# Patient Record
Sex: MALE | Race: WHITE | Employment: FULL TIME | URBAN - METROPOLITAN AREA
[De-identification: names, ages, dates, MRNs, and addresses within clinical notes are randomized per-mention and may not be internally consistent; named-entity substitution may affect disease eponyms.]

---

## 2019-05-31 ENCOUNTER — LAB ENCOUNTER (OUTPATIENT)
Dept: LAB | Age: 59
End: 2019-05-31
Attending: INTERNAL MEDICINE
Payer: COMMERCIAL

## 2019-05-31 ENCOUNTER — OFFICE VISIT (OUTPATIENT)
Dept: INTERNAL MEDICINE CLINIC | Facility: CLINIC | Age: 59
End: 2019-05-31
Payer: COMMERCIAL

## 2019-05-31 VITALS
DIASTOLIC BLOOD PRESSURE: 70 MMHG | RESPIRATION RATE: 16 BRPM | TEMPERATURE: 98 F | BODY MASS INDEX: 27.95 KG/M2 | HEART RATE: 68 BPM | WEIGHT: 176 LBS | HEIGHT: 66.5 IN | SYSTOLIC BLOOD PRESSURE: 136 MMHG

## 2019-05-31 DIAGNOSIS — Z12.5 PROSTATE CANCER SCREENING: ICD-10-CM

## 2019-05-31 DIAGNOSIS — Z13.29 THYROID DISORDER SCREENING: ICD-10-CM

## 2019-05-31 DIAGNOSIS — Z00.00 LABORATORY EXAMINATION ORDERED AS PART OF A COMPLETE PHYSICAL EXAMINATION: ICD-10-CM

## 2019-05-31 DIAGNOSIS — Z76.89 ESTABLISHING CARE WITH NEW DOCTOR, ENCOUNTER FOR: Primary | ICD-10-CM

## 2019-05-31 DIAGNOSIS — Z13.220 SCREENING FOR LIPID DISORDERS: ICD-10-CM

## 2019-05-31 DIAGNOSIS — Z13.0 SCREENING FOR BLOOD DISEASE: ICD-10-CM

## 2019-05-31 DIAGNOSIS — Z13.228 SCREENING FOR METABOLIC DISORDER: ICD-10-CM

## 2019-05-31 DIAGNOSIS — E11.3493 TYPE 2 DIABETES MELLITUS WITH SEVERE NONPROLIFERATIVE RETINOPATHY OF BOTH EYES, WITHOUT LONG-TERM CURRENT USE OF INSULIN, MACULAR EDEMA PRESENCE UNSPECIFIED (HCC): ICD-10-CM

## 2019-05-31 PROCEDURE — 84443 ASSAY THYROID STIM HORMONE: CPT

## 2019-05-31 PROCEDURE — 80061 LIPID PANEL: CPT

## 2019-05-31 PROCEDURE — 80053 COMPREHEN METABOLIC PANEL: CPT

## 2019-05-31 PROCEDURE — 83036 HEMOGLOBIN GLYCOSYLATED A1C: CPT

## 2019-05-31 PROCEDURE — 85025 COMPLETE CBC W/AUTO DIFF WBC: CPT

## 2019-05-31 PROCEDURE — 82043 UR ALBUMIN QUANTITATIVE: CPT

## 2019-05-31 PROCEDURE — 99203 OFFICE O/P NEW LOW 30 MIN: CPT | Performed by: INTERNAL MEDICINE

## 2019-05-31 PROCEDURE — 82570 ASSAY OF URINE CREATININE: CPT

## 2019-05-31 NOTE — PROGRESS NOTES
Cabrera Luu  12/7/1960    Patient presents with:  Establish Care: LB-rm 1  Diabetes      SUBJECTIVE   Cabrera Luu is a 59year old male with diabetes mellitus type 2 who presents for an evaluation.     The patient has a history of diabetes mellitus di Cardiovascular: Normal rate, regular rhythm and intact distal pulses. No murmur, rubs or gallops. Pulmonary/Chest: Effort normal and breath sounds normal. No respiratory distress. Abdominal: Soft.  Bowel sounds are normal. Non tender, no masses, no or were answered and the patient agrees with the plan.      Thank you,  Aleks Baez MD

## 2019-07-08 ENCOUNTER — OFFICE VISIT (OUTPATIENT)
Dept: INTERNAL MEDICINE CLINIC | Facility: CLINIC | Age: 59
End: 2019-07-08
Payer: COMMERCIAL

## 2019-07-08 VITALS
SYSTOLIC BLOOD PRESSURE: 138 MMHG | WEIGHT: 177 LBS | HEIGHT: 66.5 IN | RESPIRATION RATE: 16 BRPM | DIASTOLIC BLOOD PRESSURE: 76 MMHG | HEART RATE: 84 BPM | TEMPERATURE: 98 F | BODY MASS INDEX: 28.11 KG/M2

## 2019-07-08 DIAGNOSIS — E11.3493 TYPE 2 DIABETES MELLITUS WITH SEVERE NONPROLIFERATIVE RETINOPATHY OF BOTH EYES, WITHOUT LONG-TERM CURRENT USE OF INSULIN, MACULAR EDEMA PRESENCE UNSPECIFIED (HCC): Primary | ICD-10-CM

## 2019-07-08 DIAGNOSIS — E11.21 DIABETES MELLITUS WITH PROTEINURIC DIABETIC NEPHROPATHY (HCC): ICD-10-CM

## 2019-07-08 DIAGNOSIS — E78.5 DYSLIPIDEMIA ASSOCIATED WITH TYPE 2 DIABETES MELLITUS (HCC): ICD-10-CM

## 2019-07-08 DIAGNOSIS — E11.69 DYSLIPIDEMIA ASSOCIATED WITH TYPE 2 DIABETES MELLITUS (HCC): ICD-10-CM

## 2019-07-08 PROCEDURE — 99214 OFFICE O/P EST MOD 30 MIN: CPT | Performed by: INTERNAL MEDICINE

## 2019-07-08 RX ORDER — GLIPIZIDE 10 MG/1
10 TABLET ORAL
Qty: 180 TABLET | Refills: 0 | Status: SHIPPED | OUTPATIENT
Start: 2019-07-08 | End: 2020-06-22 | Stop reason: CLARIF

## 2019-07-08 RX ORDER — ATORVASTATIN CALCIUM 20 MG/1
20 TABLET, FILM COATED ORAL NIGHTLY
Qty: 90 TABLET | Refills: 0 | Status: SHIPPED | OUTPATIENT
Start: 2019-07-08 | End: 2020-06-22

## 2019-07-08 RX ORDER — METFORMIN HYDROCHLORIDE 500 MG/1
1000 TABLET, EXTENDED RELEASE ORAL 2 TIMES DAILY WITH MEALS
Qty: 360 TABLET | Refills: 0 | Status: SHIPPED | OUTPATIENT
Start: 2019-07-08 | End: 2019-10-06

## 2019-07-08 RX ORDER — LOSARTAN POTASSIUM 50 MG/1
50 TABLET ORAL DAILY
Qty: 90 TABLET | Refills: 0 | Status: SHIPPED | OUTPATIENT
Start: 2019-07-08 | End: 2020-06-22 | Stop reason: CLARIF

## 2019-07-08 NOTE — PROGRESS NOTES
Dima Nuñez  12/7/1960    Patient presents with: Follow - Up: SN Rm 7, DM f/u      [address] is a 59year old male who presents as a follow-up.     The patient has a long-standing history of diabetes mellitus diagnosed in 2001 for ic Smokeless tobacco: Never Used    Alcohol use: Never      Frequency: Never    Drug use: Never        OBJECTIVE:   /76 (BP Location: Right arm, Patient Position: Sitting, Cuff Size: adult)   Pulse 84   Temp 98 °F (36.7 °C) (Oral)   Resp 16   Ht 66. 5 to monitor his blood glucose levels regularly and return to the office within 3 weeks. Patient declined PPSV23 administration today, although he states that he is unsure of ever receiving this vaccination.   He states that he is agreeable to receiving th

## 2019-10-25 ENCOUNTER — TELEPHONE (OUTPATIENT)
Dept: INTERNAL MEDICINE CLINIC | Facility: CLINIC | Age: 59
End: 2019-10-25

## 2019-10-25 NOTE — TELEPHONE ENCOUNTER
Lan Ramirez shows DM eye exam done at [facility] on 3/23/17. Abstracted into chart.

## 2020-05-19 ENCOUNTER — OFFICE VISIT (OUTPATIENT)
Dept: INTERNAL MEDICINE CLINIC | Facility: CLINIC | Age: 60
End: 2020-05-19
Payer: COMMERCIAL

## 2020-05-19 VITALS
SYSTOLIC BLOOD PRESSURE: 90 MMHG | DIASTOLIC BLOOD PRESSURE: 80 MMHG | TEMPERATURE: 98 F | BODY MASS INDEX: 27 KG/M2 | WEIGHT: 171 LBS | HEART RATE: 80 BPM

## 2020-05-19 DIAGNOSIS — E11.3493 TYPE 2 DIABETES MELLITUS WITH SEVERE NONPROLIFERATIVE RETINOPATHY OF BOTH EYES, WITHOUT LONG-TERM CURRENT USE OF INSULIN, MACULAR EDEMA PRESENCE UNSPECIFIED (HCC): Primary | ICD-10-CM

## 2020-05-19 DIAGNOSIS — Z13.29 THYROID DISORDER SCREENING: ICD-10-CM

## 2020-05-19 DIAGNOSIS — E78.5 DYSLIPIDEMIA ASSOCIATED WITH TYPE 2 DIABETES MELLITUS (HCC): ICD-10-CM

## 2020-05-19 DIAGNOSIS — Z12.11 SCREEN FOR COLON CANCER: ICD-10-CM

## 2020-05-19 DIAGNOSIS — Z00.00 LABORATORY EXAMINATION ORDERED AS PART OF A COMPLETE PHYSICAL EXAMINATION: ICD-10-CM

## 2020-05-19 DIAGNOSIS — E11.21 DIABETES MELLITUS WITH PROTEINURIC DIABETIC NEPHROPATHY (HCC): ICD-10-CM

## 2020-05-19 DIAGNOSIS — Z13.0 SCREENING FOR BLOOD DISEASE: ICD-10-CM

## 2020-05-19 DIAGNOSIS — E11.69 DYSLIPIDEMIA ASSOCIATED WITH TYPE 2 DIABETES MELLITUS (HCC): ICD-10-CM

## 2020-05-19 PROCEDURE — 99214 OFFICE O/P EST MOD 30 MIN: CPT | Performed by: INTERNAL MEDICINE

## 2020-05-19 PROCEDURE — 3074F SYST BP LT 130 MM HG: CPT | Performed by: INTERNAL MEDICINE

## 2020-05-19 PROCEDURE — 3079F DIAST BP 80-89 MM HG: CPT | Performed by: INTERNAL MEDICINE

## 2020-05-19 RX ORDER — DOCUSATE SODIUM 100 MG/1
100 CAPSULE, LIQUID FILLED ORAL 2 TIMES DAILY
Qty: 60 CAPSULE | Refills: 0 | Status: SHIPPED | OUTPATIENT
Start: 2020-05-19 | End: 2020-06-18

## 2020-05-19 RX ORDER — TRAMADOL HYDROCHLORIDE 50 MG/1
50 TABLET ORAL EVERY 8 HOURS PRN
Qty: 21 TABLET | Refills: 0 | Status: SHIPPED | OUTPATIENT
Start: 2020-05-19 | End: 2020-05-26

## 2020-05-19 NOTE — PROGRESS NOTES
[address]  12/7/1960    Patient presents with:  Pain: nerve pain   Urinary Frequency: [id number]  Health Maintenance: reminded      [address] is a 62year old male who presents as a follow-up.     The patient was last evaluated one BMI 27.19 kg/m²   Constitutional: Oriented to person, place, and time. No distress. HEENT:  Normocephalic and atraumatic. TM's wnl. Nose normal. Oropharynx is clear and moist.   Eyes: Conjunctivae wnl. Neck: Normal range of motion. Neck supple.  Normal prescriptions requested or ordered in this encounter       Imaging & Consults:  None    No follow-ups on file. There are no Patient Instructions on file for this visit. All questions were answered and the patient agrees with the plan.      Thank you,  A

## 2020-06-09 NOTE — H&P
New Patient Visit Note       Active Problems      1. Screen for colon cancer    2. Inguinodynia, right    3. Coccydynia    4. Pain in both testicles        Chief Complaint   Patient presents with:  Colonoscopy Screening: Need for colonoscopy.   Right groin reviewed by me today. Past Medical History:   Diagnosis Date   • Type II or unspecified type diabetes mellitus without mention of complication, not stated as uncontrolled      History reviewed. No pertinent surgical history.     Family History   Problem Jose  ) 179/96   Pulse 84   Temp 98.1 °F (36.7 °C)   Resp 16   Wt 174 lb (78.9 kg)   BMI 27.66 kg/m²   Physical Exam   Constitutional: He is oriented to person, place, and time. He appears well-developed and well-nourished. No distress.    HENT:   Head: Normoce colonoscopy. The likelihood of an anal or rectal mass contributing to the groin or coccygeal pain is very low, however we will evaluate this on colonoscopy. 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   R

## 2020-06-10 ENCOUNTER — OFFICE VISIT (OUTPATIENT)
Dept: SURGERY | Facility: CLINIC | Age: 60
End: 2020-06-10
Payer: COMMERCIAL

## 2020-06-10 VITALS
HEART RATE: 84 BPM | SYSTOLIC BLOOD PRESSURE: 179 MMHG | BODY MASS INDEX: 28 KG/M2 | WEIGHT: 174 LBS | DIASTOLIC BLOOD PRESSURE: 96 MMHG | RESPIRATION RATE: 16 BRPM | TEMPERATURE: 98 F

## 2020-06-10 DIAGNOSIS — Z12.11 SCREEN FOR COLON CANCER: Primary | ICD-10-CM

## 2020-06-10 DIAGNOSIS — N50.812 PAIN IN BOTH TESTICLES: ICD-10-CM

## 2020-06-10 DIAGNOSIS — M53.3 COCCYDYNIA: ICD-10-CM

## 2020-06-10 DIAGNOSIS — N50.811 PAIN IN BOTH TESTICLES: ICD-10-CM

## 2020-06-10 DIAGNOSIS — R10.31 INGUINODYNIA, RIGHT: ICD-10-CM

## 2020-06-10 PROCEDURE — 99204 OFFICE O/P NEW MOD 45 MIN: CPT | Performed by: COLON & RECTAL SURGERY

## 2020-06-10 RX ORDER — POLYETHYLENE GLYCOL 3350, SODIUM CHLORIDE, SODIUM BICARBONATE, POTASSIUM CHLORIDE 420; 11.2; 5.72; 1.48 G/4L; G/4L; G/4L; G/4L
POWDER, FOR SOLUTION ORAL
Qty: 1 BOTTLE | Refills: 0 | Status: SHIPPED | OUTPATIENT
Start: 2020-06-10 | End: 2020-06-22 | Stop reason: CLARIF

## 2020-06-13 NOTE — PATIENT INSTRUCTIONS
Assessment   Screen for colon cancer  (primary encounter diagnosis)  Inguinodynia, right  Coccydynia  Pain in both testicles      Plan   The patient is 62year old and has not had a colonoscopy. The patient's family history is negative.  The patient does no

## 2020-06-19 ENCOUNTER — OFFICE VISIT (OUTPATIENT)
Dept: INTERNAL MEDICINE CLINIC | Facility: CLINIC | Age: 60
End: 2020-06-19
Payer: COMMERCIAL

## 2020-06-19 VITALS
WEIGHT: 170 LBS | SYSTOLIC BLOOD PRESSURE: 118 MMHG | BODY MASS INDEX: 27 KG/M2 | HEART RATE: 60 BPM | HEIGHT: 66.5 IN | DIASTOLIC BLOOD PRESSURE: 56 MMHG | TEMPERATURE: 98 F

## 2020-06-19 DIAGNOSIS — Z91.19 MEDICALLY NONCOMPLIANT: ICD-10-CM

## 2020-06-19 DIAGNOSIS — M53.3 COCCYDYNIA: Primary | ICD-10-CM

## 2020-06-19 DIAGNOSIS — E11.21 DIABETES MELLITUS WITH PROTEINURIC DIABETIC NEPHROPATHY (HCC): ICD-10-CM

## 2020-06-19 DIAGNOSIS — E11.3493 TYPE 2 DIABETES MELLITUS WITH SEVERE NONPROLIFERATIVE RETINOPATHY OF BOTH EYES, WITHOUT LONG-TERM CURRENT USE OF INSULIN, MACULAR EDEMA PRESENCE UNSPECIFIED (HCC): ICD-10-CM

## 2020-06-19 PROCEDURE — 99214 OFFICE O/P EST MOD 30 MIN: CPT | Performed by: INTERNAL MEDICINE

## 2020-06-19 NOTE — PROGRESS NOTES
Emilie Palm  12/7/1960    Patient presents with: Follow - Up: AJ rm 6 f/u from last office visit      [address] is a 62year old male who presents with worsening tailbone pain.     The patient was last evaluated approximately 4 wee total) by mouth daily. 90 tablet 0   • glipiZIDE 10 MG Oral Tab Take 1 tablet (10 mg total) by mouth 2 (two) times daily before meals. 180 tablet 0   • linagliptin 5 mg Oral Tab Take 1 tablet (5 mg total) by mouth daily.  90 tablet 0      No Known Allergies neurological deficits. Given the severity of symptoms, I have ordered a plain film of the sacrum and coccyx, and referred the patient to the pain service for further evaluation and management. The patient has declined oral steroid therapy at this time.

## 2020-06-22 ENCOUNTER — HOSPITAL ENCOUNTER (EMERGENCY)
Facility: HOSPITAL | Age: 60
Discharge: LEFT BEFORE DISCHARGE | End: 2020-06-22
Attending: EMERGENCY MEDICINE
Payer: COMMERCIAL

## 2020-06-22 ENCOUNTER — APPOINTMENT (OUTPATIENT)
Dept: ULTRASOUND IMAGING | Facility: HOSPITAL | Age: 60
End: 2020-06-22
Attending: EMERGENCY MEDICINE
Payer: COMMERCIAL

## 2020-06-22 ENCOUNTER — APPOINTMENT (OUTPATIENT)
Dept: GENERAL RADIOLOGY | Facility: HOSPITAL | Age: 60
End: 2020-06-22
Attending: EMERGENCY MEDICINE
Payer: COMMERCIAL

## 2020-06-22 VITALS
OXYGEN SATURATION: 97 % | RESPIRATION RATE: 20 BRPM | TEMPERATURE: 99 F | BODY MASS INDEX: 25.01 KG/M2 | HEIGHT: 68 IN | WEIGHT: 165 LBS | HEART RATE: 96 BPM | SYSTOLIC BLOOD PRESSURE: 178 MMHG | DIASTOLIC BLOOD PRESSURE: 92 MMHG

## 2020-06-22 DIAGNOSIS — N17.9 ACUTE KIDNEY INJURY (HCC): ICD-10-CM

## 2020-06-22 DIAGNOSIS — L03.115 CELLULITIS OF RIGHT LOWER EXTREMITY: Primary | ICD-10-CM

## 2020-06-22 PROBLEM — E87.1 HYPONATREMIA: Status: ACTIVE | Noted: 2020-06-22

## 2020-06-22 PROBLEM — D64.9 ANEMIA: Status: ACTIVE | Noted: 2020-06-22

## 2020-06-22 PROBLEM — D72.829 LEUKOCYTOSIS: Status: ACTIVE | Noted: 2020-06-22

## 2020-06-22 PROCEDURE — 87040 BLOOD CULTURE FOR BACTERIA: CPT | Performed by: EMERGENCY MEDICINE

## 2020-06-22 PROCEDURE — 96365 THER/PROPH/DIAG IV INF INIT: CPT

## 2020-06-22 PROCEDURE — 99284 EMERGENCY DEPT VISIT MOD MDM: CPT

## 2020-06-22 PROCEDURE — 87186 SC STD MICRODIL/AGAR DIL: CPT | Performed by: EMERGENCY MEDICINE

## 2020-06-22 PROCEDURE — 87147 CULTURE TYPE IMMUNOLOGIC: CPT | Performed by: EMERGENCY MEDICINE

## 2020-06-22 PROCEDURE — 87150 DNA/RNA AMPLIFIED PROBE: CPT | Performed by: EMERGENCY MEDICINE

## 2020-06-22 PROCEDURE — 93971 EXTREMITY STUDY: CPT | Performed by: EMERGENCY MEDICINE

## 2020-06-22 PROCEDURE — 36415 COLL VENOUS BLD VENIPUNCTURE: CPT

## 2020-06-22 PROCEDURE — 85025 COMPLETE CBC W/AUTO DIFF WBC: CPT | Performed by: EMERGENCY MEDICINE

## 2020-06-22 PROCEDURE — 83605 ASSAY OF LACTIC ACID: CPT | Performed by: EMERGENCY MEDICINE

## 2020-06-22 PROCEDURE — 73630 X-RAY EXAM OF FOOT: CPT | Performed by: EMERGENCY MEDICINE

## 2020-06-22 PROCEDURE — 80053 COMPREHEN METABOLIC PANEL: CPT | Performed by: EMERGENCY MEDICINE

## 2020-06-22 RX ORDER — CEFADROXIL 500 MG/1
500 CAPSULE ORAL 2 TIMES DAILY
Qty: 20 CAPSULE | Refills: 0 | Status: SHIPPED | OUTPATIENT
Start: 2020-06-22 | End: 2020-06-26

## 2020-06-22 NOTE — ED NOTES
Pt refuses to stay in the hospital to be admitted. Dr Yg Waters attempted to talk with pt and explained the risk factors and pt continues to refuse and want to leave against medical advice.

## 2020-06-22 NOTE — ED INITIAL ASSESSMENT (HPI)
PT STATES HE STARTED WITH REDNESS AND SWELLING TO THE RIGHT FOOT AND ANKLE ON Saturday. HE STATES HE BUMPED HIS FOOT ON A THRESHOLD AT HOME 2 WEEKS AGO.  PT IS NON COMPLIANT WITH HIS DIABETES MEDS AND STATES HIS LAST BLOOD SUGARS HAVE BEEN RUNNING IN THE 30

## 2020-06-23 NOTE — ED NOTES
PT CONTACTED NOTIFIED OF POSITIVE BLOOD CULTURES. PT IS ASKED TO RETURN TO THE ED FOR FURTHER TREATMENT WITH IV ANTIBX AND ADMISSION. PT STATES YOU KNOW I HAVE ALREADY BEEN ON IV ANTIBX.  I TOLD THE PT NO I DIDN'T KNOW BECAUSE I DID NOT LOOK BACK INTO YOUR

## 2020-06-25 ENCOUNTER — TELEPHONE (OUTPATIENT)
Dept: INTERNAL MEDICINE CLINIC | Facility: CLINIC | Age: 60
End: 2020-06-25

## 2020-06-25 NOTE — TELEPHONE ENCOUNTER
Appointment changed to doximity visit and patient is aware.  [facility]      [phone]

## 2020-06-25 NOTE — TELEPHONE ENCOUNTER
Unfortunately yes he will need virtual follow-up. Doximity or via Epic would be preferred for video evaluation. Otherwise he may send a photo of his wound.

## 2020-06-25 NOTE — TELEPHONE ENCOUNTER
AD please advise as patient is scheduled for ER f/u ov with you 7/2; patient has r/o covid infection alert on chart from 6/22/2020. Does not appear covid testing was collected during ER visit. Schedule virtually instead of OV? Please advise.

## 2020-06-26 ENCOUNTER — APPOINTMENT (OUTPATIENT)
Dept: CT IMAGING | Facility: HOSPITAL | Age: 60
DRG: 853 | End: 2020-06-26
Attending: EMERGENCY MEDICINE
Payer: COMMERCIAL

## 2020-06-26 ENCOUNTER — ANESTHESIA EVENT (OUTPATIENT)
Dept: SURGERY | Facility: HOSPITAL | Age: 60
DRG: 853 | End: 2020-06-26
Payer: COMMERCIAL

## 2020-06-26 ENCOUNTER — ANESTHESIA (OUTPATIENT)
Dept: SURGERY | Facility: HOSPITAL | Age: 60
DRG: 853 | End: 2020-06-26
Payer: COMMERCIAL

## 2020-06-26 ENCOUNTER — HOSPITAL ENCOUNTER (INPATIENT)
Facility: HOSPITAL | Age: 60
LOS: 5 days | Discharge: HOME HEALTH | DRG: 853 | End: 2020-07-02
Attending: EMERGENCY MEDICINE | Admitting: HOSPITALIST
Payer: COMMERCIAL

## 2020-06-26 DIAGNOSIS — M72.6 NECROTIZING FASCIITIS OF ANKLE AND FOOT (HCC): ICD-10-CM

## 2020-06-26 DIAGNOSIS — E11.65 TYPE 2 DIABETES MELLITUS WITH HYPERGLYCEMIA, UNSPECIFIED WHETHER LONG TERM INSULIN USE (HCC): ICD-10-CM

## 2020-06-26 DIAGNOSIS — E78.5 DYSLIPIDEMIA ASSOCIATED WITH TYPE 2 DIABETES MELLITUS (HCC): ICD-10-CM

## 2020-06-26 DIAGNOSIS — E11.69 DYSLIPIDEMIA ASSOCIATED WITH TYPE 2 DIABETES MELLITUS (HCC): ICD-10-CM

## 2020-06-26 DIAGNOSIS — L03.115 CELLULITIS OF RIGHT LOWER EXTREMITY: Primary | ICD-10-CM

## 2020-06-26 LAB
ACETONE: NEGATIVE
ALBUMIN SERPL-MCNC: 2.1 G/DL (ref 3.4–5)
ALBUMIN/GLOB SERPL: 0.4 {RATIO} (ref 1–2)
ALP LIVER SERPL-CCNC: 179 U/L (ref 45–117)
ALT SERPL-CCNC: 18 U/L (ref 16–61)
ANION GAP SERPL CALC-SCNC: 10 MMOL/L (ref 0–18)
ANTIBODY SCREEN: NEGATIVE
APTT PPP: 38.2 SECONDS (ref 25.4–36.1)
AST SERPL-CCNC: 7 U/L (ref 15–37)
BASOPHILS # BLD AUTO: 0.03 X10(3) UL (ref 0–0.2)
BASOPHILS NFR BLD AUTO: 0.2 %
BILIRUB SERPL-MCNC: 0.4 MG/DL (ref 0.1–2)
BILIRUB UR QL STRIP.AUTO: NEGATIVE
BUN BLD-MCNC: 49 MG/DL (ref 7–18)
BUN/CREAT SERPL: 23.7 (ref 10–20)
CALCIUM BLD-MCNC: 9.1 MG/DL (ref 8.5–10.1)
CHLORIDE SERPL-SCNC: 101 MMOL/L (ref 98–112)
CO2 SERPL-SCNC: 23 MMOL/L (ref 21–32)
COLOR UR AUTO: YELLOW
CREAT BLD-MCNC: 2.07 MG/DL (ref 0.7–1.3)
DEPRECATED RDW RBC AUTO: 35.4 FL (ref 35.1–46.3)
EOSINOPHIL # BLD AUTO: 0.03 X10(3) UL (ref 0–0.7)
EOSINOPHIL NFR BLD AUTO: 0.2 %
ERYTHROCYTE [DISTWIDTH] IN BLOOD BY AUTOMATED COUNT: 11.9 % (ref 11–15)
GLOBULIN PLAS-MCNC: 5 G/DL (ref 2.8–4.4)
GLUCOSE BLD-MCNC: 110 MG/DL (ref 70–99)
GLUCOSE BLD-MCNC: 314 MG/DL (ref 70–99)
GLUCOSE BLD-MCNC: 403 MG/DL (ref 70–99)
GLUCOSE UR STRIP.AUTO-MCNC: >=500 MG/DL
HCT VFR BLD AUTO: 29.2 % (ref 39–53)
HGB BLD-MCNC: 9.6 G/DL (ref 13–17.5)
HYALINE CASTS #/AREA URNS AUTO: PRESENT /LPF
IMM GRANULOCYTES # BLD AUTO: 0.15 X10(3) UL (ref 0–1)
IMM GRANULOCYTES NFR BLD: 0.8 %
INR BLD: 1.04 (ref 0.89–1.11)
KETONES UR STRIP.AUTO-MCNC: NEGATIVE MG/DL
LACTATE SERPL-SCNC: 1 MMOL/L (ref 0.4–2)
LEUKOCYTE ESTERASE UR QL STRIP.AUTO: NEGATIVE
LYMPHOCYTES # BLD AUTO: 0.78 X10(3) UL (ref 1–4)
LYMPHOCYTES NFR BLD AUTO: 4.4 %
M PROTEIN MFR SERPL ELPH: 7.1 G/DL (ref 6.4–8.2)
MCH RBC QN AUTO: 26.8 PG (ref 26–34)
MCHC RBC AUTO-ENTMCNC: 32.9 G/DL (ref 31–37)
MCV RBC AUTO: 81.6 FL (ref 80–100)
MONOCYTES # BLD AUTO: 1.06 X10(3) UL (ref 0.1–1)
MONOCYTES NFR BLD AUTO: 5.9 %
NEUTROPHILS # BLD AUTO: 15.78 X10 (3) UL (ref 1.5–7.7)
NEUTROPHILS # BLD AUTO: 15.78 X10(3) UL (ref 1.5–7.7)
NEUTROPHILS NFR BLD AUTO: 88.5 %
NITRITE UR QL STRIP.AUTO: NEGATIVE
OSMOLALITY SERPL CALC.SUM OF ELEC: 308 MOSM/KG (ref 275–295)
PH UR STRIP.AUTO: 5 [PH] (ref 4.5–8)
PLATELET # BLD AUTO: 312 10(3)UL (ref 150–450)
POTASSIUM SERPL-SCNC: 4.3 MMOL/L (ref 3.5–5.1)
PROT UR STRIP.AUTO-MCNC: >=500 MG/DL
PSA SERPL DL<=0.01 NG/ML-MCNC: 13.9 SECONDS (ref 12.4–14.6)
RBC # BLD AUTO: 3.58 X10(6)UL (ref 4.3–5.7)
RH BLOOD TYPE: POSITIVE
SARS-COV-2 RNA RESP QL NAA+PROBE: NOT DETECTED
SODIUM SERPL-SCNC: 134 MMOL/L (ref 136–145)
SP GR UR STRIP.AUTO: 1.02 (ref 1–1.03)
UROBILINOGEN UR STRIP.AUTO-MCNC: <2 MG/DL
VENOUS BASE EXCESS: -2.2
VENOUS BLOOD GAS HCO3: 23.3 MEQ/L (ref 23–27)
VENOUS O2 SAT CALC: 31 % (ref 72–78)
VENOUS O2 SATURATION: 30 % (ref 72–78)
VENOUS PCO2: 43 MM HG (ref 38–50)
VENOUS PH: 7.35 (ref 7.33–7.43)
VENOUS PO2: 21 MM HG (ref 30–50)
WBC # BLD AUTO: 17.8 X10(3) UL (ref 4–11)

## 2020-06-26 PROCEDURE — 73700 CT LOWER EXTREMITY W/O DYE: CPT | Performed by: EMERGENCY MEDICINE

## 2020-06-26 PROCEDURE — 99223 1ST HOSP IP/OBS HIGH 75: CPT | Performed by: HOSPITALIST

## 2020-06-26 PROCEDURE — 0JDQ0ZZ EXTRACTION OF RIGHT FOOT SUBCUTANEOUS TISSUE AND FASCIA, OPEN APPROACH: ICD-10-PCS | Performed by: PODIATRIST

## 2020-06-26 RX ORDER — DIPHENHYDRAMINE HYDROCHLORIDE 50 MG/ML
12.5 INJECTION INTRAMUSCULAR; INTRAVENOUS AS NEEDED
Status: DISCONTINUED | OUTPATIENT
Start: 2020-06-26 | End: 2020-06-27 | Stop reason: HOSPADM

## 2020-06-26 RX ORDER — EPHEDRINE SULFATE 50 MG/ML
INJECTION, SOLUTION INTRAVENOUS AS NEEDED
Status: DISCONTINUED | OUTPATIENT
Start: 2020-06-26 | End: 2020-06-26 | Stop reason: SURG

## 2020-06-26 RX ORDER — VANCOMYCIN 2 GRAM/500 ML IN 0.9 % SODIUM CHLORIDE INTRAVENOUS
25 ONCE
Status: COMPLETED | OUTPATIENT
Start: 2020-06-26 | End: 2020-06-26

## 2020-06-26 RX ORDER — INSULIN ASPART 100 [IU]/ML
INJECTION, SOLUTION INTRAVENOUS; SUBCUTANEOUS ONCE
Status: DISCONTINUED | OUTPATIENT
Start: 2020-06-26 | End: 2020-06-27 | Stop reason: HOSPADM

## 2020-06-26 RX ORDER — ONDANSETRON 2 MG/ML
INJECTION INTRAMUSCULAR; INTRAVENOUS AS NEEDED
Status: DISCONTINUED | OUTPATIENT
Start: 2020-06-26 | End: 2020-06-26 | Stop reason: SURG

## 2020-06-26 RX ORDER — CEFADROXIL 500 MG/1
500 CAPSULE ORAL 2 TIMES DAILY
Status: ON HOLD | COMMUNITY
Start: 2020-06-22 | End: 2020-07-02

## 2020-06-26 RX ORDER — LIDOCAINE HYDROCHLORIDE 40 MG/ML
SOLUTION TOPICAL AS NEEDED
Status: DISCONTINUED | OUTPATIENT
Start: 2020-06-26 | End: 2020-06-26 | Stop reason: SURG

## 2020-06-26 RX ORDER — MEPERIDINE HYDROCHLORIDE 25 MG/ML
12.5 INJECTION INTRAMUSCULAR; INTRAVENOUS; SUBCUTANEOUS AS NEEDED
Status: DISCONTINUED | OUTPATIENT
Start: 2020-06-26 | End: 2020-06-27 | Stop reason: HOSPADM

## 2020-06-26 RX ORDER — SODIUM CHLORIDE, SODIUM LACTATE, POTASSIUM CHLORIDE, CALCIUM CHLORIDE 600; 310; 30; 20 MG/100ML; MG/100ML; MG/100ML; MG/100ML
INJECTION, SOLUTION INTRAVENOUS CONTINUOUS
Status: DISCONTINUED | OUTPATIENT
Start: 2020-06-26 | End: 2020-06-27 | Stop reason: HOSPADM

## 2020-06-26 RX ORDER — ONDANSETRON 2 MG/ML
4 INJECTION INTRAMUSCULAR; INTRAVENOUS AS NEEDED
Status: DISCONTINUED | OUTPATIENT
Start: 2020-06-26 | End: 2020-06-27 | Stop reason: HOSPADM

## 2020-06-26 RX ORDER — MIDAZOLAM HYDROCHLORIDE 1 MG/ML
1 INJECTION INTRAMUSCULAR; INTRAVENOUS EVERY 5 MIN PRN
Status: DISCONTINUED | OUTPATIENT
Start: 2020-06-26 | End: 2020-06-27 | Stop reason: HOSPADM

## 2020-06-26 RX ORDER — DEXAMETHASONE SODIUM PHOSPHATE 4 MG/ML
VIAL (ML) INJECTION AS NEEDED
Status: DISCONTINUED | OUTPATIENT
Start: 2020-06-26 | End: 2020-06-26 | Stop reason: SURG

## 2020-06-26 RX ORDER — INSULIN ASPART 100 [IU]/ML
0.2 INJECTION, SOLUTION INTRAVENOUS; SUBCUTANEOUS ONCE
Status: COMPLETED | OUTPATIENT
Start: 2020-06-26 | End: 2020-06-26

## 2020-06-26 RX ORDER — HYDROCODONE BITARTRATE AND ACETAMINOPHEN 5; 325 MG/1; MG/1
1 TABLET ORAL AS NEEDED
Status: DISCONTINUED | OUTPATIENT
Start: 2020-06-26 | End: 2020-06-27 | Stop reason: HOSPADM

## 2020-06-26 RX ORDER — LIDOCAINE HYDROCHLORIDE 10 MG/ML
INJECTION, SOLUTION EPIDURAL; INFILTRATION; INTRACAUDAL; PERINEURAL AS NEEDED
Status: DISCONTINUED | OUTPATIENT
Start: 2020-06-26 | End: 2020-06-26 | Stop reason: SURG

## 2020-06-26 RX ORDER — HYDROCODONE BITARTRATE AND ACETAMINOPHEN 5; 325 MG/1; MG/1
2 TABLET ORAL AS NEEDED
Status: DISCONTINUED | OUTPATIENT
Start: 2020-06-26 | End: 2020-06-27 | Stop reason: HOSPADM

## 2020-06-26 RX ORDER — NALOXONE HYDROCHLORIDE 0.4 MG/ML
80 INJECTION, SOLUTION INTRAMUSCULAR; INTRAVENOUS; SUBCUTANEOUS AS NEEDED
Status: DISCONTINUED | OUTPATIENT
Start: 2020-06-26 | End: 2020-06-27 | Stop reason: HOSPADM

## 2020-06-26 RX ORDER — SODIUM CHLORIDE 9 MG/ML
INJECTION, SOLUTION INTRAVENOUS CONTINUOUS PRN
Status: DISCONTINUED | OUTPATIENT
Start: 2020-06-26 | End: 2020-06-26 | Stop reason: SURG

## 2020-06-26 RX ORDER — DEXTROSE MONOHYDRATE 25 G/50ML
50 INJECTION, SOLUTION INTRAVENOUS
Status: DISCONTINUED | OUTPATIENT
Start: 2020-06-26 | End: 2020-06-27 | Stop reason: HOSPADM

## 2020-06-26 RX ORDER — HYDROMORPHONE HYDROCHLORIDE 1 MG/ML
0.4 INJECTION, SOLUTION INTRAMUSCULAR; INTRAVENOUS; SUBCUTANEOUS EVERY 5 MIN PRN
Status: DISCONTINUED | OUTPATIENT
Start: 2020-06-26 | End: 2020-06-27 | Stop reason: HOSPADM

## 2020-06-26 RX ADMIN — LIDOCAINE HYDROCHLORIDE 50 MG: 10 INJECTION, SOLUTION EPIDURAL; INFILTRATION; INTRACAUDAL; PERINEURAL at 22:51:00

## 2020-06-26 RX ADMIN — ONDANSETRON 4 MG: 2 INJECTION INTRAMUSCULAR; INTRAVENOUS at 22:51:00

## 2020-06-26 RX ADMIN — SODIUM CHLORIDE: 9 INJECTION, SOLUTION INTRAVENOUS at 22:46:00

## 2020-06-26 RX ADMIN — EPHEDRINE SULFATE 5 MG: 50 INJECTION, SOLUTION INTRAVENOUS at 23:18:00

## 2020-06-26 RX ADMIN — EPHEDRINE SULFATE 10 MG: 50 INJECTION, SOLUTION INTRAVENOUS at 22:58:00

## 2020-06-26 RX ADMIN — LIDOCAINE HYDROCHLORIDE 4 ML: 40 SOLUTION TOPICAL at 22:51:00

## 2020-06-26 RX ADMIN — DEXAMETHASONE SODIUM PHOSPHATE 4 MG: 4 MG/ML VIAL (ML) INJECTION at 22:51:00

## 2020-06-26 NOTE — ED INITIAL ASSESSMENT (HPI)
PT seen in hospital on Monday for swollen foot. PT received call early Tuesday morning for positive blood cultures. Denies fevers. Denies vomiting or diarrhea.

## 2020-06-27 ENCOUNTER — APPOINTMENT (OUTPATIENT)
Dept: MRI IMAGING | Facility: HOSPITAL | Age: 60
DRG: 853 | End: 2020-06-27
Attending: PODIATRIST
Payer: COMMERCIAL

## 2020-06-27 ENCOUNTER — APPOINTMENT (OUTPATIENT)
Dept: CV DIAGNOSTICS | Facility: HOSPITAL | Age: 60
DRG: 853 | End: 2020-06-27
Attending: INTERNAL MEDICINE
Payer: COMMERCIAL

## 2020-06-27 PROBLEM — L03.90 CELLULITIS: Status: ACTIVE | Noted: 2020-06-27

## 2020-06-27 LAB
ANION GAP SERPL CALC-SCNC: 10 MMOL/L (ref 0–18)
BASOPHILS # BLD AUTO: 0.02 X10(3) UL (ref 0–0.2)
BASOPHILS NFR BLD AUTO: 0.1 %
BUN BLD-MCNC: 43 MG/DL (ref 7–18)
BUN/CREAT SERPL: 25.9 (ref 10–20)
CALCIUM BLD-MCNC: 8.3 MG/DL (ref 8.5–10.1)
CHLORIDE SERPL-SCNC: 109 MMOL/L (ref 98–112)
CO2 SERPL-SCNC: 20 MMOL/L (ref 21–32)
CREAT BLD-MCNC: 1.66 MG/DL (ref 0.7–1.3)
DEPRECATED RDW RBC AUTO: 36 FL (ref 35.1–46.3)
EOSINOPHIL # BLD AUTO: 0.09 X10(3) UL (ref 0–0.7)
EOSINOPHIL NFR BLD AUTO: 0.6 %
ERYTHROCYTE [DISTWIDTH] IN BLOOD BY AUTOMATED COUNT: 11.9 % (ref 11–15)
EST. AVERAGE GLUCOSE BLD GHB EST-MCNC: 289 MG/DL (ref 68–126)
GLUCOSE BLD-MCNC: 153 MG/DL (ref 70–99)
GLUCOSE BLD-MCNC: 172 MG/DL (ref 70–99)
GLUCOSE BLD-MCNC: 239 MG/DL (ref 70–99)
GLUCOSE BLD-MCNC: 252 MG/DL (ref 70–99)
GLUCOSE BLD-MCNC: 284 MG/DL (ref 70–99)
HBA1C MFR BLD HPLC: 11.7 % (ref ?–5.7)
HCT VFR BLD AUTO: 24.1 % (ref 39–53)
HGB BLD-MCNC: 7.9 G/DL (ref 13–17.5)
IMM GRANULOCYTES # BLD AUTO: 0.11 X10(3) UL (ref 0–1)
IMM GRANULOCYTES NFR BLD: 0.7 %
LYMPHOCYTES # BLD AUTO: 1.39 X10(3) UL (ref 1–4)
LYMPHOCYTES NFR BLD AUTO: 8.8 %
MCH RBC QN AUTO: 27.2 PG (ref 26–34)
MCHC RBC AUTO-ENTMCNC: 32.8 G/DL (ref 31–37)
MCV RBC AUTO: 83.1 FL (ref 80–100)
MONOCYTES # BLD AUTO: 1.07 X10(3) UL (ref 0.1–1)
MONOCYTES NFR BLD AUTO: 6.8 %
NEUTROPHILS # BLD AUTO: 13.04 X10 (3) UL (ref 1.5–7.7)
NEUTROPHILS # BLD AUTO: 13.04 X10(3) UL (ref 1.5–7.7)
NEUTROPHILS NFR BLD AUTO: 83 %
OSMOLALITY SERPL CALC.SUM OF ELEC: 302 MOSM/KG (ref 275–295)
PLATELET # BLD AUTO: 262 10(3)UL (ref 150–450)
POTASSIUM SERPL-SCNC: 3.8 MMOL/L (ref 3.5–5.1)
RBC # BLD AUTO: 2.9 X10(6)UL (ref 4.3–5.7)
SODIUM SERPL-SCNC: 139 MMOL/L (ref 136–145)
WBC # BLD AUTO: 15.7 X10(3) UL (ref 4–11)

## 2020-06-27 PROCEDURE — 73718 MRI LOWER EXTREMITY W/O DYE: CPT | Performed by: PODIATRIST

## 2020-06-27 PROCEDURE — 93306 TTE W/DOPPLER COMPLETE: CPT | Performed by: INTERNAL MEDICINE

## 2020-06-27 PROCEDURE — 99232 SBSQ HOSP IP/OBS MODERATE 35: CPT | Performed by: HOSPITALIST

## 2020-06-27 RX ORDER — METOCLOPRAMIDE HYDROCHLORIDE 5 MG/ML
5 INJECTION INTRAMUSCULAR; INTRAVENOUS EVERY 8 HOURS PRN
Status: DISCONTINUED | OUTPATIENT
Start: 2020-06-27 | End: 2020-07-02

## 2020-06-27 RX ORDER — HYDROCODONE BITARTRATE AND ACETAMINOPHEN 5; 325 MG/1; MG/1
1 TABLET ORAL EVERY 4 HOURS PRN
Status: DISCONTINUED | OUTPATIENT
Start: 2020-06-27 | End: 2020-07-02

## 2020-06-27 RX ORDER — ONDANSETRON 2 MG/ML
4 INJECTION INTRAMUSCULAR; INTRAVENOUS EVERY 6 HOURS PRN
Status: DISCONTINUED | OUTPATIENT
Start: 2020-06-27 | End: 2020-07-02

## 2020-06-27 RX ORDER — MORPHINE SULFATE 4 MG/ML
1 INJECTION, SOLUTION INTRAMUSCULAR; INTRAVENOUS EVERY 2 HOUR PRN
Status: DISCONTINUED | OUTPATIENT
Start: 2020-06-27 | End: 2020-07-02

## 2020-06-27 RX ORDER — SODIUM CHLORIDE 9 MG/ML
INJECTION, SOLUTION INTRAVENOUS CONTINUOUS
Status: DISCONTINUED | OUTPATIENT
Start: 2020-06-27 | End: 2020-06-28

## 2020-06-27 RX ORDER — MORPHINE SULFATE 4 MG/ML
2 INJECTION, SOLUTION INTRAMUSCULAR; INTRAVENOUS EVERY 2 HOUR PRN
Status: DISCONTINUED | OUTPATIENT
Start: 2020-06-27 | End: 2020-07-02

## 2020-06-27 RX ORDER — HYDROCODONE BITARTRATE AND ACETAMINOPHEN 5; 325 MG/1; MG/1
2 TABLET ORAL EVERY 4 HOURS PRN
Status: DISCONTINUED | OUTPATIENT
Start: 2020-06-27 | End: 2020-07-02

## 2020-06-27 RX ORDER — ENOXAPARIN SODIUM 100 MG/ML
40 INJECTION SUBCUTANEOUS DAILY
Status: DISCONTINUED | OUTPATIENT
Start: 2020-06-27 | End: 2020-07-02

## 2020-06-27 RX ORDER — DEXTROSE MONOHYDRATE 25 G/50ML
50 INJECTION, SOLUTION INTRAVENOUS
Status: DISCONTINUED | OUTPATIENT
Start: 2020-06-27 | End: 2020-07-02

## 2020-06-27 RX ORDER — HYDROCHLOROTHIAZIDE 12.5 MG/1
12.5 CAPSULE, GELATIN COATED ORAL DAILY
Status: DISCONTINUED | OUTPATIENT
Start: 2020-06-27 | End: 2020-07-02

## 2020-06-27 RX ORDER — ACETAMINOPHEN 325 MG/1
650 TABLET ORAL EVERY 4 HOURS PRN
Status: DISCONTINUED | OUTPATIENT
Start: 2020-06-27 | End: 2020-07-02

## 2020-06-27 NOTE — PROGRESS NOTES
[facility]  Progress Note     Jeremy Flattery Patient Status:  Inpatient    1960 MRN [id number]   [facility] 3SW-A Attending Carmen Marino MD   Hosp Day # 0 PCP Daniel Corbett MD     Chief Complaint: foto surgery    S: Patient in the last 168 hours. Imaging: Imaging data reviewed in Epic.     Medications:   • Insulin Aspart Pen  1-10 Units Subcutaneous TID AC and HS   • enoxaparin  40 mg Subcutaneous Daily   • ampicillin-sulbactam  3 g Intravenous Q8H   • hydrochlorothiaz

## 2020-06-27 NOTE — OPERATIVE REPORT
Operative Report    Date of surgery: 06/26/2020    Pre-operative Diagnosis: gas gangrene/necrotizing fasciitis right foot    Post-operative Diagnosis: Same as pre op dx    Procedure:  Incision and drainage right foot with irrigation and debridment    Ros Rondon metatarsal head. A 2cm incision was created plantar to the 5th metatarsal head and an increased amount of necrotic tissue was noted.  Necrotic deep tissue found at the plantar right 5th metatarsal head was removed and passed off the surgical field for comp

## 2020-06-27 NOTE — PROGRESS NOTES
Pharmacy Note: Renal dose adjustment for Metoclopramide (Reglan)  [address] has been prescribed Metoclopramide (Reglan) 10 mg every 8 hours as needed. Estimated Creatinine Clearance: 36.3 mL/min (A) (based on SCr of 2.07 mg/dL (H)).     His calcul

## 2020-06-27 NOTE — ANESTHESIA PROCEDURE NOTES
Airway  Date/Time: 6/26/2020 10:55 PM  Urgency: elective      General Information and Staff    Patient location during procedure: OR  Anesthesiologist: Ankita Dumont MD  Performed: anesthesiologist     Indications and Patient Condition  Indications for a

## 2020-06-27 NOTE — PROGRESS NOTES
Pharmacy Note: Renal dose adjustment of [address] is a 62year old male who has been prescribed Unasyn 1.5g every 6 hours.   CrCl is 36.3 ml/min so the dose has been adjusted  to 3gm IV every 8 hours per hospital renal dose adjustment yenni

## 2020-06-27 NOTE — BRIEF OP NOTE
Pre-Operative Diagnosis: Necrotizing fasciitis of lower leg (formerly [facility]) [M72.6]     Post-Operative Diagnosis: Necrotizing fasciitis of lower leg ([address] [M72.6]      Procedure Performed:   Procedure(s):  EXTREMITY LOWER IRRIGATION & DEBRIDEMENT    Surgeon(s) and R

## 2020-06-27 NOTE — ANESTHESIA POSTPROCEDURE EVALUATION
[address] [facility] Patient Status:  Emergency   Age/Gender 62year old male MRN [id number]   [facility] SURGERY Attending [facility], [address] Day # 0 PCP Daniel Corbett MD       Anesthesia Post-op Note    Procedure(s):  EXT

## 2020-06-27 NOTE — PROGRESS NOTES
[facility] Dosing Service    Initial Pharmacokinetic Consult for Vancomycin Dosing     Chetan Greenfield is a 62year old patient who is being treated for cellulitis.   Pharmacy has been asked to dose Vancomycin by Dr. Levi Lewis    Patient has no known allerg

## 2020-06-27 NOTE — PLAN OF CARE
Received phone call that transport is coming to get patient for MRI. SCDs discontinued.  taken off. Patient denies any jewelry in place. No telemetry meds seen. Disconnected from IVF and unasyn. Unasyn still needs to be infused.  Will let dayshift RN kno

## 2020-06-27 NOTE — H&P
[facility]  History and Physical     Carson Boo Patient Status:  Emergency    1960 MRN [id number]   [facility] SURGERY Attending Debbie Harley, [address] Day # 0 PCP Norman Mcclure MD     Chief Complaint: RLE erythema A comprehensive 14 point review of systems was completed. Pertinent positives and negatives noted in the HPI.     Physical Exam:    BP (!) 173/88   Pulse 93   Temp 99.1 °F (37.3 °C) (Temporal)   Resp 18   Ht 5' 8\" (1.727 m)   Wt 165 lb (74.8 kg)   SpO2 1. Cultures +ve staph aureus  3. DMII, hyperglycemia protocol    Quality:  · DVT Prophylaxis: lovenox  · CODE status: Full  · Soni: no    Plan of care discussed with patient and wife.     Valdez MD Martina  6/26/2020

## 2020-06-27 NOTE — CONSULTS
[facility]    PATIENT'S NAME: JULIUS FOY   ATTENDING PHYSICIAN: Wojciech Stiles. Taylor Patton DPM   CONSULTING PHYSICIAN: Soheila George M.D.    PATIENT ACCOUNT#:   [de-identified]    LOCATION:  PACU [facility] PACU 3 EDWP 10  MEDICAL RECORD #:   [id number]       DATE OF

## 2020-06-27 NOTE — PLAN OF CARE
Patient admitted from PACU to room 370 via cart. Accompanied by transporter. Transfered from cart to bed with assistance of 4. Patient states he has glasses with him. Denies need to have any personal belongings placed with security.  Patient is alert and mary

## 2020-06-27 NOTE — PROGRESS NOTES
[facility]  Progress Note    Cate Barnes Patient Status:  Inpatient    1960 MRN [id number]   [facility] 3SW-A Attending Nima Mon MD   Hosp Day # 0 PCP Ramya Bedolla MD     Subjective:  Cate Barnes is a(n) 62 year o performed without intravenous gadolinium contrast.     PATIENT STATED HISTORY: (As transcribed by Technologist)  The patient presents with right foot swelling, specifically around 5th metatarsal. Exam to evaluate for infection.          FINDINGS:    BONE: spring  Will continue to follow    Severiano De Leon D[facility]LL[facility]  6/27/2020  4:53 PM

## 2020-06-27 NOTE — CONSULTS
INFECTIOUS DISEASE CONSULT NOTE    Cate Barnes Patient Status:  Inpatient    1960 MRN [id number]   [facility] 3SW-A Attending Nima Mon MD   Hosp Day # 0 PCP Woody Pittman **OR** glucose (DEX4) oral liquid 30 g, 30 g, Oral, Q15 Min PRN **OR** Glucose-Vitamin C (DEX-4) chewable tab 8 tablet, 8 tablet, Oral, Q15 Min PRN  •  Insulin Aspart Pen (NOVOLOG) 100 UNIT/ML flexpen 1-10 Units, 1-10 Units, Subcutaneous, TID AC and HS  • dizziness, focal neuro deficits  Behavioral/Psych: Negative for anxiety or depression    Physical Exam:    General: No acute distress. Alert and oriented x 3.   Vital signs: Blood pressure 156/75, pulse 91, temperature 98.8 °F (37.1 °C), temperature source 6/22/2020  PROCEDURE:  XR FOOT, COMPLETE (MIN 3 VIEWS), RIGHT (CPT=73630)  TECHNIQUE:  AP, oblique, and lateral views were obtained. COMPARISON:  None.   INDICATIONS:  swelling, redness, soreness to leg since Saturday  PATIENT STATED HISTORY: (As transcrib segments. There is observed phasicity and augmentation. Flow is demonstrated in the posterior tibial veins. There are numerous small, nonspecific lymph nodes at the right groin. The largest is measured at 3.1 x 1.8 x 1.7 cm.   This is considered mildly are noted. A fluid collection is not identified. OTHER:  Negative. CONCLUSION:   1. No evidence of bony destructive process. 2. Subcutaneous gas throughout the dorsal portion of the distal metatarsal bones, 2-5, is noted.   Associated skin thickening an

## 2020-06-27 NOTE — PLAN OF CARE
Sage huertas notified of elevated bp.    pt A&Ox4. On ra  & scds in place. Pt stating he has not had bm in 3 days, but refusing laxatives, or stool softener. Pt stating he feels \"fine\" stating he does not want any more medications.  Refusing pain meds & nause

## 2020-06-27 NOTE — ED PROVIDER NOTES
Patient Seen in: [facility] Emergency Department      History   Patient presents with:  Abnormal Result    Stated Complaint: abnormal labs    HPI    Elieser Up is a 47-year-old male who presents today for cellulitis and abnormal blood cultures.   He has a General: A&O x 3; well-developed; well-nourished; no acute distress  Chest: Nontender, normal expansion  Cardio: RRR, no murmurs/clicks/rubs, capillary refill brisk, normal distal pulses  Pulmonary: Normal respirations, lungs CTA  Musculoskeletal: Right lo HGB 9.6 (*)     HCT 29.2 (*)     Neutrophil Absolute Prelim 15.78 (*)     Neutrophil Absolute 15.78 (*)     Lymphocyte Absolute 0.78 (*)     Monocyte Absolute 1.06 (*)     All other components within normal limits   LACTIC ACID, PLASMA - Normal   ACETONE CONCLUSION:  1. Soft tissue swelling adjacent to the 5th metatarsal phalangeal joint. Decreased bone mineralization particularly involves the 5th metatarsal head medially.   If osteomyelitis is of high clinical concern, recommend MRI for further evaluati Clinical Impression:  Cellulitis of right lower extremity  (primary encounter diagnosis)  Necrotizing fasciitis of ankle and foot ([address]  Type 2 diabetes mellitus with hyperglycemia, unspecified whether long term insulin use (HCC)    Disposition:  Send to o

## 2020-06-27 NOTE — PROGRESS NOTES
S/p foot I&D  No erythema or swelling on calf  nvi  Calf resolved  NO surgery needed  Will sign off; please contact if needed

## 2020-06-27 NOTE — ANESTHESIA PREPROCEDURE EVALUATION
PRE-OP EVALUATION    Patient Name: Trini Forbes    Pre-op Diagnosis: Necrotizing fasciitis of lower leg ([facility] [address] [M72.6]    Procedure(s):  EXTREMITY LOWER IRRIGATION & DEBRIDEMENT    Surgeon(s) and Role:     Rob Andrea DPM - Primary    Pre-op vitals 3.58 (L) 06/26/2020    HGB 9.6 (L) 06/26/2020    HCT 29.2 (L) 06/26/2020    MCV 81.6 06/26/2020    MCH 26.8 06/26/2020    MCHC 32.9 06/26/2020    RDW 11.9 06/26/2020    .0 06/26/2020     Lab Results   Component Value Date     (L) 06/26/2020

## 2020-06-27 NOTE — PLAN OF CARE
Plan of care explained and updated with patient input. Progressing per plan of care. Patient is alert and oriented to person, place, time and situation. On room air with continuous pulse oximetry monitoring. Denies numbness/tingling to right foot.  Right fo

## 2020-06-27 NOTE — CONSULTS
[facility]  Report of Consultation    David Sawyer Patient Status:  Emergency    1960 MRN [id number]   Location [address] [facility] Attending Shannon Crook MD   Hosp Day # 0 PCP Sandra Burgos MD     Date of Admission: met head with boggy appearance. No active drainage. No purulence with compression. No palpable crepitus. Right 5th digit appering mildy dusky. Vascular: DP/PT pluses palpable. DVT intact to digits right foot, sluggish to right 5th digit.    Neurologic: CONCLUSION:       1. No evidence of bony destructive process. 2. Subcutaneous gas throughout the dorsal portion of the distal metatarsal bones, 2-5, is noted. Associated skin thickening and subcutaneous edema is noted.   This is suggestive of necro emphysema and cellulitis recommend emergent incision and drainage with debridement of necrotic tissue and bone. Patient is agreeable.    Consent to be signed for \"incision and drainage with debridement right foot\"  All risks, benefits and potential compl

## 2020-06-28 LAB
CRP SERPL-MCNC: 22.5 MG/DL (ref ?–0.3)
DEPRECATED RDW RBC AUTO: 37 FL (ref 35.1–46.3)
ERYTHROCYTE [DISTWIDTH] IN BLOOD BY AUTOMATED COUNT: 12 % (ref 11–15)
GLUCOSE BLD-MCNC: 259 MG/DL (ref 70–99)
GLUCOSE BLD-MCNC: 265 MG/DL (ref 70–99)
GLUCOSE BLD-MCNC: 300 MG/DL (ref 70–99)
GLUCOSE BLD-MCNC: 400 MG/DL (ref 70–99)
HCT VFR BLD AUTO: 25.9 % (ref 39–53)
HGB BLD-MCNC: 8.2 G/DL (ref 13–17.5)
MCH RBC QN AUTO: 27.1 PG (ref 26–34)
MCHC RBC AUTO-ENTMCNC: 31.7 G/DL (ref 31–37)
MCV RBC AUTO: 85.5 FL (ref 80–100)
PLATELET # BLD AUTO: 300 10(3)UL (ref 150–450)
RBC # BLD AUTO: 3.03 X10(6)UL (ref 4.3–5.7)
SED RATE-ML: 143 MM/HR (ref 0–12)
WBC # BLD AUTO: 16.7 X10(3) UL (ref 4–11)

## 2020-06-28 PROCEDURE — 99232 SBSQ HOSP IP/OBS MODERATE 35: CPT | Performed by: HOSPITALIST

## 2020-06-28 RX ORDER — VANCOMYCIN 2 GRAM/500 ML IN 0.9 % SODIUM CHLORIDE INTRAVENOUS
25 ONCE
Status: COMPLETED | OUTPATIENT
Start: 2020-06-28 | End: 2020-06-28

## 2020-06-28 RX ORDER — AMLODIPINE BESYLATE 5 MG/1
5 TABLET ORAL DAILY
Status: DISCONTINUED | OUTPATIENT
Start: 2020-06-28 | End: 2020-07-01

## 2020-06-28 RX ORDER — HYDRALAZINE HYDROCHLORIDE 20 MG/ML
10 INJECTION INTRAMUSCULAR; INTRAVENOUS EVERY 4 HOURS PRN
Status: DISCONTINUED | OUTPATIENT
Start: 2020-06-28 | End: 2020-07-02

## 2020-06-28 NOTE — PROGRESS NOTES
[facility]  Progress Note     Baldemar Gonzalezjob Patient Status:  Inpatient    1960 MRN [id number]   [facility] 3SW-A Attending Brii Sweet MD   Hosp Day # 1 PCP Cleve Sarmiento MD     Chief Complaint: foto surgery    S: Patient 168 hours. Imaging: Imaging data reviewed in Epic.     Medications:   • Insulin Aspart Pen  1-10 Units Subcutaneous TID AC and HS   • enoxaparin  40 mg Subcutaneous Daily   • ampicillin-sulbactam  3 g Intravenous Q8H   • hydrochlorothiazide  12.5 mg

## 2020-06-28 NOTE — PROGRESS NOTES
Blood sugar at dinner 400. Gave 9 units of novolog per correction scale ordered. Paged Dr Marjan Carlson for elevated glucose level.

## 2020-06-28 NOTE — PROGRESS NOTES
[address] [facility] Dosing Service    Initial Pharmacokinetic Consult for Vancomycin Dosing     Geno Flores is a 62year old patient who is being treated for bacteremia.   Pharmacy has been asked to dose Vancomycin by Jess Powers PA>    Patient has no function. Pharmacy will continue to follow and adjust as necessary.     [address] [facility]  6/28/2020  2:56 PM  [address] [place] [address] Extension: [phone]

## 2020-06-28 NOTE — PROGRESS NOTES
INFECTIOUS DISEASE PROGRESS NOTE    Rikki Kong Patient Status:  Inpatient    1960 MRN [id number]   [facility] 3SW-A Attending Christin Vargas MD   Hosp Day # 1 PCP [facility] Ampicillin-Sulbactam Sodium (UNASYN) 3 g in sodium chloride 0.9% 100 mL MBP/ADD-vantage, 3 g, Intravenous, Q8H  •  hydrochlorothiazide (MICROZIDE) cap 12.5 mg, 12.5 mg, Oral, Daily    Review of Systems:  Completed.  See pertinent positives and negatives abo LEUUR Negative   WBCUR 1-4   RBCUR 6-10*   BACUR None Seen   EPIUR None Seen         Microbiology    Reviewed in EMR,     Radiology: Xr Foot, Complete (min 3 Views), Right (cpt=73630)    Result Date: 6/22/2020  PROCEDURE:  XR FOOT, COMPLETE (MIN 3 VIEWS) (As transcribed by Technologist)  The patient complains of right foot and calf pain and redness. FINDINGS:  Compression is demonstrated of the common femoral, superficial femoral and popliteal venous segments.   There is observed phasicity and augmentati over the 2nd-5 metatarsal bones is noted. This extends into the intraosseous region as well. Diffuse skin thickening is noted. Infiltration of subcutaneous fat is noted. Dense vascular calcifications are noted. A fluid collection is not identified.  OT Should be able to get PICC tomorrow if blood cultures remain negative. Discussed case and plan with RN, patient and Dr. Richmond Tolbert. Latasha Guajardo.

## 2020-06-28 NOTE — PLAN OF CARE
Right foot dressing clean dry and intact. Changed by Podiatrist. Elevated on a pillow. Able to wiggle toes. Denies any pain. Post op shoe on. WB to heel only. IV antibiotics as ordered. IS given to patient and instructed on how to use.  Plan of care reviewe

## 2020-06-28 NOTE — PLAN OF CARE
Pt continues to deny pain to right foot when assessed, declining pain medications. Podiatry dressing in place, changed 6/27/20 by MD. Plan to evaluate wound and have possible 2nd I&D with a possible 5th metatarsal amputation if not healing well.  Patient aw

## 2020-06-29 ENCOUNTER — ANESTHESIA EVENT (OUTPATIENT)
Dept: SURGERY | Facility: HOSPITAL | Age: 60
DRG: 853 | End: 2020-06-29
Payer: COMMERCIAL

## 2020-06-29 ENCOUNTER — TELEPHONE (OUTPATIENT)
Dept: INTERNAL MEDICINE CLINIC | Facility: CLINIC | Age: 60
End: 2020-06-29

## 2020-06-29 LAB
ANION GAP SERPL CALC-SCNC: 6 MMOL/L (ref 0–18)
BASOPHILS # BLD AUTO: 0.02 X10(3) UL (ref 0–0.2)
BASOPHILS NFR BLD AUTO: 0.1 %
BUN BLD-MCNC: 38 MG/DL (ref 7–18)
BUN/CREAT SERPL: 21.1 (ref 10–20)
CALCIUM BLD-MCNC: 7.8 MG/DL (ref 8.5–10.1)
CHLORIDE SERPL-SCNC: 108 MMOL/L (ref 98–112)
CO2 SERPL-SCNC: 25 MMOL/L (ref 21–32)
CREAT BLD-MCNC: 1.8 MG/DL (ref 0.7–1.3)
DEPRECATED RDW RBC AUTO: 37.1 FL (ref 35.1–46.3)
EOSINOPHIL # BLD AUTO: 0.24 X10(3) UL (ref 0–0.7)
EOSINOPHIL NFR BLD AUTO: 1.6 %
ERYTHROCYTE [DISTWIDTH] IN BLOOD BY AUTOMATED COUNT: 11.9 % (ref 11–15)
GLUCOSE BLD-MCNC: 237 MG/DL (ref 70–99)
GLUCOSE BLD-MCNC: 246 MG/DL (ref 70–99)
GLUCOSE BLD-MCNC: 254 MG/DL (ref 70–99)
GLUCOSE BLD-MCNC: 268 MG/DL (ref 70–99)
GLUCOSE BLD-MCNC: 272 MG/DL (ref 70–99)
HCT VFR BLD AUTO: 24.7 % (ref 39–53)
HGB BLD-MCNC: 7.9 G/DL (ref 13–17.5)
IMM GRANULOCYTES # BLD AUTO: 0.12 X10(3) UL (ref 0–1)
IMM GRANULOCYTES NFR BLD: 0.8 %
LYMPHOCYTES # BLD AUTO: 1.46 X10(3) UL (ref 1–4)
LYMPHOCYTES NFR BLD AUTO: 9.9 %
MCH RBC QN AUTO: 27.3 PG (ref 26–34)
MCHC RBC AUTO-ENTMCNC: 32 G/DL (ref 31–37)
MCV RBC AUTO: 85.5 FL (ref 80–100)
MONOCYTES # BLD AUTO: 0.84 X10(3) UL (ref 0.1–1)
MONOCYTES NFR BLD AUTO: 5.7 %
NEUTROPHILS # BLD AUTO: 12.01 X10 (3) UL (ref 1.5–7.7)
NEUTROPHILS # BLD AUTO: 12.01 X10(3) UL (ref 1.5–7.7)
NEUTROPHILS NFR BLD AUTO: 81.9 %
OSMOLALITY SERPL CALC.SUM OF ELEC: 305 MOSM/KG (ref 275–295)
PLATELET # BLD AUTO: 297 10(3)UL (ref 150–450)
POTASSIUM SERPL-SCNC: 3.8 MMOL/L (ref 3.5–5.1)
RBC # BLD AUTO: 2.89 X10(6)UL (ref 4.3–5.7)
SODIUM SERPL-SCNC: 139 MMOL/L (ref 136–145)
WBC # BLD AUTO: 14.7 X10(3) UL (ref 4–11)

## 2020-06-29 PROCEDURE — 02HV33Z INSERTION OF INFUSION DEVICE INTO SUPERIOR VENA CAVA, PERCUTANEOUS APPROACH: ICD-10-PCS | Performed by: HOSPITALIST

## 2020-06-29 PROCEDURE — 99232 SBSQ HOSP IP/OBS MODERATE 35: CPT | Performed by: HOSPITALIST

## 2020-06-29 PROCEDURE — B548ZZA ULTRASONOGRAPHY OF SUPERIOR VENA CAVA, GUIDANCE: ICD-10-PCS | Performed by: HOSPITALIST

## 2020-06-29 RX ORDER — POTASSIUM CHLORIDE 20 MEQ/1
40 TABLET, EXTENDED RELEASE ORAL ONCE
Status: COMPLETED | OUTPATIENT
Start: 2020-06-29 | End: 2020-06-29

## 2020-06-29 NOTE — PLAN OF CARE
Plan of care explained and updated with patient and spouse input. Progressing per plan of care. Patient is alert and oriented to person, place, time and situation. On room air with continuous pulse oximetry monitoring.  Patient denies numbness, but also den

## 2020-06-29 NOTE — OCCUPATIONAL THERAPY NOTE
OCCUPATIONAL THERAPY QUICK EVALUATION - INPATIENT    Room Number: 370/370-A  Evaluation Date: 6/29/2020     Type of Evaluation: Quick Eval  Presenting Problem: s/p R foot I&D 6/26/2020    Physician Order: IP Consult to Occupational Therapy  Reason for Ther RESTRICTION  Weight Bearing Restriction: R lower extremity        R Lower Extremity: Other (Comment)(heel WB only)       PAIN ASSESSMENT  Ratin  Location: R foot  Management Techniques: Activity promotion; Body mechanics;Breathing techniques;Relaxation; same location at home), simulated toileting via SBA. Patient also educated on OT role, safety, fall prevention, pain management, tub transfers with good verbal understanding. PPE worn by therapist this session: Surgical mask, gloves.     Patient End of supervision level or above; patient reports will have supervision at home  Patient/Caregiver able to demonstrate safety with ADLS: At supervision level or above; patient reports will have supervision at home

## 2020-06-29 NOTE — PHYSICAL THERAPY NOTE
PHYSICAL THERAPY QUICK EVALUATION - INPATIENT    Room Number: 370/370-A  Evaluation Date: 6/29/2020  Presenting Problem: (s/p I/D of right foot /cellulitis)  Physician Order: PT Eval and Treat    Problem List  Principal Problem:    Cellulitis of right lo adjusting bedclothes, sheets and blankets)?: A Little   -   Sitting down on and standing up from a chair with arms (e.g., wheelchair, bedside commode, etc.): A Little   -   Moving from lying on back to sitting on the side of the bed?: A Little   How much h Functional outcome measures completed include The AM-PAC '6-Clicks' Inpatient Basic Mobility Short Form was completed and this patient is demonstrating a 0% degree of impairment in mobility.  Research supports that patients with this level of impairment may

## 2020-06-29 NOTE — PLAN OF CARE
Right foot dressing clean, dry and intact. Denies having pain, numbness or tingling sensation to right foot. ID discussed plan for PICC line placement and having IV antibiotic on discharge.   Dr. Walsh Estimjeff seen patient this morning, adjustment made to regu

## 2020-06-29 NOTE — ANESTHESIA PREPROCEDURE EVALUATION
PRE-OP EVALUATION    Patient Name: Geno Flores    Pre-op Diagnosis: INPT    Procedure(s):  IRRIGATION AND DEBRIDEMENT RIGHT FOOT, POSSIBLE PARTIAL 5TH RAY AMPUTATION    Surgeon(s) and Role:     Kay Adams DPM - Primary    Pre-op vitals reviewed. PRN  ondansetron HCl (ZOFRAN) injection 4 mg, 4 mg, Intravenous, Q6H PRN  Metoclopramide HCl (REGLAN) injection 5 mg, 5 mg, Intravenous, Q8H PRN  hydrochlorothiazide (MICROZIDE) cap 12.5 mg, 12.5 mg, Oral, Daily  [COMPLETED] sodium chloride 0.9% IV bolus 1 anemia                   Pulmonary    Negative pulmonary ROS. Neuro/Psych    Negative neuro/psych ROS.                                 Past Surgical History:   Procedure Laterality Date   • EXTREMITY LOWER IRRIGATION & DEBRIDEMENT Righ

## 2020-06-29 NOTE — CM/SW NOTE
CM faxed initial [employer] wound vac order form to # [phone] [facility] Liaison) and Paty Wheat (podiatry) for signature of order. Copy of order placed on Mrs Goins's chart as well.     Gila Wagner RN, BSN   [phone]

## 2020-06-29 NOTE — HOME CARE LIAISON
MET WITH PTNT AND OFFERED CHOICE  OF AGENCIES. PTNT AGREEABLE TO [facility]. MET WITH PTNT TO DISCUSS HOME HEALTH SERVICES AND COVERAGE CRITERIA. PTNT AGREEABLE TO [facility]. PTNT GIVEN RESIDENTIAL BROCHURE.  RESIDENTIAL WITH PROVIDE SN/PT ON DISC

## 2020-06-29 NOTE — PROGRESS NOTES
ADD: vanco d/c by ID    Pharmacy Note:  Renal Adjustment for vancomycin (VANCOCIN)         Rikki Kong is a 62year old male who has been prescribed vancomycin 1000mg q12hr.       Est CrCl: ~42 mL/min    The freq has been adjusted to vancomycin 1000mg

## 2020-06-29 NOTE — TELEPHONE ENCOUNTER
Spoke with Alejandro Locke RN H-notified per AD will sign orders. Alejadnro Locke verbalized understanding.

## 2020-06-29 NOTE — PROGRESS NOTES
[facility]  Progress Note     Alaina Rose Patient Status:  Inpatient    1960 MRN [id number]   [facility] 3SW-A Attending Hazel Barakat MD   Hosp Day # 2 PCP Dave Hutson MD     Chief Complaint: foto surgery    S: Patient Clearance: 41.7 mL/min (A) (based on SCr of 1.8 mg/dL (H)). Recent Labs   Lab 06/26/20  1843   PTP 13.9   INR 1.04       No results for input(s): TROP, CK in the last 168 hours. Imaging: Imaging data reviewed in Epic.     Medications:   • Potassi

## 2020-06-29 NOTE — PROGRESS NOTES
INFECTIOUS DISEASE PROGRESS NOTE    Rosa Valadez Patient Status:  Inpatient    1960 MRN [id number]   [facility] 3SW-A Attending Ev Edmond MD   Hosp Day # 2 PCP [facility] ([facility]) 5-325 MG per tab 2 tablet, 2 tablet, Oral, Q4H PRN  •  morphINE sulfate (PF) 4 MG/ML injection 1 mg, 1 mg, Intravenous, Q2H PRN **OR** morphINE sulfate (PF) 4 MG/ML injection 2 mg, 2 mg, Intravenous, Q2H PRN  •  ondansetron HCl (ZOFRAN) injection 4 104 101 109 108   CO2 25.0 23.0 20.0* 25.0   ALKPHO 134* 179*  --   --    AST 18 7*  --   --    ALT 24 18  --   --    BILT 0.6 0.4  --   --    TP 7.1 7.1  --   --      Recent Labs   Lab 06/26/20 2112   COLORUR Yellow   CLARITY Cloudy*   SPECGRAVITY 1.016 ultrasound was used to evaluate the lower extremity venous system. B-mode two-dimensional images of the vascular structures, Doppler spectral analysis, and color flow. Doppler imaging were performed.   The following veins were imaged:  Common, deep, and berkowitz Patient is herewith and infection and swelling to right foot. FINDINGS:  BONES:  Tibiotalar osteophytes are noted with moderate osteoarthritic changes in this region. Subtalar osteophytes are noted. Sinus tarsi is well delineated.   No evidence of a zulma doc resolution  - monitor creatinine  - discussed with the patient the importance of good glycemic control for wound healing.  - pt will need IV abx on d/c for this infection. [id number] Abi Sue for PICC line given blood cultures have been negative for > 48 hrs.  Patient i

## 2020-06-29 NOTE — CM/SW NOTE
Referred to [facility]  P:[phone]  F:[phone] during rounds. Patient has a hx of non-compliance now with likely need for toe amputation and IV abx. SW will continue to follow for recommendations and further discharge planning.

## 2020-06-29 NOTE — TELEPHONE ENCOUNTER
I can, however as the patient is scheduled for a procedure by the podiatry service tomorrow, the order should be dictated by the podiatrist.

## 2020-06-29 NOTE — TELEPHONE ENCOUNTER
Pt will be possibly discharge on Weds 7/1/20 and calling to see if AD will sign home health orders?  Please advise

## 2020-06-29 NOTE — TELEPHONE ENCOUNTER
Spoke with Jocy Sanz will not be making it to the podiatry appt tomorrow, he is admitted at hospital, please advise.

## 2020-06-29 NOTE — DIETARY NOTE
Eric Fierro 24     Admitting diagnosis:  Cellulitis of right lower extremity [L03.115]  Type 2 diabetes mellitus with hyperglycemia, unspecified whether long term insulin use [facility] [E11.65]  Necrotizing fasciitis o

## 2020-06-30 ENCOUNTER — ANESTHESIA (OUTPATIENT)
Dept: SURGERY | Facility: HOSPITAL | Age: 60
DRG: 853 | End: 2020-06-30
Payer: COMMERCIAL

## 2020-06-30 LAB
ATRIAL RATE: 90 BPM
GLUCOSE BLD-MCNC: 143 MG/DL (ref 70–99)
GLUCOSE BLD-MCNC: 177 MG/DL (ref 70–99)
GLUCOSE BLD-MCNC: 197 MG/DL (ref 70–99)
GLUCOSE BLD-MCNC: 232 MG/DL (ref 70–99)
P AXIS: -29 DEGREES
P-R INTERVAL: 142 MS
POTASSIUM SERPL-SCNC: 4 MMOL/L (ref 3.5–5.1)
Q-T INTERVAL: 366 MS
QRS DURATION: 92 MS
QTC CALCULATION (BEZET): 447 MS
R AXIS: 0 DEGREES
T AXIS: -9 DEGREES
VENTRICULAR RATE: 90 BPM

## 2020-06-30 PROCEDURE — 99232 SBSQ HOSP IP/OBS MODERATE 35: CPT | Performed by: HOSPITALIST

## 2020-06-30 PROCEDURE — 0JDQ0ZZ EXTRACTION OF RIGHT FOOT SUBCUTANEOUS TISSUE AND FASCIA, OPEN APPROACH: ICD-10-PCS | Performed by: PODIATRIST

## 2020-06-30 PROCEDURE — 0Y6N0ZF DETACHMENT AT LEFT FOOT, PARTIAL 5TH RAY, OPEN APPROACH: ICD-10-PCS | Performed by: PODIATRIST

## 2020-06-30 PROCEDURE — 0HRMXK4 REPLACEMENT OF RIGHT FOOT SKIN WITH NONAUTOLOGOUS TISSUE SUBSTITUTE, PARTIAL THICKNESS, EXTERNAL APPROACH: ICD-10-PCS | Performed by: PODIATRIST

## 2020-06-30 RX ORDER — DIPHENHYDRAMINE HYDROCHLORIDE 50 MG/ML
12.5 INJECTION INTRAMUSCULAR; INTRAVENOUS AS NEEDED
Status: DISCONTINUED | OUTPATIENT
Start: 2020-06-30 | End: 2020-06-30 | Stop reason: HOSPADM

## 2020-06-30 RX ORDER — HYDROMORPHONE HYDROCHLORIDE 1 MG/ML
0.4 INJECTION, SOLUTION INTRAMUSCULAR; INTRAVENOUS; SUBCUTANEOUS EVERY 5 MIN PRN
Status: DISCONTINUED | OUTPATIENT
Start: 2020-06-30 | End: 2020-06-30 | Stop reason: HOSPADM

## 2020-06-30 RX ORDER — DEXTROSE MONOHYDRATE 25 G/50ML
50 INJECTION, SOLUTION INTRAVENOUS
Status: DISCONTINUED | OUTPATIENT
Start: 2020-06-30 | End: 2020-06-30 | Stop reason: HOSPADM

## 2020-06-30 RX ORDER — LIDOCAINE HYDROCHLORIDE 10 MG/ML
INJECTION, SOLUTION EPIDURAL; INFILTRATION; INTRACAUDAL; PERINEURAL AS NEEDED
Status: DISCONTINUED | OUTPATIENT
Start: 2020-06-30 | End: 2020-06-30 | Stop reason: SURG

## 2020-06-30 RX ORDER — SODIUM CHLORIDE, SODIUM LACTATE, POTASSIUM CHLORIDE, CALCIUM CHLORIDE 600; 310; 30; 20 MG/100ML; MG/100ML; MG/100ML; MG/100ML
INJECTION, SOLUTION INTRAVENOUS CONTINUOUS
Status: DISCONTINUED | OUTPATIENT
Start: 2020-06-30 | End: 2020-06-30 | Stop reason: HOSPADM

## 2020-06-30 RX ORDER — EPHEDRINE SULFATE 50 MG/ML
INJECTION, SOLUTION INTRAVENOUS AS NEEDED
Status: DISCONTINUED | OUTPATIENT
Start: 2020-06-30 | End: 2020-06-30 | Stop reason: SURG

## 2020-06-30 RX ORDER — MIDAZOLAM HYDROCHLORIDE 1 MG/ML
1 INJECTION INTRAMUSCULAR; INTRAVENOUS EVERY 5 MIN PRN
Status: DISCONTINUED | OUTPATIENT
Start: 2020-06-30 | End: 2020-06-30 | Stop reason: HOSPADM

## 2020-06-30 RX ORDER — MEPERIDINE HYDROCHLORIDE 25 MG/ML
12.5 INJECTION INTRAMUSCULAR; INTRAVENOUS; SUBCUTANEOUS AS NEEDED
Status: DISCONTINUED | OUTPATIENT
Start: 2020-06-30 | End: 2020-06-30 | Stop reason: HOSPADM

## 2020-06-30 RX ORDER — SODIUM CHLORIDE 9 MG/ML
INJECTION, SOLUTION INTRAVENOUS CONTINUOUS PRN
Status: DISCONTINUED | OUTPATIENT
Start: 2020-06-30 | End: 2020-06-30 | Stop reason: SURG

## 2020-06-30 RX ORDER — DEXAMETHASONE SODIUM PHOSPHATE 4 MG/ML
VIAL (ML) INJECTION AS NEEDED
Status: DISCONTINUED | OUTPATIENT
Start: 2020-06-30 | End: 2020-06-30 | Stop reason: SURG

## 2020-06-30 RX ORDER — NALOXONE HYDROCHLORIDE 0.4 MG/ML
80 INJECTION, SOLUTION INTRAMUSCULAR; INTRAVENOUS; SUBCUTANEOUS AS NEEDED
Status: DISCONTINUED | OUTPATIENT
Start: 2020-06-30 | End: 2020-06-30 | Stop reason: HOSPADM

## 2020-06-30 RX ORDER — DEXAMETHASONE SODIUM PHOSPHATE 4 MG/ML
4 VIAL (ML) INJECTION AS NEEDED
Status: DISCONTINUED | OUTPATIENT
Start: 2020-06-30 | End: 2020-06-30 | Stop reason: HOSPADM

## 2020-06-30 RX ORDER — METOCLOPRAMIDE HYDROCHLORIDE 5 MG/ML
10 INJECTION INTRAMUSCULAR; INTRAVENOUS AS NEEDED
Status: DISCONTINUED | OUTPATIENT
Start: 2020-06-30 | End: 2020-06-30 | Stop reason: HOSPADM

## 2020-06-30 RX ORDER — ONDANSETRON 2 MG/ML
4 INJECTION INTRAMUSCULAR; INTRAVENOUS AS NEEDED
Status: DISCONTINUED | OUTPATIENT
Start: 2020-06-30 | End: 2020-06-30 | Stop reason: HOSPADM

## 2020-06-30 RX ORDER — DIPHENHYDRAMINE HYDROCHLORIDE 50 MG/ML
INJECTION INTRAMUSCULAR; INTRAVENOUS AS NEEDED
Status: DISCONTINUED | OUTPATIENT
Start: 2020-06-30 | End: 2020-06-30 | Stop reason: SURG

## 2020-06-30 RX ORDER — BUPIVACAINE HYDROCHLORIDE 5 MG/ML
INJECTION, SOLUTION EPIDURAL; INTRACAUDAL AS NEEDED
Status: DISCONTINUED | OUTPATIENT
Start: 2020-06-30 | End: 2020-06-30 | Stop reason: HOSPADM

## 2020-06-30 RX ORDER — INSULIN ASPART 100 [IU]/ML
INJECTION, SOLUTION INTRAVENOUS; SUBCUTANEOUS ONCE
Status: DISCONTINUED | OUTPATIENT
Start: 2020-06-30 | End: 2020-06-30 | Stop reason: HOSPADM

## 2020-06-30 RX ADMIN — LIDOCAINE HYDROCHLORIDE 50 MG: 10 INJECTION, SOLUTION EPIDURAL; INFILTRATION; INTRACAUDAL; PERINEURAL at 18:34:00

## 2020-06-30 RX ADMIN — ONDANSETRON 4 MG: 2 INJECTION INTRAMUSCULAR; INTRAVENOUS at 19:29:00

## 2020-06-30 RX ADMIN — EPHEDRINE SULFATE 10 MG: 50 INJECTION, SOLUTION INTRAVENOUS at 18:55:00

## 2020-06-30 RX ADMIN — DIPHENHYDRAMINE HYDROCHLORIDE 12.5 MG: 50 INJECTION INTRAMUSCULAR; INTRAVENOUS at 18:40:00

## 2020-06-30 RX ADMIN — SODIUM CHLORIDE: 9 INJECTION, SOLUTION INTRAVENOUS at 18:28:00

## 2020-06-30 RX ADMIN — DEXAMETHASONE SODIUM PHOSPHATE 4 MG: 4 MG/ML VIAL (ML) INJECTION at 18:40:00

## 2020-06-30 NOTE — PAYOR COMM NOTE
--------------  Appropriate for inpatient status per guidelines for cellulitis and fatigue due to necrotizing fasciitis. Recent BC +.         ADMISSION REVIEW     Payor: [address] [place] [address] #:  [id number]  Authorization Number: A Physical Exam  Nursing note reviewed. Vital signs reviewed.   General: A&O x 3; well-developed; well-nourished; no acute distress  Chest: Nontender, normal expansion  Cardio: RRR, no murmurs/clicks/rubs, capillary refill brisk, normal distal pulses  Pulmona CBC W/ DIFFERENTIAL - Abnormal; Notable for the following components:    WBC 17.8 (*)     RBC 3.58 (*)     HGB 9.6 (*)     HCT 29.2 (*)     Neutrophil Absolute Prelim 15.78 (*)     Neutrophil Absolute 15.78 (*)     Lymphocyte Absolute 0.78 (*)     Monocyte CONCLUSION:   1. No evidence of bony destructive process. 2. Subcutaneous gas throughout the dorsal portion of the distal metatarsal bones, 2-5, is noted. Associated skin thickening and subcutaneous edema is noted.   This is suggestive of necrotizing fas History of Present Illness: So Hernandez is a 62year old male with medical significant for type 2 diabetes presents the ER with complaints of worsening cellulitis and bacteremia.   Patient was seen in the ER on 6/22 for cellulitis and imaging, blood wo Abdomen: Soft, nontender, nondistended. Positive bowel sounds. No rebound, guarding or organomegaly. Neurologic: No focal neurological deficits. CNII-XII grossly intact. Musculoskeletal: Moves all extremities.   Extremities: (see picture below)  Integume Pt to OR for planned surgical procedure. 6/26 OP NOTE    Pre-operative Diagnosis: gas gangrene/necrotizing fasciitis right foot     Post-operative Diagnosis: Same as pre op dx     Procedure:  Incision and drainage right foot with irrigation and de 6 point ROS completed and was negative, except for pertinent positive and negatives stated in subjective.     Vital signs:  Temp:  [97.8 °F (36.6 °C)-99.1 °F (37.3 °C)] 97.8 °F (36.6 °C)  Pulse:  [87-94] 92  Resp:  [17-26] 17  BP: (129-174)/(62-88) 170/82      TISSUE CULTURE RESULT 4+ growth Staphylococcus aureusAbnormal     Detected inducible resistance to Clindamycin. If used monitor therapy for resistance.    1+ growth Enterobacter cloacae complexAbnormal

## 2020-06-30 NOTE — CM/SW NOTE
06/30/20 1100   CM/SW Referral Data   Referral Source Social Work (self-referral)   Reason for Referral Discharge planning   Informant Patient   Patient Info   Patient's Mental Status Alert;Oriented   Discharge Needs   Anticipated D/C needs Home health

## 2020-06-30 NOTE — PROGRESS NOTES
[facility]  Progress Note     Geno Flores Patient Status:  Inpatient    1960 MRN [id number]   [facility] 3SW-A Attending Nicole Avlarado MD   Hosp Day # 3 PCP Tiffanie Chandra MD     Chief Complaint: foto surgery    S: Patient on SCr of 1.8 mg/dL (H)). Recent Labs   Lab 06/26/20  1843   PTP 13.9   INR 1.04       No results for input(s): TROP, CK in the last 168 hours. Imaging: Imaging data reviewed in Epic.     Medications:   • insulin detemir  10 Units Subcutaneous BI

## 2020-06-30 NOTE — PLAN OF CARE
Patient scheduled for OR this evening at 1800 for right foot I and D, possible 5th ray amputation and wound vac application by Dr. Nelson Stewart. Patient may have breakfast then NPO after 10 am per Dr. Sharon Ontiveros (anesthesia) and Dr. eNlson Stewart (Podiatrist).   Consent for

## 2020-06-30 NOTE — PLAN OF CARE
Right foot dressing clean dry and intact. Denies pain to right foot. Up with walker and min assist with weight bearing to right heel with surgical shoe on. NPO after midnight for surgery today. Patient verbalized understanding. IV antibiotics as ordered.  P

## 2020-06-30 NOTE — PROGRESS NOTES
INFECTIOUS DISEASE PROGRESS NOTE    Chetan Greenfield Patient Status:  Inpatient    1960 MRN [id number]   [facility] 3SW-A Attending Tamie Garcia MD   Hosp Day # 3 PCP [facility] morphINE sulfate (PF) 4 MG/ML injection 2 mg, 2 mg, Intravenous, Q2H PRN  •  ondansetron HCl (ZOFRAN) injection 4 mg, 4 mg, Intravenous, Q6H PRN  •  Metoclopramide HCl (REGLAN) injection 5 mg, 5 mg, Intravenous, Q8H PRN  •  hydrochlorothiazide (MICROZIDE) --   --    BILT 0.4  --   --   --    TP 7.1  --   --   --      Recent Labs   Lab 06/26/20 2112   COLORUR Yellow   CLARITY Cloudy*   SPECGRAVITY 1.016   GLUUR >=500*   BILUR Negative   KETUR Negative   BLOODURINE Moderate*   PHURINE 5.0   PROUR >=500*   UR

## 2020-06-30 NOTE — PROGRESS NOTES
[facility]  Progress Note    Iain Vega Patient Status:  Inpatient    1960 MRN [id number]   [facility] 3SW-A Attending Lucas Cuevas MD   Hosp Day # 2 PCP Alonso Pollock MD     Subjective:  Iain Vega is a(n) 62 year o acquired including axial, sagittal and coronal imaging. Proton density, T2 weighted and fat suppression sequences are included.   Exam was performed without intravenous gadolinium contrast.     PATIENT STATED HISTORY: (As transcribed by Technologist)  The geraldine wound to the plantar right fifth metatarsal head has exposed fifth metatarsal and may have early osteomyelitis which may not be determined on MRI.     With increasing ischemic changes to the right fifth digit and possible early osteomyelitis right fifth met

## 2020-06-30 NOTE — CM/SW NOTE
Spoke with Guevara Fierro [phone] at [facility], received signed order form for wound vac, awaiting OR report and wound measurements, will send when available. Planning to have equipment delivered to hospital room 7/1. Referral updated in aidin.      1400 call from trino

## 2020-07-01 ENCOUNTER — APPOINTMENT (OUTPATIENT)
Dept: GENERAL RADIOLOGY | Facility: HOSPITAL | Age: 60
DRG: 853 | End: 2020-07-01
Attending: PODIATRIST
Payer: COMMERCIAL

## 2020-07-01 LAB
ANION GAP SERPL CALC-SCNC: 6 MMOL/L (ref 0–18)
BASOPHILS # BLD AUTO: 0.02 X10(3) UL (ref 0–0.2)
BASOPHILS NFR BLD AUTO: 0.1 %
BUN BLD-MCNC: 36 MG/DL (ref 7–18)
BUN/CREAT SERPL: 22.5 (ref 10–20)
CALCIUM BLD-MCNC: 8.2 MG/DL (ref 8.5–10.1)
CHLORIDE SERPL-SCNC: 107 MMOL/L (ref 98–112)
CO2 SERPL-SCNC: 25 MMOL/L (ref 21–32)
CREAT BLD-MCNC: 1.6 MG/DL (ref 0.7–1.3)
DEPRECATED RDW RBC AUTO: 36.3 FL (ref 35.1–46.3)
EOSINOPHIL # BLD AUTO: 0 X10(3) UL (ref 0–0.7)
EOSINOPHIL NFR BLD AUTO: 0 %
ERYTHROCYTE [DISTWIDTH] IN BLOOD BY AUTOMATED COUNT: 11.9 % (ref 11–15)
GLUCOSE BLD-MCNC: 170 MG/DL (ref 70–99)
GLUCOSE BLD-MCNC: 223 MG/DL (ref 70–99)
GLUCOSE BLD-MCNC: 276 MG/DL (ref 70–99)
GLUCOSE BLD-MCNC: 304 MG/DL (ref 70–99)
GLUCOSE BLD-MCNC: 315 MG/DL (ref 70–99)
HCT VFR BLD AUTO: 25.6 % (ref 39–53)
HGB BLD-MCNC: 8.3 G/DL (ref 13–17.5)
IMM GRANULOCYTES # BLD AUTO: 0.12 X10(3) UL (ref 0–1)
IMM GRANULOCYTES NFR BLD: 0.6 %
LYMPHOCYTES # BLD AUTO: 0.76 X10(3) UL (ref 1–4)
LYMPHOCYTES NFR BLD AUTO: 3.7 %
MCH RBC QN AUTO: 27.2 PG (ref 26–34)
MCHC RBC AUTO-ENTMCNC: 32.4 G/DL (ref 31–37)
MCV RBC AUTO: 83.9 FL (ref 80–100)
MONOCYTES # BLD AUTO: 0.58 X10(3) UL (ref 0.1–1)
MONOCYTES NFR BLD AUTO: 2.8 %
NEUTROPHILS # BLD AUTO: 19.19 X10 (3) UL (ref 1.5–7.7)
NEUTROPHILS # BLD AUTO: 19.19 X10(3) UL (ref 1.5–7.7)
NEUTROPHILS NFR BLD AUTO: 92.8 %
OSMOLALITY SERPL CALC.SUM OF ELEC: 306 MOSM/KG (ref 275–295)
PLATELET # BLD AUTO: 372 10(3)UL (ref 150–450)
POTASSIUM SERPL-SCNC: 4.4 MMOL/L (ref 3.5–5.1)
RBC # BLD AUTO: 3.05 X10(6)UL (ref 4.3–5.7)
SODIUM SERPL-SCNC: 138 MMOL/L (ref 136–145)
WBC # BLD AUTO: 20.7 X10(3) UL (ref 4–11)

## 2020-07-01 PROCEDURE — 99231 SBSQ HOSP IP/OBS SF/LOW 25: CPT | Performed by: CLINICAL NURSE SPECIALIST

## 2020-07-01 PROCEDURE — 99232 SBSQ HOSP IP/OBS MODERATE 35: CPT | Performed by: HOSPITALIST

## 2020-07-01 RX ORDER — AMLODIPINE BESYLATE 5 MG/1
10 TABLET ORAL DAILY
Status: DISCONTINUED | OUTPATIENT
Start: 2020-07-02 | End: 2020-07-02

## 2020-07-01 RX ORDER — BLOOD-GLUCOSE METER
EACH MISCELLANEOUS
Qty: 1 KIT | Refills: 0 | Status: SHIPPED | OUTPATIENT
Start: 2020-07-01 | End: 2020-07-06 | Stop reason: CLARIF

## 2020-07-01 RX ORDER — BLOOD SUGAR DIAGNOSTIC
STRIP MISCELLANEOUS
Qty: 100 STRIP | Refills: 2 | Status: SHIPPED | OUTPATIENT
Start: 2020-07-01 | End: 2020-11-10

## 2020-07-01 NOTE — PROGRESS NOTES
INFECTIOUS DISEASE PROGRESS NOTE    Sakshi Foster Patient Status:  Inpatient    1960 MRN [id number]   [facility] 3SW-A Attending Ester Sanches MD   Hosp Day # 4 [facility] (PF) 4 MG/ML injection 2 mg, 2 mg, Intravenous, Q2H PRN  •  ondansetron HCl (ZOFRAN) injection 4 mg, 4 mg, Intravenous, Q6H PRN  •  Metoclopramide HCl (REGLAN) injection 5 mg, 5 mg, Intravenous, Q8H PRN  •  hydrochlorothiazide (MICROZIDE) cap 12.5 mg, 12.5 --   --   --    AST 7*  --   --   --   --    ALT 18  --   --   --   --    BILT 0.4  --   --   --   --    TP 7.1  --   --   --   --      REPP   Lab 06/26/20 2112   COLORUR Yellow   CLARITY Cloudy*   SPECGRAVITY 1.016   GLUUR >=500*   BILUR Negative ELIZABETH    Agree with above, home with invanz

## 2020-07-01 NOTE — OPERATIVE REPORT
Operative Report  Date of surgery: 06/30/2020    Pre-operative Diagnosis: gas gangrene/necrotizing fasciitis right foot    Post-operative Diagnosis: Same as pre op dx    Procedure: Irrigation and debridement with partial fifth ray amputation, application o was resected to what appeared to be viable bone at the midshaft and a dorsal distal to proximal plantar angulation. The fifth metatarsal was passed off the field and sent as specimen.   Any remaining necrotic and devitalized gray appearing tissue to the do

## 2020-07-01 NOTE — CONSULTS
ENDOCRINOLOGY CONSULTATION    Attending physician:  John Mendes MD  Consulting physican:  Jaskaran Jackson MD    Admission Date:  6/26/2020  Consultation Date:  07/01/20      Reason for consultation: Type 2 DM    Chief Complaint:  Admitted for ri (K-DUR M20) CR tab 40 mEq, 40 mEq, Oral, Once  Insulin Aspart Pen (NOVOLOG) 100 UNIT/ML flexpen 1-68 Units, 1-68 Units, Subcutaneous, TID CC  hydrALAzine HCl (APRESOLINE) injection 10 mg, 10 mg, Intravenous, Q4H PRN  Meropenem (MERREM) 500 mg in sodium chl vancomycin IVPB premix 2g in 0.9% NaCl 500 mL, 25 mg/kg, Intravenous, Once        Allergies:  No Known Allergies      Social History    Socioeconomic History      Marital status:       Spouse name: Not on file      Number of children: Not on file given  · Insulin teaching  · Pt's diabetes is managed by Dr. Ruchi Fowler  · Home diabetic regimen is diet therapy      3. Htn    · On BP meds      4. Right foot ulcer: s/p I&D    · On Abx        We will follow with you. Thank you for the consultation.     Ayush Keys

## 2020-07-01 NOTE — ANESTHESIA POSTPROCEDURE EVALUATION
[facility] Patient Status:  Inpatient   Age/Gender 62year old male MRN [id number]   [facility] SURGERY Attending Ev Edmond MD   Hosp Day # 3 PCP Dianna Bloch, MD       Anesthesia Post-op Note    Procedure(s):  IR

## 2020-07-01 NOTE — PROGRESS NOTES
[facility]  Progress Note    Matti Martinez Patient Status:  Inpatient    1960 MRN [id number]   [facility] 3SW-A Attending Hudson Ying MD   Hosp Day # 4 PCP Alexandru Kaur MD     Subjective:  Matti Martinez is a(n) 62 year o Subjacent geographic low signal may represent packing material.  This abuts the extensor tendons. Irregularity of the   plantar soft tissues at the level of the 5th metatarsal phalangeal joint most likely represents ulceration.   No focal fluid collection

## 2020-07-01 NOTE — CM/SW NOTE
Spoke w/wife re: d/c plan, patient indisposed. Explained invanz drug costs, drug delivery, verbalized understanding and agreeable to costs. Reviewed [facility] to see patient day after d/c for iv abx and wound care.  Wound vac at bedside, will be applie

## 2020-07-01 NOTE — PLAN OF CARE
Pt received booklet \"Understanding Diabetes, Basic Survival Skills\". Instructed and gave return demonstration of glucometer with diabetic educator. More education given by endocrinologist.  Pt and spouse verbalized understanding.   Plan home tomorrow wi

## 2020-07-01 NOTE — CONSULTS
[facility]  Diabetes Consult Note    Carson Boo Patient Status:  Inpatient    1960 MRN [id number]   [facility] 3SW-A Attending Ladan Merritt MD   Hosp Day # 4 PCP Norman Mcclure MD     Reason for Consult:     Recommendation recommendations for diabetes self-management. The patient reports he was diagnosed with type 2 diabetes about 20 years ago and denies a family history of diabetes.   He states at the time of diagnosis, he took insulin and tested his blood glucose, and was counting would take some practice, but understood reading a label. Recommend a corrective insulin scale for home and a fixed dose of insulin for the food initially.     Taught the One Touch Verio Flex glucometer to the patient's wife who was able to teach One Touch Verio Flex     PROVIDER F/U RECOMMENDATIONS:    · Patient's current PCP  · Endocrinologist    A total of 48 minutes were spent with the patient, 100% was spent counseling and coordinating care for uncontrolled type 2 diabetes self-management incl

## 2020-07-01 NOTE — PROGRESS NOTES
[facility]  Progress Note     Alaina Rose Patient Status:  Inpatient    1960 MRN [id number]   [facility] 3SW-A Attending Hazel Barakat MD   Hosp Day # 4 PCP Dave Hutson MD     Chief Complaint: foto surgery    S: Patient --    ALT 18  --   --   --   --    BILT 0.4  --   --   --   --    TP 7.1  --   --   --   --        Estimated Creatinine Clearance: 46.9 mL/min (A) (based on SCr of 1.6 mg/dL (H)).     Recent Labs   Lab 06/26/20  1843   PTP 13.9   INR 1.04       No results f

## 2020-07-01 NOTE — PLAN OF CARE
Received pt in bed. Dressing and wound vac intact to RLE. Pt and wife instructed on insulin pen. Wife administered insulin this am after instruction, without difficulty. Endocrine consulted, and Cirilo Munoz for further DM education per Dr. Emily Elise.

## 2020-07-01 NOTE — BRIEF OP NOTE
Pre-Operative Diagnosis: INPT     Post-Operative Diagnosis: INPT      Procedure Performed:   Procedure(s):  IRRIGATION AND DEBRIDEMENT RIGHT  PARTIAL 5TH RAY AMPUTATION, application of Integra graft, application of wound vac    Surgeon(s) and Role:     * T

## 2020-07-01 NOTE — CONSULTS
[facility]  Inpatient Wound Care Contact Note    Iain Gary Patient Status:  Inpatient    1960 MRN [id number]   [facility] 3SW-A Attending Lucas Cuevas MD   Hosp Day # 4 PCP Alonso Pollock MD     Patient will need use of Ne

## 2020-07-01 NOTE — PLAN OF CARE
Received patient from pacu on bed. Right foot with dressing, ace wrap and wound vac intact. Denies any pain. Foot elevated on pillow. Picc to right upper arm. Patient voided upon arrival to unit. Plan of care reviewed. Bed alarm on.  Call light within reach

## 2020-07-02 ENCOUNTER — APPOINTMENT (OUTPATIENT)
Dept: GENERAL RADIOLOGY | Facility: HOSPITAL | Age: 60
DRG: 853 | End: 2020-07-02
Attending: PODIATRIST
Payer: COMMERCIAL

## 2020-07-02 VITALS
SYSTOLIC BLOOD PRESSURE: 162 MMHG | BODY MASS INDEX: 25.01 KG/M2 | TEMPERATURE: 98 F | WEIGHT: 165 LBS | HEIGHT: 68 IN | OXYGEN SATURATION: 98 % | HEART RATE: 90 BPM | RESPIRATION RATE: 18 BRPM | DIASTOLIC BLOOD PRESSURE: 83 MMHG

## 2020-07-02 LAB
BASOPHILS # BLD AUTO: 0.05 X10(3) UL (ref 0–0.2)
BASOPHILS NFR BLD AUTO: 0.3 %
DEPRECATED RDW RBC AUTO: 36.5 FL (ref 35.1–46.3)
EOSINOPHIL # BLD AUTO: 0.21 X10(3) UL (ref 0–0.7)
EOSINOPHIL NFR BLD AUTO: 1.3 %
ERYTHROCYTE [DISTWIDTH] IN BLOOD BY AUTOMATED COUNT: 11.9 % (ref 11–15)
GLUCOSE BLD-MCNC: 149 MG/DL (ref 70–99)
GLUCOSE BLD-MCNC: 188 MG/DL (ref 70–99)
HCT VFR BLD AUTO: 23.6 % (ref 39–53)
HGB BLD-MCNC: 7.7 G/DL (ref 13–17.5)
IMM GRANULOCYTES # BLD AUTO: 0.12 X10(3) UL (ref 0–1)
IMM GRANULOCYTES NFR BLD: 0.7 %
LYMPHOCYTES # BLD AUTO: 1.79 X10(3) UL (ref 1–4)
LYMPHOCYTES NFR BLD AUTO: 10.9 %
MCH RBC QN AUTO: 27.6 PG (ref 26–34)
MCHC RBC AUTO-ENTMCNC: 32.6 G/DL (ref 31–37)
MCV RBC AUTO: 84.6 FL (ref 80–100)
MONOCYTES # BLD AUTO: 0.8 X10(3) UL (ref 0.1–1)
MONOCYTES NFR BLD AUTO: 4.9 %
NEUTROPHILS # BLD AUTO: 13.4 X10 (3) UL (ref 1.5–7.7)
NEUTROPHILS # BLD AUTO: 13.4 X10(3) UL (ref 1.5–7.7)
NEUTROPHILS NFR BLD AUTO: 81.9 %
PLATELET # BLD AUTO: 353 10(3)UL (ref 150–450)
RBC # BLD AUTO: 2.79 X10(6)UL (ref 4.3–5.7)
WBC # BLD AUTO: 16.4 X10(3) UL (ref 4–11)

## 2020-07-02 PROCEDURE — 73620 X-RAY EXAM OF FOOT: CPT | Performed by: PODIATRIST

## 2020-07-02 PROCEDURE — 99239 HOSP IP/OBS DSCHRG MGMT >30: CPT | Performed by: HOSPITALIST

## 2020-07-02 RX ORDER — HYDROCHLOROTHIAZIDE 12.5 MG/1
12.5 CAPSULE, GELATIN COATED ORAL DAILY
Qty: 30 CAPSULE | Refills: 0 | Status: SHIPPED | OUTPATIENT
Start: 2020-07-03 | End: 2020-08-03

## 2020-07-02 RX ORDER — INSULIN LISPRO 100 [IU]/ML
INJECTION, SOLUTION INTRAVENOUS; SUBCUTANEOUS
Qty: 5 PEN | Refills: 0 | Status: SHIPPED | OUTPATIENT
Start: 2020-07-02 | End: 2020-09-25

## 2020-07-02 RX ORDER — AMLODIPINE BESYLATE 10 MG/1
10 TABLET ORAL DAILY
Qty: 30 TABLET | Refills: 0 | Status: SHIPPED | OUTPATIENT
Start: 2020-07-03 | End: 2020-08-03

## 2020-07-02 RX ORDER — INSULIN DETEMIR 100 [IU]/ML
INJECTION, SOLUTION SUBCUTANEOUS
Qty: 5 PEN | Refills: 0 | Status: SHIPPED | OUTPATIENT
Start: 2020-07-02 | End: 2020-07-06 | Stop reason: CLARIF

## 2020-07-02 NOTE — PROGRESS NOTES
ENDOCRINOLOGY PROGRESS NOTE    Typed by Jules Singh MD on 7/2/2020      S:  Pt anticipates discharge today      O:  BP (!) 163/82 (BP Location: Left arm)   Pulse 86   Temp 99 °F (37.2 °C) (Oral)   Resp 18   Ht 68\"   Wt 165 lb (74.8 kg)   SpO2 for diabetes, in 2 weeks. Call [phone]. I already completed my part of the order reconciliation for discharge.       Vivian Ann MD  Endocrinology, Diabetes and Metabolism  [facility]

## 2020-07-02 NOTE — CM/SW NOTE
06/30/20 1200   Discharge disposition   Expected discharge disposition Home-Health   Name of Facillity/Home Care/Hospice [facility]   Additional Home Care/Hospice Provider   (Jarad Marmolejo 94)   E provider   [facility])     [facility] liaison aware of d/c today, [place] [phone]

## 2020-07-02 NOTE — CONSULTS
[facility]  Report of Inpatient Wound Care Consultation     Raquel Garces Patient Status:  Inpatient    1960 MRN [id number]   [facility] 3SW-A Attending Jose E Winston MD   Hosp Day # 5 PCP Trixie Silva MD     [facility] --   --    *  --  153*  --   --   --  237*  --   --   --  315*  --   --   --   --   --    CA 9.1  --  8.3*  --   --   --  7.8*  --   --   --  8.2*  --   --   --   --   --    ALB 2.1*  --   --   --   --   --   --   --   --   --   --   --   --   -- patient. Please call me at [phone] or page me at [phone] if you have any questions about this consultation and plan of care. If unable to reach me at these, please call the Inpatient Wound Care pager at #[phone]. Time Spent 1 Hour. Thank you,    Clive Mcleod.  SMITA

## 2020-07-02 NOTE — PLAN OF CARE
0930 Wound care at bedside to apply home wound vac. Call to Xray to inform that wound care present, Xray will send for patient after wound care completed. Patient on IV merrem in hospital, DC orders for Drew Ocampo, message to ZAHIDA HOUSER for ID at 0

## 2020-07-02 NOTE — PROGRESS NOTES
INFECTIOUS DISEASE PROGRESS NOTE    Bishnu Suarez Patient Status:  Inpatient    1960 MRN [id number]   [facility] 3SW-A Attending Neelima Merlos MD   Hosp Day # 5 PCP [facility] MG/ML injection 2 mg, 2 mg, Intravenous, Q2H PRN  •  ondansetron HCl (ZOFRAN) injection 4 mg, 4 mg, Intravenous, Q6H PRN  •  Metoclopramide HCl (REGLAN) injection 5 mg, 5 mg, Intravenous, Q8H PRN  •  hydrochlorothiazide (MICROZIDE) cap 12.5 mg, 12.5 mg, Or BILT 0.4  --   --   --   --    TP 7.1  --   --   --   --      Recent Labs   Lab 06/26/20 2112   COLORUR Yellow   CLARITY Cloudy*   SPECGRAVITY 1.016   GLUUR >=500*   BILUR Negative   KETUR Negative   BLOODURINE Moderate*   PHURINE 5.0   PROUR >=500*   U patient's wife,  and Dr. Too Joy. River Mao PA-C    Agree with above, Austin Sinha for home ivabx  Fu Dr. Ashlyn Bishop

## 2020-07-02 NOTE — PROGRESS NOTES
[facility]  Progress Note     Elif Whitaker Patient Status:  Inpatient    1960 MRN [id number]   [facility] 3SW-A Attending Gisel Mcclure MD   Hosp Day # 5 PCP Ruchi Fowler MD     Chief Complaint: foot pain    S: Patient florie --   --   --    AST 7*  --   --   --   --    ALT 18  --   --   --   --    BILT 0.4  --   --   --   --    TP 7.1  --   --   --   --        Estimated Creatinine Clearance: 46.9 mL/min (A) (based on SCr of 1.6 mg/dL (H)).     Recent Labs   Lab 06/26/20  2254

## 2020-07-03 ENCOUNTER — TELEPHONE (OUTPATIENT)
Dept: MEDSURG UNIT | Facility: HOSPITAL | Age: 60
End: 2020-07-03

## 2020-07-03 ENCOUNTER — PATIENT OUTREACH (OUTPATIENT)
Dept: CASE MANAGEMENT | Age: 60
End: 2020-07-03

## 2020-07-03 DIAGNOSIS — Z02.9 ENCOUNTERS FOR UNSPECIFIED ADMINISTRATIVE PURPOSE: ICD-10-CM

## 2020-07-03 NOTE — PROGRESS NOTES
Attempted to reach pt for TCM. Wife answered and stated she will call NCM back, waiting for a call back from Endo office. Will wait for call back.

## 2020-07-03 NOTE — PAYOR COMM NOTE
--------------  DISCHARGE REVIEW    Payor: [address] #:  [id number]  Authorization Number: [id number]    Admit date: 6/27/20  Admit time:  Davey Germain 92  Discharge Date: 7/2/2020  4:37 PM     Admitting Physician: Marta Zeng lower extremity irrigation and debridement on 1026 by Dr. Trina Post podiatry. Patient was seen by orthopedic surgery as well as infectious diseases. He was initially started on Unasyn and cultures were sent from the OR to adjust antibiotics.   Transthoracic e daily.   Quantity:  30 tablet  Refills:  0     ertapenem 1 g 1 g in sodium chloride 0.9% 100 mL  Notes to patient:  Antibiotic (paper RX for reference only)       Inject 1 g into the vein daily. Will need weekly CBC, BMP, sed rate and CRP while on IV abx. Phone:  [phone]   · HumaLOG KwikPen 100 UNIT/ML Sopn  · Insulin Pen Needle 32G X 4 MM Misc  · Levemir FlexTouch 100 UNIT/ML Sopn  · OneTouch Verio Strp     Please  your prescriptions at the location directed by your doctor or nurse    Bring a p (162-179)/(82-88) 162/83    Physical Exam:    General: No acute distress. Respiratory: Clear to auscultation bilaterally. No wheezes. No rhonchi. Cardiovascular: S1, S2. Regular rate and rhythm. No murmurs, rubs or gallops.    Abdomen: Soft, nontender, n

## 2020-07-03 NOTE — DISCHARGE SUMMARY
[facility]  DISCHARGE SUMMARY     Brain Nam Patient Status:  Inpatient    1960 MRN [id number]   [facility] 3SW-A Attending No att. providers found   [facility] Day # 5 PCP Alex Rea MD     Date of Admission: 2020  Date physician regarding adjusting blood pressure medicines according to blood pressure checks at home. He states that he will check with home health care nurse instead.   Patient was educated by infectious disease consultant about the need of prolonged course 0     Insulin Pen Needle 32G X 4 MM Misc  Commonly known as:  BD Pen Needle Lara U/F      For insulin use 5 times per day (may substitute with formulary brand)   Quantity:  150 each  Refills:  1     Levemir FlexTouch 100 UNIT/ML Sopn  Generic drug:  insuli schedule for diabetes follow-up. Dietician.      Belkys Shabazz, [address]  [phone]    In 4 weeks  For diabetes (SEE LELAND FIRST)    [address]  [address] [address] [facility] [address]  [phone]

## 2020-07-03 NOTE — PLAN OF CARE
Patient cleared for discharge from all services. Updated and in agreement with POC and DC plan. AVS discussed in detail with patient and spouse, all questions answered.  Extensive time spent with discharge instructions, all topics adresssed at length, PICC,

## 2020-07-06 ENCOUNTER — VIRTUAL PHONE E/M (OUTPATIENT)
Dept: INTERNAL MEDICINE CLINIC | Facility: CLINIC | Age: 60
End: 2020-07-06
Payer: COMMERCIAL

## 2020-07-06 ENCOUNTER — LAB REQUISITION (OUTPATIENT)
Dept: LAB | Facility: HOSPITAL | Age: 60
End: 2020-07-06
Payer: COMMERCIAL

## 2020-07-06 DIAGNOSIS — F41.9 ANXIETY: ICD-10-CM

## 2020-07-06 DIAGNOSIS — M72.6 NECROTIZING FASCIITIS OF ANKLE AND FOOT (HCC): Primary | ICD-10-CM

## 2020-07-06 DIAGNOSIS — E11.65 TYPE 2 DIABETES MELLITUS WITH HYPERGLYCEMIA (HCC): ICD-10-CM

## 2020-07-06 DIAGNOSIS — L03.115 CELLULITIS OF RIGHT LOWER LIMB: ICD-10-CM

## 2020-07-06 DIAGNOSIS — R78.81 BACTEREMIA: ICD-10-CM

## 2020-07-06 DIAGNOSIS — A41.9 SEPTICEMIA (HCC): ICD-10-CM

## 2020-07-06 DIAGNOSIS — E11.65 TYPE 2 DIABETES MELLITUS WITH HYPERGLYCEMIA, UNSPECIFIED WHETHER LONG TERM INSULIN USE (HCC): ICD-10-CM

## 2020-07-06 DIAGNOSIS — M86.9 OSTEOMYELITIS OF RIGHT FOOT, UNSPECIFIED TYPE (HCC): ICD-10-CM

## 2020-07-06 DIAGNOSIS — I10 ESSENTIAL HYPERTENSION: ICD-10-CM

## 2020-07-06 LAB
ANION GAP SERPL CALC-SCNC: 4 MMOL/L (ref 0–18)
BASOPHILS # BLD AUTO: 0.03 X10(3) UL (ref 0–0.2)
BASOPHILS NFR BLD AUTO: 0.2 %
BUN BLD-MCNC: 41 MG/DL (ref 7–18)
BUN/CREAT SERPL: 24.3 (ref 10–20)
CALCIUM BLD-MCNC: 8.2 MG/DL (ref 8.5–10.1)
CHLORIDE SERPL-SCNC: 107 MMOL/L (ref 98–112)
CO2 SERPL-SCNC: 28 MMOL/L (ref 21–32)
CREAT BLD-MCNC: 1.69 MG/DL (ref 0.7–1.3)
CRP SERPL-MCNC: 6.03 MG/DL (ref ?–0.3)
DEPRECATED RDW RBC AUTO: 36.8 FL (ref 35.1–46.3)
EOSINOPHIL # BLD AUTO: 0.08 X10(3) UL (ref 0–0.7)
EOSINOPHIL NFR BLD AUTO: 0.6 %
ERYTHROCYTE [DISTWIDTH] IN BLOOD BY AUTOMATED COUNT: 11.9 % (ref 11–15)
GLUCOSE BLD-MCNC: 178 MG/DL (ref 70–99)
HCT VFR BLD AUTO: 23.7 % (ref 39–53)
HGB BLD-MCNC: 7.4 G/DL (ref 13–17.5)
IMM GRANULOCYTES # BLD AUTO: 0.06 X10(3) UL (ref 0–1)
IMM GRANULOCYTES NFR BLD: 0.5 %
LYMPHOCYTES # BLD AUTO: 1.43 X10(3) UL (ref 1–4)
LYMPHOCYTES NFR BLD AUTO: 10.8 %
MCH RBC QN AUTO: 26.6 PG (ref 26–34)
MCHC RBC AUTO-ENTMCNC: 31.2 G/DL (ref 31–37)
MCV RBC AUTO: 85.3 FL (ref 80–100)
MONOCYTES # BLD AUTO: 0.64 X10(3) UL (ref 0.1–1)
MONOCYTES NFR BLD AUTO: 4.9 %
NEUTROPHILS # BLD AUTO: 10.94 X10 (3) UL (ref 1.5–7.7)
NEUTROPHILS # BLD AUTO: 10.94 X10(3) UL (ref 1.5–7.7)
NEUTROPHILS NFR BLD AUTO: 83 %
OSMOLALITY SERPL CALC.SUM OF ELEC: 303 MOSM/KG (ref 275–295)
PLATELET # BLD AUTO: 423 10(3)UL (ref 150–450)
POTASSIUM SERPL-SCNC: 4 MMOL/L (ref 3.5–5.1)
RBC # BLD AUTO: 2.78 X10(6)UL (ref 4.3–5.7)
SED RATE-ML: 135 MM/HR (ref 0–12)
SODIUM SERPL-SCNC: 139 MMOL/L (ref 136–145)
WBC # BLD AUTO: 13.2 X10(3) UL (ref 4–11)

## 2020-07-06 PROCEDURE — 86140 C-REACTIVE PROTEIN: CPT | Performed by: INTERNAL MEDICINE

## 2020-07-06 PROCEDURE — 99213 OFFICE O/P EST LOW 20 MIN: CPT | Performed by: CLINICAL NURSE SPECIALIST

## 2020-07-06 PROCEDURE — 80048 BASIC METABOLIC PNL TOTAL CA: CPT | Performed by: INTERNAL MEDICINE

## 2020-07-06 PROCEDURE — 85025 COMPLETE CBC W/AUTO DIFF WBC: CPT | Performed by: INTERNAL MEDICINE

## 2020-07-06 PROCEDURE — 85652 RBC SED RATE AUTOMATED: CPT | Performed by: INTERNAL MEDICINE

## 2020-07-06 NOTE — PROGRESS NOTES
TRANSITIONAL CARE CLINIC PHARMACIST MEDICATION RECONCILIATION        Carmen Moise MRN [id number]    1960 PCP Maria Esther Ball MD       Comments: Medication history completed by the [address] [place] [address] Pharmacist with the patient on the phone. medications. He states she injects him 5 times a day. He reports that neither he nor his wife were taught how to use the blood glucose monitor and are unable to take his blood sugars.   In hospital, Mamta Presley taught the wife how to use the monitor with

## 2020-07-06 NOTE — PROGRESS NOTES
Attempted to contact the pt for TCM without a patient call back. The patient was seen for TCM-Hospital FU with the [facility] on 7/6/2020. NCM closing encounter.

## 2020-07-06 NOTE — PROGRESS NOTES
Virtual/Telephone Check-In  [address] [place] [address] verbally consents to a Virtual/Telephone Check-In service on 07/06/20.   Patient understands and accepts financial responsibility for any deductible, co-insurance and/or co-pays associated with this today.  He denies fever or pain to the right foot/leg. Assessment/Plan:  1.  Necrotizing skin and soft tissue infection of right foot (HCC)/ s/p I&D 6/26/2020 +MSSA enterobacter cocci complex and prevotella species/Septicemia  · Ertapenem at home via PICC

## 2020-07-10 ENCOUNTER — TELEPHONE (OUTPATIENT)
Dept: INTERNAL MEDICINE CLINIC | Facility: CLINIC | Age: 60
End: 2020-07-10

## 2020-07-10 NOTE — TELEPHONE ENCOUNTER
Received fax from [facility] with attached orders to be signed by provider.  Placed in AD bin to complete

## 2020-07-13 ENCOUNTER — TELEPHONE (OUTPATIENT)
Dept: INTERNAL MEDICINE CLINIC | Facility: CLINIC | Age: 60
End: 2020-07-13

## 2020-07-13 ENCOUNTER — LAB REQUISITION (OUTPATIENT)
Dept: LAB | Facility: HOSPITAL | Age: 60
End: 2020-07-13
Payer: COMMERCIAL

## 2020-07-13 ENCOUNTER — MOBILE ENCOUNTER (OUTPATIENT)
Dept: WOUND CARE | Facility: HOSPITAL | Age: 60
End: 2020-07-13

## 2020-07-13 DIAGNOSIS — D64.9 ANEMIA, UNSPECIFIED TYPE: Primary | ICD-10-CM

## 2020-07-13 DIAGNOSIS — A41.01 SEPSIS DUE TO METHICILLIN SUSCEPTIBLE STAPHYLOCOCCUS AUREUS (HCC): ICD-10-CM

## 2020-07-13 LAB
ANION GAP SERPL CALC-SCNC: 3 MMOL/L (ref 0–18)
BASOPHILS # BLD AUTO: 0.07 X10(3) UL (ref 0–0.2)
BASOPHILS NFR BLD AUTO: 0.8 %
BUN BLD-MCNC: 47 MG/DL (ref 7–18)
BUN/CREAT SERPL: 27.2 (ref 10–20)
CALCIUM BLD-MCNC: 8.4 MG/DL (ref 8.5–10.1)
CHLORIDE SERPL-SCNC: 112 MMOL/L (ref 98–112)
CO2 SERPL-SCNC: 28 MMOL/L (ref 21–32)
CREAT BLD-MCNC: 1.73 MG/DL (ref 0.7–1.3)
CRP SERPL-MCNC: 3.32 MG/DL (ref ?–0.3)
DEPRECATED RDW RBC AUTO: 38.4 FL (ref 35.1–46.3)
EOSINOPHIL # BLD AUTO: 0.19 X10(3) UL (ref 0–0.7)
EOSINOPHIL NFR BLD AUTO: 2.2 %
ERYTHROCYTE [DISTWIDTH] IN BLOOD BY AUTOMATED COUNT: 11.9 % (ref 11–15)
GLUCOSE BLD-MCNC: 99 MG/DL (ref 70–99)
HCT VFR BLD AUTO: 22 % (ref 39–53)
HGB BLD-MCNC: 6.7 G/DL (ref 13–17.5)
IMM GRANULOCYTES # BLD AUTO: 0.03 X10(3) UL (ref 0–1)
IMM GRANULOCYTES NFR BLD: 0.3 %
LYMPHOCYTES # BLD AUTO: 1.7 X10(3) UL (ref 1–4)
LYMPHOCYTES NFR BLD AUTO: 19.5 %
MCH RBC QN AUTO: 26.4 PG (ref 26–34)
MCHC RBC AUTO-ENTMCNC: 30.5 G/DL (ref 31–37)
MCV RBC AUTO: 86.6 FL (ref 80–100)
MONOCYTES # BLD AUTO: 0.46 X10(3) UL (ref 0.1–1)
MONOCYTES NFR BLD AUTO: 5.3 %
NEUTROPHILS # BLD AUTO: 6.26 X10 (3) UL (ref 1.5–7.7)
NEUTROPHILS # BLD AUTO: 6.26 X10(3) UL (ref 1.5–7.7)
NEUTROPHILS NFR BLD AUTO: 71.9 %
OSMOLALITY SERPL CALC.SUM OF ELEC: 308 MOSM/KG (ref 275–295)
PLATELET # BLD AUTO: 329 10(3)UL (ref 150–450)
POTASSIUM SERPL-SCNC: 4.2 MMOL/L (ref 3.5–5.1)
RBC # BLD AUTO: 2.54 X10(6)UL (ref 4.3–5.7)
SED RATE-ML: 95 MM/HR (ref 0–12)
SODIUM SERPL-SCNC: 143 MMOL/L (ref 136–145)
WBC # BLD AUTO: 8.7 X10(3) UL (ref 4–11)

## 2020-07-13 PROCEDURE — 85025 COMPLETE CBC W/AUTO DIFF WBC: CPT | Performed by: INTERNAL MEDICINE

## 2020-07-13 PROCEDURE — 80048 BASIC METABOLIC PNL TOTAL CA: CPT | Performed by: INTERNAL MEDICINE

## 2020-07-13 PROCEDURE — 86140 C-REACTIVE PROTEIN: CPT | Performed by: INTERNAL MEDICINE

## 2020-07-13 PROCEDURE — 85652 RBC SED RATE AUTOMATED: CPT | Performed by: INTERNAL MEDICINE

## 2020-07-13 NOTE — PROGRESS NOTES
Notified Hg 6.7. No symptoms. He was told to contact Dr Lakhwinder Jackson and may need transfusion or iron IV. Message sent to Dr Lakhwinder Jackson through [email or web address] chat.

## 2020-07-14 NOTE — TELEPHONE ENCOUNTER
Current hemoglobin of 6.7 without obvious source or gross bleeding. Progressive worsening. Asymptomatic. Overdue for screening colonoscopy, however, may need prompt transfusion.  Spoke with patient and provided information for hematology referral. Office no

## 2020-07-15 ENCOUNTER — TELEPHONE (OUTPATIENT)
Dept: INTERNAL MEDICINE CLINIC | Facility: CLINIC | Age: 60
End: 2020-07-15

## 2020-07-15 NOTE — TELEPHONE ENCOUNTER
Pierre Joyner is calling form [facility] calling with critical labs for this patient would like to talk to  A nurse

## 2020-07-15 NOTE — TELEPHONE ENCOUNTER
Patient notified his Hgb=6.7, BUN=47, notified he will need to go to the ER for evaluation, possible blood transfusion. Pt states he just spent a week in the hospital and does not want to go back. Pt notified how dangerous it was not to go. Pt states he is asymptomatic and will call Hematology for appt. Pt notified he will not be able to get in fight away but pt insisted. FYI to AD.

## 2020-07-15 NOTE — TELEPHONE ENCOUNTER
LM for pt at [phone]  to call back. AD would like him to go to the ER due to Hgb=6.7, BUN=47. Gretchen Mercado at the [facility] notified we were unable to reach the patient.

## 2020-07-15 NOTE — TELEPHONE ENCOUNTER
Nancy LIN from [address] [place] [address] received labs for this patient(in Epic ordered by Dr Magui Olivo) 7/13/20 showing Hgb=6.7 and BUN =47, Cr 1.73. She cannot get a hold of Dr Magui Olivo and wants to know if the patient should go to the ER, he is at his [address] office right now. Please advise.

## 2020-07-16 ENCOUNTER — HOSPITAL ENCOUNTER (EMERGENCY)
Facility: HOSPITAL | Age: 60
Discharge: HOME | End: 2020-07-16
Attending: EMERGENCY MEDICINE
Payer: COMMERCIAL

## 2020-07-16 ENCOUNTER — TELEPHONE (OUTPATIENT)
Dept: INTERNAL MEDICINE CLINIC | Facility: CLINIC | Age: 60
End: 2020-07-16

## 2020-07-16 VITALS
HEIGHT: 68 IN | BODY MASS INDEX: 25.01 KG/M2 | SYSTOLIC BLOOD PRESSURE: 143 MMHG | OXYGEN SATURATION: 98 % | WEIGHT: 165 LBS | TEMPERATURE: 98 F | RESPIRATION RATE: 18 BRPM | DIASTOLIC BLOOD PRESSURE: 89 MMHG | HEART RATE: 81 BPM

## 2020-07-16 DIAGNOSIS — D64.9 CHRONIC ANEMIA: Primary | ICD-10-CM

## 2020-07-16 LAB
ALBUMIN SERPL-MCNC: 2.1 G/DL (ref 3.4–5)
ALBUMIN/GLOB SERPL: 0.4 {RATIO} (ref 1–2)
ALP LIVER SERPL-CCNC: 160 U/L (ref 45–117)
ALT SERPL-CCNC: 19 U/L (ref 16–61)
ANION GAP SERPL CALC-SCNC: 4 MMOL/L (ref 0–18)
AST SERPL-CCNC: 21 U/L (ref 15–37)
BASOPHILS # BLD AUTO: 0.05 X10(3) UL (ref 0–0.2)
BASOPHILS NFR BLD AUTO: 0.7 %
BILIRUB SERPL-MCNC: 0.2 MG/DL (ref 0.1–2)
BUN BLD-MCNC: 43 MG/DL (ref 7–18)
BUN/CREAT SERPL: 25.6 (ref 10–20)
CALCIUM BLD-MCNC: 8.4 MG/DL (ref 8.5–10.1)
CHLORIDE SERPL-SCNC: 111 MMOL/L (ref 98–112)
CO2 SERPL-SCNC: 26 MMOL/L (ref 21–32)
CREAT BLD-MCNC: 1.68 MG/DL (ref 0.7–1.3)
DEPRECATED RDW RBC AUTO: 37.1 FL (ref 35.1–46.3)
EOSINOPHIL # BLD AUTO: 0.17 X10(3) UL (ref 0–0.7)
EOSINOPHIL NFR BLD AUTO: 2.3 %
ERYTHROCYTE [DISTWIDTH] IN BLOOD BY AUTOMATED COUNT: 12.1 % (ref 11–15)
GLOBULIN PLAS-MCNC: 4.7 G/DL (ref 2.8–4.4)
GLUCOSE BLD-MCNC: 121 MG/DL (ref 70–99)
HCT VFR BLD AUTO: 22.8 % (ref 39–53)
HGB BLD-MCNC: 7.4 G/DL (ref 13–17.5)
IMM GRANULOCYTES # BLD AUTO: 0.05 X10(3) UL (ref 0–1)
IMM GRANULOCYTES NFR BLD: 0.7 %
LYMPHOCYTES # BLD AUTO: 1.44 X10(3) UL (ref 1–4)
LYMPHOCYTES NFR BLD AUTO: 19.2 %
M PROTEIN MFR SERPL ELPH: 6.8 G/DL (ref 6.4–8.2)
MCH RBC QN AUTO: 27.4 PG (ref 26–34)
MCHC RBC AUTO-ENTMCNC: 32.5 G/DL (ref 31–37)
MCV RBC AUTO: 84.4 FL (ref 80–100)
MONOCYTES # BLD AUTO: 0.43 X10(3) UL (ref 0.1–1)
MONOCYTES NFR BLD AUTO: 5.7 %
NEUTROPHILS # BLD AUTO: 5.35 X10 (3) UL (ref 1.5–7.7)
NEUTROPHILS # BLD AUTO: 5.35 X10(3) UL (ref 1.5–7.7)
NEUTROPHILS NFR BLD AUTO: 71.4 %
OSMOLALITY SERPL CALC.SUM OF ELEC: 304 MOSM/KG (ref 275–295)
PLATELET # BLD AUTO: 299 10(3)UL (ref 150–450)
POTASSIUM SERPL-SCNC: 4.5 MMOL/L (ref 3.5–5.1)
RBC # BLD AUTO: 2.7 X10(6)UL (ref 4.3–5.7)
SODIUM SERPL-SCNC: 141 MMOL/L (ref 136–145)
WBC # BLD AUTO: 7.5 X10(3) UL (ref 4–11)

## 2020-07-16 PROCEDURE — 80053 COMPREHEN METABOLIC PANEL: CPT

## 2020-07-16 PROCEDURE — 82272 OCCULT BLD FECES 1-3 TESTS: CPT

## 2020-07-16 PROCEDURE — 36415 COLL VENOUS BLD VENIPUNCTURE: CPT

## 2020-07-16 PROCEDURE — 80053 COMPREHEN METABOLIC PANEL: CPT | Performed by: EMERGENCY MEDICINE

## 2020-07-16 PROCEDURE — 99284 EMERGENCY DEPT VISIT MOD MDM: CPT

## 2020-07-16 PROCEDURE — 85025 COMPLETE CBC W/AUTO DIFF WBC: CPT | Performed by: EMERGENCY MEDICINE

## 2020-07-16 PROCEDURE — 99283 EMERGENCY DEPT VISIT LOW MDM: CPT

## 2020-07-16 PROCEDURE — 85025 COMPLETE CBC W/AUTO DIFF WBC: CPT

## 2020-07-16 NOTE — ED PROVIDER NOTES
Patient Seen in: [facility] Emergency Department      History   Patient presents with:  Abnormal Result    Stated Complaint: Sent for low hemoglobin drawn on 07/13. Denies any symptoms.      HPI    68-year-old male comes to the hospital sent for low PEERL, throat clear, neck supple, no JVD, trachea midline, No LAD  Heart: S1S2 normal. No murmurs, regular rate and rhythm  Lungs: Clear to auscultation bilaterally  Abdomen: Soft nontender nondistended normal active bowel sounds without rebound, guarding with hematology for further outpatient management. The patient is asymptomatic at this time.         MDM     As above              Disposition and Plan     Clinical Impression:  Chronic anemia  (primary encounter diagnosis)    Disposition:  Discharge  7/16

## 2020-07-16 NOTE — TELEPHONE ENCOUNTER
Received orders from [address] [place] [address] in regards to wound care. Placed in MD bin for review and signature.

## 2020-07-16 NOTE — ED INITIAL ASSESSMENT (HPI)
Patient sent by PMD for low hgb. Patient had recent toe surgery about two weeks ago. Patient denies bloody stools, no blood in vomit. C/O fatigue.

## 2020-07-16 NOTE — TELEPHONE ENCOUNTER
Agree with urgency. Reinforced need for urgent evaluation with patient. Noncompliant with medical management and recommendations for care. States will contact hematology service, however, has not done so even with my recommendation on the day of result, after notification by Dr. Adin Durand.

## 2020-07-17 ENCOUNTER — TELEPHONE (OUTPATIENT)
Dept: INTERNAL MEDICINE CLINIC | Facility: CLINIC | Age: 60
End: 2020-07-17

## 2020-07-17 NOTE — TELEPHONE ENCOUNTER
Home Health Certification received requesting signature. Placed in MD bin for review and signature  Holding for faxing.

## 2020-07-20 ENCOUNTER — LAB REQUISITION (OUTPATIENT)
Dept: LAB | Facility: HOSPITAL | Age: 60
End: 2020-07-20
Payer: COMMERCIAL

## 2020-07-20 DIAGNOSIS — M72.6 NECROTIZING FASCIITIS (HCC): ICD-10-CM

## 2020-07-20 DIAGNOSIS — A41.01 SEPSIS DUE TO METHICILLIN SUSCEPTIBLE STAPHYLOCOCCUS AUREUS (HCC): ICD-10-CM

## 2020-07-20 DIAGNOSIS — Z79.2 LONG TERM (CURRENT) USE OF ANTIBIOTICS: ICD-10-CM

## 2020-07-20 LAB
ANION GAP SERPL CALC-SCNC: 1 MMOL/L (ref 0–18)
BASOPHILS # BLD AUTO: 0.03 X10(3) UL (ref 0–0.2)
BASOPHILS NFR BLD AUTO: 0.4 %
BUN BLD-MCNC: 46 MG/DL (ref 7–18)
BUN/CREAT SERPL: 26.4 (ref 10–20)
CALCIUM BLD-MCNC: 8.3 MG/DL (ref 8.5–10.1)
CHLORIDE SERPL-SCNC: 109 MMOL/L (ref 98–112)
CO2 SERPL-SCNC: 28 MMOL/L (ref 21–32)
CREAT BLD-MCNC: 1.74 MG/DL (ref 0.7–1.3)
CRP SERPL-MCNC: 0.76 MG/DL (ref ?–0.3)
DEPRECATED RDW RBC AUTO: 37.8 FL (ref 35.1–46.3)
EOSINOPHIL # BLD AUTO: 0.14 X10(3) UL (ref 0–0.7)
EOSINOPHIL NFR BLD AUTO: 1.8 %
ERYTHROCYTE [DISTWIDTH] IN BLOOD BY AUTOMATED COUNT: 12.5 % (ref 11–15)
GLUCOSE BLD-MCNC: 92 MG/DL (ref 70–99)
HCT VFR BLD AUTO: 22 % (ref 39–53)
HGB BLD-MCNC: 7 G/DL (ref 13–17.5)
IMM GRANULOCYTES # BLD AUTO: 0.03 X10(3) UL (ref 0–1)
IMM GRANULOCYTES NFR BLD: 0.4 %
LYMPHOCYTES # BLD AUTO: 1.81 X10(3) UL (ref 1–4)
LYMPHOCYTES NFR BLD AUTO: 23.2 %
MCH RBC QN AUTO: 26.4 PG (ref 26–34)
MCHC RBC AUTO-ENTMCNC: 31.8 G/DL (ref 31–37)
MCV RBC AUTO: 83 FL (ref 80–100)
MONOCYTES # BLD AUTO: 0.47 X10(3) UL (ref 0.1–1)
MONOCYTES NFR BLD AUTO: 6 %
NEUTROPHILS # BLD AUTO: 5.31 X10 (3) UL (ref 1.5–7.7)
NEUTROPHILS # BLD AUTO: 5.31 X10(3) UL (ref 1.5–7.7)
NEUTROPHILS NFR BLD AUTO: 68.2 %
OSMOLALITY SERPL CALC.SUM OF ELEC: 298 MOSM/KG (ref 275–295)
PLATELET # BLD AUTO: 295 10(3)UL (ref 150–450)
POTASSIUM SERPL-SCNC: 5 MMOL/L (ref 3.5–5.1)
RBC # BLD AUTO: 2.65 X10(6)UL (ref 4.3–5.7)
SED RATE-ML: 98 MM/HR (ref 0–12)
SODIUM SERPL-SCNC: 138 MMOL/L (ref 136–145)
WBC # BLD AUTO: 7.8 X10(3) UL (ref 4–11)

## 2020-07-20 PROCEDURE — 85025 COMPLETE CBC W/AUTO DIFF WBC: CPT | Performed by: INTERNAL MEDICINE

## 2020-07-20 PROCEDURE — 85652 RBC SED RATE AUTOMATED: CPT | Performed by: INTERNAL MEDICINE

## 2020-07-20 PROCEDURE — 86140 C-REACTIVE PROTEIN: CPT | Performed by: INTERNAL MEDICINE

## 2020-07-20 PROCEDURE — 80048 BASIC METABOLIC PNL TOTAL CA: CPT | Performed by: INTERNAL MEDICINE

## 2020-07-21 ENCOUNTER — OFFICE VISIT (OUTPATIENT)
Dept: WOUND CARE | Facility: HOSPITAL | Age: 60
End: 2020-07-21
Attending: NURSE PRACTITIONER
Payer: COMMERCIAL

## 2020-07-21 DIAGNOSIS — L97.514 NON-PRESSURE CHRONIC ULCER OF OTHER PART OF RIGHT FOOT WITH NECROSIS OF BONE (HCC): ICD-10-CM

## 2020-07-21 DIAGNOSIS — E11.621 TYPE 2 DIABETES MELLITUS WITH FOOT ULCER (HCC): ICD-10-CM

## 2020-07-21 DIAGNOSIS — Z79.4 LONG TERM (CURRENT) USE OF INSULIN (HCC): ICD-10-CM

## 2020-07-21 DIAGNOSIS — E11.65 TYPE 2 DIABETES MELLITUS WITH HYPERGLYCEMIA (HCC): ICD-10-CM

## 2020-07-21 DIAGNOSIS — L97.509 TYPE 2 DIABETES MELLITUS WITH FOOT ULCER, UNSPECIFIED WHETHER LONG TERM INSULIN USE (HCC): Primary | ICD-10-CM

## 2020-07-21 DIAGNOSIS — E11.621 TYPE 2 DIABETES MELLITUS WITH FOOT ULCER, UNSPECIFIED WHETHER LONG TERM INSULIN USE (HCC): Primary | ICD-10-CM

## 2020-07-21 DIAGNOSIS — L97.509 TYPE 2 DIABETES MELLITUS WITH FOOT ULCER (HCC): ICD-10-CM

## 2020-07-21 LAB — GLUCOSE BLD-MCNC: 185 MG/DL (ref 70–99)

## 2020-07-21 PROCEDURE — 29581 APPL MULTLAYER CMPRN SYS LEG: CPT

## 2020-07-21 PROCEDURE — 97597 DBRDMT OPN WND 1ST 20 CM/<: CPT

## 2020-07-21 PROCEDURE — 99215 OFFICE O/P EST HI 40 MIN: CPT

## 2020-07-21 PROCEDURE — 82962 GLUCOSE BLOOD TEST: CPT

## 2020-07-21 NOTE — PROGRESS NOTES
[facility]                INFECTIOUS DISEASE PROGRESS NOTE    Emilie Niece Patient Status:  Wound Series    1960 MRN [id number]   Location [facility] Attending JAMEL Boss   Hosp Day # 0 PCP Woody Dahl,

## 2020-07-24 ENCOUNTER — OFFICE VISIT (OUTPATIENT)
Dept: WOUND CARE | Facility: HOSPITAL | Age: 60
End: 2020-07-24
Attending: NURSE PRACTITIONER
Payer: COMMERCIAL

## 2020-07-24 ENCOUNTER — TELEPHONE (OUTPATIENT)
Dept: INTERNAL MEDICINE CLINIC | Facility: CLINIC | Age: 60
End: 2020-07-24

## 2020-07-24 DIAGNOSIS — Z79.4 LONG TERM (CURRENT) USE OF INSULIN (HCC): ICD-10-CM

## 2020-07-24 DIAGNOSIS — L97.509 TYPE 2 DIABETES MELLITUS WITH FOOT ULCER (HCC): ICD-10-CM

## 2020-07-24 DIAGNOSIS — E11.65 TYPE 2 DIABETES MELLITUS WITH HYPERGLYCEMIA (HCC): ICD-10-CM

## 2020-07-24 DIAGNOSIS — L97.514 NON-PRESSURE CHRONIC ULCER OF OTHER PART OF RIGHT FOOT WITH NECROSIS OF BONE (HCC): Primary | ICD-10-CM

## 2020-07-24 DIAGNOSIS — E11.621 TYPE 2 DIABETES MELLITUS WITH FOOT ULCER (HCC): ICD-10-CM

## 2020-07-24 DIAGNOSIS — M86.171 OTHER ACUTE OSTEOMYELITIS, RIGHT ANKLE AND FOOT (HCC): ICD-10-CM

## 2020-07-24 LAB — GLUCOSE BLD-MCNC: 187 MG/DL (ref 70–99)

## 2020-07-24 PROCEDURE — 82962 GLUCOSE BLOOD TEST: CPT

## 2020-07-24 PROCEDURE — 29581 APPL MULTLAYER CMPRN SYS LEG: CPT

## 2020-07-27 ENCOUNTER — LAB REQUISITION (OUTPATIENT)
Dept: LAB | Facility: HOSPITAL | Age: 60
End: 2020-07-27
Payer: COMMERCIAL

## 2020-07-27 ENCOUNTER — TELEPHONE (OUTPATIENT)
Dept: INTERNAL MEDICINE CLINIC | Facility: CLINIC | Age: 60
End: 2020-07-27

## 2020-07-27 DIAGNOSIS — L03.115 CELLULITIS OF RIGHT LOWER LIMB: ICD-10-CM

## 2020-07-27 DIAGNOSIS — E11.9 TYPE 2 DIABETES MELLITUS WITHOUT COMPLICATIONS (HCC): ICD-10-CM

## 2020-07-27 DIAGNOSIS — M72.6 NECROTIZING FASCIITIS (HCC): ICD-10-CM

## 2020-07-27 DIAGNOSIS — A41.01 SEPSIS DUE TO METHICILLIN SUSCEPTIBLE STAPHYLOCOCCUS AUREUS (HCC): ICD-10-CM

## 2020-07-27 LAB
ANION GAP SERPL CALC-SCNC: 3 MMOL/L (ref 0–18)
BASOPHILS # BLD AUTO: 0.06 X10(3) UL (ref 0–0.2)
BASOPHILS NFR BLD AUTO: 0.8 %
BUN BLD-MCNC: 52 MG/DL (ref 7–18)
BUN/CREAT SERPL: 28.9 (ref 10–20)
CALCIUM BLD-MCNC: 8.3 MG/DL (ref 8.5–10.1)
CHLORIDE SERPL-SCNC: 111 MMOL/L (ref 98–112)
CO2 SERPL-SCNC: 25 MMOL/L (ref 21–32)
CREAT BLD-MCNC: 1.8 MG/DL (ref 0.7–1.3)
CRP SERPL-MCNC: 0.71 MG/DL (ref ?–0.3)
DEPRECATED RDW RBC AUTO: 39.8 FL (ref 35.1–46.3)
EOSINOPHIL # BLD AUTO: 0.26 X10(3) UL (ref 0–0.7)
EOSINOPHIL NFR BLD AUTO: 3.5 %
ERYTHROCYTE [DISTWIDTH] IN BLOOD BY AUTOMATED COUNT: 12.9 % (ref 11–15)
GLUCOSE BLD-MCNC: 127 MG/DL (ref 70–99)
HCT VFR BLD AUTO: 24.7 % (ref 39–53)
HGB BLD-MCNC: 7.8 G/DL (ref 13–17.5)
IMM GRANULOCYTES # BLD AUTO: 0.02 X10(3) UL (ref 0–1)
IMM GRANULOCYTES NFR BLD: 0.3 %
LYMPHOCYTES # BLD AUTO: 1.97 X10(3) UL (ref 1–4)
LYMPHOCYTES NFR BLD AUTO: 26.3 %
MCH RBC QN AUTO: 26.8 PG (ref 26–34)
MCHC RBC AUTO-ENTMCNC: 31.6 G/DL (ref 31–37)
MCV RBC AUTO: 84.9 FL (ref 80–100)
MONOCYTES # BLD AUTO: 0.48 X10(3) UL (ref 0.1–1)
MONOCYTES NFR BLD AUTO: 6.4 %
NEUTROPHILS # BLD AUTO: 4.71 X10 (3) UL (ref 1.5–7.7)
NEUTROPHILS # BLD AUTO: 4.71 X10(3) UL (ref 1.5–7.7)
NEUTROPHILS NFR BLD AUTO: 62.7 %
OSMOLALITY SERPL CALC.SUM OF ELEC: 304 MOSM/KG (ref 275–295)
PLATELET # BLD AUTO: 298 10(3)UL (ref 150–450)
POTASSIUM SERPL-SCNC: 5 MMOL/L (ref 3.5–5.1)
RBC # BLD AUTO: 2.91 X10(6)UL (ref 4.3–5.7)
SED RATE-ML: 126 MM/HR (ref 0–12)
SODIUM SERPL-SCNC: 139 MMOL/L (ref 136–145)
WBC # BLD AUTO: 7.5 X10(3) UL (ref 4–11)

## 2020-07-27 PROCEDURE — 85025 COMPLETE CBC W/AUTO DIFF WBC: CPT | Performed by: INTERNAL MEDICINE

## 2020-07-27 PROCEDURE — 86140 C-REACTIVE PROTEIN: CPT | Performed by: INTERNAL MEDICINE

## 2020-07-27 PROCEDURE — 85652 RBC SED RATE AUTOMATED: CPT | Performed by: INTERNAL MEDICINE

## 2020-07-27 PROCEDURE — 80048 BASIC METABOLIC PNL TOTAL CA: CPT | Performed by: INTERNAL MEDICINE

## 2020-07-27 NOTE — TELEPHONE ENCOUNTER
Pt called and now needs to know if he should be continuing his Insulin that was given to him in the hosp     Please advise

## 2020-07-27 NOTE — TELEPHONE ENCOUNTER
Patient was hospitalized 6/26/2020 and states he was given amlodipine and hydrochlorothiazide with no refills. Patient unsure if he is to continue these medications or when they are completed to discontinue. LOV 6/19/2020; hospital f/u to discuss? AD please advise.

## 2020-07-29 ENCOUNTER — OFFICE VISIT (OUTPATIENT)
Dept: WOUND CARE | Facility: HOSPITAL | Age: 60
End: 2020-07-29
Attending: NURSE PRACTITIONER
Payer: COMMERCIAL

## 2020-07-29 DIAGNOSIS — M86.171 OTHER ACUTE OSTEOMYELITIS, RIGHT ANKLE AND FOOT (HCC): ICD-10-CM

## 2020-07-29 DIAGNOSIS — E11.621 TYPE 2 DIABETES MELLITUS WITH FOOT ULCER, UNSPECIFIED WHETHER LONG TERM INSULIN USE (HCC): Primary | ICD-10-CM

## 2020-07-29 DIAGNOSIS — L97.514 NON-PRESSURE CHRONIC ULCER OF OTHER PART OF RIGHT FOOT WITH NECROSIS OF BONE (HCC): ICD-10-CM

## 2020-07-29 DIAGNOSIS — L97.509 TYPE 2 DIABETES MELLITUS WITH FOOT ULCER, UNSPECIFIED WHETHER LONG TERM INSULIN USE (HCC): Primary | ICD-10-CM

## 2020-07-29 DIAGNOSIS — E11.65 TYPE 2 DIABETES MELLITUS WITH HYPERGLYCEMIA (HCC): ICD-10-CM

## 2020-07-29 DIAGNOSIS — Z79.4 LONG TERM (CURRENT) USE OF INSULIN (HCC): ICD-10-CM

## 2020-07-29 LAB — GLUCOSE BLD-MCNC: 300 MG/DL (ref 70–99)

## 2020-07-29 PROCEDURE — 29581 APPL MULTLAYER CMPRN SYS LEG: CPT

## 2020-07-29 PROCEDURE — 82962 GLUCOSE BLOOD TEST: CPT

## 2020-07-29 NOTE — PROGRESS NOTES
Subjective    Chief Complaint  This information was obtained from the patient  Patient is here for a wound care follow up to right leg, compression is to mid calf, pt states it was wrapped too tight and had his wife \"just roll it down\" but looks more alt 7-29-20 patient returns today, rn communication noted regarding patient in his \"regular\" shoe on 7-24-20 rn visit. It does not appear as though the integra graft was successful.  we still have significant tendon exposure, areas are dry even with moisture Wound #1 Right Foot is a chronic Grubbs Grade 3 Diabetic Ulcer and has received a status of Not Healed. Subsequent wound encounter measurements are 10.4cm length x 7cm width x 0.3cm depth, with an area of 72.8 sq cm and a volume of 21.84 cubic cm.  Tendon a bp wnl for patient. Pulse Regular and wnl for patient. Alonzo Cliche Respirations easy and unlabored. Temperature wnl. Weight normal for height. . Appearance neat and clean. Appears in no acute distress. Well nourished and well developed.     Cardiovascular:  dp/pt palpa Supplement with a daily multivitamin  Increase protein into diet  Start or continue taking Edis  Decrease salt intake  S/S of Infection  Non-adherence    Additional Orders:    Off-Loading:  Partial weight bearing  Other: - surgical shoe    Care summary  R

## 2020-08-03 ENCOUNTER — OFFICE VISIT (OUTPATIENT)
Dept: INTERNAL MEDICINE CLINIC | Facility: CLINIC | Age: 60
End: 2020-08-03
Payer: COMMERCIAL

## 2020-08-03 ENCOUNTER — LAB REQUISITION (OUTPATIENT)
Dept: LAB | Facility: HOSPITAL | Age: 60
End: 2020-08-03
Payer: COMMERCIAL

## 2020-08-03 VITALS
BODY MASS INDEX: 28.19 KG/M2 | TEMPERATURE: 98 F | HEIGHT: 68 IN | SYSTOLIC BLOOD PRESSURE: 148 MMHG | DIASTOLIC BLOOD PRESSURE: 82 MMHG | WEIGHT: 186 LBS | HEART RATE: 76 BPM

## 2020-08-03 DIAGNOSIS — M72.6 NECROTIZING FASCIITIS (HCC): ICD-10-CM

## 2020-08-03 DIAGNOSIS — M72.6 NECROTIZING FASCIITIS OF ANKLE AND FOOT (HCC): ICD-10-CM

## 2020-08-03 DIAGNOSIS — E11.65 TYPE 2 DIABETES MELLITUS WITH HYPERGLYCEMIA, UNSPECIFIED WHETHER LONG TERM INSULIN USE (HCC): Primary | ICD-10-CM

## 2020-08-03 DIAGNOSIS — I10 ESSENTIAL HYPERTENSION: ICD-10-CM

## 2020-08-03 DIAGNOSIS — E11.21 DIABETES MELLITUS WITH PROTEINURIC DIABETIC NEPHROPATHY (HCC): ICD-10-CM

## 2020-08-03 DIAGNOSIS — E08.3213 DIABETES MELLITUS DUE TO UNDERLYING CONDITION WITH BOTH EYES AFFECTED BY MILD NONPROLIFERATIVE RETINOPATHY AND MACULAR EDEMA, WITHOUT LONG-TERM CURRENT USE OF INSULIN (HCC): ICD-10-CM

## 2020-08-03 DIAGNOSIS — L03.115 CELLULITIS OF RIGHT LOWER LIMB: ICD-10-CM

## 2020-08-03 DIAGNOSIS — A41.01 SEPSIS DUE TO METHICILLIN SUSCEPTIBLE STAPHYLOCOCCUS AUREUS (HCC): ICD-10-CM

## 2020-08-03 DIAGNOSIS — D64.9 NORMOCYTIC ANEMIA: ICD-10-CM

## 2020-08-03 LAB
ANION GAP SERPL CALC-SCNC: 3 MMOL/L (ref 0–18)
BASOPHILS # BLD AUTO: 0.05 X10(3) UL (ref 0–0.2)
BASOPHILS NFR BLD AUTO: 0.6 %
BUN BLD-MCNC: 58 MG/DL (ref 7–18)
BUN/CREAT SERPL: 34.7 (ref 10–20)
CALCIUM BLD-MCNC: 8.8 MG/DL (ref 8.5–10.1)
CHLORIDE SERPL-SCNC: 115 MMOL/L (ref 98–112)
CO2 SERPL-SCNC: 23 MMOL/L (ref 21–32)
CREAT BLD-MCNC: 1.67 MG/DL (ref 0.7–1.3)
CRP SERPL-MCNC: 0.33 MG/DL (ref ?–0.3)
DEPRECATED RDW RBC AUTO: 40.4 FL (ref 35.1–46.3)
EOSINOPHIL # BLD AUTO: 0.18 X10(3) UL (ref 0–0.7)
EOSINOPHIL NFR BLD AUTO: 2.1 %
ERYTHROCYTE [DISTWIDTH] IN BLOOD BY AUTOMATED COUNT: 13.3 % (ref 11–15)
GLUCOSE BLD-MCNC: 108 MG/DL (ref 70–99)
GLUCOSE BLOOD: 147
HCT VFR BLD AUTO: 26.9 % (ref 39–53)
HGB BLD-MCNC: 8.5 G/DL (ref 13–17.5)
IMM GRANULOCYTES # BLD AUTO: 0.02 X10(3) UL (ref 0–1)
IMM GRANULOCYTES NFR BLD: 0.2 %
LYMPHOCYTES # BLD AUTO: 1.95 X10(3) UL (ref 1–4)
LYMPHOCYTES NFR BLD AUTO: 23 %
MCH RBC QN AUTO: 26.5 PG (ref 26–34)
MCHC RBC AUTO-ENTMCNC: 31.6 G/DL (ref 31–37)
MCV RBC AUTO: 83.8 FL (ref 80–100)
MONOCYTES # BLD AUTO: 0.54 X10(3) UL (ref 0.1–1)
MONOCYTES NFR BLD AUTO: 6.4 %
NEUTROPHILS # BLD AUTO: 5.72 X10 (3) UL (ref 1.5–7.7)
NEUTROPHILS # BLD AUTO: 5.72 X10(3) UL (ref 1.5–7.7)
NEUTROPHILS NFR BLD AUTO: 67.7 %
OSMOLALITY SERPL CALC.SUM OF ELEC: 309 MOSM/KG (ref 275–295)
PLATELET # BLD AUTO: 281 10(3)UL (ref 150–450)
POTASSIUM SERPL-SCNC: 4.7 MMOL/L (ref 3.5–5.1)
RBC # BLD AUTO: 3.21 X10(6)UL (ref 4.3–5.7)
SED RATE-ML: 112 MM/HR (ref 0–12)
SODIUM SERPL-SCNC: 141 MMOL/L (ref 136–145)
TEST STRIP LOT #: NORMAL NUMERIC
WBC # BLD AUTO: 8.5 X10(3) UL (ref 4–11)

## 2020-08-03 PROCEDURE — 99214 OFFICE O/P EST MOD 30 MIN: CPT | Performed by: INTERNAL MEDICINE

## 2020-08-03 PROCEDURE — 86140 C-REACTIVE PROTEIN: CPT | Performed by: INTERNAL MEDICINE

## 2020-08-03 PROCEDURE — 1111F DSCHRG MED/CURRENT MED MERGE: CPT | Performed by: INTERNAL MEDICINE

## 2020-08-03 PROCEDURE — 80048 BASIC METABOLIC PNL TOTAL CA: CPT | Performed by: INTERNAL MEDICINE

## 2020-08-03 PROCEDURE — 82962 GLUCOSE BLOOD TEST: CPT | Performed by: INTERNAL MEDICINE

## 2020-08-03 PROCEDURE — 3077F SYST BP >= 140 MM HG: CPT | Performed by: INTERNAL MEDICINE

## 2020-08-03 PROCEDURE — 85652 RBC SED RATE AUTOMATED: CPT | Performed by: INTERNAL MEDICINE

## 2020-08-03 PROCEDURE — 3008F BODY MASS INDEX DOCD: CPT | Performed by: INTERNAL MEDICINE

## 2020-08-03 PROCEDURE — 85025 COMPLETE CBC W/AUTO DIFF WBC: CPT | Performed by: INTERNAL MEDICINE

## 2020-08-03 PROCEDURE — 3079F DIAST BP 80-89 MM HG: CPT | Performed by: INTERNAL MEDICINE

## 2020-08-03 RX ORDER — HYDROCHLOROTHIAZIDE 12.5 MG/1
12.5 CAPSULE, GELATIN COATED ORAL DAILY
Qty: 90 CAPSULE | Refills: 0 | Status: SHIPPED | OUTPATIENT
Start: 2020-08-03 | End: 2020-09-25

## 2020-08-03 RX ORDER — AMLODIPINE BESYLATE 10 MG/1
10 TABLET ORAL DAILY
Qty: 90 TABLET | Refills: 0 | Status: SHIPPED | OUTPATIENT
Start: 2020-08-03 | End: 2020-10-02

## 2020-08-03 NOTE — PROGRESS NOTES
Carson Boo  12/7/1960    Patient presents with: Follow - Up: AJ rm f/u since surgery and med check      [address] is a 62year old male who presents as a follow-up.     The patient has a history of uncontrolled DM2 secondary to me the wound. finally we did discuss hbo hwoever due to patient's work schedule he is unable to do it due to the time committement. Review of Systems   No f/c/chest pain or sob. No cough. No abd pain/n/v/d. No ha or dizziness.  No numbness, tingling, or we Septicemia ([facility] [address]     Osteomyelitis of right foot [facility])     Anxiety     Past Surgical History:   Procedure Laterality Date   • EXTREMITY LOWER IRRIGATION & DEBRIDEMENT Right 6/26/2020    Performed by Lennie Mariee DPM at [facility] MAIN OR   • TOE AMPUTATION Right tomorrow  Overdue for screening colonoscopy; declined  Low-normal MCV and normal RDW    Hypertension:  Patient did not take his antihypertensive regimen today  Advised to monitor and practice DASH lifestyle     The patient indicates understanding of these

## 2020-08-03 NOTE — PROGRESS NOTES
[address] [facility] Note    Diagnosis: Anemia 2/2 CKD, Chronic Inflammation     Treatment History: Pending     Visit Diagnosis:  Anemia, unspecified type  (primary encounter diagnosis)  CKD (chronic kidney disease) stage 3, GFR 30-59 ml/m Disp: , Rfl:   HUMALOG KWIKPEN 100 UNIT/ML Subcutaneous Solution Pen-injector, Take as directed - max 30 Units per day, Disp: 5 pen, Rfl: 0  ertapenem 1 g 1 g in sodium chloride 0.9% 100 mL, Inject 1 g into the vein daily.  Will need weekly CBC, BMP, sed ra non-distended   Neuro: CN: II-XII grossly intact  PICC line c/d/i    Results:  Lab Results   Component Value Date    WBC 8.5 08/03/2020    HGB 8.5 (L) 08/03/2020    HCT 26.9 (L) 08/03/2020    MCV 83.8 08/03/2020    .0 08/03/2020     Lab Results   Co

## 2020-08-04 ENCOUNTER — OFFICE VISIT (OUTPATIENT)
Dept: HEMATOLOGY/ONCOLOGY | Facility: HOSPITAL | Age: 60
End: 2020-08-04
Attending: INTERNAL MEDICINE
Payer: COMMERCIAL

## 2020-08-04 VITALS
SYSTOLIC BLOOD PRESSURE: 159 MMHG | BODY MASS INDEX: 27.86 KG/M2 | HEIGHT: 67.99 IN | RESPIRATION RATE: 16 BRPM | DIASTOLIC BLOOD PRESSURE: 74 MMHG | WEIGHT: 183.81 LBS | HEART RATE: 82 BPM | OXYGEN SATURATION: 99 %

## 2020-08-04 VITALS — TEMPERATURE: 98 F

## 2020-08-04 DIAGNOSIS — N18.30 CKD (CHRONIC KIDNEY DISEASE) STAGE 3, GFR 30-59 ML/MIN (HCC): ICD-10-CM

## 2020-08-04 DIAGNOSIS — D64.9 ANEMIA, UNSPECIFIED TYPE: Primary | ICD-10-CM

## 2020-08-04 LAB
DEPRECATED HBV CORE AB SER IA-ACNC: 364.4 NG/ML (ref 30–530)
FOLATE SERPL-MCNC: 11.2 NG/ML (ref 8.7–?)
HGB RETIC QN AUTO: 33 PG (ref 28.2–36.6)
IMM RETICS NFR: 0.1 RATIO (ref 0.1–0.3)
IRON SATURATION: 13 % (ref 20–50)
IRON SERPL-MCNC: 48 UG/DL (ref 65–175)
LDH SERPL L TO P-CCNC: 240 U/L
RETICS # AUTO: 51.2 X10(3) UL (ref 22.5–147.5)
RETICS/RBC NFR AUTO: 1.7 % (ref 0.5–2.5)
TOTAL IRON BINDING CAPACITY: 365 UG/DL (ref 240–450)
TRANSFERRIN SERPL-MCNC: 245 MG/DL (ref 200–360)
TSI SER-ACNC: 2.05 MIU/ML (ref 0.36–3.74)
VIT B12 SERPL-MCNC: 416 PG/ML (ref 193–986)

## 2020-08-04 PROCEDURE — 99205 OFFICE O/P NEW HI 60 MIN: CPT | Performed by: INTERNAL MEDICINE

## 2020-08-04 PROCEDURE — 36592 COLLECT BLOOD FROM PICC: CPT

## 2020-08-04 NOTE — PROGRESS NOTES
Education Record    Learner:  Patient    Disease / Beryl Simmons     Barriers / Limitations:  None   Comments:    Method:  Discussion   Comments:    General Topics:  Plan of care reviewed   Comments:    Outcome:  Shows understanding   Comments:    Daniel

## 2020-08-06 PROBLEM — Z86.2 HISTORY OF ANEMIA DUE TO CHRONIC KIDNEY DISEASE: Status: ACTIVE | Noted: 2020-08-06

## 2020-08-06 PROBLEM — N18.9 HISTORY OF ANEMIA DUE TO CHRONIC KIDNEY DISEASE: Status: ACTIVE | Noted: 2020-08-06

## 2020-08-06 LAB
ERYTHROPOIETIN (EPO): 5 MU/ML
KAPPA FREE LIGHT CHAIN: 2.88 MG/DL (ref 0.33–1.94)
KAPPA/LAMBDA FLC RATIO: 0.96 (ref 0.26–1.65)
LAMBDA FREE LIGHT CHAIN: 3 MG/DL (ref 0.57–2.63)

## 2020-08-10 ENCOUNTER — LAB REQUISITION (OUTPATIENT)
Dept: LAB | Facility: HOSPITAL | Age: 60
End: 2020-08-10
Payer: COMMERCIAL

## 2020-08-10 DIAGNOSIS — M72.6 NECROTIZING FASCIITIS (HCC): ICD-10-CM

## 2020-08-10 DIAGNOSIS — A41.01 SEPSIS DUE TO METHICILLIN SUSCEPTIBLE STAPHYLOCOCCUS AUREUS (HCC): ICD-10-CM

## 2020-08-10 DIAGNOSIS — L03.115 CELLULITIS OF RIGHT LOWER LIMB: ICD-10-CM

## 2020-08-10 LAB
ANION GAP SERPL CALC-SCNC: 2 MMOL/L (ref 0–18)
BASOPHILS # BLD AUTO: 0.08 X10(3) UL (ref 0–0.2)
BASOPHILS NFR BLD AUTO: 1 %
BUN BLD-MCNC: 51 MG/DL (ref 7–18)
BUN/CREAT SERPL: 27 (ref 10–20)
CALCIUM BLD-MCNC: 8.7 MG/DL (ref 8.5–10.1)
CHLORIDE SERPL-SCNC: 115 MMOL/L (ref 98–112)
CO2 SERPL-SCNC: 25 MMOL/L (ref 21–32)
CREAT BLD-MCNC: 1.89 MG/DL (ref 0.7–1.3)
CRP SERPL-MCNC: <0.29 MG/DL (ref ?–0.3)
DEPRECATED RDW RBC AUTO: 40.5 FL (ref 35.1–46.3)
EOSINOPHIL # BLD AUTO: 0.27 X10(3) UL (ref 0–0.7)
EOSINOPHIL NFR BLD AUTO: 3.4 %
ERYTHROCYTE [DISTWIDTH] IN BLOOD BY AUTOMATED COUNT: 13.4 % (ref 11–15)
GLUCOSE BLD-MCNC: 148 MG/DL (ref 70–99)
HCT VFR BLD AUTO: 26.6 % (ref 39–53)
HGB BLD-MCNC: 8.3 G/DL (ref 13–17.5)
IMM GRANULOCYTES # BLD AUTO: 0.03 X10(3) UL (ref 0–1)
IMM GRANULOCYTES NFR BLD: 0.4 %
LYMPHOCYTES # BLD AUTO: 1.71 X10(3) UL (ref 1–4)
LYMPHOCYTES NFR BLD AUTO: 21.7 %
MCH RBC QN AUTO: 25.9 PG (ref 26–34)
MCHC RBC AUTO-ENTMCNC: 31.2 G/DL (ref 31–37)
MCV RBC AUTO: 82.9 FL (ref 80–100)
MONOCYTES # BLD AUTO: 0.46 X10(3) UL (ref 0.1–1)
MONOCYTES NFR BLD AUTO: 5.8 %
NEUTROPHILS # BLD AUTO: 5.34 X10 (3) UL (ref 1.5–7.7)
NEUTROPHILS # BLD AUTO: 5.34 X10(3) UL (ref 1.5–7.7)
NEUTROPHILS NFR BLD AUTO: 67.7 %
OSMOLALITY SERPL CALC.SUM OF ELEC: 310 MOSM/KG (ref 275–295)
PLATELET # BLD AUTO: 278 10(3)UL (ref 150–450)
POTASSIUM SERPL-SCNC: 5.3 MMOL/L (ref 3.5–5.1)
RBC # BLD AUTO: 3.21 X10(6)UL (ref 4.3–5.7)
SED RATE-ML: 93 MM/HR (ref 0–12)
SODIUM SERPL-SCNC: 142 MMOL/L (ref 136–145)
WBC # BLD AUTO: 7.9 X10(3) UL (ref 4–11)

## 2020-08-10 PROCEDURE — 86140 C-REACTIVE PROTEIN: CPT | Performed by: INTERNAL MEDICINE

## 2020-08-10 PROCEDURE — 85652 RBC SED RATE AUTOMATED: CPT | Performed by: INTERNAL MEDICINE

## 2020-08-10 PROCEDURE — 80048 BASIC METABOLIC PNL TOTAL CA: CPT | Performed by: INTERNAL MEDICINE

## 2020-08-10 PROCEDURE — 85025 COMPLETE CBC W/AUTO DIFF WBC: CPT | Performed by: INTERNAL MEDICINE

## 2020-08-12 ENCOUNTER — OFFICE VISIT (OUTPATIENT)
Dept: WOUND CARE | Facility: HOSPITAL | Age: 60
End: 2020-08-12
Attending: NURSE PRACTITIONER
Payer: COMMERCIAL

## 2020-08-12 DIAGNOSIS — L97.514 NON-PRESSURE CHRONIC ULCER OF OTHER PART OF RIGHT FOOT WITH NECROSIS OF BONE (HCC): ICD-10-CM

## 2020-08-12 DIAGNOSIS — E11.65 TYPE 2 DIABETES MELLITUS WITH HYPERGLYCEMIA (HCC): ICD-10-CM

## 2020-08-12 DIAGNOSIS — Z79.4 LONG TERM (CURRENT) USE OF INSULIN (HCC): ICD-10-CM

## 2020-08-12 DIAGNOSIS — E11.621 TYPE 2 DIABETES MELLITUS WITH FOOT ULCER, UNSPECIFIED WHETHER LONG TERM INSULIN USE (HCC): Primary | ICD-10-CM

## 2020-08-12 DIAGNOSIS — M86.171 OTHER ACUTE OSTEOMYELITIS, RIGHT ANKLE AND FOOT (HCC): ICD-10-CM

## 2020-08-12 DIAGNOSIS — L97.509 TYPE 2 DIABETES MELLITUS WITH FOOT ULCER, UNSPECIFIED WHETHER LONG TERM INSULIN USE (HCC): Primary | ICD-10-CM

## 2020-08-12 LAB — GLUCOSE BLD-MCNC: 125 MG/DL (ref 70–99)

## 2020-08-12 PROCEDURE — 99214 OFFICE O/P EST MOD 30 MIN: CPT

## 2020-08-12 PROCEDURE — 82962 GLUCOSE BLOOD TEST: CPT

## 2020-08-12 NOTE — PROGRESS NOTES
Subjective    Chief Complaint  This information was obtained from the patient  Patient is here for a follow up visit for right foot wounds. Patient states his IV antibiotic therapy will end tomorrow.     Allergies  No known Allergies    HPI  This informatio 7-29-20 patient returns today, rn communication noted regarding patient in his \"regular\" shoe on 7-24-20 rn visit. It does not appear as though the integra graft was successful.  we still have significant tendon exposure, areas are dry even with moisture Wound #1 Right Foot is a chronic Grubbs Grade 3 Diabetic Ulcer and has received a status of Not Healed. Subsequent wound encounter measurements are 10cm length x 6.8cm width x 0.3cm depth, with an area of 68 sq cm and a volume of 20.4 cubic cm.  Tendon and bp wnl for patient. Pulse Regular and wnl for patient. Laura [place] Respirations easy and unlabored. Temperature wnl. Weight normal for height. . Appearance neat and clean. Appears in no acute distress. Well nourished and well developed.     Cardiovascular:  dp/pt palpa Change Dressing Every Other Day and As Needed. Compression Therapy:  Spanda     Follow-Up Appointments:  Return Appointment in 1 week.     Misc/Additional Orders:  Supplement with a daily multivitamin  Increase protein into diet  Start or continue ta Patient is at very high risk for further limb loss. we discussed various options for healing including: Dr. Iker Centeno taking the patient back to the OR for debridement and skin sub placement, we discussed santyl v plurogel to get the wound bed cleaned up.   pa

## 2020-08-13 ENCOUNTER — OFFICE VISIT (OUTPATIENT)
Dept: ENDOCRINOLOGY CLINIC | Facility: CLINIC | Age: 60
End: 2020-08-13
Payer: COMMERCIAL

## 2020-08-13 ENCOUNTER — TELEPHONE (OUTPATIENT)
Dept: ENDOCRINOLOGY CLINIC | Facility: CLINIC | Age: 60
End: 2020-08-13

## 2020-08-13 VITALS
DIASTOLIC BLOOD PRESSURE: 70 MMHG | HEIGHT: 68 IN | WEIGHT: 186 LBS | BODY MASS INDEX: 28.19 KG/M2 | HEART RATE: 90 BPM | OXYGEN SATURATION: 98 % | TEMPERATURE: 98 F | SYSTOLIC BLOOD PRESSURE: 130 MMHG

## 2020-08-13 DIAGNOSIS — E11.65 TYPE 2 DIABETES MELLITUS WITH HYPERGLYCEMIA, UNSPECIFIED WHETHER LONG TERM INSULIN USE (HCC): Primary | ICD-10-CM

## 2020-08-13 PROBLEM — L03.115 CELLULITIS OF RIGHT LOWER EXTREMITY: Status: RESOLVED | Noted: 2020-06-26 | Resolved: 2020-08-13

## 2020-08-13 PROBLEM — D72.829 LEUKOCYTOSIS: Status: RESOLVED | Noted: 2020-06-22 | Resolved: 2020-08-13

## 2020-08-13 PROBLEM — A41.9 SEPTICEMIA (HCC): Status: RESOLVED | Noted: 2020-07-06 | Resolved: 2020-08-13

## 2020-08-13 PROBLEM — Z79.4 TYPE 2 DIABETES MELLITUS WITH HYPERGLYCEMIA, WITH LONG-TERM CURRENT USE OF INSULIN (HCC): Status: ACTIVE | Noted: 2020-06-26

## 2020-08-13 PROBLEM — E87.1 HYPONATREMIA: Status: RESOLVED | Noted: 2020-06-22 | Resolved: 2020-08-13

## 2020-08-13 PROBLEM — M72.6 NECROTIZING FASCIITIS OF ANKLE AND FOOT (HCC): Status: RESOLVED | Noted: 2020-06-26 | Resolved: 2020-08-13

## 2020-08-13 LAB
CARTRIDGE LOT#: 676 NUMERIC
HEMOGLOBIN A1C: 7.9 % (ref 4.3–5.6)

## 2020-08-13 PROCEDURE — 99214 OFFICE O/P EST MOD 30 MIN: CPT | Performed by: NURSE PRACTITIONER

## 2020-08-13 PROCEDURE — 3078F DIAST BP <80 MM HG: CPT | Performed by: NURSE PRACTITIONER

## 2020-08-13 PROCEDURE — 3075F SYST BP GE 130 - 139MM HG: CPT | Performed by: NURSE PRACTITIONER

## 2020-08-13 PROCEDURE — 83036 HEMOGLOBIN GLYCOSYLATED A1C: CPT | Performed by: NURSE PRACTITIONER

## 2020-08-13 PROCEDURE — 3008F BODY MASS INDEX DOCD: CPT | Performed by: NURSE PRACTITIONER

## 2020-08-13 RX ORDER — SEMAGLUTIDE 1.34 MG/ML
0.5 INJECTION, SOLUTION SUBCUTANEOUS WEEKLY
Qty: 4.5 ML | Refills: 0 | Status: SHIPPED | OUTPATIENT
Start: 2020-08-13 | End: 2020-10-10

## 2020-08-13 NOTE — PROGRESS NOTES
Dane Alex is a 62year old male who presents today to establish for diabetes management.    Primary care physician: Constance Crook MD  In the past 3m DM control has improved to 7.9%  (most recent A1C: 11.7% 6-)   Admits he thought in years past, (H) 05/31/2019    HDL 39 (L) 05/31/2019     (H) 05/31/2019    MICROALBCREA 893.4 (H) 05/31/2019    CREATSERUM 1.89 (H) 08/10/2020    GFRNAA 37 (L) 08/10/2020    GFRAA 43 (L) 60/22/4191         DM Complications:  Microvascular:   Neuropathy: yes  Ret Pen-injector Per sliding scale. MDD 30 units 30 mL 1   • insulin glargine 100 UNIT/ML Subcutaneous Solution Inject 14 Units into the skin 2 (two) times daily.  With breakfast and bedtime     • HUMALOG KWIKPEN 100 UNIT/ML Subcutaneous Solution Pen-injector T 11. 7%)   Weight: 186 lb   Diabetes control is improving  Has injection burden. Desires to decrease insulin injections   Recommendations: discussed benefits of GLP1 agonist rx.    Reviewed action, risk vs benefit, dosing, and potential side effects of GLP-1 and agrees to the plan. Orders Placed This Encounter      Hgb A1C      OZEMPIC, 0.25 OR 0.5 MG/DOSE, 2 MG/1.5ML Subcutaneous Solution Pen-injector          Sig: Inject 0.5 mg into the skin once a week.           Dispense:  4.5 mL          Refill:  0

## 2020-08-13 NOTE — PATIENT INSTRUCTIONS
We are here to support you with Diabetes but please remember that you still need your primary care doctor for your routine health maintenance.    Your A1C: 7.9% (last A1C 11.7%)   The main goal of diabetes treatment is to keep your sugar from going too high of food that you eat.        Common side effects:   Nausea or diarrhea   These effects usually go away over time as your body gets used to the medicine     Here are some things that might help your nausea go away:     Eat small amounts of food instrad of fe low blood glucose, call your healthcare provider. 6. Always check your blood glucose before you drive       Health Maintenance   1.  LABS: It is important to monitor your kidney function (blood and urine protein levels) , liver function tests and cholester

## 2020-08-18 ENCOUNTER — TELEPHONE (OUTPATIENT)
Dept: INTERNAL MEDICINE CLINIC | Facility: CLINIC | Age: 60
End: 2020-08-18

## 2020-08-19 ENCOUNTER — OFFICE VISIT (OUTPATIENT)
Dept: WOUND CARE | Facility: HOSPITAL | Age: 60
End: 2020-08-19
Attending: NURSE PRACTITIONER
Payer: COMMERCIAL

## 2020-08-19 DIAGNOSIS — Z79.4 LONG TERM (CURRENT) USE OF INSULIN (HCC): ICD-10-CM

## 2020-08-19 DIAGNOSIS — E11.65 TYPE 2 DIABETES MELLITUS WITH HYPERGLYCEMIA (HCC): ICD-10-CM

## 2020-08-19 DIAGNOSIS — E11.621 TYPE 2 DIABETES MELLITUS WITH FOOT ULCER, UNSPECIFIED WHETHER LONG TERM INSULIN USE (HCC): Primary | ICD-10-CM

## 2020-08-19 DIAGNOSIS — L97.514 NON-PRESSURE CHRONIC ULCER OF OTHER PART OF RIGHT FOOT WITH NECROSIS OF BONE (HCC): ICD-10-CM

## 2020-08-19 DIAGNOSIS — M86.171 OTHER ACUTE OSTEOMYELITIS, RIGHT ANKLE AND FOOT (HCC): ICD-10-CM

## 2020-08-19 DIAGNOSIS — L97.509 TYPE 2 DIABETES MELLITUS WITH FOOT ULCER, UNSPECIFIED WHETHER LONG TERM INSULIN USE (HCC): Primary | ICD-10-CM

## 2020-08-19 LAB — GLUCOSE BLD-MCNC: 153 MG/DL (ref 70–99)

## 2020-08-19 PROCEDURE — 99213 OFFICE O/P EST LOW 20 MIN: CPT

## 2020-08-19 PROCEDURE — 82962 GLUCOSE BLOOD TEST: CPT

## 2020-08-19 NOTE — PROGRESS NOTES
Subjective    Chief Complaint  This information was obtained from the patient  Patient is here for a follow up visit for right foot wounds. Patients denies any pain and no more pic line last friday.  no compression    Allergies  No known Allergies    HPI  T 7-29-20 patient returns today, rn communication noted regarding patient in his \"regular\" shoe on 7-24-20 rn visit. It does not appear as though the integra graft was successful.  we still have significant tendon exposure, areas are dry even with moisture Cardiovascular (Central/Peripheral):  Other (htn), Edema  Integumentary (Hair/Skin/Nails): Dryness, Calluses/Corns, Ulcers  Neurological: Loss of Protective Sensation  Prior Wound History: Other (osteomyelitis)    Patient denies complaints or symptoms relat bp wnl for patient. Pulse Regular and wnl for patient. Zachary Ivy Respirations easy and unlabored. Temperature wnl. Weight normal for height. . Appearance neat and clean. Appears in no acute distress. Well nourished and well developed.     Cardiovascular:  dp/pt palpa S/S of Infection  Non-adherence    Additional Orders:    Off-Loading:  Partial weight bearing  Other: - surgical shoe    Care summary  Reviewed and evaluated labs. - july 2020 bun 43, creat 1.68, gfr43  Wound type: - dfu  Wound no change.  No s/s of infecti

## 2020-08-26 ENCOUNTER — APPOINTMENT (OUTPATIENT)
Dept: WOUND CARE | Facility: HOSPITAL | Age: 60
End: 2020-08-26
Attending: NURSE PRACTITIONER
Payer: COMMERCIAL

## 2020-08-26 NOTE — TELEPHONE ENCOUNTER
Third attempt to schedule pt for Diabetes Education: He cannot consistently get into his My Chart (said he would prefer 1st appointment to be video visit). Gave him phone # to My Chart Help Desk ([phone]).  When explaining the program he stated he has many

## 2020-09-02 ENCOUNTER — OFFICE VISIT (OUTPATIENT)
Dept: WOUND CARE | Facility: HOSPITAL | Age: 60
End: 2020-09-02
Attending: NURSE PRACTITIONER
Payer: COMMERCIAL

## 2020-09-02 DIAGNOSIS — E11.65 TYPE 2 DIABETES MELLITUS WITH HYPERGLYCEMIA (HCC): ICD-10-CM

## 2020-09-02 DIAGNOSIS — E11.621 TYPE 2 DIABETES MELLITUS WITH FOOT ULCER, UNSPECIFIED WHETHER LONG TERM INSULIN USE (HCC): Primary | ICD-10-CM

## 2020-09-02 DIAGNOSIS — M86.171 OTHER ACUTE OSTEOMYELITIS, RIGHT ANKLE AND FOOT (HCC): ICD-10-CM

## 2020-09-02 DIAGNOSIS — Z79.4 LONG TERM (CURRENT) USE OF INSULIN (HCC): ICD-10-CM

## 2020-09-02 DIAGNOSIS — L97.514 NON-PRESSURE CHRONIC ULCER OF OTHER PART OF RIGHT FOOT WITH NECROSIS OF BONE (HCC): ICD-10-CM

## 2020-09-02 DIAGNOSIS — L97.509 TYPE 2 DIABETES MELLITUS WITH FOOT ULCER, UNSPECIFIED WHETHER LONG TERM INSULIN USE (HCC): Primary | ICD-10-CM

## 2020-09-02 LAB — GLUCOSE BLD-MCNC: 184 MG/DL (ref 70–99)

## 2020-09-02 PROCEDURE — 11043 DBRDMT MUSC&/FSCA 1ST 20/<: CPT

## 2020-09-02 PROCEDURE — 11046 DBRDMT MUSC&/FSCA EA ADDL: CPT

## 2020-09-02 PROCEDURE — 82962 GLUCOSE BLOOD TEST: CPT

## 2020-09-02 NOTE — PROGRESS NOTES
Subjective    Chief Complaint  This information was obtained from the patient  Patient is here for a follow up visit for right foot wounds.  Denies any pain at this time, states the plurelgel makes the wound \"ooze\"    Allergies  No known Allergies    HPI 7-29-20 patient returns today, rn communication noted regarding patient in his \"regular\" shoe on 7-24-20 rn visit. It does not appear as though the integra graft was successful.  we still have significant tendon exposure, areas are dry even with moisture 9-2-20 patient returns today, he did not f/u last week as scheduled. he has a debridement in the OR scheduled with dr. Hunter Bustamante on October 15. He has been utilizing plurogel. wound is much  and slightly smaller but the tendon remains exposed.   Daniel Wound #1 Right Foot is a chronic Grubbs Grade 3 Diabetic Ulcer and has received a status of Not Healed. Subsequent wound encounter measurements are 8.5cm length x 5.7cm width x 0.8cm depth, with an area of 48.45 sq cm and a volume of 38.76 cubic cm.  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  - july 2020 bun 43, creat 1.68, gfr43  Wound type: - dfu  Wound improv

## 2020-09-03 RX ORDER — INSULIN GLARGINE 100 [IU]/ML
INJECTION, SOLUTION SUBCUTANEOUS
Qty: 15 ML | Refills: 0 | OUTPATIENT
Start: 2020-09-03

## 2020-09-03 NOTE — TELEPHONE ENCOUNTER
inpt    Future Appointments   Date Time Provider Vanessa Yates   9/4/2020 10:40 AM Brooklyn Schwarz MD [facility]   9/9/2020 10:00 AM [facility] Wound Odalys Sick   10/15/2020  8:00 AM Bimal Todd DPM [facility]   10/28/2020 10:30 AM Vear Sandifer,

## 2020-09-04 ENCOUNTER — OFFICE VISIT (OUTPATIENT)
Dept: INTERNAL MEDICINE CLINIC | Facility: CLINIC | Age: 60
End: 2020-09-04
Payer: COMMERCIAL

## 2020-09-04 VITALS
HEIGHT: 68 IN | HEART RATE: 60 BPM | SYSTOLIC BLOOD PRESSURE: 130 MMHG | DIASTOLIC BLOOD PRESSURE: 82 MMHG | RESPIRATION RATE: 18 BRPM | WEIGHT: 172.38 LBS | BODY MASS INDEX: 26.13 KG/M2 | TEMPERATURE: 98 F

## 2020-09-04 DIAGNOSIS — S91.301A WOUND OF RIGHT FOOT: ICD-10-CM

## 2020-09-04 DIAGNOSIS — I10 ESSENTIAL HYPERTENSION: ICD-10-CM

## 2020-09-04 DIAGNOSIS — M72.6 NECROTIZING FASCIITIS OF ANKLE AND FOOT (HCC): ICD-10-CM

## 2020-09-04 DIAGNOSIS — E11.65 TYPE 2 DIABETES MELLITUS WITH HYPERGLYCEMIA, UNSPECIFIED WHETHER LONG TERM INSULIN USE (HCC): Primary | ICD-10-CM

## 2020-09-04 DIAGNOSIS — E78.5 DYSLIPIDEMIA: ICD-10-CM

## 2020-09-04 PROCEDURE — 90471 IMMUNIZATION ADMIN: CPT | Performed by: INTERNAL MEDICINE

## 2020-09-04 PROCEDURE — 90732 PPSV23 VACC 2 YRS+ SUBQ/IM: CPT | Performed by: INTERNAL MEDICINE

## 2020-09-04 PROCEDURE — 3075F SYST BP GE 130 - 139MM HG: CPT | Performed by: INTERNAL MEDICINE

## 2020-09-04 PROCEDURE — 99214 OFFICE O/P EST MOD 30 MIN: CPT | Performed by: INTERNAL MEDICINE

## 2020-09-04 PROCEDURE — 3008F BODY MASS INDEX DOCD: CPT | Performed by: INTERNAL MEDICINE

## 2020-09-04 PROCEDURE — 3079F DIAST BP 80-89 MM HG: CPT | Performed by: INTERNAL MEDICINE

## 2020-09-04 NOTE — PROGRESS NOTES
[address] [facility]  12/7/1960    Patient presents with:  Wellness Visit: MR rm 7 annual pe       Brooklyn Gibson is a 62year old male who presents as a follow-up.     Patient initially scheduled for annual examination, however, would like to un Lispro, 1 Unit Dial, (HUMALOG KWIKPEN) 100 UNIT/ML Subcutaneous Solution Pen-injector Per sliding scale.  MDD 30 units (Patient not taking: Reported on 9/4/2020 ) 30 mL 1   • HUMALOG KWIKPEN 100 UNIT/ML Subcutaneous Solution Pen-injector Take as directed - Pulmonary/Chest: Effort normal and breath sounds normal. No respiratory distress. Abdominal: Soft. Bowel sounds are normal. Non tender, no masses, no organomegaly or hernias.   Musculoskeletal: Distal RLE wrap and dressing applied  Neurological: No defec file.  There are no Patient Instructions on file for this visit. All questions were answered and the patient agrees with the plan.      Thank you,  Irineo Diamond MD

## 2020-09-07 PROBLEM — E11.65 TYPE 2 DIABETES MELLITUS WITH HYPERGLYCEMIA (HCC): Status: ACTIVE | Noted: 2020-09-07

## 2020-09-07 PROBLEM — E78.5 DYSLIPIDEMIA: Status: ACTIVE | Noted: 2020-09-07

## 2020-09-08 ENCOUNTER — TELEPHONE (OUTPATIENT)
Dept: ENDOCRINOLOGY CLINIC | Facility: CLINIC | Age: 60
End: 2020-09-08

## 2020-09-08 NOTE — TELEPHONE ENCOUNTER
insulin glargine 100 UNIT/ML Subcutaneous Solution       Sig:   Inject 14 Units into the skin 2 (two) times daily.  With breakfast and bedtime       Lantus Solostar     [facility] [address], [address] [facility] [address]

## 2020-09-09 ENCOUNTER — OFFICE VISIT (OUTPATIENT)
Dept: WOUND CARE | Facility: HOSPITAL | Age: 60
End: 2020-09-09
Attending: NURSE PRACTITIONER
Payer: COMMERCIAL

## 2020-09-09 ENCOUNTER — TELEPHONE (OUTPATIENT)
Dept: ENDOCRINOLOGY CLINIC | Facility: CLINIC | Age: 60
End: 2020-09-09

## 2020-09-09 ENCOUNTER — LAB ENCOUNTER (OUTPATIENT)
Dept: LAB | Facility: HOSPITAL | Age: 60
End: 2020-09-09
Attending: NURSE PRACTITIONER
Payer: COMMERCIAL

## 2020-09-09 DIAGNOSIS — A49.9 BACTERIAL INFECTION, UNSPECIFIED: ICD-10-CM

## 2020-09-09 DIAGNOSIS — Z79.4 LONG TERM (CURRENT) USE OF INSULIN (HCC): ICD-10-CM

## 2020-09-09 DIAGNOSIS — E11.628 DIABETIC FOOT INFECTION (HCC): Primary | ICD-10-CM

## 2020-09-09 DIAGNOSIS — L97.516 NON-PRESSURE CHRONIC ULCER OF OTHER PART OF RIGHT FOOT WITH BONE INVOLVEMENT WITHOUT EVIDENCE OF NECROSIS (HCC): Primary | ICD-10-CM

## 2020-09-09 DIAGNOSIS — E11.621 TYPE 2 DIABETES MELLITUS WITH FOOT ULCER (HCC): ICD-10-CM

## 2020-09-09 DIAGNOSIS — M86.171 OTHER ACUTE OSTEOMYELITIS, RIGHT ANKLE AND FOOT (HCC): ICD-10-CM

## 2020-09-09 DIAGNOSIS — Z91.19 PATIENT'S NONCOMPLIANCE WITH OTHER MEDICAL TREATMENT AND REGIMEN: ICD-10-CM

## 2020-09-09 DIAGNOSIS — E11.65 TYPE 2 DIABETES MELLITUS WITH HYPERGLYCEMIA (HCC): ICD-10-CM

## 2020-09-09 DIAGNOSIS — L08.9 DIABETIC FOOT INFECTION (HCC): Primary | ICD-10-CM

## 2020-09-09 DIAGNOSIS — L97.509 TYPE 2 DIABETES MELLITUS WITH FOOT ULCER (HCC): ICD-10-CM

## 2020-09-09 LAB
ALBUMIN SERPL-MCNC: 3 G/DL (ref 3.4–5)
ALBUMIN/GLOB SERPL: 0.6 {RATIO} (ref 1–2)
ALP LIVER SERPL-CCNC: 144 U/L (ref 45–117)
ALT SERPL-CCNC: 14 U/L (ref 16–61)
ANION GAP SERPL CALC-SCNC: 5 MMOL/L (ref 0–18)
AST SERPL-CCNC: 16 U/L (ref 15–37)
BASOPHILS # BLD AUTO: 0.05 X10(3) UL (ref 0–0.2)
BASOPHILS NFR BLD AUTO: 0.5 %
BILIRUB SERPL-MCNC: 0.3 MG/DL (ref 0.1–2)
BUN BLD-MCNC: 53 MG/DL (ref 7–18)
BUN/CREAT SERPL: 25.1 (ref 10–20)
CALCIUM BLD-MCNC: 9.4 MG/DL (ref 8.5–10.1)
CHLORIDE SERPL-SCNC: 111 MMOL/L (ref 98–112)
CO2 SERPL-SCNC: 25 MMOL/L (ref 21–32)
CREAT BLD-MCNC: 2.11 MG/DL (ref 0.7–1.3)
CRP SERPL-MCNC: 2.51 MG/DL (ref ?–0.3)
DEPRECATED RDW RBC AUTO: 39.7 FL (ref 35.1–46.3)
EOSINOPHIL # BLD AUTO: 0.15 X10(3) UL (ref 0–0.7)
EOSINOPHIL NFR BLD AUTO: 1.6 %
ERYTHROCYTE [DISTWIDTH] IN BLOOD BY AUTOMATED COUNT: 13.2 % (ref 11–15)
GLOBULIN PLAS-MCNC: 4.8 G/DL (ref 2.8–4.4)
GLUCOSE BLD-MCNC: 132 MG/DL (ref 70–99)
GLUCOSE BLD-MCNC: 142 MG/DL (ref 70–99)
HCT VFR BLD AUTO: 31.5 % (ref 39–53)
HGB BLD-MCNC: 9.6 G/DL (ref 13–17.5)
IMM GRANULOCYTES # BLD AUTO: 0.04 X10(3) UL (ref 0–1)
IMM GRANULOCYTES NFR BLD: 0.4 %
LYMPHOCYTES # BLD AUTO: 1.77 X10(3) UL (ref 1–4)
LYMPHOCYTES NFR BLD AUTO: 18.5 %
M PROTEIN MFR SERPL ELPH: 7.8 G/DL (ref 6.4–8.2)
MCH RBC QN AUTO: 25.3 PG (ref 26–34)
MCHC RBC AUTO-ENTMCNC: 30.5 G/DL (ref 31–37)
MCV RBC AUTO: 83.1 FL (ref 80–100)
MONOCYTES # BLD AUTO: 0.6 X10(3) UL (ref 0.1–1)
MONOCYTES NFR BLD AUTO: 6.3 %
NEUTROPHILS # BLD AUTO: 6.94 X10 (3) UL (ref 1.5–7.7)
NEUTROPHILS # BLD AUTO: 6.94 X10(3) UL (ref 1.5–7.7)
NEUTROPHILS NFR BLD AUTO: 72.7 %
OSMOLALITY SERPL CALC.SUM OF ELEC: 308 MOSM/KG (ref 275–295)
PATIENT FASTING Y/N/NP: NO
PLATELET # BLD AUTO: 342 10(3)UL (ref 150–450)
POTASSIUM SERPL-SCNC: 4.4 MMOL/L (ref 3.5–5.1)
RBC # BLD AUTO: 3.79 X10(6)UL (ref 4.3–5.7)
SED RATE-ML: 115 MM/HR (ref 0–12)
SODIUM SERPL-SCNC: 141 MMOL/L (ref 136–145)
WBC # BLD AUTO: 9.6 X10(3) UL (ref 4–11)

## 2020-09-09 PROCEDURE — 87070 CULTURE OTHR SPECIMN AEROBIC: CPT

## 2020-09-09 PROCEDURE — 87205 SMEAR GRAM STAIN: CPT

## 2020-09-09 PROCEDURE — 99214 OFFICE O/P EST MOD 30 MIN: CPT

## 2020-09-09 PROCEDURE — 86140 C-REACTIVE PROTEIN: CPT

## 2020-09-09 PROCEDURE — 85025 COMPLETE CBC W/AUTO DIFF WBC: CPT

## 2020-09-09 PROCEDURE — 87186 SC STD MICRODIL/AGAR DIL: CPT

## 2020-09-09 PROCEDURE — 87077 CULTURE AEROBIC IDENTIFY: CPT

## 2020-09-09 PROCEDURE — 80053 COMPREHEN METABOLIC PANEL: CPT

## 2020-09-09 PROCEDURE — 85652 RBC SED RATE AUTOMATED: CPT

## 2020-09-09 PROCEDURE — 36415 COLL VENOUS BLD VENIPUNCTURE: CPT

## 2020-09-09 PROCEDURE — 82962 GLUCOSE BLOOD TEST: CPT

## 2020-09-09 NOTE — TELEPHONE ENCOUNTER
Patient called stating the prescription sent to pharmacy is incorrect - it was sent for a vial and should be pens.  Please correct and re-send asap

## 2020-09-09 NOTE — PROGRESS NOTES
Subjective    Chief Complaint  This information was obtained from the patient  Patient is here for a follow up visit for right foot wound. He states the wound has been smelling and oozing out of bandages since last Thursday.     Allergies  No known Allergie 8-12-20 patient returns today, I had communicated with Dr Brandy Fuller and had rn call patient this am for him to bring his wound vac and he states \"Oh, i returned it on monday\". he presents today with single layer of xeroform and gauze on his wound.  the media 9-9-20 patient returns today, he states as he was walking in \"there is alot of 'rank' since last Thursday\", he states his foot started hurting last thursday, he noticed his toes getting more edematous but he states \"I thought that was because of the fis Respiratory: Cough, Shortness of Breath  Cardiovascular (Central/Peripheral): Chest Pain, Dyspnea on Exertion, Intermittent Claudication  Musculoskeletal: Contractures, Assistive Devices, Joint Pain  Psychiatric: Claustrophobia, Memory Loss    Additional I dp/pt palpable right, right lower Extremity free of varicosities, + edema increased and present in the digits, no hyperpigmentation. Capillary refill < 3 seconds. Digits are warm. toenails are wnl for color, thickness and hygeine.  + sparse hairgrowth on le 2) please call to schedule an appointment with provider here in wound clinic, Dr. Eri Dexter or SOMEONE. Care summary  Reviewed and evaluated labs. - july 2020 bun 43, creat 1.68, gfr43  Wound type: - dfu  Wound deteriorating. Perfusion assessed by doppler. He states he did not call because he \"thought it was part of the fish that was placed last week\".   I discussed with him that as we have talked about on multiple previous other visits at any point bacteria can take over and deterioate the wound rapidly an Entered By: Loi EM-C, NAVEED-AP, CFHUBERT, DEVAUGHN, [facility] on 09/09/2020 11:10:35    Addendum: [id number]  Called patient and let him know that currently the wbc and neutrophils are not elevated, however his inflammatory markers are trending upward

## 2020-09-14 ENCOUNTER — TELEPHONE (OUTPATIENT)
Dept: INTERNAL MEDICINE CLINIC | Facility: CLINIC | Age: 60
End: 2020-09-14

## 2020-09-14 NOTE — TELEPHONE ENCOUNTER
Patient scheduled for BP check with log on 9/25/20 at 10:40am.  Pt notified he will need to schedule CPE in 4-6 weeks. Pt verbalizes understanding.

## 2020-09-14 NOTE — TELEPHONE ENCOUNTER
Patient states he has had surgery on his foot, going to the wound clinic, states right after surgery he was told he had high BP, was started on Amlodipine 10mg daily, now that he is feeling better he noticed he is having some dizziness, lightheadedness, fa

## 2020-09-14 NOTE — TELEPHONE ENCOUNTER
Pt was given new rx for amLODIPine Besylate 10 MG Oral Tab and is having side effects-dizziness/lightheaded/feels drained-call to advise

## 2020-09-14 NOTE — TELEPHONE ENCOUNTER
Electronic follow-up with BP and HR log in one week. CPE as discussed with patient within 4-6 weeks.

## 2020-09-14 NOTE — TELEPHONE ENCOUNTER
Patient notified to hold Amlodipine for now, may need 50% of the dose but will be determined from the BP log. Pt will take BP at different times during the day, if he is having s/s. AD when do you want pt to f/u?  LOV 9/4/20 with AD, notes say to return i Instructions on file for this visit.     All questions were answered and the patient agrees with the plan.      Thank you,  Van Gamez MD

## 2020-09-25 ENCOUNTER — OFFICE VISIT (OUTPATIENT)
Dept: INTERNAL MEDICINE CLINIC | Facility: CLINIC | Age: 60
End: 2020-09-25
Payer: COMMERCIAL

## 2020-09-25 VITALS
SYSTOLIC BLOOD PRESSURE: 96 MMHG | TEMPERATURE: 97 F | HEART RATE: 92 BPM | BODY MASS INDEX: 25 KG/M2 | RESPIRATION RATE: 16 BRPM | DIASTOLIC BLOOD PRESSURE: 52 MMHG | WEIGHT: 165 LBS

## 2020-09-25 DIAGNOSIS — I10 ESSENTIAL HYPERTENSION: ICD-10-CM

## 2020-09-25 DIAGNOSIS — E11.65 TYPE 2 DIABETES MELLITUS WITH HYPERGLYCEMIA, UNSPECIFIED WHETHER LONG TERM INSULIN USE (HCC): Primary | ICD-10-CM

## 2020-09-25 DIAGNOSIS — E78.5 DYSLIPIDEMIA: ICD-10-CM

## 2020-09-25 PROCEDURE — 99214 OFFICE O/P EST MOD 30 MIN: CPT | Performed by: INTERNAL MEDICINE

## 2020-09-25 PROCEDURE — 3078F DIAST BP <80 MM HG: CPT | Performed by: INTERNAL MEDICINE

## 2020-09-25 PROCEDURE — 3074F SYST BP LT 130 MM HG: CPT | Performed by: INTERNAL MEDICINE

## 2020-09-25 RX ORDER — LEVOFLOXACIN 750 MG/1
750 TABLET ORAL EVERY OTHER DAY
COMMUNITY
Start: 2020-09-09 | End: 2020-11-16

## 2020-09-25 NOTE — PROGRESS NOTES
[address] [facility]  12/7/1960    Patient presents with:  Blood Pressure: SN Rm 6; F/U, has log      SUBJECTIVE   [address] [facility] Perfecto is a 62year old male who presents as a follow-up.     The patient continues to make efforts towards improving blood glucose cont Strip Test 4 times per day; premeal; signs/symptoms of hypoglycemia; bedtime 100 strip 2      No Known Allergies   Past Medical History:   Diagnosis Date   • Essential hypertension    • Hyperlipidemia    • Type II or unspecified type diabetes mellitus with affect. ASSESSMENT AND PLAN:   Rikki Kong is a 62year old male who presents as a follow-up. Artemio Ramsay Hypertension: In office evaluation was 96/52 mmHg with heart rate of 92 bpm.  The patient is asymptomatic.   Hold amlodipine 10 mg daily  Proper manda

## 2020-10-02 ENCOUNTER — TELEPHONE (OUTPATIENT)
Dept: INTERNAL MEDICINE CLINIC | Facility: CLINIC | Age: 60
End: 2020-10-02

## 2020-10-02 ENCOUNTER — OFFICE VISIT (OUTPATIENT)
Dept: PODIATRY CLINIC | Facility: CLINIC | Age: 60
End: 2020-10-02
Payer: COMMERCIAL

## 2020-10-02 DIAGNOSIS — S91.301A WOUND OF RIGHT FOOT: Primary | ICD-10-CM

## 2020-10-02 PROCEDURE — 99203 OFFICE O/P NEW LOW 30 MIN: CPT | Performed by: PODIATRIST

## 2020-10-02 NOTE — TELEPHONE ENCOUNTER
Pt calling to report BP readings to AD    9-25      122/69  9-26 132/71  114/61  9-27   148/82  9-28 126/78  140/79  9-29 124/74  123/64  9-30 126/73  149/80  10-1 128/77  141/76  10-2 128/73

## 2020-10-02 NOTE — TELEPHONE ENCOUNTER
Patient verbalized understanding of plan of care. Patient will continue to monitor BP and HR as advised.

## 2020-10-02 NOTE — TELEPHONE ENCOUNTER
These readings are while holding amlodipine 10 mg daily. Favorable BP trend. Continue to hold amlodipine 10 mg daily and monitor BP and HR.

## 2020-10-05 ENCOUNTER — TELEPHONE (OUTPATIENT)
Dept: PODIATRY CLINIC | Facility: CLINIC | Age: 60
End: 2020-10-05

## 2020-10-05 NOTE — PROGRESS NOTES
Camille Rivera is a 62year old male.  Patient presents with:  New Patient: ref by Dr Jairon Tracy - diabetic type 2 - BS this  - Last A1C of 9.6 on 9/9/20 - LOV w/PCP on 9/25/20 -  right foot sx on June/2020 by another DPM and has been going to the wound History      Marital status:       Spouse name: Not on file      Number of children: Not on file      Years of education: Not on file      Highest education level: Not on file    Tobacco Use      Smoking status: Never Smoker      Smokeless tobacco: plan.    Jessica Pan, DPM    After thinking about it I had my associate Dr. Yony Degroot look at the x-rays and the date of this conversation should be 10/5/2020 in which I had him evaluate the x-rays and the third and fourth metatarsals are gone as well

## 2020-10-05 NOTE — TELEPHONE ENCOUNTER
I called the patient and left a vm regarding Dr. Carson Richards wanting him to see Dr. Ade Verma for a second opinion and getting him in on 10/6 or 10/8 he said he was not available and made a appointment for 10/22 veronica called him and told him Dr. Kelly Oliva
never

## 2020-10-07 ENCOUNTER — TELEPHONE (OUTPATIENT)
Dept: PODIATRY CLINIC | Facility: CLINIC | Age: 60
End: 2020-10-07

## 2020-10-07 NOTE — TELEPHONE ENCOUNTER
Per Dr Trip Navarro, called patient on 10-5-20 to come and get a 2nd opinion with Dr Dill Jung the wk of 1/5/20. Patient stated that he is to busy at work and couldn't make it here until the wk of 10/22/20.

## 2020-10-09 DIAGNOSIS — E11.65 TYPE 2 DIABETES MELLITUS WITH HYPERGLYCEMIA, UNSPECIFIED WHETHER LONG TERM INSULIN USE (HCC): ICD-10-CM

## 2020-10-09 NOTE — TELEPHONE ENCOUNTER
Medication(s) to Refill:   Requested Prescriptions     Pending Prescriptions Disp Refills   • OZEMPIC, 0.25 OR 0.5 MG/DOSE, 2 MG/1.5ML Subcutaneous Solution Pen-injector 4.5 mL 0     Sig: Inject 0.5 mg into the skin once a week.            Last Time Medicat

## 2020-10-10 RX ORDER — SEMAGLUTIDE 1.34 MG/ML
0.5 INJECTION, SOLUTION SUBCUTANEOUS WEEKLY
Qty: 4.5 ML | Refills: 0 | Status: SHIPPED | OUTPATIENT
Start: 2020-10-10 | End: 2021-03-29

## 2020-10-12 ENCOUNTER — TELEPHONE (OUTPATIENT)
Dept: ENDOCRINOLOGY CLINIC | Facility: CLINIC | Age: 60
End: 2020-10-12

## 2020-10-12 NOTE — TELEPHONE ENCOUNTER
Per CAB ok to start PA thru cover my meds key-[id number]      Your information has been sent to [facility]. Awaiting response.

## 2020-11-04 ENCOUNTER — TELEPHONE (OUTPATIENT)
Dept: INTERNAL MEDICINE CLINIC | Facility: CLINIC | Age: 60
End: 2020-11-04

## 2020-11-04 NOTE — TELEPHONE ENCOUNTER
CMP, Lipid, A1c, and Microalb/creat pending from 9/7/20. Pt also had CMP and CBC done 9/9/20. Please advise if pt needs labs prior to upcoming appt.

## 2020-11-04 NOTE — TELEPHONE ENCOUNTER
Future Appointments   Date Time Provider Vanessa Yates   11/11/2020  9:15 AM REFERENCE [id number]   11/16/2020  9:00 AM Santino Martínez MD [facility]        Orders to   THE [facility]  aware must fast no call back required

## 2020-11-10 ENCOUNTER — OFFICE VISIT (OUTPATIENT)
Dept: ENDOCRINOLOGY CLINIC | Facility: CLINIC | Age: 60
End: 2020-11-10
Payer: COMMERCIAL

## 2020-11-10 VITALS
DIASTOLIC BLOOD PRESSURE: 70 MMHG | HEIGHT: 68 IN | TEMPERATURE: 98 F | SYSTOLIC BLOOD PRESSURE: 118 MMHG | WEIGHT: 170 LBS | OXYGEN SATURATION: 99 % | HEART RATE: 86 BPM | BODY MASS INDEX: 25.76 KG/M2

## 2020-11-10 DIAGNOSIS — Z79.4 TYPE 2 DIABETES MELLITUS WITH HYPERGLYCEMIA, WITH LONG-TERM CURRENT USE OF INSULIN (HCC): Primary | ICD-10-CM

## 2020-11-10 DIAGNOSIS — E11.65 TYPE 2 DIABETES MELLITUS WITH HYPERGLYCEMIA, WITH LONG-TERM CURRENT USE OF INSULIN (HCC): Primary | ICD-10-CM

## 2020-11-10 PROBLEM — L03.90 CELLULITIS: Status: RESOLVED | Noted: 2020-06-27 | Resolved: 2020-11-10

## 2020-11-10 PROCEDURE — 83036 HEMOGLOBIN GLYCOSYLATED A1C: CPT | Performed by: NURSE PRACTITIONER

## 2020-11-10 PROCEDURE — 82570 ASSAY OF URINE CREATININE: CPT | Performed by: NURSE PRACTITIONER

## 2020-11-10 PROCEDURE — 3074F SYST BP LT 130 MM HG: CPT | Performed by: NURSE PRACTITIONER

## 2020-11-10 PROCEDURE — 82043 UR ALBUMIN QUANTITATIVE: CPT | Performed by: NURSE PRACTITIONER

## 2020-11-10 PROCEDURE — 3078F DIAST BP <80 MM HG: CPT | Performed by: NURSE PRACTITIONER

## 2020-11-10 PROCEDURE — 99214 OFFICE O/P EST MOD 30 MIN: CPT | Performed by: NURSE PRACTITIONER

## 2020-11-10 PROCEDURE — 3008F BODY MASS INDEX DOCD: CPT | Performed by: NURSE PRACTITIONER

## 2020-11-10 NOTE — PATIENT INSTRUCTIONS
Your A1C: 6.7%   This is down from 7.9% -   The main goal of diabetes treatment is to keep your sugar from going too high.  We measure your overall blood sugar trends with a Hemoglobin A1C test. (also called an A1C)  For most people the target is less than sweating  · Numbness or tingling in the lips or tongue    Treatment of Low Blood sugar Action Plan  1. Check blood glucose to be sure that it is low. You can’t always go by symptoms. If in doubt, treat your low blood glucose anyway.   2. Take 15 grams of ca your request.     Thank you   Omer Lux   [phone]

## 2020-11-10 NOTE — PROGRESS NOTES
Raquel Garces is a 62year old male who presents today to establish for diabetes management.    Primary care physician: Trixie Silva MD     In the past 3m DM control has improved again to 6.7% (last A1C 7.9%, 11.7% 6-)   Has lost 19#   Started [employer]:  PVD: no  CAD: no  Stroke/CVA: no    Modifying factors:  Medication adherence: yes  Nutrition - mindful of portions   Exercise: not since infection- still not cleared.     Recent steroids, illness or infections: yes, see above     Allergi pain and palpitations. Gastrointestinal: Negative for nausea, diarrhea and constipation. Musculoskeletal: Negative for joint pain. Skin: Positive for wound. Psychiatric/Behavioral: Negative for sleep disturbance.        Physical exam:  /70   P weight loss)   Discussed with pt importance of carrying glucose tablets in case of hypoglycemia and to test blood glucose before driving  Urine collected today for m/alb   The patient is asked to return in 3m  but recommended to contact DM clinic sooner if

## 2020-11-11 ENCOUNTER — APPOINTMENT (OUTPATIENT)
Dept: LAB | Age: 60
End: 2020-11-11
Attending: INTERNAL MEDICINE
Payer: COMMERCIAL

## 2020-11-11 NOTE — PROGRESS NOTES
Agree with recommendation, which has been declined by patient during several subsequent encounters. Will discuss the significant objective abnormality seen to make my argument at follow-up on 11/16.

## 2020-11-12 DIAGNOSIS — E87.5 HYPERKALEMIA: ICD-10-CM

## 2020-11-12 DIAGNOSIS — N18.32 STAGE 3B CHRONIC KIDNEY DISEASE (HCC): Primary | ICD-10-CM

## 2020-11-12 DIAGNOSIS — E11.21 DIABETIC NEPHROPATHY WITH PROTEINURIA (HCC): ICD-10-CM

## 2020-11-16 ENCOUNTER — TELEPHONE (OUTPATIENT)
Dept: INTERNAL MEDICINE CLINIC | Facility: CLINIC | Age: 60
End: 2020-11-16

## 2020-11-16 ENCOUNTER — OFFICE VISIT (OUTPATIENT)
Dept: INTERNAL MEDICINE CLINIC | Facility: CLINIC | Age: 60
End: 2020-11-16
Payer: COMMERCIAL

## 2020-11-16 VITALS
SYSTOLIC BLOOD PRESSURE: 132 MMHG | DIASTOLIC BLOOD PRESSURE: 80 MMHG | RESPIRATION RATE: 16 BRPM | WEIGHT: 169.63 LBS | TEMPERATURE: 97 F | HEART RATE: 84 BPM | BODY MASS INDEX: 26.31 KG/M2 | HEIGHT: 67.32 IN

## 2020-11-16 DIAGNOSIS — E11.21 DIABETES MELLITUS WITH PROTEINURIC DIABETIC NEPHROPATHY (HCC): ICD-10-CM

## 2020-11-16 DIAGNOSIS — Z12.5 PROSTATE CANCER SCREENING: ICD-10-CM

## 2020-11-16 DIAGNOSIS — D64.9 NORMOCYTIC ANEMIA: ICD-10-CM

## 2020-11-16 DIAGNOSIS — S91.301A WOUND OF RIGHT FOOT: ICD-10-CM

## 2020-11-16 DIAGNOSIS — E78.5 DYSLIPIDEMIA: ICD-10-CM

## 2020-11-16 DIAGNOSIS — N18.32 CHRONIC RENAL FAILURE, STAGE 3B (HCC): ICD-10-CM

## 2020-11-16 DIAGNOSIS — E08.3213 DIABETES MELLITUS DUE TO UNDERLYING CONDITION WITH BOTH EYES AFFECTED BY MILD NONPROLIFERATIVE RETINOPATHY AND MACULAR EDEMA, WITHOUT LONG-TERM CURRENT USE OF INSULIN (HCC): ICD-10-CM

## 2020-11-16 DIAGNOSIS — Z00.00 ANNUAL PHYSICAL EXAM: Primary | ICD-10-CM

## 2020-11-16 DIAGNOSIS — E87.5 HYPERKALEMIA: ICD-10-CM

## 2020-11-16 PROCEDURE — 3075F SYST BP GE 130 - 139MM HG: CPT | Performed by: INTERNAL MEDICINE

## 2020-11-16 PROCEDURE — 90686 IIV4 VACC NO PRSV 0.5 ML IM: CPT | Performed by: INTERNAL MEDICINE

## 2020-11-16 PROCEDURE — 90471 IMMUNIZATION ADMIN: CPT | Performed by: INTERNAL MEDICINE

## 2020-11-16 PROCEDURE — 3079F DIAST BP 80-89 MM HG: CPT | Performed by: INTERNAL MEDICINE

## 2020-11-16 PROCEDURE — 3008F BODY MASS INDEX DOCD: CPT | Performed by: INTERNAL MEDICINE

## 2020-11-16 PROCEDURE — 99396 PREV VISIT EST AGE 40-64: CPT | Performed by: INTERNAL MEDICINE

## 2020-11-16 RX ORDER — LISINOPRIL 5 MG/1
5 TABLET ORAL DAILY
Qty: 90 TABLET | Refills: 0 | Status: SHIPPED | OUTPATIENT
Start: 2020-11-16 | End: 2022-01-03

## 2020-11-16 NOTE — TELEPHONE ENCOUNTER
Pt had last labs at [facility] and too late to add on. Do you want him to have blood drawn again? Or have PSA done with next labs? Also, if we need to reorder for [facility], is it screening or diagnostic? Please advise. Thanks.

## 2020-11-16 NOTE — PROGRESS NOTES
[address] [facility]  12/7/1960    Patient presents with:  Physical: BM RM 6      HPI:   Chris75 Faisal [place] is a 62year old male who presents for an annual physical examination.     The patient has been in his usual state of health and denies any acute complaint REVIEW OF SYSTEMS:   GENERAL: feels well otherwise  SKIN: no rashes  EYES:denies blurred vision or double vision  HEENT: not congested  LUNGS: denies shortness of breath with exertion  CARDIOVASCULAR: denies chest pain on exertion  GI: no nausea or abd time  Associated hyperkalemia: advised reducing potassium-containing foods; repeat BMP ordered  Initiated ACEi therapy  Will need to maintain blood glucose and blood pressure control    Right foot wound:  Post amputation for osteomyelitis  Following with p

## 2020-12-22 ENCOUNTER — TELEPHONE (OUTPATIENT)
Dept: INTERNAL MEDICINE CLINIC | Facility: CLINIC | Age: 60
End: 2020-12-22

## 2020-12-22 ENCOUNTER — OFFICE VISIT (OUTPATIENT)
Dept: NEPHROLOGY | Facility: CLINIC | Age: 60
End: 2020-12-22
Payer: COMMERCIAL

## 2020-12-22 VITALS — WEIGHT: 168 LBS | DIASTOLIC BLOOD PRESSURE: 78 MMHG | BODY MASS INDEX: 26 KG/M2 | SYSTOLIC BLOOD PRESSURE: 136 MMHG

## 2020-12-22 DIAGNOSIS — N18.30 STAGE 3 CHRONIC KIDNEY DISEASE, UNSPECIFIED WHETHER STAGE 3A OR 3B CKD (HCC): Primary | ICD-10-CM

## 2020-12-22 DIAGNOSIS — E11.21 DIABETIC NEPHROPATHY ASSOCIATED WITH TYPE 2 DIABETES MELLITUS (HCC): ICD-10-CM

## 2020-12-22 DIAGNOSIS — R80.9 PROTEINURIA, UNSPECIFIED TYPE: ICD-10-CM

## 2020-12-22 PROCEDURE — 3078F DIAST BP <80 MM HG: CPT | Performed by: INTERNAL MEDICINE

## 2020-12-22 PROCEDURE — 99204 OFFICE O/P NEW MOD 45 MIN: CPT | Performed by: INTERNAL MEDICINE

## 2020-12-22 PROCEDURE — 3075F SYST BP GE 130 - 139MM HG: CPT | Performed by: INTERNAL MEDICINE

## 2020-12-22 NOTE — PROGRESS NOTES
Nephrology Consult Note    REASON FOR CONSULT: CKD 3    ASSESSMENT/PLAN:        1) CKD 3- agree that onset of progressive renal dysfunction accompanied by subnephrotic range proteinuria since last year with a serum creatinine less than 1 mg/dL in 2014 is c over 250 pounds. At that time, he was not seeing physicians on a regular basis. Denies any history of acute or chronic renal failure gross microscopic hematuria kidney stones or infections. No history of cardiac pulmonary or hepatic disease.   Developed status: Never Smoker      Smokeless tobacco: Never Used    Substance and Sexual Activity      Alcohol use: Yes        Frequency: Never        Comment: socially       Drug use: Never      Sexual activity: Yes        Partners: Female    Other Topics      Con

## 2021-02-25 RX ORDER — AMLODIPINE BESYLATE 10 MG/1
TABLET ORAL
Qty: 90 TABLET | Refills: 0 | Status: SHIPPED | OUTPATIENT
Start: 2021-02-25 | End: 2022-01-03

## 2021-02-25 NOTE — TELEPHONE ENCOUNTER
Last VISIT 11/16/20    Last REFILL 08/03/20 qty 90 w/0 refills (discontinued?)    Last LABS 11/11/20 CBC, Lipid, TSH, CMP done    No Future Appointments      Per PROTOCOL? Failed       Please Approve or Deny.

## 2021-03-29 DIAGNOSIS — E11.65 TYPE 2 DIABETES MELLITUS WITH HYPERGLYCEMIA, UNSPECIFIED WHETHER LONG TERM INSULIN USE (HCC): ICD-10-CM

## 2021-03-29 RX ORDER — SEMAGLUTIDE 1.34 MG/ML
INJECTION, SOLUTION SUBCUTANEOUS
Qty: 4.5 ML | Refills: 0 | Status: SHIPPED | OUTPATIENT
Start: 2021-03-29 | End: 2022-01-03

## 2021-04-30 ENCOUNTER — IMMUNIZATION (OUTPATIENT)
Dept: LAB | Age: 61
End: 2021-04-30
Attending: HOSPITALIST
Payer: COMMERCIAL

## 2021-04-30 DIAGNOSIS — Z23 NEED FOR VACCINATION: Primary | ICD-10-CM

## 2021-04-30 PROCEDURE — 0001A SARSCOV2 VAC 30MCG/0.3ML IM: CPT

## 2021-05-17 ENCOUNTER — MED REC SCAN ONLY (OUTPATIENT)
Dept: INTERNAL MEDICINE CLINIC | Facility: CLINIC | Age: 61
End: 2021-05-17

## 2021-05-19 ENCOUNTER — TELEPHONE (OUTPATIENT)
Dept: INTERNAL MEDICINE CLINIC | Facility: CLINIC | Age: 61
End: 2021-05-19

## 2021-05-19 NOTE — TELEPHONE ENCOUNTER
Received fax from [facility] with attached eye exam. Abstracted. Provider signed off. Sent papers to scan.

## 2021-05-21 ENCOUNTER — IMMUNIZATION (OUTPATIENT)
Dept: LAB | Age: 61
End: 2021-05-21
Attending: HOSPITALIST
Payer: COMMERCIAL

## 2021-05-21 DIAGNOSIS — Z23 NEED FOR VACCINATION: Primary | ICD-10-CM

## 2021-05-21 PROCEDURE — 0002A SARSCOV2 VAC 30MCG/0.3ML IM: CPT

## 2021-06-28 DIAGNOSIS — E11.65 TYPE 2 DIABETES MELLITUS WITH HYPERGLYCEMIA, UNSPECIFIED WHETHER LONG TERM INSULIN USE (HCC): ICD-10-CM

## 2021-06-29 ENCOUNTER — TELEPHONE (OUTPATIENT)
Dept: ENDOCRINOLOGY CLINIC | Facility: CLINIC | Age: 61
End: 2021-06-29

## 2021-06-29 RX ORDER — SEMAGLUTIDE 1.34 MG/ML
INJECTION, SOLUTION SUBCUTANEOUS
Qty: 4.5 ML | Refills: 0 | OUTPATIENT
Start: 2021-06-29

## 2021-06-29 NOTE — TELEPHONE ENCOUNTER
Called and spoke with pt about rescheduling missed appointment with [facility], pt stated driving and will give us a call back later when at home

## 2021-07-01 NOTE — TELEPHONE ENCOUNTER
Called and spoke with pt about scheduling FU with [facility], pt states not having any insurance at this time and new insurance from his wife new job will not start until late September.      Pt asking if we can refill Ozempic and once insurance kicks in he will g

## 2021-07-09 NOTE — TELEPHONE ENCOUNTER
Call with Erica Ferguson. Gave him info per Marcey Leventhal, APRN. Number to [facility] patient assistance program was provided to patient and he will call them.

## 2021-10-05 NOTE — TELEPHONE ENCOUNTER
Called and spoke with pt trying to figure out when able to follow up with [facility], pt states now having insurance again and is at work but will give us a call back when available, also asked if pt reached out to [place] [phone] and pt states he never called [facility]    Pt st

## 2021-10-26 DIAGNOSIS — E11.65 TYPE 2 DIABETES MELLITUS WITH HYPERGLYCEMIA, UNSPECIFIED WHETHER LONG TERM INSULIN USE (HCC): ICD-10-CM

## 2021-10-27 ENCOUNTER — PATIENT MESSAGE (OUTPATIENT)
Dept: ENDOCRINOLOGY CLINIC | Facility: CLINIC | Age: 61
End: 2021-10-27

## 2021-10-27 ENCOUNTER — PATIENT MESSAGE (OUTPATIENT)
Dept: INTERNAL MEDICINE CLINIC | Facility: CLINIC | Age: 61
End: 2021-10-27

## 2021-10-27 DIAGNOSIS — E11.65 TYPE 2 DIABETES MELLITUS WITH HYPERGLYCEMIA, UNSPECIFIED WHETHER LONG TERM INSULIN USE (HCC): ICD-10-CM

## 2021-10-27 RX ORDER — SEMAGLUTIDE 1.34 MG/ML
INJECTION, SOLUTION SUBCUTANEOUS
Qty: 4.5 ML | Refills: 0 | OUTPATIENT
Start: 2021-10-27

## 2021-10-27 NOTE — TELEPHONE ENCOUNTER
Call with patient to reschedule appointment prior to year end per provider review. Informed him we can get him in earlier with new diabetes APRN. Pt then asked, \"so what happens with the refill\".   I informed him that we needed to reschedule a follow up

## 2021-10-27 NOTE — TELEPHONE ENCOUNTER
Patient called in after pharmacy informed him his refill request was denied. He did not see the [employer] message I sent about either following up with JAMEL Steele or have PCP take on management of diabetes.       I offered him an appt tomorrow with

## 2021-10-27 NOTE — TELEPHONE ENCOUNTER
He needs appt - needs to be seen -per refill protocol - which is provided on avs every visit. Last A1c value was 6.7% done 11/10/2020.      If not convenient, can resume DM care w PCP

## 2021-10-27 NOTE — TELEPHONE ENCOUNTER
Need to be seen before 1-2021  Shady Castañeda to see any provider in DM center  If he wants video visit, needs labs done - needs labs

## 2021-10-27 NOTE — TELEPHONE ENCOUNTER
Pt called requesting refill on Ozempic and I let pt know he needs to schedule Follow up with [facility], pt states he does not have his \" quarterly schedule\" but once he figures it out he will schedule follow up appointment

## 2021-10-28 ENCOUNTER — PATIENT MESSAGE (OUTPATIENT)
Dept: ENDOCRINOLOGY CLINIC | Facility: CLINIC | Age: 61
End: 2021-10-28

## 2021-10-28 RX ORDER — SEMAGLUTIDE 1.34 MG/ML
INJECTION, SOLUTION SUBCUTANEOUS
Qty: 4.5 ML | Refills: 0 | OUTPATIENT
Start: 2021-10-28

## 2021-10-28 NOTE — TELEPHONE ENCOUNTER
I certainly would like to assist with the patient's care.   I can offer a follow-up appointment today at 4 PM.  However, given the duration since the patient's last office visit and laboratory evaluation, I cannot refill this medication without first in off

## 2021-10-28 NOTE — TELEPHONE ENCOUNTER
Offered to make an appointment with AD per as noted below. Patient asked to just let AD know that he called.

## 2021-10-28 NOTE — TELEPHONE ENCOUNTER
Pt called back stated he spoke with someone prior to me and to make sure I tell AD he called back. Pt did not schedule an appt.  Please advise

## 2021-10-28 NOTE — TELEPHONE ENCOUNTER
From: Carson Boo  To: Norman Mcclure MD  Sent: 10/27/2021 4:44 PM CDT  Subject: Kimberley Notice Dr. Yobany Guidno,    I hope all is well. I'm trying to have my Ozempic prescription filled.  However, the diabetic center, [facility] has denied the

## 2021-10-28 NOTE — TELEPHONE ENCOUNTER
From: Maggie Staff  To: JAMEL Soliman  Sent: 10/27/2021 5:05 PM CDT  Subject: Sol Haresh    I was advised that the January 24th appointment would not work.

## 2021-10-28 NOTE — TELEPHONE ENCOUNTER
From: Crow Shetty  To: JAMEL Gomez  Sent: 10/28/2021 11:48 AM CDT  Subject: OZEMPIC PRESCRIPTION    Good Morning,    I apologize for using the word \"blackmail. \" I should have used \"ransom or \"being held hostage. \" I did expect to read th

## 2021-10-28 NOTE — TELEPHONE ENCOUNTER
Discussed patient case with Medical Director, Dr Laure Shah that [place] should not be refilled since no recent appointment or labs - and refusal to come in sooner than 1-2022 appointment date  Will defer pt to PCP office -

## 2021-10-29 RX ORDER — SEMAGLUTIDE 1.34 MG/ML
0.25 INJECTION, SOLUTION SUBCUTANEOUS
Qty: 1 EACH | Refills: 0 | Status: SHIPPED | OUTPATIENT
Start: 2021-10-29 | End: 2021-11-26

## 2021-10-29 RX ORDER — PEN NEEDLE, DIABETIC 30 GX3/16"
1 NEEDLE, DISPOSABLE MISCELLANEOUS
Qty: 30 EACH | Refills: 0 | Status: SHIPPED | OUTPATIENT
Start: 2021-10-29 | End: 2022-01-03

## 2021-10-29 NOTE — TELEPHONE ENCOUNTER
Please advise the patient that we are here 5 days/week to provide care for him.  It is no one's intention to hold his medication from him, however, it is also not safe for his providers to prescribe medication without knowledge of his current disease status

## 2021-11-01 RX ORDER — SEMAGLUTIDE 1.34 MG/ML
0.5 INJECTION, SOLUTION SUBCUTANEOUS
Qty: 4.5 ML | Refills: 0 | OUTPATIENT
Start: 2021-11-01

## 2021-11-02 NOTE — TELEPHONE ENCOUNTER
Patient notified AD sent Ozempic to [facility] on file. Appt scheduled with AD for 11/5/21 at 10:40 am.  Pt verbalizes understanding.

## 2021-11-04 ENCOUNTER — TELEPHONE (OUTPATIENT)
Dept: INTERNAL MEDICINE CLINIC | Facility: CLINIC | Age: 61
End: 2021-11-04

## 2021-11-08 NOTE — TELEPHONE ENCOUNTER
[facility] received forms back from "[employer] stating No Member Found. [facility] called [employer] Provider [phone] and per Devika Funez, pt insurance has been terminated since 6/29/2021.

## 2021-11-09 NOTE — TELEPHONE ENCOUNTER
PA request from [employer] requesting PA to [employer]. P#[phone]. Member ID [id number]. Questions faxed to [phone]. Awaiting decision.

## 2021-11-28 ENCOUNTER — PATIENT MESSAGE (OUTPATIENT)
Dept: INTERNAL MEDICINE CLINIC | Facility: CLINIC | Age: 61
End: 2021-11-28

## 2021-11-29 NOTE — TELEPHONE ENCOUNTER
CMA called and discussed with pharmacy, med on back order and arriving tomorrow. CMA made pt aware and told pt to call office if still having issues.

## 2021-11-29 NOTE — TELEPHONE ENCOUNTER
From: Crow Shetty  To: Hiram Portillo MD  Sent: 11/28/2021 10:05 PM CST  Subject: Melissa Montejo    I still have not received my Ozempic prescription.

## 2021-12-01 ENCOUNTER — TELEPHONE (OUTPATIENT)
Dept: INTERNAL MEDICINE CLINIC | Facility: CLINIC | Age: 61
End: 2021-12-01

## 2021-12-20 ENCOUNTER — OFFICE VISIT (OUTPATIENT)
Dept: INTERNAL MEDICINE CLINIC | Facility: CLINIC | Age: 61
End: 2021-12-20
Payer: COMMERCIAL

## 2021-12-20 VITALS
SYSTOLIC BLOOD PRESSURE: 132 MMHG | DIASTOLIC BLOOD PRESSURE: 70 MMHG | RESPIRATION RATE: 18 BRPM | OXYGEN SATURATION: 98 % | TEMPERATURE: 97 F | BODY MASS INDEX: 27.62 KG/M2 | HEART RATE: 82 BPM | WEIGHT: 176 LBS | HEIGHT: 67 IN

## 2021-12-20 DIAGNOSIS — E13.319 RETINOPATHY DUE TO SECONDARY DIABETES MELLITUS (HCC): ICD-10-CM

## 2021-12-20 DIAGNOSIS — Z13.0 SCREENING FOR BLOOD DISEASE: ICD-10-CM

## 2021-12-20 DIAGNOSIS — Z12.5 PROSTATE CANCER SCREENING: ICD-10-CM

## 2021-12-20 DIAGNOSIS — E11.21 DIABETES MELLITUS WITH PROTEINURIC DIABETIC NEPHROPATHY (HCC): ICD-10-CM

## 2021-12-20 DIAGNOSIS — N18.30 STAGE 3 CHRONIC KIDNEY DISEASE, UNSPECIFIED WHETHER STAGE 3A OR 3B CKD (HCC): ICD-10-CM

## 2021-12-20 DIAGNOSIS — Z13.29 THYROID DISORDER SCREENING: ICD-10-CM

## 2021-12-20 DIAGNOSIS — E11.65 TYPE 2 DIABETES MELLITUS WITH HYPERGLYCEMIA, UNSPECIFIED WHETHER LONG TERM INSULIN USE (HCC): ICD-10-CM

## 2021-12-20 DIAGNOSIS — E78.5 DYSLIPIDEMIA: ICD-10-CM

## 2021-12-20 DIAGNOSIS — Z13.228 SCREENING FOR METABOLIC DISORDER: ICD-10-CM

## 2021-12-20 DIAGNOSIS — Z12.11 SCREEN FOR COLON CANCER: ICD-10-CM

## 2021-12-20 DIAGNOSIS — Z00.00 ROUTINE GENERAL MEDICAL EXAMINATION AT A HEALTH CARE FACILITY: Primary | ICD-10-CM

## 2021-12-20 PROCEDURE — 99396 PREV VISIT EST AGE 40-64: CPT | Performed by: INTERNAL MEDICINE

## 2021-12-20 PROCEDURE — 3078F DIAST BP <80 MM HG: CPT | Performed by: INTERNAL MEDICINE

## 2021-12-20 PROCEDURE — 3075F SYST BP GE 130 - 139MM HG: CPT | Performed by: INTERNAL MEDICINE

## 2021-12-20 PROCEDURE — 3008F BODY MASS INDEX DOCD: CPT | Performed by: INTERNAL MEDICINE

## 2021-12-20 NOTE — PROGRESS NOTES
[address] [facility]  12/7/1960    Patient presents with:  Diabetes: RG rm 6 DM f/u      HPI:   75 Faisal [facility] Perfecto is a 59year old male who presents for an annual physical examination. The patient has maintained his usual state of health.  He has maintained c Onset   • Heart Disorder Father 62      Social History    Tobacco Use      Smoking status: Never Smoker      Smokeless tobacco: Never Used    Vaping Use      Vaping Use: Never used    Alcohol use: Yes      Comment: socially     Drug use: Never        Ayana Alvarez Refills:  Requested Prescriptions      No prescriptions requested or ordered in this encounter       Imaging & Consults:  None    No follow-ups on file. There are no Patient Instructions on file for this visit.     All questions were answered and the sergio

## 2021-12-22 ENCOUNTER — LAB ENCOUNTER (OUTPATIENT)
Dept: LAB | Facility: HOSPITAL | Age: 61
End: 2021-12-22
Attending: INTERNAL MEDICINE
Payer: COMMERCIAL

## 2021-12-22 DIAGNOSIS — Z12.11 SCREEN FOR COLON CANCER: ICD-10-CM

## 2021-12-22 PROCEDURE — 82274 ASSAY TEST FOR BLOOD FECAL: CPT

## 2021-12-31 DIAGNOSIS — E11.65 TYPE 2 DIABETES MELLITUS WITH HYPERGLYCEMIA, UNSPECIFIED WHETHER LONG TERM INSULIN USE (HCC): ICD-10-CM

## 2022-01-03 ENCOUNTER — APPOINTMENT (OUTPATIENT)
Dept: CT IMAGING | Facility: HOSPITAL | Age: 62
DRG: 637 | End: 2022-01-03
Attending: EMERGENCY MEDICINE
Payer: COMMERCIAL

## 2022-01-03 ENCOUNTER — HOSPITAL ENCOUNTER (INPATIENT)
Facility: HOSPITAL | Age: 62
LOS: 4 days | Discharge: HOME | DRG: 637 | End: 2022-01-07
Attending: EMERGENCY MEDICINE | Admitting: HOSPITALIST
Payer: COMMERCIAL

## 2022-01-03 DIAGNOSIS — U07.1 COVID-19: ICD-10-CM

## 2022-01-03 DIAGNOSIS — N17.9 AKI (ACUTE KIDNEY INJURY) (HCC): Primary | ICD-10-CM

## 2022-01-03 DIAGNOSIS — E10.10 TYPE 1 DIABETES MELLITUS WITH KETOACIDOSIS WITHOUT COMA (HCC): ICD-10-CM

## 2022-01-03 PROBLEM — R73.9 HYPERGLYCEMIA: Status: ACTIVE | Noted: 2022-01-03

## 2022-01-03 PROBLEM — R79.89 AZOTEMIA: Status: ACTIVE | Noted: 2022-01-03

## 2022-01-03 PROBLEM — E87.20 METABOLIC ACIDOSIS: Status: ACTIVE | Noted: 2022-01-03

## 2022-01-03 PROBLEM — E87.2 METABOLIC ACIDOSIS: Status: ACTIVE | Noted: 2022-01-03

## 2022-01-03 LAB
ACETONE: NEGATIVE
ALBUMIN SERPL-MCNC: 2.7 G/DL (ref 3.4–5)
ALBUMIN/GLOB SERPL: 0.6 {RATIO} (ref 1–2)
ALP LIVER SERPL-CCNC: 109 U/L
ALT SERPL-CCNC: 19 U/L
ANION GAP SERPL CALC-SCNC: 11 MMOL/L (ref 0–18)
ANION GAP SERPL CALC-SCNC: 13 MMOL/L (ref 0–18)
ANION GAP SERPL CALC-SCNC: 14 MMOL/L (ref 0–18)
ARTERIAL PATENCY WRIST A: POSITIVE
AST SERPL-CCNC: 17 U/L (ref 15–37)
ATRIAL RATE: 85 BPM
BASE EXCESS BLDA CALC-SCNC: -15.8 MMOL/L (ref ?–2)
BASOPHILS # BLD AUTO: 0.01 X10(3) UL (ref 0–0.2)
BASOPHILS NFR BLD AUTO: 0.1 %
BILIRUB SERPL-MCNC: 0.3 MG/DL (ref 0.1–2)
BILIRUB UR QL STRIP.AUTO: NEGATIVE
BODY TEMPERATURE: 97.8 F
BUN BLD-MCNC: 113 MG/DL (ref 7–18)
BUN BLD-MCNC: 116 MG/DL (ref 7–18)
BUN BLD-MCNC: 117 MG/DL (ref 7–18)
CA-I BLD-SCNC: 1.21 MMOL/L (ref 1.12–1.32)
CALCIUM BLD-MCNC: 8.4 MG/DL (ref 8.5–10.1)
CALCIUM BLD-MCNC: 8.6 MG/DL (ref 8.5–10.1)
CALCIUM BLD-MCNC: 9.2 MG/DL (ref 8.5–10.1)
CHLORIDE SERPL-SCNC: 115 MMOL/L (ref 98–112)
CHLORIDE SERPL-SCNC: 120 MMOL/L (ref 98–112)
CHLORIDE SERPL-SCNC: 123 MMOL/L (ref 98–112)
CK SERPL-CCNC: 183 U/L
CO2 SERPL-SCNC: 11 MMOL/L (ref 21–32)
CO2 SERPL-SCNC: 12 MMOL/L (ref 21–32)
CO2 SERPL-SCNC: 13 MMOL/L (ref 21–32)
COHGB MFR BLD: 0.8 % SAT (ref 0–3)
COLOR UR AUTO: YELLOW
CREAT BLD-MCNC: 4.94 MG/DL
CREAT BLD-MCNC: 5.12 MG/DL
CREAT BLD-MCNC: 5.67 MG/DL
EOSINOPHIL # BLD AUTO: 0 X10(3) UL (ref 0–0.7)
EOSINOPHIL NFR BLD AUTO: 0 %
ERYTHROCYTE [DISTWIDTH] IN BLOOD BY AUTOMATED COUNT: 12.7 %
EST. AVERAGE GLUCOSE BLD GHB EST-MCNC: 166 MG/DL (ref 68–126)
GLOBULIN PLAS-MCNC: 4.6 G/DL (ref 2.8–4.4)
GLUCOSE BLD-MCNC: 138 MG/DL (ref 70–99)
GLUCOSE BLD-MCNC: 144 MG/DL (ref 70–99)
GLUCOSE BLD-MCNC: 165 MG/DL (ref 70–99)
GLUCOSE BLD-MCNC: 168 MG/DL (ref 70–99)
GLUCOSE BLD-MCNC: 171 MG/DL (ref 70–99)
GLUCOSE BLD-MCNC: 205 MG/DL (ref 70–99)
GLUCOSE BLD-MCNC: 234 MG/DL (ref 70–99)
GLUCOSE BLD-MCNC: 253 MG/DL (ref 70–99)
GLUCOSE BLD-MCNC: 256 MG/DL (ref 70–99)
GLUCOSE BLD-MCNC: 266 MG/DL (ref 70–99)
GLUCOSE BLD-MCNC: 268 MG/DL (ref 70–99)
GLUCOSE BLD-MCNC: 286 MG/DL (ref 70–99)
GLUCOSE UR STRIP.AUTO-MCNC: 150 MG/DL
HBA1C MFR BLD: 7.4 % (ref ?–5.7)
HCO3 BLDA-SCNC: 9.4 MEQ/L (ref 22–26)
HCT VFR BLD AUTO: 36.3 %
HGB BLD-MCNC: 11 G/DL
HGB BLD-MCNC: 12.2 G/DL
IMM GRANULOCYTES # BLD AUTO: 0.06 X10(3) UL (ref 0–1)
IMM GRANULOCYTES NFR BLD: 0.5 %
KETONES UR STRIP.AUTO-MCNC: NEGATIVE MG/DL
LACTATE BLDA-SCNC: <1.6 MMOL/L (ref 0.5–2)
LEUKOCYTE ESTERASE UR QL STRIP.AUTO: NEGATIVE
LIPASE SERPL-CCNC: 517 U/L (ref 73–393)
LYMPHOCYTES # BLD AUTO: 0.62 X10(3) UL (ref 1–4)
LYMPHOCYTES NFR BLD AUTO: 5.6 %
MAGNESIUM SERPL-MCNC: 2.5 MG/DL (ref 1.6–2.6)
MCH RBC QN AUTO: 28 PG (ref 26–34)
MCHC RBC AUTO-ENTMCNC: 33.6 G/DL (ref 31–37)
MCV RBC AUTO: 83.3 FL
METHGB MFR BLD: 0.8 % SAT (ref 0.4–1.5)
MONOCYTES # BLD AUTO: 0.37 X10(3) UL (ref 0.1–1)
MONOCYTES NFR BLD AUTO: 3.3 %
NEUTROPHILS # BLD AUTO: 10.07 X10 (3) UL (ref 1.5–7.7)
NEUTROPHILS # BLD AUTO: 10.07 X10(3) UL (ref 1.5–7.7)
NEUTROPHILS NFR BLD AUTO: 90.5 %
NITRITE UR QL STRIP.AUTO: NEGATIVE
NT-PROBNP SERPL-MCNC: 4574 PG/ML (ref ?–125)
OSMOLALITY SERPL CALC.SUM OF ELEC: 340 MOSM/KG (ref 275–295)
OSMOLALITY SERPL CALC.SUM OF ELEC: 343 MOSM/KG (ref 275–295)
OSMOLALITY SERPL CALC.SUM OF ELEC: 345 MOSM/KG (ref 275–295)
P AXIS: 62 DEGREES
P-R INTERVAL: 142 MS
P/F RATIO: 510.1 MMHG
PCO2 BLDA: 21 MM HG (ref 35–45)
PH BLDA: 7.27 [PH] (ref 7.35–7.45)
PH UR STRIP.AUTO: 5 [PH] (ref 5–8)
PHOSPHATE SERPL-MCNC: 5.3 MG/DL (ref 2.5–4.9)
PLATELET # BLD AUTO: 256 10(3)UL (ref 150–450)
PO2 BLDA: 104 MM HG (ref 80–105)
POTASSIUM BLD-SCNC: 5 MMOL/L (ref 3.6–5.1)
POTASSIUM SERPL-SCNC: 4.4 MMOL/L (ref 3.5–5.1)
POTASSIUM SERPL-SCNC: 4.9 MMOL/L (ref 3.5–5.1)
POTASSIUM SERPL-SCNC: 5 MMOL/L (ref 3.5–5.1)
PROCALCITONIN SERPL-MCNC: 0.25 NG/ML (ref ?–0.16)
PROT SERPL-MCNC: 7.3 G/DL (ref 6.4–8.2)
PROT UR STRIP.AUTO-MCNC: >=500 MG/DL
Q-T INTERVAL: 378 MS
QRS DURATION: 90 MS
QTC CALCULATION (BEZET): 449 MS
R AXIS: 8 DEGREES
RBC # BLD AUTO: 4.36 X10(6)UL
SAO2 % BLDA FROM PO2: 97 % (ref 92–100)
SAO2 % BLDA: 96 % (ref 92–100)
SARS-COV-2 RNA RESP QL NAA+PROBE: DETECTED
SODIUM BLD-SCNC: 145 MMOL/L (ref 136–144)
SODIUM SERPL-SCNC: 141 MMOL/L (ref 136–145)
SODIUM SERPL-SCNC: 145 MMOL/L (ref 136–145)
SODIUM SERPL-SCNC: 146 MMOL/L (ref 136–145)
SP GR UR STRIP.AUTO: 1.01 (ref 1–1.03)
T AXIS: 55 DEGREES
UROBILINOGEN UR STRIP.AUTO-MCNC: <2 MG/DL
VENTRICULAR RATE: 85 BPM
WBC # BLD AUTO: 11.1 X10(3) UL (ref 4–11)

## 2022-01-03 PROCEDURE — 99223 1ST HOSP IP/OBS HIGH 75: CPT | Performed by: HOSPITALIST

## 2022-01-03 PROCEDURE — 74176 CT ABD & PELVIS W/O CONTRAST: CPT | Performed by: EMERGENCY MEDICINE

## 2022-01-03 PROCEDURE — 99255 IP/OBS CONSLTJ NEW/EST HI 80: CPT | Performed by: INTERNAL MEDICINE

## 2022-01-03 RX ORDER — SODIUM CHLORIDE 9 MG/ML
INJECTION, SOLUTION INTRAVENOUS CONTINUOUS
Status: DISCONTINUED | OUTPATIENT
Start: 2022-01-03 | End: 2022-01-03

## 2022-01-03 RX ORDER — DEXTROSE MONOHYDRATE 25 G/50ML
50 INJECTION, SOLUTION INTRAVENOUS
Status: DISCONTINUED | OUTPATIENT
Start: 2022-01-03 | End: 2022-01-03

## 2022-01-03 RX ORDER — ONDANSETRON 2 MG/ML
4 INJECTION INTRAMUSCULAR; INTRAVENOUS EVERY 6 HOURS PRN
Status: DISCONTINUED | OUTPATIENT
Start: 2022-01-03 | End: 2022-01-07

## 2022-01-03 RX ORDER — BISACODYL 10 MG
10 SUPPOSITORY, RECTAL RECTAL
Status: DISCONTINUED | OUTPATIENT
Start: 2022-01-03 | End: 2022-01-07

## 2022-01-03 RX ORDER — BENZONATATE 100 MG/1
200 CAPSULE ORAL 3 TIMES DAILY PRN
Status: DISCONTINUED | OUTPATIENT
Start: 2022-01-03 | End: 2022-01-07

## 2022-01-03 RX ORDER — POLYETHYLENE GLYCOL 3350 17 G/17G
17 POWDER, FOR SOLUTION ORAL DAILY PRN
Status: DISCONTINUED | OUTPATIENT
Start: 2022-01-03 | End: 2022-01-07

## 2022-01-03 RX ORDER — MELATONIN
3 NIGHTLY PRN
Status: DISCONTINUED | OUTPATIENT
Start: 2022-01-03 | End: 2022-01-07

## 2022-01-03 RX ORDER — DEXTROSE AND SODIUM CHLORIDE 5; .45 G/100ML; G/100ML
100 INJECTION, SOLUTION INTRAVENOUS CONTINUOUS PRN
Status: DISCONTINUED | OUTPATIENT
Start: 2022-01-03 | End: 2022-01-03

## 2022-01-03 RX ORDER — HYDRALAZINE HYDROCHLORIDE 20 MG/ML
10 INJECTION INTRAMUSCULAR; INTRAVENOUS EVERY 4 HOURS PRN
Status: DISCONTINUED | OUTPATIENT
Start: 2022-01-03 | End: 2022-01-04

## 2022-01-03 RX ORDER — DEXTROSE MONOHYDRATE 25 G/50ML
50 INJECTION, SOLUTION INTRAVENOUS
Status: DISCONTINUED | OUTPATIENT
Start: 2022-01-03 | End: 2022-01-07

## 2022-01-03 RX ORDER — ONDANSETRON 2 MG/ML
4 INJECTION INTRAMUSCULAR; INTRAVENOUS ONCE
Status: COMPLETED | OUTPATIENT
Start: 2022-01-03 | End: 2022-01-03

## 2022-01-03 RX ORDER — ALBUTEROL SULFATE 90 UG/1
4 AEROSOL, METERED RESPIRATORY (INHALATION) EVERY 4 HOURS PRN
Status: DISCONTINUED | OUTPATIENT
Start: 2022-01-03 | End: 2022-01-07

## 2022-01-03 RX ORDER — PROCHLORPERAZINE EDISYLATE 5 MG/ML
5 INJECTION INTRAMUSCULAR; INTRAVENOUS EVERY 8 HOURS PRN
Status: DISCONTINUED | OUTPATIENT
Start: 2022-01-03 | End: 2022-01-07

## 2022-01-03 RX ORDER — SEMAGLUTIDE 1.34 MG/ML
INJECTION, SOLUTION SUBCUTANEOUS
Qty: 1.5 ML | Refills: 0 | Status: SHIPPED | OUTPATIENT
Start: 2022-01-03

## 2022-01-03 RX ORDER — ACETAMINOPHEN 325 MG/1
650 TABLET ORAL EVERY 6 HOURS PRN
Status: DISCONTINUED | OUTPATIENT
Start: 2022-01-03 | End: 2022-01-07

## 2022-01-03 RX ORDER — SENNOSIDES 8.6 MG
17.2 TABLET ORAL NIGHTLY PRN
Status: DISCONTINUED | OUTPATIENT
Start: 2022-01-03 | End: 2022-01-07

## 2022-01-03 RX ORDER — ENOXAPARIN SODIUM 100 MG/ML
40 INJECTION SUBCUTANEOUS DAILY
Status: DISCONTINUED | OUTPATIENT
Start: 2022-01-03 | End: 2022-01-03 | Stop reason: SDUPTHER

## 2022-01-03 RX ORDER — HEPARIN SODIUM 5000 [USP'U]/ML
5000 INJECTION, SOLUTION INTRAVENOUS; SUBCUTANEOUS EVERY 8 HOURS SCHEDULED
Status: DISCONTINUED | OUTPATIENT
Start: 2022-01-03 | End: 2022-01-07

## 2022-01-03 RX ORDER — OMEPRAZOLE 20 MG/1
20 CAPSULE, DELAYED RELEASE ORAL
Status: ON HOLD | COMMUNITY
End: 2022-01-04

## 2022-01-03 NOTE — ED PROVIDER NOTES
Patient Seen in: [facility] Emergency Department      History   Patient presents with:  Nausea/Vomiting/Diarrhea  Body ache and/or chills    Stated Complaint: chills, vomiting    Subjective:   HPI    Patient is a 27-year-old male comes emergency blu no distress  HEENT: Normocephalic, atraumatic. Dry mucous membranes. Pupils equal round reactive to light and accommodation, extraocular motion is intact, sclerae white, conjunctiva is pink. Oropharynx is unremarkable, no exudate.   NECK: Supple, trachea limits   RAPID SARS-COV-2 BY PCR - Abnormal; Notable for the following components:    Rapid SARS-CoV-2 by PCR Detected (*)     All other components within normal limits   CBC W/ DIFFERENTIAL - Abnormal; Notable for the following components:    WBC 11.1 (*) or significant focal lesion. BILIARY:  No visible dilatation or calcification. PANCREAS:  No lesion, fluid collection, ductal dilatation, or atrophy. SPLEEN:  No enlargement or focal lesion. KIDNEYS:  No mass, obstruction, or calcification.   ADRENALS: 0.9 % 10 mL IV push (40 mg Intravenous Given 1/3/22 4836)              MDM      Patient is a 75-year-old male comes emergency room for evaluation of vomiting. Patient Covid positive. Patient with moderate diabetic ketoacidosis. Blood sugar is above 250. [facility]) [facility] N17.9 1/3/2022 Unknown    Azotemia R79.89 1/3/2022 Yes    Hyperglycemia R73.9 0/4/1176 Yes    Metabolic acidosis V80.4 0/9/9970 Yes

## 2022-01-03 NOTE — ED QUICK NOTES
Orders for admission, patient is aware of plan and ready to go upstairs.  Any questions, please call ED RN ana lilia at extension [phone]    Patient Covid vaccination status: Fully vaccinated     COVID Test Ordered in ED: Rapid SARS-CoV-2 by PCR    COVID Suspicio

## 2022-01-03 NOTE — ED QUICK NOTES
Orders for admission, patient is aware of plan and ready to go upstairs. Any questions, please call ED RN kartik at extension [phone].      Patient Covid vaccination status: Fully vaccinated     COVID Test Ordered in ED: Rapid SARS-CoV-2 by PCR    COVID Suspic

## 2022-01-03 NOTE — CONSULTS
[address] [facility] Patient Status:  Emergency    1960 MRN [id number]   Location [address] [facility] Attending Michael Carmichael MD   Hosp Day # 0 PCP Loc Solano MD     Date of Admission: 1/3/2022  Admission Diagn Systems:  Constitutional: denies weight loss, fevers, chills, night sweats, +fatigue  HEENT: denies vision or hearing changes, eye pain, tinnitus, hearing loss, sore throat, epistaxis, sinus congestion  Cardiovascular: denies chest pain, PND, palpitations, no edema                          Skin: No rashes or lesions.        Lab Results   Component Value Date    WBC 11.1 01/03/2022    RBC 4.36 01/03/2022    HGB 12.2 01/03/2022    HCT 36.3 01/03/2022    MCV 83.3 01/03/2022    MCH 28.0 01/03/2022    MCHC 33.6 01

## 2022-01-03 NOTE — CONSULTS
[facility]                       Gastroenterology [address] [facility] Gastroenterology    [facility] Patient Status:  Emergency    1960 MRN [id number]   Location 656 [facility] Attending Sky Gayle acute illness            Cardiovascular: No history of CAD, prior MI, chest pain, or palpitations            Respiratory: No shortness of breath, asthma, copd, recurrent pneumonia            Hematologic: The patient reports no easy bruising, frequent gum b 256.0 01/03/2022    CREATSERUM 5.67 01/03/2022     01/03/2022     01/03/2022    K 5.0 01/03/2022     01/03/2022    CO2 13.0 01/03/2022     01/03/2022    CA 9.2 01/03/2022    ALB 2.7 01/03/2022    ALKPHO 109 01/03/2022    BILT 0.3

## 2022-01-03 NOTE — ED INITIAL ASSESSMENT (HPI)
C/o cough, n/v, chills and no appetite since he attended a  on  or someone that  of covid. Has not been tested for covid. Has had 2 covid vaccines.

## 2022-01-03 NOTE — H&P
[facility]  History and Physical     Rebecca Case Patient Status:  Emergency    1960 MRN [id number]   Location [address] [facility] Attending Hoa Rust MD   Hosp Day # 0 PCP Ashkan Charles MD     Chief Complaint: (77.1 kg)   SpO2 99%   BMI 26.63 kg/m²   General: mod acute distress. Alert and oriented x 3. HEENT: Normocephalic atraumatic. Moist mucous membranes. EOM-I. PERRLA. Anicteric. Neck: No lymphadenopathy. No JVD. No carotid bruits.   Respiratory: Clear to a pt, ed Bryon Bundy MD  1/3/2022

## 2022-01-03 NOTE — TELEPHONE ENCOUNTER
Last OV 12/20/21  Last PE 12/20/21  Last REFILL   Medication Quantity Refills Start End   OZEMPIC, 0.25 OR 0.5 MG/DOSE, 2 MG/1.5ML Subcutaneous Solution Pen-injector 4.5 mL 0 3/29/2021      Last LABS Tsh, cbc, lipid, cmp, a1c 11/10/20    Future Appointment

## 2022-01-04 LAB
ALBUMIN SERPL-MCNC: 2.3 G/DL (ref 3.4–5)
ALBUMIN/GLOB SERPL: 0.7 {RATIO} (ref 1–2)
ALP LIVER SERPL-CCNC: 91 U/L
ALT SERPL-CCNC: 15 U/L
ANION GAP SERPL CALC-SCNC: 11 MMOL/L (ref 0–18)
ANION GAP SERPL CALC-SCNC: 8 MMOL/L (ref 0–18)
ANION GAP SERPL CALC-SCNC: 9 MMOL/L (ref 0–18)
AST SERPL-CCNC: 18 U/L (ref 15–37)
BASOPHILS # BLD AUTO: 0.01 X10(3) UL (ref 0–0.2)
BASOPHILS NFR BLD AUTO: 0.1 %
BILIRUB SERPL-MCNC: 0.3 MG/DL (ref 0.1–2)
BUN BLD-MCNC: 104 MG/DL (ref 7–18)
BUN BLD-MCNC: 108 MG/DL (ref 7–18)
BUN BLD-MCNC: 109 MG/DL (ref 7–18)
CALCIUM BLD-MCNC: 8.3 MG/DL (ref 8.5–10.1)
CHLORIDE SERPL-SCNC: 120 MMOL/L (ref 98–112)
CHLORIDE SERPL-SCNC: 121 MMOL/L (ref 98–112)
CHLORIDE SERPL-SCNC: 123 MMOL/L (ref 98–112)
CO2 SERPL-SCNC: 13 MMOL/L (ref 21–32)
CO2 SERPL-SCNC: 16 MMOL/L (ref 21–32)
CO2 SERPL-SCNC: 17 MMOL/L (ref 21–32)
CREAT BLD-MCNC: 4.61 MG/DL
CREAT BLD-MCNC: 4.73 MG/DL
CREAT BLD-MCNC: 4.86 MG/DL
CRP SERPL-MCNC: 2.71 MG/DL (ref ?–0.3)
D DIMER PPP FEU-MCNC: 2.21 UG/ML FEU (ref ?–0.63)
DEPRECATED HBV CORE AB SER IA-ACNC: 649.7 NG/ML
EOSINOPHIL # BLD AUTO: 0.01 X10(3) UL (ref 0–0.7)
EOSINOPHIL NFR BLD AUTO: 0.1 %
ERYTHROCYTE [DISTWIDTH] IN BLOOD BY AUTOMATED COUNT: 12.8 %
GLOBULIN PLAS-MCNC: 3.2 G/DL (ref 2.8–4.4)
GLUCOSE BLD-MCNC: 126 MG/DL (ref 70–99)
GLUCOSE BLD-MCNC: 128 MG/DL (ref 70–99)
GLUCOSE BLD-MCNC: 129 MG/DL (ref 70–99)
GLUCOSE BLD-MCNC: 134 MG/DL (ref 70–99)
GLUCOSE BLD-MCNC: 143 MG/DL (ref 70–99)
GLUCOSE BLD-MCNC: 144 MG/DL (ref 70–99)
GLUCOSE BLD-MCNC: 146 MG/DL (ref 70–99)
GLUCOSE BLD-MCNC: 150 MG/DL (ref 70–99)
GLUCOSE BLD-MCNC: 153 MG/DL (ref 70–99)
GLUCOSE BLD-MCNC: 156 MG/DL (ref 70–99)
GLUCOSE BLD-MCNC: 157 MG/DL (ref 70–99)
GLUCOSE BLD-MCNC: 158 MG/DL (ref 70–99)
GLUCOSE BLD-MCNC: 163 MG/DL (ref 70–99)
GLUCOSE BLD-MCNC: 164 MG/DL (ref 70–99)
GLUCOSE BLD-MCNC: 170 MG/DL (ref 70–99)
GLUCOSE BLD-MCNC: 175 MG/DL (ref 70–99)
HCT VFR BLD AUTO: 31 %
HGB BLD-MCNC: 10.2 G/DL
HGB BLD-MCNC: 10.4 G/DL
IMM GRANULOCYTES # BLD AUTO: 0.03 X10(3) UL (ref 0–1)
IMM GRANULOCYTES NFR BLD: 0.3 %
LDH SERPL L TO P-CCNC: 270 U/L
LYMPHOCYTES # BLD AUTO: 0.81 X10(3) UL (ref 1–4)
LYMPHOCYTES NFR BLD AUTO: 9 %
MAGNESIUM SERPL-MCNC: 2.4 MG/DL (ref 1.6–2.6)
MAGNESIUM SERPL-MCNC: 2.4 MG/DL (ref 1.6–2.6)
MAGNESIUM SERPL-MCNC: 2.5 MG/DL (ref 1.6–2.6)
MCH RBC QN AUTO: 27.6 PG (ref 26–34)
MCHC RBC AUTO-ENTMCNC: 33.5 G/DL (ref 31–37)
MCV RBC AUTO: 82.2 FL
MONOCYTES # BLD AUTO: 0.52 X10(3) UL (ref 0.1–1)
MONOCYTES NFR BLD AUTO: 5.8 %
NEUTROPHILS # BLD AUTO: 7.65 X10 (3) UL (ref 1.5–7.7)
NEUTROPHILS # BLD AUTO: 7.65 X10(3) UL (ref 1.5–7.7)
NEUTROPHILS NFR BLD AUTO: 84.7 %
OSMOLALITY SERPL CALC.SUM OF ELEC: 338 MOSM/KG (ref 275–295)
OSMOLALITY SERPL CALC.SUM OF ELEC: 338 MOSM/KG (ref 275–295)
OSMOLALITY SERPL CALC.SUM OF ELEC: 340 MOSM/KG (ref 275–295)
PHOSPHATE SERPL-MCNC: 4.1 MG/DL (ref 2.5–4.9)
PHOSPHATE SERPL-MCNC: 4.3 MG/DL (ref 2.5–4.9)
PHOSPHATE SERPL-MCNC: 4.6 MG/DL (ref 2.5–4.9)
PLATELET # BLD AUTO: 247 10(3)UL (ref 150–450)
POTASSIUM SERPL-SCNC: 4 MMOL/L (ref 3.5–5.1)
POTASSIUM SERPL-SCNC: 4.2 MMOL/L (ref 3.5–5.1)
POTASSIUM SERPL-SCNC: 4.4 MMOL/L (ref 3.5–5.1)
PROT SERPL-MCNC: 5.5 G/DL (ref 6.4–8.2)
RBC # BLD AUTO: 3.77 X10(6)UL
SODIUM SERPL-SCNC: 145 MMOL/L (ref 136–145)
SODIUM SERPL-SCNC: 146 MMOL/L (ref 136–145)
SODIUM SERPL-SCNC: 147 MMOL/L (ref 136–145)
WBC # BLD AUTO: 9 X10(3) UL (ref 4–11)

## 2022-01-04 PROCEDURE — 99233 SBSQ HOSP IP/OBS HIGH 50: CPT | Performed by: HOSPITALIST

## 2022-01-04 PROCEDURE — 99233 SBSQ HOSP IP/OBS HIGH 50: CPT | Performed by: INTERNAL MEDICINE

## 2022-01-04 RX ORDER — AMLODIPINE BESYLATE 2.5 MG/1
2.5 TABLET ORAL 2 TIMES DAILY PRN
Status: DISCONTINUED | OUTPATIENT
Start: 2022-01-04 | End: 2022-01-07

## 2022-01-04 RX ORDER — HYDRALAZINE HYDROCHLORIDE 20 MG/ML
10 INJECTION INTRAMUSCULAR; INTRAVENOUS EVERY 4 HOURS PRN
Status: DISCONTINUED | OUTPATIENT
Start: 2022-01-04 | End: 2022-01-05

## 2022-01-04 RX ORDER — OMEPRAZOLE 20 MG/1
20 CAPSULE, DELAYED RELEASE ORAL
Qty: 120 CAPSULE | Refills: 0 | Status: SHIPPED | OUTPATIENT
Start: 2022-01-04 | End: 2022-03-05

## 2022-01-04 RX ORDER — AMLODIPINE BESYLATE 5 MG/1
5 TABLET ORAL DAILY
Status: DISCONTINUED | OUTPATIENT
Start: 2022-01-04 | End: 2022-01-07

## 2022-01-04 NOTE — PROGRESS NOTES
[address] [facility] Patient Status:  Inpatient    1960 MRN [id number]   [facility] 4SW-A Attending Dottie Calero MD   Hosp Day # 1 PCP Gabrielle Amato MD     Critical Care Progress Note     Date of Admission: 1/3/2022 11 Intravenous, Continuous  •  hydrALAzine HCl (APRESOLINE) injection 10 mg, 10 mg, Intravenous, Q4H PRN  •  Heparin Sodium (Porcine) 5000 UNIT/ML injection 5,000 Units, 5,000 Units, Subcutaneous, Q8H [facility]     OBJECTIVE:  /75 (BP Location: Left arm)   Pu COVID-19 Lab Results    COVID-19  Lab Results   Component Value Date    COVID19 Detected (A) 01/03/2022    COVID19 Not Detected 06/26/2020       Pro-Calcitonin  Recent Labs   Lab 01/03/22  1614   PCT 0.25*       Cardiac  Recent Labs   Lab 01/03/2

## 2022-01-04 NOTE — CM/SW NOTE
Pt tested positive for COVID. Attended a  on . Pt has a hx of DM. Room air. Independent prior to admission. Will remain available.

## 2022-01-04 NOTE — PROGRESS NOTES
[facility]     Hospitalist Progress Note     Rebecca Case Patient Status:  Inpatient    1960 MRN [id number]   [facility] 4SW-A Attending Deidra Gustafson MD   Hosp Day # 1 PCP Ashkan Charles MD     Chief Complaint: body aches 01/03/22  1614   PCT 0.25*       Cardiac  Recent Labs   Lab 01/03/22  1614   PBNP 4,574*       Creatinine Kinase  Recent Labs   Lab 01/03/22  1614          Inflammatory Markers  Recent Labs   Lab 01/04/22  0438   CRP 2.71*   .7*   *   D

## 2022-01-04 NOTE — PROGRESS NOTES
Gastroenterology Progress Note  Patient Name: Esthela Honeycutt  Chief Complaint: vomiting, ?hematemesis  S: Pt denies vomiting. He still has nausea. He denies abdominal pain. He has not had melena, hematochezia, hematemesis.    O: /71 (BP Location: [facility] Observe for signs of brisk GI bleeding  4. Avoid NSAIDs  5. Recommend EGD and screening colonoscopy as an outpatient; will consider EGD as an inpatient if he has signs of brisk GI bleeding    Pt without further bleeding. We will sign off at this time.  He w

## 2022-01-04 NOTE — PLAN OF CARE
Patient received from ED alert and oriented x4, able to make needs known. No c/o pain or discomfort. HTN 's. PRN hydralazine given. Remains on insulin drip per protocol. Bicarb drip infusing.  Voiding in urinal. Ambulated from ER cart to bed without

## 2022-01-04 NOTE — PROGRESS NOTES
[facility] Note:  Renal Dose Adjustment for enoxaparin (LOVENOX)    [address] [facility] [address] has been prescribed enoxaparin 40 mg subcutaneously every 24 hours. Estimated Creatinine Clearance: 12.5 mL/min (A) (based on SCr of 5.67 mg/dL (H)).     Calculated Cr

## 2022-01-04 NOTE — PLAN OF CARE
Patient alert and oriented. No complaints of pain. Insulin gtt and IVF infusing per protocol. Bicarb gtt per renal. Tolerating room air. Afebrile. NSR. PRN hydralazine given x1 for SBP>160. Patient complaining of nausea.  No emesis or signs of bleeding over

## 2022-01-04 NOTE — PROGRESS NOTES
[facility]  Nephrology Progress Note    Laurent Warner Attending:  Yumiko Hills MD       Assessment and Plan:    1) YULISA- due to dehydration with anorexia / nausea exac by ACE-I effect. No other clear insults; meds benign; UA bland. Improving.  TG extremities  Skin: Warm and dry, no rashes       Labs:   Lab Results   Component Value Date    WBC 9.0 01/04/2022    HGB 10.4 01/04/2022    HCT 31.0 01/04/2022    .0 01/04/2022    CREATSERUM 4.86 01/04/2022     01/04/2022     01/04/2022 Continuous  hydrALAzine HCl (APRESOLINE) injection 10 mg, 10 mg, Intravenous, Q4H PRN  Heparin Sodium (Porcine) 5000 UNIT/ML injection 5,000 Units, 5,000 Units, Subcutaneous, Q8H [facility]  sodium bicarbonate 75 mEq in dextrose 5 % 1,000 mL infusion, 75 mEq, Int

## 2022-01-04 NOTE — PAYOR COMM NOTE
--------------  ADMISSION REVIEW     Payor: DJ GARCIA  Subscriber #:  [id number]  Authorization Number: [id number]    Admit date: 1/3/22  Admit time:  5:51 PM       History   HPI  Patient is a 27-year-old male comes emergency room for evaluation of vomiti Chloride 115 (*)     CO2 13.0 (*)      (*)     Creatinine 5.67 (*)     Calculated Osmolality 340 (*)     GFR, Non- 10 (*)     GFR, -American 11 (*)     Albumin 2.7 (*)     Globulin  4.6 (*)     A/G Ratio 0.6 (*)     All other 100 mL infusion (has no administration in time range)   ondansetron (ZOFRAN) injection 4 mg (4 mg Intravenous Given 1/3/22 1220)   sodium chloride 0.9% IV bolus 1,000 mL (0 mL Intravenous Stopped 1/3/22 1520)   Pantoprazole Sodium (PROTONIX) 40 mg in sodiu affect.     Lab 01/03/22  1219   WBC 11.1*   HGB 12.2*   MCV 83.3   .0     Lab 01/03/22  1219   *   *   CREATSERUM 5.67*   GFRAA 11*   GFRNAA 10*   CA 9.2   ALB 2.7*      K 5.0   *   CO2 13.0*   ALKPHO 109   AST 17   ALT 19 4,574*             Lab 01/03/22  1614               Lab 01/04/22  0438   CRP 2.71*   .7*   *   DDIMER 2.21*      Assessment & Plan:    #Uncontrolled DM2  -Transition off insulin drip     #COVID-19 pneumonia  -Patient is vaccinated but n Intravenous     1/3/2022 2201 Rate/Dose Change 1.5 Units/hr Intravenous     1/3/2022 2109 Rate/Dose Change 1 Units/hr Intravenous     1/3/2022 2016 Rate/Dose Change 1.5 Units/hr Intravenous     1/3/2022 1858 Rate/Dose Change 2 Units/hr Intravenous     1/3/

## 2022-01-04 NOTE — PLAN OF CARE
Assumed care of pt after shift report. Alert and oriented x4. RA. VSS. Transitioned to subcutaneous insulin, insulin gtt stopped per protocol. Carb-controlled diet ordered, pt declined to eat. Remains nauseated, refusing anti-emesis medications.  Bicarb gtt

## 2022-01-05 LAB
ALBUMIN SERPL-MCNC: 2.3 G/DL (ref 3.4–5)
ALBUMIN/GLOB SERPL: 0.8 {RATIO} (ref 1–2)
ALP LIVER SERPL-CCNC: 89 U/L
ALT SERPL-CCNC: 16 U/L
ANION GAP SERPL CALC-SCNC: 10 MMOL/L (ref 0–18)
AST SERPL-CCNC: 18 U/L (ref 15–37)
BASOPHILS # BLD AUTO: 0.01 X10(3) UL (ref 0–0.2)
BASOPHILS NFR BLD AUTO: 0.1 %
BETA-HYDROXYBUTYRIC ACID: 15.5 MG/DL
BILIRUB SERPL-MCNC: 0.4 MG/DL (ref 0.1–2)
BUN BLD-MCNC: 86 MG/DL (ref 7–18)
CALCIUM BLD-MCNC: 8.1 MG/DL (ref 8.5–10.1)
CHLORIDE SERPL-SCNC: 119 MMOL/L (ref 98–112)
CO2 SERPL-SCNC: 20 MMOL/L (ref 21–32)
CREAT BLD-MCNC: 4.06 MG/DL
CRP SERPL-MCNC: 1.93 MG/DL (ref ?–0.3)
D DIMER PPP FEU-MCNC: 1.43 UG/ML FEU (ref ?–0.63)
DEPRECATED HBV CORE AB SER IA-ACNC: 832.8 NG/ML
EOSINOPHIL # BLD AUTO: 0.04 X10(3) UL (ref 0–0.7)
EOSINOPHIL NFR BLD AUTO: 0.4 %
ERYTHROCYTE [DISTWIDTH] IN BLOOD BY AUTOMATED COUNT: 12.5 %
GLOBULIN PLAS-MCNC: 2.9 G/DL (ref 2.8–4.4)
GLUCOSE BLD-MCNC: 105 MG/DL (ref 70–99)
GLUCOSE BLD-MCNC: 115 MG/DL (ref 70–99)
GLUCOSE BLD-MCNC: 141 MG/DL (ref 70–99)
GLUCOSE BLD-MCNC: 155 MG/DL (ref 70–99)
GLUCOSE BLD-MCNC: 174 MG/DL (ref 70–99)
HCT VFR BLD AUTO: 30.5 %
HGB BLD-MCNC: 10.5 G/DL
HGB BLD-MCNC: 9.7 G/DL
IMM GRANULOCYTES # BLD AUTO: 0.03 X10(3) UL (ref 0–1)
IMM GRANULOCYTES NFR BLD: 0.3 %
LDH SERPL L TO P-CCNC: 259 U/L
LYMPHOCYTES # BLD AUTO: 0.59 X10(3) UL (ref 1–4)
LYMPHOCYTES NFR BLD AUTO: 6.6 %
MCH RBC QN AUTO: 26.8 PG (ref 26–34)
MCHC RBC AUTO-ENTMCNC: 31.8 G/DL (ref 31–37)
MCV RBC AUTO: 84.3 FL
MONOCYTES # BLD AUTO: 0.59 X10(3) UL (ref 0.1–1)
MONOCYTES NFR BLD AUTO: 6.6 %
NEUTROPHILS # BLD AUTO: 7.72 X10 (3) UL (ref 1.5–7.7)
NEUTROPHILS # BLD AUTO: 7.72 X10(3) UL (ref 1.5–7.7)
NEUTROPHILS NFR BLD AUTO: 86 %
OSMOLALITY SERPL CALC.SUM OF ELEC: 337 MOSM/KG (ref 275–295)
PLATELET # BLD AUTO: 248 10(3)UL (ref 150–450)
POTASSIUM SERPL-SCNC: 3.7 MMOL/L (ref 3.5–5.1)
PROT SERPL-MCNC: 5.2 G/DL (ref 6.4–8.2)
RBC # BLD AUTO: 3.62 X10(6)UL
SODIUM SERPL-SCNC: 149 MMOL/L (ref 136–145)
WBC # BLD AUTO: 9 X10(3) UL (ref 4–11)

## 2022-01-05 PROCEDURE — 99233 SBSQ HOSP IP/OBS HIGH 50: CPT | Performed by: INTERNAL MEDICINE

## 2022-01-05 PROCEDURE — 99232 SBSQ HOSP IP/OBS MODERATE 35: CPT | Performed by: HOSPITALIST

## 2022-01-05 RX ORDER — POTASSIUM CHLORIDE 20 MEQ/1
20 TABLET, EXTENDED RELEASE ORAL ONCE
Status: DISCONTINUED | OUTPATIENT
Start: 2022-01-05 | End: 2022-01-06

## 2022-01-05 RX ORDER — PANTOPRAZOLE SODIUM 40 MG/1
40 TABLET, DELAYED RELEASE ORAL
Status: DISCONTINUED | OUTPATIENT
Start: 2022-01-05 | End: 2022-01-07

## 2022-01-05 NOTE — PROGRESS NOTES
[facility]     Hospitalist Progress Note     Fabian Feliz Patient Status:  Inpatient    1960 MRN [id number]   [facility] 4SW-A Attending Flaco Howe MD   Hosp Day # 2 PCP Loc Solano MD     Chief Complaint: body aches 17.4 mL/min (A) (based on SCr of 4.06 mg/dL (H)). No results for input(s): PTP, INR in the last 168 hours.          COVID-19 Lab Results    COVID-19  Lab Results   Component Value Date    COVID19 Detected (A) 01/03/2022    COVID19 Not Detected 06/26/2020

## 2022-01-05 NOTE — PLAN OF CARE
A&Ox4. VSS. RA. . Telemetry: NSR  GI: Abdomen soft, nondistended. Denies nausea. : Voids. Denies pain. Up with standby assist.  Diet: Carb controlled diet--low appetite. IVF running per order. All appropriate safety measures in place.  All quest imbalances  - Administer electrolyte replacement as ordered  - Monitor response to electrolyte replacements, including rhythm and repeat lab results as appropriate  - Fluid restriction as ordered  - Instruct patient on fluid and nutrition restrictions as a

## 2022-01-05 NOTE — PROGRESS NOTES
[facility]  Nephrology Progress Note    Nieves Meier Attending:  Americo Walker MD       Assessment and Plan:    1) YULISA- due to dehydration with anorexia / nausea exac by ACE-I effect- improving.  Adjust IVF    2) CKD 3- baseline Cr > 2 mg/dl due clubbing, cyanosis; no edema  Neurologic: Cranial nerves grossly intact, moving all extremities  Skin: Warm and dry, no rashes       Labs:   Lab Results   Component Value Date    WBC 9.0 01/05/2022    HGB 9.7 01/05/2022    HCT 30.5 01/05/2022    .0 (TYLENOL) tab 650 mg, 650 mg, Oral, Q6H PRN  melatonin tab 3 mg, 3 mg, Oral, Nightly PRN  ondansetron (ZOFRAN) injection 4 mg, 4 mg, Intravenous, Q6H PRN  prochlorperazine (COMPAZINE) injection 5 mg, 5 mg, Intravenous, Q8H PRN  polyethylene glycol (

## 2022-01-05 NOTE — PLAN OF CARE
Assume care in am. Patient awake and alert. Vital signs monitored. Episodes of nausea, refused medication. Encourage small light frequent meals, blood sugar monitored. Poor appetite. Continue to monitor patient progress.  Patient has orders to transfer out

## 2022-01-05 NOTE — PAYOR COMM NOTE
--------------  CONTINUED STAY REVIEW    Payor: JD GARCIA  Subscriber #:  [id number]  Authorization Number: [id number]    Admit date: 1/3/22  Admit time:  5:51 PM    FAXING CLINICAL UPDATE FOR 1/5/22 1/5/22:   Patient continues to feel malaise.       Vi Date Action Dose Route     1/5/2022 1302 Given 5,000 Units Subcutaneous (Left Lower Abdomen)     1/4/2022 2139 Given 5,000 Units Subcutaneous (Left Lower Abdomen)       hydrALAzine HCl (APRESOLINE) injection 10 mg     Date Action Dose Route     1/5/2022

## 2022-01-05 NOTE — PROGRESS NOTES
[id number] [facility] Patient Status:  Inpatient    1960 MRN [id number]   [facility] 4SW-A Attending Solis Rowan MD   Hosp Day # 2 PCP Heriberto Negrete MD     Critical Care Progress Note     Date of Admission: 1/3/2022 11 (ZOFRAN) injection 4 mg, 4 mg, Intravenous, Q6H PRN  •  prochlorperazine (COMPAZINE) injection 5 mg, 5 mg, Intravenous, Q8H PRN  •  polyethylene glycol (PEG 3350) (MIRALAX) powder packet 17 g, 17 g, Oral, Daily PRN  •  sennosides (SENOKOT) tab 17.2 mg, 17.  01/05/2022    CO2 20.0 01/05/2022    BUN 86 01/05/2022    CREATSERUM 4.06 01/05/2022     01/05/2022    CA 8.1 01/05/2022    ALKPHO 89 01/05/2022    ALT 16 01/05/2022    AST 18 01/05/2022    BILT 0.4 01/05/2022    ALB 2.3 01/05/2022    TP 5

## 2022-01-05 NOTE — PLAN OF CARE
Patient alert and oriented. Tolerating room air. PRN hydralazine given x2 for SBP>160. Poor appetite. Voiding in bathroom. Bicarb gtt infusing per orders. Transfer orders in place.

## 2022-01-06 LAB
ALBUMIN SERPL-MCNC: 2.4 G/DL (ref 3.4–5)
ALBUMIN/GLOB SERPL: 0.8 {RATIO} (ref 1–2)
ALP LIVER SERPL-CCNC: 93 U/L
ALT SERPL-CCNC: 17 U/L
ANION GAP SERPL CALC-SCNC: 9 MMOL/L (ref 0–18)
AST SERPL-CCNC: 20 U/L (ref 15–37)
BASOPHILS # BLD AUTO: 0 X10(3) UL (ref 0–0.2)
BASOPHILS NFR BLD AUTO: 0 %
BILIRUB SERPL-MCNC: 0.5 MG/DL (ref 0.1–2)
BUN BLD-MCNC: 69 MG/DL (ref 7–18)
CALCIUM BLD-MCNC: 8.2 MG/DL (ref 8.5–10.1)
CHLORIDE SERPL-SCNC: 109 MMOL/L (ref 98–112)
CO2 SERPL-SCNC: 26 MMOL/L (ref 21–32)
CREAT BLD-MCNC: 3.45 MG/DL
CRP SERPL-MCNC: 2.83 MG/DL (ref ?–0.3)
D DIMER PPP FEU-MCNC: 1.07 UG/ML FEU (ref ?–0.63)
DEPRECATED HBV CORE AB SER IA-ACNC: 805.8 NG/ML
EOSINOPHIL # BLD AUTO: 0.06 X10(3) UL (ref 0–0.7)
EOSINOPHIL NFR BLD AUTO: 0.7 %
ERYTHROCYTE [DISTWIDTH] IN BLOOD BY AUTOMATED COUNT: 12.4 %
GLOBULIN PLAS-MCNC: 3 G/DL (ref 2.8–4.4)
GLUCOSE BLD-MCNC: 113 MG/DL (ref 70–99)
GLUCOSE BLD-MCNC: 116 MG/DL (ref 70–99)
GLUCOSE BLD-MCNC: 129 MG/DL (ref 70–99)
GLUCOSE BLD-MCNC: 146 MG/DL (ref 70–99)
GLUCOSE BLD-MCNC: 176 MG/DL (ref 70–99)
HCT VFR BLD AUTO: 31.8 %
HGB BLD-MCNC: 10.2 G/DL
HGB BLD-MCNC: 9.6 G/DL
IMM GRANULOCYTES # BLD AUTO: 0.06 X10(3) UL (ref 0–1)
IMM GRANULOCYTES NFR BLD: 0.7 %
LDH SERPL L TO P-CCNC: 288 U/L
LYMPHOCYTES # BLD AUTO: 1.02 X10(3) UL (ref 1–4)
LYMPHOCYTES NFR BLD AUTO: 11.4 %
MCH RBC QN AUTO: 27 PG (ref 26–34)
MCHC RBC AUTO-ENTMCNC: 32.1 G/DL (ref 31–37)
MCV RBC AUTO: 84.1 FL
MONOCYTES # BLD AUTO: 0.61 X10(3) UL (ref 0.1–1)
MONOCYTES NFR BLD AUTO: 6.8 %
NEUTROPHILS # BLD AUTO: 7.2 X10 (3) UL (ref 1.5–7.7)
NEUTROPHILS # BLD AUTO: 7.2 X10(3) UL (ref 1.5–7.7)
NEUTROPHILS NFR BLD AUTO: 80.4 %
OSMOLALITY SERPL CALC.SUM OF ELEC: 319 MOSM/KG (ref 275–295)
PHOSPHATE SERPL-MCNC: 3.3 MG/DL (ref 2.5–4.9)
PLATELET # BLD AUTO: 258 10(3)UL (ref 150–450)
POTASSIUM SERPL-SCNC: 3.8 MMOL/L (ref 3.5–5.1)
PROT SERPL-MCNC: 5.4 G/DL (ref 6.4–8.2)
RBC # BLD AUTO: 3.78 X10(6)UL
SODIUM SERPL-SCNC: 144 MMOL/L (ref 136–145)
WBC # BLD AUTO: 9 X10(3) UL (ref 4–11)

## 2022-01-06 PROCEDURE — 99232 SBSQ HOSP IP/OBS MODERATE 35: CPT | Performed by: HOSPITALIST

## 2022-01-06 PROCEDURE — 99233 SBSQ HOSP IP/OBS HIGH 50: CPT | Performed by: INTERNAL MEDICINE

## 2022-01-06 RX ORDER — SODIUM CHLORIDE, SODIUM LACTATE, POTASSIUM CHLORIDE, CALCIUM CHLORIDE 600; 310; 30; 20 MG/100ML; MG/100ML; MG/100ML; MG/100ML
INJECTION, SOLUTION INTRAVENOUS CONTINUOUS
Status: DISCONTINUED | OUTPATIENT
Start: 2022-01-06 | End: 2022-01-07

## 2022-01-06 RX ORDER — POTASSIUM CHLORIDE 20 MEQ/1
20 TABLET, EXTENDED RELEASE ORAL ONCE
Status: COMPLETED | OUTPATIENT
Start: 2022-01-06 | End: 2022-01-06

## 2022-01-06 RX ORDER — CARVEDILOL 6.25 MG/1
6.25 TABLET ORAL 2 TIMES DAILY WITH MEALS
Status: DISCONTINUED | OUTPATIENT
Start: 2022-01-06 | End: 2022-01-07

## 2022-01-06 NOTE — PAYOR COMM NOTE
--------------  CONTINUED STAY REVIEW    Payor: [facility] PPO  Subscriber #:  [id number]  Authorization Number: [id number]    Admit date: 1/3/22  Admit time:  5:51 PM    Admitting Physician: Shagufta Jim MD  Attending Physician:  Dottie Calero MD  Primary Plan:     1) YULISA- due to dehydration with anorexia / nausea exac by ACE-I effect- improving.  Continue IVF x 24 hrs  (LR 75 ml hr)       2) CKD 3- baseline Cr > 2 mg/dl due to longstanding poorly controlled DM x yrs- previous eval for other etiologies unrev Route User    1/5/2022 2807 New Bag 75 mEq Intravenous Bj Dec, RN          Vitals (last day)     Date/Time Temp Pulse Resp BP SpO2 Weight O2 Device O2 Flow Rate (L/min) [facility]    01/06/22 0900 — 81 — — — — — — YV    01/06/22 0806 98.1 °F (36.7 °C) 74

## 2022-01-06 NOTE — PROGRESS NOTES
[facility]     Hospitalist Progress Note     Remonia [place] Patient Status:  Inpatient    1960 MRN [id number]   [facility] 4SW-A Attending Dottie Calero MD   Hosp Day # 3 PCP Gabrielle Amato MD     Chief Complaint: body aches (H)).    No results for input(s): PTP, INR in the last 168 hours.          COVID-19 Lab Results    COVID-19  Lab Results   Component Value Date    COVID19 Detected (A) 01/03/2022    COVID19 Not Detected 06/26/2020       Pro-Calcitonin  Recent Labs   Lab 01/

## 2022-01-06 NOTE — PLAN OF CARE
COVID-19 Daily Discharge Readiness-Nursing    O2 Sat at Rest:  92   %   O2 Sat with Exertion:    % on    liters   Temperature max from last 24 hrs: Temp (24hrs), Av °F (36.7 °C), Min:97.7 °F (36.5 °C), Max:98.7 °F (37.1 °C)    Inflammatory Markers:   R PO as ordered and tolerated  - Nasogastric tube to low intermittent suction as ordered  - Evaluate effectiveness of ordered antiemetic medications  - Provide nonpharmacologic comfort measures as appropriate  - Advance diet as tolerated, if ordered  - Obtai

## 2022-01-06 NOTE — PLAN OF CARE
Problem: Patient/Family Goals  Goal: Patient/Family Long Term Goal  Description: Patient's Long Term Goal: \"improve nausea\"    Interventions:  - prn antiemetics  - insulin gtt and IVF  - See additional Care Plan goals for specific interventions  Outcom

## 2022-01-06 NOTE — PROGRESS NOTES
[facility]  Nephrology Progress Note    Enrique Bar Attending:  Marie Dailey MD       Assessment and Plan:    1) YULISA- due to dehydration with anorexia / nausea exac by ACE-I effect- improving.  Continue IVF x 24 hrs    2) CKD 3- baseline Cr > 2 no rashes       Labs:   Lab Results   Component Value Date    WBC 9.0 01/06/2022    HGB 10.2 01/06/2022    HCT 31.8 01/06/2022    .0 01/06/2022    CREATSERUM 3.45 01/06/2022    BUN 69 01/06/2022     01/06/2022    K 3.8 01/06/2022     01/ 5 mg, Intravenous, Q8H PRN  polyethylene glycol (PEG 3350) (MIRALAX) powder packet 17 g, 17 g, Oral, Daily PRN  sennosides (SENOKOT) tab 17.2 mg, 17.2 mg, Oral, Nightly PRN  bisacodyl (DULCOLAX) rectal suppository 10 mg, 10 mg, Rectal, Daily PRN  benzonata

## 2022-01-06 NOTE — PLAN OF CARE
COVID-19 Daily Discharge Readiness-Nursing    O2 Sat at Rest:  97%     Temperature max from last 24 hrs: Temp (24hrs), Av °F (36.7 °C), Min:97.7 °F (36.5 °C), Max:98.3 °F (36.8 °C)    Inflammatory Markers:   Recent Labs   Lab 22  0500 22  0 for signs and symptoms of hyperglycemia and hypoglycemia  - Administer ordered medications to maintain glucose within target range  - Assess barriers to adequate nutritional intake and initiate nutrition consult as needed  - Instruct patient on self manage

## 2022-01-07 VITALS
HEART RATE: 80 BPM | BODY MASS INDEX: 26.37 KG/M2 | DIASTOLIC BLOOD PRESSURE: 87 MMHG | HEIGHT: 67 IN | WEIGHT: 168 LBS | OXYGEN SATURATION: 96 % | TEMPERATURE: 98 F | RESPIRATION RATE: 18 BRPM | SYSTOLIC BLOOD PRESSURE: 173 MMHG

## 2022-01-07 LAB
ALBUMIN SERPL-MCNC: 2.1 G/DL (ref 3.4–5)
ALBUMIN/GLOB SERPL: 0.7 {RATIO} (ref 1–2)
ALP LIVER SERPL-CCNC: 90 U/L
ALT SERPL-CCNC: 16 U/L
ANION GAP SERPL CALC-SCNC: 9 MMOL/L (ref 0–18)
AST SERPL-CCNC: 20 U/L (ref 15–37)
BILIRUB SERPL-MCNC: 0.5 MG/DL (ref 0.1–2)
BUN BLD-MCNC: 54 MG/DL (ref 7–18)
CALCIUM BLD-MCNC: 7.9 MG/DL (ref 8.5–10.1)
CHLORIDE SERPL-SCNC: 108 MMOL/L (ref 98–112)
CO2 SERPL-SCNC: 28 MMOL/L (ref 21–32)
CREAT BLD-MCNC: 2.86 MG/DL
GLOBULIN PLAS-MCNC: 3.1 G/DL (ref 2.8–4.4)
GLUCOSE BLD-MCNC: 98 MG/DL (ref 70–99)
GLUCOSE BLD-MCNC: 98 MG/DL (ref 70–99)
HGB BLD-MCNC: 9.8 G/DL
OSMOLALITY SERPL CALC.SUM OF ELEC: 315 MOSM/KG (ref 275–295)
POTASSIUM SERPL-SCNC: 3.7 MMOL/L (ref 3.5–5.1)
PROT SERPL-MCNC: 5.2 G/DL (ref 6.4–8.2)
SODIUM SERPL-SCNC: 145 MMOL/L (ref 136–145)

## 2022-01-07 PROCEDURE — 99233 SBSQ HOSP IP/OBS HIGH 50: CPT | Performed by: INTERNAL MEDICINE

## 2022-01-07 PROCEDURE — 99239 HOSP IP/OBS DSCHRG MGMT >30: CPT | Performed by: HOSPITALIST

## 2022-01-07 RX ORDER — LISINOPRIL 5 MG/1
5 TABLET ORAL DAILY
Qty: 30 TABLET | Refills: 11 | Status: SHIPPED | OUTPATIENT
Start: 2022-01-08

## 2022-01-07 RX ORDER — AMLODIPINE BESYLATE 5 MG/1
5 TABLET ORAL DAILY
Qty: 30 TABLET | Refills: 11 | Status: SHIPPED | OUTPATIENT
Start: 2022-01-08

## 2022-01-07 RX ORDER — LISINOPRIL 5 MG/1
5 TABLET ORAL DAILY
Status: DISCONTINUED | OUTPATIENT
Start: 2022-01-07 | End: 2022-01-07

## 2022-01-07 RX ORDER — POTASSIUM CHLORIDE 20 MEQ/1
20 TABLET, EXTENDED RELEASE ORAL ONCE
Status: COMPLETED | OUTPATIENT
Start: 2022-01-07 | End: 2022-01-07

## 2022-01-07 NOTE — PROGRESS NOTES
COVID-19 Daily Discharge Readiness-Nursing    O2 Sat at Rest:    92 % on room air   O2 Sat with Exertion:    % on    liters   Temperature max from last 24 hrs: Temp (24hrs), Av.3 °F (36.8 °C), Min:98 °F (36.7 °C), Max:98.6 °F (37 °C)    Inflammatory Ma

## 2022-01-07 NOTE — PLAN OF CARE
Problem: Patient/Family Goals  Goal: Patient/Family Long Term Goal  Description: Patient's Long Term Goal: \"improve nausea\"    Interventions:  - prn antiemetics  - insulin gtt and IVF  - See additional Care Plan goals for specific interventions  Outcom Lungs diminished. Telemetry: NSR  GI: Abdomen soft, nondistended. Denies nausea. : Voids. Pain controlled with PRN pain medications  Up with standby assist  Diet:Tolerating 2000 ADA diet. IVF running per order.   All appropriate safety measures in

## 2022-01-07 NOTE — PROGRESS NOTES
[facility]  Nephrology Progress Note    Brian Pimentel Attending:  Deven Sanders MD       Assessment and Plan:    1) YULISA- due to dehydration with anorexia / nausea exac by ACE-I effect- improving rapidly    2) CKD 3- baseline Cr > 2 mg/dl due to l 01/07/2022     01/07/2022    K 3.7 01/07/2022     01/07/2022    CO2 28.0 01/07/2022    GLU 98 01/07/2022    CA 7.9 01/07/2022    ALB 2.1 01/07/2022    ALKPHO 90 01/07/2022    BILT 0.5 01/07/2022    TP 5.2 01/07/2022    AST 20 01/07/2022    ALT (Porcine) 5000 UNIT/ML injection 5,000 Units, 5,000 Units, Subcutaneous, Q8H [facility]          Questions/concerns were discussed with patient and/or family by bedside.           Alysha Ghosh MD  1/7/2022  705 AM

## 2022-01-07 NOTE — PROGRESS NOTES
[facility]  Nephrology Progress Note    Melony Morales Attending:  Beverley Langston MD       Assessment and Plan:    1) YULISA- due to dehydration with anorexia / nausea exac by ACE-I effect- improving rapidly    2) CKD 3- baseline Cr > 2 mg/dl due to l Value Date    HGB 9.8 01/07/2022    CREATSERUM 2.86 01/07/2022    BUN 54 01/07/2022     01/07/2022    K 3.7 01/07/2022     01/07/2022    CO2 28.0 01/07/2022    GLU 98 01/07/2022    CA 7.9 01/07/2022    ALB 2.1 01/07/2022    ALKPHO 90 01/07/2022 suppository 10 mg, 10 mg, Rectal, Daily PRN  benzonatate (TESSALON) cap 200 mg, 200 mg, Oral, TID PRN  Heparin Sodium (Porcine) 5000 UNIT/ML injection 5,000 Units, 5,000 Units, Subcutaneous, Q8H [facility]          Questions/concerns were discussed with patient a

## 2022-01-10 ENCOUNTER — PATIENT OUTREACH (OUTPATIENT)
Dept: CASE MANAGEMENT | Age: 62
End: 2022-01-10

## 2022-01-10 ENCOUNTER — TELEPHONE (OUTPATIENT)
Dept: INTERNAL MEDICINE CLINIC | Facility: CLINIC | Age: 62
End: 2022-01-10

## 2022-01-10 DIAGNOSIS — U07.1 COVID-19: ICD-10-CM

## 2022-01-10 DIAGNOSIS — Z02.9 ENCOUNTERS FOR UNSPECIFIED ADMINISTRATIVE PURPOSE: ICD-10-CM

## 2022-01-10 NOTE — TELEPHONE ENCOUNTER
Spoke to pt for TCM today. Pt does not have HFU appt scheduled at this time. Patient is Covid+ and declines feeling up to a virtual visit at this time. Virtual visit for Covid is recommended within 2 days. Please advise.     BOOK BY DATE (last date for

## 2022-01-10 NOTE — PROGRESS NOTES
Initial Post Discharge Follow Up   Discharge Date: 1/7/22  Contact Date: 1/10/2022    Consent Verification:  Assessment Completed With: Patient  HIPAA Verified?   Yes    Discharge Dx:     #Uncontrolled DM2  #COVID-19 pneumonia  #Acute kidney injury on CK times daily before meals.  For 14 days 120 capsule 0   • OZEMPIC, 0.25 OR 0.5 MG/DOSE, 2 MG/1.5ML Subcutaneous Solution Pen-injector INJECT 0.25MG INTO THE SKIN EVERY 7 DAYS 1.5 mL 0     • (NCM)  Were there any medication changes noted on AVS?  yes  o If so reason as to why you cannot make your appointments? No     NCM Reviewed upcoming Specialist Appt with patient     Not Applicable    Overall Rating:    How would you rate the care you received while in the hospital?  good     Interventions by NCM:   Spoke

## 2022-01-11 NOTE — DISCHARGE SUMMARY
[facility]  DISCHARGE SUMMARY     Reji Reyes Patient Status:  Inpatient    1960 MRN [id number]   [facility] 3NW-A Attending No att. providers found   [facility] Day # 4 PCP Sana Mcguire MD     Date of Admission: 1/3/2022  Date [facility].    Consultants:  • Nephrology  • Pulmonology  • Gastroenterology    Discharge Medication List:     Discharge Medications      START taking these medications      Instructions Prescription details   amLODIPine 5 MG Tabs  Commonly known as: Cristian Haynes Positive bowel sounds.     -----------------------------------------------------------------------------------------------  PATIENT DISCHARGE INSTRUCTIONS: See electronic chart    Sakshi Kent MD    Time spent:  > 30 minutes

## 2022-03-10 ENCOUNTER — PATIENT MESSAGE (OUTPATIENT)
Dept: INTERNAL MEDICINE CLINIC | Facility: CLINIC | Age: 62
End: 2022-03-10

## 2022-03-10 RX ORDER — SEMAGLUTIDE 1.34 MG/ML
INJECTION, SOLUTION SUBCUTANEOUS
Qty: 1.5 ML | Refills: 0 | Status: SHIPPED | OUTPATIENT
Start: 2022-03-10 | End: 2022-03-24

## 2022-03-10 NOTE — TELEPHONE ENCOUNTER
PA was granted in November and is good until thi November, [address] called pharmacy to update them.

## 2022-03-21 ENCOUNTER — PATIENT MESSAGE (OUTPATIENT)
Dept: INTERNAL MEDICINE CLINIC | Facility: CLINIC | Age: 62
End: 2022-03-21

## 2022-03-22 RX ORDER — SEMAGLUTIDE 1.34 MG/ML
0.25 INJECTION, SOLUTION SUBCUTANEOUS
Qty: 1.5 ML | Refills: 0 | OUTPATIENT
Start: 2022-03-22

## 2022-03-22 NOTE — TELEPHONE ENCOUNTER
Chaparro James, RN 3/22/2022 11:35 AM CDT      ----- Message -----  From: Rebecca Case  Sent: 3/21/2022 1:55 PM CDT  To: [facility] Clinical Staff  Subject: Haley Cedeno     I still have not received my Ozempic prescription. Also, when do I need to set a follow-up appointment with Dr. Jillian Cantu.     Thank Robert Goodwin  [phone]

## 2022-03-22 NOTE — TELEPHONE ENCOUNTER
Called pt and MA told pt that a 6 month f/u was needed  Pt was gonna call back to schedule   Pt understood  Also pt told MA that ozempic was filled already so disregard message

## 2022-03-24 RX ORDER — SEMAGLUTIDE 1.34 MG/ML
INJECTION, SOLUTION SUBCUTANEOUS
Qty: 1.5 ML | Refills: 0 | Status: SHIPPED | OUTPATIENT
Start: 2022-03-24

## 2022-03-24 NOTE — TELEPHONE ENCOUNTER
Last visit- 12/20/2021 cpe seen by AD    Last refill- 03/10/2022 ozempic 0.25 or 0.5mg QTY1.5ml 0R    Last labs- 01/03/2022 hemoglobin a1c  Ordered by Purnima Hu MD    No future appointments. Per Protocol?   Please approve or deny

## 2022-03-31 ENCOUNTER — TELEPHONE (OUTPATIENT)
Dept: INTERNAL MEDICINE CLINIC | Facility: CLINIC | Age: 62
End: 2022-03-31

## 2022-05-23 ENCOUNTER — PATIENT MESSAGE (OUTPATIENT)
Dept: INTERNAL MEDICINE CLINIC | Facility: CLINIC | Age: 62
End: 2022-05-23

## 2022-05-23 NOTE — TELEPHONE ENCOUNTER
Fwd to AD,  Pt received denial for med from [employer] [facility] Loree Fraire informed pt and advised to call office to f/u. Please advise if fill for med can be sent from our office or must be through [employer]

## 2022-05-23 NOTE — TELEPHONE ENCOUNTER
From: Candy Dean  To: Gabrielle Amato MD  Sent: 5/23/2022 11:04 AM CDT  Subject: PRESCRIPTION FOR OMEPRAZOLE DENIAL    Would you happen to know why my request for a prescription refill was denied?     Thank You,  Ruchi Fernandez  [phone]

## 2022-05-23 NOTE — TELEPHONE ENCOUNTER
Can be obtained from over-the-counter. Needs follow-up with GI service before refills can be provided by them.

## 2022-06-06 ENCOUNTER — PATIENT MESSAGE (OUTPATIENT)
Dept: INTERNAL MEDICINE CLINIC | Facility: CLINIC | Age: 62
End: 2022-06-06

## 2022-06-06 RX ORDER — OMEPRAZOLE 20 MG/1
20 CAPSULE, DELAYED RELEASE ORAL
Qty: 120 CAPSULE | Refills: 0 | Status: SHIPPED | OUTPATIENT
Start: 2022-06-06 | End: 2022-08-05

## 2022-06-06 NOTE — TELEPHONE ENCOUNTER
From: Ramana Dunlap  To: Galo Rebolledo MD  Sent: 6/6/2022 11:27 AM CDT  Subject: OMEPRAZOLE - REFILL    Can Dr. Oz Barlow refill this prescription for me?  RX [phone]    Jody Bean [address]  [address]

## 2022-06-06 NOTE — TELEPHONE ENCOUNTER
Fwd to AD,    Pt requesting medication filled previously by Dr. Mingo Fermin. Pending order for review.

## 2022-06-07 RX ORDER — OMEPRAZOLE 20 MG/1
CAPSULE, DELAYED RELEASE ORAL
Qty: 180 CAPSULE | Refills: 0 | Status: SHIPPED | OUTPATIENT
Start: 2022-06-07

## 2022-07-06 NOTE — ED PROVIDER NOTES
Patient Seen in: [facility] Emergency Department      History   Patient presents with:  Cellulitis    Stated Complaint: swelling, redness, soreness to leg since Saturday    HPI    22-year-old male complaining of right leg redness and swelling.   zachary fourth and fifth metatarsals on the plantar surface there is a small wound approximately 25 cm in width some black crusting over this there is no fluctuance or redness around this wound. Pulses are intact.     ED Course     Labs Reviewed   COMP METABOLIC P has some acute kidney injury as well as cellulitis with possible sepsis.   The patient is adamant about going home he states if I called Dr. Heather Jay and he wants him to stay well but then me know a couple minutes later he said that he still can go home and he none

## 2022-07-30 DIAGNOSIS — E11.65 TYPE 2 DIABETES MELLITUS WITH HYPERGLYCEMIA, UNSPECIFIED WHETHER LONG TERM INSULIN USE (HCC): Primary | ICD-10-CM

## 2022-08-01 RX ORDER — SEMAGLUTIDE 1.34 MG/ML
0.25 INJECTION, SOLUTION SUBCUTANEOUS
Qty: 1 ML | Refills: 0 | Status: SHIPPED | OUTPATIENT
Start: 2022-08-01

## 2022-08-01 NOTE — TELEPHONE ENCOUNTER
Last VISIT 12/20/21    Last CPE 12/20/21     Last REFILL 03/24/22 qty 1.5 mL w/0 refills    Last LABS 01/07/22 CMP, A1c done    No future appointments. Per PROTOCOL? Failed       Please Approve or Deny.

## 2022-08-02 NOTE — TELEPHONE ENCOUNTER
Future Appointments   Date Time Provider Vanessa Yates   8/15/2022  8:00 AM Sridhar Michelle., [facility]

## 2022-08-03 ENCOUNTER — TELEPHONE (OUTPATIENT)
Dept: INTERNAL MEDICINE CLINIC | Facility: CLINIC | Age: 62
End: 2022-08-03

## 2022-08-10 ENCOUNTER — LAB ENCOUNTER (OUTPATIENT)
Dept: LAB | Age: 62
End: 2022-08-10
Attending: INTERNAL MEDICINE
Payer: COMMERCIAL

## 2022-08-10 ENCOUNTER — TELEPHONE (OUTPATIENT)
Dept: INTERNAL MEDICINE CLINIC | Facility: CLINIC | Age: 62
End: 2022-08-10

## 2022-08-10 DIAGNOSIS — Z13.29 THYROID DISORDER SCREENING: ICD-10-CM

## 2022-08-10 DIAGNOSIS — Z00.00 ROUTINE GENERAL MEDICAL EXAMINATION AT A HEALTH CARE FACILITY: ICD-10-CM

## 2022-08-10 DIAGNOSIS — Z12.5 PROSTATE CANCER SCREENING: ICD-10-CM

## 2022-08-10 DIAGNOSIS — Z13.0 SCREENING FOR BLOOD DISEASE: ICD-10-CM

## 2022-08-10 DIAGNOSIS — Z13.228 SCREENING FOR METABOLIC DISORDER: ICD-10-CM

## 2022-08-10 DIAGNOSIS — E11.65 TYPE 2 DIABETES MELLITUS WITH HYPERGLYCEMIA, UNSPECIFIED WHETHER LONG TERM INSULIN USE (HCC): ICD-10-CM

## 2022-08-10 LAB
ALBUMIN SERPL-MCNC: 2.5 G/DL (ref 3.4–5)
ALBUMIN/GLOB SERPL: 0.5 {RATIO} (ref 1–2)
ALP LIVER SERPL-CCNC: 78 U/L
ALT SERPL-CCNC: 51 U/L
ANION GAP SERPL CALC-SCNC: 19 MMOL/L (ref 0–18)
AST SERPL-CCNC: 26 U/L (ref 15–37)
BASOPHILS # BLD AUTO: 0.02 X10(3) UL (ref 0–0.2)
BASOPHILS NFR BLD AUTO: 0.1 %
BILIRUB SERPL-MCNC: 0.3 MG/DL (ref 0.1–2)
BUN BLD-MCNC: 127 MG/DL (ref 7–18)
BUN/CREAT SERPL: 14.8 (ref 10–20)
CALCIUM BLD-MCNC: 9.2 MG/DL (ref 8.5–10.1)
CHLORIDE SERPL-SCNC: 110 MMOL/L (ref 98–112)
CHOLEST SERPL-MCNC: 249 MG/DL (ref ?–200)
CO2 SERPL-SCNC: 9 MMOL/L (ref 21–32)
COMPLEXED PSA SERPL-MCNC: 0.75 NG/ML (ref ?–4)
CREAT BLD-MCNC: 8.58 MG/DL
CREAT UR-SCNC: 103 MG/DL
DEPRECATED RDW RBC AUTO: 39.8 FL (ref 35.1–46.3)
EOSINOPHIL # BLD AUTO: 0.07 X10(3) UL (ref 0–0.7)
EOSINOPHIL NFR BLD AUTO: 0.5 %
ERYTHROCYTE [DISTWIDTH] IN BLOOD BY AUTOMATED COUNT: 13.1 % (ref 11–15)
EST. AVERAGE GLUCOSE BLD GHB EST-MCNC: 177 MG/DL (ref 68–126)
FASTING PATIENT LIPID ANSWER: YES
FASTING STATUS PATIENT QL REPORTED: YES
GFR SERPLBLD BASED ON 1.73 SQ M-ARVRAT: 6 ML/MIN/1.73M2 (ref 60–?)
GLOBULIN PLAS-MCNC: 4.6 G/DL (ref 2.8–4.4)
GLUCOSE BLD-MCNC: 275 MG/DL (ref 70–99)
HBA1C MFR BLD: 7.8 % (ref ?–5.7)
HCT VFR BLD AUTO: 30.2 %
HDLC SERPL-MCNC: 25 MG/DL (ref 40–59)
HGB BLD-MCNC: 9.9 G/DL
IMM GRANULOCYTES # BLD AUTO: 0.09 X10(3) UL (ref 0–1)
IMM GRANULOCYTES NFR BLD: 0.6 %
LDLC SERPL CALC-MCNC: 135 MG/DL (ref ?–100)
LYMPHOCYTES # BLD AUTO: 0.84 X10(3) UL (ref 1–4)
LYMPHOCYTES NFR BLD AUTO: 5.8 %
MCH RBC QN AUTO: 27.5 PG (ref 26–34)
MCHC RBC AUTO-ENTMCNC: 32.8 G/DL (ref 31–37)
MCV RBC AUTO: 83.9 FL
MICROALBUMIN UR-MCNC: 195 MG/DL
MICROALBUMIN/CREAT 24H UR-RTO: 1893.2 UG/MG (ref ?–30)
MONOCYTES # BLD AUTO: 0.77 X10(3) UL (ref 0.1–1)
MONOCYTES NFR BLD AUTO: 5.4 %
NEUTROPHILS # BLD AUTO: 12.6 X10 (3) UL (ref 1.5–7.7)
NEUTROPHILS # BLD AUTO: 12.6 X10(3) UL (ref 1.5–7.7)
NEUTROPHILS NFR BLD AUTO: 87.6 %
NONHDLC SERPL-MCNC: 224 MG/DL (ref ?–130)
OSMOLALITY SERPL CALC.SUM OF ELEC: 337 MOSM/KG (ref 275–295)
PLATELET # BLD AUTO: 303 10(3)UL (ref 150–450)
POTASSIUM SERPL-SCNC: 3.9 MMOL/L (ref 3.5–5.1)
PROT SERPL-MCNC: 7.1 G/DL (ref 6.4–8.2)
RBC # BLD AUTO: 3.6 X10(6)UL
SODIUM SERPL-SCNC: 138 MMOL/L (ref 136–145)
TESTOST SERPL-MCNC: 49.18 NG/DL
TRIGL SERPL-MCNC: 484 MG/DL (ref 30–149)
TSI SER-ACNC: 1.36 MIU/ML (ref 0.36–3.74)
VLDLC SERPL CALC-MCNC: 93 MG/DL (ref 0–30)
WBC # BLD AUTO: 14.4 X10(3) UL (ref 4–11)

## 2022-08-10 PROCEDURE — 84153 ASSAY OF PSA TOTAL: CPT | Performed by: INTERNAL MEDICINE

## 2022-08-10 PROCEDURE — 3060F POS MICROALBUMINURIA REV: CPT | Performed by: STUDENT IN AN ORGANIZED HEALTH CARE EDUCATION/TRAINING PROGRAM

## 2022-08-10 PROCEDURE — 82043 UR ALBUMIN QUANTITATIVE: CPT | Performed by: INTERNAL MEDICINE

## 2022-08-10 PROCEDURE — 80050 GENERAL HEALTH PANEL: CPT | Performed by: INTERNAL MEDICINE

## 2022-08-10 PROCEDURE — 84403 ASSAY OF TOTAL TESTOSTERONE: CPT | Performed by: INTERNAL MEDICINE

## 2022-08-10 PROCEDURE — 3051F HG A1C>EQUAL 7.0%<8.0%: CPT | Performed by: STUDENT IN AN ORGANIZED HEALTH CARE EDUCATION/TRAINING PROGRAM

## 2022-08-10 PROCEDURE — 83036 HEMOGLOBIN GLYCOSYLATED A1C: CPT | Performed by: INTERNAL MEDICINE

## 2022-08-10 PROCEDURE — 82570 ASSAY OF URINE CREATININE: CPT | Performed by: INTERNAL MEDICINE

## 2022-08-10 PROCEDURE — 80061 LIPID PANEL: CPT | Performed by: INTERNAL MEDICINE

## 2022-08-10 NOTE — PROGRESS NOTES
Called on multiple occasions. No answer. Left message expressing the gravity of the situation, including kidney failure, DKA, and the worst case scenario. Will continue to attempt to call the patient.

## 2022-08-10 NOTE — TELEPHONE ENCOUNTER
Mercedes at [place] lab indicates a critical result CO2 =9.    Per AD, wants pt to go to the ER for evaluation. LM for pt to return call at [phone] and speak with the Triage RN's. See result notes.

## 2022-08-12 ENCOUNTER — TELEPHONE (OUTPATIENT)
Dept: INTERNAL MEDICINE CLINIC | Facility: CLINIC | Age: 62
End: 2022-08-12

## 2022-08-12 NOTE — TELEPHONE ENCOUNTER
Called patient at 15:40 today and strictly advised to be taken to ED IMMEDIATELY. Advised patient that his kidneys are failing, he is back into DKA, worse than in January, and that he may not make it through the night. He told me initially that he understands and will go the ED immediately, however, his last comment to me was that he will go on Monday prior to hanging up. Will continue my efforts. Patient

## 2022-08-15 ENCOUNTER — APPOINTMENT (OUTPATIENT)
Dept: ULTRASOUND IMAGING | Facility: HOSPITAL | Age: 62
End: 2022-08-15
Attending: INTERNAL MEDICINE
Payer: COMMERCIAL

## 2022-08-15 ENCOUNTER — APPOINTMENT (OUTPATIENT)
Dept: GENERAL RADIOLOGY | Facility: HOSPITAL | Age: 62
End: 2022-08-15
Attending: STUDENT IN AN ORGANIZED HEALTH CARE EDUCATION/TRAINING PROGRAM
Payer: COMMERCIAL

## 2022-08-15 ENCOUNTER — HOSPITAL ENCOUNTER (INPATIENT)
Facility: HOSPITAL | Age: 62
LOS: 4 days | Discharge: HOME | End: 2022-08-19
Attending: STUDENT IN AN ORGANIZED HEALTH CARE EDUCATION/TRAINING PROGRAM | Admitting: HOSPITALIST
Payer: COMMERCIAL

## 2022-08-15 DIAGNOSIS — E11.10 DIABETIC KETOACIDOSIS WITHOUT COMA ASSOCIATED WITH TYPE 2 DIABETES MELLITUS (HCC): Primary | ICD-10-CM

## 2022-08-15 DIAGNOSIS — Z79.4 TYPE 2 DIABETES MELLITUS WITHOUT COMPLICATION, WITH LONG-TERM CURRENT USE OF INSULIN (HCC): ICD-10-CM

## 2022-08-15 DIAGNOSIS — N17.9 ACUTE RENAL FAILURE, UNSPECIFIED ACUTE RENAL FAILURE TYPE (HCC): ICD-10-CM

## 2022-08-15 DIAGNOSIS — E11.9 TYPE 2 DIABETES MELLITUS WITHOUT COMPLICATION, WITH LONG-TERM CURRENT USE OF INSULIN (HCC): ICD-10-CM

## 2022-08-15 LAB
ADENOVIRUS PCR:: NOT DETECTED
ALBUMIN SERPL-MCNC: 2.8 G/DL (ref 3.4–5)
ALBUMIN/GLOB SERPL: 0.7 {RATIO} (ref 1–2)
ALP LIVER SERPL-CCNC: 77 U/L
ALT SERPL-CCNC: 42 U/L
ANION GAP SERPL CALC-SCNC: 15 MMOL/L (ref 0–18)
ANION GAP SERPL CALC-SCNC: 16 MMOL/L (ref 0–18)
ANION GAP SERPL CALC-SCNC: 18 MMOL/L (ref 0–18)
AST SERPL-CCNC: 12 U/L (ref 15–37)
B PARAPERT DNA SPEC QL NAA+PROBE: NOT DETECTED
B PERT DNA SPEC QL NAA+PROBE: NOT DETECTED
BASE EXCESS BLDV CALC-SCNC: -15 MMOL/L
BASOPHILS # BLD AUTO: 0.01 X10(3) UL (ref 0–0.2)
BASOPHILS NFR BLD AUTO: 0.1 %
BILIRUB SERPL-MCNC: 0.4 MG/DL (ref 0.1–2)
BILIRUB UR QL STRIP.AUTO: NEGATIVE
BILIRUB UR QL STRIP.AUTO: NEGATIVE
BUN BLD-MCNC: 184 MG/DL (ref 7–18)
BUN BLD-MCNC: 205 MG/DL (ref 7–18)
BUN BLD-MCNC: 218 MG/DL (ref 7–18)
C PNEUM DNA SPEC QL NAA+PROBE: NOT DETECTED
CALCIUM BLD-MCNC: 8.2 MG/DL (ref 8.5–10.1)
CALCIUM BLD-MCNC: 8.5 MG/DL (ref 8.5–10.1)
CALCIUM BLD-MCNC: 8.9 MG/DL (ref 8.5–10.1)
CHLORIDE SERPL-SCNC: 101 MMOL/L (ref 98–112)
CHLORIDE SERPL-SCNC: 114 MMOL/L (ref 98–112)
CHLORIDE SERPL-SCNC: 115 MMOL/L (ref 98–112)
CLARITY UR REFRACT.AUTO: CLEAR
CLARITY UR REFRACT.AUTO: CLEAR
CO2 SERPL-SCNC: 12 MMOL/L (ref 21–32)
CO2 SERPL-SCNC: 12 MMOL/L (ref 21–32)
CO2 SERPL-SCNC: 13 MMOL/L (ref 21–32)
COLOR UR AUTO: YELLOW
COLOR UR AUTO: YELLOW
CORONAVIRUS 229E PCR:: NOT DETECTED
CORONAVIRUS HKU1 PCR:: NOT DETECTED
CORONAVIRUS NL63 PCR:: NOT DETECTED
CORONAVIRUS OC43 PCR:: NOT DETECTED
CREAT BLD-MCNC: 8.18 MG/DL
CREAT BLD-MCNC: 8.65 MG/DL
CREAT BLD-MCNC: 9.5 MG/DL
CREAT UR-SCNC: 122 MG/DL
CRP SERPL-MCNC: 3.06 MG/DL (ref ?–0.3)
DEPRECATED HBV CORE AB SER IA-ACNC: 1076.2 NG/ML
EOSINOPHIL # BLD AUTO: 0.01 X10(3) UL (ref 0–0.7)
EOSINOPHIL NFR BLD AUTO: 0.1 %
ERYTHROCYTE [DISTWIDTH] IN BLOOD BY AUTOMATED COUNT: 13.1 %
FLUAV RNA SPEC QL NAA+PROBE: NOT DETECTED
FLUBV RNA SPEC QL NAA+PROBE: NOT DETECTED
GFR SERPLBLD BASED ON 1.73 SQ M-ARVRAT: 6 ML/MIN/1.73M2 (ref 60–?)
GFR SERPLBLD BASED ON 1.73 SQ M-ARVRAT: 6 ML/MIN/1.73M2 (ref 60–?)
GFR SERPLBLD BASED ON 1.73 SQ M-ARVRAT: 7 ML/MIN/1.73M2 (ref 60–?)
GLOBULIN PLAS-MCNC: 3.8 G/DL (ref 2.8–4.4)
GLUCOSE BLD-MCNC: 120 MG/DL (ref 70–99)
GLUCOSE BLD-MCNC: 128 MG/DL (ref 70–99)
GLUCOSE BLD-MCNC: 139 MG/DL (ref 70–99)
GLUCOSE BLD-MCNC: 156 MG/DL (ref 70–99)
GLUCOSE BLD-MCNC: 167 MG/DL (ref 70–99)
GLUCOSE BLD-MCNC: 171 MG/DL (ref 70–99)
GLUCOSE BLD-MCNC: 173 MG/DL (ref 70–99)
GLUCOSE BLD-MCNC: 173 MG/DL (ref 70–99)
GLUCOSE BLD-MCNC: 182 MG/DL (ref 70–99)
GLUCOSE BLD-MCNC: 273 MG/DL (ref 70–99)
GLUCOSE BLD-MCNC: 374 MG/DL (ref 70–99)
GLUCOSE BLD-MCNC: 499 MG/DL (ref 70–99)
GLUCOSE BLD-MCNC: 512 MG/DL (ref 70–99)
GLUCOSE BLD-MCNC: 533 MG/DL (ref 70–99)
GLUCOSE BLD-MCNC: 533 MG/DL (ref 70–99)
GLUCOSE BLD-MCNC: 63 MG/DL (ref 70–99)
GLUCOSE BLD-MCNC: 637 MG/DL (ref 70–99)
GLUCOSE BLD-MCNC: 82 MG/DL (ref 70–99)
GLUCOSE UR STRIP.AUTO-MCNC: 500 MG/DL
GLUCOSE UR STRIP.AUTO-MCNC: NEGATIVE MG/DL
HCO3 BLDV-SCNC: 12.3 MEQ/L (ref 22–26)
HCT VFR BLD AUTO: 30.4 %
HGB BLD-MCNC: 10.2 G/DL
HGB BLD-MCNC: 9.3 G/DL
HYALINE CASTS #/AREA URNS AUTO: PRESENT /LPF
HYALINE CASTS #/AREA URNS AUTO: PRESENT /LPF
IMM GRANULOCYTES # BLD AUTO: 0.14 X10(3) UL (ref 0–1)
IMM GRANULOCYTES NFR BLD: 0.8 %
IRON SATN MFR SERPL: 39 %
IRON SERPL-MCNC: 78 UG/DL
KETONES UR STRIP.AUTO-MCNC: NEGATIVE MG/DL
KETONES UR STRIP.AUTO-MCNC: NEGATIVE MG/DL
LACTATE SERPL-SCNC: 0.9 MMOL/L (ref 0.4–2)
LEUKOCYTE ESTERASE UR QL STRIP.AUTO: NEGATIVE
LIPASE SERPL-CCNC: 1242 U/L (ref 73–393)
LYMPHOCYTES # BLD AUTO: 0.66 X10(3) UL (ref 1–4)
LYMPHOCYTES NFR BLD AUTO: 3.7 %
MAGNESIUM SERPL-MCNC: 2.7 MG/DL (ref 1.6–2.6)
MAGNESIUM SERPL-MCNC: 2.7 MG/DL (ref 1.6–2.6)
MCH RBC QN AUTO: 27.6 PG (ref 26–34)
MCHC RBC AUTO-ENTMCNC: 33.6 G/DL (ref 31–37)
MCV RBC AUTO: 82.4 FL
METAPNEUMOVIRUS PCR:: NOT DETECTED
MONOCYTES # BLD AUTO: 0.64 X10(3) UL (ref 0.1–1)
MONOCYTES NFR BLD AUTO: 3.6 %
MYCOPLASMA PNEUMONIA PCR:: NOT DETECTED
NEUTROPHILS # BLD AUTO: 16.51 X10 (3) UL (ref 1.5–7.7)
NEUTROPHILS # BLD AUTO: 16.51 X10(3) UL (ref 1.5–7.7)
NEUTROPHILS NFR BLD AUTO: 91.7 %
NITRITE UR QL STRIP.AUTO: NEGATIVE
NITRITE UR QL STRIP.AUTO: NEGATIVE
OSMOLALITY SERPL CALC.SUM OF ELEC: 359 MOSM/KG (ref 275–295)
OSMOLALITY SERPL CALC.SUM OF ELEC: 366 MOSM/KG (ref 275–295)
OSMOLALITY SERPL CALC.SUM OF ELEC: 375 MOSM/KG (ref 275–295)
OSMOLALITY UR: 411 MOSM/KG (ref 300–1300)
OXYHGB MFR BLDV: 61.9 % (ref 72–78)
PARAINFLUENZA 1 PCR:: NOT DETECTED
PARAINFLUENZA 2 PCR:: NOT DETECTED
PARAINFLUENZA 3 PCR:: NOT DETECTED
PARAINFLUENZA 4 PCR:: NOT DETECTED
PCO2 BLDV: 26 MM HG (ref 38–50)
PH BLDV: 7.23 [PH] (ref 7.33–7.43)
PH UR STRIP.AUTO: 5 [PH] (ref 5–8)
PH UR STRIP.AUTO: 7 [PH] (ref 5–8)
PHOSPHATE SERPL-MCNC: 6.1 MG/DL (ref 2.5–4.9)
PHOSPHATE SERPL-MCNC: 6.3 MG/DL (ref 2.5–4.9)
PLATELET # BLD AUTO: 422 10(3)UL (ref 150–450)
PO2 BLDV: 39 MM HG (ref 30–50)
POTASSIUM SERPL-SCNC: 3 MMOL/L (ref 3.5–5.1)
POTASSIUM SERPL-SCNC: 3.1 MMOL/L (ref 3.5–5.1)
POTASSIUM SERPL-SCNC: 3.7 MMOL/L (ref 3.5–5.1)
PROCALCITONIN SERPL-MCNC: 0.5 NG/ML (ref ?–0.16)
PROT SERPL-MCNC: 6.6 G/DL (ref 6.4–8.2)
PROT UR STRIP.AUTO-MCNC: >=300 MG/DL
PROT UR STRIP.AUTO-MCNC: NEGATIVE MG/DL
RBC # BLD AUTO: 3.69 X10(6)UL
RBC UR QL AUTO: NEGATIVE
RHINOVIRUS/ENTERO PCR:: NOT DETECTED
RSV RNA SPEC QL NAA+PROBE: NOT DETECTED
SARS-COV-2 RNA NPH QL NAA+NON-PROBE: NOT DETECTED
SARS-COV-2 RNA RESP QL NAA+PROBE: NOT DETECTED
SODIUM SERPL-SCNC: 11 MMOL/L
SODIUM SERPL-SCNC: 131 MMOL/L (ref 136–145)
SODIUM SERPL-SCNC: 142 MMOL/L (ref 136–145)
SODIUM SERPL-SCNC: 143 MMOL/L (ref 136–145)
SP GR UR STRIP.AUTO: 1.01 (ref 1–1.03)
SP GR UR STRIP.AUTO: >=1.03 (ref 1–1.03)
TIBC SERPL-MCNC: 201 UG/DL (ref 240–450)
TRANSFERRIN SERPL-MCNC: 135 MG/DL (ref 200–360)
TRIGL SERPL-MCNC: 830 MG/DL (ref 30–149)
URATE SERPL-MCNC: 17.8 MG/DL
UROBILINOGEN UR STRIP.AUTO-MCNC: 0.2 MG/DL
UROBILINOGEN UR STRIP.AUTO-MCNC: <2 MG/DL
UUN UR-MCNC: 652 MG/DL
WBC # BLD AUTO: 18 X10(3) UL (ref 4–11)

## 2022-08-15 PROCEDURE — 81015 MICROSCOPIC EXAM OF URINE: CPT | Performed by: STUDENT IN AN ORGANIZED HEALTH CARE EDUCATION/TRAINING PROGRAM

## 2022-08-15 PROCEDURE — 99291 CRITICAL CARE FIRST HOUR: CPT

## 2022-08-15 PROCEDURE — 81001 URINALYSIS AUTO W/SCOPE: CPT | Performed by: STUDENT IN AN ORGANIZED HEALTH CARE EDUCATION/TRAINING PROGRAM

## 2022-08-15 PROCEDURE — 84478 ASSAY OF TRIGLYCERIDES: CPT | Performed by: STUDENT IN AN ORGANIZED HEALTH CARE EDUCATION/TRAINING PROGRAM

## 2022-08-15 PROCEDURE — 83605 ASSAY OF LACTIC ACID: CPT | Performed by: STUDENT IN AN ORGANIZED HEALTH CARE EDUCATION/TRAINING PROGRAM

## 2022-08-15 PROCEDURE — 0202U NFCT DS 22 TRGT SARS-COV-2: CPT | Performed by: HOSPITALIST

## 2022-08-15 PROCEDURE — 83735 ASSAY OF MAGNESIUM: CPT | Performed by: HOSPITALIST

## 2022-08-15 PROCEDURE — 87086 URINE CULTURE/COLONY COUNT: CPT | Performed by: STUDENT IN AN ORGANIZED HEALTH CARE EDUCATION/TRAINING PROGRAM

## 2022-08-15 PROCEDURE — 82962 GLUCOSE BLOOD TEST: CPT

## 2022-08-15 PROCEDURE — 96375 TX/PRO/DX INJ NEW DRUG ADDON: CPT

## 2022-08-15 PROCEDURE — 83690 ASSAY OF LIPASE: CPT | Performed by: STUDENT IN AN ORGANIZED HEALTH CARE EDUCATION/TRAINING PROGRAM

## 2022-08-15 PROCEDURE — 84145 PROCALCITONIN (PCT): CPT | Performed by: HOSPITALIST

## 2022-08-15 PROCEDURE — C9113 INJ PANTOPRAZOLE SODIUM, VIA: HCPCS | Performed by: HOSPITALIST

## 2022-08-15 PROCEDURE — 83935 ASSAY OF URINE OSMOLALITY: CPT | Performed by: HOSPITALIST

## 2022-08-15 PROCEDURE — 85025 COMPLETE CBC W/AUTO DIFF WBC: CPT | Performed by: STUDENT IN AN ORGANIZED HEALTH CARE EDUCATION/TRAINING PROGRAM

## 2022-08-15 PROCEDURE — 87040 BLOOD CULTURE FOR BACTERIA: CPT | Performed by: HOSPITALIST

## 2022-08-15 PROCEDURE — 82803 BLOOD GASES ANY COMBINATION: CPT | Performed by: STUDENT IN AN ORGANIZED HEALTH CARE EDUCATION/TRAINING PROGRAM

## 2022-08-15 PROCEDURE — 84540 ASSAY OF URINE/UREA-N: CPT | Performed by: HOSPITALIST

## 2022-08-15 PROCEDURE — 96365 THER/PROPH/DIAG IV INF INIT: CPT

## 2022-08-15 PROCEDURE — 86140 C-REACTIVE PROTEIN: CPT | Performed by: HOSPITALIST

## 2022-08-15 PROCEDURE — 84100 ASSAY OF PHOSPHORUS: CPT | Performed by: HOSPITALIST

## 2022-08-15 PROCEDURE — 85018 HEMOGLOBIN: CPT | Performed by: HOSPITALIST

## 2022-08-15 PROCEDURE — 84550 ASSAY OF BLOOD/URIC ACID: CPT | Performed by: HOSPITALIST

## 2022-08-15 PROCEDURE — 82570 ASSAY OF URINE CREATININE: CPT | Performed by: HOSPITALIST

## 2022-08-15 PROCEDURE — 71045 X-RAY EXAM CHEST 1 VIEW: CPT | Performed by: STUDENT IN AN ORGANIZED HEALTH CARE EDUCATION/TRAINING PROGRAM

## 2022-08-15 PROCEDURE — 83550 IRON BINDING TEST: CPT | Performed by: HOSPITALIST

## 2022-08-15 PROCEDURE — 82728 ASSAY OF FERRITIN: CPT | Performed by: HOSPITALIST

## 2022-08-15 PROCEDURE — 83540 ASSAY OF IRON: CPT | Performed by: HOSPITALIST

## 2022-08-15 PROCEDURE — 84300 ASSAY OF URINE SODIUM: CPT | Performed by: HOSPITALIST

## 2022-08-15 PROCEDURE — 76770 US EXAM ABDO BACK WALL COMP: CPT | Performed by: INTERNAL MEDICINE

## 2022-08-15 PROCEDURE — 80053 COMPREHEN METABOLIC PANEL: CPT | Performed by: STUDENT IN AN ORGANIZED HEALTH CARE EDUCATION/TRAINING PROGRAM

## 2022-08-15 RX ORDER — NICOTINE POLACRILEX 4 MG
15 LOZENGE BUCCAL
Status: DISCONTINUED | OUTPATIENT
Start: 2022-08-15 | End: 2022-08-19

## 2022-08-15 RX ORDER — INSULIN ASPART 100 [IU]/ML
0.2 INJECTION, SOLUTION INTRAVENOUS; SUBCUTANEOUS ONCE
Status: COMPLETED | OUTPATIENT
Start: 2022-08-15 | End: 2022-08-15

## 2022-08-15 RX ORDER — SODIUM CHLORIDE 9 MG/ML
INJECTION, SOLUTION INTRAVENOUS CONTINUOUS
Status: DISCONTINUED | OUTPATIENT
Start: 2022-08-15 | End: 2022-08-15

## 2022-08-15 RX ORDER — POTASSIUM CHLORIDE 14.9 MG/ML
20 INJECTION INTRAVENOUS ONCE
Status: COMPLETED | OUTPATIENT
Start: 2022-08-15 | End: 2022-08-16

## 2022-08-15 RX ORDER — HYDRALAZINE HYDROCHLORIDE 20 MG/ML
10 INJECTION INTRAMUSCULAR; INTRAVENOUS EVERY 4 HOURS PRN
Status: DISCONTINUED | OUTPATIENT
Start: 2022-08-15 | End: 2022-08-19

## 2022-08-15 RX ORDER — PROCHLORPERAZINE EDISYLATE 5 MG/ML
5 INJECTION INTRAMUSCULAR; INTRAVENOUS EVERY 8 HOURS PRN
Status: DISCONTINUED | OUTPATIENT
Start: 2022-08-15 | End: 2022-08-19

## 2022-08-15 RX ORDER — NICOTINE POLACRILEX 4 MG
30 LOZENGE BUCCAL
Status: DISCONTINUED | OUTPATIENT
Start: 2022-08-15 | End: 2022-08-19

## 2022-08-15 RX ORDER — POTASSIUM CHLORIDE 14.9 MG/ML
20 INJECTION INTRAVENOUS ONCE
Status: COMPLETED | OUTPATIENT
Start: 2022-08-15 | End: 2022-08-15

## 2022-08-15 RX ORDER — ONDANSETRON 2 MG/ML
4 INJECTION INTRAMUSCULAR; INTRAVENOUS ONCE
Status: COMPLETED | OUTPATIENT
Start: 2022-08-15 | End: 2022-08-15

## 2022-08-15 RX ORDER — HEPARIN SODIUM 5000 [USP'U]/ML
5000 INJECTION, SOLUTION INTRAVENOUS; SUBCUTANEOUS EVERY 8 HOURS SCHEDULED
Status: DISCONTINUED | OUTPATIENT
Start: 2022-08-15 | End: 2022-08-19

## 2022-08-15 RX ORDER — ONDANSETRON 2 MG/ML
4 INJECTION INTRAMUSCULAR; INTRAVENOUS EVERY 6 HOURS PRN
Status: DISCONTINUED | OUTPATIENT
Start: 2022-08-15 | End: 2022-08-19

## 2022-08-15 RX ORDER — DEXTROSE AND SODIUM CHLORIDE 5; .45 G/100ML; G/100ML
100 INJECTION, SOLUTION INTRAVENOUS CONTINUOUS PRN
Status: DISCONTINUED | OUTPATIENT
Start: 2022-08-15 | End: 2022-08-15

## 2022-08-15 RX ORDER — DEXTROSE MONOHYDRATE 25 G/50ML
50 INJECTION, SOLUTION INTRAVENOUS
Status: DISCONTINUED | OUTPATIENT
Start: 2022-08-15 | End: 2022-08-19

## 2022-08-15 NOTE — PLAN OF CARE
Received report from ED RN. Pt arrived via cart with insulin gtt infusing at 9 u/hr. No s/s of acute distress noted at this time. VSS. Pt denies any pain at this time. Pt is alert and oriented X 4, HORNE, Fcs. Pt has a runny nose and productive cough as well. Pt stated that he's been sick x 2 wks. Hourly BG checks for gtt titration. Pts spouse notified that he's a r/o covid right  now, so he cannot have visitors. Will update her asap. POC continues. Call light in reach. Will continue to monitor.

## 2022-08-16 ENCOUNTER — APPOINTMENT (OUTPATIENT)
Dept: GENERAL RADIOLOGY | Facility: HOSPITAL | Age: 62
End: 2022-08-16
Attending: HOSPITALIST
Payer: COMMERCIAL

## 2022-08-16 LAB
ALBUMIN SERPL-MCNC: 2.2 G/DL (ref 3.4–5)
ALBUMIN/GLOB SERPL: 0.7 {RATIO} (ref 1–2)
ALP LIVER SERPL-CCNC: 58 U/L
ALT SERPL-CCNC: 34 U/L
ANION GAP SERPL CALC-SCNC: 10 MMOL/L (ref 0–18)
ANION GAP SERPL CALC-SCNC: 12 MMOL/L (ref 0–18)
ANION GAP SERPL CALC-SCNC: 13 MMOL/L (ref 0–18)
ANION GAP SERPL CALC-SCNC: 14 MMOL/L (ref 0–18)
AST SERPL-CCNC: 15 U/L (ref 15–37)
BASOPHILS # BLD AUTO: 0.01 X10(3) UL (ref 0–0.2)
BASOPHILS NFR BLD AUTO: 0.1 %
BILIRUB SERPL-MCNC: 0.3 MG/DL (ref 0.1–2)
BUN BLD-MCNC: 142 MG/DL (ref 7–18)
BUN BLD-MCNC: 148 MG/DL (ref 7–18)
BUN BLD-MCNC: 173 MG/DL (ref 7–18)
BUN BLD-MCNC: 183 MG/DL (ref 7–18)
CALCIUM BLD-MCNC: 7.7 MG/DL (ref 8.5–10.1)
CALCIUM BLD-MCNC: 8.1 MG/DL (ref 8.5–10.1)
CALCIUM BLD-MCNC: 8.1 MG/DL (ref 8.5–10.1)
CALCIUM BLD-MCNC: 8.2 MG/DL (ref 8.5–10.1)
CHLORIDE SERPL-SCNC: 115 MMOL/L (ref 98–112)
CHLORIDE SERPL-SCNC: 115 MMOL/L (ref 98–112)
CHLORIDE SERPL-SCNC: 116 MMOL/L (ref 98–112)
CHLORIDE SERPL-SCNC: 117 MMOL/L (ref 98–112)
CO2 SERPL-SCNC: 13 MMOL/L (ref 21–32)
CO2 SERPL-SCNC: 14 MMOL/L (ref 21–32)
CO2 SERPL-SCNC: 15 MMOL/L (ref 21–32)
CO2 SERPL-SCNC: 17 MMOL/L (ref 21–32)
CREAT BLD-MCNC: 6.36 MG/DL
CREAT BLD-MCNC: 6.97 MG/DL
CREAT BLD-MCNC: 7.47 MG/DL
CREAT BLD-MCNC: 7.85 MG/DL
EOSINOPHIL # BLD AUTO: 0.03 X10(3) UL (ref 0–0.7)
EOSINOPHIL NFR BLD AUTO: 0.2 %
ERYTHROCYTE [DISTWIDTH] IN BLOOD BY AUTOMATED COUNT: 13 %
GFR SERPLBLD BASED ON 1.73 SQ M-ARVRAT: 7 ML/MIN/1.73M2 (ref 60–?)
GFR SERPLBLD BASED ON 1.73 SQ M-ARVRAT: 8 ML/MIN/1.73M2 (ref 60–?)
GFR SERPLBLD BASED ON 1.73 SQ M-ARVRAT: 8 ML/MIN/1.73M2 (ref 60–?)
GFR SERPLBLD BASED ON 1.73 SQ M-ARVRAT: 9 ML/MIN/1.73M2 (ref 60–?)
GLOBULIN PLAS-MCNC: 3.1 G/DL (ref 2.8–4.4)
GLUCOSE BLD-MCNC: 118 MG/DL (ref 70–99)
GLUCOSE BLD-MCNC: 128 MG/DL (ref 70–99)
GLUCOSE BLD-MCNC: 131 MG/DL (ref 70–99)
GLUCOSE BLD-MCNC: 133 MG/DL (ref 70–99)
GLUCOSE BLD-MCNC: 139 MG/DL (ref 70–99)
GLUCOSE BLD-MCNC: 143 MG/DL (ref 70–99)
GLUCOSE BLD-MCNC: 144 MG/DL (ref 70–99)
GLUCOSE BLD-MCNC: 147 MG/DL (ref 70–99)
GLUCOSE BLD-MCNC: 147 MG/DL (ref 70–99)
GLUCOSE BLD-MCNC: 150 MG/DL (ref 70–99)
GLUCOSE BLD-MCNC: 157 MG/DL (ref 70–99)
GLUCOSE BLD-MCNC: 157 MG/DL (ref 70–99)
GLUCOSE BLD-MCNC: 158 MG/DL (ref 70–99)
GLUCOSE BLD-MCNC: 162 MG/DL (ref 70–99)
GLUCOSE BLD-MCNC: 167 MG/DL (ref 70–99)
GLUCOSE BLD-MCNC: 167 MG/DL (ref 70–99)
GLUCOSE BLD-MCNC: 168 MG/DL (ref 70–99)
GLUCOSE BLD-MCNC: 173 MG/DL (ref 70–99)
GLUCOSE BLD-MCNC: 175 MG/DL (ref 70–99)
GLUCOSE BLD-MCNC: 176 MG/DL (ref 70–99)
GLUCOSE BLD-MCNC: 183 MG/DL (ref 70–99)
GLUCOSE BLD-MCNC: 183 MG/DL (ref 70–99)
GLUCOSE BLD-MCNC: 184 MG/DL (ref 70–99)
GLUCOSE BLD-MCNC: 194 MG/DL (ref 70–99)
GLUCOSE BLD-MCNC: 210 MG/DL (ref 70–99)
HCT VFR BLD AUTO: 24.8 %
HGB BLD-MCNC: 8.4 G/DL
HGB BLD-MCNC: 8.6 G/DL
IMM GRANULOCYTES # BLD AUTO: 0.08 X10(3) UL (ref 0–1)
IMM GRANULOCYTES NFR BLD: 0.6 %
LYMPHOCYTES # BLD AUTO: 0.64 X10(3) UL (ref 1–4)
LYMPHOCYTES NFR BLD AUTO: 4.9 %
MAGNESIUM SERPL-MCNC: 2.5 MG/DL (ref 1.6–2.6)
MAGNESIUM SERPL-MCNC: 2.6 MG/DL (ref 1.6–2.6)
MCH RBC QN AUTO: 27.5 PG (ref 26–34)
MCHC RBC AUTO-ENTMCNC: 33.9 G/DL (ref 31–37)
MCV RBC AUTO: 81.3 FL
MONOCYTES # BLD AUTO: 0.84 X10(3) UL (ref 0.1–1)
MONOCYTES NFR BLD AUTO: 6.4 %
NEUTROPHILS # BLD AUTO: 11.5 X10 (3) UL (ref 1.5–7.7)
NEUTROPHILS # BLD AUTO: 11.5 X10(3) UL (ref 1.5–7.7)
NEUTROPHILS NFR BLD AUTO: 87.8 %
OSMOLALITY SERPL CALC.SUM OF ELEC: 345 MOSM/KG (ref 275–295)
OSMOLALITY SERPL CALC.SUM OF ELEC: 347 MOSM/KG (ref 275–295)
OSMOLALITY SERPL CALC.SUM OF ELEC: 358 MOSM/KG (ref 275–295)
OSMOLALITY SERPL CALC.SUM OF ELEC: 360 MOSM/KG (ref 275–295)
PHOSPHATE SERPL-MCNC: 5 MG/DL (ref 2.5–4.9)
PHOSPHATE SERPL-MCNC: 5.3 MG/DL (ref 2.5–4.9)
PLATELET # BLD AUTO: 349 10(3)UL (ref 150–450)
POTASSIUM SERPL-SCNC: 2.8 MMOL/L (ref 3.5–5.1)
POTASSIUM SERPL-SCNC: 2.8 MMOL/L (ref 3.5–5.1)
POTASSIUM SERPL-SCNC: 3.2 MMOL/L (ref 3.5–5.1)
POTASSIUM SERPL-SCNC: 3.3 MMOL/L (ref 3.5–5.1)
PROT SERPL-MCNC: 5.3 G/DL (ref 6.4–8.2)
RBC # BLD AUTO: 3.05 X10(6)UL
SODIUM SERPL-SCNC: 142 MMOL/L (ref 136–145)
SODIUM SERPL-SCNC: 145 MMOL/L (ref 136–145)
WBC # BLD AUTO: 13.1 X10(3) UL (ref 4–11)

## 2022-08-16 PROCEDURE — 80048 BASIC METABOLIC PNL TOTAL CA: CPT | Performed by: HOSPITALIST

## 2022-08-16 PROCEDURE — C9113 INJ PANTOPRAZOLE SODIUM, VIA: HCPCS | Performed by: HOSPITALIST

## 2022-08-16 PROCEDURE — 82962 GLUCOSE BLOOD TEST: CPT

## 2022-08-16 PROCEDURE — 85018 HEMOGLOBIN: CPT | Performed by: HOSPITALIST

## 2022-08-16 PROCEDURE — 83735 ASSAY OF MAGNESIUM: CPT | Performed by: HOSPITALIST

## 2022-08-16 PROCEDURE — 80053 COMPREHEN METABOLIC PANEL: CPT | Performed by: HOSPITALIST

## 2022-08-16 PROCEDURE — 85025 COMPLETE CBC W/AUTO DIFF WBC: CPT | Performed by: HOSPITALIST

## 2022-08-16 PROCEDURE — 84100 ASSAY OF PHOSPHORUS: CPT | Performed by: HOSPITALIST

## 2022-08-16 PROCEDURE — 71045 X-RAY EXAM CHEST 1 VIEW: CPT | Performed by: HOSPITALIST

## 2022-08-16 RX ORDER — POTASSIUM CHLORIDE 29.8 MG/ML
40 INJECTION INTRAVENOUS ONCE
Status: DISCONTINUED | OUTPATIENT
Start: 2022-08-16 | End: 2022-08-16 | Stop reason: SDUPTHER

## 2022-08-16 RX ORDER — POTASSIUM CHLORIDE 29.8 MG/ML
40 INJECTION INTRAVENOUS ONCE
Status: DISCONTINUED | OUTPATIENT
Start: 2022-08-16 | End: 2022-08-16

## 2022-08-16 NOTE — PLAN OF CARE
Resumed care at . Insulin drip infusing, accuchecks q1h. Clear liquid diet.  C/o nausea, controlled without meds per patient request. Voids in urinal.

## 2022-08-16 NOTE — PROGRESS NOTES
Report given to Rolan Hews whom will be assuming care at bedside. No s/s of acute distress noted at this time. VSS. Pt is alert and oriented X 4, HORNE, FCs. Pt reporting nausea all day, medicated per MAR. Insulin gtt infusing for DKA. Clear liquid diet started today, tolerating well so far. One medium, loose BM today. POC continues. Call light in reach. Will continue to monitor.

## 2022-08-16 NOTE — PLAN OF CARE
Assumed care at [address]. Alert and oriented x4. Bicarb and insulin drip infusing per protocol. No pain. Zofran given for nausea and vomiting with some relief. K+ replaced. Fall precautions maintained per protocol. Plan to stop insulin drip and advance diet. Call light in reach at bedside. Will continue to monitor.       Problem: Diabetes/Glucose Control  Goal: Glucose maintained within prescribed range  Description: INTERVENTIONS:  - Monitor Blood Glucose as ordered  - Assess for signs and symptoms of hyperglycemia and hypoglycemia  - Administer ordered medications to maintain glucose within target range  - Assess barriers to adequate nutritional intake and initiate nutrition consult as needed  - Instruct patient on self management of diabetes  Outcome: Progressing

## 2022-08-17 LAB
ANION GAP SERPL CALC-SCNC: 6 MMOL/L (ref 0–18)
ANION GAP SERPL CALC-SCNC: 7 MMOL/L (ref 0–18)
BUN BLD-MCNC: 111 MG/DL (ref 7–18)
BUN BLD-MCNC: 127 MG/DL (ref 7–18)
CALCIUM BLD-MCNC: 7.6 MG/DL (ref 8.5–10.1)
CALCIUM BLD-MCNC: 8 MG/DL (ref 8.5–10.1)
CHLORIDE SERPL-SCNC: 116 MMOL/L (ref 98–112)
CHLORIDE SERPL-SCNC: 118 MMOL/L (ref 98–112)
CO2 SERPL-SCNC: 18 MMOL/L (ref 21–32)
CO2 SERPL-SCNC: 21 MMOL/L (ref 21–32)
CREAT BLD-MCNC: 5.65 MG/DL
CREAT BLD-MCNC: 6.18 MG/DL
ERYTHROCYTE [DISTWIDTH] IN BLOOD BY AUTOMATED COUNT: 13.3 %
GFR SERPLBLD BASED ON 1.73 SQ M-ARVRAT: 10 ML/MIN/1.73M2 (ref 60–?)
GFR SERPLBLD BASED ON 1.73 SQ M-ARVRAT: 11 ML/MIN/1.73M2 (ref 60–?)
GLUCOSE BLD-MCNC: 114 MG/DL (ref 70–99)
GLUCOSE BLD-MCNC: 117 MG/DL (ref 70–99)
GLUCOSE BLD-MCNC: 118 MG/DL (ref 70–99)
GLUCOSE BLD-MCNC: 119 MG/DL (ref 70–99)
GLUCOSE BLD-MCNC: 121 MG/DL (ref 70–99)
GLUCOSE BLD-MCNC: 123 MG/DL (ref 70–99)
GLUCOSE BLD-MCNC: 124 MG/DL (ref 70–99)
GLUCOSE BLD-MCNC: 128 MG/DL (ref 70–99)
GLUCOSE BLD-MCNC: 131 MG/DL (ref 70–99)
GLUCOSE BLD-MCNC: 133 MG/DL (ref 70–99)
GLUCOSE BLD-MCNC: 135 MG/DL (ref 70–99)
GLUCOSE BLD-MCNC: 142 MG/DL (ref 70–99)
GLUCOSE BLD-MCNC: 150 MG/DL (ref 70–99)
GLUCOSE BLD-MCNC: 222 MG/DL (ref 70–99)
GLUCOSE BLD-MCNC: 230 MG/DL (ref 70–99)
GLUCOSE BLD-MCNC: 255 MG/DL (ref 70–99)
HCT VFR BLD AUTO: 25.2 %
HGB BLD-MCNC: 8.1 G/DL
MAGNESIUM SERPL-MCNC: 2.1 MG/DL (ref 1.6–2.6)
MCH RBC QN AUTO: 27 PG (ref 26–34)
MCHC RBC AUTO-ENTMCNC: 32.1 G/DL (ref 31–37)
MCV RBC AUTO: 84 FL
OSMOLALITY SERPL CALC.SUM OF ELEC: 338 MOSM/KG (ref 275–295)
OSMOLALITY SERPL CALC.SUM OF ELEC: 338 MOSM/KG (ref 275–295)
PHOSPHATE SERPL-MCNC: 3.1 MG/DL (ref 2.5–4.9)
PLATELET # BLD AUTO: 315 10(3)UL (ref 150–450)
POTASSIUM SERPL-SCNC: 3.7 MMOL/L (ref 3.5–5.1)
POTASSIUM SERPL-SCNC: 3.7 MMOL/L (ref 3.5–5.1)
RBC # BLD AUTO: 3 X10(6)UL
SODIUM SERPL-SCNC: 143 MMOL/L (ref 136–145)
SODIUM SERPL-SCNC: 143 MMOL/L (ref 136–145)
URATE SERPL-MCNC: <0.2 MG/DL
WBC # BLD AUTO: 11.7 X10(3) UL (ref 4–11)

## 2022-08-17 PROCEDURE — 80048 BASIC METABOLIC PNL TOTAL CA: CPT | Performed by: INTERNAL MEDICINE

## 2022-08-17 PROCEDURE — 83735 ASSAY OF MAGNESIUM: CPT | Performed by: INTERNAL MEDICINE

## 2022-08-17 PROCEDURE — 84100 ASSAY OF PHOSPHORUS: CPT | Performed by: INTERNAL MEDICINE

## 2022-08-17 PROCEDURE — C9113 INJ PANTOPRAZOLE SODIUM, VIA: HCPCS | Performed by: HOSPITALIST

## 2022-08-17 PROCEDURE — 82962 GLUCOSE BLOOD TEST: CPT

## 2022-08-17 PROCEDURE — 84550 ASSAY OF BLOOD/URIC ACID: CPT | Performed by: INTERNAL MEDICINE

## 2022-08-17 PROCEDURE — 85027 COMPLETE CBC AUTOMATED: CPT | Performed by: INTERNAL MEDICINE

## 2022-08-17 PROCEDURE — 80048 BASIC METABOLIC PNL TOTAL CA: CPT | Performed by: NURSE PRACTITIONER

## 2022-08-17 RX ORDER — POTASSIUM CHLORIDE 14.9 MG/ML
20 INJECTION INTRAVENOUS ONCE
Status: COMPLETED | OUTPATIENT
Start: 2022-08-17 | End: 2022-08-17

## 2022-08-17 NOTE — PLAN OF CARE
Assumed care of pt. Wife at bedside intermittently  BP borderline high   C/O nausea that starts with cough & makes him gag  No nausea when just sitting. Insulin gtt stopped today, QID accuchecks now. K+ rider given  Voiding at bedside, c/o dizziness & inability to ambulate or sit in chair. PT ordered. Transfer orders in place.   Attempting to eat soft food

## 2022-08-17 NOTE — PLAN OF CARE
Assumed care at [address]  Wife at the bedside  's, Hydralazine given,SBP down to 140s  Denies pain. C/o nausea. Refused med  Blood sugar Q1, insulin drip  K+3.3, Informed APN  Recd order, replaced K+  Voiding with adequate urine output  Will check labs in am  Plan:transition to subcutaneous insulin and transfer to floor  Problem: Diabetes/Glucose Control  Goal: Glucose maintained within prescribed range  Description: INTERVENTIONS:  - Monitor Blood Glucose as ordered  - Assess for signs and symptoms of hyperglycemia and hypoglycemia  - Administer ordered medications to maintain glucose within target range  - Assess barriers to adequate nutritional intake and initiate nutrition consult as needed  - Instruct patient on self management of diabetes  8/17/2022 0040 by Kingsley Campoverde, RN  Outcome: Progressing     Problem: Patient/Family Goals  Goal: Patient/Family Long Term Goal  Description: Patient's Long Term Goal: Go home    Interventions:  - discharge instructions  -monitor blood sugar  - See additional Care Plan goals for specific interventions  8/17/2022 0040 by Kingsley Campoverde, RN  Outcome: Progressing    Goal: Patient/Family Short Term Goal  Description: Patient's Short Term Goal: out of ICU    Interventions:   - transition to subcutaneous insulin  -monitor blood sugar  - See additional Care Plan goals for specific interventions  8/17/2022 0040 by Kingsley Campoverde, RN  Outcome: Progressing     Problem: SAFETY ADULT - FALL  Goal: Free from fall injury  Description: INTERVENTIONS:  - Assess pt frequently for physical needs  - Identify cognitive and physical deficits and behaviors that affect risk of falls.   - [place] fall precautions as indicated by assessment.  - Educate pt/family on patient safety including physical limitations  - Instruct pt to call for assistance with activity based on assessment  - Modify environment to reduce risk of injury  - Provide assistive devices as appropriate  - Consider OT/PT consult to assist with strengthening/mobility  - Encourage toileting schedule  8/17/2022 0040 by Pierre Friend RN  Outcome: Progressing     Problem: DISCHARGE PLANNING  Goal: Discharge to home or other facility with appropriate resources  Description: INTERVENTIONS:  - Identify barriers to discharge w/pt and caregiver  - Include patient/family/discharge partner in discharge planning  - Arrange for needed discharge resources and transportation as appropriate  - Identify discharge learning needs (meds, wound care, etc)  - Arrange for interpreters to assist at discharge as needed  - Consider post-discharge preferences of patient/family/discharge partner  - Complete POLST form as appropriate  - Assess patient's ability to be responsible for managing their own health  - Refer to Case Management Department for coordinating discharge planning if the patient needs post-hospital services based on physician/LIP order or complex needs related to functional status, cognitive ability or social support system  8/17/2022 0040 by Pierre Friend RN  Outcome: Progressing     Problem: MUSCULOSKELETAL - ADULT  Goal: Return mobility to safest level of function  Description: INTERVENTIONS:  - Assess patient stability and activity tolerance for standing, transferring and ambulating w/ or w/o assistive devices  - Assist with transfers and ambulation using safe patient handling equipment as needed  - Ensure adequate protection for wounds/incisions during mobilization  - Obtain PT/OT consults as needed  - Advance activity as appropriate  - Communicate ordered activity level and limitations with patient/family  Outcome: Progressing

## 2022-08-18 LAB
ANION GAP SERPL CALC-SCNC: 8 MMOL/L (ref 0–18)
BUN BLD-MCNC: 103 MG/DL (ref 7–18)
CALCIUM BLD-MCNC: 7.6 MG/DL (ref 8.5–10.1)
CHLORIDE SERPL-SCNC: 116 MMOL/L (ref 98–112)
CO2 SERPL-SCNC: 21 MMOL/L (ref 21–32)
CREAT BLD-MCNC: 5.18 MG/DL
ERYTHROCYTE [DISTWIDTH] IN BLOOD BY AUTOMATED COUNT: 13.7 %
GFR SERPLBLD BASED ON 1.73 SQ M-ARVRAT: 12 ML/MIN/1.73M2 (ref 60–?)
GLUCOSE BLD-MCNC: 176 MG/DL (ref 70–99)
GLUCOSE BLD-MCNC: 177 MG/DL (ref 70–99)
GLUCOSE BLD-MCNC: 183 MG/DL (ref 70–99)
GLUCOSE BLD-MCNC: 264 MG/DL (ref 70–99)
GLUCOSE BLD-MCNC: 274 MG/DL (ref 70–99)
HCT VFR BLD AUTO: 26 %
HGB BLD-MCNC: 8.6 G/DL
MCH RBC QN AUTO: 27.7 PG (ref 26–34)
MCHC RBC AUTO-ENTMCNC: 33.1 G/DL (ref 31–37)
MCV RBC AUTO: 83.9 FL
OSMOLALITY SERPL CALC.SUM OF ELEC: 337 MOSM/KG (ref 275–295)
PLATELET # BLD AUTO: 287 10(3)UL (ref 150–450)
POTASSIUM SERPL-SCNC: 3.7 MMOL/L (ref 3.5–5.1)
RBC # BLD AUTO: 3.1 X10(6)UL
SODIUM SERPL-SCNC: 145 MMOL/L (ref 136–145)
WBC # BLD AUTO: 12.3 X10(3) UL (ref 4–11)

## 2022-08-18 PROCEDURE — C9113 INJ PANTOPRAZOLE SODIUM, VIA: HCPCS | Performed by: HOSPITALIST

## 2022-08-18 PROCEDURE — 80048 BASIC METABOLIC PNL TOTAL CA: CPT | Performed by: INTERNAL MEDICINE

## 2022-08-18 PROCEDURE — 85027 COMPLETE CBC AUTOMATED: CPT | Performed by: INTERNAL MEDICINE

## 2022-08-18 PROCEDURE — 97161 PT EVAL LOW COMPLEX 20 MIN: CPT

## 2022-08-18 PROCEDURE — 97116 GAIT TRAINING THERAPY: CPT

## 2022-08-18 PROCEDURE — 82962 GLUCOSE BLOOD TEST: CPT

## 2022-08-18 RX ORDER — PANTOPRAZOLE SODIUM 40 MG/1
40 TABLET, DELAYED RELEASE ORAL
Status: DISCONTINUED | OUTPATIENT
Start: 2022-08-19 | End: 2022-08-19

## 2022-08-18 RX ORDER — POTASSIUM CHLORIDE 20 MEQ/1
20 TABLET, EXTENDED RELEASE ORAL ONCE
Status: COMPLETED | OUTPATIENT
Start: 2022-08-18 | End: 2022-08-18

## 2022-08-18 RX ORDER — AMLODIPINE BESYLATE 5 MG/1
5 TABLET ORAL DAILY
Status: DISCONTINUED | OUTPATIENT
Start: 2022-08-18 | End: 2022-08-19

## 2022-08-18 NOTE — PLAN OF CARE
Problem: DKA, nausea/vomiting  Data: Patient alert and oriented, denies pain, occasional weak cough. Patient on room air, voiding per urinal, tolerating liquid diet and some solids. Vital signs stable, NSR/ST on tele. Action: Due medications given, monitoring blood sugars as ordered, IVF infusing, all patient's needs attended to. Education (patient/family): Patient updated on plan of care. Plan for transfer out of ICU this evening and PT to see in AM.   Response: Patient verbalizes understanding of plan of care and appears to be resting comfortably at this time, will continue to monitor.     Problem: Diabetes/Glucose Control  Goal: Glucose maintained within prescribed range  Description: INTERVENTIONS:  - Monitor Blood Glucose as ordered  - Assess for signs and symptoms of hyperglycemia and hypoglycemia  - Administer ordered medications to maintain glucose within target range  - Assess barriers to adequate nutritional intake and initiate nutrition consult as needed  - Instruct patient on self management of diabetes  Outcome: Progressing     Problem: Patient/Family Goals  Goal: Patient/Family Long Term Goal  Description: Patient's Long Term Goal: DC home    Interventions:  - comply with plan of care  - See additional Care Plan goals for specific interventions  Outcome: Progressing  Goal: Patient/Family Short Term Goal  Description: Patient's Short Term Goal: have good blood sugars and get stronger    Interventions:   - monitor blood sugars  -PT eval  - See additional Care Plan goals for specific interventions  Outcome: Progressing     Problem: MUSCULOSKELETAL - ADULT  Goal: Return mobility to safest level of function  Description: INTERVENTIONS:  - Assess patient stability and activity tolerance for standing, transferring and ambulating w/ or w/o assistive devices  - Assist with transfers and ambulation using safe patient handling equipment as needed  - Ensure adequate protection for wounds/incisions during mobilization  - Obtain PT/OT consults as needed  - Advance activity as appropriate  - Communicate ordered activity level and limitations with patient/family  Outcome: Not Progressing

## 2022-08-18 NOTE — PROGRESS NOTES
Endorsed pt care at 2230. Pt aox4. VSS on RA. Educated and encouraged to use IS, reached 1250. Tele NSR. Heparin. Voids per urinal. Denies constipation/diarrhea/nausea/pain at this time. QID accuchecks, insulin coverage per MAR. Bicarb gtt @ 75 ml/hr. Soft diet tolerating well. Call light in reach, all needs met at this time.

## 2022-08-18 NOTE — PLAN OF CARE
Problem:DKA  Data:Pt. A/O x4. VSS. SR on tele monitor. Denies pain. Pt. Tolerating advancing diet. Tolerated soft diet for breakfast and advance to reg ADA diet for lunch. Denies nausea. IVF discontinued per Dr. Sammie Lester. Pt. Up with PT and up in chair for lunch. Action:Monitor vs and tele. Assessment done. Meds as scheduled and prn. Teaching: All care explained to pt. See education flowsheet for details. Response:will continue to monitor.   Problem: Diabetes/Glucose Control  Goal: Glucose maintained within prescribed range  Description: INTERVENTIONS:  - Monitor Blood Glucose as ordered  - Assess for signs and symptoms of hyperglycemia and hypoglycemia  - Administer ordered medications to maintain glucose within target range  - Assess barriers to adequate nutritional intake and initiate nutrition consult as needed  - Instruct patient on self management of diabetes  Outcome: Progressing     Problem: Diabetes/Glucose Control  Goal: Glucose maintained within prescribed range  Description: INTERVENTIONS:  - Monitor Blood Glucose as ordered  - Assess for signs and symptoms of hyperglycemia and hypoglycemia  - Administer ordered medications to maintain glucose within target range  - Assess barriers to adequate nutritional intake and initiate nutrition consult as needed  - Instruct patient on self management of diabetes  Outcome: Progressing

## 2022-08-18 NOTE — PROGRESS NOTES
Full report given to [facility] REYNOLD OROZCO RN, patient and spouse updated on transfer.

## 2022-08-19 VITALS
RESPIRATION RATE: 18 BRPM | DIASTOLIC BLOOD PRESSURE: 62 MMHG | BODY MASS INDEX: 26.89 KG/M2 | HEART RATE: 78 BPM | TEMPERATURE: 99 F | SYSTOLIC BLOOD PRESSURE: 137 MMHG | WEIGHT: 171.31 LBS | OXYGEN SATURATION: 99 % | HEIGHT: 67 IN

## 2022-08-19 LAB
ANION GAP SERPL CALC-SCNC: 2 MMOL/L (ref 0–18)
BASOPHILS # BLD AUTO: 0.01 X10(3) UL (ref 0–0.2)
BASOPHILS NFR BLD AUTO: 0.1 %
BUN BLD-MCNC: 80 MG/DL (ref 7–18)
CALCIUM BLD-MCNC: 7.5 MG/DL (ref 8.5–10.1)
CHLORIDE SERPL-SCNC: 113 MMOL/L (ref 98–112)
CO2 SERPL-SCNC: 27 MMOL/L (ref 21–32)
CREAT BLD-MCNC: 4.73 MG/DL
EOSINOPHIL # BLD AUTO: 0.18 X10(3) UL (ref 0–0.7)
EOSINOPHIL NFR BLD AUTO: 1.8 %
ERYTHROCYTE [DISTWIDTH] IN BLOOD BY AUTOMATED COUNT: 13.9 %
GFR SERPLBLD BASED ON 1.73 SQ M-ARVRAT: 13 ML/MIN/1.73M2 (ref 60–?)
GLUCOSE BLD-MCNC: 181 MG/DL (ref 70–99)
GLUCOSE BLD-MCNC: 197 MG/DL (ref 70–99)
GLUCOSE BLD-MCNC: 267 MG/DL (ref 70–99)
HCT VFR BLD AUTO: 25.9 %
HGB BLD-MCNC: 8.2 G/DL
IMM GRANULOCYTES # BLD AUTO: 0.16 X10(3) UL (ref 0–1)
IMM GRANULOCYTES NFR BLD: 1.6 %
LYMPHOCYTES # BLD AUTO: 0.88 X10(3) UL (ref 1–4)
LYMPHOCYTES NFR BLD AUTO: 8.6 %
MCH RBC QN AUTO: 27.6 PG (ref 26–34)
MCHC RBC AUTO-ENTMCNC: 31.7 G/DL (ref 31–37)
MCV RBC AUTO: 87.2 FL
MONOCYTES # BLD AUTO: 0.59 X10(3) UL (ref 0.1–1)
MONOCYTES NFR BLD AUTO: 5.8 %
NEUTROPHILS # BLD AUTO: 8.44 X10 (3) UL (ref 1.5–7.7)
NEUTROPHILS # BLD AUTO: 8.44 X10(3) UL (ref 1.5–7.7)
NEUTROPHILS NFR BLD AUTO: 82.1 %
OSMOLALITY SERPL CALC.SUM OF ELEC: 324 MOSM/KG (ref 275–295)
PLATELET # BLD AUTO: 273 10(3)UL (ref 150–450)
POTASSIUM SERPL-SCNC: 4 MMOL/L (ref 3.5–5.1)
RBC # BLD AUTO: 2.97 X10(6)UL
SODIUM SERPL-SCNC: 142 MMOL/L (ref 136–145)
WBC # BLD AUTO: 10.3 X10(3) UL (ref 4–11)

## 2022-08-19 PROCEDURE — 80048 BASIC METABOLIC PNL TOTAL CA: CPT | Performed by: HOSPITALIST

## 2022-08-19 PROCEDURE — 82962 GLUCOSE BLOOD TEST: CPT

## 2022-08-19 PROCEDURE — 85025 COMPLETE CBC W/AUTO DIFF WBC: CPT | Performed by: HOSPITALIST

## 2022-08-19 RX ORDER — PEN NEEDLE, DIABETIC 32GX 5/32"
NEEDLE, DISPOSABLE MISCELLANEOUS
Qty: 100 EACH | Refills: 6 | Status: SHIPPED | OUTPATIENT
Start: 2022-08-19

## 2022-08-19 RX ORDER — PANTOPRAZOLE SODIUM 40 MG/1
40 TABLET, DELAYED RELEASE ORAL
Qty: 30 TABLET | Refills: 0 | Status: SHIPPED | OUTPATIENT
Start: 2022-08-20

## 2022-08-19 RX ORDER — INSULIN ASPART 100 [IU]/ML
4 INJECTION, SOLUTION INTRAVENOUS; SUBCUTANEOUS
Qty: 6 ML | Refills: 0 | Status: SHIPPED | OUTPATIENT
Start: 2022-08-19 | End: 2022-09-18

## 2022-08-19 RX ORDER — INSULIN DETEMIR 100 [IU]/ML
15 INJECTION, SOLUTION SUBCUTANEOUS NIGHTLY
Qty: 6 ML | Refills: 0 | Status: SHIPPED | OUTPATIENT
Start: 2022-08-19 | End: 2022-09-18

## 2022-08-19 RX ORDER — ONDANSETRON 4 MG/1
4 TABLET, ORALLY DISINTEGRATING ORAL EVERY 4 HOURS PRN
Qty: 30 TABLET | Refills: 0 | Status: SHIPPED | OUTPATIENT
Start: 2022-08-19

## 2022-08-19 NOTE — PLAN OF CARE
Problem: Diabetes/Glucose Control  Goal: Glucose maintained within prescribed range  Description: INTERVENTIONS:  - Monitor Blood Glucose as ordered  - Assess for signs and symptoms of hyperglycemia and hypoglycemia  - Administer ordered medications to maintain glucose within target range  - Assess barriers to adequate nutritional intake and initiate nutrition consult as needed  - Instruct patient on self management of diabetes  Outcome: Progressing     Problem: Patient/Family Goals  Goal: Patient/Family Long Term Goal  Description: Patient's Long Term Goal: DC home    Interventions:  - comply with plan of care  - See additional Care Plan goals for specific interventions  Outcome: Progressing  Goal: Patient/Family Short Term Goal  Description: Patient's Short Term Goal: have good blood sugars and get stronger  8/18 days: Ambulate with Physical therapy and sit up in chair for meals today. Tolerate advancing diet and have blood sugars under control.   8/19 Am: Go home  Interventions:   - monitor blood sugars  -PT eval  - See additional Care Plan goals for specific interventions  Outcome: Progressing     Problem: PAIN - ADULT  Goal: Verbalizes/displays adequate comfort level or patient's stated pain goal  Description: INTERVENTIONS:  - Encourage pt to monitor pain and request assistance  - Assess pain using appropriate pain scale  - Administer analgesics based on type and severity of pain and evaluate response  - Implement non-pharmacological measures as appropriate and evaluate response  - Consider cultural and social influences on pain and pain management  - Manage/alleviate anxiety  - Utilize distraction and/or relaxation techniques  - Monitor for opioid side effects  - Notify MD/LIP if interventions unsuccessful or patient reports new pain  - Anticipate increased pain with activity and pre-medicate as appropriate  Outcome: Progressing     Problem: RISK FOR INFECTION - ADULT  Goal: Absence of fever/infection during anticipated neutropenic period  Description: INTERVENTIONS  - Monitor WBC  - Administer growth factors as ordered  - Implement neutropenic guidelines  Outcome: Progressing     Problem: SAFETY ADULT - FALL  Goal: Free from fall injury  Description: INTERVENTIONS:  - Assess pt frequently for physical needs  - Identify cognitive and physical deficits and behaviors that affect risk of falls.   - [place] fall precautions as indicated by assessment.  - Educate pt/family on patient safety including physical limitations  - Instruct pt to call for assistance with activity based on assessment  - Modify environment to reduce risk of injury  - Provide assistive devices as appropriate  - Consider OT/PT consult to assist with strengthening/mobility  - Encourage toileting schedule  Outcome: Progressing     Problem: DISCHARGE PLANNING  Goal: Discharge to home or other facility with appropriate resources  Description: INTERVENTIONS:  - Identify barriers to discharge w/pt and caregiver  - Include patient/family/discharge partner in discharge planning  - Arrange for needed discharge resources and transportation as appropriate  - Identify discharge learning needs (meds, wound care, etc)  - Arrange for interpreters to assist at discharge as needed  - Consider post-discharge preferences of patient/family/discharge partner  - Complete POLST form as appropriate  - Assess patient's ability to be responsible for managing their own health  - Refer to Case Management Department for coordinating discharge planning if the patient needs post-hospital services based on physician/LIP order or complex needs related to functional status, cognitive ability or social support system  Outcome: Progressing     Problem: MUSCULOSKELETAL - ADULT  Goal: Return mobility to safest level of function  Description: INTERVENTIONS:  - Assess patient stability and activity tolerance for standing, transferring and ambulating w/ or w/o assistive devices  - Assist with transfers and ambulation using safe patient handling equipment as needed  - Ensure adequate protection for wounds/incisions during mobilization  - Obtain PT/OT consults as needed  - Advance activity as appropriate  - Communicate ordered activity level and limitations with patient/family  Outcome: Progressing

## 2022-08-19 NOTE — PROGRESS NOTES
NURSING DISCHARGE NOTE    Discharged Home via Wheelchair. Accompanied by Support staff  Belongings Taken by patient/family. PIV and tele removed. Discharge navigator complete. Discharge education done at bedside with wife present. All questions answered. Printed scripts given to pt.

## 2022-08-19 NOTE — PLAN OF CARE
Pt aox4. VSS on RA. Tele NSR. Heparin. Voids per urinal. Denies constipation/diarrhea/nausea/pain at this time. QID accuchecks, insulin coverage per MAR. Regular tolerating well. Call light in reach, all needs met at this time. Problem: SAFETY ADULT - FALL  Goal: Free from fall injury  Description: INTERVENTIONS:  - Assess pt frequently for physical needs  - Identify cognitive and physical deficits and behaviors that affect risk of falls.   - [place] fall precautions as indicated by assessment.  - Educate pt/family on patient safety including physical limitations  - Instruct pt to call for assistance with activity based on assessment  - Modify environment to reduce risk of injury  - Provide assistive devices as appropriate  - Consider OT/PT consult to assist with strengthening/mobility  - Encourage toileting schedule  Outcome: Progressing     Problem: RISK FOR INFECTION - ADULT  Goal: Absence of fever/infection during anticipated neutropenic period  Description: INTERVENTIONS  - Monitor WBC  - Administer growth factors as ordered  - Implement neutropenic guidelines  Outcome: Progressing     Problem: PAIN - ADULT  Goal: Verbalizes/displays adequate comfort level or patient's stated pain goal  Description: INTERVENTIONS:  - Encourage pt to monitor pain and request assistance  - Assess pain using appropriate pain scale  - Administer analgesics based on type and severity of pain and evaluate response  - Implement non-pharmacological measures as appropriate and evaluate response  - Consider cultural and social influences on pain and pain management  - Manage/alleviate anxiety  - Utilize distraction and/or relaxation techniques  - Monitor for opioid side effects  - Notify MD/LIP if interventions unsuccessful or patient reports new pain  - Anticipate increased pain with activity and pre-medicate as appropriate  Outcome: Progressing     Problem: Diabetes/Glucose Control  Goal: Glucose maintained within prescribed range  Description: INTERVENTIONS:  - Monitor Blood Glucose as ordered  - Assess for signs and symptoms of hyperglycemia and hypoglycemia  - Administer ordered medications to maintain glucose within target range  - Assess barriers to adequate nutritional intake and initiate nutrition consult as needed  - Instruct patient on self management of diabetes  Outcome: Progressing     Problem: MUSCULOSKELETAL - ADULT  Goal: Return mobility to safest level of function  Description: INTERVENTIONS:  - Assess patient stability and activity tolerance for standing, transferring and ambulating w/ or w/o assistive devices  - Assist with transfers and ambulation using safe patient handling equipment as needed  - Ensure adequate protection for wounds/incisions during mobilization  - Obtain PT/OT consults as needed  - Advance activity as appropriate  - Communicate ordered activity level and limitations with patient/family  Outcome: Progressing     Problem: DISCHARGE PLANNING  Goal: Discharge to home or other facility with appropriate resources  Description: INTERVENTIONS:  - Identify barriers to discharge w/pt and caregiver  - Include patient/family/discharge partner in discharge planning  - Arrange for needed discharge resources and transportation as appropriate  - Identify discharge learning needs (meds, wound care, etc)  - Arrange for interpreters to assist at discharge as needed  - Consider post-discharge preferences of patient/family/discharge partner  - Complete POLST form as appropriate  - Assess patient's ability to be responsible for managing their own health  - Refer to Case Management Department for coordinating discharge planning if the patient needs post-hospital services based on physician/LIP order or complex needs related to functional status, cognitive ability or social support system  Outcome: Progressing

## 2022-08-22 ENCOUNTER — PATIENT OUTREACH (OUTPATIENT)
Dept: CASE MANAGEMENT | Age: 62
End: 2022-08-22

## 2022-08-22 ENCOUNTER — LAB ENCOUNTER (OUTPATIENT)
Dept: LAB | Age: 62
End: 2022-08-22
Attending: INTERNAL MEDICINE
Payer: COMMERCIAL

## 2022-08-22 DIAGNOSIS — N17.9 ACUTE RENAL FAILURE, UNSPECIFIED ACUTE RENAL FAILURE TYPE (HCC): ICD-10-CM

## 2022-08-22 LAB
ANION GAP SERPL CALC-SCNC: 8 MMOL/L (ref 0–18)
BUN BLD-MCNC: 59 MG/DL (ref 7–18)
BUN/CREAT SERPL: 13.7 (ref 10–20)
CALCIUM BLD-MCNC: 7.7 MG/DL (ref 8.5–10.1)
CHLORIDE SERPL-SCNC: 116 MMOL/L (ref 98–112)
CO2 SERPL-SCNC: 20 MMOL/L (ref 21–32)
CREAT BLD-MCNC: 4.31 MG/DL
GFR SERPLBLD BASED ON 1.73 SQ M-ARVRAT: 15 ML/MIN/1.73M2 (ref 60–?)
GLUCOSE BLD-MCNC: 312 MG/DL (ref 70–99)
OSMOLALITY SERPL CALC.SUM OF ELEC: 326 MOSM/KG (ref 275–295)
POTASSIUM SERPL-SCNC: 4.3 MMOL/L (ref 3.5–5.1)
SODIUM SERPL-SCNC: 144 MMOL/L (ref 136–145)

## 2022-08-22 PROCEDURE — 80048 BASIC METABOLIC PNL TOTAL CA: CPT | Performed by: INTERNAL MEDICINE

## 2022-08-25 ENCOUNTER — TELEPHONE (OUTPATIENT)
Dept: CASE MANAGEMENT | Age: 62
End: 2022-08-25

## 2022-08-25 DIAGNOSIS — Z12.11 COLON CANCER SCREENING: Primary | ICD-10-CM

## 2022-09-08 ENCOUNTER — TELEPHONE (OUTPATIENT)
Dept: ENDOCRINOLOGY CLINIC | Facility: CLINIC | Age: 62
End: 2022-09-08

## 2022-09-08 NOTE — TELEPHONE ENCOUNTER
Pt. contacted regarding referral for diabetes education. Pt. Says that he would rather follow-up w/Dr. Qamar Hernandez on 9/21/22 before scheduling an appt. Pt. encouraged to speak with his Endo before scheduling.

## 2022-09-21 ENCOUNTER — OFFICE VISIT (OUTPATIENT)
Dept: ENDOCRINOLOGY CLINIC | Facility: CLINIC | Age: 62
End: 2022-09-21

## 2022-09-21 VITALS
BODY MASS INDEX: 28 KG/M2 | HEART RATE: 86 BPM | DIASTOLIC BLOOD PRESSURE: 68 MMHG | WEIGHT: 176 LBS | SYSTOLIC BLOOD PRESSURE: 148 MMHG

## 2022-09-21 DIAGNOSIS — E11.9 DIABETES MELLITUS TYPE 2 IN NONOBESE (HCC): Primary | ICD-10-CM

## 2022-09-21 PROBLEM — E11.22 TYPE 2 DIABETES MELLITUS WITH DIABETIC CHRONIC KIDNEY DISEASE (HCC): Status: ACTIVE | Noted: 2022-09-21

## 2022-09-21 PROCEDURE — 99215 OFFICE O/P EST HI 40 MIN: CPT | Performed by: STUDENT IN AN ORGANIZED HEALTH CARE EDUCATION/TRAINING PROGRAM

## 2022-09-21 PROCEDURE — 3077F SYST BP >= 140 MM HG: CPT | Performed by: STUDENT IN AN ORGANIZED HEALTH CARE EDUCATION/TRAINING PROGRAM

## 2022-09-21 PROCEDURE — 3078F DIAST BP <80 MM HG: CPT | Performed by: STUDENT IN AN ORGANIZED HEALTH CARE EDUCATION/TRAINING PROGRAM

## 2022-09-21 RX ORDER — FLASH GLUCOSE SENSOR
1 KIT MISCELLANEOUS
Qty: 2 EACH | Refills: 2 | Status: SHIPPED | OUTPATIENT
Start: 2022-09-21

## 2022-09-21 NOTE — PATIENT INSTRUCTIONS
- continuous glucose monitor x 3 month supply, link to your smarkphone  - increase your levemir from 15U to 17U, with fasting blood sugar goal ; if not at goal, further increase by 1U every 2-3 days until you are at goal  - for your meals, resume 4U per meal, given 15 minutes prior; pre-meal blood sugar goal <140, 2 hours after-meal blood sugar goal 140-180  - get in the habit of carbohydrate counting (# of grams per meal); to be more sophisticated later on with meal-time insulin dosing  - pre-meal correction factor of 1U for every 40 points blood sugar is >140  - given current kidney function, best option is insulin.  As kidney improves, we may have more options  - labs to check for insulin function (non-fasting) and antibodies to the pancreas  - diabetes educator referral

## 2022-09-27 ENCOUNTER — OFFICE VISIT (OUTPATIENT)
Dept: NEPHROLOGY | Facility: CLINIC | Age: 62
End: 2022-09-27

## 2022-09-27 ENCOUNTER — LAB ENCOUNTER (OUTPATIENT)
Dept: LAB | Age: 62
End: 2022-09-27
Attending: INTERNAL MEDICINE

## 2022-09-27 VITALS — DIASTOLIC BLOOD PRESSURE: 66 MMHG | WEIGHT: 176.5 LBS | SYSTOLIC BLOOD PRESSURE: 152 MMHG | BODY MASS INDEX: 28 KG/M2

## 2022-09-27 DIAGNOSIS — D63.1 ANEMIA DUE TO STAGE 4 CHRONIC KIDNEY DISEASE (HCC): ICD-10-CM

## 2022-09-27 DIAGNOSIS — N18.4 CKD (CHRONIC KIDNEY DISEASE) STAGE 4, GFR 15-29 ML/MIN (HCC): ICD-10-CM

## 2022-09-27 DIAGNOSIS — N17.9 AKI (ACUTE KIDNEY INJURY) (HCC): Primary | ICD-10-CM

## 2022-09-27 DIAGNOSIS — N18.4 ANEMIA DUE TO STAGE 4 CHRONIC KIDNEY DISEASE (HCC): ICD-10-CM

## 2022-09-27 DIAGNOSIS — N17.9 AKI (ACUTE KIDNEY INJURY) (HCC): ICD-10-CM

## 2022-09-27 LAB
ANION GAP SERPL CALC-SCNC: 5 MMOL/L (ref 0–18)
BASOPHILS # BLD AUTO: 0.08 X10(3) UL (ref 0–0.2)
BASOPHILS NFR BLD AUTO: 0.8 %
BUN BLD-MCNC: 84 MG/DL (ref 7–18)
CALCIUM BLD-MCNC: 7.9 MG/DL (ref 8.5–10.1)
CHLORIDE SERPL-SCNC: 119 MMOL/L (ref 98–112)
CO2 SERPL-SCNC: 16 MMOL/L (ref 21–32)
CREAT BLD-MCNC: 4.05 MG/DL
EOSINOPHIL # BLD AUTO: 0.29 X10(3) UL (ref 0–0.7)
EOSINOPHIL NFR BLD AUTO: 3.1 %
ERYTHROCYTE [DISTWIDTH] IN BLOOD BY AUTOMATED COUNT: 14.1 %
FASTING STATUS PATIENT QL REPORTED: NO
GFR SERPLBLD BASED ON 1.73 SQ M-ARVRAT: 16 ML/MIN/1.73M2 (ref 60–?)
GLUCOSE BLD-MCNC: 356 MG/DL (ref 70–99)
HCT VFR BLD AUTO: 28.2 %
HGB BLD-MCNC: 8.9 G/DL
IMM GRANULOCYTES # BLD AUTO: 0.07 X10(3) UL (ref 0–1)
IMM GRANULOCYTES NFR BLD: 0.7 %
LYMPHOCYTES # BLD AUTO: 1.59 X10(3) UL (ref 1–4)
LYMPHOCYTES NFR BLD AUTO: 16.9 %
MCH RBC QN AUTO: 27.6 PG (ref 26–34)
MCHC RBC AUTO-ENTMCNC: 31.6 G/DL (ref 31–37)
MCV RBC AUTO: 87.3 FL
MONOCYTES # BLD AUTO: 0.47 X10(3) UL (ref 0.1–1)
MONOCYTES NFR BLD AUTO: 5 %
NEUTROPHILS # BLD AUTO: 6.92 X10 (3) UL (ref 1.5–7.7)
NEUTROPHILS # BLD AUTO: 6.92 X10(3) UL (ref 1.5–7.7)
NEUTROPHILS NFR BLD AUTO: 73.5 %
OSMOLALITY SERPL CALC.SUM OF ELEC: 330 MOSM/KG (ref 275–295)
PLATELET # BLD AUTO: 238 10(3)UL (ref 150–450)
POTASSIUM SERPL-SCNC: 5.2 MMOL/L (ref 3.5–5.1)
RBC # BLD AUTO: 3.23 X10(6)UL
SODIUM SERPL-SCNC: 140 MMOL/L (ref 136–145)
WBC # BLD AUTO: 9.4 X10(3) UL (ref 4–11)

## 2022-09-27 PROCEDURE — 85025 COMPLETE CBC W/AUTO DIFF WBC: CPT | Performed by: INTERNAL MEDICINE

## 2022-09-27 PROCEDURE — 3078F DIAST BP <80 MM HG: CPT | Performed by: INTERNAL MEDICINE

## 2022-09-27 PROCEDURE — 99214 OFFICE O/P EST MOD 30 MIN: CPT | Performed by: INTERNAL MEDICINE

## 2022-09-27 PROCEDURE — 80048 BASIC METABOLIC PNL TOTAL CA: CPT | Performed by: INTERNAL MEDICINE

## 2022-09-27 PROCEDURE — 3077F SYST BP >= 140 MM HG: CPT | Performed by: INTERNAL MEDICINE

## 2022-09-28 ENCOUNTER — PATIENT MESSAGE (OUTPATIENT)
Dept: ENDOCRINOLOGY CLINIC | Facility: CLINIC | Age: 62
End: 2022-09-28

## 2022-09-28 ENCOUNTER — PATIENT MESSAGE (OUTPATIENT)
Dept: INTERNAL MEDICINE CLINIC | Facility: CLINIC | Age: 62
End: 2022-09-28

## 2022-09-28 NOTE — TELEPHONE ENCOUNTER
From: Bettye Mendez  To: Franklin Lott MD  Sent: 9/28/2022 12:26 PM CDT  Subject: levemir prescription refill    Frandy Penaloza,    How do I order a refill of the Levemir? Please let me know what doctor I should contact. I'm down to a couple of shots left.     Than You,  Fannie Carter  [phone]

## 2022-09-28 NOTE — TELEPHONE ENCOUNTER
Ekaterina Jean RN 9/28/2022 1:03 PM CDT      ----- Message -----  From: Bria Merchant  Sent: 9/28/2022 12:27 PM CDT  To: [facility] Clinical Staff  Subject: Tere Booker,    How do I order a refill of the Levemir? Please let me know what doctor I should contact. I'm down to a couple of shots left.     Thank You,  Adin Kussmaul  [phone]

## 2022-09-29 NOTE — TELEPHONE ENCOUNTER
Last VISIT 12/20/21    Last CPE 12/20/21    Last REFILL 08/19/22 qty 6 mL w/0 refills    Last LABS 09/27/22 CBC, BMP done    No Future Appointments      Please Approve or Deny.

## 2022-09-30 NOTE — TELEPHONE ENCOUNTER
Future Appointments   Date Time Provider Vanessa Yates   10/25/2022  8:00 AM Emilie Ellison MD [facility]

## 2022-09-30 NOTE — TELEPHONE ENCOUNTER
Pt returned call. Verified name and . Pt does not know his dose because his wife administers it. He will let us know next time he needs a refill. RX refilled as discussed with Dr. Qamar Hernandez yesterday for 17U nightly.

## 2022-10-03 RX ORDER — INSULIN DETEMIR 100 [IU]/ML
INJECTION, SOLUTION SUBCUTANEOUS
Qty: 6 ML | Refills: 0 | Status: SHIPPED | OUTPATIENT
Start: 2022-10-03

## 2022-10-21 ENCOUNTER — TELEPHONE (OUTPATIENT)
Dept: ENDOCRINOLOGY CLINIC | Facility: CLINIC | Age: 62
End: 2022-10-21

## 2022-10-21 ENCOUNTER — LAB ENCOUNTER (OUTPATIENT)
Dept: LAB | Age: 62
End: 2022-10-21
Attending: STUDENT IN AN ORGANIZED HEALTH CARE EDUCATION/TRAINING PROGRAM
Payer: COMMERCIAL

## 2022-10-21 ENCOUNTER — OFFICE VISIT (OUTPATIENT)
Dept: ENDOCRINOLOGY CLINIC | Facility: CLINIC | Age: 62
End: 2022-10-21
Payer: COMMERCIAL

## 2022-10-21 VITALS — HEART RATE: 99 BPM | SYSTOLIC BLOOD PRESSURE: 150 MMHG | DIASTOLIC BLOOD PRESSURE: 78 MMHG

## 2022-10-21 DIAGNOSIS — E11.9 DIABETES MELLITUS TYPE 2 IN NONOBESE (HCC): ICD-10-CM

## 2022-10-21 DIAGNOSIS — I10 PRIMARY HYPERTENSION: ICD-10-CM

## 2022-10-21 DIAGNOSIS — E11.65 TYPE 2 DIABETES MELLITUS WITH HYPERGLYCEMIA, WITH LONG-TERM CURRENT USE OF INSULIN (HCC): Primary | ICD-10-CM

## 2022-10-21 DIAGNOSIS — Z79.4 TYPE 2 DIABETES MELLITUS WITH HYPERGLYCEMIA, WITH LONG-TERM CURRENT USE OF INSULIN (HCC): Primary | ICD-10-CM

## 2022-10-21 LAB
CARTRIDGE LOT#: ABNORMAL NUMERIC
EST. AVERAGE GLUCOSE BLD GHB EST-MCNC: 186 MG/DL (ref 68–126)
HBA1C MFR BLD: 8.1 % (ref ?–5.7)
HEMOGLOBIN A1C: 7.7 % (ref 4.3–5.6)

## 2022-10-21 PROCEDURE — 3052F HG A1C>EQUAL 8.0%<EQUAL 9.0%: CPT | Performed by: INTERNAL MEDICINE

## 2022-10-21 PROCEDURE — 3077F SYST BP >= 140 MM HG: CPT

## 2022-10-21 PROCEDURE — 84681 ASSAY OF C-PEPTIDE: CPT | Performed by: STUDENT IN AN ORGANIZED HEALTH CARE EDUCATION/TRAINING PROGRAM

## 2022-10-21 PROCEDURE — 3051F HG A1C>EQUAL 7.0%<8.0%: CPT

## 2022-10-21 PROCEDURE — 99215 OFFICE O/P EST HI 40 MIN: CPT

## 2022-10-21 PROCEDURE — 86341 ISLET CELL ANTIBODY: CPT | Performed by: STUDENT IN AN ORGANIZED HEALTH CARE EDUCATION/TRAINING PROGRAM

## 2022-10-21 PROCEDURE — 3078F DIAST BP <80 MM HG: CPT

## 2022-10-21 PROCEDURE — 83036 HEMOGLOBIN GLYCOSYLATED A1C: CPT

## 2022-10-21 RX ORDER — FLASH GLUCOSE SENSOR
1 KIT MISCELLANEOUS
Qty: 2 EACH | Refills: 5 | Status: SHIPPED | OUTPATIENT
Start: 2022-10-21

## 2022-10-21 NOTE — TELEPHONE ENCOUNTER
10/21/22 Received via fax from [facility] Carlson records from 8/1/22.  Given to APN to review placed in APN's in basket

## 2022-10-21 NOTE — PATIENT INSTRUCTIONS
Thank you for visiting our clinic today. We are here to help you manage your diabetes. Please continue with your primary care physician/provider for your routine health care maintenance. A1C: Today's A1C 7.7%, our goal is to get you <7%  Please get your labs done today to evaluate your pancreas- from there we can make further adjustments to your medications. Dr. Sergey Ruiz placed those orders for you. Call our office if the lab does not see them:  Office phone number: [phone]    Please address your blood pressure medications when you follow up with your primary care doctor. Medications:   - increase your levemir (long acting night time insulin) from 17U to 19 U, with fasting blood sugar goal ; if not at goal in 2-3 days, further increase by 1U every 2-3 days until you are at goal  - for your meals, continue 4U per meal, given 15 minutes prior; pre-meal blood sugar goal <140, 2 hours after-meal blood sugar goal 140-180 - you should see these trends on the Hood  - Please set goal of having Hood on by next week; schedule nurse  visit  - Remember you have to scan your Trudi at least every 8 hours so please scan in the morning when you wake up, at lunch time at work, before dinner, and before bed. - get in the habit of carbohydrate counting (# of grams per meal); to be more sophisticated later on with meal-time insulin dosing once we have your Hood on and know your blood sugar trends  - pre-meal correction factor of 1U for every 40 points blood sugar is >140     Freestyle Trudi Continuous glucose monitor was sent to the [employer] on [address]. If not covered by insurance, it should be $75. Contact our office if you're having trouble getting it; this is very important to help you achieve your blood sugar targets.   Blood sugar testing:   Always bring your glucose meter,blood sugar logbook, or CGM reader/phone with CGM data to every appointment here at the endocrinology office, since this allows me to safely make adjustments to your diabetes plan. A1C goal:   <7.0%  Blood sugar testing:    Blood sugar targets:  Before breakfast:   (preferably < 110)  Before meals OR 2 hours after meals: <180 (preferably <150)   Call for persistent blood sugars < 75 or > 200.    Blood pressure:   BP Readings from Last 3 Encounters:  10/21/22 : 150/78  09/27/22 : 152/66  09/21/22 : 148/68

## 2022-10-23 LAB — C-PEPTIDE, SERUM OR PLASMA: 8.2 NG/ML

## 2022-10-25 ENCOUNTER — OFFICE VISIT (OUTPATIENT)
Dept: INTERNAL MEDICINE CLINIC | Facility: CLINIC | Age: 62
End: 2022-10-25
Payer: COMMERCIAL

## 2022-10-25 VITALS
WEIGHT: 174 LBS | OXYGEN SATURATION: 99 % | TEMPERATURE: 98 F | BODY MASS INDEX: 27.64 KG/M2 | HEIGHT: 66.5 IN | RESPIRATION RATE: 16 BRPM | DIASTOLIC BLOOD PRESSURE: 62 MMHG | SYSTOLIC BLOOD PRESSURE: 120 MMHG | HEART RATE: 84 BPM

## 2022-10-25 DIAGNOSIS — I10 PRIMARY HYPERTENSION: ICD-10-CM

## 2022-10-25 DIAGNOSIS — E11.65 TYPE 2 DIABETES MELLITUS WITH HYPERGLYCEMIA, UNSPECIFIED WHETHER LONG TERM INSULIN USE (HCC): Primary | ICD-10-CM

## 2022-10-25 DIAGNOSIS — N18.4 CKD (CHRONIC KIDNEY DISEASE) STAGE 4, GFR 15-29 ML/MIN (HCC): ICD-10-CM

## 2022-10-25 LAB
ISLET ANTIGEN-2 ANTIBODY: <5.4 U/ML
ZINC TRANSPORTER 8 ANTIBODY: <10 U/ML

## 2022-10-25 PROCEDURE — 90686 IIV4 VACC NO PRSV 0.5 ML IM: CPT | Performed by: INTERNAL MEDICINE

## 2022-10-25 PROCEDURE — 3008F BODY MASS INDEX DOCD: CPT | Performed by: INTERNAL MEDICINE

## 2022-10-25 PROCEDURE — 3078F DIAST BP <80 MM HG: CPT | Performed by: INTERNAL MEDICINE

## 2022-10-25 PROCEDURE — 3074F SYST BP LT 130 MM HG: CPT | Performed by: INTERNAL MEDICINE

## 2022-10-25 PROCEDURE — 99214 OFFICE O/P EST MOD 30 MIN: CPT | Performed by: INTERNAL MEDICINE

## 2022-10-25 PROCEDURE — 90471 IMMUNIZATION ADMIN: CPT | Performed by: INTERNAL MEDICINE

## 2022-10-26 ENCOUNTER — TELEPHONE (OUTPATIENT)
Dept: ENDOCRINOLOGY CLINIC | Facility: CLINIC | Age: 62
End: 2022-10-26

## 2022-10-26 LAB — GLUTAMIC ACID DECARBOXYLASE AB: <5 IU/ML

## 2022-10-26 NOTE — TELEPHONE ENCOUNTER
Per APRN request, phoned patient, lvm, requesting return call to confirm patient was able to  CGM at pharmacy. APRN would like to have patient come in for a nurse visit to have CGM placed.

## 2022-10-26 NOTE — TELEPHONE ENCOUNTER
Spoke with patient, patient states Travon told him it is back ordered. They are calling around to find one for him. We agreed that if he does not call clinic in next two days, clinic will phone him on Monday, October 31 for follow up. Patient is agreeable.

## 2022-10-31 ENCOUNTER — OFFICE VISIT (OUTPATIENT)
Dept: NEPHROLOGY | Facility: CLINIC | Age: 62
End: 2022-10-31
Payer: COMMERCIAL

## 2022-10-31 VITALS — SYSTOLIC BLOOD PRESSURE: 154 MMHG | WEIGHT: 175.13 LBS | DIASTOLIC BLOOD PRESSURE: 70 MMHG | BODY MASS INDEX: 28 KG/M2

## 2022-10-31 DIAGNOSIS — E11.21 DIABETIC NEPHROPATHY ASSOCIATED WITH TYPE 2 DIABETES MELLITUS (HCC): ICD-10-CM

## 2022-10-31 DIAGNOSIS — N18.4 ANEMIA DUE TO STAGE 4 CHRONIC KIDNEY DISEASE (HCC): ICD-10-CM

## 2022-10-31 DIAGNOSIS — I10 PRIMARY HYPERTENSION: ICD-10-CM

## 2022-10-31 DIAGNOSIS — D63.1 ANEMIA DUE TO STAGE 4 CHRONIC KIDNEY DISEASE (HCC): ICD-10-CM

## 2022-10-31 DIAGNOSIS — N18.4 CKD (CHRONIC KIDNEY DISEASE) STAGE 4, GFR 15-29 ML/MIN (HCC): Primary | ICD-10-CM

## 2022-10-31 PROCEDURE — 3078F DIAST BP <80 MM HG: CPT | Performed by: INTERNAL MEDICINE

## 2022-10-31 PROCEDURE — 99215 OFFICE O/P EST HI 40 MIN: CPT | Performed by: INTERNAL MEDICINE

## 2022-10-31 PROCEDURE — 3077F SYST BP >= 140 MM HG: CPT | Performed by: INTERNAL MEDICINE

## 2022-10-31 NOTE — TELEPHONE ENCOUNTER
Spoke with pt via phone. Verified name and . Pt stated that he picked up his Dong sensor on Friday. He states he does need to come in for a nurse visit for [place] set up and education. Pt states he is too busy at work this week but will come in some day next week. Pt states he will call us back when he checks with his work schedule to set up a nurse appointment.

## 2022-11-01 NOTE — PROGRESS NOTES
[facility]  Progress Note    Kaleigh Del Toro Patient Status:  Inpatient    1960 MRN [id number]   [facility] 3SW-A Attending John Mendes MD   Hosp Day # 1 PCP Oscar Crisostomo MD     Subjective:  Kaleigh Del Toro is a(n) 62 year o Called pt and spoke wit pt daughter and info her I fax pt referral to [place] office.   included. Exam was performed without intravenous gadolinium contrast.     PATIENT STATED HISTORY: (As transcribed by Technologist)  The patient presents with right foot swelling, specifically around 5th metatarsal. Exam to evaluate for infection. monitor incision site and right 5th digit and pack open. Plan for irrigation and delayed secondary closure/ possible graft wound vac application Tuesday 9/02. OK to WB with limited heel touch only.   PT/OT to evaluate  Deep surgical wound cultures pending

## 2022-11-09 ENCOUNTER — LAB ENCOUNTER (OUTPATIENT)
Dept: LAB | Age: 62
End: 2022-11-09
Attending: INTERNAL MEDICINE
Payer: COMMERCIAL

## 2022-11-09 ENCOUNTER — PATIENT MESSAGE (OUTPATIENT)
Dept: ENDOCRINOLOGY CLINIC | Facility: CLINIC | Age: 62
End: 2022-11-09

## 2022-11-09 DIAGNOSIS — E11.21 DIABETIC NEPHROPATHY ASSOCIATED WITH TYPE 2 DIABETES MELLITUS (HCC): ICD-10-CM

## 2022-11-09 DIAGNOSIS — D63.1 ANEMIA DUE TO STAGE 4 CHRONIC KIDNEY DISEASE (HCC): ICD-10-CM

## 2022-11-09 DIAGNOSIS — N18.4 ANEMIA DUE TO STAGE 4 CHRONIC KIDNEY DISEASE (HCC): ICD-10-CM

## 2022-11-09 DIAGNOSIS — I10 PRIMARY HYPERTENSION: ICD-10-CM

## 2022-11-09 DIAGNOSIS — N18.4 CKD (CHRONIC KIDNEY DISEASE) STAGE 4, GFR 15-29 ML/MIN (HCC): ICD-10-CM

## 2022-11-09 LAB
ANION GAP SERPL CALC-SCNC: 9 MMOL/L (ref 0–18)
BASOPHILS # BLD AUTO: 0.08 X10(3) UL (ref 0–0.2)
BASOPHILS NFR BLD AUTO: 0.9 %
BUN BLD-MCNC: 79 MG/DL (ref 7–18)
BUN/CREAT SERPL: 18.3 (ref 10–20)
CALCIUM BLD-MCNC: 8.3 MG/DL (ref 8.5–10.1)
CHLORIDE SERPL-SCNC: 117 MMOL/L (ref 98–112)
CO2 SERPL-SCNC: 17 MMOL/L (ref 21–32)
CREAT BLD-MCNC: 4.32 MG/DL
DEPRECATED RDW RBC AUTO: 44.1 FL (ref 35.1–46.3)
EOSINOPHIL # BLD AUTO: 0.17 X10(3) UL (ref 0–0.7)
EOSINOPHIL NFR BLD AUTO: 1.9 %
ERYTHROCYTE [DISTWIDTH] IN BLOOD BY AUTOMATED COUNT: 13.8 % (ref 11–15)
FASTING STATUS PATIENT QL REPORTED: NO
GFR SERPLBLD BASED ON 1.73 SQ M-ARVRAT: 15 ML/MIN/1.73M2 (ref 60–?)
GLUCOSE BLD-MCNC: 240 MG/DL (ref 70–99)
HCT VFR BLD AUTO: 30 %
HGB BLD-MCNC: 9.6 G/DL
IMM GRANULOCYTES # BLD AUTO: 0.03 X10(3) UL (ref 0–1)
IMM GRANULOCYTES NFR BLD: 0.3 %
LYMPHOCYTES # BLD AUTO: 1.61 X10(3) UL (ref 1–4)
LYMPHOCYTES NFR BLD AUTO: 18.1 %
MCH RBC QN AUTO: 27.8 PG (ref 26–34)
MCHC RBC AUTO-ENTMCNC: 32 G/DL (ref 31–37)
MCV RBC AUTO: 87 FL
MONOCYTES # BLD AUTO: 0.51 X10(3) UL (ref 0.1–1)
MONOCYTES NFR BLD AUTO: 5.7 %
NEUTROPHILS # BLD AUTO: 6.5 X10 (3) UL (ref 1.5–7.7)
NEUTROPHILS # BLD AUTO: 6.5 X10(3) UL (ref 1.5–7.7)
NEUTROPHILS NFR BLD AUTO: 73.1 %
OSMOLALITY SERPL CALC.SUM OF ELEC: 328 MOSM/KG (ref 275–295)
PHOSPHATE SERPL-MCNC: 4 MG/DL (ref 2.5–4.9)
PLATELET # BLD AUTO: 273 10(3)UL (ref 150–450)
POTASSIUM SERPL-SCNC: 5.1 MMOL/L (ref 3.5–5.1)
RBC # BLD AUTO: 3.45 X10(6)UL
SODIUM SERPL-SCNC: 143 MMOL/L (ref 136–145)
WBC # BLD AUTO: 8.9 X10(3) UL (ref 4–11)

## 2022-11-09 PROCEDURE — 80048 BASIC METABOLIC PNL TOTAL CA: CPT | Performed by: INTERNAL MEDICINE

## 2022-11-09 PROCEDURE — 84100 ASSAY OF PHOSPHORUS: CPT | Performed by: INTERNAL MEDICINE

## 2022-11-09 PROCEDURE — 85025 COMPLETE CBC W/AUTO DIFF WBC: CPT | Performed by: INTERNAL MEDICINE

## 2022-11-09 NOTE — TELEPHONE ENCOUNTER
From: Bria Merchant  To: Kriss Sarmiento MD  Sent: 11/9/2022 2:08 PM CST  Subject: Foye Seals    I now have my [address]. I would like to stop by your office for calibration on Wednesday, November 16th - at 10:00 if possible.     Thank Lori Murrieta  [phone]

## 2022-11-09 NOTE — TELEPHONE ENCOUNTER
Phoned patient to discuss MyChart message- unsure of what patient means by \"calibration\". Patient states he received his Hood, wants to come in for visit to place for first time. Scheduled RN visit Wednesday 11/16, 1000.

## 2022-11-10 ENCOUNTER — PATIENT MESSAGE (OUTPATIENT)
Dept: NEPHROLOGY | Facility: CLINIC | Age: 62
End: 2022-11-10

## 2022-11-10 RX ORDER — INSULIN DETEMIR 100 [IU]/ML
INJECTION, SOLUTION SUBCUTANEOUS
Qty: 6 ML | Refills: 0 | Status: SHIPPED | OUTPATIENT
Start: 2022-11-10

## 2022-11-11 ENCOUNTER — PATIENT MESSAGE (OUTPATIENT)
Dept: INTERNAL MEDICINE CLINIC | Facility: CLINIC | Age: 62
End: 2022-11-11

## 2022-11-11 ENCOUNTER — PATIENT MESSAGE (OUTPATIENT)
Dept: ENDOCRINOLOGY CLINIC | Facility: CLINIC | Age: 62
End: 2022-11-11

## 2022-11-11 RX ORDER — INSULIN DETEMIR 100 [IU]/ML
INJECTION, SOLUTION SUBCUTANEOUS
Qty: 6 ML | Refills: 0 | OUTPATIENT
Start: 2022-11-11

## 2022-11-11 NOTE — TELEPHONE ENCOUNTER
Radha Cool. The prescription was sent in yesterday by Dr. Max Bennett it is being processed and will be ready for  later today. My Chart message sent pt.  Requests for refill should be sent to Dr. Amarilis Penaloza

## 2022-11-11 NOTE — TELEPHONE ENCOUNTER
From: Ramana Dunlap  To: Jelani Teran MD  Sent: 11/11/2022 9:06 AM CST  Subject: Kayy Ayala    My request for a Levemir prescription refill was denied.     Thank Cayden Rodríguez

## 2022-11-14 RX ORDER — INSULIN DETEMIR 100 [IU]/ML
INJECTION, SOLUTION SUBCUTANEOUS
Qty: 6 ML | Refills: 0 | OUTPATIENT
Start: 2022-11-14

## 2022-11-14 NOTE — TELEPHONE ENCOUNTER
From: Thai Burton MD  To: Claribel Chaudhary  Sent: 11/10/2022 3:39 PM CST  Subject: Labs    Mr. Gutierrez-    Your labs are pretty stable; creatinine level 4.3 and your electrolytes are good. How are your blood pressures? CATHERINE Tierney

## 2022-11-14 NOTE — TELEPHONE ENCOUNTER
Richy-    Just to confirm- are you taking the amlodipine 5 mg every day? Also, what is your pulse (heart rate)? Kendrick Mohamud

## 2022-11-15 NOTE — TELEPHONE ENCOUNTER
From: Fay Dolan  To: Asha Mcdonough MD  Sent: 11/11/2022 9:06 AM CST  Subject: Cirilo Needle    My request for a Levemir prescription refill was denied.     Mik Melendez

## 2022-11-16 ENCOUNTER — NURSE ONLY (OUTPATIENT)
Dept: ENDOCRINOLOGY CLINIC | Facility: CLINIC | Age: 62
End: 2022-11-16
Payer: COMMERCIAL

## 2022-11-16 PROCEDURE — 99211 OFF/OP EST MAY X REQ PHY/QHP: CPT

## 2022-11-16 NOTE — PROGRESS NOTES
Patient in office to place Hood 2 for first time. Patient will be using phone as reader device, android phone. Huaat 2 lilli downloaded, patient created sign in. Reviewed hand washing, site selection, placement of sensor. Placed sensor on back of upper left arm. Lot [id number], SN [id number], Exp 05-. Successfully scanned sensor, starting 1 hour start up time. Reviewed with patient alternating sites, removal of sensor, alarms, scanning once at least every 8 hours, keeping phone near him (within 20 ft), keeping bluetooth on, alarms, arrow trends. Reviewed if symptoms don't match reading- always double check with finger stick. Paired patient with clinic [address] site. Reminded patient of next scheduled office visit with Dr. Sonali Krishnamurthy 12/28. Patient had no questions upon leaving, asked to f/u with office if any need. Verbalized understanding.

## 2022-12-01 ENCOUNTER — LAB ENCOUNTER (OUTPATIENT)
Dept: LAB | Age: 62
End: 2022-12-01
Attending: INTERNAL MEDICINE
Payer: COMMERCIAL

## 2022-12-01 DIAGNOSIS — N18.4 ANEMIA DUE TO STAGE 4 CHRONIC KIDNEY DISEASE (HCC): ICD-10-CM

## 2022-12-01 DIAGNOSIS — E11.21 DIABETIC NEPHROPATHY ASSOCIATED WITH TYPE 2 DIABETES MELLITUS (HCC): ICD-10-CM

## 2022-12-01 DIAGNOSIS — I10 PRIMARY HYPERTENSION: ICD-10-CM

## 2022-12-01 DIAGNOSIS — D63.1 ANEMIA DUE TO STAGE 4 CHRONIC KIDNEY DISEASE (HCC): ICD-10-CM

## 2022-12-01 DIAGNOSIS — N18.4 CKD (CHRONIC KIDNEY DISEASE) STAGE 4, GFR 15-29 ML/MIN (HCC): ICD-10-CM

## 2022-12-01 LAB
ANION GAP SERPL CALC-SCNC: 8 MMOL/L (ref 0–18)
BASOPHILS # BLD AUTO: 0.05 X10(3) UL (ref 0–0.2)
BASOPHILS NFR BLD AUTO: 0.5 %
BUN BLD-MCNC: 67 MG/DL (ref 7–18)
BUN/CREAT SERPL: 16.3 (ref 10–20)
CALCIUM BLD-MCNC: 9 MG/DL (ref 8.5–10.1)
CHLORIDE SERPL-SCNC: 117 MMOL/L (ref 98–112)
CO2 SERPL-SCNC: 17 MMOL/L (ref 21–32)
CREAT BLD-MCNC: 4.12 MG/DL
DEPRECATED RDW RBC AUTO: 41.9 FL (ref 35.1–46.3)
EOSINOPHIL # BLD AUTO: 0.13 X10(3) UL (ref 0–0.7)
EOSINOPHIL NFR BLD AUTO: 1.4 %
ERYTHROCYTE [DISTWIDTH] IN BLOOD BY AUTOMATED COUNT: 13.5 % (ref 11–15)
FASTING STATUS PATIENT QL REPORTED: NO
GFR SERPLBLD BASED ON 1.73 SQ M-ARVRAT: 15 ML/MIN/1.73M2 (ref 60–?)
GLUCOSE BLD-MCNC: 218 MG/DL (ref 70–99)
HCT VFR BLD AUTO: 34.2 %
HGB BLD-MCNC: 10.6 G/DL
IMM GRANULOCYTES # BLD AUTO: 0.03 X10(3) UL (ref 0–1)
IMM GRANULOCYTES NFR BLD: 0.3 %
LYMPHOCYTES # BLD AUTO: 1.54 X10(3) UL (ref 1–4)
LYMPHOCYTES NFR BLD AUTO: 16.8 %
MCH RBC QN AUTO: 26.7 PG (ref 26–34)
MCHC RBC AUTO-ENTMCNC: 31 G/DL (ref 31–37)
MCV RBC AUTO: 86.1 FL
MONOCYTES # BLD AUTO: 0.48 X10(3) UL (ref 0.1–1)
MONOCYTES NFR BLD AUTO: 5.2 %
NEUTROPHILS # BLD AUTO: 6.95 X10 (3) UL (ref 1.5–7.7)
NEUTROPHILS # BLD AUTO: 6.95 X10(3) UL (ref 1.5–7.7)
NEUTROPHILS NFR BLD AUTO: 75.8 %
OSMOLALITY SERPL CALC.SUM OF ELEC: 320 MOSM/KG (ref 275–295)
PHOSPHATE SERPL-MCNC: 4.3 MG/DL (ref 2.5–4.9)
PLATELET # BLD AUTO: 317 10(3)UL (ref 150–450)
POTASSIUM SERPL-SCNC: 4.9 MMOL/L (ref 3.5–5.1)
RBC # BLD AUTO: 3.97 X10(6)UL
SODIUM SERPL-SCNC: 142 MMOL/L (ref 136–145)
WBC # BLD AUTO: 9.2 X10(3) UL (ref 4–11)

## 2022-12-01 PROCEDURE — 80048 BASIC METABOLIC PNL TOTAL CA: CPT | Performed by: INTERNAL MEDICINE

## 2022-12-01 PROCEDURE — 84100 ASSAY OF PHOSPHORUS: CPT | Performed by: INTERNAL MEDICINE

## 2022-12-01 PROCEDURE — 85025 COMPLETE CBC W/AUTO DIFF WBC: CPT | Performed by: INTERNAL MEDICINE

## 2022-12-28 ENCOUNTER — OFFICE VISIT (OUTPATIENT)
Dept: ENDOCRINOLOGY CLINIC | Facility: CLINIC | Age: 62
End: 2022-12-28
Payer: COMMERCIAL

## 2022-12-28 ENCOUNTER — PATIENT MESSAGE (OUTPATIENT)
Dept: ENDOCRINOLOGY CLINIC | Facility: CLINIC | Age: 62
End: 2022-12-28

## 2022-12-28 VITALS — HEART RATE: 70 BPM | SYSTOLIC BLOOD PRESSURE: 140 MMHG | DIASTOLIC BLOOD PRESSURE: 88 MMHG

## 2022-12-28 DIAGNOSIS — E11.65 TYPE 2 DIABETES MELLITUS WITH HYPERGLYCEMIA, WITH LONG-TERM CURRENT USE OF INSULIN (HCC): Primary | ICD-10-CM

## 2022-12-28 DIAGNOSIS — Z79.4 TYPE 2 DIABETES MELLITUS WITH HYPERGLYCEMIA, WITH LONG-TERM CURRENT USE OF INSULIN (HCC): Primary | ICD-10-CM

## 2022-12-28 PROBLEM — E10.10 TYPE 1 DIABETES MELLITUS WITH KETOACIDOSIS WITHOUT COMA (HCC): Status: RESOLVED | Noted: 2022-01-03 | Resolved: 2022-12-28

## 2022-12-28 PROCEDURE — 99215 OFFICE O/P EST HI 40 MIN: CPT | Performed by: STUDENT IN AN ORGANIZED HEALTH CARE EDUCATION/TRAINING PROGRAM

## 2022-12-28 PROCEDURE — 3079F DIAST BP 80-89 MM HG: CPT | Performed by: STUDENT IN AN ORGANIZED HEALTH CARE EDUCATION/TRAINING PROGRAM

## 2022-12-28 PROCEDURE — 82947 ASSAY GLUCOSE BLOOD QUANT: CPT | Performed by: STUDENT IN AN ORGANIZED HEALTH CARE EDUCATION/TRAINING PROGRAM

## 2022-12-28 PROCEDURE — 3077F SYST BP >= 140 MM HG: CPT | Performed by: STUDENT IN AN ORGANIZED HEALTH CARE EDUCATION/TRAINING PROGRAM

## 2022-12-28 NOTE — PATIENT INSTRUCTIONS
Confirm that you are taking levemir ONCE a day 19 units and not TWICE a day    Increase novolog from 4 units before meals to 5 units before meals; pre-meal    pre-meal blood sugar goal <140, 2 hours after-meal blood sugar goal 140-180; with the new dose of novolog 5U, check 2 hours after you meal to ensure blood sugar is 140-180. If it's still >200, increase to 6U.  ideally add on a pre-meal correction factor of 1U for every 40 points blood sugar is >140     Restarting ozempic 0.25mg weekly x 4 weeks. After that you will have appointment with Es again, if tolerating, to increase to 0.5mg weekly. If you notice that the ozempic is helping to lower your blood sugar significantly, you may be able to cut down on your novolog from 5U back down to 4U or even 3U (keeping the same pre- and post-meal blood sugar goals)    Once your GFR >25 (not there yet, but heading in the right direction), we'll talk about another oral DM medication!

## 2022-12-29 LAB
GLUCOSE BLOOD: 255
TEST STRIP LOT #: NORMAL NUMERIC

## 2022-12-29 RX ORDER — INSULIN DETEMIR 100 [IU]/ML
15 INJECTION, SOLUTION SUBCUTANEOUS NIGHTLY
Qty: 6 ML | Refills: 2 | Status: SHIPPED | OUTPATIENT
Start: 2022-12-29

## 2022-12-29 RX ORDER — PEN NEEDLE, DIABETIC 32GX 5/32"
NEEDLE, DISPOSABLE MISCELLANEOUS
Qty: 100 EACH | Refills: 6 | Status: SHIPPED | OUTPATIENT
Start: 2022-12-29

## 2022-12-29 NOTE — TELEPHONE ENCOUNTER
From: Gaby Gates  To: Jalen Jacobo MD  Sent: 12/28/2022 5:27 PM CST  Subject: LEVEMIR AND NEEDLES REFILL    Frandy,    I hope you can assist me. I need a refill on both Levimir and the needles but I don't have the prescription numbers. Is that something you can provide? I appreciate your help.     Thank Dmitri Medina

## 2023-01-24 ENCOUNTER — LAB ENCOUNTER (OUTPATIENT)
Dept: LAB | Age: 63
End: 2023-01-24
Attending: INTERNAL MEDICINE
Payer: COMMERCIAL

## 2023-01-24 ENCOUNTER — OFFICE VISIT (OUTPATIENT)
Facility: CLINIC | Age: 63
End: 2023-01-24
Payer: COMMERCIAL

## 2023-01-24 VITALS
HEART RATE: 85 BPM | WEIGHT: 161 LBS | BODY MASS INDEX: 24 KG/M2 | SYSTOLIC BLOOD PRESSURE: 136 MMHG | OXYGEN SATURATION: 99 % | DIASTOLIC BLOOD PRESSURE: 62 MMHG

## 2023-01-24 DIAGNOSIS — N18.4 CKD (CHRONIC KIDNEY DISEASE) STAGE 4, GFR 15-29 ML/MIN (HCC): ICD-10-CM

## 2023-01-24 DIAGNOSIS — Z79.4 TYPE 2 DIABETES MELLITUS WITH HYPERGLYCEMIA, WITH LONG-TERM CURRENT USE OF INSULIN (HCC): Primary | ICD-10-CM

## 2023-01-24 DIAGNOSIS — N18.4 ANEMIA DUE TO STAGE 4 CHRONIC KIDNEY DISEASE (HCC): ICD-10-CM

## 2023-01-24 DIAGNOSIS — D63.1 ANEMIA DUE TO STAGE 4 CHRONIC KIDNEY DISEASE (HCC): ICD-10-CM

## 2023-01-24 DIAGNOSIS — E11.65 TYPE 2 DIABETES MELLITUS WITH HYPERGLYCEMIA, WITH LONG-TERM CURRENT USE OF INSULIN (HCC): Primary | ICD-10-CM

## 2023-01-24 DIAGNOSIS — E11.21 DIABETIC NEPHROPATHY ASSOCIATED WITH TYPE 2 DIABETES MELLITUS (HCC): ICD-10-CM

## 2023-01-24 DIAGNOSIS — I10 PRIMARY HYPERTENSION: ICD-10-CM

## 2023-01-24 LAB
ANION GAP SERPL CALC-SCNC: 8 MMOL/L (ref 0–18)
BASOPHILS # BLD AUTO: 0.05 X10(3) UL (ref 0–0.2)
BASOPHILS NFR BLD AUTO: 0.4 %
BUN BLD-MCNC: 66 MG/DL (ref 7–18)
CALCIUM BLD-MCNC: 9 MG/DL (ref 8.5–10.1)
CARTRIDGE LOT#: ABNORMAL NUMERIC
CHLORIDE SERPL-SCNC: 118 MMOL/L (ref 98–112)
CO2 SERPL-SCNC: 17 MMOL/L (ref 21–32)
CREAT BLD-MCNC: 3.62 MG/DL
EOSINOPHIL # BLD AUTO: 0.03 X10(3) UL (ref 0–0.7)
EOSINOPHIL NFR BLD AUTO: 0.2 %
ERYTHROCYTE [DISTWIDTH] IN BLOOD BY AUTOMATED COUNT: 13.6 %
FASTING STATUS PATIENT QL REPORTED: NO
GFR SERPLBLD BASED ON 1.73 SQ M-ARVRAT: 18 ML/MIN/1.73M2 (ref 60–?)
GLUCOSE BLD-MCNC: 228 MG/DL (ref 70–99)
GLUCOSE BLOOD: 206
HCT VFR BLD AUTO: 32.7 %
HEMOGLOBIN A1C: 8 % (ref 4.3–5.6)
HGB BLD-MCNC: 10.4 G/DL
IMM GRANULOCYTES # BLD AUTO: 0.05 X10(3) UL (ref 0–1)
IMM GRANULOCYTES NFR BLD: 0.4 %
LYMPHOCYTES # BLD AUTO: 1.4 X10(3) UL (ref 1–4)
LYMPHOCYTES NFR BLD AUTO: 11 %
MCH RBC QN AUTO: 27.3 PG (ref 26–34)
MCHC RBC AUTO-ENTMCNC: 31.8 G/DL (ref 31–37)
MCV RBC AUTO: 85.8 FL
MONOCYTES # BLD AUTO: 0.54 X10(3) UL (ref 0.1–1)
MONOCYTES NFR BLD AUTO: 4.2 %
NEUTROPHILS # BLD AUTO: 10.67 X10 (3) UL (ref 1.5–7.7)
NEUTROPHILS # BLD AUTO: 10.67 X10(3) UL (ref 1.5–7.7)
NEUTROPHILS NFR BLD AUTO: 83.8 %
OSMOLALITY SERPL CALC.SUM OF ELEC: 322 MOSM/KG (ref 275–295)
PHOSPHATE SERPL-MCNC: 4 MG/DL (ref 2.5–4.9)
PLATELET # BLD AUTO: 294 10(3)UL (ref 150–450)
POTASSIUM SERPL-SCNC: 4.7 MMOL/L (ref 3.5–5.1)
RBC # BLD AUTO: 3.81 X10(6)UL
SODIUM SERPL-SCNC: 143 MMOL/L (ref 136–145)
TEST STRIP LOT #: NORMAL NUMERIC
WBC # BLD AUTO: 12.7 X10(3) UL (ref 4–11)

## 2023-01-24 PROCEDURE — 99215 OFFICE O/P EST HI 40 MIN: CPT

## 2023-01-24 PROCEDURE — 3078F DIAST BP <80 MM HG: CPT

## 2023-01-24 PROCEDURE — 85025 COMPLETE CBC W/AUTO DIFF WBC: CPT | Performed by: INTERNAL MEDICINE

## 2023-01-24 PROCEDURE — 82947 ASSAY GLUCOSE BLOOD QUANT: CPT

## 2023-01-24 PROCEDURE — 3052F HG A1C>EQUAL 8.0%<EQUAL 9.0%: CPT

## 2023-01-24 PROCEDURE — 84100 ASSAY OF PHOSPHORUS: CPT | Performed by: INTERNAL MEDICINE

## 2023-01-24 PROCEDURE — 3075F SYST BP GE 130 - 139MM HG: CPT

## 2023-01-24 PROCEDURE — 83036 HEMOGLOBIN GLYCOSYLATED A1C: CPT

## 2023-01-24 PROCEDURE — 80048 BASIC METABOLIC PNL TOTAL CA: CPT | Performed by: INTERNAL MEDICINE

## 2023-01-24 RX ORDER — INSULIN DETEMIR 100 [IU]/ML
19 INJECTION, SOLUTION SUBCUTANEOUS NIGHTLY
Qty: 15 ML | Refills: 2 | Status: CANCELLED | COMMUNITY
Start: 2023-01-24

## 2023-01-24 RX ORDER — SEMAGLUTIDE 1.34 MG/ML
0.5 INJECTION, SOLUTION SUBCUTANEOUS WEEKLY
Qty: 1.5 ML | Refills: 0 | Status: CANCELLED | OUTPATIENT
Start: 2023-01-24

## 2023-01-26 NOTE — TELEPHONE ENCOUNTER
LOV with AD 9/25/2020    ASSESSMENT AND PLAN:   Bishnu Suarez is a 62year old male who presents as a follow-up. .      Hypertension: In office evaluation was 96/52 mmHg with heart rate of 92 bpm.  The patient is asymptomatic.   Hold amlodipine 10 mg millicent normal...

## 2023-02-07 NOTE — CONSULTS
[facility] in [place], [place]       Met with patient and wife for LVAD education. LCSW (Alexia Park) also present for discussions. Reviewed the following:     LVAD terms and functions- discussed each component of the LVAD system. - patient's wife verbalized understanding. Sterile dressing change- Discussed driveline sterile dressing change. Reviewed driveline dressing change hand out. Discussed need for dressing change daily for the first 30 days and 3 times a week after, unless otherwise indicated by LVAD team. Informed family dressing change education should be set up by family with inpatient nurses for daily education. Explained at least on family member needs to be fully checked off on dressing change prior to patient discharge and encouraged them to set up education with inpatient RN as soon as possible after implant once patient stable. Patient's wife had several questions regarding dressing change including if patient could be trained to do it himself. Discussed that patient will likely not be able to learn to change dressing by himself in the post-op period due to surgical recovery. Patient's wife expressed concern regarding commitment to doing this, stated \"I don't want to be tied down. \" Educated patient's wife that multiple family members should be trained if possible act as support to her. Stated she will speak to her daughter about being trained. Power Sources- Discussed MPU and batteries. Educated patient and family that patient to use MPU at night when sleeping and any other time there is a chance of sleep. Discussed batteries and battery charger. Reviewed indicator lights on battery charger. -Patient's wife verbalized understanding.       Continued Heart Failure Care- Educated patient and family on the importance of continued heart healthy lifestyle including heart healthy diet, sodium monitoring, daily weight monitoring, taking [facility]  Report of Consultation    [facility] Patient Status:  Emergency    1960 MRN [id number]   Location [address] Attending Ilana Horan MD   Hosp Day # 0 PCP Mathew Cerda MD     Reason for Consultat m)   Wt 170 lb (77.1 kg)   SpO2 99%   BMI 26.63 kg/m²   Temp (24hrs), Av.8 °F (36.6 °C), Min:97.8 °F (36.6 °C), Max:97.8 °F (36.6 °C)     No intake or output data in the 24 hours ending 22 1428  Last 3 Weights  22 1113 : 170 lb (77.1 kg)  1 medications as prescribed, and following up as recommended with health care providers. Educated patient's wife they will need to arrange dressing change education with the inpatient nurses to get checked off and  will likely need several sessions. Discussed expectation for post op period (trained caregiver present 24/7, weekly clinic visits to monthly, frequent lab draws, home health ect). Patient's wife stated there are several relatives who would come from out of state to act as a support system in the post op period however she continues to express concerns related to care giver commitment on a long term basis. Educated patients wife that multiple people should be trained on equipment to alleviate caregiver burden on her and that the goal would be that patient could be independent with equipment after recovery. Encouraged her to discuss with family member who lives in [place] concerning equipment training, training on dressing changes and splitting caregiver time. LCSW and Swati Daniel notified of wife's concerns, plan for family meeting to discuss before implant. Time given to ask questions. Patient and family had no further questions at this time. Informed them will reach out to set up additional training. They verbalized understanding and had no further questions.      Giana Melendez RN  VAD Coordinator creatinine      Albuminuria       12/06/2014 16.0 <=30.0 ug/mg Final       Recent Labs   Lab 01/03/22  1219   WBC 11.1*   HGB 12.2*   MCV 83.3   .0       Recent Labs   Lab 01/03/22  1219      K 5.0   *   CO2 13.0*   *   CREATSERUM

## 2023-02-17 ENCOUNTER — LAB ENCOUNTER (OUTPATIENT)
Dept: LAB | Facility: HOSPITAL | Age: 63
End: 2023-02-17
Attending: STUDENT IN AN ORGANIZED HEALTH CARE EDUCATION/TRAINING PROGRAM
Payer: COMMERCIAL

## 2023-02-17 DIAGNOSIS — Z01.818 PRE-OP TESTING: ICD-10-CM

## 2023-02-18 LAB — SARS-COV-2 RNA RESP QL NAA+PROBE: NOT DETECTED

## 2023-02-20 ENCOUNTER — ANESTHESIA (OUTPATIENT)
Dept: ENDOSCOPY | Facility: HOSPITAL | Age: 63
End: 2023-02-20
Payer: COMMERCIAL

## 2023-02-20 ENCOUNTER — HOSPITAL ENCOUNTER (OUTPATIENT)
Facility: HOSPITAL | Age: 63
Setting detail: HOSPITAL OUTPATIENT SURGERY
Discharge: HOME | End: 2023-02-20
Attending: STUDENT IN AN ORGANIZED HEALTH CARE EDUCATION/TRAINING PROGRAM | Admitting: STUDENT IN AN ORGANIZED HEALTH CARE EDUCATION/TRAINING PROGRAM
Payer: COMMERCIAL

## 2023-02-20 ENCOUNTER — ANESTHESIA EVENT (OUTPATIENT)
Dept: ENDOSCOPY | Facility: HOSPITAL | Age: 63
End: 2023-02-20
Payer: COMMERCIAL

## 2023-02-20 VITALS
TEMPERATURE: 98 F | SYSTOLIC BLOOD PRESSURE: 162 MMHG | RESPIRATION RATE: 18 BRPM | BODY MASS INDEX: 24.25 KG/M2 | HEIGHT: 68 IN | HEART RATE: 74 BPM | WEIGHT: 160 LBS | DIASTOLIC BLOOD PRESSURE: 75 MMHG | OXYGEN SATURATION: 100 %

## 2023-02-20 DIAGNOSIS — R11.10 RETCHING: ICD-10-CM

## 2023-02-20 DIAGNOSIS — Z01.818 PRE-OP TESTING: Primary | ICD-10-CM

## 2023-02-20 DIAGNOSIS — Z12.11 COLON CANCER SCREENING: ICD-10-CM

## 2023-02-20 LAB — GLUCOSE BLD-MCNC: 94 MG/DL (ref 70–99)

## 2023-02-20 PROCEDURE — 82962 GLUCOSE BLOOD TEST: CPT

## 2023-02-20 PROCEDURE — 0DB68ZX EXCISION OF STOMACH, VIA NATURAL OR ARTIFICIAL OPENING ENDOSCOPIC, DIAGNOSTIC: ICD-10-PCS | Performed by: STUDENT IN AN ORGANIZED HEALTH CARE EDUCATION/TRAINING PROGRAM

## 2023-02-20 PROCEDURE — 88305 TISSUE EXAM BY PATHOLOGIST: CPT | Performed by: STUDENT IN AN ORGANIZED HEALTH CARE EDUCATION/TRAINING PROGRAM

## 2023-02-20 PROCEDURE — 0DJD8ZZ INSPECTION OF LOWER INTESTINAL TRACT, VIA NATURAL OR ARTIFICIAL OPENING ENDOSCOPIC: ICD-10-PCS | Performed by: STUDENT IN AN ORGANIZED HEALTH CARE EDUCATION/TRAINING PROGRAM

## 2023-02-20 PROCEDURE — 0DB98ZX EXCISION OF DUODENUM, VIA NATURAL OR ARTIFICIAL OPENING ENDOSCOPIC, DIAGNOSTIC: ICD-10-PCS | Performed by: STUDENT IN AN ORGANIZED HEALTH CARE EDUCATION/TRAINING PROGRAM

## 2023-02-20 PROCEDURE — 0DB78ZX EXCISION OF STOMACH, PYLORUS, VIA NATURAL OR ARTIFICIAL OPENING ENDOSCOPIC, DIAGNOSTIC: ICD-10-PCS | Performed by: STUDENT IN AN ORGANIZED HEALTH CARE EDUCATION/TRAINING PROGRAM

## 2023-02-20 PROCEDURE — 0DB58ZX EXCISION OF ESOPHAGUS, VIA NATURAL OR ARTIFICIAL OPENING ENDOSCOPIC, DIAGNOSTIC: ICD-10-PCS | Performed by: STUDENT IN AN ORGANIZED HEALTH CARE EDUCATION/TRAINING PROGRAM

## 2023-02-20 RX ORDER — NICOTINE POLACRILEX 4 MG
30 LOZENGE BUCCAL
Status: DISCONTINUED | OUTPATIENT
Start: 2023-02-20 | End: 2023-02-20

## 2023-02-20 RX ORDER — NICOTINE POLACRILEX 4 MG
15 LOZENGE BUCCAL
Status: DISCONTINUED | OUTPATIENT
Start: 2023-02-20 | End: 2023-02-20

## 2023-02-20 RX ORDER — NALOXONE HYDROCHLORIDE 0.4 MG/ML
80 INJECTION, SOLUTION INTRAMUSCULAR; INTRAVENOUS; SUBCUTANEOUS AS NEEDED
Status: DISCONTINUED | OUTPATIENT
Start: 2023-02-20 | End: 2023-02-20

## 2023-02-20 RX ORDER — LIDOCAINE HYDROCHLORIDE 10 MG/ML
INJECTION, SOLUTION EPIDURAL; INFILTRATION; INTRACAUDAL; PERINEURAL AS NEEDED
Status: DISCONTINUED | OUTPATIENT
Start: 2023-02-20 | End: 2023-02-20 | Stop reason: SURG

## 2023-02-20 RX ORDER — SODIUM CHLORIDE, SODIUM LACTATE, POTASSIUM CHLORIDE, CALCIUM CHLORIDE 600; 310; 30; 20 MG/100ML; MG/100ML; MG/100ML; MG/100ML
INJECTION, SOLUTION INTRAVENOUS CONTINUOUS
Status: DISCONTINUED | OUTPATIENT
Start: 2023-02-20 | End: 2023-02-20

## 2023-02-20 RX ORDER — DEXTROSE MONOHYDRATE 25 G/50ML
50 INJECTION, SOLUTION INTRAVENOUS
Status: DISCONTINUED | OUTPATIENT
Start: 2023-02-20 | End: 2023-02-20

## 2023-02-20 RX ADMIN — LIDOCAINE HYDROCHLORIDE 5 ML: 10 INJECTION, SOLUTION EPIDURAL; INFILTRATION; INTRACAUDAL; PERINEURAL at 13:51:00

## 2023-02-20 RX ADMIN — SODIUM CHLORIDE, SODIUM LACTATE, POTASSIUM CHLORIDE, CALCIUM CHLORIDE: 600; 310; 30; 20 INJECTION, SOLUTION INTRAVENOUS at 13:48:00

## 2023-02-20 RX ADMIN — SODIUM CHLORIDE, SODIUM LACTATE, POTASSIUM CHLORIDE, CALCIUM CHLORIDE: 600; 310; 30; 20 INJECTION, SOLUTION INTRAVENOUS at 14:08:00

## 2023-02-20 NOTE — DISCHARGE INSTRUCTIONS
Home Care Instructions for Colonoscopy and/or Gastroscopy with Sedation    Diet:  - Resume your regular diet as tolerated unless otherwise instructed. - Start with light meals to minimize bloating.  - Do not drink alcohol today. Medication:  - If you have questions about resuming your normal medications, please contact your Primary Care Physician. Activities:  - Take it easy today. Do not return to work today. - Do not drive today. - Do not operate any machinery today (including kitchen equipment). Colonoscopy:  - You may notice some rectal \"spotting\" (a little blood on the toilet tissue) for a day or two after the exam. This is normal.  - If you experience any rectal bleeding (not spotting), persistent tenderness or sharp severe abdominal pains, oral temperature over 100 degrees Fahrenheit, light-headedness or dizziness, or any other problems, contact your doctor. Gastroscopy:  - You may have a sore throat for 2-3 days following the exam. This is normal. Gargling with warm salt water (1/2 tsp salt to 1 glass warm water) or using throat lozenges will help. - If you experience any sharp pain in your neck, abdomen or chest, vomiting of blood, oral temperature over 100 degrees Fahrenheit, light-headedness or dizziness, or any other problems, contact your doctor. **If unable to reach your doctor, please go to the [facility] Emergency Room**    - Your referring physician will receive a full report of your examination.  - If you do not hear from your doctor's office within two weeks of your biopsy, please call them for your results.

## 2023-02-20 NOTE — ANESTHESIA POSTPROCEDURE EVALUATION
[facility] Patient Status:  Hospital Outpatient Surgery   Age/Gender 57year old male MRN [id number]   Location [address] [address] Attending Alfie Hankins MD   Hosp Day # 0 PCP Berwyn Dandy, MD       Anesthesia Post-op Note    ESOPHAGOGASTRODUODENOSCOPY (EGD) WITH BIOPSIES AND COLD SNARE POLYPECTOMIES  AND COLONOSCOPY     Procedure Summary     Date: 02/20/23 Room / Location: [facility] ENDOSCOPY 04 / [facility] ENDOSCOPY    Anesthesia Start: 1526 Anesthesia Stop: 3396    Procedures:       ESOPHAGOGASTRODUODENOSCOPY (EGD) WITH BIOPSIES AND COLD SNARE POLYPECTOMIES  AND COLONOSCOPY      COLONOSCOPY Diagnosis:       Retching      Colon cancer screening      (EGD: Esophagitis, gastric polyps COLON: poor prep, incomplete colon )    Surgeons: Alfie Hankins MD Anesthesiologist: Sea Howell MD    Anesthesia Type: MAC ASA Status: 3          Anesthesia Type: MAC    Vitals Value Taken Time   /82 02/20/23 1427   Temp  02/20/23 1433   Pulse 77 02/20/23 1432   Resp 16 02/20/23 1433   SpO2 100 % 02/20/23 1432   Vitals shown include unvalidated device data. Patient Location: Endoscopy    Anesthesia Type: MAC    Airway Patency: patent    Postop Pain Control: adequate    Mental Status: preanesthetic baseline    Nausea/Vomiting: none    Cardiopulmonary/Hydration status: stable euvolemic    Complications: no apparent anesthesia related complications    Postop vital signs: stable    Dental Exam: Unchanged from Preop    Patient to be discharged home when criteria met.

## 2023-02-21 ENCOUNTER — PATIENT MESSAGE (OUTPATIENT)
Facility: CLINIC | Age: 63
End: 2023-02-21

## 2023-02-21 NOTE — TELEPHONE ENCOUNTER
Per last office visit note, \"RTC 1 month w/ APN, 2 months with MD\". Replied to patient's MyChart, asked to call office to assist with re-scheduling. Will close this encounter.

## 2023-02-21 NOTE — TELEPHONE ENCOUNTER
From: Allison Bah  To: MISSY Bonilla  Sent: 2/21/2023 8:20 AM CST  Subject: CHANGE SCHEDULED APPOINTMENT    Hello,    I would like to change my scheduled appointment for February 28th to March 28th or later if possible. I appreciate your help.     Thank Dm Peter  [phone]

## 2023-03-14 DIAGNOSIS — Z79.4 TYPE 2 DIABETES MELLITUS WITH HYPERGLYCEMIA, WITH LONG-TERM CURRENT USE OF INSULIN (HCC): Primary | ICD-10-CM

## 2023-03-14 DIAGNOSIS — E11.65 TYPE 2 DIABETES MELLITUS WITH HYPERGLYCEMIA, WITH LONG-TERM CURRENT USE OF INSULIN (HCC): Primary | ICD-10-CM

## 2023-03-14 RX ORDER — INSULIN DETEMIR 100 [IU]/ML
20 INJECTION, SOLUTION SUBCUTANEOUS NIGHTLY
Qty: 30 ML | Refills: 1 | Status: SHIPPED | OUTPATIENT
Start: 2023-03-14

## 2023-03-14 NOTE — TELEPHONE ENCOUNTER
Received phone call from patient's [address]. They are asking for updated rx for Levemir. \"Flex touch\" is no longer being made. Now it is \"flex pen\". Asked if they would accept verbal order, but they are requesting new rx to be sent. Pended fill- per LOV on 1/24 increase to 20units nightly. Routed to [facility] 4 APN.     Removed Flextouch from med list.

## 2023-03-22 ENCOUNTER — PATIENT MESSAGE (OUTPATIENT)
Dept: INTERNAL MEDICINE CLINIC | Facility: CLINIC | Age: 63
End: 2023-03-22

## 2023-03-23 NOTE — TELEPHONE ENCOUNTER
From: Yoli Correia  To: Melissa Lopez MD  Sent: 3/22/2023 4:01 PM CDT  Subject: FOOT INFECTION? Hello Dr. Nunu Zamora,    I have a blister on the bottom of my right foot that appears to have been infected. It's been at least 3 weeks and it still has not healed. Should I make an appointment to see you or visit the [facility] (Urgent Care.) I hope all I need is antibiotics.      Thank Almita Grover  [phone]

## 2023-03-24 ENCOUNTER — HOSPITAL ENCOUNTER (EMERGENCY)
Facility: HOSPITAL | Age: 63
Discharge: LEFT BEFORE DISCHARGE | End: 2023-03-24
Payer: COMMERCIAL

## 2023-03-24 ENCOUNTER — HOSPITAL ENCOUNTER (OUTPATIENT)
Age: 63
Discharge: ANOTHER HOSPITAL | End: 2023-03-24
Payer: COMMERCIAL

## 2023-03-24 VITALS
OXYGEN SATURATION: 97 % | DIASTOLIC BLOOD PRESSURE: 68 MMHG | RESPIRATION RATE: 20 BRPM | HEART RATE: 68 BPM | TEMPERATURE: 98 F | SYSTOLIC BLOOD PRESSURE: 145 MMHG

## 2023-03-24 DIAGNOSIS — L97.418 DIABETIC ULCER OF RIGHT MIDFOOT ASSOCIATED WITH TYPE 2 DIABETES MELLITUS, WITH OTHER ULCER SEVERITY (HCC): Primary | ICD-10-CM

## 2023-03-24 DIAGNOSIS — E11.621 DIABETIC ULCER OF RIGHT MIDFOOT ASSOCIATED WITH TYPE 2 DIABETES MELLITUS, WITH OTHER ULCER SEVERITY (HCC): Primary | ICD-10-CM

## 2023-03-24 PROCEDURE — 99215 OFFICE O/P EST HI 40 MIN: CPT | Performed by: PHYSICIAN ASSISTANT

## 2023-03-24 NOTE — DISCHARGE INSTRUCTIONS
Please go directly to the emergency department at [address] in [place]. Do not eat or drink until you are evaluated by the emergency department staff.

## 2023-03-24 NOTE — ED INITIAL ASSESSMENT (HPI)
Pt is a diabetic and states he had a blister to the bottom of his rt foot that became a wound and is red and draining a foul smelling odor, when bandage removed, he has a large surface area of diabetic ulcer, which appears infected

## 2023-03-27 ENCOUNTER — LAB ENCOUNTER (OUTPATIENT)
Dept: LAB | Age: 63
End: 2023-03-27
Attending: INTERNAL MEDICINE
Payer: COMMERCIAL

## 2023-03-27 DIAGNOSIS — E11.21 DIABETIC NEPHROPATHY ASSOCIATED WITH TYPE 2 DIABETES MELLITUS (HCC): ICD-10-CM

## 2023-03-27 DIAGNOSIS — N18.4 CKD (CHRONIC KIDNEY DISEASE) STAGE 4, GFR 15-29 ML/MIN (HCC): ICD-10-CM

## 2023-03-27 DIAGNOSIS — N18.4 ANEMIA DUE TO STAGE 4 CHRONIC KIDNEY DISEASE (HCC): ICD-10-CM

## 2023-03-27 DIAGNOSIS — I10 PRIMARY HYPERTENSION: ICD-10-CM

## 2023-03-27 DIAGNOSIS — D63.1 ANEMIA DUE TO STAGE 4 CHRONIC KIDNEY DISEASE (HCC): ICD-10-CM

## 2023-03-27 LAB
ANION GAP SERPL CALC-SCNC: 10 MMOL/L (ref 0–18)
BASOPHILS # BLD AUTO: 0.04 X10(3) UL (ref 0–0.2)
BASOPHILS NFR BLD AUTO: 0.3 %
BUN BLD-MCNC: 67 MG/DL (ref 7–18)
CALCIUM BLD-MCNC: 9.2 MG/DL (ref 8.5–10.1)
CHLORIDE SERPL-SCNC: 121 MMOL/L (ref 98–112)
CO2 SERPL-SCNC: 12 MMOL/L (ref 21–32)
CREAT BLD-MCNC: 4.01 MG/DL
EOSINOPHIL # BLD AUTO: 0.01 X10(3) UL (ref 0–0.7)
EOSINOPHIL NFR BLD AUTO: 0.1 %
ERYTHROCYTE [DISTWIDTH] IN BLOOD BY AUTOMATED COUNT: 14.1 %
FASTING STATUS PATIENT QL REPORTED: NO
GFR SERPLBLD BASED ON 1.73 SQ M-ARVRAT: 16 ML/MIN/1.73M2 (ref 60–?)
GLUCOSE BLD-MCNC: 268 MG/DL (ref 70–99)
HCT VFR BLD AUTO: 27.8 %
HGB BLD-MCNC: 8.7 G/DL
IMM GRANULOCYTES # BLD AUTO: 0.09 X10(3) UL (ref 0–1)
IMM GRANULOCYTES NFR BLD: 0.7 %
LYMPHOCYTES # BLD AUTO: 0.95 X10(3) UL (ref 1–4)
LYMPHOCYTES NFR BLD AUTO: 7.1 %
MCH RBC QN AUTO: 26.7 PG (ref 26–34)
MCHC RBC AUTO-ENTMCNC: 31.3 G/DL (ref 31–37)
MCV RBC AUTO: 85.3 FL
MONOCYTES # BLD AUTO: 0.44 X10(3) UL (ref 0.1–1)
MONOCYTES NFR BLD AUTO: 3.3 %
NEUTROPHILS # BLD AUTO: 11.91 X10 (3) UL (ref 1.5–7.7)
NEUTROPHILS # BLD AUTO: 11.91 X10(3) UL (ref 1.5–7.7)
NEUTROPHILS NFR BLD AUTO: 88.5 %
OSMOLALITY SERPL CALC.SUM OF ELEC: 325 MOSM/KG (ref 275–295)
PHOSPHATE SERPL-MCNC: 5.6 MG/DL (ref 2.5–4.9)
PLATELET # BLD AUTO: 435 10(3)UL (ref 150–450)
POTASSIUM SERPL-SCNC: 4.2 MMOL/L (ref 3.5–5.1)
RBC # BLD AUTO: 3.26 X10(6)UL
SODIUM SERPL-SCNC: 143 MMOL/L (ref 136–145)
WBC # BLD AUTO: 13.4 X10(3) UL (ref 4–11)

## 2023-03-27 PROCEDURE — 84100 ASSAY OF PHOSPHORUS: CPT | Performed by: INTERNAL MEDICINE

## 2023-03-27 PROCEDURE — 85025 COMPLETE CBC W/AUTO DIFF WBC: CPT | Performed by: INTERNAL MEDICINE

## 2023-03-27 PROCEDURE — 80048 BASIC METABOLIC PNL TOTAL CA: CPT | Performed by: INTERNAL MEDICINE

## 2023-03-29 ENCOUNTER — TELEPHONE (OUTPATIENT)
Dept: NEPHROLOGY | Facility: CLINIC | Age: 63
End: 2023-03-29

## 2023-03-29 ENCOUNTER — APPOINTMENT (OUTPATIENT)
Dept: GENERAL RADIOLOGY | Facility: HOSPITAL | Age: 63
DRG: 617 | End: 2023-03-29
Attending: EMERGENCY MEDICINE
Payer: COMMERCIAL

## 2023-03-29 ENCOUNTER — HOSPITAL ENCOUNTER (INPATIENT)
Facility: HOSPITAL | Age: 63
LOS: 9 days | Discharge: HOME HEALTH | DRG: 617 | End: 2023-04-07
Attending: EMERGENCY MEDICINE | Admitting: HOSPITALIST
Payer: COMMERCIAL

## 2023-03-29 DIAGNOSIS — N18.4 CKD (CHRONIC KIDNEY DISEASE) STAGE 4, GFR 15-29 ML/MIN (HCC): Primary | ICD-10-CM

## 2023-03-29 DIAGNOSIS — E11.628 DIABETIC FOOT INFECTION (HCC): Primary | ICD-10-CM

## 2023-03-29 DIAGNOSIS — E87.20 METABOLIC ACIDOSIS: ICD-10-CM

## 2023-03-29 DIAGNOSIS — N18.9 CHRONIC RENAL IMPAIRMENT, UNSPECIFIED CKD STAGE: ICD-10-CM

## 2023-03-29 DIAGNOSIS — N18.4 CKD (CHRONIC KIDNEY DISEASE) STAGE 4, GFR 15-29 ML/MIN (HCC): ICD-10-CM

## 2023-03-29 DIAGNOSIS — Z79.4 TYPE 2 DIABETES MELLITUS WITH HYPERGLYCEMIA, WITH LONG-TERM CURRENT USE OF INSULIN (HCC): ICD-10-CM

## 2023-03-29 DIAGNOSIS — E11.65 TYPE 2 DIABETES MELLITUS WITH HYPERGLYCEMIA, WITH LONG-TERM CURRENT USE OF INSULIN (HCC): ICD-10-CM

## 2023-03-29 DIAGNOSIS — D64.9 ANEMIA, UNSPECIFIED TYPE: ICD-10-CM

## 2023-03-29 DIAGNOSIS — D72.829 LEUKOCYTOSIS, UNSPECIFIED TYPE: ICD-10-CM

## 2023-03-29 DIAGNOSIS — L08.9 DIABETIC FOOT INFECTION (HCC): Primary | ICD-10-CM

## 2023-03-29 PROBLEM — L03.90 CELLULITIS: Status: ACTIVE | Noted: 2023-03-29

## 2023-03-29 LAB
ALBUMIN SERPL-MCNC: 2.5 G/DL (ref 3.4–5)
ALBUMIN/GLOB SERPL: 0.5 {RATIO} (ref 1–2)
ALP LIVER SERPL-CCNC: 83 U/L
ALT SERPL-CCNC: 11 U/L
ANION GAP SERPL CALC-SCNC: 12 MMOL/L (ref 0–18)
AST SERPL-CCNC: 11 U/L (ref 15–37)
BASOPHILS # BLD AUTO: 0.02 X10(3) UL (ref 0–0.2)
BASOPHILS NFR BLD AUTO: 0.1 %
BILIRUB SERPL-MCNC: 0.2 MG/DL (ref 0.1–2)
BUN BLD-MCNC: 75 MG/DL (ref 7–18)
CALCIUM BLD-MCNC: 9 MG/DL (ref 8.5–10.1)
CHLORIDE SERPL-SCNC: 114 MMOL/L (ref 98–112)
CO2 SERPL-SCNC: 11 MMOL/L (ref 21–32)
CREAT BLD-MCNC: 3.95 MG/DL
CRP SERPL-MCNC: 10.2 MG/DL (ref ?–0.3)
EOSINOPHIL # BLD AUTO: 0.02 X10(3) UL (ref 0–0.7)
EOSINOPHIL NFR BLD AUTO: 0.1 %
ERYTHROCYTE [DISTWIDTH] IN BLOOD BY AUTOMATED COUNT: 13.9 %
ERYTHROCYTE [SEDIMENTATION RATE] IN BLOOD: 73 MM/HR
GFR SERPLBLD BASED ON 1.73 SQ M-ARVRAT: 16 ML/MIN/1.73M2 (ref 60–?)
GLOBULIN PLAS-MCNC: 4.7 G/DL (ref 2.8–4.4)
GLUCOSE BLD-MCNC: 174 MG/DL (ref 70–99)
GLUCOSE BLD-MCNC: 208 MG/DL (ref 70–99)
HCT VFR BLD AUTO: 25.2 %
HGB BLD-MCNC: 8.1 G/DL
IMM GRANULOCYTES # BLD AUTO: 0.13 X10(3) UL (ref 0–1)
IMM GRANULOCYTES NFR BLD: 0.7 %
LACTATE SERPL-SCNC: 0.6 MMOL/L (ref 0.4–2)
LYMPHOCYTES # BLD AUTO: 0.91 X10(3) UL (ref 1–4)
LYMPHOCYTES NFR BLD AUTO: 4.7 %
MCH RBC QN AUTO: 26.7 PG (ref 26–34)
MCHC RBC AUTO-ENTMCNC: 32.1 G/DL (ref 31–37)
MCV RBC AUTO: 83.2 FL
MONOCYTES # BLD AUTO: 0.99 X10(3) UL (ref 0.1–1)
MONOCYTES NFR BLD AUTO: 5.1 %
NEUTROPHILS # BLD AUTO: 17.35 X10 (3) UL (ref 1.5–7.7)
NEUTROPHILS # BLD AUTO: 17.35 X10(3) UL (ref 1.5–7.7)
NEUTROPHILS NFR BLD AUTO: 89.3 %
OSMOLALITY SERPL CALC.SUM OF ELEC: 312 MOSM/KG (ref 275–295)
PLATELET # BLD AUTO: 419 10(3)UL (ref 150–450)
POTASSIUM SERPL-SCNC: 3.8 MMOL/L (ref 3.5–5.1)
PROT SERPL-MCNC: 7.2 G/DL (ref 6.4–8.2)
RBC # BLD AUTO: 3.03 X10(6)UL
SARS-COV-2 RNA RESP QL NAA+PROBE: NOT DETECTED
SODIUM SERPL-SCNC: 137 MMOL/L (ref 136–145)
WBC # BLD AUTO: 19.4 X10(3) UL (ref 4–11)

## 2023-03-29 PROCEDURE — 73630 X-RAY EXAM OF FOOT: CPT | Performed by: EMERGENCY MEDICINE

## 2023-03-29 PROCEDURE — 99223 1ST HOSP IP/OBS HIGH 75: CPT | Performed by: HOSPITALIST

## 2023-03-29 RX ORDER — HYDROMORPHONE HYDROCHLORIDE 1 MG/ML
0.4 INJECTION, SOLUTION INTRAMUSCULAR; INTRAVENOUS; SUBCUTANEOUS EVERY 2 HOUR PRN
Status: DISCONTINUED | OUTPATIENT
Start: 2023-03-29 | End: 2023-04-07

## 2023-03-29 RX ORDER — SODIUM BICARBONATE 325 MG/1
650 TABLET ORAL 2 TIMES DAILY
Status: DISCONTINUED | OUTPATIENT
Start: 2023-03-29 | End: 2023-04-01

## 2023-03-29 RX ORDER — SODIUM BICARBONATE 650 MG/1
650 TABLET ORAL 2 TIMES DAILY
Qty: 180 TABLET | Refills: 3 | Status: ON HOLD | OUTPATIENT
Start: 2023-03-29 | End: 2023-03-29

## 2023-03-29 RX ORDER — MELATONIN
3 NIGHTLY PRN
Status: DISCONTINUED | OUTPATIENT
Start: 2023-03-29 | End: 2023-04-07

## 2023-03-29 RX ORDER — PANTOPRAZOLE SODIUM 40 MG/1
40 TABLET, DELAYED RELEASE ORAL
Status: DISCONTINUED | OUTPATIENT
Start: 2023-03-29 | End: 2023-04-07

## 2023-03-29 RX ORDER — ONDANSETRON 2 MG/ML
4 INJECTION INTRAMUSCULAR; INTRAVENOUS EVERY 4 HOURS PRN
Status: DISCONTINUED | OUTPATIENT
Start: 2023-03-29 | End: 2023-03-29 | Stop reason: ALTCHOICE

## 2023-03-29 RX ORDER — CEFAZOLIN SODIUM/WATER 2 G/20 ML
2 SYRINGE (ML) INTRAVENOUS EVERY 12 HOURS
Status: CANCELLED | OUTPATIENT
Start: 2023-03-29

## 2023-03-29 RX ORDER — ONDANSETRON 2 MG/ML
4 INJECTION INTRAMUSCULAR; INTRAVENOUS EVERY 6 HOURS PRN
Status: DISCONTINUED | OUTPATIENT
Start: 2023-03-29 | End: 2023-04-07

## 2023-03-29 RX ORDER — SODIUM CHLORIDE 9 MG/ML
INJECTION, SOLUTION INTRAVENOUS CONTINUOUS
Status: ACTIVE | OUTPATIENT
Start: 2023-03-29 | End: 2023-03-29

## 2023-03-29 RX ORDER — SENNOSIDES 8.6 MG
17.2 TABLET ORAL NIGHTLY PRN
Status: DISCONTINUED | OUTPATIENT
Start: 2023-03-29 | End: 2023-04-07

## 2023-03-29 RX ORDER — NICOTINE POLACRILEX 4 MG
15 LOZENGE BUCCAL
Status: DISCONTINUED | OUTPATIENT
Start: 2023-03-29 | End: 2023-04-07

## 2023-03-29 RX ORDER — DEXTROSE MONOHYDRATE 25 G/50ML
50 INJECTION, SOLUTION INTRAVENOUS
Status: DISCONTINUED | OUTPATIENT
Start: 2023-03-29 | End: 2023-04-07

## 2023-03-29 RX ORDER — HEPARIN SODIUM 5000 [USP'U]/ML
5000 INJECTION, SOLUTION INTRAVENOUS; SUBCUTANEOUS EVERY 8 HOURS SCHEDULED
Status: DISCONTINUED | OUTPATIENT
Start: 2023-03-29 | End: 2023-04-07

## 2023-03-29 RX ORDER — POLYETHYLENE GLYCOL 3350 17 G/17G
17 POWDER, FOR SOLUTION ORAL DAILY PRN
Status: DISCONTINUED | OUTPATIENT
Start: 2023-03-29 | End: 2023-04-07

## 2023-03-29 RX ORDER — BISACODYL 10 MG
10 SUPPOSITORY, RECTAL RECTAL
Status: DISCONTINUED | OUTPATIENT
Start: 2023-03-29 | End: 2023-04-07

## 2023-03-29 RX ORDER — ECHINACEA PURPUREA EXTRACT 125 MG
1 TABLET ORAL
Status: DISCONTINUED | OUTPATIENT
Start: 2023-03-29 | End: 2023-04-07

## 2023-03-29 RX ORDER — HYDROMORPHONE HYDROCHLORIDE 1 MG/ML
0.8 INJECTION, SOLUTION INTRAMUSCULAR; INTRAVENOUS; SUBCUTANEOUS EVERY 2 HOUR PRN
Status: DISCONTINUED | OUTPATIENT
Start: 2023-03-29 | End: 2023-04-07

## 2023-03-29 RX ORDER — ACETAMINOPHEN 500 MG
1000 TABLET ORAL EVERY 4 HOURS PRN
Status: DISCONTINUED | OUTPATIENT
Start: 2023-03-29 | End: 2023-04-07

## 2023-03-29 RX ORDER — PROCHLORPERAZINE EDISYLATE 5 MG/ML
5 INJECTION INTRAMUSCULAR; INTRAVENOUS EVERY 8 HOURS PRN
Status: DISCONTINUED | OUTPATIENT
Start: 2023-03-29 | End: 2023-04-07

## 2023-03-29 RX ORDER — NICOTINE POLACRILEX 4 MG
30 LOZENGE BUCCAL
Status: DISCONTINUED | OUTPATIENT
Start: 2023-03-29 | End: 2023-04-07

## 2023-03-29 NOTE — ED INITIAL ASSESSMENT (HPI)
Infection in right foot since few days . started like blisters now it is black in colour and foul smell from wound .  History of diabetic foot and amputated toes

## 2023-03-29 NOTE — ED QUICK NOTES
Orders for admission, patient is aware of plan and ready to go upstairs.  Any questions, please call ED RN Caroline Moore at [phone]    Patient Covid vaccination status: Fully vaccinated     COVID Test Ordered in ED: Rapid SARS-CoV-2 by PCR    COVID Suspicion at Admission: N/A    Running Infusions:  None    Mental Status/LOC at time of transport: ORIENTED X4    Other pertinent information: NA  CIWA score: N/A   NIH score:  N/A

## 2023-03-30 ENCOUNTER — APPOINTMENT (OUTPATIENT)
Dept: ULTRASOUND IMAGING | Facility: HOSPITAL | Age: 63
DRG: 617 | End: 2023-03-30
Attending: STUDENT IN AN ORGANIZED HEALTH CARE EDUCATION/TRAINING PROGRAM
Payer: COMMERCIAL

## 2023-03-30 ENCOUNTER — APPOINTMENT (OUTPATIENT)
Dept: MRI IMAGING | Facility: HOSPITAL | Age: 63
DRG: 617 | End: 2023-03-30
Attending: HOSPITALIST
Payer: COMMERCIAL

## 2023-03-30 ENCOUNTER — ANESTHESIA EVENT (OUTPATIENT)
Dept: SURGERY | Facility: HOSPITAL | Age: 63
DRG: 617 | End: 2023-03-30
Payer: COMMERCIAL

## 2023-03-30 LAB
ANION GAP SERPL CALC-SCNC: 12 MMOL/L (ref 0–18)
BASOPHILS # BLD AUTO: 0.03 X10(3) UL (ref 0–0.2)
BASOPHILS NFR BLD AUTO: 0.2 %
BUN BLD-MCNC: 76 MG/DL (ref 7–18)
CALCIUM BLD-MCNC: 8.8 MG/DL (ref 8.5–10.1)
CHLORIDE SERPL-SCNC: 118 MMOL/L (ref 98–112)
CO2 SERPL-SCNC: 11 MMOL/L (ref 21–32)
CREAT BLD-MCNC: 3.81 MG/DL
EOSINOPHIL # BLD AUTO: 0.02 X10(3) UL (ref 0–0.7)
EOSINOPHIL NFR BLD AUTO: 0.1 %
ERYTHROCYTE [DISTWIDTH] IN BLOOD BY AUTOMATED COUNT: 13.6 %
GFR SERPLBLD BASED ON 1.73 SQ M-ARVRAT: 17 ML/MIN/1.73M2 (ref 60–?)
GLUCOSE BLD-MCNC: 171 MG/DL (ref 70–99)
GLUCOSE BLD-MCNC: 175 MG/DL (ref 70–99)
GLUCOSE BLD-MCNC: 175 MG/DL (ref 70–99)
GLUCOSE BLD-MCNC: 249 MG/DL (ref 70–99)
GLUCOSE BLD-MCNC: 328 MG/DL (ref 70–99)
HCT VFR BLD AUTO: 24.7 %
HGB BLD-MCNC: 7.9 G/DL
IMM GRANULOCYTES # BLD AUTO: 0.12 X10(3) UL (ref 0–1)
IMM GRANULOCYTES NFR BLD: 0.7 %
LYMPHOCYTES # BLD AUTO: 0.91 X10(3) UL (ref 1–4)
LYMPHOCYTES NFR BLD AUTO: 4.9 %
MAGNESIUM SERPL-MCNC: 2.1 MG/DL (ref 1.6–2.6)
MCH RBC QN AUTO: 26.6 PG (ref 26–34)
MCHC RBC AUTO-ENTMCNC: 32 G/DL (ref 31–37)
MCV RBC AUTO: 83.2 FL
MONOCYTES # BLD AUTO: 1.07 X10(3) UL (ref 0.1–1)
MONOCYTES NFR BLD AUTO: 5.8 %
NEUTROPHILS # BLD AUTO: 16.31 X10 (3) UL (ref 1.5–7.7)
NEUTROPHILS # BLD AUTO: 16.31 X10(3) UL (ref 1.5–7.7)
NEUTROPHILS NFR BLD AUTO: 88.3 %
OSMOLALITY SERPL CALC.SUM OF ELEC: 319 MOSM/KG (ref 275–295)
PLATELET # BLD AUTO: 405 10(3)UL (ref 150–450)
POTASSIUM SERPL-SCNC: 3.4 MMOL/L (ref 3.5–5.1)
POTASSIUM SERPL-SCNC: 3.7 MMOL/L (ref 3.5–5.1)
RBC # BLD AUTO: 2.97 X10(6)UL
SODIUM SERPL-SCNC: 141 MMOL/L (ref 136–145)
WBC # BLD AUTO: 18.5 X10(3) UL (ref 4–11)

## 2023-03-30 PROCEDURE — 99232 SBSQ HOSP IP/OBS MODERATE 35: CPT | Performed by: INTERNAL MEDICINE

## 2023-03-30 PROCEDURE — 93925 LOWER EXTREMITY STUDY: CPT | Performed by: STUDENT IN AN ORGANIZED HEALTH CARE EDUCATION/TRAINING PROGRAM

## 2023-03-30 PROCEDURE — 73718 MRI LOWER EXTREMITY W/O DYE: CPT | Performed by: HOSPITALIST

## 2023-03-30 PROCEDURE — 99253 IP/OBS CNSLTJ NEW/EST LOW 45: CPT | Performed by: STUDENT IN AN ORGANIZED HEALTH CARE EDUCATION/TRAINING PROGRAM

## 2023-03-30 RX ORDER — POTASSIUM CHLORIDE 20 MEQ/1
20 TABLET, EXTENDED RELEASE ORAL ONCE
Status: DISCONTINUED | OUTPATIENT
Start: 2023-03-30 | End: 2023-03-30

## 2023-03-30 RX ORDER — HYDRALAZINE HYDROCHLORIDE 20 MG/ML
10 INJECTION INTRAMUSCULAR; INTRAVENOUS EVERY 6 HOURS PRN
Status: DISCONTINUED | OUTPATIENT
Start: 2023-03-30 | End: 2023-04-07

## 2023-03-30 RX ORDER — POTASSIUM CHLORIDE 20 MEQ/1
20 TABLET, EXTENDED RELEASE ORAL ONCE
Status: COMPLETED | OUTPATIENT
Start: 2023-03-30 | End: 2023-03-30

## 2023-03-30 NOTE — PROGRESS NOTES
AOx4, on RA, continuous pulsox, TELE. Denies having any pain. Right foot wound noted, foul smelling odor. Scant serosanguinous drainage. Necrotic tissue. covered with Kerlix and nonadherent dressing. IV abt infusing. Call light in reach. Voiding well.

## 2023-03-30 NOTE — PROGRESS NOTES
[facility] Pharmacy Note:  Renal Adjustment for cefepime (MAXIPIME)    Riana Tilley is a 57year old patient who has been prescribed cefepime (MAXIPIME) 2000 mg every 8 hrs. The estimated creatinine clearance is 17.7 mL/min (A) (based on SCr of 3.95 mg/dL (H)). The dose has been adjusted to cefepime (MAXIPIME) 1000 mg every 24 hrs per hospital renal dose adjustment protocol for treatment of cellulitis. Pharmacy will follow and adjust dose as warranted for additional renal function changes.     Thank you,    Shin Waggoner, PharmD  3/29/2023  7:29 PM

## 2023-03-31 ENCOUNTER — APPOINTMENT (OUTPATIENT)
Dept: GENERAL RADIOLOGY | Facility: HOSPITAL | Age: 63
DRG: 617 | End: 2023-03-31
Attending: STUDENT IN AN ORGANIZED HEALTH CARE EDUCATION/TRAINING PROGRAM
Payer: COMMERCIAL

## 2023-03-31 ENCOUNTER — ANESTHESIA (OUTPATIENT)
Dept: SURGERY | Facility: HOSPITAL | Age: 63
DRG: 617 | End: 2023-03-31
Payer: COMMERCIAL

## 2023-03-31 LAB
ANION GAP SERPL CALC-SCNC: 12 MMOL/L (ref 0–18)
BASOPHILS # BLD AUTO: 0.02 X10(3) UL (ref 0–0.2)
BASOPHILS NFR BLD AUTO: 0.1 %
BUN BLD-MCNC: 71 MG/DL (ref 7–18)
CALCIUM BLD-MCNC: 8.6 MG/DL (ref 8.5–10.1)
CHLORIDE SERPL-SCNC: 114 MMOL/L (ref 98–112)
CO2 SERPL-SCNC: 12 MMOL/L (ref 21–32)
CREAT BLD-MCNC: 3.95 MG/DL
DEPRECATED HBV CORE AB SER IA-ACNC: 444.5 NG/ML
EOSINOPHIL # BLD AUTO: 0.01 X10(3) UL (ref 0–0.7)
EOSINOPHIL NFR BLD AUTO: 0 %
ERYTHROCYTE [DISTWIDTH] IN BLOOD BY AUTOMATED COUNT: 13.6 %
GFR SERPLBLD BASED ON 1.73 SQ M-ARVRAT: 16 ML/MIN/1.73M2 (ref 60–?)
GLUCOSE BLD-MCNC: 149 MG/DL (ref 70–99)
GLUCOSE BLD-MCNC: 165 MG/DL (ref 70–99)
GLUCOSE BLD-MCNC: 202 MG/DL (ref 70–99)
GLUCOSE BLD-MCNC: 207 MG/DL (ref 70–99)
GLUCOSE BLD-MCNC: 284 MG/DL (ref 70–99)
HCT VFR BLD AUTO: 23.6 %
HCT VFR BLD AUTO: 23.8 %
HGB BLD-MCNC: 7.5 G/DL
HGB BLD-MCNC: 7.6 G/DL
IMM GRANULOCYTES # BLD AUTO: 0.19 X10(3) UL (ref 0–1)
IMM GRANULOCYTES NFR BLD: 0.8 %
IRON SATN MFR SERPL: 13 %
IRON SERPL-MCNC: 19 UG/DL
LYMPHOCYTES # BLD AUTO: 0.87 X10(3) UL (ref 1–4)
LYMPHOCYTES NFR BLD AUTO: 3.9 %
MCH RBC QN AUTO: 26.2 PG (ref 26–34)
MCHC RBC AUTO-ENTMCNC: 31.8 G/DL (ref 31–37)
MCV RBC AUTO: 82.5 FL
MONOCYTES # BLD AUTO: 0.9 X10(3) UL (ref 0.1–1)
MONOCYTES NFR BLD AUTO: 4 %
NEUTROPHILS # BLD AUTO: 20.51 X10 (3) UL (ref 1.5–7.7)
NEUTROPHILS # BLD AUTO: 20.51 X10(3) UL (ref 1.5–7.7)
NEUTROPHILS NFR BLD AUTO: 91.2 %
OSMOLALITY SERPL CALC.SUM OF ELEC: 313 MOSM/KG (ref 275–295)
PLATELET # BLD AUTO: 371 10(3)UL (ref 150–450)
POTASSIUM SERPL-SCNC: 3.5 MMOL/L (ref 3.5–5.1)
RBC # BLD AUTO: 2.86 X10(6)UL
SODIUM SERPL-SCNC: 138 MMOL/L (ref 136–145)
TIBC SERPL-MCNC: 149 UG/DL (ref 240–450)
TRANSFERRIN SERPL-MCNC: 100 MG/DL (ref 200–360)
WBC # BLD AUTO: 22.5 X10(3) UL (ref 4–11)

## 2023-03-31 PROCEDURE — 11044 DBRDMT BONE 1ST 20 SQ CM/<: CPT | Performed by: STUDENT IN AN ORGANIZED HEALTH CARE EDUCATION/TRAINING PROGRAM

## 2023-03-31 PROCEDURE — 99254 IP/OBS CNSLTJ NEW/EST MOD 60: CPT | Performed by: INTERNAL MEDICINE

## 2023-03-31 PROCEDURE — 3E033GC INTRODUCTION OF OTHER THERAPEUTIC SUBSTANCE INTO PERIPHERAL VEIN, PERCUTANEOUS APPROACH: ICD-10-PCS | Performed by: INTERNAL MEDICINE

## 2023-03-31 PROCEDURE — 99233 SBSQ HOSP IP/OBS HIGH 50: CPT | Performed by: INTERNAL MEDICINE

## 2023-03-31 PROCEDURE — 0QBQ0ZZ EXCISION OF RIGHT TOE PHALANX, OPEN APPROACH: ICD-10-PCS | Performed by: STUDENT IN AN ORGANIZED HEALTH CARE EDUCATION/TRAINING PROGRAM

## 2023-03-31 PROCEDURE — 73620 X-RAY EXAM OF FOOT: CPT | Performed by: STUDENT IN AN ORGANIZED HEALTH CARE EDUCATION/TRAINING PROGRAM

## 2023-03-31 RX ORDER — MIDAZOLAM HYDROCHLORIDE 1 MG/ML
1 INJECTION INTRAMUSCULAR; INTRAVENOUS EVERY 5 MIN PRN
Status: DISCONTINUED | OUTPATIENT
Start: 2023-03-31 | End: 2023-03-31 | Stop reason: HOSPADM

## 2023-03-31 RX ORDER — ACETAMINOPHEN 500 MG
1000 TABLET ORAL ONCE AS NEEDED
Status: DISCONTINUED | OUTPATIENT
Start: 2023-03-31 | End: 2023-03-31 | Stop reason: HOSPADM

## 2023-03-31 RX ORDER — CEFAZOLIN SODIUM/WATER 2 G/20 ML
SYRINGE (ML) INTRAVENOUS AS NEEDED
Status: DISCONTINUED | OUTPATIENT
Start: 2023-03-31 | End: 2023-03-31 | Stop reason: SURG

## 2023-03-31 RX ORDER — PROCHLORPERAZINE EDISYLATE 5 MG/ML
5 INJECTION INTRAMUSCULAR; INTRAVENOUS EVERY 8 HOURS PRN
Status: DISCONTINUED | OUTPATIENT
Start: 2023-03-31 | End: 2023-03-31 | Stop reason: HOSPADM

## 2023-03-31 RX ORDER — HYDROMORPHONE HYDROCHLORIDE 1 MG/ML
0.6 INJECTION, SOLUTION INTRAMUSCULAR; INTRAVENOUS; SUBCUTANEOUS EVERY 5 MIN PRN
Status: DISCONTINUED | OUTPATIENT
Start: 2023-03-31 | End: 2023-03-31 | Stop reason: HOSPADM

## 2023-03-31 RX ORDER — BUPIVACAINE HYDROCHLORIDE 5 MG/ML
INJECTION, SOLUTION EPIDURAL; INTRACAUDAL AS NEEDED
Status: DISCONTINUED | OUTPATIENT
Start: 2023-03-31 | End: 2023-03-31 | Stop reason: HOSPADM

## 2023-03-31 RX ORDER — HYDROMORPHONE HYDROCHLORIDE 1 MG/ML
0.4 INJECTION, SOLUTION INTRAMUSCULAR; INTRAVENOUS; SUBCUTANEOUS EVERY 5 MIN PRN
Status: DISCONTINUED | OUTPATIENT
Start: 2023-03-31 | End: 2023-03-31 | Stop reason: HOSPADM

## 2023-03-31 RX ORDER — NALOXONE HYDROCHLORIDE 0.4 MG/ML
80 INJECTION, SOLUTION INTRAMUSCULAR; INTRAVENOUS; SUBCUTANEOUS AS NEEDED
Status: DISCONTINUED | OUTPATIENT
Start: 2023-03-31 | End: 2023-03-31 | Stop reason: HOSPADM

## 2023-03-31 RX ORDER — MEPERIDINE HYDROCHLORIDE 25 MG/ML
12.5 INJECTION INTRAMUSCULAR; INTRAVENOUS; SUBCUTANEOUS AS NEEDED
Status: DISCONTINUED | OUTPATIENT
Start: 2023-03-31 | End: 2023-03-31 | Stop reason: HOSPADM

## 2023-03-31 RX ORDER — DEXAMETHASONE SODIUM PHOSPHATE 4 MG/ML
VIAL (ML) INJECTION AS NEEDED
Status: DISCONTINUED | OUTPATIENT
Start: 2023-03-31 | End: 2023-03-31 | Stop reason: SURG

## 2023-03-31 RX ORDER — HYDROMORPHONE HYDROCHLORIDE 1 MG/ML
0.2 INJECTION, SOLUTION INTRAMUSCULAR; INTRAVENOUS; SUBCUTANEOUS EVERY 5 MIN PRN
Status: DISCONTINUED | OUTPATIENT
Start: 2023-03-31 | End: 2023-03-31 | Stop reason: HOSPADM

## 2023-03-31 RX ORDER — SODIUM CHLORIDE, SODIUM LACTATE, POTASSIUM CHLORIDE, CALCIUM CHLORIDE 600; 310; 30; 20 MG/100ML; MG/100ML; MG/100ML; MG/100ML
INJECTION, SOLUTION INTRAVENOUS CONTINUOUS
Status: DISCONTINUED | OUTPATIENT
Start: 2023-03-31 | End: 2023-03-31 | Stop reason: HOSPADM

## 2023-03-31 RX ORDER — ONDANSETRON 2 MG/ML
4 INJECTION INTRAMUSCULAR; INTRAVENOUS EVERY 6 HOURS PRN
Status: DISCONTINUED | OUTPATIENT
Start: 2023-03-31 | End: 2023-03-31 | Stop reason: HOSPADM

## 2023-03-31 RX ORDER — HYDROCODONE BITARTRATE AND ACETAMINOPHEN 10; 325 MG/1; MG/1
2 TABLET ORAL ONCE AS NEEDED
Status: DISCONTINUED | OUTPATIENT
Start: 2023-03-31 | End: 2023-03-31 | Stop reason: HOSPADM

## 2023-03-31 RX ORDER — HYDROCODONE BITARTRATE AND ACETAMINOPHEN 10; 325 MG/1; MG/1
1 TABLET ORAL ONCE AS NEEDED
Status: DISCONTINUED | OUTPATIENT
Start: 2023-03-31 | End: 2023-03-31 | Stop reason: HOSPADM

## 2023-03-31 RX ORDER — LABETALOL HYDROCHLORIDE 5 MG/ML
5 INJECTION, SOLUTION INTRAVENOUS EVERY 5 MIN PRN
Status: DISCONTINUED | OUTPATIENT
Start: 2023-03-31 | End: 2023-03-31 | Stop reason: HOSPADM

## 2023-03-31 RX ORDER — METOPROLOL SUCCINATE 25 MG/1
25 TABLET, EXTENDED RELEASE ORAL
Status: DISCONTINUED | OUTPATIENT
Start: 2023-03-31 | End: 2023-04-07

## 2023-03-31 RX ORDER — AMLODIPINE BESYLATE 5 MG/1
5 TABLET ORAL DAILY
Status: DISCONTINUED | OUTPATIENT
Start: 2023-03-31 | End: 2023-04-07

## 2023-03-31 RX ORDER — ONDANSETRON 2 MG/ML
INJECTION INTRAMUSCULAR; INTRAVENOUS AS NEEDED
Status: DISCONTINUED | OUTPATIENT
Start: 2023-03-31 | End: 2023-03-31 | Stop reason: SURG

## 2023-03-31 RX ORDER — LIDOCAINE HYDROCHLORIDE 10 MG/ML
INJECTION, SOLUTION INFILTRATION; PERINEURAL AS NEEDED
Status: DISCONTINUED | OUTPATIENT
Start: 2023-03-31 | End: 2023-03-31 | Stop reason: HOSPADM

## 2023-03-31 RX ADMIN — DEXAMETHASONE SODIUM PHOSPHATE 4 MG: 4 MG/ML VIAL (ML) INJECTION at 14:29:00

## 2023-03-31 RX ADMIN — SODIUM CHLORIDE: 9 INJECTION, SOLUTION INTRAVENOUS at 15:06:00

## 2023-03-31 RX ADMIN — ONDANSETRON 4 MG: 2 INJECTION INTRAMUSCULAR; INTRAVENOUS at 14:29:00

## 2023-03-31 RX ADMIN — CEFAZOLIN SODIUM/WATER 2 G: 2 G/20 ML SYRINGE (ML) INTRAVENOUS at 14:37:00

## 2023-03-31 NOTE — PLAN OF CARE
A/o x4. RA/. NPO at MN. Accuchecks. Voiding. LBM 3/29. Denies pain medication. Plan to go to OR 3/31. IV abx infusing. POC updated with pt. All safety measures in place.  Call light within reach instructed to call for help or assistance

## 2023-03-31 NOTE — PLAN OF CARE
Pt a/ox4. Denies pain. NPO this am for OR at 1345 this afternoon. Refused IVF for hydration, prefers to be saline locked for mobility. Pt declines to sign consent on unit, prefers to wait un til he sees Dr Saima Myers in [place]. Vascular surgery consulted, will see later today or tomorrow am per MD.   Continuing IV zosyn per ID. Will have OR cultures pending postop. Plan to be determined based on culture results.

## 2023-03-31 NOTE — PROGRESS NOTES
Pt returned to room. Dressing CDI. Denies pain. Popliteal pulse strong with doppler. Discussed NWB status,can attempt to get up and hop with walker with staff later if needs to use the bathroom, pt agreeable.

## 2023-03-31 NOTE — BRIEF OP NOTE
Pre-Operative Diagnosis: INPATIENT     Post-Operative Diagnosis: wet gangrene of right foot      Procedure Performed:   RIGHT FOOT IRRIGATION & DEBRIDEMENT    Surgeon(s) and Role:     * Radha Altamirano DPM - Primary    Assistant(s):   none     Surgical Findings: Consistent with pre op diagnosis, infection tracking plantarly along flexor tendons     Specimen:   Ulcerated soft tissue--micro  5th metatarsal bone--micro  5th metatarsal bone--path     Estimated Blood Loss: Blood Output: 30 mL (3/31/2023  2:55 PM)      Dictation Number:  See Bernard Freeman DPM  3/31/2023  3:21 PM

## 2023-03-31 NOTE — PROGRESS NOTES
Received call from Dr Angel Espinoza, aware of consult, will see pt either tonight or tomorrow morning.

## 2023-03-31 NOTE — ANESTHESIA PROCEDURE NOTES
Airway  Date/Time: 3/31/2023 2:29 PM  Urgency: elective      General Information and Staff    Patient location during procedure: OR  Anesthesiologist: Sushila Russ MD  Performed: anesthesiologist   Performed by: Sushila Russ MD  Authorized by: Sushila Russ MD      Indications and Patient Condition  Indications for airway management: anesthesia  Sedation level: deep  Preoxygenated: yes  Patient position: sniffing  Mask difficulty assessment: 1 - vent by mask    Final Airway Details  Final airway type: supraglottic airway      Successful airway: classic  Size 4       Number of attempts at approach: 1

## 2023-03-31 NOTE — PROGRESS NOTES
Patient transported to OR. Spouse at bedside. Jewelry (watch) removed prior to procedure and given to his spouse.

## 2023-04-01 LAB
ANION GAP SERPL CALC-SCNC: 10 MMOL/L (ref 0–18)
BASOPHILS # BLD AUTO: 0.02 X10(3) UL (ref 0–0.2)
BASOPHILS NFR BLD AUTO: 0.1 %
BUN BLD-MCNC: 71 MG/DL (ref 7–18)
CALCIUM BLD-MCNC: 8.4 MG/DL (ref 8.5–10.1)
CHLORIDE SERPL-SCNC: 113 MMOL/L (ref 98–112)
CO2 SERPL-SCNC: 12 MMOL/L (ref 21–32)
CREAT BLD-MCNC: 3.92 MG/DL
EOSINOPHIL # BLD AUTO: 0 X10(3) UL (ref 0–0.7)
EOSINOPHIL NFR BLD AUTO: 0 %
ERYTHROCYTE [DISTWIDTH] IN BLOOD BY AUTOMATED COUNT: 13.7 %
GFR SERPLBLD BASED ON 1.73 SQ M-ARVRAT: 16 ML/MIN/1.73M2 (ref 60–?)
GLUCOSE BLD-MCNC: 159 MG/DL (ref 70–99)
GLUCOSE BLD-MCNC: 216 MG/DL (ref 70–99)
GLUCOSE BLD-MCNC: 264 MG/DL (ref 70–99)
GLUCOSE BLD-MCNC: 274 MG/DL (ref 70–99)
GLUCOSE BLD-MCNC: 283 MG/DL (ref 70–99)
GLUCOSE BLD-MCNC: 303 MG/DL (ref 70–99)
HCT VFR BLD AUTO: 22.9 %
HGB BLD-MCNC: 7.5 G/DL
IMM GRANULOCYTES # BLD AUTO: 0.19 X10(3) UL (ref 0–1)
IMM GRANULOCYTES NFR BLD: 0.7 %
LYMPHOCYTES # BLD AUTO: 0.78 X10(3) UL (ref 1–4)
LYMPHOCYTES NFR BLD AUTO: 2.8 %
MCH RBC QN AUTO: 26.6 PG (ref 26–34)
MCHC RBC AUTO-ENTMCNC: 32.8 G/DL (ref 31–37)
MCV RBC AUTO: 81.2 FL
MONOCYTES # BLD AUTO: 0.83 X10(3) UL (ref 0.1–1)
MONOCYTES NFR BLD AUTO: 3 %
NEUTROPHILS # BLD AUTO: 25.62 X10 (3) UL (ref 1.5–7.7)
NEUTROPHILS # BLD AUTO: 25.62 X10(3) UL (ref 1.5–7.7)
NEUTROPHILS NFR BLD AUTO: 93.4 %
OSMOLALITY SERPL CALC.SUM OF ELEC: 310 MOSM/KG (ref 275–295)
PLATELET # BLD AUTO: 371 10(3)UL (ref 150–450)
POTASSIUM SERPL-SCNC: 3.8 MMOL/L (ref 3.5–5.1)
RBC # BLD AUTO: 2.82 X10(6)UL
SODIUM SERPL-SCNC: 135 MMOL/L (ref 136–145)
WBC # BLD AUTO: 27.4 X10(3) UL (ref 4–11)

## 2023-04-01 PROCEDURE — 99233 SBSQ HOSP IP/OBS HIGH 50: CPT | Performed by: INTERNAL MEDICINE

## 2023-04-01 PROCEDURE — 99232 SBSQ HOSP IP/OBS MODERATE 35: CPT | Performed by: INTERNAL MEDICINE

## 2023-04-01 RX ORDER — SODIUM BICARBONATE 325 MG/1
650 TABLET ORAL 3 TIMES DAILY
Status: DISCONTINUED | OUTPATIENT
Start: 2023-04-01 | End: 2023-04-04

## 2023-04-01 RX ORDER — ASPIRIN 81 MG/1
81 TABLET ORAL DAILY
Status: DISCONTINUED | OUTPATIENT
Start: 2023-04-01 | End: 2023-04-07

## 2023-04-01 NOTE — PLAN OF CARE
pt refusing to take novolog at this time despite elevated blood glucose. pt also only allowing rn to give 16 units of levemir at this time instead of 22 units of levemir (as ordered), stating that this is what he does at home & feels \"uncomfortable changing\" that at this time. Pt educated on reasoning behind increase & added short acting insulin d/t continued elevation of blood glucose, and the importance of maintaining tight blood sugar control to aid in wound healing, pt continues to refuse medications. On call im md updated. Pt also refusing to take po sodium bicarb at this time d/t \"esophagitis\" stating he gags up any po meds he takes as per hx & therefor does not like taking any po medications. Pt resting comfortably at this time. Vss at this time. Dressing clean dry and intact, pt able to move foot & lift leg strong. Continues to elevate rle at this time. Pt reminded to call donmeggan simeon, call staff to get oob with him to use bathroom so we may maintain nwb to RLE safely. Plan await final cultures. Then home when ready. [id number] pt continues to refuse insuline this morning despite blood sugar results & morning medications, see mar. Additional education given on each  Medication & importance pt continues to refuse.

## 2023-04-01 NOTE — OPERATIVE REPORT
OPERATIVE REPORT     Isamar Dumas Patient Status:  Inpatient    1960 MRN [id number]   [facility] 3SW-A Attending Jaime Glynn DO   Hosp Day # 2 PCP Jennifer Malone MD     Date of Surgery:  3/31/23     Preoperative Diagnosis: wet gangrene of right foot    Postoperative Diagnosis: same    Primary Surgeon: Shahram Pineda DPM    Assistant: none    Procedures:   RIGHT FOOT IRRIGATION & DEBRIDEMENT    Surgical Findings: Consistent with pre op diagnosis, infection tracking plantarly along flexor tendons    Anesthesia: General    Complications: none    Implants: 1/2\" iodoform packing    Specimen:   Ulcerated soft tissue--micro  5th metatarsal bone--micro  5th metatarsal bone--path    Drains: none    Condition: Vital signs stable with CFT intact to remaining digits    Estimated Blood Loss: 30 mL    Preoperative history/indications:  Isamar Dumas is a 57year old male with past medical history of diabetes was admitted to the hospital for right foot wound. Patient had partial fifth ray amputation in 2020 and was followed at wound clinic after this surgery. The site uneventfully healed. However, over the last several weeks patient noticed abrasion at previous surgical site that has become necrotic and infected more recently. He ended up presenting to the emergency room and was noted to have elevated white count as well as ESR and CRP. His x-rays were negative for erosions or other concerns. His MRI demonstrates reactive osteitis but no definitive osteomyelitis. Given changes consistent with wet gangrene right foot, he has been scheduled for I&D to help control infection. He understands that he will likely require second stage procedure and vascular intervention after I&D is complete. Informed Consent: All treatment options have been discussed with the patient including both conservative and surgical attempts at correction.  Potential risks and complications of surgical intervention were discussed at length including but not limited to death, loss of limb, post op pain, swelling, infection, bleeding, reoccurrence, extended healing,  and the possibility of further and future surgery. No guarantees have been made to the patient and the informed consent has been signed. Procedure in detail:  The patient was brought into the operating room and placed on the operating table in the supine position. A timeout was called in the presence of the anesthesiologist, circulating nurse, scrub tech, and myself to confirm the correct patient, patient's date of birth, procedure, and location of the procedure to be performed. All present were in agreement. Following administration of general anesthesia, a local infiltrative block was administered about the right lateral foot utilizing 10 ccs of a 1:1 mixture of 1% lidocaine plain and 0.5% marcaine plain. The right foot was then scrubbed, prepped, and draped in the usual aseptic manner. Attention was directed to the right foot where a large necrotic wound was noted to the plantar lateral aspect of the remaining fifth metatarsal at the region of the fifth metatarsal base. Necrotic tissue, purulence, malodor, and exposed bone was noted to this region. Using a 15 blade, all necrotic and nonviable tissue was resected from the field. The ulcerated necrotic tissue was excised and sent to microbiology for cultures and sensitivities. Any further necrotic tissue was further resected from the field at this time. This included nonviable subcutaneous tissue, muscle, and tendon. It was noted that purulence and infection tract proximally and plantarly along the flexor tendon sheath. Any nonviable flexor tendons and necrotic tissue was further excised. The remaining aspects of the fifth metatarsal were visualized. The bone was noted to be diseased and brittle to this region.   Using a rongeur, a portion of the bone was resected and sent to microbiology for cultures and sensitivities as well as pathology. Next, the site was copiously irrigated with Irrisept and sterile saline. At this time the wound measured 6 cm x 6 cm x 1 cm wound is much healthier in appearance than preoperatively. It appeared that all necrotic and nonviable tissue was excised at this time. It should be noted that there was some bleeding to the surgical site although it was not excessive. Next, the site was packed with 1/2 inch iodoform packing. This was covered with sterile compressive dressings consisting of gauze, kerlix, and a mildly compressive ACE wrap. The patient tolerated the procedure and anesthesia well. He was transferred to the recovery room with VSS and vascular status intact. Following a period of postoperative monitoring, the patient will return to the floor for continued care as an inpatient. As discussed preoperatively, he will likely require second stage procedure pending clearance of infection and vascular evaluation.     Shahram Pineda DPM   3/31/23

## 2023-04-01 NOTE — OCCUPATIONAL THERAPY NOTE
OCCUPATIONAL THERAPY               OT order received chart reviewed. Patient was educated on NWB on RLE and on OT role and potential session goals. Patient refused therapy services. Discussed with RN and podiatrist. Will DC order.

## 2023-04-01 NOTE — PLAN OF CARE
Patient alert and oriented x4. BP elevated this AM, scheduled medication given. Patient denies pain. Reports sensation is at his baseline. Dressing in place to Rt foot, to be changed today per podiatry notes. Tolerating diet, pt stated he had a good appetite this AM. Pt took AM medications. Voiding via urinal/up in the bathroom. NWB to RLE. Plan: IV antibiotic, PT to see. Initially refused Novolog, then pt stated to RN upon return to give further medication, \"I don't want to do anything that will cause me to be hospitalized longer than I need to be so I will take the insulin\". Hospitalist notified, Blood sugar 303 and pt treated per sliding scale order.

## 2023-04-02 LAB
ANION GAP SERPL CALC-SCNC: 10 MMOL/L (ref 0–18)
BUN BLD-MCNC: 71 MG/DL (ref 7–18)
CALCIUM BLD-MCNC: 8.5 MG/DL (ref 8.5–10.1)
CHLORIDE SERPL-SCNC: 113 MMOL/L (ref 98–112)
CO2 SERPL-SCNC: 13 MMOL/L (ref 21–32)
CREAT BLD-MCNC: 4.01 MG/DL
GFR SERPLBLD BASED ON 1.73 SQ M-ARVRAT: 16 ML/MIN/1.73M2 (ref 60–?)
GLUCOSE BLD-MCNC: 152 MG/DL (ref 70–99)
GLUCOSE BLD-MCNC: 167 MG/DL (ref 70–99)
GLUCOSE BLD-MCNC: 169 MG/DL (ref 70–99)
GLUCOSE BLD-MCNC: 170 MG/DL (ref 70–99)
OSMOLALITY SERPL CALC.SUM OF ELEC: 307 MOSM/KG (ref 275–295)
POTASSIUM SERPL-SCNC: 3.5 MMOL/L (ref 3.5–5.1)
SODIUM SERPL-SCNC: 136 MMOL/L (ref 136–145)

## 2023-04-02 PROCEDURE — 99231 SBSQ HOSP IP/OBS SF/LOW 25: CPT | Performed by: STUDENT IN AN ORGANIZED HEALTH CARE EDUCATION/TRAINING PROGRAM

## 2023-04-02 PROCEDURE — 99232 SBSQ HOSP IP/OBS MODERATE 35: CPT | Performed by: INTERNAL MEDICINE

## 2023-04-02 RX ORDER — POTASSIUM CHLORIDE 20 MEQ/1
20 TABLET, EXTENDED RELEASE ORAL ONCE
Status: COMPLETED | OUTPATIENT
Start: 2023-04-02 | End: 2023-04-02

## 2023-04-02 NOTE — PLAN OF CARE
POD 2 Rt foot I&D, Pt is AAOX4, VSS, room air, ACE wrap Kerlix dressing remains CDI, Pt is up NWB to Rt foot, MIN w/ RW, voiding freely to restroom, glucose monitored and treated per orders, IV ABX, SL, no pain noted at this time, plan for angio on Monday, Pt is doing well, all needs met, all safety measures in place, call light within reach, will CTM.

## 2023-04-02 NOTE — PROGRESS NOTES
Patient seen at bedside this AM. Dressings are c/d/i and he is without pain. Plans for angiogram with possible intervention with Dr. Rober Minor tomorrow. Will also consult wound care to evaluate.

## 2023-04-03 ENCOUNTER — APPOINTMENT (OUTPATIENT)
Dept: INTERVENTIONAL RADIOLOGY/VASCULAR | Facility: HOSPITAL | Age: 63
DRG: 617 | End: 2023-04-03
Attending: SURGERY
Payer: COMMERCIAL

## 2023-04-03 LAB
ANION GAP SERPL CALC-SCNC: 8 MMOL/L (ref 0–18)
ANTIBODY SCREEN: NEGATIVE
BUN BLD-MCNC: 68 MG/DL (ref 7–18)
CALCIUM BLD-MCNC: 8.2 MG/DL (ref 8.5–10.1)
CHLORIDE SERPL-SCNC: 114 MMOL/L (ref 98–112)
CO2 SERPL-SCNC: 15 MMOL/L (ref 21–32)
CREAT BLD-MCNC: 3.85 MG/DL
ERYTHROCYTE [DISTWIDTH] IN BLOOD BY AUTOMATED COUNT: 13.9 %
GFR SERPLBLD BASED ON 1.73 SQ M-ARVRAT: 17 ML/MIN/1.73M2 (ref 60–?)
GLUCOSE BLD-MCNC: 108 MG/DL (ref 70–99)
GLUCOSE BLD-MCNC: 157 MG/DL (ref 70–99)
GLUCOSE BLD-MCNC: 172 MG/DL (ref 70–99)
GLUCOSE BLD-MCNC: 226 MG/DL (ref 70–99)
GLUCOSE BLD-MCNC: 79 MG/DL (ref 70–99)
HCT VFR BLD AUTO: 21.6 %
HCT VFR BLD AUTO: 21.9 %
HGB BLD-MCNC: 6.9 G/DL
HGB BLD-MCNC: 6.9 G/DL
MCH RBC QN AUTO: 26.8 PG (ref 26–34)
MCHC RBC AUTO-ENTMCNC: 31.9 G/DL (ref 31–37)
MCV RBC AUTO: 84 FL
OSMOLALITY SERPL CALC.SUM OF ELEC: 304 MOSM/KG (ref 275–295)
PLATELET # BLD AUTO: 389 10(3)UL (ref 150–450)
POTASSIUM SERPL-SCNC: 3.4 MMOL/L (ref 3.5–5.1)
POTASSIUM SERPL-SCNC: 3.4 MMOL/L (ref 3.5–5.1)
POTASSIUM SERPL-SCNC: 3.9 MMOL/L (ref 3.5–5.1)
RBC # BLD AUTO: 2.57 X10(6)UL
RH BLOOD TYPE: POSITIVE
SODIUM SERPL-SCNC: 137 MMOL/L (ref 136–145)
WBC # BLD AUTO: 19.7 X10(3) UL (ref 4–11)

## 2023-04-03 PROCEDURE — 11043 DBRDMT MUSC&/FSCA 1ST 20/<: CPT | Performed by: STUDENT IN AN ORGANIZED HEALTH CARE EDUCATION/TRAINING PROGRAM

## 2023-04-03 PROCEDURE — 99233 SBSQ HOSP IP/OBS HIGH 50: CPT | Performed by: INTERNAL MEDICINE

## 2023-04-03 PROCEDURE — 02HV33Z INSERTION OF INFUSION DEVICE INTO SUPERIOR VENA CAVA, PERCUTANEOUS APPROACH: ICD-10-PCS | Performed by: INTERNAL MEDICINE

## 2023-04-03 PROCEDURE — 047P3ZZ DILATION OF RIGHT ANTERIOR TIBIAL ARTERY, PERCUTANEOUS APPROACH: ICD-10-PCS | Performed by: SURGERY

## 2023-04-03 RX ORDER — POTASSIUM CHLORIDE 20 MEQ/1
20 TABLET, EXTENDED RELEASE ORAL ONCE
Status: COMPLETED | OUTPATIENT
Start: 2023-04-03 | End: 2023-04-03

## 2023-04-03 RX ORDER — CEFAZOLIN SODIUM/WATER 2 G/20 ML
2 SYRINGE (ML) INTRAVENOUS EVERY 12 HOURS
Status: DISCONTINUED | OUTPATIENT
Start: 2023-04-03 | End: 2023-04-07

## 2023-04-03 RX ORDER — MIDAZOLAM HYDROCHLORIDE 1 MG/ML
INJECTION INTRAMUSCULAR; INTRAVENOUS
Status: COMPLETED
Start: 2023-04-03 | End: 2023-04-03

## 2023-04-03 RX ORDER — CLOPIDOGREL BISULFATE 75 MG/1
75 TABLET ORAL DAILY
Status: DISCONTINUED | OUTPATIENT
Start: 2023-04-04 | End: 2023-04-06 | Stop reason: HOSPADM

## 2023-04-03 RX ORDER — SODIUM CHLORIDE 9 MG/ML
INJECTION, SOLUTION INTRAVENOUS ONCE
Status: DISCONTINUED | OUTPATIENT
Start: 2023-04-03 | End: 2023-04-07

## 2023-04-03 RX ORDER — POTASSIUM CHLORIDE 20 MEQ/1
40 TABLET, EXTENDED RELEASE ORAL ONCE
Status: COMPLETED | OUTPATIENT
Start: 2023-04-03 | End: 2023-04-03

## 2023-04-03 RX ORDER — POTASSIUM CHLORIDE 20 MEQ/1
20 TABLET, EXTENDED RELEASE ORAL ONCE
Status: DISCONTINUED | OUTPATIENT
Start: 2023-04-03 | End: 2023-04-03

## 2023-04-03 RX ORDER — HEPARIN SODIUM 5000 [USP'U]/ML
INJECTION, SOLUTION INTRAVENOUS; SUBCUTANEOUS
Status: COMPLETED
Start: 2023-04-03 | End: 2023-04-03

## 2023-04-03 RX ORDER — LIDOCAINE HYDROCHLORIDE 10 MG/ML
INJECTION, SOLUTION EPIDURAL; INFILTRATION; INTRACAUDAL; PERINEURAL
Status: COMPLETED
Start: 2023-04-03 | End: 2023-04-03

## 2023-04-03 RX ORDER — CEFAZOLIN SODIUM/WATER 2 G/20 ML
2 SYRINGE (ML) INTRAVENOUS DAILY
Status: DISCONTINUED | OUTPATIENT
Start: 2023-04-03 | End: 2023-04-03 | Stop reason: DRUGHIGH

## 2023-04-03 RX ORDER — CLOPIDOGREL BISULFATE 75 MG/1
TABLET ORAL
Status: COMPLETED
Start: 2023-04-03 | End: 2023-04-03

## 2023-04-03 RX ORDER — IODIXANOL 320 MG/ML
100 INJECTION, SOLUTION INTRAVASCULAR
Status: COMPLETED | OUTPATIENT
Start: 2023-04-03 | End: 2023-04-03

## 2023-04-03 RX ORDER — METRONIDAZOLE 500 MG/1
500 TABLET ORAL EVERY 8 HOURS SCHEDULED
Status: DISCONTINUED | OUTPATIENT
Start: 2023-04-03 | End: 2023-04-07

## 2023-04-03 RX ORDER — NITROGLYCERIN 20 MG/100ML
INJECTION INTRAVENOUS
Status: COMPLETED
Start: 2023-04-03 | End: 2023-04-03

## 2023-04-03 RX ORDER — MIDAZOLAM HYDROCHLORIDE 1 MG/ML
INJECTION INTRAMUSCULAR; INTRAVENOUS
Status: DISCONTINUED
Start: 2023-04-03 | End: 2023-04-03 | Stop reason: WASHOUT

## 2023-04-03 NOTE — PROGRESS NOTES
I discussed the findings of the angiogram with the patient's wife. I discussed that despite improving the flow in the anterior tibial artery he has suffered significant tissue loss on the foot that is already experienced a partial amputation. He has a robust posterior tibial artery that fills the plantar artery and I angioplasty of the anterior tibial artery, but the amount of tissue loss he has suffered may result in below-knee amputation.

## 2023-04-03 NOTE — CONSULTS
[facility] Pharmacy Note:  Renal Adjustment for cefazolin (ANCEF)    Fay Dolan is a 57year old patient who has been prescribed cefazolin (ANCEF) 2 g every 24 hrs. The estimated creatinine clearance is 18.1 mL/min (A) (based on SCr of 3.85 mg/dL (H)). The dose has been adjusted to cefazolin (ANCEF) 2 g every 12 hrs per hospital renal dose adjustment protocol for treatment of cellulitis. Pharmacy will follow and adjust dose as warranted for additional renal function changes.     Thank you,    Leatha Curry, PharmD  4/3/2023  11:59 AM

## 2023-04-03 NOTE — BRIEF OP NOTE
Pre-Operative Diagnosis: Atherosclerosis with gangrene right foot     Post-Operative Diagnosis: same     Procedure Performed:   1. US percutaneous access left common femoral artery  2. Selection of right common femoral artery and angiogram right leg  3. Balloon angioplasty of anterior tibial artery with 2.5 x210     Surgeon(s) and Role:     Mode    Anesthesia:   2 V 75 F start 710, finish 0801     Surgical Findings:   1.right common femoral artery and profunda patent  2. Right SFA popliteal patent   3. Posterior tibial artery patent to plantar artery  4. Anterior tibial artery with multiple high grade stenoses treated with angioplasty  5.  Peroneal occludes in proximal third     Specimen: none     Estimated Blood Loss: Blood Output: 30 mL (3/31/2023  2:55 PM)      Ailyn Stahl MD  4/3/2023  8:00 AM

## 2023-04-03 NOTE — PLAN OF CARE
Assumed care of pt at [facility]. Pt AOx4,, saturating well on RA, VSS. PT NPO for angiogram today. IV abx infusing as ordered. Accuchecks QID. Plan is for angio today, pt to work with PT/OT. Pt updated and agreeable to POC. Call light within reach and bed alarm on.

## 2023-04-03 NOTE — PROCEDURES
[facility]    PATIENT'S NAME: JULIUS Bassett   ATTENDING PHYSICIAN: Loreta Mcclure DO   OPERATING PHYSICIAN: Kasia Sanders M.D. PATIENT ACCOUNT#:   [de-identified]    LOCATION:  [address] [place] [address]  MEDICAL RECORD #:   [id number]       YOB: 1960  ADMISSION DATE:       03/29/2023      OPERATION DATE:  04/03/2023    CARDIAC PROCEDURE TRANSCRIPTION    PERIPHERAL ANGIOGRAPHY/PERCUTANEOUS PERIPHERAL INTERVENTION     PREOPERATIVE DIAGNOSIS:  Atherosclerosis with gangrene right foot. POSTOPERATIVE DIAGNOSIS:  Atherosclerosis with gangrene right foot. PROCEDURE PERFORMED:    1. Ultrasound-guided percutaneous access left common femoral artery. 2.   Selection of right common femoral artery and angiogram right leg. 3.   Balloon angioplasty of anterior tibial artery with a 2.5 x 210 balloon. SEDATION:  Moderate conscious sedation with 2 mg of Versed and 75 mcg of fentanyl. Start time was 0710, finish 0801, for a total of 51 minutes. SURGICAL FINDINGS:    1. Right common femoral artery and profunda patent. 2.   Right SFA and popliteal artery patent. 3.   Posterior tibial artery is patent and fills the plantar artery. 4.   Anterior tibial artery with multiple high-grade stenosis and treated with angioplasty. Anterior tibial artery fills dorsalis pedis but is diminutive compared to the robust size of the posterior tibial artery. 5.   Peroneal occludes in the proximal third of the leg. SPECIMEN:  None. ESTIMATED BLOOD LOSS:  30 mL. BRIEF HISTORY:  This is a 20-year-old male with significant history of diabetes. He has already suffered a partial foot amputation in the past, and he presented with wet gangrene of his right foot. He underwent an extensive debridement, and we are proceeding with angiogram as described below. DETAILS OF PROCEDURE:  Patient was taken to the catheterization lab and prepped and draped in the usual sterile fashion.   Moderate conscious sedation was initiated by a qualified nurse under my supervision. Using ultrasound, I percutaneously accessed the left common femoral artery with a micropuncture kit. I then advanced a Glidewire Advantage into the aorta and exchanged micropuncture sheath for a 5-Vietnamese sheath. I then used a Crossover catheter and hooked the aortic bifurcation and did an angiogram of the right common iliac and external iliac, both of which were widely patent. I then advanced the Glidewire Advantage and the Crossover to the level of the right common femoral artery and did an angiogram of the right leg. Right common femoral and profunda were patent. Right SFA to popliteal was patent. He has a robust posterior tibial artery that filled the plantar artery. He had multiple high-grade stenosis in his anterior tibial artery that filled the dorsalis pedis, and the peroneal occluded in the proximal third. I then advanced the Glidewire Advantage into the popliteal artery, exchanged the 5-Vietnamese short sheath for a 6-Vietnamese 65 cm sheath. Sheath went up and over the aortic bifurcation, and the tip of the sheath was in the mid SFA. Patient was systemically heparinized. I then used a 0.014 Runthrough and a Quick-Cross Select and engaged the anterior tibial artery. I then advanced the Iker Mendoza Select into the anterior tibial artery and then exchanged for a Halberd wire and was able to cross some of the short segment chronic total occlusions. I then switched back to a Runthrough wire and was able to advance it down to the dorsalis pedis. I then balloon angioplastied the entire length of the anterior tibial artery from the dorsalis pedis to the origin of the anterior tibial artery with a 2.5 x 210 balloon with prolonged inflations. Repeat angiogram showed an excellent technical result with wide patency of the anterior tibial artery from its origin to the dorsalis pedis.   The artery is small compared to the posterior tibial artery, and the posterior tibial artery is still the dominant runoff to the foot and is widely patent and fills the plantar artery. The peroneal artery was occluded. Having completed the angiogram and the intervention, all devices were removed and a Perclose device was placed. Patient was then taken to Recovery in stable condition.     Dictated By Janis Werner M.D.  d: 04/03/2023 08:13:20  t: 04/03/2023 08:58:14  [facility] [id number]  BBY/

## 2023-04-04 LAB
ANION GAP SERPL CALC-SCNC: 8 MMOL/L (ref 0–18)
BUN BLD-MCNC: 65 MG/DL (ref 7–18)
CALCIUM BLD-MCNC: 8.3 MG/DL (ref 8.5–10.1)
CHLORIDE SERPL-SCNC: 114 MMOL/L (ref 98–112)
CO2 SERPL-SCNC: 13 MMOL/L (ref 21–32)
CREAT BLD-MCNC: 4.1 MG/DL
GFR SERPLBLD BASED ON 1.73 SQ M-ARVRAT: 15 ML/MIN/1.73M2 (ref 60–?)
GLUCOSE BLD-MCNC: 172 MG/DL (ref 70–99)
GLUCOSE BLD-MCNC: 202 MG/DL (ref 70–99)
GLUCOSE BLD-MCNC: 204 MG/DL (ref 70–99)
GLUCOSE BLD-MCNC: 222 MG/DL (ref 70–99)
GLUCOSE BLD-MCNC: 240 MG/DL (ref 70–99)
GLUCOSE BLD-MCNC: 279 MG/DL (ref 70–99)
OSMOLALITY SERPL CALC.SUM OF ELEC: 304 MOSM/KG (ref 275–295)
POTASSIUM SERPL-SCNC: 4.3 MMOL/L (ref 3.5–5.1)
SODIUM SERPL-SCNC: 135 MMOL/L (ref 136–145)

## 2023-04-04 PROCEDURE — 99232 SBSQ HOSP IP/OBS MODERATE 35: CPT | Performed by: INTERNAL MEDICINE

## 2023-04-04 PROCEDURE — 99232 SBSQ HOSP IP/OBS MODERATE 35: CPT | Performed by: PODIATRIST

## 2023-04-04 RX ORDER — SODIUM BICARBONATE 325 MG/1
1300 TABLET ORAL 3 TIMES DAILY
Status: DISCONTINUED | OUTPATIENT
Start: 2023-04-04 | End: 2023-04-05

## 2023-04-04 NOTE — PLAN OF CARE
Received pt at 2000  Pt AOx4, NSR, RA, VSS  L groin dressing cdi. Soft w/ no signs of hematoma  L pulses per doppler  Strike through on R foot dressing. Reinforced w/ Kerlix and ACE @2200. Endorsed to oncoming RN  Call light within reach.   Needs currently met    Report given to [facility] RN @6969 who will continue care

## 2023-04-04 NOTE — PLAN OF CARE
Received patient from cath lab approximately 0900  Patient alert and oriented X4  On room air, VSS, NSR on tele  Denies pain  Left groin site with gauze and tegaderm, soft, no hematoma  Post cath assessment and vitals maintained per protocol, see flow sheets   LLE pulse per doppler  RLE dressing in place   Tolerating diet   Patient educated on NWB order, patient requesting to ambulate to bathroom, voiding, no BM this shift   Hgb 6.9, MDs aware, no transfusion per nephrology   Right foot dressing changed by podiatry MD, reinforce as needed   Patient updated on plan of care     Problem: PAIN - ADULT  Goal: Verbalizes/displays adequate comfort level or patient's stated pain goal  Description: INTERVENTIONS:  - Encourage pt to monitor pain and request assistance  - Assess pain using appropriate pain scale  - Administer analgesics based on type and severity of pain and evaluate response  - Implement non-pharmacological measures as appropriate and evaluate response  - Consider cultural and social influences on pain and pain management  - Manage/alleviate anxiety  - Utilize distraction and/or relaxation techniques  - Monitor for opioid side effects  - Notify MD/LIP if interventions unsuccessful or patient reports new pain  - Anticipate increased pain with activity and pre-medicate as appropriate  Outcome: Progressing     Problem: Peripheral vascular status not within defined limits  Goal: Pt will have peripheral vascular status adequate for disch  Outcome: Progressing

## 2023-04-04 NOTE — PROGRESS NOTES
Patient seen at bedside this evening. Wound care photos reviewed. Would like to attempt limb salvage. Will plan for OR this Thursday.

## 2023-04-04 NOTE — PLAN OF CARE
Pt AxO4  RA  NSR  No pain at this time  1x assist with walker  -NWB RLE    Wound(s)  -see note  Monitor hemoglobin level  IV/PO antibiotics  Monitor/manage BG level

## 2023-04-04 NOTE — PROGRESS NOTES
No complaints  Groin soft  Plans for attempt at limb salvage continue    Will be available if plan changes and he requires/agrees to BKA

## 2023-04-05 ENCOUNTER — ANESTHESIA EVENT (OUTPATIENT)
Dept: SURGERY | Facility: HOSPITAL | Age: 63
DRG: 617 | End: 2023-04-05
Payer: COMMERCIAL

## 2023-04-05 LAB
ANION GAP SERPL CALC-SCNC: 12 MMOL/L (ref 0–18)
BUN BLD-MCNC: 66 MG/DL (ref 7–18)
CALCIUM BLD-MCNC: 8.5 MG/DL (ref 8.5–10.1)
CHLORIDE SERPL-SCNC: 115 MMOL/L (ref 98–112)
CO2 SERPL-SCNC: 13 MMOL/L (ref 21–32)
CREAT BLD-MCNC: 4.69 MG/DL
ERYTHROCYTE [DISTWIDTH] IN BLOOD BY AUTOMATED COUNT: 14 %
GFR SERPLBLD BASED ON 1.73 SQ M-ARVRAT: 13 ML/MIN/1.73M2 (ref 60–?)
GLUCOSE BLD-MCNC: 111 MG/DL (ref 70–99)
GLUCOSE BLD-MCNC: 115 MG/DL (ref 70–99)
GLUCOSE BLD-MCNC: 130 MG/DL (ref 70–99)
GLUCOSE BLD-MCNC: 180 MG/DL (ref 70–99)
GLUCOSE BLD-MCNC: 195 MG/DL (ref 70–99)
HCT VFR BLD AUTO: 21.4 %
HGB BLD-MCNC: 6.9 G/DL
MCH RBC QN AUTO: 26.7 PG (ref 26–34)
MCHC RBC AUTO-ENTMCNC: 32.2 G/DL (ref 31–37)
MCV RBC AUTO: 82.9 FL
OSMOLALITY SERPL CALC.SUM OF ELEC: 310 MOSM/KG (ref 275–295)
PLATELET # BLD AUTO: 419 10(3)UL (ref 150–450)
POTASSIUM SERPL-SCNC: 4.2 MMOL/L (ref 3.5–5.1)
RBC # BLD AUTO: 2.58 X10(6)UL
SODIUM SERPL-SCNC: 140 MMOL/L (ref 136–145)
WBC # BLD AUTO: 17.9 X10(3) UL (ref 4–11)

## 2023-04-05 PROCEDURE — 99233 SBSQ HOSP IP/OBS HIGH 50: CPT | Performed by: INTERNAL MEDICINE

## 2023-04-05 PROCEDURE — 99232 SBSQ HOSP IP/OBS MODERATE 35: CPT | Performed by: INTERNAL MEDICINE

## 2023-04-05 RX ORDER — ASPIRIN 81 MG/1
81 TABLET ORAL DAILY
Qty: 360 TABLET | Refills: 0 | Status: SHIPPED | OUTPATIENT
Start: 2023-04-06 | End: 2024-03-31

## 2023-04-05 RX ORDER — CLOPIDOGREL BISULFATE 75 MG/1
75 TABLET ORAL DAILY
Qty: 90 TABLET | Refills: 0 | Status: SHIPPED | OUTPATIENT
Start: 2023-04-06 | End: 2023-07-05

## 2023-04-05 RX ORDER — SODIUM BICARBONATE 325 MG/1
1300 TABLET ORAL 2 TIMES DAILY
Status: DISCONTINUED | OUTPATIENT
Start: 2023-04-05 | End: 2023-04-07

## 2023-04-05 NOTE — CM/SW NOTE
SW received call from TODD with [facility] at  [phone] to offer support for discharge planning.      JULIUS Combs  Discharge 2011 [facility]

## 2023-04-05 NOTE — PLAN OF CARE
Assumed pt care at 0730  A&Ox4  Denies pain/discomfort  Continent w/ B/B  Wound dressing on right foot was changed by Dr. Rosa Barboza this AM.  Repeat I&D tomorrow w/ Dr. Rosa Barboza, consent signed  NPO after MN  Hold [facility] meds tomorrow  Nephro order Na bicarb drip, pt refused.  MD aware  Will continue to monitor

## 2023-04-05 NOTE — DISCHARGE INSTRUCTIONS
Take 81 mg aspirin lifelong  Take clopidogrel 75 mg for 90 days     Sometimes managing your health at home requires assistance. The [facility] team has recognized your preference to use [facility]. They can be reached by phone at [phone]. The fax number for your reference is [phone]. A representative from the home health agency will contact you or your family to schedule your first visit.

## 2023-04-05 NOTE — PLAN OF CARE
Assumed care @ 1900. On telemetry monitoring. Updated patient with plan of care  Ensures patient safety. Maintained a calm and safe environment. Side rails up x2. Needs attended. Call light within reach, will continue to monitor. Instructed to call when assistance needed. Left in bed resting comfortably. Problem: Patient/Family Goals  Goal: Patient/Family Long Term Goal  Description:  Patient's Long Term Goal: To be able to perform ADLs independently. Interventions:   -Healthy diet  -Activity as tolerated  -F/u with pcp and podiatry  as recommended   - See additional Care Plan goals for specific interventions      Outcome: Progressing  Goal: Patient/Family Short Term Goal  Description:  Patient's Short Term Goal: To be able to perform ADLs independently.     Interventions:   -Healthy diet  -Activity as tolerated  -F/u with pcp and podiatry  as recommended   - See additional Care Plan goals for specific interventions    Outcome: Progressing     Problem: PAIN - ADULT  Goal: Verbalizes/displays adequate comfort level or patient's stated pain goal  Description: INTERVENTIONS:  - Encourage pt to monitor pain and request assistance  - Assess pain using appropriate pain scale  - Administer analgesics based on type and severity of pain and evaluate response  - Implement non-pharmacological measures as appropriate and evaluate response  - Consider cultural and social influences on pain and pain management  - Manage/alleviate anxiety  - Utilize distraction and/or relaxation techniques  - Monitor for opioid side effects  - Notify MD/LIP if interventions unsuccessful or patient reports new pain  - Anticipate increased pain with activity and pre-medicate as appropriate  Outcome: Progressing     Problem: Peripheral vascular status not within defined limits  Goal: Pt will have peripheral vascular status adequate for disch  Outcome: Progressing

## 2023-04-05 NOTE — PROGRESS NOTES
Vascular surgery will sign off  Please reconsult if patient requires or requests amputation   Will need 90 days of clopidogrel   Follow up in system

## 2023-04-06 ENCOUNTER — APPOINTMENT (OUTPATIENT)
Dept: GENERAL RADIOLOGY | Facility: HOSPITAL | Age: 63
DRG: 617 | End: 2023-04-06
Attending: STUDENT IN AN ORGANIZED HEALTH CARE EDUCATION/TRAINING PROGRAM
Payer: COMMERCIAL

## 2023-04-06 ENCOUNTER — ANESTHESIA (OUTPATIENT)
Dept: SURGERY | Facility: HOSPITAL | Age: 63
DRG: 617 | End: 2023-04-06
Payer: COMMERCIAL

## 2023-04-06 LAB
ANION GAP SERPL CALC-SCNC: 12 MMOL/L (ref 0–18)
BLOOD TYPE BARCODE: 7300
BUN BLD-MCNC: 67 MG/DL (ref 7–18)
CALCIUM BLD-MCNC: 8.5 MG/DL (ref 8.5–10.1)
CHLORIDE SERPL-SCNC: 114 MMOL/L (ref 98–112)
CO2 SERPL-SCNC: 16 MMOL/L (ref 21–32)
CREAT BLD-MCNC: 4.89 MG/DL
ERYTHROCYTE [DISTWIDTH] IN BLOOD BY AUTOMATED COUNT: 14.3 %
GFR SERPLBLD BASED ON 1.73 SQ M-ARVRAT: 13 ML/MIN/1.73M2 (ref 60–?)
GLUCOSE BLD-MCNC: 149 MG/DL (ref 70–99)
GLUCOSE BLD-MCNC: 157 MG/DL (ref 70–99)
GLUCOSE BLD-MCNC: 160 MG/DL (ref 70–99)
GLUCOSE BLD-MCNC: 222 MG/DL (ref 70–99)
GLUCOSE BLD-MCNC: 246 MG/DL (ref 70–99)
GLUCOSE BLD-MCNC: 260 MG/DL (ref 70–99)
HCT VFR BLD AUTO: 23.4 %
HCT VFR BLD AUTO: 23.4 %
HGB BLD-MCNC: 7.1 G/DL
HGB BLD-MCNC: 7.4 G/DL
MCH RBC QN AUTO: 26.2 PG (ref 26–34)
MCHC RBC AUTO-ENTMCNC: 31.6 G/DL (ref 31–37)
MCV RBC AUTO: 83 FL
OSMOLALITY SERPL CALC.SUM OF ELEC: 317 MOSM/KG (ref 275–295)
PLATELET # BLD AUTO: 471 10(3)UL (ref 150–450)
POTASSIUM SERPL-SCNC: 4.3 MMOL/L (ref 3.5–5.1)
RBC # BLD AUTO: 2.82 X10(6)UL
SODIUM SERPL-SCNC: 142 MMOL/L (ref 136–145)
UNIT VOLUME: 350 ML
WBC # BLD AUTO: 18 X10(3) UL (ref 4–11)

## 2023-04-06 PROCEDURE — 15276 SKIN SUB GRAFT F/N/HF/G ADDL: CPT | Performed by: STUDENT IN AN ORGANIZED HEALTH CARE EDUCATION/TRAINING PROGRAM

## 2023-04-06 PROCEDURE — 99233 SBSQ HOSP IP/OBS HIGH 50: CPT | Performed by: INTERNAL MEDICINE

## 2023-04-06 PROCEDURE — 28122 PARTIAL REMOVAL OF FOOT BONE: CPT | Performed by: STUDENT IN AN ORGANIZED HEALTH CARE EDUCATION/TRAINING PROGRAM

## 2023-04-06 PROCEDURE — 0QUQ0KZ SUPPLEMENT RIGHT TOE PHALANX WITH NONAUTOLOGOUS TISSUE SUBSTITUTE, OPEN APPROACH: ICD-10-PCS | Performed by: STUDENT IN AN ORGANIZED HEALTH CARE EDUCATION/TRAINING PROGRAM

## 2023-04-06 PROCEDURE — 0Y6M0ZF DETACHMENT AT RIGHT FOOT, PARTIAL 5TH RAY, OPEN APPROACH: ICD-10-PCS | Performed by: STUDENT IN AN ORGANIZED HEALTH CARE EDUCATION/TRAINING PROGRAM

## 2023-04-06 PROCEDURE — 73630 X-RAY EXAM OF FOOT: CPT | Performed by: STUDENT IN AN ORGANIZED HEALTH CARE EDUCATION/TRAINING PROGRAM

## 2023-04-06 PROCEDURE — 15275 SKIN SUB GRAFT FACE/NK/HF/G: CPT | Performed by: STUDENT IN AN ORGANIZED HEALTH CARE EDUCATION/TRAINING PROGRAM

## 2023-04-06 RX ORDER — SODIUM CHLORIDE, SODIUM LACTATE, POTASSIUM CHLORIDE, CALCIUM CHLORIDE 600; 310; 30; 20 MG/100ML; MG/100ML; MG/100ML; MG/100ML
INJECTION, SOLUTION INTRAVENOUS CONTINUOUS PRN
Status: DISCONTINUED | OUTPATIENT
Start: 2023-04-06 | End: 2023-04-06 | Stop reason: SURG

## 2023-04-06 RX ORDER — PROCHLORPERAZINE EDISYLATE 5 MG/ML
5 INJECTION INTRAMUSCULAR; INTRAVENOUS EVERY 8 HOURS PRN
Status: DISCONTINUED | OUTPATIENT
Start: 2023-04-06 | End: 2023-04-06 | Stop reason: HOSPADM

## 2023-04-06 RX ORDER — HYDROMORPHONE HYDROCHLORIDE 1 MG/ML
0.6 INJECTION, SOLUTION INTRAMUSCULAR; INTRAVENOUS; SUBCUTANEOUS EVERY 5 MIN PRN
Status: DISCONTINUED | OUTPATIENT
Start: 2023-04-06 | End: 2023-04-06 | Stop reason: HOSPADM

## 2023-04-06 RX ORDER — BUPIVACAINE HYDROCHLORIDE 5 MG/ML
INJECTION, SOLUTION EPIDURAL; INTRACAUDAL AS NEEDED
Status: DISCONTINUED | OUTPATIENT
Start: 2023-04-06 | End: 2023-04-06 | Stop reason: HOSPADM

## 2023-04-06 RX ORDER — HYDROMORPHONE HYDROCHLORIDE 1 MG/ML
0.2 INJECTION, SOLUTION INTRAMUSCULAR; INTRAVENOUS; SUBCUTANEOUS EVERY 5 MIN PRN
Status: DISCONTINUED | OUTPATIENT
Start: 2023-04-06 | End: 2023-04-06 | Stop reason: HOSPADM

## 2023-04-06 RX ORDER — NALOXONE HYDROCHLORIDE 0.4 MG/ML
80 INJECTION, SOLUTION INTRAMUSCULAR; INTRAVENOUS; SUBCUTANEOUS AS NEEDED
Status: DISCONTINUED | OUTPATIENT
Start: 2023-04-06 | End: 2023-04-06 | Stop reason: HOSPADM

## 2023-04-06 RX ORDER — ONDANSETRON 2 MG/ML
4 INJECTION INTRAMUSCULAR; INTRAVENOUS EVERY 6 HOURS PRN
Status: DISCONTINUED | OUTPATIENT
Start: 2023-04-06 | End: 2023-04-06 | Stop reason: HOSPADM

## 2023-04-06 RX ORDER — HYDROCODONE BITARTRATE AND ACETAMINOPHEN 5; 325 MG/1; MG/1
2 TABLET ORAL ONCE AS NEEDED
Status: DISCONTINUED | OUTPATIENT
Start: 2023-04-06 | End: 2023-04-06 | Stop reason: HOSPADM

## 2023-04-06 RX ORDER — LIDOCAINE HYDROCHLORIDE 10 MG/ML
INJECTION, SOLUTION INFILTRATION; PERINEURAL AS NEEDED
Status: DISCONTINUED | OUTPATIENT
Start: 2023-04-06 | End: 2023-04-06 | Stop reason: HOSPADM

## 2023-04-06 RX ORDER — HYDROCODONE BITARTRATE AND ACETAMINOPHEN 5; 325 MG/1; MG/1
1 TABLET ORAL ONCE AS NEEDED
Status: DISCONTINUED | OUTPATIENT
Start: 2023-04-06 | End: 2023-04-06 | Stop reason: HOSPADM

## 2023-04-06 RX ORDER — ACETAMINOPHEN 500 MG
1000 TABLET ORAL ONCE AS NEEDED
Status: DISCONTINUED | OUTPATIENT
Start: 2023-04-06 | End: 2023-04-06 | Stop reason: HOSPADM

## 2023-04-06 RX ORDER — SODIUM CHLORIDE, SODIUM LACTATE, POTASSIUM CHLORIDE, CALCIUM CHLORIDE 600; 310; 30; 20 MG/100ML; MG/100ML; MG/100ML; MG/100ML
INJECTION, SOLUTION INTRAVENOUS CONTINUOUS
Status: DISCONTINUED | OUTPATIENT
Start: 2023-04-06 | End: 2023-04-06 | Stop reason: HOSPADM

## 2023-04-06 RX ORDER — LABETALOL HYDROCHLORIDE 5 MG/ML
5 INJECTION, SOLUTION INTRAVENOUS EVERY 5 MIN PRN
Status: DISCONTINUED | OUTPATIENT
Start: 2023-04-06 | End: 2023-04-06 | Stop reason: HOSPADM

## 2023-04-06 RX ORDER — HYDROMORPHONE HYDROCHLORIDE 1 MG/ML
0.4 INJECTION, SOLUTION INTRAMUSCULAR; INTRAVENOUS; SUBCUTANEOUS EVERY 5 MIN PRN
Status: DISCONTINUED | OUTPATIENT
Start: 2023-04-06 | End: 2023-04-06 | Stop reason: HOSPADM

## 2023-04-06 RX ORDER — ONDANSETRON 2 MG/ML
INJECTION INTRAMUSCULAR; INTRAVENOUS AS NEEDED
Status: DISCONTINUED | OUTPATIENT
Start: 2023-04-06 | End: 2023-04-06 | Stop reason: SURG

## 2023-04-06 RX ORDER — MIDAZOLAM HYDROCHLORIDE 1 MG/ML
INJECTION INTRAMUSCULAR; INTRAVENOUS AS NEEDED
Status: DISCONTINUED | OUTPATIENT
Start: 2023-04-06 | End: 2023-04-06 | Stop reason: SURG

## 2023-04-06 RX ADMIN — ONDANSETRON 4 MG: 2 INJECTION INTRAMUSCULAR; INTRAVENOUS at 07:27:00

## 2023-04-06 RX ADMIN — SODIUM CHLORIDE, SODIUM LACTATE, POTASSIUM CHLORIDE, CALCIUM CHLORIDE: 600; 310; 30; 20 INJECTION, SOLUTION INTRAVENOUS at 07:10:00

## 2023-04-06 RX ADMIN — MIDAZOLAM HYDROCHLORIDE 2 MG: 1 INJECTION INTRAMUSCULAR; INTRAVENOUS at 07:13:00

## 2023-04-06 NOTE — BRIEF OP NOTE
Pre-Operative Diagnosis: Gangrene, right foot     Post-Operative Diagnosis: Same     Procedure Performed:   1. Incision and drainage, right foot  2. 5th metatarsal base excision, right foot  3.  Kerecis graft application, right foot    Surgeon(s) and Role:     * Lita Altamirano DPM - Primary    Assistant(s):   none     Surgical Findings: Consistent with pre op diagnosis     Specimen:     5th met--path  Right foot soft tissue--micro     Estimated Blood Loss: 40cc    Dictation Number:  See Gonzalez Prado DPM  4/6/2023  7:58 AM

## 2023-04-06 NOTE — CM/SW NOTE
Consult order received for coordination of IV ABX at [place]. Referral sent to [facility]. [facility]. Awaiting for responses. Awaiting final orders. Pt to have line placed for long term abx. SW available.     Che Kapadia, JULIUS  Discharge 2011 [facility]

## 2023-04-06 NOTE — ANESTHESIA POSTPROCEDURE EVALUATION
[address] [facility] Patient Status:  Inpatient   Age/Gender 57year old male MRN [id number]   [facility] SURGERY Attending Yamila Reyes, [address] Day # 8 PCP Aleyda Sandra MD       Anesthesia Post-op Note    IRRIGATION & DEBRIDEMENT OF RIGHT FOOT WITH FIFTH METATARSAL BASE EXCISION    Procedure Summary     Date: 04/06/23 Room / Location: 1404 [facility] OR 04 / [facility]4 [facility] OR    Anesthesia Start: [address] Anesthesia Stop: 3403    Procedure: FJLCOSMCAC & DEBRIDEMENT OF RIGHT FOOT WITH FIFTH METATARSAL BASE EXCISION (Right: Foot) Diagnosis: (INPATIENT ROOM 7622)    Surgeons: Masoud Donahue DPM Anesthesiologist: Jeannie Molina MD    Anesthesia Type: MAC ASA Status: 3          Anesthesia Type: MAC    Vitals Value Taken Time   /63 04/06/23 0759   Temp 97.8 04/06/23 0759   Pulse 63 04/06/23 0759   Resp 12 04/06/23 0759   SpO2 100 04/06/23 0759       Patient Location: PACU    Anesthesia Type: MAC    Airway Patency: patent    Postop Pain Control: adequate    Mental Status: preanesthetic baseline    Nausea/Vomiting: none    Cardiopulmonary/Hydration status: stable euvolemic    Complications: no apparent anesthesia related complications    Postop vital signs: stable    Dental Exam: Unchanged from Preop    Patient to be discharged from PACU when criteria met.

## 2023-04-06 NOTE — PLAN OF CARE
Assumed pt care around 0900  Pt had I&D of right foot, 5th met base excision, and kerecis graft application today w/ Dr. Saima Myers.  Surgical dressing on right foot C/D/I  Hold AC until dressing change sreekanth  Denies pain/discomfort  IVF infusing per order  POC: PICC line placement for IV abx on dc  Will continue to monitor

## 2023-04-06 NOTE — HOME CARE LIAISON
Received referral via [facility] for Home Health services. Spoke w/ patient and provided with list of [facility] providers from [facility], choice is [facility] Gl[facility]. Agency reserved in [facility] and contact information placed on AVS.Financial interest disclosure provided. Notified SW.

## 2023-04-06 NOTE — PROGRESS NOTES
04/06/23 1356   Clinical Encounter Type   Visited With Health care provider;Patient   Continue Visiting No   Taxonomy   Intended Effects Build relationship of care and support   Methods Encourage sharing of feelings; Offer support   Interventions Acknowledge current situation; Active listening; Share words of hope and inspiration     Spiritual Care support can be requested via an Epic consult. For urgent/immediate needs, please contact the On Call  at ext. [phone].

## 2023-04-06 NOTE — PLAN OF CARE
Assumed care at 1.    A&Ox4, RA, NSR on tele  No complaints of pain   Pulses per doppler  Abx given per order   NPO at midnight  R foot dressing C/D/I   Call light within reach    Patient updated on plan of care

## 2023-04-07 ENCOUNTER — TELEPHONE (OUTPATIENT)
Dept: PODIATRY CLINIC | Facility: CLINIC | Age: 63
End: 2023-04-07

## 2023-04-07 VITALS
SYSTOLIC BLOOD PRESSURE: 151 MMHG | WEIGHT: 149.94 LBS | HEIGHT: 67 IN | BODY MASS INDEX: 23.53 KG/M2 | HEART RATE: 57 BPM | TEMPERATURE: 98 F | DIASTOLIC BLOOD PRESSURE: 60 MMHG | RESPIRATION RATE: 17 BRPM | OXYGEN SATURATION: 100 %

## 2023-04-07 LAB
ANION GAP SERPL CALC-SCNC: 10 MMOL/L (ref 0–18)
ANTIBODY SCREEN: NEGATIVE
BUN BLD-MCNC: 61 MG/DL (ref 7–18)
CALCIUM BLD-MCNC: 8.2 MG/DL (ref 8.5–10.1)
CHLORIDE SERPL-SCNC: 112 MMOL/L (ref 98–112)
CO2 SERPL-SCNC: 19 MMOL/L (ref 21–32)
CREAT BLD-MCNC: 4.68 MG/DL
ERYTHROCYTE [DISTWIDTH] IN BLOOD BY AUTOMATED COUNT: 14.5 %
GFR SERPLBLD BASED ON 1.73 SQ M-ARVRAT: 13 ML/MIN/1.73M2 (ref 60–?)
GLUCOSE BLD-MCNC: 106 MG/DL (ref 70–99)
GLUCOSE BLD-MCNC: 106 MG/DL (ref 70–99)
GLUCOSE BLD-MCNC: 230 MG/DL (ref 70–99)
HCT VFR BLD AUTO: 21.9 %
HGB BLD-MCNC: 6.7 G/DL
HGB BLD-MCNC: 7.9 G/DL
MCH RBC QN AUTO: 26.4 PG (ref 26–34)
MCHC RBC AUTO-ENTMCNC: 30.6 G/DL (ref 31–37)
MCV RBC AUTO: 86.2 FL
OSMOLALITY SERPL CALC.SUM OF ELEC: 310 MOSM/KG (ref 275–295)
PLATELET # BLD AUTO: 452 10(3)UL (ref 150–450)
POTASSIUM SERPL-SCNC: 3.8 MMOL/L (ref 3.5–5.1)
RBC # BLD AUTO: 2.54 X10(6)UL
RH BLOOD TYPE: POSITIVE
SODIUM SERPL-SCNC: 141 MMOL/L (ref 136–145)
WBC # BLD AUTO: 15.5 X10(3) UL (ref 4–11)

## 2023-04-07 PROCEDURE — 99239 HOSP IP/OBS DSCHRG MGMT >30: CPT | Performed by: INTERNAL MEDICINE

## 2023-04-07 PROCEDURE — 30233N1 TRANSFUSION OF NONAUTOLOGOUS RED BLOOD CELLS INTO PERIPHERAL VEIN, PERCUTANEOUS APPROACH: ICD-10-PCS | Performed by: INTERNAL MEDICINE

## 2023-04-07 PROCEDURE — 99233 SBSQ HOSP IP/OBS HIGH 50: CPT | Performed by: INTERNAL MEDICINE

## 2023-04-07 RX ORDER — AMLODIPINE BESYLATE 5 MG/1
5 TABLET ORAL DAILY
Qty: 90 TABLET | Refills: 3 | Status: SHIPPED | OUTPATIENT
Start: 2023-04-07

## 2023-04-07 RX ORDER — METRONIDAZOLE 500 MG/1
500 TABLET ORAL 2 TIMES DAILY
Qty: 60 TABLET | Refills: 0 | Status: SHIPPED | OUTPATIENT
Start: 2023-04-07 | End: 2023-05-07

## 2023-04-07 RX ORDER — CEFAZOLIN SODIUM/WATER 2 G/20 ML
2 SYRINGE (ML) INTRAVENOUS EVERY 24 HOURS
Status: DISCONTINUED | OUTPATIENT
Start: 2023-04-07 | End: 2023-04-07

## 2023-04-07 RX ORDER — METOPROLOL SUCCINATE 25 MG/1
25 TABLET, EXTENDED RELEASE ORAL
Qty: 90 TABLET | Refills: 3 | Status: SHIPPED | OUTPATIENT
Start: 2023-04-08

## 2023-04-07 RX ORDER — CEFAZOLIN SODIUM/WATER 2 G/20 ML
2 SYRINGE (ML) INTRAVENOUS EVERY 24 HOURS
Qty: 640 ML | Refills: 0 | Status: SHIPPED | OUTPATIENT
Start: 2023-04-08 | End: 2023-05-10

## 2023-04-07 RX ORDER — SODIUM BICARBONATE 650 MG/1
650 TABLET ORAL 2 TIMES DAILY
Qty: 180 TABLET | Refills: 3 | Status: SHIPPED | OUTPATIENT
Start: 2023-04-07

## 2023-04-07 RX ORDER — LIDOCAINE HYDROCHLORIDE 10 MG/ML
5 INJECTION, SOLUTION EPIDURAL; INFILTRATION; INTRACAUDAL; PERINEURAL
Status: COMPLETED | OUTPATIENT
Start: 2023-04-07 | End: 2023-04-07

## 2023-04-07 RX ORDER — INSULIN DETEMIR 100 [IU]/ML
25 INJECTION, SOLUTION SUBCUTANEOUS NIGHTLY
Qty: 30 ML | Refills: 1 | Status: SHIPPED | OUTPATIENT
Start: 2023-04-07

## 2023-04-07 RX ORDER — SODIUM CHLORIDE 9 MG/ML
INJECTION, SOLUTION INTRAVENOUS ONCE
Status: COMPLETED | OUTPATIENT
Start: 2023-04-07 | End: 2023-04-07

## 2023-04-07 RX ORDER — SODIUM BICARBONATE 325 MG/1
650 TABLET ORAL 2 TIMES DAILY
Status: DISCONTINUED | OUTPATIENT
Start: 2023-04-07 | End: 2023-04-07

## 2023-04-07 RX ORDER — CEFAZOLIN SODIUM/WATER 2 G/20 ML
2 SYRINGE (ML) INTRAVENOUS EVERY 24 HOURS
Status: DISCONTINUED | OUTPATIENT
Start: 2023-04-08 | End: 2023-04-07

## 2023-04-07 NOTE — PLAN OF CARE
Assumed care at 0730  A&O x4  RA. NSR/SB. VSS  Denies pain  Antibiotics given per order. See MAR  Dressing change done by Dr. Liv Kerr this AM  PICC line placed this AM.   Voiding per urinal  Hgb 6.7. 1 unit of PRBC infused per nephrology order. Repeat hgb ordered per protocol. Pt updated on plan of care  Call light within reach. Bed in lowest position    Medically cleared for discharge  IV removed. Tele removed  Went over discharge education/medications with pt. Verbalizes understanding. NURSING DISCHARGE NOTE    Discharged Home via Wheelchair. Accompanied by RN  Belongings Taken by patient/family.       Agree with above, Herb Hernandez RN.

## 2023-04-07 NOTE — PLAN OF CARE
Assumed care at [address] [facility].    A&Ox4, RA, NSR on tele  R foot dressing C/D/I  Voiding per urinal   No complaints of pain   Critical hgb 6.7, Dr Suzanne Palomares notified no new orders  Call light within reach    Patient updated on plan of care

## 2023-04-07 NOTE — CM/SW NOTE
SW made aware of plans to dc today. Awaiting blood transfusion. SW notified [facility] and [facility] of dc for today. [facility] coordinating for [facility] in [place] tomorrow. Updates to follow.

## 2023-04-08 LAB
BLOOD TYPE BARCODE: 7300
UNIT VOLUME: 350 ML

## 2023-04-10 ENCOUNTER — PATIENT OUTREACH (OUTPATIENT)
Dept: CASE MANAGEMENT | Age: 63
End: 2023-04-10

## 2023-04-10 ENCOUNTER — LAB REQUISITION (OUTPATIENT)
Dept: LAB | Facility: HOSPITAL | Age: 63
End: 2023-04-10
Payer: COMMERCIAL

## 2023-04-10 DIAGNOSIS — M86.171 OTHER ACUTE OSTEOMYELITIS, RIGHT ANKLE AND FOOT (HCC): ICD-10-CM

## 2023-04-10 DIAGNOSIS — Z02.9 ENCOUNTERS FOR UNSPECIFIED ADMINISTRATIVE PURPOSE: ICD-10-CM

## 2023-04-10 LAB
ALBUMIN SERPL-MCNC: 2.1 G/DL (ref 3.4–5)
ALBUMIN/GLOB SERPL: 0.5 {RATIO} (ref 1–2)
ALP LIVER SERPL-CCNC: 123 U/L
ALT SERPL-CCNC: 12 U/L
ANION GAP SERPL CALC-SCNC: 10 MMOL/L (ref 0–18)
AST SERPL-CCNC: 14 U/L (ref 15–37)
BASOPHILS # BLD AUTO: 0.03 X10(3) UL (ref 0–0.2)
BASOPHILS NFR BLD AUTO: 0.3 %
BILIRUB SERPL-MCNC: 0.2 MG/DL (ref 0.1–2)
BUN BLD-MCNC: 65 MG/DL (ref 7–18)
CALCIUM BLD-MCNC: 7.6 MG/DL (ref 8.5–10.1)
CHLORIDE SERPL-SCNC: 111 MMOL/L (ref 98–112)
CO2 SERPL-SCNC: 18 MMOL/L (ref 21–32)
CREAT BLD-MCNC: 3.54 MG/DL
CRP SERPL-MCNC: 4.02 MG/DL (ref ?–0.3)
EOSINOPHIL # BLD AUTO: 0.29 X10(3) UL (ref 0–0.7)
EOSINOPHIL NFR BLD AUTO: 2.7 %
ERYTHROCYTE [DISTWIDTH] IN BLOOD BY AUTOMATED COUNT: 15.3 %
GFR SERPLBLD BASED ON 1.73 SQ M-ARVRAT: 18 ML/MIN/1.73M2 (ref 60–?)
GLOBULIN PLAS-MCNC: 3.9 G/DL (ref 2.8–4.4)
GLUCOSE BLD-MCNC: 283 MG/DL (ref 70–99)
HCT VFR BLD AUTO: 26.5 %
HGB BLD-MCNC: 7.9 G/DL
IMM GRANULOCYTES # BLD AUTO: 0.12 X10(3) UL (ref 0–1)
IMM GRANULOCYTES NFR BLD: 1.1 %
LYMPHOCYTES # BLD AUTO: 0.81 X10(3) UL (ref 1–4)
LYMPHOCYTES NFR BLD AUTO: 7.4 %
MCH RBC QN AUTO: 26.7 PG (ref 26–34)
MCHC RBC AUTO-ENTMCNC: 29.8 G/DL (ref 31–37)
MCV RBC AUTO: 89.5 FL
MONOCYTES # BLD AUTO: 0.47 X10(3) UL (ref 0.1–1)
MONOCYTES NFR BLD AUTO: 4.3 %
NEUTROPHILS # BLD AUTO: 9.21 X10 (3) UL (ref 1.5–7.7)
NEUTROPHILS # BLD AUTO: 9.21 X10(3) UL (ref 1.5–7.7)
NEUTROPHILS NFR BLD AUTO: 84.2 %
OSMOLALITY SERPL CALC.SUM OF ELEC: 317 MOSM/KG (ref 275–295)
PLATELET # BLD AUTO: 403 10(3)UL (ref 150–450)
POTASSIUM SERPL-SCNC: 4.4 MMOL/L (ref 3.5–5.1)
PROT SERPL-MCNC: 6 G/DL (ref 6.4–8.2)
RBC # BLD AUTO: 2.96 X10(6)UL
SODIUM SERPL-SCNC: 139 MMOL/L (ref 136–145)
WBC # BLD AUTO: 10.9 X10(3) UL (ref 4–11)

## 2023-04-10 PROCEDURE — 86140 C-REACTIVE PROTEIN: CPT | Performed by: INTERNAL MEDICINE

## 2023-04-10 PROCEDURE — 80053 COMPREHEN METABOLIC PANEL: CPT | Performed by: INTERNAL MEDICINE

## 2023-04-10 PROCEDURE — 85025 COMPLETE CBC W/AUTO DIFF WBC: CPT | Performed by: INTERNAL MEDICINE

## 2023-04-10 NOTE — PROGRESS NOTES
SD spoke with patient briefly, states [facility] is there at this time and he will call SD back. SD provided contact information.

## 2023-04-11 ENCOUNTER — TELEMEDICINE (OUTPATIENT)
Dept: INTERNAL MEDICINE CLINIC | Facility: CLINIC | Age: 63
End: 2023-04-11
Payer: COMMERCIAL

## 2023-04-11 DIAGNOSIS — D63.1 ANEMIA DUE TO STAGE 4 CHRONIC KIDNEY DISEASE (HCC): ICD-10-CM

## 2023-04-11 DIAGNOSIS — E11.65 TYPE 2 DIABETES MELLITUS WITH HYPERGLYCEMIA, WITH LONG-TERM CURRENT USE OF INSULIN (HCC): ICD-10-CM

## 2023-04-11 DIAGNOSIS — L03.115 CELLULITIS OF RIGHT FOOT: ICD-10-CM

## 2023-04-11 DIAGNOSIS — N18.4 CKD (CHRONIC KIDNEY DISEASE) STAGE 4, GFR 15-29 ML/MIN (HCC): ICD-10-CM

## 2023-04-11 DIAGNOSIS — E11.628 DIABETIC FOOT INFECTION (HCC): Primary | ICD-10-CM

## 2023-04-11 DIAGNOSIS — I10 PRIMARY HYPERTENSION: ICD-10-CM

## 2023-04-11 DIAGNOSIS — N18.4 ANEMIA DUE TO STAGE 4 CHRONIC KIDNEY DISEASE (HCC): ICD-10-CM

## 2023-04-11 DIAGNOSIS — Z79.4 TYPE 2 DIABETES MELLITUS WITH HYPERGLYCEMIA, WITH LONG-TERM CURRENT USE OF INSULIN (HCC): ICD-10-CM

## 2023-04-11 DIAGNOSIS — L08.9 DIABETIC FOOT INFECTION (HCC): Primary | ICD-10-CM

## 2023-04-13 ENCOUNTER — OFFICE VISIT (OUTPATIENT)
Dept: PODIATRY CLINIC | Facility: CLINIC | Age: 63
End: 2023-04-13

## 2023-04-13 DIAGNOSIS — Z47.89 ORTHOPEDIC AFTERCARE: ICD-10-CM

## 2023-04-13 DIAGNOSIS — I73.9 PERIPHERAL VASCULAR DISEASE (HCC): ICD-10-CM

## 2023-04-13 DIAGNOSIS — E11.42 DIABETIC POLYNEUROPATHY ASSOCIATED WITH TYPE 2 DIABETES MELLITUS (HCC): ICD-10-CM

## 2023-04-13 DIAGNOSIS — N18.4 CKD (CHRONIC KIDNEY DISEASE) STAGE 4, GFR 15-29 ML/MIN (HCC): ICD-10-CM

## 2023-04-13 DIAGNOSIS — L97.514 FOOT ULCER WITH NECROSIS OF BONE, RIGHT (HCC): Primary | ICD-10-CM

## 2023-04-13 PROCEDURE — 99024 POSTOP FOLLOW-UP VISIT: CPT | Performed by: STUDENT IN AN ORGANIZED HEALTH CARE EDUCATION/TRAINING PROGRAM

## 2023-04-17 ENCOUNTER — OFFICE VISIT (OUTPATIENT)
Dept: WOUND CARE | Facility: HOSPITAL | Age: 63
End: 2023-04-17
Attending: STUDENT IN AN ORGANIZED HEALTH CARE EDUCATION/TRAINING PROGRAM
Payer: COMMERCIAL

## 2023-04-17 ENCOUNTER — LAB REQUISITION (OUTPATIENT)
Dept: LAB | Facility: HOSPITAL | Age: 63
End: 2023-04-17
Payer: COMMERCIAL

## 2023-04-17 VITALS
RESPIRATION RATE: 17 BRPM | SYSTOLIC BLOOD PRESSURE: 168 MMHG | TEMPERATURE: 98 F | HEART RATE: 73 BPM | DIASTOLIC BLOOD PRESSURE: 73 MMHG | BODY MASS INDEX: 23.54 KG/M2 | HEIGHT: 67 IN | WEIGHT: 150 LBS

## 2023-04-17 DIAGNOSIS — M86.171 OTHER ACUTE OSTEOMYELITIS, RIGHT ANKLE AND FOOT (HCC): ICD-10-CM

## 2023-04-17 DIAGNOSIS — E11.42 DIABETIC POLYNEUROPATHY ASSOCIATED WITH TYPE 2 DIABETES MELLITUS (HCC): ICD-10-CM

## 2023-04-17 DIAGNOSIS — I73.9 PERIPHERAL ARTERIAL DISEASE (HCC): ICD-10-CM

## 2023-04-17 DIAGNOSIS — N18.4 CKD (CHRONIC KIDNEY DISEASE) STAGE 4, GFR 15-29 ML/MIN (HCC): ICD-10-CM

## 2023-04-17 DIAGNOSIS — L97.514 FOOT ULCER, RIGHT, WITH NECROSIS OF BONE (HCC): Primary | ICD-10-CM

## 2023-04-17 LAB
ALBUMIN SERPL-MCNC: 2.4 G/DL (ref 3.4–5)
ALBUMIN/GLOB SERPL: 0.6 {RATIO} (ref 1–2)
ALP LIVER SERPL-CCNC: 102 U/L
ALT SERPL-CCNC: 8 U/L
ANION GAP SERPL CALC-SCNC: 8 MMOL/L (ref 0–18)
AST SERPL-CCNC: 12 U/L (ref 15–37)
BASOPHILS # BLD AUTO: 0.07 X10(3) UL (ref 0–0.2)
BASOPHILS NFR BLD AUTO: 0.7 %
BILIRUB SERPL-MCNC: 0.2 MG/DL (ref 0.1–2)
BUN BLD-MCNC: 76 MG/DL (ref 7–18)
CALCIUM BLD-MCNC: 7.9 MG/DL (ref 8.5–10.1)
CHLORIDE SERPL-SCNC: 115 MMOL/L (ref 98–112)
CO2 SERPL-SCNC: 16 MMOL/L (ref 21–32)
CREAT BLD-MCNC: 3.77 MG/DL
CRP SERPL-MCNC: 1.64 MG/DL (ref ?–0.3)
EOSINOPHIL # BLD AUTO: 0.27 X10(3) UL (ref 0–0.7)
EOSINOPHIL NFR BLD AUTO: 2.7 %
ERYTHROCYTE [DISTWIDTH] IN BLOOD BY AUTOMATED COUNT: 14.9 %
GFR SERPLBLD BASED ON 1.73 SQ M-ARVRAT: 17 ML/MIN/1.73M2 (ref 60–?)
GLOBULIN PLAS-MCNC: 3.8 G/DL (ref 2.8–4.4)
GLUCOSE BLD-MCNC: 137 MG/DL (ref 70–99)
GLUCOSE BLD-MCNC: 206 MG/DL (ref 70–99)
HCT VFR BLD AUTO: 26.7 %
HGB BLD-MCNC: 8 G/DL
IMM GRANULOCYTES # BLD AUTO: 0.04 X10(3) UL (ref 0–1)
IMM GRANULOCYTES NFR BLD: 0.4 %
LYMPHOCYTES # BLD AUTO: 1.18 X10(3) UL (ref 1–4)
LYMPHOCYTES NFR BLD AUTO: 11.6 %
MCH RBC QN AUTO: 26.9 PG (ref 26–34)
MCHC RBC AUTO-ENTMCNC: 30 G/DL (ref 31–37)
MCV RBC AUTO: 89.9 FL
MONOCYTES # BLD AUTO: 0.55 X10(3) UL (ref 0.1–1)
MONOCYTES NFR BLD AUTO: 5.4 %
NEUTROPHILS # BLD AUTO: 8.02 X10 (3) UL (ref 1.5–7.7)
NEUTROPHILS # BLD AUTO: 8.02 X10(3) UL (ref 1.5–7.7)
NEUTROPHILS NFR BLD AUTO: 79.2 %
OSMOLALITY SERPL CALC.SUM OF ELEC: 317 MOSM/KG (ref 275–295)
PLATELET # BLD AUTO: 332 10(3)UL (ref 150–450)
POTASSIUM SERPL-SCNC: 5.1 MMOL/L (ref 3.5–5.1)
PROT SERPL-MCNC: 6.2 G/DL (ref 6.4–8.2)
RBC # BLD AUTO: 2.97 X10(6)UL
SODIUM SERPL-SCNC: 139 MMOL/L (ref 136–145)
WBC # BLD AUTO: 10.1 X10(3) UL (ref 4–11)

## 2023-04-17 PROCEDURE — 11045 DBRDMT SUBQ TISS EACH ADDL: CPT | Performed by: STUDENT IN AN ORGANIZED HEALTH CARE EDUCATION/TRAINING PROGRAM

## 2023-04-17 PROCEDURE — 86140 C-REACTIVE PROTEIN: CPT | Performed by: INTERNAL MEDICINE

## 2023-04-17 PROCEDURE — 11042 DBRDMT SUBQ TIS 1ST 20SQCM/<: CPT | Performed by: STUDENT IN AN ORGANIZED HEALTH CARE EDUCATION/TRAINING PROGRAM

## 2023-04-17 PROCEDURE — 99024 POSTOP FOLLOW-UP VISIT: CPT | Performed by: STUDENT IN AN ORGANIZED HEALTH CARE EDUCATION/TRAINING PROGRAM

## 2023-04-17 PROCEDURE — 85025 COMPLETE CBC W/AUTO DIFF WBC: CPT | Performed by: INTERNAL MEDICINE

## 2023-04-17 PROCEDURE — 80053 COMPREHEN METABOLIC PANEL: CPT | Performed by: INTERNAL MEDICINE

## 2023-04-17 NOTE — PROGRESS NOTES
Weekly Wound Education Note                      Notes: Right lateral foot: cleanse with saline or wound cleanser, apply silver alginate, dry absorptive dressing (abd pad, or if increased drainage baby diaper with tapx removed),rolled gauze,  Ace wrap not wrapped too tight, change  every other day. Strictly nonweightbearing to right lower extremity recommend to use knee scooter or wheelchair as needed.     Zinc applied to periwound to protect from moisture

## 2023-04-17 NOTE — PROGRESS NOTES
Patient ID: Christine Agudelo is a 57year old male. Debridement   Wound 04/17/23 #1 Right Lateral Foot Old surgical Foot Right;Lateral    Consent obtained? verbal  Consent given by: patient  Risks discussed?  procedural risks discussed  Time out called at 4/17/2023 7:48 AM  Immediately prior to the procedure a time out was called  Performed by: provider  Debridement type: surgical  Level of debridement: subcutaneous tissue  Pain control: none  Pre-debridement measurements  Length (cm): 13  Width (cm): 7.5  Depth (cm): 1.2  Surface Area (cm^2): 97.5  Volume (cm^3): 117    Post-debridement measurements  Length (cm): 13.1  Width (cm): 7.6  Depth (cm): 1.2  Percent debrided: 100%  Surface Area (cm^2): 99.56  Area debrided (cm^2): 99.56  Volume (cm^3): 119.47  Tissue and other material debrided: subcutaneous tissue and tendon  Devitalized tissue debrided: biofilm, exudate, fibrin, necrotic debris and slough  Instrument(s) utilized: blade, curette and forceps  Bleeding: small  Hemostasis obtained with: not applicable  Procedural pain (0-10): 0  Post-procedural pain: 0   Response to treatment: procedure was tolerated well      staples from surgery removed

## 2023-04-17 NOTE — PATIENT INSTRUCTIONS
-Dress site with silver alginate every other day. Remain nonweightbearing to right lower extremity. Continue IV antibiotics per ID. Follow-up in 1 week for reevaluation or sooner if other concerns arise.

## 2023-04-17 NOTE — PATIENT INSTRUCTIONS
-Leave dressings clean, dry, intact. Remain nonweightbearing to right lower extremity. Continue IV antibiotics per ID. Follow-up in wound care center on 4/17.

## 2023-04-24 ENCOUNTER — LAB REQUISITION (OUTPATIENT)
Dept: LAB | Facility: HOSPITAL | Age: 63
End: 2023-04-24
Payer: COMMERCIAL

## 2023-04-24 ENCOUNTER — OFFICE VISIT (OUTPATIENT)
Dept: WOUND CARE | Facility: HOSPITAL | Age: 63
End: 2023-04-24
Attending: STUDENT IN AN ORGANIZED HEALTH CARE EDUCATION/TRAINING PROGRAM
Payer: COMMERCIAL

## 2023-04-24 VITALS
DIASTOLIC BLOOD PRESSURE: 84 MMHG | HEART RATE: 66 BPM | TEMPERATURE: 98 F | SYSTOLIC BLOOD PRESSURE: 178 MMHG | RESPIRATION RATE: 16 BRPM

## 2023-04-24 DIAGNOSIS — M86.171 OTHER ACUTE OSTEOMYELITIS, RIGHT ANKLE AND FOOT (HCC): ICD-10-CM

## 2023-04-24 DIAGNOSIS — Z47.89 ORTHOPEDIC AFTERCARE: ICD-10-CM

## 2023-04-24 DIAGNOSIS — E11.42 DIABETIC POLYNEUROPATHY ASSOCIATED WITH TYPE 2 DIABETES MELLITUS (HCC): ICD-10-CM

## 2023-04-24 DIAGNOSIS — N18.4 CKD (CHRONIC KIDNEY DISEASE) STAGE 4, GFR 15-29 ML/MIN (HCC): ICD-10-CM

## 2023-04-24 DIAGNOSIS — I73.9 PERIPHERAL ARTERIAL DISEASE (HCC): ICD-10-CM

## 2023-04-24 DIAGNOSIS — L97.514 FOOT ULCER, RIGHT, WITH NECROSIS OF BONE (HCC): Primary | ICD-10-CM

## 2023-04-24 LAB
ALBUMIN SERPL-MCNC: 2.5 G/DL (ref 3.4–5)
ALBUMIN/GLOB SERPL: 0.7 {RATIO} (ref 1–2)
ALP LIVER SERPL-CCNC: 125 U/L
ALT SERPL-CCNC: 9 U/L
ANION GAP SERPL CALC-SCNC: 9 MMOL/L (ref 0–18)
AST SERPL-CCNC: 12 U/L (ref 15–37)
BASOPHILS # BLD AUTO: 0.06 X10(3) UL (ref 0–0.2)
BASOPHILS NFR BLD AUTO: 0.8 %
BILIRUB SERPL-MCNC: 0.2 MG/DL (ref 0.1–2)
BUN BLD-MCNC: 71 MG/DL (ref 7–18)
CALCIUM BLD-MCNC: 7.5 MG/DL (ref 8.5–10.1)
CHLORIDE SERPL-SCNC: 120 MMOL/L (ref 98–112)
CO2 SERPL-SCNC: 14 MMOL/L (ref 21–32)
CREAT BLD-MCNC: 3.47 MG/DL
CRP SERPL-MCNC: 0.96 MG/DL (ref ?–0.3)
EOSINOPHIL # BLD AUTO: 0.39 X10(3) UL (ref 0–0.7)
EOSINOPHIL NFR BLD AUTO: 5.2 %
ERYTHROCYTE [DISTWIDTH] IN BLOOD BY AUTOMATED COUNT: 15 %
GFR SERPLBLD BASED ON 1.73 SQ M-ARVRAT: 19 ML/MIN/1.73M2 (ref 60–?)
GLOBULIN PLAS-MCNC: 3.7 G/DL (ref 2.8–4.4)
GLUCOSE BLD-MCNC: 134 MG/DL (ref 70–99)
GLUCOSE BLD-MCNC: 166 MG/DL (ref 70–99)
HCT VFR BLD AUTO: 29.8 %
HGB BLD-MCNC: 8.9 G/DL
IMM GRANULOCYTES # BLD AUTO: 0.03 X10(3) UL (ref 0–1)
IMM GRANULOCYTES NFR BLD: 0.4 %
LYMPHOCYTES # BLD AUTO: 1.15 X10(3) UL (ref 1–4)
LYMPHOCYTES NFR BLD AUTO: 15.4 %
MCH RBC QN AUTO: 26.9 PG (ref 26–34)
MCHC RBC AUTO-ENTMCNC: 29.9 G/DL (ref 31–37)
MCV RBC AUTO: 90 FL
MONOCYTES # BLD AUTO: 0.61 X10(3) UL (ref 0.1–1)
MONOCYTES NFR BLD AUTO: 8.1 %
NEUTROPHILS # BLD AUTO: 5.25 X10 (3) UL (ref 1.5–7.7)
NEUTROPHILS # BLD AUTO: 5.25 X10(3) UL (ref 1.5–7.7)
NEUTROPHILS NFR BLD AUTO: 70.1 %
OSMOLALITY SERPL CALC.SUM OF ELEC: 321 MOSM/KG (ref 275–295)
PLATELET # BLD AUTO: 336 10(3)UL (ref 150–450)
POTASSIUM SERPL-SCNC: 5.2 MMOL/L (ref 3.5–5.1)
PROT SERPL-MCNC: 6.2 G/DL (ref 6.4–8.2)
RBC # BLD AUTO: 3.31 X10(6)UL
SODIUM SERPL-SCNC: 143 MMOL/L (ref 136–145)
WBC # BLD AUTO: 7.5 X10(3) UL (ref 4–11)

## 2023-04-24 PROCEDURE — 86140 C-REACTIVE PROTEIN: CPT | Performed by: INTERNAL MEDICINE

## 2023-04-24 PROCEDURE — 80053 COMPREHEN METABOLIC PANEL: CPT | Performed by: INTERNAL MEDICINE

## 2023-04-24 PROCEDURE — 11042 DBRDMT SUBQ TIS 1ST 20SQCM/<: CPT | Performed by: STUDENT IN AN ORGANIZED HEALTH CARE EDUCATION/TRAINING PROGRAM

## 2023-04-24 PROCEDURE — 85025 COMPLETE CBC W/AUTO DIFF WBC: CPT | Performed by: INTERNAL MEDICINE

## 2023-04-24 PROCEDURE — 99024 POSTOP FOLLOW-UP VISIT: CPT | Performed by: STUDENT IN AN ORGANIZED HEALTH CARE EDUCATION/TRAINING PROGRAM

## 2023-04-24 PROCEDURE — 11045 DBRDMT SUBQ TISS EACH ADDL: CPT | Performed by: STUDENT IN AN ORGANIZED HEALTH CARE EDUCATION/TRAINING PROGRAM

## 2023-04-24 NOTE — PROGRESS NOTES
Patient ID: Christine Agudelo is a 57year old male. Debridement   Wound 04/17/23 #1 Right Lateral Foot Old surgical Foot Right;Lateral    Consent obtained? verbal  Consent given by: patient  Risks discussed?  procedural risks discussed  Time out called at 4/24/2023 7:42 AM  Immediately prior to the procedure a time out was called  Performed by: provider  Debridement type: surgical  Level of debridement: subcutaneous tissue  Pain control: lidocaine 4%  Pre-debridement measurements  Length (cm): 13.2  Width (cm): 5.6  Depth (cm): 1.1  Surface Area (cm^2): 73.92  Volume (cm^3): 81.31    Post-debridement measurements  Length (cm): 13.4  Width (cm): 5.7  Depth (cm): 1.1  Percent debrided: 100%  Surface Area (cm^2): 76.38  Area debrided (cm^2): 76.38  Volume (cm^3): 84.02  Tissue and other material debrided: fascia, muscle and subcutaneous tissue  Devitalized tissue debrided: biofilm, fibrin and necrotic debris  Instrument(s) utilized: blade, curette and forceps  Bleeding: medium  Hemostasis obtained with: pressure  Procedural pain (0-10): 0  Post-procedural pain: 0   Response to treatment: procedure was tolerated well

## 2023-04-24 NOTE — PATIENT INSTRUCTIONS
-Perform dressing changes every other day with silver alginate and dry dressing. Remain nonweightbearing to right lower extremity. Continue IV antibiotics per ID. Follow-up in 1 week for reevaluation or sooner if other concerns arise.

## 2023-05-01 ENCOUNTER — APPOINTMENT (OUTPATIENT)
Dept: WOUND CARE | Facility: HOSPITAL | Age: 63
End: 2023-05-01
Attending: STUDENT IN AN ORGANIZED HEALTH CARE EDUCATION/TRAINING PROGRAM
Payer: COMMERCIAL

## 2023-05-01 ENCOUNTER — TELEPHONE (OUTPATIENT)
Dept: WOUND CARE | Facility: HOSPITAL | Age: 63
End: 2023-05-01

## 2023-05-01 ENCOUNTER — LAB REQUISITION (OUTPATIENT)
Dept: LAB | Facility: HOSPITAL | Age: 63
End: 2023-05-01
Payer: COMMERCIAL

## 2023-05-01 VITALS
DIASTOLIC BLOOD PRESSURE: 92 MMHG | SYSTOLIC BLOOD PRESSURE: 180 MMHG | RESPIRATION RATE: 17 BRPM | HEART RATE: 69 BPM | TEMPERATURE: 98 F

## 2023-05-01 DIAGNOSIS — L97.514 FOOT ULCER, RIGHT, WITH NECROSIS OF BONE (HCC): Primary | ICD-10-CM

## 2023-05-01 DIAGNOSIS — N18.4 CKD (CHRONIC KIDNEY DISEASE) STAGE 4, GFR 15-29 ML/MIN (HCC): ICD-10-CM

## 2023-05-01 DIAGNOSIS — Z47.89 ORTHOPEDIC AFTERCARE: ICD-10-CM

## 2023-05-01 DIAGNOSIS — L03.115 CELLULITIS OF RIGHT LOWER LIMB: ICD-10-CM

## 2023-05-01 DIAGNOSIS — E11.42 DIABETIC POLYNEUROPATHY ASSOCIATED WITH TYPE 2 DIABETES MELLITUS (HCC): ICD-10-CM

## 2023-05-01 DIAGNOSIS — I73.9 PERIPHERAL ARTERIAL DISEASE (HCC): ICD-10-CM

## 2023-05-01 LAB
ALBUMIN SERPL-MCNC: 2.5 G/DL (ref 3.4–5)
ALBUMIN/GLOB SERPL: 0.7 {RATIO} (ref 1–2)
ALP LIVER SERPL-CCNC: 110 U/L
ALT SERPL-CCNC: 16 U/L
ANION GAP SERPL CALC-SCNC: 4 MMOL/L (ref 0–18)
AST SERPL-CCNC: 18 U/L (ref 15–37)
BASOPHILS # BLD AUTO: 0.05 X10(3) UL (ref 0–0.2)
BASOPHILS NFR BLD AUTO: 0.6 %
BILIRUB SERPL-MCNC: 0.2 MG/DL (ref 0.1–2)
BUN BLD-MCNC: 62 MG/DL (ref 7–18)
CALCIUM BLD-MCNC: 7.6 MG/DL (ref 8.5–10.1)
CHLORIDE SERPL-SCNC: 122 MMOL/L (ref 98–112)
CO2 SERPL-SCNC: 16 MMOL/L (ref 21–32)
CREAT BLD-MCNC: 3.2 MG/DL
CRP SERPL-MCNC: 1.01 MG/DL (ref ?–0.3)
EOSINOPHIL # BLD AUTO: 0.23 X10(3) UL (ref 0–0.7)
EOSINOPHIL NFR BLD AUTO: 2.7 %
ERYTHROCYTE [DISTWIDTH] IN BLOOD BY AUTOMATED COUNT: 14.7 %
GFR SERPLBLD BASED ON 1.73 SQ M-ARVRAT: 21 ML/MIN/1.73M2 (ref 60–?)
GLOBULIN PLAS-MCNC: 3.5 G/DL (ref 2.8–4.4)
GLUCOSE BLD-MCNC: 129 MG/DL (ref 70–99)
GLUCOSE BLD-MCNC: 159 MG/DL (ref 70–99)
HCT VFR BLD AUTO: 25.3 %
HGB BLD-MCNC: 7.5 G/DL
IMM GRANULOCYTES # BLD AUTO: 0.03 X10(3) UL (ref 0–1)
IMM GRANULOCYTES NFR BLD: 0.4 %
LYMPHOCYTES # BLD AUTO: 1.32 X10(3) UL (ref 1–4)
LYMPHOCYTES NFR BLD AUTO: 15.6 %
MCH RBC QN AUTO: 26.7 PG (ref 26–34)
MCHC RBC AUTO-ENTMCNC: 29.6 G/DL (ref 31–37)
MCV RBC AUTO: 90 FL
MONOCYTES # BLD AUTO: 0.54 X10(3) UL (ref 0.1–1)
MONOCYTES NFR BLD AUTO: 6.4 %
NEUTROPHILS # BLD AUTO: 6.3 X10 (3) UL (ref 1.5–7.7)
NEUTROPHILS # BLD AUTO: 6.3 X10(3) UL (ref 1.5–7.7)
NEUTROPHILS NFR BLD AUTO: 74.3 %
OSMOLALITY SERPL CALC.SUM OF ELEC: 315 MOSM/KG (ref 275–295)
PLATELET # BLD AUTO: 339 10(3)UL (ref 150–450)
POTASSIUM SERPL-SCNC: 5.3 MMOL/L (ref 3.5–5.1)
PROT SERPL-MCNC: 6 G/DL (ref 6.4–8.2)
RBC # BLD AUTO: 2.81 X10(6)UL
SODIUM SERPL-SCNC: 142 MMOL/L (ref 136–145)
WBC # BLD AUTO: 8.5 X10(3) UL (ref 4–11)

## 2023-05-01 PROCEDURE — 86140 C-REACTIVE PROTEIN: CPT | Performed by: INTERNAL MEDICINE

## 2023-05-01 PROCEDURE — 11042 DBRDMT SUBQ TIS 1ST 20SQCM/<: CPT | Performed by: STUDENT IN AN ORGANIZED HEALTH CARE EDUCATION/TRAINING PROGRAM

## 2023-05-01 PROCEDURE — 82962 GLUCOSE BLOOD TEST: CPT | Performed by: STUDENT IN AN ORGANIZED HEALTH CARE EDUCATION/TRAINING PROGRAM

## 2023-05-01 PROCEDURE — 80053 COMPREHEN METABOLIC PANEL: CPT | Performed by: INTERNAL MEDICINE

## 2023-05-01 PROCEDURE — 85025 COMPLETE CBC W/AUTO DIFF WBC: CPT | Performed by: INTERNAL MEDICINE

## 2023-05-01 PROCEDURE — 11045 DBRDMT SUBQ TISS EACH ADDL: CPT | Performed by: STUDENT IN AN ORGANIZED HEALTH CARE EDUCATION/TRAINING PROGRAM

## 2023-05-01 NOTE — TELEPHONE ENCOUNTER
Spoke to the patient last week to schedule an appointment for HBO consult. Patient said that he will talk to Dr eSdrick Zeng on Monday 5/1/23. He said he won't be able to do HBO 5 days a week.

## 2023-05-01 NOTE — PROGRESS NOTES
Patient ID: Melony Morales is a 57year old male. Debridement   Wound 04/17/23 #1 Right Lateral Foot Old surgical Foot Right;Lateral    Consent obtained? verbal  Consent given by: patient  Risks discussed?  procedural risks discussed  Time out called at 5/1/2023 7:37 AM  Immediately prior to the procedure a time out was called  Performed by: provider  Debridement type: surgical  Level of debridement: subcutaneous tissue  Pain control: lidocaine 4%  Pre-debridement measurements  Length (cm): 12.9  Width (cm): 5.7  Depth (cm): 1  Surface Area (cm^2): 73.53  Volume (cm^3): 73.53    Post-debridement measurements  Length (cm): 12.9  Width (cm): 5.7  Depth (cm): 1  Percent debrided: 100%  Surface Area (cm^2): 73.53  Area debrided (cm^2): 73.53  Volume (cm^3): 73.53  Tissue and other material debrided: subcutaneous tissue  Devitalized tissue debrided: biofilm

## 2023-05-01 NOTE — PATIENT INSTRUCTIONS
-Dress site with silver alginate every other day. Remain nonweightbearing to right lower extremity. Continue IV antibiotics per ID. Follow-up with ID regarding side effects with antibiotics and seek immediate medical attention if any concerns arise.

## 2023-05-01 NOTE — PROGRESS NOTES
Weekly Wound Education Note                      Notes: Cleanse right lateral foot with saline or wound cleanser apply jerel to area of exposed bone cover with silver alginate, kerramax, abd and kerlix, apply ace wrap not too tight. Change dressing every other day. Nonweightbearing to right lower extremity.     Zinc to haley-wound

## 2023-05-01 NOTE — TELEPHONE ENCOUNTER
Procedure:   1. 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

## 2023-05-03 ENCOUNTER — PATIENT MESSAGE (OUTPATIENT)
Dept: NEPHROLOGY | Facility: CLINIC | Age: 63
End: 2023-05-03

## 2023-05-03 ENCOUNTER — LAB ENCOUNTER (OUTPATIENT)
Dept: LAB | Age: 63
End: 2023-05-03
Attending: INTERNAL MEDICINE
Payer: COMMERCIAL

## 2023-05-03 DIAGNOSIS — E87.20 METABOLIC ACIDOSIS: ICD-10-CM

## 2023-05-03 DIAGNOSIS — E87.5 HYPERKALEMIA: ICD-10-CM

## 2023-05-03 DIAGNOSIS — I10 PRIMARY HYPERTENSION: ICD-10-CM

## 2023-05-03 DIAGNOSIS — E11.21 DIABETIC NEPHROPATHY ASSOCIATED WITH TYPE 2 DIABETES MELLITUS (HCC): ICD-10-CM

## 2023-05-03 DIAGNOSIS — N18.4 ANEMIA DUE TO STAGE 4 CHRONIC KIDNEY DISEASE (HCC): ICD-10-CM

## 2023-05-03 DIAGNOSIS — N18.4 CKD (CHRONIC KIDNEY DISEASE) STAGE 4, GFR 15-29 ML/MIN (HCC): ICD-10-CM

## 2023-05-03 DIAGNOSIS — D63.1 ANEMIA DUE TO STAGE 4 CHRONIC KIDNEY DISEASE (HCC): ICD-10-CM

## 2023-05-03 DIAGNOSIS — N18.4 CKD (CHRONIC KIDNEY DISEASE) STAGE 4, GFR 15-29 ML/MIN (HCC): Primary | ICD-10-CM

## 2023-05-03 LAB
ANION GAP SERPL CALC-SCNC: 9 MMOL/L (ref 0–18)
BASOPHILS # BLD AUTO: 0.06 X10(3) UL (ref 0–0.2)
BASOPHILS NFR BLD AUTO: 0.6 %
BUN BLD-MCNC: 66 MG/DL (ref 7–18)
CALCIUM BLD-MCNC: 8.3 MG/DL (ref 8.5–10.1)
CHLORIDE SERPL-SCNC: 121 MMOL/L (ref 98–112)
CO2 SERPL-SCNC: 14 MMOL/L (ref 21–32)
CREAT BLD-MCNC: 3.59 MG/DL
EOSINOPHIL # BLD AUTO: 0.21 X10(3) UL (ref 0–0.7)
EOSINOPHIL NFR BLD AUTO: 2.2 %
ERYTHROCYTE [DISTWIDTH] IN BLOOD BY AUTOMATED COUNT: 14.6 %
FASTING STATUS PATIENT QL REPORTED: NO
GFR SERPLBLD BASED ON 1.73 SQ M-ARVRAT: 18 ML/MIN/1.73M2 (ref 60–?)
GLUCOSE BLD-MCNC: 157 MG/DL (ref 70–99)
HCT VFR BLD AUTO: 28.5 %
HGB BLD-MCNC: 8.4 G/DL
IMM GRANULOCYTES # BLD AUTO: 0.04 X10(3) UL (ref 0–1)
IMM GRANULOCYTES NFR BLD: 0.4 %
LYMPHOCYTES # BLD AUTO: 1.25 X10(3) UL (ref 1–4)
LYMPHOCYTES NFR BLD AUTO: 13 %
MCH RBC QN AUTO: 26.8 PG (ref 26–34)
MCHC RBC AUTO-ENTMCNC: 29.5 G/DL (ref 31–37)
MCV RBC AUTO: 91.1 FL
MONOCYTES # BLD AUTO: 0.56 X10(3) UL (ref 0.1–1)
MONOCYTES NFR BLD AUTO: 5.8 %
NEUTROPHILS # BLD AUTO: 7.47 X10 (3) UL (ref 1.5–7.7)
NEUTROPHILS # BLD AUTO: 7.47 X10(3) UL (ref 1.5–7.7)
NEUTROPHILS NFR BLD AUTO: 78 %
OSMOLALITY SERPL CALC.SUM OF ELEC: 320 MOSM/KG (ref 275–295)
PHOSPHATE SERPL-MCNC: 6.5 MG/DL (ref 2.5–4.9)
PLATELET # BLD AUTO: 358 10(3)UL (ref 150–450)
POTASSIUM SERPL-SCNC: 6.1 MMOL/L (ref 3.5–5.1)
RBC # BLD AUTO: 3.13 X10(6)UL
SODIUM SERPL-SCNC: 144 MMOL/L (ref 136–145)
WBC # BLD AUTO: 9.6 X10(3) UL (ref 4–11)

## 2023-05-03 PROCEDURE — 85025 COMPLETE CBC W/AUTO DIFF WBC: CPT | Performed by: INTERNAL MEDICINE

## 2023-05-03 PROCEDURE — 80048 BASIC METABOLIC PNL TOTAL CA: CPT | Performed by: INTERNAL MEDICINE

## 2023-05-03 PROCEDURE — 84100 ASSAY OF PHOSPHORUS: CPT | Performed by: INTERNAL MEDICINE

## 2023-05-08 ENCOUNTER — OFFICE VISIT (OUTPATIENT)
Dept: WOUND CARE | Facility: HOSPITAL | Age: 63
End: 2023-05-08
Attending: STUDENT IN AN ORGANIZED HEALTH CARE EDUCATION/TRAINING PROGRAM
Payer: COMMERCIAL

## 2023-05-08 ENCOUNTER — LAB REQUISITION (OUTPATIENT)
Dept: LAB | Facility: HOSPITAL | Age: 63
End: 2023-05-08
Payer: COMMERCIAL

## 2023-05-08 VITALS
DIASTOLIC BLOOD PRESSURE: 66 MMHG | TEMPERATURE: 98 F | RESPIRATION RATE: 17 BRPM | HEART RATE: 82 BPM | SYSTOLIC BLOOD PRESSURE: 159 MMHG

## 2023-05-08 DIAGNOSIS — E11.42 DIABETIC POLYNEUROPATHY ASSOCIATED WITH TYPE 2 DIABETES MELLITUS (HCC): ICD-10-CM

## 2023-05-08 DIAGNOSIS — Z47.89 ORTHOPEDIC AFTERCARE: ICD-10-CM

## 2023-05-08 DIAGNOSIS — N18.4 CKD (CHRONIC KIDNEY DISEASE) STAGE 4, GFR 15-29 ML/MIN (HCC): ICD-10-CM

## 2023-05-08 DIAGNOSIS — I73.9 PERIPHERAL ARTERIAL DISEASE (HCC): ICD-10-CM

## 2023-05-08 DIAGNOSIS — L97.514 FOOT ULCER, RIGHT, WITH NECROSIS OF BONE (HCC): Primary | ICD-10-CM

## 2023-05-08 DIAGNOSIS — M86.171 OTHER ACUTE OSTEOMYELITIS, RIGHT ANKLE AND FOOT (HCC): ICD-10-CM

## 2023-05-08 LAB
ALBUMIN SERPL-MCNC: 2.5 G/DL (ref 3.4–5)
ALBUMIN/GLOB SERPL: 0.7 {RATIO} (ref 1–2)
ALP LIVER SERPL-CCNC: 123 U/L
ALT SERPL-CCNC: 11 U/L
ANION GAP SERPL CALC-SCNC: 5 MMOL/L (ref 0–18)
AST SERPL-CCNC: 10 U/L (ref 15–37)
BASOPHILS # BLD AUTO: 0.04 X10(3) UL (ref 0–0.2)
BASOPHILS NFR BLD AUTO: 0.4 %
BILIRUB SERPL-MCNC: 0.1 MG/DL (ref 0.1–2)
BUN BLD-MCNC: 79 MG/DL (ref 7–18)
CALCIUM BLD-MCNC: 7.7 MG/DL (ref 8.5–10.1)
CHLORIDE SERPL-SCNC: 127 MMOL/L (ref 98–112)
CO2 SERPL-SCNC: 11 MMOL/L (ref 21–32)
CREAT BLD-MCNC: 3.76 MG/DL
CRP SERPL-MCNC: 2.14 MG/DL (ref ?–0.3)
EOSINOPHIL # BLD AUTO: 0.33 X10(3) UL (ref 0–0.7)
EOSINOPHIL NFR BLD AUTO: 3.3 %
ERYTHROCYTE [DISTWIDTH] IN BLOOD BY AUTOMATED COUNT: 14.7 %
GFR SERPLBLD BASED ON 1.73 SQ M-ARVRAT: 17 ML/MIN/1.73M2 (ref 60–?)
GLOBULIN PLAS-MCNC: 3.4 G/DL (ref 2.8–4.4)
GLUCOSE BLD-MCNC: 145 MG/DL (ref 70–99)
GLUCOSE BLD-MCNC: 94 MG/DL (ref 70–99)
HCT VFR BLD AUTO: 24.6 %
HGB BLD-MCNC: 7.3 G/DL
IMM GRANULOCYTES # BLD AUTO: 0.04 X10(3) UL (ref 0–1)
IMM GRANULOCYTES NFR BLD: 0.4 %
LYMPHOCYTES # BLD AUTO: 1.32 X10(3) UL (ref 1–4)
LYMPHOCYTES NFR BLD AUTO: 13.1 %
MCH RBC QN AUTO: 26.9 PG (ref 26–34)
MCHC RBC AUTO-ENTMCNC: 29.7 G/DL (ref 31–37)
MCV RBC AUTO: 90.8 FL
MONOCYTES # BLD AUTO: 0.64 X10(3) UL (ref 0.1–1)
MONOCYTES NFR BLD AUTO: 6.3 %
NEUTROPHILS # BLD AUTO: 7.73 X10 (3) UL (ref 1.5–7.7)
NEUTROPHILS # BLD AUTO: 7.73 X10(3) UL (ref 1.5–7.7)
NEUTROPHILS NFR BLD AUTO: 76.5 %
OSMOLALITY SERPL CALC.SUM OF ELEC: 322 MOSM/KG (ref 275–295)
PLATELET # BLD AUTO: 311 10(3)UL (ref 150–450)
POTASSIUM SERPL-SCNC: 5.1 MMOL/L (ref 3.5–5.1)
PROT SERPL-MCNC: 5.9 G/DL (ref 6.4–8.2)
RBC # BLD AUTO: 2.71 X10(6)UL
SODIUM SERPL-SCNC: 143 MMOL/L (ref 136–145)
WBC # BLD AUTO: 10.1 X10(3) UL (ref 4–11)

## 2023-05-08 PROCEDURE — 86140 C-REACTIVE PROTEIN: CPT | Performed by: INTERNAL MEDICINE

## 2023-05-08 PROCEDURE — 85025 COMPLETE CBC W/AUTO DIFF WBC: CPT | Performed by: INTERNAL MEDICINE

## 2023-05-08 PROCEDURE — 82962 GLUCOSE BLOOD TEST: CPT | Performed by: STUDENT IN AN ORGANIZED HEALTH CARE EDUCATION/TRAINING PROGRAM

## 2023-05-08 PROCEDURE — 11045 DBRDMT SUBQ TISS EACH ADDL: CPT | Performed by: STUDENT IN AN ORGANIZED HEALTH CARE EDUCATION/TRAINING PROGRAM

## 2023-05-08 PROCEDURE — 11042 DBRDMT SUBQ TIS 1ST 20SQCM/<: CPT | Performed by: STUDENT IN AN ORGANIZED HEALTH CARE EDUCATION/TRAINING PROGRAM

## 2023-05-08 PROCEDURE — 80053 COMPREHEN METABOLIC PANEL: CPT | Performed by: INTERNAL MEDICINE

## 2023-05-08 NOTE — TELEPHONE ENCOUNTER
From: Ganesh Yuen MD  To: Nieves Meier  Sent: 5/3/2023 3:46 PM CDT  Subject: Labs    Mr. Chacon Area-    What is your current list of medications (just the names, not the doses)? Just want to clarify as your potassium and acid levels are both pretty high and want to make sure you and I are on the same page when it comes to your medications-    Kendrick Kwok

## 2023-05-08 NOTE — PROGRESS NOTES
Weekly Wound Education Note    Teaching Provided To: Patient; Family  Training Topics: Discharge instructions; Off-loading;Cleasing and general instructions;Signs and symptoms of infection  Training Method: Demonstration;Explain/Verbal;Written  Training Response: Patient responds and understands        Notes: Cleanse right lateral foot with saline or wound cleanser, apply Bianca and Hydrofera Transfer, zinc to periwound, kerramax/abd, kerlix, ace wrap not too tight. . Changes every other day. Continue strict nonweightbearing to right lower extremity.  Reviewed s/s of infection or concerns to report

## 2023-05-08 NOTE — PATIENT INSTRUCTIONS
-Dress site with Bianca and Hydrofera Blue every other day. Remain nonweightbearing to right lower extremity. Finish Antibiotics per ID. Seek immediate medical attention for any concerns arise.

## 2023-05-08 NOTE — PROGRESS NOTES
Patient ID: Brian Pimentel is a 57year old male. Debridement   Wound 04/17/23 #1 Right Lateral Foot Old surgical Foot Right;Lateral    Consent obtained? verbal  Consent given by: patient  Risks discussed?  procedural risks discussed  Time out called at 5/8/2023 7:41 AM  Immediately prior to the procedure a time out was called  Debridement type: surgical  Level of debridement: subcutaneous tissue    Pre-debridement measurements  Length (cm): 12.3  Width (cm): 5  Depth (cm): 0.1  Surface Area (cm^2): 61.5  Volume (cm^3): 6.15    Post-debridement measurements  Length (cm): 12.4  Width (cm): 5.1  Depth (cm): 0.1  Percent debrided: 100%  Surface Area (cm^2): 63.24  Area debrided (cm^2): 63.24  Volume (cm^3): 6.32  Tissue and other material debrided: subcutaneous tissue  Devitalized tissue debrided: biofilm, fibrin and necrotic debris  Instrument(s) utilized: nippers and curette  Bleeding: medium  Hemostasis obtained with: pressure  Procedural pain (0-10): 0  Post-procedural pain: 0   Response to treatment: procedure was tolerated well

## 2023-05-10 ENCOUNTER — LAB ENCOUNTER (OUTPATIENT)
Dept: LAB | Age: 63
End: 2023-05-10
Attending: INTERNAL MEDICINE
Payer: COMMERCIAL

## 2023-05-10 DIAGNOSIS — I10 PRIMARY HYPERTENSION: ICD-10-CM

## 2023-05-10 DIAGNOSIS — N18.4 CKD (CHRONIC KIDNEY DISEASE) STAGE 4, GFR 15-29 ML/MIN (HCC): ICD-10-CM

## 2023-05-10 DIAGNOSIS — D63.1 ANEMIA DUE TO STAGE 4 CHRONIC KIDNEY DISEASE (HCC): ICD-10-CM

## 2023-05-10 DIAGNOSIS — N18.4 ANEMIA DUE TO STAGE 4 CHRONIC KIDNEY DISEASE (HCC): ICD-10-CM

## 2023-05-10 DIAGNOSIS — E87.5 HYPERKALEMIA: ICD-10-CM

## 2023-05-10 DIAGNOSIS — E11.21 DIABETIC NEPHROPATHY ASSOCIATED WITH TYPE 2 DIABETES MELLITUS (HCC): ICD-10-CM

## 2023-05-10 LAB
ANION GAP SERPL CALC-SCNC: 5 MMOL/L (ref 0–18)
BASOPHILS # BLD AUTO: 0.06 X10(3) UL (ref 0–0.2)
BASOPHILS NFR BLD AUTO: 0.6 %
BUN BLD-MCNC: 81 MG/DL (ref 7–18)
CALCIUM BLD-MCNC: 8.2 MG/DL (ref 8.5–10.1)
CHLORIDE SERPL-SCNC: 125 MMOL/L (ref 98–112)
CO2 SERPL-SCNC: 12 MMOL/L (ref 21–32)
CREAT BLD-MCNC: 3.99 MG/DL
EOSINOPHIL # BLD AUTO: 0.31 X10(3) UL (ref 0–0.7)
EOSINOPHIL NFR BLD AUTO: 3.3 %
ERYTHROCYTE [DISTWIDTH] IN BLOOD BY AUTOMATED COUNT: 14.6 %
FASTING STATUS PATIENT QL REPORTED: NO
GFR SERPLBLD BASED ON 1.73 SQ M-ARVRAT: 16 ML/MIN/1.73M2 (ref 60–?)
GLUCOSE BLD-MCNC: 105 MG/DL (ref 70–99)
HCT VFR BLD AUTO: 24.7 %
HGB BLD-MCNC: 7.4 G/DL
IMM GRANULOCYTES # BLD AUTO: 0.04 X10(3) UL (ref 0–1)
IMM GRANULOCYTES NFR BLD: 0.4 %
LYMPHOCYTES # BLD AUTO: 1.71 X10(3) UL (ref 1–4)
LYMPHOCYTES NFR BLD AUTO: 18.3 %
MCH RBC QN AUTO: 26.9 PG (ref 26–34)
MCHC RBC AUTO-ENTMCNC: 30 G/DL (ref 31–37)
MCV RBC AUTO: 89.8 FL
MONOCYTES # BLD AUTO: 0.68 X10(3) UL (ref 0.1–1)
MONOCYTES NFR BLD AUTO: 7.3 %
NEUTROPHILS # BLD AUTO: 6.55 X10 (3) UL (ref 1.5–7.7)
NEUTROPHILS # BLD AUTO: 6.55 X10(3) UL (ref 1.5–7.7)
NEUTROPHILS NFR BLD AUTO: 70.1 %
OSMOLALITY SERPL CALC.SUM OF ELEC: 319 MOSM/KG (ref 275–295)
PHOSPHATE SERPL-MCNC: 6.3 MG/DL (ref 2.5–4.9)
PLATELET # BLD AUTO: 314 10(3)UL (ref 150–450)
POTASSIUM SERPL-SCNC: 5.1 MMOL/L (ref 3.5–5.1)
RBC # BLD AUTO: 2.75 X10(6)UL
SODIUM SERPL-SCNC: 142 MMOL/L (ref 136–145)
WBC # BLD AUTO: 9.4 X10(3) UL (ref 4–11)

## 2023-05-10 PROCEDURE — 80048 BASIC METABOLIC PNL TOTAL CA: CPT | Performed by: INTERNAL MEDICINE

## 2023-05-10 PROCEDURE — 84100 ASSAY OF PHOSPHORUS: CPT | Performed by: INTERNAL MEDICINE

## 2023-05-10 PROCEDURE — 85025 COMPLETE CBC W/AUTO DIFF WBC: CPT | Performed by: INTERNAL MEDICINE

## 2023-05-12 ENCOUNTER — MOBILE ENCOUNTER (OUTPATIENT)
Dept: NEPHROLOGY | Facility: CLINIC | Age: 63
End: 2023-05-12

## 2023-05-12 RX ORDER — SODIUM BICARBONATE 650 MG/1
650 TABLET ORAL 2 TIMES DAILY
Qty: 60 TABLET | Refills: 0 | Status: SHIPPED | OUTPATIENT
Start: 2023-05-12

## 2023-05-15 ENCOUNTER — OFFICE VISIT (OUTPATIENT)
Dept: WOUND CARE | Facility: HOSPITAL | Age: 63
End: 2023-05-15
Attending: STUDENT IN AN ORGANIZED HEALTH CARE EDUCATION/TRAINING PROGRAM
Payer: COMMERCIAL

## 2023-05-15 VITALS
TEMPERATURE: 98 F | HEART RATE: 62 BPM | SYSTOLIC BLOOD PRESSURE: 169 MMHG | DIASTOLIC BLOOD PRESSURE: 72 MMHG | RESPIRATION RATE: 16 BRPM

## 2023-05-15 DIAGNOSIS — I73.9 PERIPHERAL ARTERIAL DISEASE (HCC): ICD-10-CM

## 2023-05-15 DIAGNOSIS — E11.42 DIABETIC POLYNEUROPATHY ASSOCIATED WITH TYPE 2 DIABETES MELLITUS (HCC): ICD-10-CM

## 2023-05-15 DIAGNOSIS — L97.514 FOOT ULCER, RIGHT, WITH NECROSIS OF BONE (HCC): Primary | ICD-10-CM

## 2023-05-15 DIAGNOSIS — Z47.89 ORTHOPEDIC AFTERCARE: ICD-10-CM

## 2023-05-15 DIAGNOSIS — N18.4 CKD (CHRONIC KIDNEY DISEASE) STAGE 4, GFR 15-29 ML/MIN (HCC): ICD-10-CM

## 2023-05-15 LAB — GLUCOSE BLD-MCNC: 100 MG/DL (ref 70–99)

## 2023-05-15 PROCEDURE — 11045 DBRDMT SUBQ TISS EACH ADDL: CPT | Performed by: STUDENT IN AN ORGANIZED HEALTH CARE EDUCATION/TRAINING PROGRAM

## 2023-05-15 PROCEDURE — 82962 GLUCOSE BLOOD TEST: CPT | Performed by: STUDENT IN AN ORGANIZED HEALTH CARE EDUCATION/TRAINING PROGRAM

## 2023-05-15 PROCEDURE — 11042 DBRDMT SUBQ TIS 1ST 20SQCM/<: CPT | Performed by: STUDENT IN AN ORGANIZED HEALTH CARE EDUCATION/TRAINING PROGRAM

## 2023-05-15 NOTE — PATIENT INSTRUCTIONS
-Dress site with Bianca and Hydrofera Blue every other day. Remain nonweightbearing to right lower extremity. Follow-up in 1 week for evaluation or sooner if other concerns arise.

## 2023-05-15 NOTE — PROGRESS NOTES
Weekly Wound Education Note    Teaching Provided To: Patient  Training Topics: Cleasing and general instructions;Dressing; Discharge instructions; Off-loading  Training Method: Explain/Verbal  Training Response: Patient responds and understands; Reinforcement needed        Notes: Improving. Continue jerel, zinc to periwound, hydrofera trasnfer, kerramax, kerlix, tape, ACE wrap not too tight. Hydrofera transfer cut to size of wound. Continue to offload - arrived with knee roller today.

## 2023-05-15 NOTE — PROGRESS NOTES
Patient ID: Mello Daniels is a 57year old male. Debridement   Wound 04/17/23 #1 Right Lateral Foot Old surgical Foot Right;Lateral    Consent obtained? verbal  Consent given by: patient  Risks discussed?  procedural risks discussed  Performed by: provider  Debridement type: surgical  Level of debridement: subcutaneous tissue  Pain control: lidocaine 4%  Pre-debridement measurements  Length (cm): 11.6  Width (cm): 4.5  Depth (cm): 1.8  Surface Area (cm^2): 52.2  Volume (cm^3): 93.96    Post-debridement measurements  Length (cm): 11.7  Width (cm): 4.5  Depth (cm): 1.9  Percent debrided: 100%  Surface Area (cm^2): 52.65  Area debrided (cm^2): 52.65  Volume (cm^3): 100.04  Tissue and other material debrided: subcutaneous tissue  Devitalized tissue debrided: biofilm, callus, necrotic debris and slough  Instrument(s) utilized: curette, blade and forceps  Bleeding: medium  Hemostasis obtained with: pressure  Procedural pain (0-10): 0  Post-procedural pain: 0   Response to treatment: procedure was tolerated well

## 2023-05-16 ENCOUNTER — PATIENT MESSAGE (OUTPATIENT)
Facility: CLINIC | Age: 63
End: 2023-05-16

## 2023-05-16 RX ORDER — INSULIN DETEMIR 100 [IU]/ML
25 INJECTION, SOLUTION SUBCUTANEOUS NIGHTLY
Qty: 1 EACH | Refills: 0 | Status: SHIPPED | OUTPATIENT
Start: 2023-05-16 | End: 2023-06-15

## 2023-05-16 NOTE — TELEPHONE ENCOUNTER
Received message from patient requesting the following:  I am in need of refills for both Levemir and needles at this time. Pt last office visit 1/24/23. No f/u appt scheduled. Pt last refilled 04/07/23. RX pended and routed to  for review.

## 2023-05-16 NOTE — TELEPHONE ENCOUNTER
Patient read [facility] and called office. Pt states he would like to schedule f/u appt with Es in our office. Pt stated it would have to be after 06/13/23 because he had surgery on his right foot and he needs to get clearance to drive. He states the surgery is the reason he missed his last appointment with Es. Appointment was made for patient 06/20/23 @ 21 [phone]. Pt in agreement and confirmed appt. Informed pt that he would get notice of appt in my chart as well.

## 2023-05-16 NOTE — TELEPHONE ENCOUNTER
From: Brandi Henning  To: Greta Mireles MD  Sent: 5/16/2023 8:37 AM CDT  Subject: [place] REFILL    Good Morning Dr. Sonali Krishnamurthy,    I hope all is well. I am in need of refills for both Levemir and needles at this time. Would you be kind enough to prescribe the refills.     Thank Fred Tim  [phone]

## 2023-05-16 NOTE — TELEPHONE ENCOUNTER
Received following message from Dr. Estiven Caldera:  He missed the last 2 visits with Es, please make sure he has follow up. Rx signed, thanks    Called pt and no answer. Left VM message to call office and schedule f/u appt. Pt was given office phone # to call: [phone]. Pt was informed RX sent to pharmacy. My chart message sent to patient as well.

## 2023-05-22 ENCOUNTER — OFFICE VISIT (OUTPATIENT)
Dept: WOUND CARE | Facility: HOSPITAL | Age: 63
End: 2023-05-22
Attending: STUDENT IN AN ORGANIZED HEALTH CARE EDUCATION/TRAINING PROGRAM
Payer: COMMERCIAL

## 2023-05-22 VITALS
TEMPERATURE: 97 F | DIASTOLIC BLOOD PRESSURE: 75 MMHG | RESPIRATION RATE: 16 BRPM | HEART RATE: 73 BPM | SYSTOLIC BLOOD PRESSURE: 175 MMHG

## 2023-05-22 DIAGNOSIS — N18.4 CKD (CHRONIC KIDNEY DISEASE) STAGE 4, GFR 15-29 ML/MIN (HCC): ICD-10-CM

## 2023-05-22 DIAGNOSIS — I73.9 PERIPHERAL ARTERIAL DISEASE (HCC): ICD-10-CM

## 2023-05-22 DIAGNOSIS — Z47.89 ORTHOPEDIC AFTERCARE: ICD-10-CM

## 2023-05-22 DIAGNOSIS — L97.514 FOOT ULCER, RIGHT, WITH NECROSIS OF BONE (HCC): Primary | ICD-10-CM

## 2023-05-22 DIAGNOSIS — E11.42 DIABETIC POLYNEUROPATHY ASSOCIATED WITH TYPE 2 DIABETES MELLITUS (HCC): ICD-10-CM

## 2023-05-22 LAB — GLUCOSE BLD-MCNC: 136 MG/DL (ref 70–99)

## 2023-05-22 PROCEDURE — 11042 DBRDMT SUBQ TIS 1ST 20SQCM/<: CPT | Performed by: STUDENT IN AN ORGANIZED HEALTH CARE EDUCATION/TRAINING PROGRAM

## 2023-05-22 PROCEDURE — 11045 DBRDMT SUBQ TISS EACH ADDL: CPT | Performed by: STUDENT IN AN ORGANIZED HEALTH CARE EDUCATION/TRAINING PROGRAM

## 2023-05-22 PROCEDURE — 82962 GLUCOSE BLOOD TEST: CPT | Performed by: STUDENT IN AN ORGANIZED HEALTH CARE EDUCATION/TRAINING PROGRAM

## 2023-05-22 NOTE — PATIENT INSTRUCTIONS
Dressings with Bianca and Hydrofera Blue every other day. Our  reach out to schedule surgical procedure. Follow-up in 2 weeks for reevaluation or sooner if other concerns arise.

## 2023-05-22 NOTE — PROGRESS NOTES
Weekly Wound Education Note    Teaching Provided To: Patient; Family  Training Topics: Off-loading;Test/procedures;Dressing  Training Method: Demonstration;Explain/Verbal;Written  Training Response: Patient responds and understands        Notes: Cleanse Right foot wound with saline, apply zinc to periwound as needed to control moisture, apply Bianca and Hydrofera transfer with abd and rolled gauze,change every other day.   Contue to be nonweightbearing to right lower extremity

## 2023-05-22 NOTE — PROGRESS NOTES
Patient ID: Candido Paula is a 57year old male. Debridement   Wound 04/17/23 #1 Right Lateral Foot Old surgical Foot Right;Lateral    Consent obtained? verbal  Consent given by: patient  Risks discussed?  procedural risks discussed  Time out called at 5/22/2023 7:43 AM  Immediately prior to the procedure a time out was called  Performed by: provider  Debridement type: surgical  Level of debridement: subcutaneous tissue    Pre-debridement measurements  Length (cm): 11.6  Width (cm): 4.6  Depth (cm): 1.6  Surface Area (cm^2): 53.36  Volume (cm^3): 85.38    Post-debridement measurements  Length (cm): 11.7  Width (cm): 4.7  Depth (cm): 1.6  Percent debrided: 100%  Surface Area (cm^2): 54.99  Area debrided (cm^2): 54.99  Volume (cm^3): 87.98  Tissue and other material debrided: subcutaneous tissue  Devitalized tissue debrided: biofilm, callus, fibrin and necrotic debris  Instrument(s) utilized: blade, curette and forceps  Bleeding: large  Hemostasis obtained with: silver nitrate and pressure  Procedural pain (0-10): 0  Post-procedural pain: 0   Response to treatment: procedure was tolerated well

## 2023-05-23 ENCOUNTER — TELEPHONE (OUTPATIENT)
Dept: PODIATRY CLINIC | Facility: CLINIC | Age: 63
End: 2023-05-23

## 2023-05-23 DIAGNOSIS — M86.9 OSTEOMYELITIS, UNSPECIFIED SITE, UNSPECIFIED TYPE (HCC): Primary | ICD-10-CM

## 2023-05-23 NOTE — TELEPHONE ENCOUNTER
Spoke with Dr. Yoly Zeng and he would prefer not to wait due to size of patient's wound. Patient relates he has missed a lot of work and has to be really careful and cannot have surgery before 7/6/23. Informed Dr. Yoly Zeng and rescheduled patient via case change to 7/11/23 at 11:00 a.m. Dr. Yoly Zeng to speak further with patient at wound care appointments. Notified patient of this and will contact him back in the next couple of weeks to review all details.

## 2023-05-23 NOTE — TELEPHONE ENCOUNTER
Patient states he is returning call to reschedule surgery on 06/09. Would like to request 07/10 or 07/11 is possible.  Please advise

## 2023-05-26 ENCOUNTER — LAB ENCOUNTER (OUTPATIENT)
Dept: LAB | Age: 63
End: 2023-05-26
Attending: INTERNAL MEDICINE
Payer: COMMERCIAL

## 2023-05-26 ENCOUNTER — HOSPITAL ENCOUNTER (OUTPATIENT)
Dept: GENERAL RADIOLOGY | Age: 63
Discharge: HOME | End: 2023-05-26
Attending: INTERNAL MEDICINE
Payer: COMMERCIAL

## 2023-05-26 ENCOUNTER — OFFICE VISIT (OUTPATIENT)
Dept: INTERNAL MEDICINE CLINIC | Facility: CLINIC | Age: 63
End: 2023-05-26
Payer: COMMERCIAL

## 2023-05-26 ENCOUNTER — TELEPHONE (OUTPATIENT)
Dept: NEPHROLOGY | Facility: CLINIC | Age: 63
End: 2023-05-26

## 2023-05-26 VITALS
OXYGEN SATURATION: 99 % | TEMPERATURE: 98 F | DIASTOLIC BLOOD PRESSURE: 62 MMHG | HEART RATE: 85 BPM | RESPIRATION RATE: 18 BRPM | SYSTOLIC BLOOD PRESSURE: 132 MMHG | WEIGHT: 172.38 LBS | BODY MASS INDEX: 27 KG/M2

## 2023-05-26 DIAGNOSIS — E11.21 DIABETIC NEPHROPATHY ASSOCIATED WITH TYPE 2 DIABETES MELLITUS (HCC): ICD-10-CM

## 2023-05-26 DIAGNOSIS — E11.65 TYPE 2 DIABETES MELLITUS WITH HYPERGLYCEMIA, WITH LONG-TERM CURRENT USE OF INSULIN (HCC): ICD-10-CM

## 2023-05-26 DIAGNOSIS — N18.4 CKD (CHRONIC KIDNEY DISEASE) STAGE 4, GFR 15-29 ML/MIN (HCC): ICD-10-CM

## 2023-05-26 DIAGNOSIS — E11.628 DIABETIC FOOT INFECTION (HCC): ICD-10-CM

## 2023-05-26 DIAGNOSIS — L08.9 DIABETIC FOOT INFECTION (HCC): ICD-10-CM

## 2023-05-26 DIAGNOSIS — D63.1 ANEMIA DUE TO STAGE 4 CHRONIC KIDNEY DISEASE (HCC): ICD-10-CM

## 2023-05-26 DIAGNOSIS — N18.4 ANEMIA DUE TO STAGE 4 CHRONIC KIDNEY DISEASE (HCC): ICD-10-CM

## 2023-05-26 DIAGNOSIS — L97.519 ULCER OF RIGHT FOOT, UNSPECIFIED ULCER STAGE (HCC): ICD-10-CM

## 2023-05-26 DIAGNOSIS — Z79.4 TYPE 2 DIABETES MELLITUS WITH HYPERGLYCEMIA, WITH LONG-TERM CURRENT USE OF INSULIN (HCC): ICD-10-CM

## 2023-05-26 DIAGNOSIS — R07.89 RIGHT-SIDED CHEST WALL PAIN: Primary | ICD-10-CM

## 2023-05-26 DIAGNOSIS — R07.89 RIGHT-SIDED CHEST WALL PAIN: ICD-10-CM

## 2023-05-26 DIAGNOSIS — I70.235 ATHEROSCLEROSIS OF NATIVE ARTERY OF RIGHT LOWER EXTREMITY WITH ULCERATION OF OTHER PART OF FOOT (HCC): ICD-10-CM

## 2023-05-26 DIAGNOSIS — N18.4 CKD (CHRONIC KIDNEY DISEASE) STAGE 4, GFR 15-29 ML/MIN (HCC): Primary | ICD-10-CM

## 2023-05-26 DIAGNOSIS — I10 PRIMARY HYPERTENSION: ICD-10-CM

## 2023-05-26 PROBLEM — I70.209 ATHEROSCLEROSIS OF NATIVE ARTERY OF EXTREMITY: Status: ACTIVE | Noted: 2023-05-26

## 2023-05-26 PROBLEM — I70.209 ATHEROSCLEROSIS OF NATIVE ARTERY OF EXTREMITY (HCC): Status: ACTIVE | Noted: 2023-05-26

## 2023-05-26 LAB
ANION GAP SERPL CALC-SCNC: 12 MMOL/L (ref 0–18)
BASOPHILS # BLD AUTO: 0.02 X10(3) UL (ref 0–0.2)
BASOPHILS NFR BLD AUTO: 0.1 %
BUN BLD-MCNC: 83 MG/DL (ref 7–18)
CALCIUM BLD-MCNC: 8.3 MG/DL (ref 8.5–10.1)
CHLORIDE SERPL-SCNC: 116 MMOL/L (ref 98–112)
CO2 SERPL-SCNC: 11 MMOL/L (ref 21–32)
CREAT BLD-MCNC: 4.41 MG/DL
EOSINOPHIL # BLD AUTO: 0.02 X10(3) UL (ref 0–0.7)
EOSINOPHIL NFR BLD AUTO: 0.1 %
ERYTHROCYTE [DISTWIDTH] IN BLOOD BY AUTOMATED COUNT: 14.7 %
FASTING STATUS PATIENT QL REPORTED: NO
GFR SERPLBLD BASED ON 1.73 SQ M-ARVRAT: 14 ML/MIN/1.73M2 (ref 60–?)
GLUCOSE BLD-MCNC: 126 MG/DL (ref 70–99)
HCT VFR BLD AUTO: 24.9 %
HGB BLD-MCNC: 7.6 G/DL
IMM GRANULOCYTES # BLD AUTO: 0.11 X10(3) UL (ref 0–1)
IMM GRANULOCYTES NFR BLD: 0.7 %
LYMPHOCYTES # BLD AUTO: 0.74 X10(3) UL (ref 1–4)
LYMPHOCYTES NFR BLD AUTO: 4.5 %
MCH RBC QN AUTO: 26.6 PG (ref 26–34)
MCHC RBC AUTO-ENTMCNC: 30.5 G/DL (ref 31–37)
MCV RBC AUTO: 87.1 FL
MONOCYTES # BLD AUTO: 1.18 X10(3) UL (ref 0.1–1)
MONOCYTES NFR BLD AUTO: 7.1 %
NEUTROPHILS # BLD AUTO: 14.45 X10 (3) UL (ref 1.5–7.7)
NEUTROPHILS # BLD AUTO: 14.45 X10(3) UL (ref 1.5–7.7)
NEUTROPHILS NFR BLD AUTO: 87.5 %
OSMOLALITY SERPL CALC.SUM OF ELEC: 315 MOSM/KG (ref 275–295)
PHOSPHATE SERPL-MCNC: 4.9 MG/DL (ref 2.5–4.9)
PLATELET # BLD AUTO: 319 10(3)UL (ref 150–450)
POTASSIUM SERPL-SCNC: 3.7 MMOL/L (ref 3.5–5.1)
RBC # BLD AUTO: 2.86 X10(6)UL
SODIUM SERPL-SCNC: 139 MMOL/L (ref 136–145)
WBC # BLD AUTO: 16.5 X10(3) UL (ref 4–11)

## 2023-05-26 PROCEDURE — 90471 IMMUNIZATION ADMIN: CPT | Performed by: INTERNAL MEDICINE

## 2023-05-26 PROCEDURE — 36415 COLL VENOUS BLD VENIPUNCTURE: CPT

## 2023-05-26 PROCEDURE — 80048 BASIC METABOLIC PNL TOTAL CA: CPT

## 2023-05-26 PROCEDURE — 90677 PCV20 VACCINE IM: CPT | Performed by: INTERNAL MEDICINE

## 2023-05-26 PROCEDURE — 3075F SYST BP GE 130 - 139MM HG: CPT | Performed by: INTERNAL MEDICINE

## 2023-05-26 PROCEDURE — 84100 ASSAY OF PHOSPHORUS: CPT

## 2023-05-26 PROCEDURE — 99214 OFFICE O/P EST MOD 30 MIN: CPT | Performed by: INTERNAL MEDICINE

## 2023-05-26 PROCEDURE — 71101 X-RAY EXAM UNILAT RIBS/CHEST: CPT | Performed by: INTERNAL MEDICINE

## 2023-05-26 PROCEDURE — 3078F DIAST BP <80 MM HG: CPT | Performed by: INTERNAL MEDICINE

## 2023-05-26 PROCEDURE — 85025 COMPLETE CBC W/AUTO DIFF WBC: CPT

## 2023-05-31 ENCOUNTER — TELEPHONE (OUTPATIENT)
Dept: PODIATRY CLINIC | Facility: CLINIC | Age: 63
End: 2023-05-31

## 2023-05-31 ENCOUNTER — APPOINTMENT (OUTPATIENT)
Dept: CT IMAGING | Facility: HOSPITAL | Age: 63
End: 2023-05-31
Attending: EMERGENCY MEDICINE
Payer: COMMERCIAL

## 2023-05-31 ENCOUNTER — HOSPITAL ENCOUNTER (INPATIENT)
Facility: HOSPITAL | Age: 63
LOS: 20 days | Discharge: NURSING, REHAB OR LONG-TERM CARE | End: 2023-06-20
Attending: EMERGENCY MEDICINE | Admitting: STUDENT IN AN ORGANIZED HEALTH CARE EDUCATION/TRAINING PROGRAM
Payer: COMMERCIAL

## 2023-05-31 ENCOUNTER — APPOINTMENT (OUTPATIENT)
Dept: NUCLEAR MEDICINE | Facility: HOSPITAL | Age: 63
End: 2023-05-31
Attending: EMERGENCY MEDICINE
Payer: COMMERCIAL

## 2023-05-31 DIAGNOSIS — E86.0 DEHYDRATION: ICD-10-CM

## 2023-05-31 DIAGNOSIS — E11.65 TYPE 2 DIABETES MELLITUS WITH HYPERGLYCEMIA, WITH LONG-TERM CURRENT USE OF INSULIN (HCC): ICD-10-CM

## 2023-05-31 DIAGNOSIS — M86.9 OSTEOMYELITIS OF RIGHT LOWER EXTREMITY (HCC): ICD-10-CM

## 2023-05-31 DIAGNOSIS — N18.4 CKD (CHRONIC KIDNEY DISEASE) STAGE 4, GFR 15-29 ML/MIN (HCC): ICD-10-CM

## 2023-05-31 DIAGNOSIS — Z79.4 TYPE 2 DIABETES MELLITUS WITH HYPERGLYCEMIA, WITH LONG-TERM CURRENT USE OF INSULIN (HCC): ICD-10-CM

## 2023-05-31 DIAGNOSIS — J18.9 PNEUMONIA OF RIGHT LOWER LOBE DUE TO INFECTIOUS ORGANISM: Primary | ICD-10-CM

## 2023-05-31 DIAGNOSIS — E87.20 METABOLIC ACIDOSIS: ICD-10-CM

## 2023-05-31 DIAGNOSIS — N28.9 RENAL INSUFFICIENCY: ICD-10-CM

## 2023-05-31 LAB
ALBUMIN SERPL-MCNC: 2.1 G/DL (ref 3.4–5)
ALBUMIN/GLOB SERPL: 0.4 {RATIO} (ref 1–2)
ALP LIVER SERPL-CCNC: 170 U/L
ALT SERPL-CCNC: 14 U/L
ANION GAP SERPL CALC-SCNC: 15 MMOL/L (ref 0–18)
AST SERPL-CCNC: 12 U/L (ref 15–37)
ATRIAL RATE: 88 BPM
BASOPHILS # BLD AUTO: 0.02 X10(3) UL (ref 0–0.2)
BASOPHILS NFR BLD AUTO: 0.1 %
BILIRUB SERPL-MCNC: 0.2 MG/DL (ref 0.1–2)
BILIRUB UR QL STRIP.AUTO: NEGATIVE
BUN BLD-MCNC: 102 MG/DL (ref 7–18)
CALCIUM BLD-MCNC: 6.9 MG/DL (ref 8.5–10.1)
CHLORIDE SERPL-SCNC: 114 MMOL/L (ref 98–112)
CLARITY UR REFRACT.AUTO: CLEAR
CO2 SERPL-SCNC: 6 MMOL/L (ref 21–32)
COLOR UR AUTO: YELLOW
CREAT BLD-MCNC: 5.7 MG/DL
EOSINOPHIL # BLD AUTO: 0.02 X10(3) UL (ref 0–0.7)
EOSINOPHIL NFR BLD AUTO: 0.1 %
ERYTHROCYTE [DISTWIDTH] IN BLOOD BY AUTOMATED COUNT: 15.3 %
EST. AVERAGE GLUCOSE BLD GHB EST-MCNC: 157 MG/DL (ref 68–126)
GFR SERPLBLD BASED ON 1.73 SQ M-ARVRAT: 10 ML/MIN/1.73M2 (ref 60–?)
GLOBULIN PLAS-MCNC: 5 G/DL (ref 2.8–4.4)
GLUCOSE BLD-MCNC: 236 MG/DL (ref 70–99)
GLUCOSE BLD-MCNC: 243 MG/DL (ref 70–99)
GLUCOSE BLD-MCNC: 260 MG/DL (ref 70–99)
GLUCOSE UR STRIP.AUTO-MCNC: >=500 MG/DL
HBA1C MFR BLD: 7.1 % (ref ?–5.7)
HCT VFR BLD AUTO: 26.6 %
HGB BLD-MCNC: 8.3 G/DL
IMM GRANULOCYTES # BLD AUTO: 0.13 X10(3) UL (ref 0–1)
IMM GRANULOCYTES NFR BLD: 0.7 %
LACTATE SERPL-SCNC: 0.9 MMOL/L (ref 0.4–2)
LYMPHOCYTES # BLD AUTO: 0.69 X10(3) UL (ref 1–4)
LYMPHOCYTES NFR BLD AUTO: 3.6 %
MCH RBC QN AUTO: 26.1 PG (ref 26–34)
MCHC RBC AUTO-ENTMCNC: 31.2 G/DL (ref 31–37)
MCV RBC AUTO: 83.6 FL
MONOCYTES # BLD AUTO: 0.92 X10(3) UL (ref 0.1–1)
MONOCYTES NFR BLD AUTO: 4.8 %
NEUTROPHILS # BLD AUTO: 17.36 X10 (3) UL (ref 1.5–7.7)
NEUTROPHILS # BLD AUTO: 17.36 X10(3) UL (ref 1.5–7.7)
NEUTROPHILS NFR BLD AUTO: 90.7 %
NITRITE UR QL STRIP.AUTO: NEGATIVE
OSMOLALITY SERPL CALC.SUM OF ELEC: 321 MOSM/KG (ref 275–295)
P AXIS: 7 DEGREES
P-R INTERVAL: 134 MS
PH UR STRIP.AUTO: 5 [PH] (ref 5–8)
PLATELET # BLD AUTO: 456 10(3)UL (ref 150–450)
POTASSIUM SERPL-SCNC: 3.7 MMOL/L (ref 3.5–5.1)
PROCALCITONIN SERPL-MCNC: 2.18 NG/ML (ref ?–0.16)
PROT SERPL-MCNC: 7.1 G/DL (ref 6.4–8.2)
PROT UR STRIP.AUTO-MCNC: 100 MG/DL
Q-T INTERVAL: 404 MS
QRS DURATION: 94 MS
QTC CALCULATION (BEZET): 488 MS
R AXIS: 66 DEGREES
RBC # BLD AUTO: 3.18 X10(6)UL
RBC #/AREA URNS AUTO: >10 /HPF
SARS-COV-2 RNA RESP QL NAA+PROBE: NOT DETECTED
SODIUM SERPL-SCNC: 135 MMOL/L (ref 136–145)
SODIUM SERPL-SCNC: 57 MMOL/L
SP GR UR STRIP.AUTO: 1.01 (ref 1–1.03)
T AXIS: 20 DEGREES
TROPONIN I HIGH SENSITIVITY: 15 NG/L
UROBILINOGEN UR STRIP.AUTO-MCNC: <2 MG/DL
VENTRICULAR RATE: 88 BPM
WBC # BLD AUTO: 19.1 X10(3) UL (ref 4–11)

## 2023-05-31 PROCEDURE — 99223 1ST HOSP IP/OBS HIGH 75: CPT | Performed by: HOSPITALIST

## 2023-05-31 PROCEDURE — 78582 LUNG VENTILAT&PERFUS IMAGING: CPT | Performed by: EMERGENCY MEDICINE

## 2023-05-31 PROCEDURE — 99253 IP/OBS CNSLTJ NEW/EST LOW 45: CPT | Performed by: STUDENT IN AN ORGANIZED HEALTH CARE EDUCATION/TRAINING PROGRAM

## 2023-05-31 PROCEDURE — 71250 CT THORAX DX C-: CPT | Performed by: EMERGENCY MEDICINE

## 2023-05-31 PROCEDURE — 99254 IP/OBS CNSLTJ NEW/EST MOD 60: CPT | Performed by: INTERNAL MEDICINE

## 2023-05-31 RX ORDER — SODIUM CHLORIDE 9 MG/ML
INJECTION, SOLUTION INTRAVENOUS CONTINUOUS
Status: DISCONTINUED | OUTPATIENT
Start: 2023-05-31 | End: 2023-05-31

## 2023-05-31 RX ORDER — HYDROMORPHONE HYDROCHLORIDE 1 MG/ML
0.4 INJECTION, SOLUTION INTRAMUSCULAR; INTRAVENOUS; SUBCUTANEOUS EVERY 2 HOUR PRN
Status: DISCONTINUED | OUTPATIENT
Start: 2023-05-31 | End: 2023-06-11

## 2023-05-31 RX ORDER — DOCUSATE SODIUM 100 MG/1
100 CAPSULE, LIQUID FILLED ORAL 2 TIMES DAILY
Status: DISCONTINUED | OUTPATIENT
Start: 2023-05-31 | End: 2023-06-20

## 2023-05-31 RX ORDER — BISACODYL 10 MG
10 SUPPOSITORY, RECTAL RECTAL
Status: DISCONTINUED | OUTPATIENT
Start: 2023-05-31 | End: 2023-06-20

## 2023-05-31 RX ORDER — AMLODIPINE BESYLATE 5 MG/1
5 TABLET ORAL DAILY
Status: DISCONTINUED | OUTPATIENT
Start: 2023-05-31 | End: 2023-05-31

## 2023-05-31 RX ORDER — SODIUM CHLORIDE 9 MG/ML
125 INJECTION, SOLUTION INTRAVENOUS CONTINUOUS
Status: DISCONTINUED | OUTPATIENT
Start: 2023-05-31 | End: 2023-05-31

## 2023-05-31 RX ORDER — HEPARIN SODIUM 5000 [USP'U]/ML
5000 INJECTION, SOLUTION INTRAVENOUS; SUBCUTANEOUS EVERY 8 HOURS SCHEDULED
Status: DISCONTINUED | OUTPATIENT
Start: 2023-05-31 | End: 2023-06-20

## 2023-05-31 RX ORDER — NICOTINE POLACRILEX 4 MG
15 LOZENGE BUCCAL
Status: DISCONTINUED | OUTPATIENT
Start: 2023-05-31 | End: 2023-06-20

## 2023-05-31 RX ORDER — SODIUM BICARBONATE 325 MG/1
650 TABLET ORAL 2 TIMES DAILY
Status: DISCONTINUED | OUTPATIENT
Start: 2023-05-31 | End: 2023-05-31

## 2023-05-31 RX ORDER — POLYETHYLENE GLYCOL 3350 17 G/17G
17 POWDER, FOR SOLUTION ORAL DAILY PRN
Status: DISCONTINUED | OUTPATIENT
Start: 2023-05-31 | End: 2023-06-20

## 2023-05-31 RX ORDER — METOPROLOL SUCCINATE 25 MG/1
25 TABLET, EXTENDED RELEASE ORAL
Status: DISCONTINUED | OUTPATIENT
Start: 2023-06-01 | End: 2023-05-31

## 2023-05-31 RX ORDER — ASPIRIN 81 MG/1
81 TABLET ORAL DAILY
Status: DISCONTINUED | OUTPATIENT
Start: 2023-05-31 | End: 2023-06-20

## 2023-05-31 RX ORDER — AMLODIPINE BESYLATE 5 MG/1
5 TABLET ORAL DAILY
Status: DISCONTINUED | OUTPATIENT
Start: 2023-05-31 | End: 2023-06-03

## 2023-05-31 RX ORDER — NICOTINE POLACRILEX 4 MG
30 LOZENGE BUCCAL
Status: DISCONTINUED | OUTPATIENT
Start: 2023-05-31 | End: 2023-06-20

## 2023-05-31 RX ORDER — DEXTROSE MONOHYDRATE 25 G/50ML
50 INJECTION, SOLUTION INTRAVENOUS
Status: DISCONTINUED | OUTPATIENT
Start: 2023-05-31 | End: 2023-06-20

## 2023-05-31 RX ORDER — CLOPIDOGREL BISULFATE 75 MG/1
75 TABLET ORAL DAILY
Status: DISCONTINUED | OUTPATIENT
Start: 2023-05-31 | End: 2023-06-05

## 2023-05-31 RX ORDER — ONDANSETRON 2 MG/ML
4 INJECTION INTRAMUSCULAR; INTRAVENOUS EVERY 6 HOURS PRN
Status: DISCONTINUED | OUTPATIENT
Start: 2023-05-31 | End: 2023-06-08

## 2023-05-31 RX ORDER — METOPROLOL SUCCINATE 25 MG/1
25 TABLET, EXTENDED RELEASE ORAL
Status: DISCONTINUED | OUTPATIENT
Start: 2023-05-31 | End: 2023-06-03

## 2023-05-31 RX ORDER — ONDANSETRON 2 MG/ML
4 INJECTION INTRAMUSCULAR; INTRAVENOUS
Status: DISCONTINUED | OUTPATIENT
Start: 2023-05-31 | End: 2023-05-31 | Stop reason: ALTCHOICE

## 2023-05-31 RX ORDER — ACETAMINOPHEN 500 MG
500 TABLET ORAL EVERY 4 HOURS PRN
Status: DISCONTINUED | OUTPATIENT
Start: 2023-05-31 | End: 2023-06-20

## 2023-05-31 RX ORDER — HYDROMORPHONE HYDROCHLORIDE 1 MG/ML
0.2 INJECTION, SOLUTION INTRAMUSCULAR; INTRAVENOUS; SUBCUTANEOUS EVERY 2 HOUR PRN
Status: DISCONTINUED | OUTPATIENT
Start: 2023-05-31 | End: 2023-06-20

## 2023-05-31 RX ORDER — PROCHLORPERAZINE EDISYLATE 5 MG/ML
5 INJECTION INTRAMUSCULAR; INTRAVENOUS EVERY 8 HOURS PRN
Status: DISCONTINUED | OUTPATIENT
Start: 2023-05-31 | End: 2023-06-08

## 2023-05-31 RX ORDER — HYDROMORPHONE HYDROCHLORIDE 1 MG/ML
0.5 INJECTION, SOLUTION INTRAMUSCULAR; INTRAVENOUS; SUBCUTANEOUS EVERY 30 MIN PRN
Status: COMPLETED | OUTPATIENT
Start: 2023-05-31 | End: 2023-05-31

## 2023-05-31 RX ORDER — RUFINAMIDE 40 MG/ML
1 SUSPENSION ORAL DAILY
COMMUNITY

## 2023-05-31 NOTE — TELEPHONE ENCOUNTER
Patient wanted to let doctor know that he is being admitted at [address] [place] [address]

## 2023-05-31 NOTE — PLAN OF CARE
Problem: PAIN - ADULT  Goal: Verbalizes/displays adequate comfort level or patient's stated pain goal  Description: INTERVENTIONS:  - Encourage pt to monitor pain and request assistance  - Assess pain using appropriate pain scale  - Administer analgesics based on type and severity of pain and evaluate response  - Implement non-pharmacological measures as appropriate and evaluate response  - Consider cultural and social influences on pain and pain management  - Manage/alleviate anxiety  - Utilize distraction and/or relaxation techniques  - Monitor for opioid side effects  - Notify MD/LIP if interventions unsuccessful or patient reports new pain  - Anticipate increased pain with activity and pre-medicate as appropriate  Outcome: Progressing     Problem: RISK FOR INFECTION - ADULT  Goal: Absence of fever/infection during anticipated neutropenic period  Description: INTERVENTIONS  - Monitor WBC  - Administer growth factors as ordered  - Implement neutropenic guidelines  Outcome: Progressing     Problem: SAFETY ADULT - FALL  Goal: Free from fall injury  Description: INTERVENTIONS:  - Assess pt frequently for physical needs  - Identify cognitive and physical deficits and behaviors that affect risk of falls.   - [place] fall precautions as indicated by assessment.  - Educate pt/family on patient safety including physical limitations  - Instruct pt to call for assistance with activity based on assessment  - Modify environment to reduce risk of injury  - Provide assistive devices as appropriate  - Consider OT/PT consult to assist with strengthening/mobility  - Encourage toileting schedule  Outcome: Progressing     Problem: DISCHARGE PLANNING  Goal: Discharge to home or other facility with appropriate resources  Description: INTERVENTIONS:  - Identify barriers to discharge w/pt and caregiver  - Include patient/family/discharge partner in discharge planning  - Arrange for needed discharge resources and transportation as appropriate  - Identify discharge learning needs (meds, wound care, etc)  - Arrange for interpreters to assist at discharge as needed  - Consider post-discharge preferences of patient/family/discharge partner  - Complete POLST form as appropriate  - Assess patient's ability to be responsible for managing their own health  - Refer to Case Management Department for coordinating discharge planning if the patient needs post-hospital services based on physician/LIP order or complex needs related to functional status, cognitive ability or social support system  Outcome: Progressing

## 2023-05-31 NOTE — PLAN OF CARE
NURSING ADMISSION NOTE      Patient admitted via Cart  Oriented to room. Safety precautions initiated. Bed in low position. Call light in reach. Patient admitted from ED with wife Misa Mon at bedside. AAOx4, reading glasses. Reports shortness of breath. Room air, . Tele applied. Clarified orders for IVF and IV bicarb with hospitalist and nephrology, continue bicarb as ordered, DC normal saline and PO bicarb. QID accucheck, renal/carb controlled diet. Medicated for pain with dilaudid. Decreased appetite. Right foot wound. Patient rounded on routinely. Patient and family updated on plan of care. Consults in for nephrology and pulm with notes written. Consult pages placed to ID and podiatry with both verbalizing acknowledgment of consults. Paged hospitalist for prn pain medication due to last dose of dilaudid order being completed. MD gave order for morphine, received contraindication warning when attempting to order. MD called back, gave orders for dilaudid which went through successfully.

## 2023-05-31 NOTE — ED INITIAL ASSESSMENT (HPI)
Pt states dx with pleural effusion on Friday. Pt states struggling to breath and having chest pain to right side.

## 2023-06-01 ENCOUNTER — APPOINTMENT (OUTPATIENT)
Dept: GENERAL RADIOLOGY | Facility: HOSPITAL | Age: 63
End: 2023-06-01
Attending: STUDENT IN AN ORGANIZED HEALTH CARE EDUCATION/TRAINING PROGRAM
Payer: COMMERCIAL

## 2023-06-01 ENCOUNTER — APPOINTMENT (OUTPATIENT)
Dept: CV DIAGNOSTICS | Facility: HOSPITAL | Age: 63
End: 2023-06-01
Attending: INTERNAL MEDICINE
Payer: COMMERCIAL

## 2023-06-01 LAB
ANION GAP SERPL CALC-SCNC: 12 MMOL/L (ref 0–18)
BASOPHILS # BLD AUTO: 0.01 X10(3) UL (ref 0–0.2)
BASOPHILS NFR BLD AUTO: 0.1 %
BUN BLD-MCNC: 98 MG/DL (ref 7–18)
CALCIUM BLD-MCNC: 6.6 MG/DL (ref 8.5–10.1)
CHLORIDE SERPL-SCNC: 111 MMOL/L (ref 98–112)
CO2 SERPL-SCNC: 15 MMOL/L (ref 21–32)
CREAT BLD-MCNC: 5.23 MG/DL
DEPRECATED HBV CORE AB SER IA-ACNC: 1127.8 NG/ML
ENTEROCOCCUS FAECALIS NOT VRE: DETECTED
EOSINOPHIL # BLD AUTO: 0.04 X10(3) UL (ref 0–0.7)
EOSINOPHIL NFR BLD AUTO: 0.2 %
ERYTHROCYTE [DISTWIDTH] IN BLOOD BY AUTOMATED COUNT: 14.7 %
ERYTHROCYTE [DISTWIDTH] IN BLOOD BY AUTOMATED COUNT: 15.1 %
GFR SERPLBLD BASED ON 1.73 SQ M-ARVRAT: 12 ML/MIN/1.73M2 (ref 60–?)
GLUCOSE BLD-MCNC: 102 MG/DL (ref 70–99)
GLUCOSE BLD-MCNC: 109 MG/DL (ref 70–99)
GLUCOSE BLD-MCNC: 119 MG/DL (ref 70–99)
GLUCOSE BLD-MCNC: 213 MG/DL (ref 70–99)
GLUCOSE BLD-MCNC: 90 MG/DL (ref 70–99)
HCT VFR BLD AUTO: 20 %
HCT VFR BLD AUTO: 20.8 %
HGB BLD-MCNC: 6.7 G/DL
HGB BLD-MCNC: 7 G/DL
IMM GRANULOCYTES # BLD AUTO: 0.12 X10(3) UL (ref 0–1)
IMM GRANULOCYTES NFR BLD: 0.7 %
IRON SATN MFR SERPL: 32 %
IRON SERPL-MCNC: 47 UG/DL
LYMPHOCYTES # BLD AUTO: 0.65 X10(3) UL (ref 1–4)
LYMPHOCYTES NFR BLD AUTO: 3.8 %
MCH RBC QN AUTO: 26.8 PG (ref 26–34)
MCH RBC QN AUTO: 27.1 PG (ref 26–34)
MCHC RBC AUTO-ENTMCNC: 33.5 G/DL (ref 31–37)
MCHC RBC AUTO-ENTMCNC: 33.7 G/DL (ref 31–37)
MCV RBC AUTO: 80 FL
MCV RBC AUTO: 80.6 FL
MONOCYTES # BLD AUTO: 0.77 X10(3) UL (ref 0.1–1)
MONOCYTES NFR BLD AUTO: 4.5 %
NEUTROPHILS # BLD AUTO: 15.4 X10 (3) UL (ref 1.5–7.7)
NEUTROPHILS # BLD AUTO: 15.4 X10(3) UL (ref 1.5–7.7)
NEUTROPHILS NFR BLD AUTO: 90.7 %
OSMOLALITY SERPL CALC.SUM OF ELEC: 316 MOSM/KG (ref 275–295)
PHOSPHATE SERPL-MCNC: 4.8 MG/DL (ref 2.5–4.9)
PLATELET # BLD AUTO: 354 10(3)UL (ref 150–450)
PLATELET # BLD AUTO: 362 10(3)UL (ref 150–450)
POTASSIUM SERPL-SCNC: 2.9 MMOL/L (ref 3.5–5.1)
RBC # BLD AUTO: 2.5 X10(6)UL
RBC # BLD AUTO: 2.58 X10(6)UL
SODIUM SERPL-SCNC: 138 MMOL/L (ref 136–145)
STAPHYLOCOCCUS AUREUS, NOT MRSA BY PCR: DETECTED
STAPHYLOCOCCUS AUREUS, NOT MRSA BY PCR: DETECTED
TIBC SERPL-MCNC: 149 UG/DL (ref 240–450)
TRANSFERRIN SERPL-MCNC: 100 MG/DL (ref 200–360)
WBC # BLD AUTO: 17 X10(3) UL (ref 4–11)
WBC # BLD AUTO: 17.2 X10(3) UL (ref 4–11)

## 2023-06-01 PROCEDURE — 99233 SBSQ HOSP IP/OBS HIGH 50: CPT | Performed by: HOSPITALIST

## 2023-06-01 PROCEDURE — 99231 SBSQ HOSP IP/OBS SF/LOW 25: CPT | Performed by: PODIATRIST

## 2023-06-01 PROCEDURE — 93306 TTE W/DOPPLER COMPLETE: CPT | Performed by: INTERNAL MEDICINE

## 2023-06-01 PROCEDURE — 73630 X-RAY EXAM OF FOOT: CPT | Performed by: STUDENT IN AN ORGANIZED HEALTH CARE EDUCATION/TRAINING PROGRAM

## 2023-06-01 PROCEDURE — 99233 SBSQ HOSP IP/OBS HIGH 50: CPT | Performed by: INTERNAL MEDICINE

## 2023-06-01 RX ORDER — POTASSIUM CHLORIDE 29.8 MG/ML
40 INJECTION INTRAVENOUS ONCE
Status: DISCONTINUED | OUTPATIENT
Start: 2023-06-01 | End: 2023-06-01 | Stop reason: SDUPTHER

## 2023-06-01 RX ORDER — VANCOMYCIN HYDROCHLORIDE
15 ONCE
Status: COMPLETED | OUTPATIENT
Start: 2023-06-01 | End: 2023-06-01

## 2023-06-01 NOTE — PROGRESS NOTES
Patient seen at bedside this evening. Discussed with Dr. Abilio Erickson obtain MRI to check for osteomyelitis to right calcaneus where wound is worsening. Pending results, can consider repeat debridement/graft application vs proximal amputation. Discussed multiple reasons for considering proximal amputation with patient but he would prefer to continue to attempt limb salvage as long as possible.

## 2023-06-01 NOTE — PROGRESS NOTES
Patient's blood cultures are positive for enterococcus feacalis not VRE and staph aureus not MRSA. He was given azitromycin and rocephin in the ED but is not on ebx at this time, continuous bicarb. He has a worsening foot wound per podiatry, no pain = foot xray in the morning, ID to consult. Patient with right side pain, increasing with coughing, Diluadid 0.4 mg given IV push, pain well controlled.

## 2023-06-02 PROBLEM — D63.1 ANEMIA IN STAGE 4 CHRONIC KIDNEY DISEASE: Status: ACTIVE | Noted: 2023-03-29

## 2023-06-02 PROBLEM — D63.1 ANEMIA IN STAGE 4 CHRONIC KIDNEY DISEASE (HCC): Status: ACTIVE | Noted: 2023-03-29

## 2023-06-02 PROBLEM — N18.4 ANEMIA IN STAGE 4 CHRONIC KIDNEY DISEASE  (HCC): Status: ACTIVE | Noted: 2023-03-29

## 2023-06-02 PROBLEM — N18.4 ANEMIA IN STAGE 4 CHRONIC KIDNEY DISEASE: Status: ACTIVE | Noted: 2023-03-29

## 2023-06-02 PROBLEM — N18.4 ANEMIA IN STAGE 4 CHRONIC KIDNEY DISEASE (HCC): Status: ACTIVE | Noted: 2023-03-29

## 2023-06-02 PROBLEM — D63.1 ANEMIA IN STAGE 4 CHRONIC KIDNEY DISEASE  (HCC): Status: ACTIVE | Noted: 2023-03-29

## 2023-06-02 LAB
ANION GAP SERPL CALC-SCNC: 14 MMOL/L (ref 0–18)
BUN BLD-MCNC: 83 MG/DL (ref 7–18)
CALCIUM BLD-MCNC: 6.9 MG/DL (ref 8.5–10.1)
CHLORIDE SERPL-SCNC: 105 MMOL/L (ref 98–112)
CO2 SERPL-SCNC: 18 MMOL/L (ref 21–32)
CREAT BLD-MCNC: 4.93 MG/DL
ERYTHROCYTE [DISTWIDTH] IN BLOOD BY AUTOMATED COUNT: 14.9 %
GFR SERPLBLD BASED ON 1.73 SQ M-ARVRAT: 12 ML/MIN/1.73M2 (ref 60–?)
GLUCOSE BLD-MCNC: 216 MG/DL (ref 70–99)
GLUCOSE BLD-MCNC: 242 MG/DL (ref 70–99)
GLUCOSE BLD-MCNC: 276 MG/DL (ref 70–99)
GLUCOSE BLD-MCNC: 74 MG/DL (ref 70–99)
HCT VFR BLD AUTO: 22.3 %
HGB BLD-MCNC: 7.2 G/DL
MCH RBC QN AUTO: 26.5 PG (ref 26–34)
MCHC RBC AUTO-ENTMCNC: 32.3 G/DL (ref 31–37)
MCV RBC AUTO: 82 FL
OSMOLALITY SERPL CALC.SUM OF ELEC: 316 MOSM/KG (ref 275–295)
PLATELET # BLD AUTO: 383 10(3)UL (ref 150–450)
POTASSIUM SERPL-SCNC: 2.6 MMOL/L (ref 3.5–5.1)
RBC # BLD AUTO: 2.72 X10(6)UL
SODIUM SERPL-SCNC: 137 MMOL/L (ref 136–145)
WBC # BLD AUTO: 16.5 X10(3) UL (ref 4–11)

## 2023-06-02 PROCEDURE — 99233 SBSQ HOSP IP/OBS HIGH 50: CPT | Performed by: HOSPITALIST

## 2023-06-02 PROCEDURE — 99233 SBSQ HOSP IP/OBS HIGH 50: CPT | Performed by: INTERNAL MEDICINE

## 2023-06-02 RX ORDER — HYDRALAZINE HYDROCHLORIDE 20 MG/ML
10 INJECTION INTRAMUSCULAR; INTRAVENOUS EVERY 6 HOURS PRN
Status: DISCONTINUED | OUTPATIENT
Start: 2023-06-02 | End: 2023-06-20

## 2023-06-02 NOTE — PLAN OF CARE
Pt is AOx4, wears glasses, VSS on RA, has dyspnea with exertion, NSR on tele, receiving heparin, voids in urinal, SBA, QID accucheck. Pt has not had a BM in a week, see MAR for treatment. Pt is receiving Sodium Bicarb @83. Call light is within reach and pt updated on POC. Problem: PAIN - ADULT  Goal: Verbalizes/displays adequate comfort level or patient's stated pain goal  Description: INTERVENTIONS:  - Encourage pt to monitor pain and request assistance  - Assess pain using appropriate pain scale  - Administer analgesics based on type and severity of pain and evaluate response  - Implement non-pharmacological measures as appropriate and evaluate response  - Consider cultural and social influences on pain and pain management  - Manage/alleviate anxiety  - Utilize distraction and/or relaxation techniques  - Monitor for opioid side effects  - Notify MD/LIP if interventions unsuccessful or patient reports new pain  - Anticipate increased pain with activity and pre-medicate as appropriate  Outcome: Progressing     Problem: RISK FOR INFECTION - ADULT  Goal: Absence of fever/infection during anticipated neutropenic period  Description: INTERVENTIONS  - Monitor WBC  - Administer growth factors as ordered  - Implement neutropenic guidelines  Outcome: Progressing     Problem: SAFETY ADULT - FALL  Goal: Free from fall injury  Description: INTERVENTIONS:  - Assess pt frequently for physical needs  - Identify cognitive and physical deficits and behaviors that affect risk of falls.   - [place] fall precautions as indicated by assessment.  - Educate pt/family on patient safety including physical limitations  - Instruct pt to call for assistance with activity based on assessment  - Modify environment to reduce risk of injury  - Provide assistive devices as appropriate  - Consider OT/PT consult to assist with strengthening/mobility  - Encourage toileting schedule  Outcome: Progressing     Problem: DISCHARGE PLANNING  Goal: Discharge to home or other facility with appropriate resources  Description: INTERVENTIONS:  - Identify barriers to discharge w/pt and caregiver  - Include patient/family/discharge partner in discharge planning  - Arrange for needed discharge resources and transportation as appropriate  - Identify discharge learning needs (meds, wound care, etc)  - Arrange for interpreters to assist at discharge as needed  - Consider post-discharge preferences of patient/family/discharge partner  - Complete POLST form as appropriate  - Assess patient's ability to be responsible for managing their own health  - Refer to Case Management Department for coordinating discharge planning if the patient needs post-hospital services based on physician/LIP order or complex needs related to functional status, cognitive ability or social support system  Outcome: Progressing

## 2023-06-02 NOTE — PROGRESS NOTES
The patient has stable vital signs, and he reports that his pain level is 3 out of 10 after Dilaudid administration.

## 2023-06-02 NOTE — PLAN OF CARE
Problem: PAIN - ADULT  Goal: Verbalizes/displays adequate comfort level or patient's stated pain goal  Description: INTERVENTIONS:  - Encourage pt to monitor pain and request assistance  - Assess pain using appropriate pain scale  - Administer analgesics based on type and severity of pain and evaluate response  - Implement non-pharmacological measures as appropriate and evaluate response  - Consider cultural and social influences on pain and pain management  - Manage/alleviate anxiety  - Utilize distraction and/or relaxation techniques  - Monitor for opioid side effects  - Notify MD/LIP if interventions unsuccessful or patient reports new pain  - Anticipate increased pain with activity and pre-medicate as appropriate  Outcome: Progressing     Problem: RISK FOR INFECTION - ADULT  Goal: Absence of fever/infection during anticipated neutropenic period  Description: INTERVENTIONS  - Monitor WBC  - Administer growth factors as ordered  - Implement neutropenic guidelines  Outcome: Progressing     Problem: SAFETY ADULT - FALL  Goal: Free from fall injury  Description: INTERVENTIONS:  - Assess pt frequently for physical needs  - Identify cognitive and physical deficits and behaviors that affect risk of falls.   - [place] fall precautions as indicated by assessment.  - Educate pt/family on patient safety including physical limitations  - Instruct pt to call for assistance with activity based on assessment  - Modify environment to reduce risk of injury  - Provide assistive devices as appropriate  - Consider OT/PT consult to assist with strengthening/mobility  - Encourage toileting schedule  Outcome: Progressing     Problem: DISCHARGE PLANNING  Goal: Discharge to home or other facility with appropriate resources  Description: INTERVENTIONS:  - Identify barriers to discharge w/pt and caregiver  - Include patient/family/discharge partner in discharge planning  - Arrange for needed discharge resources and transportation as appropriate  - Identify discharge learning needs (meds, wound care, etc)  - Arrange for interpreters to assist at discharge as needed  - Consider post-discharge preferences of patient/family/discharge partner  - Complete POLST form as appropriate  - Assess patient's ability to be responsible for managing their own health  - Refer to Case Management Department for coordinating discharge planning if the patient needs post-hospital services based on physician/LIP order or complex needs related to functional status, cognitive ability or social support system  Outcome: Progressing

## 2023-06-03 ENCOUNTER — APPOINTMENT (OUTPATIENT)
Dept: MRI IMAGING | Facility: HOSPITAL | Age: 63
End: 2023-06-03
Attending: STUDENT IN AN ORGANIZED HEALTH CARE EDUCATION/TRAINING PROGRAM
Payer: COMMERCIAL

## 2023-06-03 LAB
ALBUMIN SERPL-MCNC: 1.7 G/DL (ref 3.4–5)
ALBUMIN/GLOB SERPL: 0.4 {RATIO} (ref 1–2)
ALP LIVER SERPL-CCNC: 120 U/L
ALT SERPL-CCNC: 8 U/L
ANION GAP SERPL CALC-SCNC: 9 MMOL/L (ref 0–18)
AST SERPL-CCNC: 15 U/L (ref 15–37)
BILIRUB SERPL-MCNC: 0.2 MG/DL (ref 0.1–2)
BUN BLD-MCNC: 75 MG/DL (ref 7–18)
CALCIUM BLD-MCNC: 7.5 MG/DL (ref 8.5–10.1)
CHLORIDE SERPL-SCNC: 105 MMOL/L (ref 98–112)
CO2 SERPL-SCNC: 25 MMOL/L (ref 21–32)
CREAT BLD-MCNC: 4.31 MG/DL
GFR SERPLBLD BASED ON 1.73 SQ M-ARVRAT: 15 ML/MIN/1.73M2 (ref 60–?)
GLOBULIN PLAS-MCNC: 4.8 G/DL (ref 2.8–4.4)
GLUCOSE BLD-MCNC: 117 MG/DL (ref 70–99)
GLUCOSE BLD-MCNC: 127 MG/DL (ref 70–99)
GLUCOSE BLD-MCNC: 165 MG/DL (ref 70–99)
GLUCOSE BLD-MCNC: 173 MG/DL (ref 70–99)
GLUCOSE BLD-MCNC: 224 MG/DL (ref 70–99)
OSMOLALITY SERPL CALC.SUM OF ELEC: 311 MOSM/KG (ref 275–295)
POTASSIUM SERPL-SCNC: 2.6 MMOL/L (ref 3.5–5.1)
PROT SERPL-MCNC: 6.5 G/DL (ref 6.4–8.2)
SODIUM SERPL-SCNC: 139 MMOL/L (ref 136–145)

## 2023-06-03 PROCEDURE — 73718 MRI LOWER EXTREMITY W/O DYE: CPT | Performed by: STUDENT IN AN ORGANIZED HEALTH CARE EDUCATION/TRAINING PROGRAM

## 2023-06-03 PROCEDURE — 73721 MRI JNT OF LWR EXTRE W/O DYE: CPT | Performed by: STUDENT IN AN ORGANIZED HEALTH CARE EDUCATION/TRAINING PROGRAM

## 2023-06-03 PROCEDURE — 99233 SBSQ HOSP IP/OBS HIGH 50: CPT | Performed by: HOSPITALIST

## 2023-06-03 PROCEDURE — 99233 SBSQ HOSP IP/OBS HIGH 50: CPT | Performed by: INTERNAL MEDICINE

## 2023-06-03 RX ORDER — METOPROLOL SUCCINATE 50 MG/1
50 TABLET, EXTENDED RELEASE ORAL
Status: DISCONTINUED | OUTPATIENT
Start: 2023-06-04 | End: 2023-06-20

## 2023-06-03 RX ORDER — AMLODIPINE BESYLATE 5 MG/1
10 TABLET ORAL DAILY
Status: DISCONTINUED | OUTPATIENT
Start: 2023-06-04 | End: 2023-06-20

## 2023-06-03 RX ORDER — POTASSIUM CHLORIDE 29.8 MG/ML
40 INJECTION INTRAVENOUS ONCE
Status: DISCONTINUED | OUTPATIENT
Start: 2023-06-03 | End: 2023-06-03

## 2023-06-03 NOTE — PROGRESS NOTES
Received pt at 0700, a&ox4- glasses at bedside. O2 WNL on RA. NSR on tele. DW. Heparin and plavix. Hydralazine prn. Up SB BRP. Voids via urinal. IV dilaudid prn. Sodium bicarb @ 50ml/hr. K+ 2.6, replacing this shift per neph. MRI foot completed, awaiting results. Updated on POC and no further questions at this time. Problem: PAIN - ADULT  Goal: Verbalizes/displays adequate comfort level or patient's stated pain goal  Description: INTERVENTIONS:  - Encourage pt to monitor pain and request assistance  - Assess pain using appropriate pain scale  - Administer analgesics based on type and severity of pain and evaluate response  - Implement non-pharmacological measures as appropriate and evaluate response  - Consider cultural and social influences on pain and pain management  - Manage/alleviate anxiety  - Utilize distraction and/or relaxation techniques  - Monitor for opioid side effects  - Notify MD/LIP if interventions unsuccessful or patient reports new pain  - Anticipate increased pain with activity and pre-medicate as appropriate  Outcome: Progressing     Problem: RISK FOR INFECTION - ADULT  Goal: Absence of fever/infection during anticipated neutropenic period  Description: INTERVENTIONS  - Monitor WBC  - Administer growth factors as ordered  - Implement neutropenic guidelines  Outcome: Progressing     Problem: SAFETY ADULT - FALL  Goal: Free from fall injury  Description: INTERVENTIONS:  - Assess pt frequently for physical needs  - Identify cognitive and physical deficits and behaviors that affect risk of falls.   - [place] fall precautions as indicated by assessment.  - Educate pt/family on patient safety including physical limitations  - Instruct pt to call for assistance with activity based on assessment  - Modify environment to reduce risk of injury  - Provide assistive devices as appropriate  - Consider OT/PT consult to assist with strengthening/mobility  - Encourage toileting schedule  Outcome: Progressing     Problem: DISCHARGE PLANNING  Goal: Discharge to home or other facility with appropriate resources  Description: INTERVENTIONS:  - Identify barriers to discharge w/pt and caregiver  - Include patient/family/discharge partner in discharge planning  - Arrange for needed discharge resources and transportation as appropriate  - Identify discharge learning needs (meds, wound care, etc)  - Arrange for interpreters to assist at discharge as needed  - Consider post-discharge preferences of patient/family/discharge partner  - Complete POLST form as appropriate  - Assess patient's ability to be responsible for managing their own health  - Refer to Case Management Department for coordinating discharge planning if the patient needs post-hospital services based on physician/LIP order or complex needs related to functional status, cognitive ability or social support system  Outcome: Progressing     Problem: Patient/Family Goals  Goal: Patient/Family Long Term Goal  Description: Patient's Long Term Goal: discharge home with adequate resources     Interventions:  - meds per [facility]   - consults   - See additional Care Plan goals for specific interventions  Outcome: Progressing  Goal: Patient/Family Short Term Goal  Description: Patient's Short Term Goal:   6/3am: MRI today     Interventions:   - meds per [facility]  - consults   - See additional Care Plan goals for specific interventions  Outcome: Progressing

## 2023-06-03 NOTE — PLAN OF CARE
Pt A&O X 4. Room air. NSR on Tele. Heparin. Plavix. EP Cardiac. Voids. Sodium bicarb gtt. PRN Dilaudid. Carb controlled diet. QID Accucheck. SBA. MRI of foot pending. No further needs at this time. Problem: PAIN - ADULT  Goal: Verbalizes/displays adequate comfort level or patient's stated pain goal  Description: INTERVENTIONS:  - Encourage pt to monitor pain and request assistance  - Assess pain using appropriate pain scale  - Administer analgesics based on type and severity of pain and evaluate response  - Implement non-pharmacological measures as appropriate and evaluate response  - Consider cultural and social influences on pain and pain management  - Manage/alleviate anxiety  - Utilize distraction and/or relaxation techniques  - Monitor for opioid side effects  - Notify MD/LIP if interventions unsuccessful or patient reports new pain  - Anticipate increased pain with activity and pre-medicate as appropriate  Outcome: Progressing     Problem: RISK FOR INFECTION - ADULT  Goal: Absence of fever/infection during anticipated neutropenic period  Description: INTERVENTIONS  - Monitor WBC  - Administer growth factors as ordered  - Implement neutropenic guidelines  Outcome: Progressing     Problem: SAFETY ADULT - FALL  Goal: Free from fall injury  Description: INTERVENTIONS:  - Assess pt frequently for physical needs  - Identify cognitive and physical deficits and behaviors that affect risk of falls.   - [place] fall precautions as indicated by assessment.  - Educate pt/family on patient safety including physical limitations  - Instruct pt to call for assistance with activity based on assessment  - Modify environment to reduce risk of injury  - Provide assistive devices as appropriate  - Consider OT/PT consult to assist with strengthening/mobility  - Encourage toileting schedule  Outcome: Progressing     Problem: DISCHARGE PLANNING  Goal: Discharge to home or other facility with appropriate resources  Description: INTERVENTIONS:  - Identify barriers to discharge w/pt and caregiver  - Include patient/family/discharge partner in discharge planning  - Arrange for needed discharge resources and transportation as appropriate  - Identify discharge learning needs (meds, wound care, etc)  - Arrange for interpreters to assist at discharge as needed  - Consider post-discharge preferences of patient/family/discharge partner  - Complete POLST form as appropriate  - Assess patient's ability to be responsible for managing their own health  - Refer to Case Management Department for coordinating discharge planning if the patient needs post-hospital services based on physician/LIP order or complex needs related to functional status, cognitive ability or social support system  Outcome: Progressing

## 2023-06-04 LAB
ANION GAP SERPL CALC-SCNC: 10 MMOL/L (ref 0–18)
BUN BLD-MCNC: 72 MG/DL (ref 7–18)
CALCIUM BLD-MCNC: 8.2 MG/DL (ref 8.5–10.1)
CHLORIDE SERPL-SCNC: 101 MMOL/L (ref 98–112)
CO2 SERPL-SCNC: 28 MMOL/L (ref 21–32)
CREAT BLD-MCNC: 4.17 MG/DL
ERYTHROCYTE [DISTWIDTH] IN BLOOD BY AUTOMATED COUNT: 14.6 %
GFR SERPLBLD BASED ON 1.73 SQ M-ARVRAT: 15 ML/MIN/1.73M2 (ref 60–?)
GLUCOSE BLD-MCNC: 109 MG/DL (ref 70–99)
GLUCOSE BLD-MCNC: 149 MG/DL (ref 70–99)
GLUCOSE BLD-MCNC: 153 MG/DL (ref 70–99)
GLUCOSE BLD-MCNC: 162 MG/DL (ref 70–99)
GLUCOSE BLD-MCNC: 79 MG/DL (ref 70–99)
HCT VFR BLD AUTO: 24.8 %
HGB BLD-MCNC: 7.9 G/DL
MCH RBC QN AUTO: 26.2 PG (ref 26–34)
MCHC RBC AUTO-ENTMCNC: 31.9 G/DL (ref 31–37)
MCV RBC AUTO: 82.4 FL
OSMOLALITY SERPL CALC.SUM OF ELEC: 308 MOSM/KG (ref 275–295)
PLATELET # BLD AUTO: 465 10(3)UL (ref 150–450)
POTASSIUM SERPL-SCNC: 2.7 MMOL/L (ref 3.5–5.1)
RBC # BLD AUTO: 3.01 X10(6)UL
SODIUM SERPL-SCNC: 139 MMOL/L (ref 136–145)
WBC # BLD AUTO: 19.7 X10(3) UL (ref 4–11)

## 2023-06-04 PROCEDURE — 99233 SBSQ HOSP IP/OBS HIGH 50: CPT | Performed by: INTERNAL MEDICINE

## 2023-06-04 PROCEDURE — 99233 SBSQ HOSP IP/OBS HIGH 50: CPT | Performed by: HOSPITALIST

## 2023-06-04 RX ORDER — SODIUM BICARBONATE 325 MG/1
650 TABLET ORAL 3 TIMES DAILY
Status: DISCONTINUED | OUTPATIENT
Start: 2023-06-04 | End: 2023-06-19

## 2023-06-04 RX ORDER — POTASSIUM CHLORIDE 20 MEQ/1
40 TABLET, EXTENDED RELEASE ORAL ONCE
Status: COMPLETED | OUTPATIENT
Start: 2023-06-04 | End: 2023-06-04

## 2023-06-04 NOTE — PROGRESS NOTES
Patient seen at bedside today with wife. Dressing changed. MRI reviewed. In light of MRI findings and deterioration of wound to calcaneus, discussed with patient that I recommend BKA amputation at this time. Also discussed findings with Dr. Pete Lassiter. Will continue to follow.

## 2023-06-04 NOTE — PLAN OF CARE
----- Message from Edgar Hercules sent at 3/31/2022  9:18 AM CDT -----  Contact: [phone] /self  Who Called: PT  Regarding:  pt needs to know next steps for her  Would the patient rather a call back or a response via [facility]? Call back  Best Call Back Number: [phone]   Additional Information:n/a        Pt A&O X 4. Room air. NSR on Tele. Heparin. Plavix. EP Cardiac. Voids. Sodium bicarb gtt. PRN Dilaudid. Carb controlled diet. QID Accucheck. SBA. MRI of foot done, see results. PRN Hydralazine. No further needs at this time. Problem: PAIN - ADULT  Goal: Verbalizes/displays adequate comfort level or patient's stated pain goal  Description: INTERVENTIONS:  - Encourage pt to monitor pain and request assistance  - Assess pain using appropriate pain scale  - Administer analgesics based on type and severity of pain and evaluate response  - Implement non-pharmacological measures as appropriate and evaluate response  - Consider cultural and social influences on pain and pain management  - Manage/alleviate anxiety  - Utilize distraction and/or relaxation techniques  - Monitor for opioid side effects  - Notify MD/LIP if interventions unsuccessful or patient reports new pain  - Anticipate increased pain with activity and pre-medicate as appropriate  Outcome: Progressing     Problem: RISK FOR INFECTION - ADULT  Goal: Absence of fever/infection during anticipated neutropenic period  Description: INTERVENTIONS  - Monitor WBC  - Administer growth factors as ordered  - Implement neutropenic guidelines  Outcome: Progressing     Problem: SAFETY ADULT - FALL  Goal: Free from fall injury  Description: INTERVENTIONS:  - Assess pt frequently for physical needs  - Identify cognitive and physical deficits and behaviors that affect risk of falls.   - [place] fall precautions as indicated by assessment.  - Educate pt/family on patient safety including physical limitations  - Instruct pt to call for assistance with activity based on assessment  - Modify environment to reduce risk of injury  - Provide assistive devices as appropriate  - Consider OT/PT consult to assist with strengthening/mobility  - Encourage toileting schedule  Outcome: Progressing     Problem: DISCHARGE PLANNING  Goal: Discharge to home or other facility with appropriate resources  Description: INTERVENTIONS:  - Identify barriers to discharge w/pt and caregiver  - Include patient/family/discharge partner in discharge planning  - Arrange for needed discharge resources and transportation as appropriate  - Identify discharge learning needs (meds, wound care, etc)  - Arrange for interpreters to assist at discharge as needed  - Consider post-discharge preferences of patient/family/discharge partner  - Complete POLST form as appropriate  - Assess patient's ability to be responsible for managing their own health  - Refer to Case Management Department for coordinating discharge planning if the patient needs post-hospital services based on physician/LIP order or complex needs related to functional status, cognitive ability or social support system  Outcome: Progressing     Problem: Patient/Family Goals  Goal: Patient/Family Long Term Goal  Description: Patient's Long Term Goal: discharge home with adequate resources     Interventions:  - meds per [facility]   - consults   - See additional Care Plan goals for specific interventions  Outcome: Progressing  Goal: Patient/Family Short Term Goal  Description: Patient's Short Term Goal:   6/3am: MRI today     Interventions:   - meds per [facility]  - consults   - See additional Care Plan goals for specific interventions  Outcome: Progressing

## 2023-06-04 NOTE — PLAN OF CARE
6/4 AM: Pt is A/O x 4, pain managed with Dilaudid. Lung sounds are clear, on room air. BP and HR are WNL, NSR on tele. BM today, voids in bathroom. SBA. IV fluids stopped. Wound care applied to R foot.      Problem: PAIN - ADULT  Goal: Verbalizes/displays adequate comfort level or patient's stated pain goal  Description: INTERVENTIONS:  - Encourage pt to monitor pain and request assistance  - Assess pain using appropriate pain scale  - Administer analgesics based on type and severity of pain and evaluate response  - Implement non-pharmacological measures as appropriate and evaluate response  - Consider cultural and social influences on pain and pain management  - Manage/alleviate anxiety  - Utilize distraction and/or relaxation techniques  - Monitor for opioid side effects  - Notify MD/LIP if interventions unsuccessful or patient reports new pain  - Anticipate increased pain with activity and pre-medicate as appropriate  Outcome: Progressing

## 2023-06-05 ENCOUNTER — APPOINTMENT (OUTPATIENT)
Dept: WOUND CARE | Facility: HOSPITAL | Age: 63
End: 2023-06-05
Attending: STUDENT IN AN ORGANIZED HEALTH CARE EDUCATION/TRAINING PROGRAM
Payer: COMMERCIAL

## 2023-06-05 LAB
ANION GAP SERPL CALC-SCNC: 4 MMOL/L (ref 0–18)
ANTIBODY SCREEN: NEGATIVE
BUN BLD-MCNC: 64 MG/DL (ref 7–18)
CALCIUM BLD-MCNC: 7.7 MG/DL (ref 8.5–10.1)
CHLORIDE SERPL-SCNC: 102 MMOL/L (ref 98–112)
CO2 SERPL-SCNC: 29 MMOL/L (ref 21–32)
CREAT BLD-MCNC: 3.95 MG/DL
ERYTHROCYTE [DISTWIDTH] IN BLOOD BY AUTOMATED COUNT: 14.5 %
GFR SERPLBLD BASED ON 1.73 SQ M-ARVRAT: 16 ML/MIN/1.73M2 (ref 60–?)
GLUCOSE BLD-MCNC: 103 MG/DL (ref 70–99)
GLUCOSE BLD-MCNC: 110 MG/DL (ref 70–99)
GLUCOSE BLD-MCNC: 156 MG/DL (ref 70–99)
GLUCOSE BLD-MCNC: 208 MG/DL (ref 70–99)
GLUCOSE BLD-MCNC: 211 MG/DL (ref 70–99)
HCT VFR BLD AUTO: 23.2 %
HGB BLD-MCNC: 7.2 G/DL
MCH RBC QN AUTO: 26.3 PG (ref 26–34)
MCHC RBC AUTO-ENTMCNC: 31 G/DL (ref 31–37)
MCV RBC AUTO: 84.7 FL
OSMOLALITY SERPL CALC.SUM OF ELEC: 299 MOSM/KG (ref 275–295)
PLATELET # BLD AUTO: 424 10(3)UL (ref 150–450)
POTASSIUM SERPL-SCNC: 3.1 MMOL/L (ref 3.5–5.1)
RBC # BLD AUTO: 2.74 X10(6)UL
RH BLOOD TYPE: POSITIVE
SODIUM SERPL-SCNC: 135 MMOL/L (ref 136–145)
WBC # BLD AUTO: 18.8 X10(3) UL (ref 4–11)

## 2023-06-05 PROCEDURE — 99233 SBSQ HOSP IP/OBS HIGH 50: CPT | Performed by: INTERNAL MEDICINE

## 2023-06-05 PROCEDURE — 99233 SBSQ HOSP IP/OBS HIGH 50: CPT | Performed by: HOSPITALIST

## 2023-06-05 RX ORDER — POTASSIUM CHLORIDE 20 MEQ/1
40 TABLET, EXTENDED RELEASE ORAL ONCE
Status: COMPLETED | OUTPATIENT
Start: 2023-06-05 | End: 2023-06-05

## 2023-06-05 NOTE — PLAN OF CARE
Pt A&Ox4. Glasses at bedside. Room air, . Tele, NSR. Afebrile. Heparin. Plavix and aspirin on hold for poss BKA. Surgery to eval in am. IV abx. Voids, up SBA. C/o of R foot/pleuritic pain, prn pain management per MAR. Carb controlled diet, QID accuchecks. No c/o of sob, n/v/d. R foot wound dressing changed by podiatry, c/d/I. Pt and pt's wife updated on poc. No further needs at this time. Safety/fall precautions in place. Call light and personal belongings within reach. Problem: PAIN - ADULT  Goal: Verbalizes/displays adequate comfort level or patient's stated pain goal  Description: INTERVENTIONS:  - Encourage pt to monitor pain and request assistance  - Assess pain using appropriate pain scale  - Administer analgesics based on type and severity of pain and evaluate response  - Implement non-pharmacological measures as appropriate and evaluate response  - Consider cultural and social influences on pain and pain management  - Manage/alleviate anxiety  - Utilize distraction and/or relaxation techniques  - Monitor for opioid side effects  - Notify MD/LIP if interventions unsuccessful or patient reports new pain  - Anticipate increased pain with activity and pre-medicate as appropriate  Outcome: Progressing     Problem: RISK FOR INFECTION - ADULT  Goal: Absence of fever/infection during anticipated neutropenic period  Description: INTERVENTIONS  - Monitor WBC  - Administer growth factors as ordered  - Implement neutropenic guidelines  Outcome: Progressing     Problem: SAFETY ADULT - FALL  Goal: Free from fall injury  Description: INTERVENTIONS:  - Assess pt frequently for physical needs  - Identify cognitive and physical deficits and behaviors that affect risk of falls.   - [place] fall precautions as indicated by assessment.  - Educate pt/family on patient safety including physical limitations  - Instruct pt to call for assistance with activity based on assessment  - Modify environment to reduce risk of injury  - Provide assistive devices as appropriate  - Consider OT/PT consult to assist with strengthening/mobility  - Encourage toileting schedule  Outcome: Progressing     Problem: DISCHARGE PLANNING  Goal: Discharge to home or other facility with appropriate resources  Description: INTERVENTIONS:  - Identify barriers to discharge w/pt and caregiver  - Include patient/family/discharge partner in discharge planning  - Arrange for needed discharge resources and transportation as appropriate  - Identify discharge learning needs (meds, wound care, etc)  - Arrange for interpreters to assist at discharge as needed  - Consider post-discharge preferences of patient/family/discharge partner  - Complete POLST form as appropriate  - Assess patient's ability to be responsible for managing their own health  - Refer to Case Management Department for coordinating discharge planning if the patient needs post-hospital services based on physician/LIP order or complex needs related to functional status, cognitive ability or social support system  Outcome: Progressing     Problem: Patient/Family Goals  Goal: Patient/Family Long Term Goal  Description: Patient's Long Term Goal: discharge home with adequate resources     Interventions:  - meds per [facility]   - consults   - See additional Care Plan goals for specific interventions  Outcome: Progressing  Goal: Patient/Family Short Term Goal  Description: Patient's Short Term Goal:   6/3am: MRI today   6/4 AM: Manage pain  6/4 NOC: Manage pain, sleep well   Interventions:   - meds per [facility]  - consults   - See additional Care Plan goals for specific interventions  Outcome: Progressing

## 2023-06-05 NOTE — PROGRESS NOTES
I discussed the case with Dr. Margi Huertas over the weekend. I followed up with the patient today. He has extensive osteomyelitis of the foot and the current recommendation from podiatry is for the patient to have a below-knee amputation of the right lower extremity. He has extensive tissue loss of the heel on exam    I discussed this with the patient on Friday and return to discuss with him. It is hard to accept but he is agreeable now. Osteomyelitis of the right lower extremity- right below-knee amputation tomorrow. Indications risks and benefits and anticipated recovery and potential need for rehab placement was discussed.

## 2023-06-05 NOTE — PROGRESS NOTES
Pt is aox4, VSS, afebrile. On RA. Tele NSR. C/o pain in foot, see MAR. No c/o SOB or n/v/d. QID accucheck. IV saline locked, IV abx. Carb controlled diet tolerating well. Replaced potassium this AM. Resting in bed with call light in place. Will continue to monitor.

## 2023-06-06 LAB
ALBUMIN SERPL-MCNC: 1.6 G/DL (ref 3.4–5)
ALBUMIN/GLOB SERPL: 0.4 {RATIO} (ref 1–2)
ALP LIVER SERPL-CCNC: 227 U/L
ALT SERPL-CCNC: 9 U/L
ANION GAP SERPL CALC-SCNC: 6 MMOL/L (ref 0–18)
ANION GAP SERPL CALC-SCNC: 7 MMOL/L (ref 0–18)
AST SERPL-CCNC: 16 U/L (ref 15–37)
BASOPHILS # BLD AUTO: 0.02 X10(3) UL (ref 0–0.2)
BASOPHILS NFR BLD AUTO: 0.1 %
BILIRUB SERPL-MCNC: 0.3 MG/DL (ref 0.1–2)
BUN BLD-MCNC: 60 MG/DL (ref 7–18)
BUN BLD-MCNC: 60 MG/DL (ref 7–18)
CALCIUM BLD-MCNC: 7.6 MG/DL (ref 8.5–10.1)
CALCIUM BLD-MCNC: 7.8 MG/DL (ref 8.5–10.1)
CHLORIDE SERPL-SCNC: 103 MMOL/L (ref 98–112)
CHLORIDE SERPL-SCNC: 103 MMOL/L (ref 98–112)
CO2 SERPL-SCNC: 26 MMOL/L (ref 21–32)
CO2 SERPL-SCNC: 27 MMOL/L (ref 21–32)
CREAT BLD-MCNC: 3.93 MG/DL
CREAT BLD-MCNC: 3.95 MG/DL
EOSINOPHIL # BLD AUTO: 0.39 X10(3) UL (ref 0–0.7)
EOSINOPHIL NFR BLD AUTO: 2.1 %
ERYTHROCYTE [DISTWIDTH] IN BLOOD BY AUTOMATED COUNT: 14.3 %
ERYTHROCYTE [DISTWIDTH] IN BLOOD BY AUTOMATED COUNT: 14.4 %
GFR SERPLBLD BASED ON 1.73 SQ M-ARVRAT: 16 ML/MIN/1.73M2 (ref 60–?)
GFR SERPLBLD BASED ON 1.73 SQ M-ARVRAT: 16 ML/MIN/1.73M2 (ref 60–?)
GLOBULIN PLAS-MCNC: 4.5 G/DL (ref 2.8–4.4)
GLUCOSE BLD-MCNC: 114 MG/DL (ref 70–99)
GLUCOSE BLD-MCNC: 122 MG/DL (ref 70–99)
GLUCOSE BLD-MCNC: 159 MG/DL (ref 70–99)
GLUCOSE BLD-MCNC: 195 MG/DL (ref 70–99)
GLUCOSE BLD-MCNC: 198 MG/DL (ref 70–99)
GLUCOSE BLD-MCNC: 59 MG/DL (ref 70–99)
GLUCOSE BLD-MCNC: 65 MG/DL (ref 70–99)
GLUCOSE BLD-MCNC: 66 MG/DL (ref 70–99)
GLUCOSE BLD-MCNC: 92 MG/DL (ref 70–99)
GLUCOSE BLD-MCNC: 97 MG/DL (ref 70–99)
HCT VFR BLD AUTO: 21.9 %
HCT VFR BLD AUTO: 22.3 %
HGB BLD-MCNC: 6.8 G/DL
HGB BLD-MCNC: 6.8 G/DL
HGB BLD-MCNC: 8.3 G/DL
IMM GRANULOCYTES # BLD AUTO: 0.15 X10(3) UL (ref 0–1)
IMM GRANULOCYTES NFR BLD: 0.8 %
LYMPHOCYTES # BLD AUTO: 1.54 X10(3) UL (ref 1–4)
LYMPHOCYTES NFR BLD AUTO: 8.3 %
MCH RBC QN AUTO: 26.6 PG (ref 26–34)
MCH RBC QN AUTO: 26.7 PG (ref 26–34)
MCHC RBC AUTO-ENTMCNC: 30.5 G/DL (ref 31–37)
MCHC RBC AUTO-ENTMCNC: 31.1 G/DL (ref 31–37)
MCV RBC AUTO: 85.9 FL
MCV RBC AUTO: 87.1 FL
MONOCYTES # BLD AUTO: 0.86 X10(3) UL (ref 0.1–1)
MONOCYTES NFR BLD AUTO: 4.6 %
NEUTROPHILS # BLD AUTO: 15.68 X10 (3) UL (ref 1.5–7.7)
NEUTROPHILS # BLD AUTO: 15.68 X10(3) UL (ref 1.5–7.7)
NEUTROPHILS NFR BLD AUTO: 84.1 %
OSMOLALITY SERPL CALC.SUM OF ELEC: 295 MOSM/KG (ref 275–295)
OSMOLALITY SERPL CALC.SUM OF ELEC: 299 MOSM/KG (ref 275–295)
PHOSPHATE SERPL-MCNC: 5.5 MG/DL (ref 2.5–4.9)
PLATELET # BLD AUTO: 415 10(3)UL (ref 150–450)
PLATELET # BLD AUTO: 433 10(3)UL (ref 150–450)
POTASSIUM SERPL-SCNC: 3.5 MMOL/L (ref 3.5–5.1)
POTASSIUM SERPL-SCNC: 3.5 MMOL/L (ref 3.5–5.1)
PROT SERPL-MCNC: 6.1 G/DL (ref 6.4–8.2)
PTH-INTACT SERPL-MCNC: 224.4 PG/ML (ref 18.5–88)
RBC # BLD AUTO: 2.55 X10(6)UL
RBC # BLD AUTO: 2.56 X10(6)UL
SODIUM SERPL-SCNC: 135 MMOL/L (ref 136–145)
SODIUM SERPL-SCNC: 137 MMOL/L (ref 136–145)
WBC # BLD AUTO: 18.3 X10(3) UL (ref 4–11)
WBC # BLD AUTO: 18.6 X10(3) UL (ref 4–11)

## 2023-06-06 PROCEDURE — 30233N1 TRANSFUSION OF NONAUTOLOGOUS RED BLOOD CELLS INTO PERIPHERAL VEIN, PERCUTANEOUS APPROACH: ICD-10-PCS | Performed by: HOSPITALIST

## 2023-06-06 PROCEDURE — 99233 SBSQ HOSP IP/OBS HIGH 50: CPT | Performed by: HOSPITALIST

## 2023-06-06 PROCEDURE — 05HD33Z INSERTION OF INFUSION DEVICE INTO RIGHT CEPHALIC VEIN, PERCUTANEOUS APPROACH: ICD-10-PCS | Performed by: INTERNAL MEDICINE

## 2023-06-06 PROCEDURE — 99233 SBSQ HOSP IP/OBS HIGH 50: CPT | Performed by: INTERNAL MEDICINE

## 2023-06-06 RX ORDER — SODIUM CHLORIDE 9 MG/ML
INJECTION, SOLUTION INTRAVENOUS ONCE
Status: COMPLETED | OUTPATIENT
Start: 2023-06-06 | End: 2023-06-06

## 2023-06-06 RX ORDER — MELATONIN
3 NIGHTLY PRN
Status: DISCONTINUED | OUTPATIENT
Start: 2023-06-06 | End: 2023-06-20

## 2023-06-06 NOTE — PLAN OF CARE
Pt is aox4, VSS, afebrile. On RA. Tele NSR. C/o pain in foot, see MAR. No c/o SOB or n/v/d. QID accucheck. IV saline locked, IV abx. NPO for BKA tonight. Received 1 unit of blood today, tolerated well. Resting in bed with call light in place. Will continue to monitor. Problem: PAIN - ADULT  Goal: Verbalizes/displays adequate comfort level or patient's stated pain goal  Description: INTERVENTIONS:  - Encourage pt to monitor pain and request assistance  - Assess pain using appropriate pain scale  - Administer analgesics based on type and severity of pain and evaluate response  - Implement non-pharmacological measures as appropriate and evaluate response  - Consider cultural and social influences on pain and pain management  - Manage/alleviate anxiety  - Utilize distraction and/or relaxation techniques  - Monitor for opioid side effects  - Notify MD/LIP if interventions unsuccessful or patient reports new pain  - Anticipate increased pain with activity and pre-medicate as appropriate  Outcome: Progressing     Problem: SAFETY ADULT - FALL  Goal: Free from fall injury  Description: INTERVENTIONS:  - Assess pt frequently for physical needs  - Identify cognitive and physical deficits and behaviors that affect risk of falls.   - [place] fall precautions as indicated by assessment.  - Educate pt/family on patient safety including physical limitations  - Instruct pt to call for assistance with activity based on assessment  - Modify environment to reduce risk of injury  - Provide assistive devices as appropriate  - Consider OT/PT consult to assist with strengthening/mobility  - Encourage toileting schedule  Outcome: Progressing     Problem: Patient/Family Goals  Goal: Patient/Family Long Term Goal  Description: Patient's Long Term Goal: discharge home with adequate resources     Interventions:  - meds per [facility]   - consults   - See additional Care Plan goals for specific interventions  Outcome: Progressing  Goal: Patient/Family Short Term Goal  Description: Patient's Short Term Goal:   6/3am: MRI today   6/4 AM: Manage pain  6/4 NOC: Manage pain, sleep well   6/5 NOC: manage pain  Interventions:   - meds per [facility]  - consults   - See additional Care Plan goals for specific interventions  Outcome: Progressing

## 2023-06-06 NOTE — CM/SW NOTE
Pt admitted for pneumonia and right foot wound. PT scheduled to have a right BKA today. PT/OT to see after surgery. Pt may need IV ABX at home upon dc. SW following.

## 2023-06-06 NOTE — PLAN OF CARE
Pt A&OX4. On RA sating WNL. . Refused tele. Q4 vitals. VSS. C/o pain to R foot. See MAR. IV ABX. QID accuchecks. NPO at 0500 for R BKA. Consent signed. R foot dressing reinforced-c/d/i. Continent. Urinal at bedside. Up standby. Pt and family updated on plan of care and verbalized understanding. Call light within reach. All needs met at this time. Will follow. Problem: PAIN - ADULT  Goal: Verbalizes/displays adequate comfort level or patient's stated pain goal  Description: INTERVENTIONS:  - Encourage pt to monitor pain and request assistance  - Assess pain using appropriate pain scale  - Administer analgesics based on type and severity of pain and evaluate response  - Implement non-pharmacological measures as appropriate and evaluate response  - Consider cultural and social influences on pain and pain management  - Manage/alleviate anxiety  - Utilize distraction and/or relaxation techniques  - Monitor for opioid side effects  - Notify MD/LIP if interventions unsuccessful or patient reports new pain  - Anticipate increased pain with activity and pre-medicate as appropriate  Outcome: Progressing     Problem: RISK FOR INFECTION - ADULT  Goal: Absence of fever/infection during anticipated neutropenic period  Description: INTERVENTIONS  - Monitor WBC  - Administer growth factors as ordered  - Implement neutropenic guidelines  Outcome: Progressing     Problem: SAFETY ADULT - FALL  Goal: Free from fall injury  Description: INTERVENTIONS:  - Assess pt frequently for physical needs  - Identify cognitive and physical deficits and behaviors that affect risk of falls.   - [place] fall precautions as indicated by assessment.  - Educate pt/family on patient safety including physical limitations  - Instruct pt to call for assistance with activity based on assessment  - Modify environment to reduce risk of injury  - Provide assistive devices as appropriate  - Consider OT/PT consult to assist with strengthening/mobility  - Encourage toileting schedule  Outcome: Progressing     Problem: DISCHARGE PLANNING  Goal: Discharge to home or other facility with appropriate resources  Description: INTERVENTIONS:  - Identify barriers to discharge w/pt and caregiver  - Include patient/family/discharge partner in discharge planning  - Arrange for needed discharge resources and transportation as appropriate  - Identify discharge learning needs (meds, wound care, etc)  - Arrange for interpreters to assist at discharge as needed  - Consider post-discharge preferences of patient/family/discharge partner  - Complete POLST form as appropriate  - Assess patient's ability to be responsible for managing their own health  - Refer to Case Management Department for coordinating discharge planning if the patient needs post-hospital services based on physician/LIP order or complex needs related to functional status, cognitive ability or social support system  Outcome: Progressing     Problem: Patient/Family Goals  Goal: Patient/Family Long Term Goal  Description: Patient's Long Term Goal: discharge home with adequate resources     Interventions:  - meds per [facility]   - consults   - See additional Care Plan goals for specific interventions  Outcome: Progressing  Goal: Patient/Family Short Term Goal  Description: Patient's Short Term Goal:   6/3am: MRI today   6/4 AM: Manage pain  6/4 NOC: Manage pain, sleep well   6/5 NOC: manage pain  Interventions:   - meds per [facility]  - consults   - See additional Care Plan goals for specific interventions  Outcome: Progressing

## 2023-06-06 NOTE — PROGRESS NOTES
06/06/23 1151   Clinical Encounter Type   Visited With Patient   Routine Visit Introduction   Continue Visiting No   Patient's Supportive Strategies/Resources Family   Family Spiritual Encounters   Family Participation in Care Consistently   Family Support During Treatment Consistently   Taxonomy   Intended Effects Demonstrate caring and concern   Methods Offer support; Offer emotional support   Interventions Acknowledge current situation; Active listening;Ask questions to bring forth feelings; Explain  role     Discussion:   initiated introductory visit. Pt (Richy) reported that he was doing okay and shared about his upcomming surgery. Richy reflected on his support system and said his wife would be visiting shortly. He appreciated the visit and is aware that chaplains are available for follow up care anytime and that [facility] can be requested via RN. Spiritual Care support can be requested via an Epic consult or ext. [phone].        Althea Whelan M.Div, Chaplain Resident  Ext. [phone]

## 2023-06-07 ENCOUNTER — TELEPHONE (OUTPATIENT)
Dept: PODIATRY CLINIC | Facility: CLINIC | Age: 63
End: 2023-06-07

## 2023-06-07 ENCOUNTER — ANESTHESIA EVENT (OUTPATIENT)
Dept: SURGERY | Facility: HOSPITAL | Age: 63
End: 2023-06-07
Payer: COMMERCIAL

## 2023-06-07 LAB
ANION GAP SERPL CALC-SCNC: 4 MMOL/L (ref 0–18)
BLOOD TYPE BARCODE: 7300
BUN BLD-MCNC: 60 MG/DL (ref 7–18)
CALCIUM BLD-MCNC: 7.9 MG/DL (ref 8.5–10.1)
CHLORIDE SERPL-SCNC: 105 MMOL/L (ref 98–112)
CO2 SERPL-SCNC: 28 MMOL/L (ref 21–32)
CREAT BLD-MCNC: 4 MG/DL
ERYTHROCYTE [DISTWIDTH] IN BLOOD BY AUTOMATED COUNT: 14.6 %
GFR SERPLBLD BASED ON 1.73 SQ M-ARVRAT: 16 ML/MIN/1.73M2 (ref 60–?)
GLUCOSE BLD-MCNC: 142 MG/DL (ref 70–99)
GLUCOSE BLD-MCNC: 155 MG/DL (ref 70–99)
GLUCOSE BLD-MCNC: 211 MG/DL (ref 70–99)
GLUCOSE BLD-MCNC: 79 MG/DL (ref 70–99)
GLUCOSE BLD-MCNC: 90 MG/DL (ref 70–99)
HCT VFR BLD AUTO: 25.1 %
HGB BLD-MCNC: 7.7 G/DL
MCH RBC QN AUTO: 26.5 PG (ref 26–34)
MCHC RBC AUTO-ENTMCNC: 30.7 G/DL (ref 31–37)
MCV RBC AUTO: 86.3 FL
OSMOLALITY SERPL CALC.SUM OF ELEC: 300 MOSM/KG (ref 275–295)
PLATELET # BLD AUTO: 402 10(3)UL (ref 150–450)
POTASSIUM SERPL-SCNC: 3.6 MMOL/L (ref 3.5–5.1)
POTASSIUM SERPL-SCNC: 4.4 MMOL/L (ref 3.5–5.1)
RBC # BLD AUTO: 2.91 X10(6)UL
SODIUM SERPL-SCNC: 137 MMOL/L (ref 136–145)
UNIT VOLUME: 350 ML
WBC # BLD AUTO: 16.8 X10(3) UL (ref 4–11)

## 2023-06-07 PROCEDURE — 99233 SBSQ HOSP IP/OBS HIGH 50: CPT | Performed by: INTERNAL MEDICINE

## 2023-06-07 PROCEDURE — 99232 SBSQ HOSP IP/OBS MODERATE 35: CPT | Performed by: HOSPITALIST

## 2023-06-07 RX ORDER — POTASSIUM CHLORIDE 14.9 MG/ML
20 INJECTION INTRAVENOUS ONCE
Status: DISCONTINUED | OUTPATIENT
Start: 2023-06-07 | End: 2023-06-19

## 2023-06-07 RX ORDER — MULTIPLE VITAMINS W/ MINERALS TAB 9MG-400MCG
1 TAB ORAL DAILY
Status: DISCONTINUED | OUTPATIENT
Start: 2023-06-08 | End: 2023-06-20

## 2023-06-07 RX ORDER — HYDROCODONE BITARTRATE AND ACETAMINOPHEN 5; 325 MG/1; MG/1
2 TABLET ORAL EVERY 4 HOURS PRN
Status: DISCONTINUED | OUTPATIENT
Start: 2023-06-07 | End: 2023-06-12

## 2023-06-07 RX ORDER — POTASSIUM CHLORIDE 20 MEQ/1
20 TABLET, EXTENDED RELEASE ORAL ONCE
Status: COMPLETED | OUTPATIENT
Start: 2023-06-07 | End: 2023-06-07

## 2023-06-07 RX ORDER — HYDROCODONE BITARTRATE AND ACETAMINOPHEN 5; 325 MG/1; MG/1
1 TABLET ORAL EVERY 4 HOURS PRN
Status: DISCONTINUED | OUTPATIENT
Start: 2023-06-07 | End: 2023-06-12

## 2023-06-07 NOTE — PLAN OF CARE
Problem: PAIN - ADULT  Goal: Verbalizes/displays adequate comfort level or patient's stated pain goal  Description: INTERVENTIONS:  - Encourage pt to monitor pain and request assistance  - Assess pain using appropriate pain scale  - Administer analgesics based on type and severity of pain and evaluate response  - Implement non-pharmacological measures as appropriate and evaluate response  - Consider cultural and social influences on pain and pain management  - Manage/alleviate anxiety  - Utilize distraction and/or relaxation techniques  - Monitor for opioid side effects  - Notify MD/LIP if interventions unsuccessful or patient reports new pain  - Anticipate increased pain with activity and pre-medicate as appropriate  Outcome: Progressing     Problem: RISK FOR INFECTION - ADULT  Goal: Absence of fever/infection during anticipated neutropenic period  Description: INTERVENTIONS  - Monitor WBC  - Administer growth factors as ordered  - Implement neutropenic guidelines  Outcome: Progressing     Problem: SAFETY ADULT - FALL  Goal: Free from fall injury  Description: INTERVENTIONS:  - Assess pt frequently for physical needs  - Identify cognitive and physical deficits and behaviors that affect risk of falls.   - [place] fall precautions as indicated by assessment.  - Educate pt/family on patient safety including physical limitations  - Instruct pt to call for assistance with activity based on assessment  - Modify environment to reduce risk of injury  - Provide assistive devices as appropriate  - Consider OT/PT consult to assist with strengthening/mobility  - Encourage toileting schedule  Outcome: Progressing     Problem: DISCHARGE PLANNING  Goal: Discharge to home or other facility with appropriate resources  Description: INTERVENTIONS:  - Identify barriers to discharge w/pt and caregiver  - Include patient/family/discharge partner in discharge planning  - Arrange for needed discharge resources and transportation as appropriate  - Identify discharge learning needs (meds, wound care, etc)  - Arrange for interpreters to assist at discharge as needed  - Consider post-discharge preferences of patient/family/discharge partner  - Complete POLST form as appropriate  - Assess patient's ability to be responsible for managing their own health  - Refer to Case Management Department for coordinating discharge planning if the patient needs post-hospital services based on physician/LIP order or complex needs related to functional status, cognitive ability or social support system  Outcome: Progressing     Problem: Patient/Family Goals  Goal: Patient/Family Long Term Goal  Description: Patient's Long Term Goal: discharge home with adequate resources     Interventions:  - meds per [facility]   - consults   - See additional Care Plan goals for specific interventions  Outcome: Progressing  Goal: Patient/Family Short Term Goal  Description: Patient's Short Term Goal:   6/3am: MRI today   6/4 AM: Manage pain  6/4 NOC: Manage pain, sleep well   6/5 NOC: manage pain  6/6 NOC: Manage pain   Interventions:   - meds per [facility]  - consults   - See additional Care Plan goals for specific interventions  Outcome: Progressing

## 2023-06-07 NOTE — TELEPHONE ENCOUNTER
The patient is scheduled for outpatient foot surgery at [facility] on 7/11/23 with Dr. Willard Elise for the following procedure(s). Right foot wound debridement with Kerecis graft application    The patient has been advised to contact your office for a History and Physical.  The hospital will contact the patient for any additional testing needed. He/She can have the appropriate pre-op testing done at the hospital prior to the date of surgery. The History and Physical (H&P) may be no older that THIRTY (30) days prior to surgery. The H&P must be updated within 24 hours of the surgical procedure stating the patient has no changes and cleared for surgery. The addendum must be completed on  7/10/23 after 11:00 a.m.     Thank you,    Anny Levi  Surgical Scheduling  [phone]

## 2023-06-07 NOTE — PLAN OF CARE
Pt A&OX4. R arm precautions-R midline. On RA sating WNL. . Refused tele. Q4 vitals. VSS. C/o pain to R foot. See MAR. IV ABX. QID accuchecks. Discussed with pt about NPO status at midnight, pt refused to be NPO for possible R BKA procedure. R foot dressing-c/d/i. Continent. Urinal at bedside. Up standby. Pt updated on plan of care and verbalized understanding. Call light within reach. All needs met at this time. Will follow. Problem: PAIN - ADULT  Goal: Verbalizes/displays adequate comfort level or patient's stated pain goal  Description: INTERVENTIONS:  - Encourage pt to monitor pain and request assistance  - Assess pain using appropriate pain scale  - Administer analgesics based on type and severity of pain and evaluate response  - Implement non-pharmacological measures as appropriate and evaluate response  - Consider cultural and social influences on pain and pain management  - Manage/alleviate anxiety  - Utilize distraction and/or relaxation techniques  - Monitor for opioid side effects  - Notify MD/LIP if interventions unsuccessful or patient reports new pain  - Anticipate increased pain with activity and pre-medicate as appropriate  Outcome: Progressing     Problem: RISK FOR INFECTION - ADULT  Goal: Absence of fever/infection during anticipated neutropenic period  Description: INTERVENTIONS  - Monitor WBC  - Administer growth factors as ordered  - Implement neutropenic guidelines  Outcome: Progressing     Problem: SAFETY ADULT - FALL  Goal: Free from fall injury  Description: INTERVENTIONS:  - Assess pt frequently for physical needs  - Identify cognitive and physical deficits and behaviors that affect risk of falls.   - [place] fall precautions as indicated by assessment.  - Educate pt/family on patient safety including physical limitations  - Instruct pt to call for assistance with activity based on assessment  - Modify environment to reduce risk of injury  - Provide assistive devices as appropriate  - Consider OT/PT consult to assist with strengthening/mobility  - Encourage toileting schedule  Outcome: Progressing     Problem: DISCHARGE PLANNING  Goal: Discharge to home or other facility with appropriate resources  Description: INTERVENTIONS:  - Identify barriers to discharge w/pt and caregiver  - Include patient/family/discharge partner in discharge planning  - Arrange for needed discharge resources and transportation as appropriate  - Identify discharge learning needs (meds, wound care, etc)  - Arrange for interpreters to assist at discharge as needed  - Consider post-discharge preferences of patient/family/discharge partner  - Complete POLST form as appropriate  - Assess patient's ability to be responsible for managing their own health  - Refer to Case Management Department for coordinating discharge planning if the patient needs post-hospital services based on physician/LIP order or complex needs related to functional status, cognitive ability or social support system  Outcome: Progressing     Problem: Patient/Family Goals  Goal: Patient/Family Long Term Goal  Description: Patient's Long Term Goal: discharge home with adequate resources     Interventions:  - meds per [facility]   - consults   - See additional Care Plan goals for specific interventions  Outcome: Progressing  Goal: Patient/Family Short Term Goal  Description: Patient's Short Term Goal:   6/3am: MRI today   6/4 AM: Manage pain  6/4 NOC: Manage pain, sleep well   6/5 NOC: manage pain  6/6 NOC: Manage pain   Interventions:   - meds per [facility]  - consults   - See additional Care Plan goals for specific interventions  Outcome: Progressing

## 2023-06-08 ENCOUNTER — ANESTHESIA (OUTPATIENT)
Dept: SURGERY | Facility: HOSPITAL | Age: 63
End: 2023-06-08
Payer: COMMERCIAL

## 2023-06-08 LAB
ANION GAP SERPL CALC-SCNC: 4 MMOL/L (ref 0–18)
BASOPHILS # BLD AUTO: 0.02 X10(3) UL (ref 0–0.2)
BASOPHILS NFR BLD AUTO: 0.1 %
BUN BLD-MCNC: 66 MG/DL (ref 7–18)
CALCIUM BLD-MCNC: 7.8 MG/DL (ref 8.5–10.1)
CHLORIDE SERPL-SCNC: 106 MMOL/L (ref 98–112)
CO2 SERPL-SCNC: 27 MMOL/L (ref 21–32)
CREAT BLD-MCNC: 4.22 MG/DL
EOSINOPHIL # BLD AUTO: 0.36 X10(3) UL (ref 0–0.7)
EOSINOPHIL NFR BLD AUTO: 2.6 %
ERYTHROCYTE [DISTWIDTH] IN BLOOD BY AUTOMATED COUNT: 14.5 %
GFR SERPLBLD BASED ON 1.73 SQ M-ARVRAT: 15 ML/MIN/1.73M2 (ref 60–?)
GLUCOSE BLD-MCNC: 116 MG/DL (ref 70–99)
GLUCOSE BLD-MCNC: 126 MG/DL (ref 70–99)
GLUCOSE BLD-MCNC: 145 MG/DL (ref 70–99)
GLUCOSE BLD-MCNC: 62 MG/DL (ref 70–99)
GLUCOSE BLD-MCNC: 72 MG/DL (ref 70–99)
GLUCOSE BLD-MCNC: 96 MG/DL (ref 70–99)
GLUCOSE BLD-MCNC: 98 MG/DL (ref 70–99)
GLUCOSE BLD-MCNC: 99 MG/DL (ref 70–99)
HCT VFR BLD AUTO: 23.5 %
HGB BLD-MCNC: 7.3 G/DL
IMM GRANULOCYTES # BLD AUTO: 0.1 X10(3) UL (ref 0–1)
IMM GRANULOCYTES NFR BLD: 0.7 %
LYMPHOCYTES # BLD AUTO: 1.05 X10(3) UL (ref 1–4)
LYMPHOCYTES NFR BLD AUTO: 7.7 %
MCH RBC QN AUTO: 26.4 PG (ref 26–34)
MCHC RBC AUTO-ENTMCNC: 31.1 G/DL (ref 31–37)
MCV RBC AUTO: 85.1 FL
MONOCYTES # BLD AUTO: 0.73 X10(3) UL (ref 0.1–1)
MONOCYTES NFR BLD AUTO: 5.3 %
NEUTROPHILS # BLD AUTO: 11.45 X10 (3) UL (ref 1.5–7.7)
NEUTROPHILS # BLD AUTO: 11.45 X10(3) UL (ref 1.5–7.7)
NEUTROPHILS NFR BLD AUTO: 83.6 %
OSMOLALITY SERPL CALC.SUM OF ELEC: 302 MOSM/KG (ref 275–295)
PLATELET # BLD AUTO: 384 10(3)UL (ref 150–450)
POTASSIUM SERPL-SCNC: 3.8 MMOL/L (ref 3.5–5.1)
RBC # BLD AUTO: 2.76 X10(6)UL
SODIUM SERPL-SCNC: 137 MMOL/L (ref 136–145)
WBC # BLD AUTO: 13.7 X10(3) UL (ref 4–11)

## 2023-06-08 PROCEDURE — 0Y6H0Z1 DETACHMENT AT RIGHT LOWER LEG, HIGH, OPEN APPROACH: ICD-10-PCS | Performed by: SURGERY

## 2023-06-08 PROCEDURE — 99232 SBSQ HOSP IP/OBS MODERATE 35: CPT | Performed by: HOSPITALIST

## 2023-06-08 PROCEDURE — 99233 SBSQ HOSP IP/OBS HIGH 50: CPT | Performed by: INTERNAL MEDICINE

## 2023-06-08 RX ORDER — LIDOCAINE HYDROCHLORIDE 10 MG/ML
INJECTION, SOLUTION EPIDURAL; INFILTRATION; INTRACAUDAL; PERINEURAL AS NEEDED
Status: DISCONTINUED | OUTPATIENT
Start: 2023-06-08 | End: 2023-06-08 | Stop reason: SURG

## 2023-06-08 RX ORDER — DEXTROSE AND SODIUM CHLORIDE 5; .9 G/100ML; G/100ML
INJECTION, SOLUTION INTRAVENOUS CONTINUOUS
Status: DISCONTINUED | OUTPATIENT
Start: 2023-06-08 | End: 2023-06-09

## 2023-06-08 RX ORDER — SODIUM CHLORIDE, SODIUM LACTATE, POTASSIUM CHLORIDE, CALCIUM CHLORIDE 600; 310; 30; 20 MG/100ML; MG/100ML; MG/100ML; MG/100ML
INJECTION, SOLUTION INTRAVENOUS CONTINUOUS
Status: DISCONTINUED | OUTPATIENT
Start: 2023-06-08 | End: 2023-06-08 | Stop reason: HOSPADM

## 2023-06-08 RX ORDER — MORPHINE SULFATE 4 MG/ML
2 INJECTION, SOLUTION INTRAMUSCULAR; INTRAVENOUS EVERY 2 HOUR PRN
Status: DISCONTINUED | OUTPATIENT
Start: 2023-06-08 | End: 2023-06-11

## 2023-06-08 RX ORDER — NALOXONE HYDROCHLORIDE 0.4 MG/ML
80 INJECTION, SOLUTION INTRAMUSCULAR; INTRAVENOUS; SUBCUTANEOUS AS NEEDED
Status: DISCONTINUED | OUTPATIENT
Start: 2023-06-08 | End: 2023-06-08 | Stop reason: HOSPADM

## 2023-06-08 RX ORDER — EPHEDRINE SULFATE 50 MG/ML
INJECTION INTRAVENOUS AS NEEDED
Status: DISCONTINUED | OUTPATIENT
Start: 2023-06-08 | End: 2023-06-08 | Stop reason: SURG

## 2023-06-08 RX ORDER — DIPHENHYDRAMINE HYDROCHLORIDE 50 MG/ML
12.5 INJECTION INTRAMUSCULAR; INTRAVENOUS EVERY 4 HOURS PRN
Status: DISCONTINUED | OUTPATIENT
Start: 2023-06-08 | End: 2023-06-11

## 2023-06-08 RX ORDER — ACETAMINOPHEN 325 MG/1
650 TABLET ORAL EVERY 4 HOURS PRN
Status: DISCONTINUED | OUTPATIENT
Start: 2023-06-08 | End: 2023-06-20

## 2023-06-08 RX ORDER — ONDANSETRON 2 MG/ML
4 INJECTION INTRAMUSCULAR; INTRAVENOUS EVERY 6 HOURS PRN
Status: DISCONTINUED | OUTPATIENT
Start: 2023-06-08 | End: 2023-06-20

## 2023-06-08 RX ORDER — PROCHLORPERAZINE EDISYLATE 5 MG/ML
5 INJECTION INTRAMUSCULAR; INTRAVENOUS EVERY 8 HOURS PRN
Status: DISCONTINUED | OUTPATIENT
Start: 2023-06-08 | End: 2023-06-08 | Stop reason: HOSPADM

## 2023-06-08 RX ORDER — METOCLOPRAMIDE HYDROCHLORIDE 5 MG/5ML
5 SOLUTION ORAL EVERY 6 HOURS PRN
Status: DISCONTINUED | OUTPATIENT
Start: 2023-06-08 | End: 2023-06-20

## 2023-06-08 RX ORDER — ONDANSETRON 2 MG/ML
4 INJECTION INTRAMUSCULAR; INTRAVENOUS EVERY 6 HOURS PRN
Status: DISCONTINUED | OUTPATIENT
Start: 2023-06-08 | End: 2023-06-08

## 2023-06-08 RX ORDER — HYDROMORPHONE HYDROCHLORIDE 1 MG/ML
INJECTION, SOLUTION INTRAMUSCULAR; INTRAVENOUS; SUBCUTANEOUS
Status: COMPLETED
Start: 2023-06-08 | End: 2023-06-08

## 2023-06-08 RX ORDER — MORPHINE SULFATE 4 MG/ML
4 INJECTION, SOLUTION INTRAMUSCULAR; INTRAVENOUS EVERY 2 HOUR PRN
Status: DISCONTINUED | OUTPATIENT
Start: 2023-06-08 | End: 2023-06-11

## 2023-06-08 RX ORDER — MORPHINE SULFATE 4 MG/ML
1 INJECTION, SOLUTION INTRAMUSCULAR; INTRAVENOUS EVERY 2 HOUR PRN
Status: DISCONTINUED | OUTPATIENT
Start: 2023-06-08 | End: 2023-06-11

## 2023-06-08 RX ORDER — HYDROMORPHONE HYDROCHLORIDE 1 MG/ML
0.2 INJECTION, SOLUTION INTRAMUSCULAR; INTRAVENOUS; SUBCUTANEOUS EVERY 5 MIN PRN
Status: DISCONTINUED | OUTPATIENT
Start: 2023-06-08 | End: 2023-06-08 | Stop reason: HOSPADM

## 2023-06-08 RX ORDER — SODIUM CHLORIDE 9 MG/ML
INJECTION, SOLUTION INTRAVENOUS CONTINUOUS PRN
Status: DISCONTINUED | OUTPATIENT
Start: 2023-06-08 | End: 2023-06-08 | Stop reason: SURG

## 2023-06-08 RX ORDER — ENOXAPARIN SODIUM 100 MG/ML
40 INJECTION SUBCUTANEOUS DAILY
Status: DISCONTINUED | OUTPATIENT
Start: 2023-06-09 | End: 2023-06-08

## 2023-06-08 RX ORDER — PROCHLORPERAZINE EDISYLATE 5 MG/ML
5 INJECTION INTRAMUSCULAR; INTRAVENOUS EVERY 8 HOURS PRN
Status: DISCONTINUED | OUTPATIENT
Start: 2023-06-08 | End: 2023-06-20

## 2023-06-08 RX ORDER — PROCHLORPERAZINE EDISYLATE 5 MG/ML
INJECTION INTRAMUSCULAR; INTRAVENOUS
Status: COMPLETED
Start: 2023-06-08 | End: 2023-06-08

## 2023-06-08 RX ORDER — CEFAZOLIN SODIUM/WATER 2 G/20 ML
SYRINGE (ML) INTRAVENOUS AS NEEDED
Status: DISCONTINUED | OUTPATIENT
Start: 2023-06-08 | End: 2023-06-08 | Stop reason: SURG

## 2023-06-08 RX ORDER — SODIUM CHLORIDE 9 MG/ML
INJECTION, SOLUTION INTRAVENOUS CONTINUOUS
Status: DISCONTINUED | OUTPATIENT
Start: 2023-06-08 | End: 2023-06-11

## 2023-06-08 RX ORDER — HYDROCODONE BITARTRATE AND ACETAMINOPHEN 5; 325 MG/1; MG/1
1 TABLET ORAL EVERY 4 HOURS PRN
Status: DISCONTINUED | OUTPATIENT
Start: 2023-06-08 | End: 2023-06-12

## 2023-06-08 RX ORDER — ONDANSETRON 2 MG/ML
INJECTION INTRAMUSCULAR; INTRAVENOUS AS NEEDED
Status: DISCONTINUED | OUTPATIENT
Start: 2023-06-08 | End: 2023-06-08 | Stop reason: SURG

## 2023-06-08 RX ORDER — CEFAZOLIN SODIUM/WATER 2 G/20 ML
2 SYRINGE (ML) INTRAVENOUS EVERY 8 HOURS
Status: DISCONTINUED | OUTPATIENT
Start: 2023-06-08 | End: 2023-06-08

## 2023-06-08 RX ORDER — DEXTROSE AND SODIUM CHLORIDE 5; .9 G/100ML; G/100ML
INJECTION, SOLUTION INTRAVENOUS CONTINUOUS
Status: DISCONTINUED | OUTPATIENT
Start: 2023-06-08 | End: 2023-06-08

## 2023-06-08 RX ORDER — BUPIVACAINE HYDROCHLORIDE 2.5 MG/ML
INJECTION, SOLUTION EPIDURAL; INFILTRATION; INTRACAUDAL AS NEEDED
Status: DISCONTINUED | OUTPATIENT
Start: 2023-06-08 | End: 2023-06-08 | Stop reason: HOSPADM

## 2023-06-08 RX ORDER — HYDROMORPHONE HYDROCHLORIDE 1 MG/ML
0.6 INJECTION, SOLUTION INTRAMUSCULAR; INTRAVENOUS; SUBCUTANEOUS EVERY 5 MIN PRN
Status: DISCONTINUED | OUTPATIENT
Start: 2023-06-08 | End: 2023-06-08 | Stop reason: HOSPADM

## 2023-06-08 RX ORDER — HYDROCODONE BITARTRATE AND ACETAMINOPHEN 5; 325 MG/1; MG/1
2 TABLET ORAL EVERY 4 HOURS PRN
Status: DISCONTINUED | OUTPATIENT
Start: 2023-06-08 | End: 2023-06-12

## 2023-06-08 RX ORDER — GLYCOPYRROLATE 0.2 MG/ML
INJECTION, SOLUTION INTRAMUSCULAR; INTRAVENOUS AS NEEDED
Status: DISCONTINUED | OUTPATIENT
Start: 2023-06-08 | End: 2023-06-08 | Stop reason: SURG

## 2023-06-08 RX ORDER — MIDAZOLAM HYDROCHLORIDE 1 MG/ML
INJECTION INTRAMUSCULAR; INTRAVENOUS AS NEEDED
Status: DISCONTINUED | OUTPATIENT
Start: 2023-06-08 | End: 2023-06-08 | Stop reason: SURG

## 2023-06-08 RX ORDER — FUROSEMIDE 10 MG/ML
20 INJECTION INTRAMUSCULAR; INTRAVENOUS ONCE
Status: COMPLETED | OUTPATIENT
Start: 2023-06-09 | End: 2023-06-09

## 2023-06-08 RX ORDER — ROCURONIUM BROMIDE 10 MG/ML
INJECTION, SOLUTION INTRAVENOUS AS NEEDED
Status: DISCONTINUED | OUTPATIENT
Start: 2023-06-08 | End: 2023-06-08 | Stop reason: SURG

## 2023-06-08 RX ORDER — NEOSTIGMINE METHYLSULFATE 1 MG/ML
INJECTION, SOLUTION INTRAVENOUS AS NEEDED
Status: DISCONTINUED | OUTPATIENT
Start: 2023-06-08 | End: 2023-06-08 | Stop reason: SURG

## 2023-06-08 RX ORDER — NALOXONE HYDROCHLORIDE 0.4 MG/ML
0.08 INJECTION, SOLUTION INTRAMUSCULAR; INTRAVENOUS; SUBCUTANEOUS
Status: DISCONTINUED | OUTPATIENT
Start: 2023-06-08 | End: 2023-06-11

## 2023-06-08 RX ORDER — ONDANSETRON 2 MG/ML
INJECTION INTRAMUSCULAR; INTRAVENOUS
Status: COMPLETED
Start: 2023-06-08 | End: 2023-06-08

## 2023-06-08 RX ORDER — HYDROMORPHONE HYDROCHLORIDE 1 MG/ML
0.4 INJECTION, SOLUTION INTRAMUSCULAR; INTRAVENOUS; SUBCUTANEOUS EVERY 5 MIN PRN
Status: DISCONTINUED | OUTPATIENT
Start: 2023-06-08 | End: 2023-06-08 | Stop reason: HOSPADM

## 2023-06-08 RX ORDER — ONDANSETRON 2 MG/ML
4 INJECTION INTRAMUSCULAR; INTRAVENOUS EVERY 6 HOURS PRN
Status: DISCONTINUED | OUTPATIENT
Start: 2023-06-08 | End: 2023-06-08 | Stop reason: HOSPADM

## 2023-06-08 RX ADMIN — NEOSTIGMINE METHYLSULFATE 3 MG: 1 INJECTION, SOLUTION INTRAVENOUS at 15:38:00

## 2023-06-08 RX ADMIN — GLYCOPYRROLATE 0.2 MG: 0.2 INJECTION, SOLUTION INTRAMUSCULAR; INTRAVENOUS at 14:28:00

## 2023-06-08 RX ADMIN — GLYCOPYRROLATE 0.4 MG: 0.2 INJECTION, SOLUTION INTRAMUSCULAR; INTRAVENOUS at 15:38:00

## 2023-06-08 RX ADMIN — CEFAZOLIN SODIUM/WATER 2 G: 2 G/20 ML SYRINGE (ML) INTRAVENOUS at 14:14:00

## 2023-06-08 RX ADMIN — EPHEDRINE SULFATE 10 MG: 50 INJECTION INTRAVENOUS at 14:28:00

## 2023-06-08 RX ADMIN — SODIUM CHLORIDE: 9 INJECTION, SOLUTION INTRAVENOUS at 13:59:00

## 2023-06-08 RX ADMIN — MIDAZOLAM HYDROCHLORIDE 2 MG: 1 INJECTION INTRAMUSCULAR; INTRAVENOUS at 14:05:00

## 2023-06-08 RX ADMIN — LIDOCAINE HYDROCHLORIDE 50 MG: 10 INJECTION, SOLUTION EPIDURAL; INFILTRATION; INTRACAUDAL; PERINEURAL at 14:06:00

## 2023-06-08 RX ADMIN — ONDANSETRON 4 MG: 2 INJECTION INTRAMUSCULAR; INTRAVENOUS at 14:16:00

## 2023-06-08 RX ADMIN — ROCURONIUM BROMIDE 40 MG: 10 INJECTION, SOLUTION INTRAVENOUS at 14:06:00

## 2023-06-08 NOTE — PROGRESS NOTES
06/07/23 2033   Provider Notification   Reason for Communication Patient request         Pt refusing to have IV Zosyn infuse for 4 hours like ordered. Pt wants it infused in 30 minutes or will not accept IV abx. Per MD, ok to infuse IV Zosyn over 30 minutes.

## 2023-06-08 NOTE — ANESTHESIA PROCEDURE NOTES
Airway  Date/Time: 6/8/2023 2:08 PM  Urgency: elective    Airway not difficult    General Information and Staff    Patient location during procedure: OR  Anesthesiologist: Jesse Quesada MD  Performed: anesthesiologist   Performed by: Jesse Quesada MD  Authorized by: Jesse Quesada MD      Indications and Patient Condition  Indications for airway management: anesthesia  Sedation level: deep  Preoxygenated: yes  Patient position: sniffing  Mask difficulty assessment: 1 - vent by mask    Final Airway Details  Final airway type: endotracheal airway      Successful airway: ETT  Cuffed: yes   Successful intubation technique: Video laryngoscopy  Facilitating devices/methods: intubating stylet  Endotracheal tube insertion site: oral  Blade: GlideScope  Blade size: #3  ETT size (mm): 7.5    Cormack-Lehane Classification: grade I - full view of glottis  Placement verified by: capnometry   Measured from: lips  ETT to lips (cm): 22  Number of attempts at approach: 1

## 2023-06-08 NOTE — TELEPHONE ENCOUNTER
DIMITRI and sent [facility] to schedule pre-op prior to 7-11 surgery with Dr. Etelvina Gomez

## 2023-06-08 NOTE — PROGRESS NOTES
[facility] Pharmacy Note:  Renal Dose Adjustment for Metoclopramide (REGLAN)    [address] has been prescribed Metoclopramide (REGLAN) 10 mg every 6 hours as needed for nausea/vomiting,. Estimated Creatinine Clearance: 16.5 mL/min (A) (based on SCr of 4.22 mg/dL (H)). Calculated creatinine clearance is < 40 ml/min, therefore, the dose of Metoclopramide (REGLAN) has been changed to 5 mg every 6 hours as needed for nausea/vomiting per P&T approved protocol. Pharmacy will continue to follow, and if renal function improves, will resume the original order.        Thank you,  Saul Boothe, PharmD  6/8/2023 6:52 PM

## 2023-06-08 NOTE — PROGRESS NOTES
[facility] Pharmacy Note:  Renal Dose Adjustment for enoxaparin (LOVENOX)    Kenya Preston has been prescribed enoxaparin 40 mg subcutaneously every 24 hours. Estimated Creatinine Clearance: 16.5 mL/min (A) (based on SCr of 4.22 mg/dL (H)). Also has current order of heparin subcutaneously every 8 hours. Calculated CrCl less than 20 mL/min so the dose of Enoxaparin (LOVENOX) has been discontinued and will continue heparin 5000 units every 8 hours per P&T approved protocol. Pharmacy will continue to follow, and make additional adjustments if needed.       Thank you,  Masoud Kim, PharmD  6/8/2023 6:22 PM

## 2023-06-08 NOTE — BRIEF OP NOTE
Pre-Operative Diagnosis: Osteomyelitis of right lower extremity ([facility] [address] [[id number]]     Post-Operative Diagnosis: Osteomyelitis of right lower extremity [facility]) [[id number]]      Procedure Performed:   RIGHT BELOW KNEE AMPUTATION    Surgeon(s) and Role:     Lisa Terrell MD - Primary    Assistant(s):        Surgical Findings:   1.  Healthy but edematous tissue at amputation level      Specimen: amputation specimen     Estimated Blood Loss: Blood Output: 300 mL (6/8/2023  3:22 PM)    Nehemiah Canas MD  6/8/2023  3:43 PM

## 2023-06-08 NOTE — PLAN OF CARE
Pt A&Ox4. Glasses at bedside. Room air, . Tele, NSR. Afebrile. Heparin/plavix and aspirin on hold for scheduled BKA tomorrow. IV abx. Voids, up SBA. C/o of R foot/pleuritic pain, prn pain management per MAR. Carb controlled diet, QID accuchecks. NPO after 5am for procedure. No c/o of sob, n/v/d. R foot wound dressing c/d/I. Pt and pt's wife updated on poc. No further needs at this time. Safety/fall precautions in place. Call light and personal belongings within reach. Problem: PAIN - ADULT  Goal: Verbalizes/displays adequate comfort level or patient's stated pain goal  Description: INTERVENTIONS:  - Encourage pt to monitor pain and request assistance  - Assess pain using appropriate pain scale  - Administer analgesics based on type and severity of pain and evaluate response  - Implement non-pharmacological measures as appropriate and evaluate response  - Consider cultural and social influences on pain and pain management  - Manage/alleviate anxiety  - Utilize distraction and/or relaxation techniques  - Monitor for opioid side effects  - Notify MD/LIP if interventions unsuccessful or patient reports new pain  - Anticipate increased pain with activity and pre-medicate as appropriate  Outcome: Progressing     Problem: RISK FOR INFECTION - ADULT  Goal: Absence of fever/infection during anticipated neutropenic period  Description: INTERVENTIONS  - Monitor WBC  - Administer growth factors as ordered  - Implement neutropenic guidelines  Outcome: Progressing     Problem: SAFETY ADULT - FALL  Goal: Free from fall injury  Description: INTERVENTIONS:  - Assess pt frequently for physical needs  - Identify cognitive and physical deficits and behaviors that affect risk of falls.   - [place] fall precautions as indicated by assessment.  - Educate pt/family on patient safety including physical limitations  - Instruct pt to call for assistance with activity based on assessment  - Modify environment to reduce risk of injury  - Provide assistive devices as appropriate  - Consider OT/PT consult to assist with strengthening/mobility  - Encourage toileting schedule  Outcome: Progressing     Problem: DISCHARGE PLANNING  Goal: Discharge to home or other facility with appropriate resources  Description: INTERVENTIONS:  - Identify barriers to discharge w/pt and caregiver  - Include patient/family/discharge partner in discharge planning  - Arrange for needed discharge resources and transportation as appropriate  - Identify discharge learning needs (meds, wound care, etc)  - Arrange for interpreters to assist at discharge as needed  - Consider post-discharge preferences of patient/family/discharge partner  - Complete POLST form as appropriate  - Assess patient's ability to be responsible for managing their own health  - Refer to Case Management Department for coordinating discharge planning if the patient needs post-hospital services based on physician/LIP order or complex needs related to functional status, cognitive ability or social support system  Outcome: Progressing     Problem: Patient/Family Goals  Goal: Patient/Family Long Term Goal  Description: Patient's Long Term Goal: discharge home with adequate resources     Interventions:  - meds per [facility]   - consults   - See additional Care Plan goals for specific interventions  Outcome: Progressing  Goal: Patient/Family Short Term Goal  Description: Patient's Short Term Goal:   6/3am: MRI today   6/4 AM: Manage pain  6/4 NOC: Manage pain, sleep well   6/5 NOC: manage pain  6/6 NOC: Manage pain   6/7 NOC: manage pain, sleep  Interventions:   - meds per [facility]  - consults   - See additional Care Plan goals for specific interventions  Outcome: Progressing

## 2023-06-08 NOTE — ANESTHESIA POSTPROCEDURE EVALUATION
[address] [facility] Patient Status:  Inpatient   Age/Gender 57year old male MRN [id number]   [facility] SURGERY Attending Rozina Bone MD   [facility] Day # 8 PCP Qian Villa MD       Anesthesia Post-op Note    RIGHT BELOW KNEE AMPUTATION    Procedure Summary     Date: 06/08/23 Room / Location: [address] [place] [address] MAIN OR    Anesthesia Start: 9991 Anesthesia Stop: 0541    Procedure: RIGHT BELOW KNEE AMPUTATION (Right: Lower Leg) Diagnosis:       Osteomyelitis of right lower extremity ([address]      (Osteomyelitis of right lower extremity ([address] [[id number]])    Surgeons: Dae Mathews MD Anesthesiologist: Rhea Huston MD    Anesthesia Type: general ASA Status: 3          Anesthesia Type: general    Vitals Value Taken Time   /73 06/08/23 1550   Temp 98.6 06/08/23 1550   Pulse 69 06/08/23 1550   Resp 18 06/08/23 1550   SpO2 97 06/08/23 1550       Patient Location: PACU    Anesthesia Type: general    Airway Patency: patent and extubated    Postop Pain Control: adequate    Mental Status: mildly sedated but able to meaningfully participate in the post-anesthesia evaluation    Nausea/Vomiting: none    Cardiopulmonary/Hydration status: stable euvolemic    Complications: no apparent anesthesia related complications    Postop vital signs: stable    Dental Exam: Unchanged from Preop    Patient to be discharged from PACU when criteria met. HTN (hypertension)    Lung nodule seen on imaging study    Tuberculosis

## 2023-06-09 PROBLEM — R78.81 BACTEREMIA: Status: ACTIVE | Noted: 2020-07-06

## 2023-06-09 PROBLEM — I73.9 PAD (PERIPHERAL ARTERY DISEASE) (HCC): Status: ACTIVE | Noted: 2023-05-26

## 2023-06-09 PROBLEM — I73.9 PAD (PERIPHERAL ARTERY DISEASE): Status: ACTIVE | Noted: 2023-05-26

## 2023-06-09 LAB
ANION GAP SERPL CALC-SCNC: 9 MMOL/L (ref 0–18)
BUN BLD-MCNC: 58 MG/DL (ref 7–18)
CALCIUM BLD-MCNC: 7.8 MG/DL (ref 8.5–10.1)
CHLORIDE SERPL-SCNC: 106 MMOL/L (ref 98–112)
CO2 SERPL-SCNC: 24 MMOL/L (ref 21–32)
CREAT BLD-MCNC: 4.4 MG/DL
ERYTHROCYTE [DISTWIDTH] IN BLOOD BY AUTOMATED COUNT: 14.3 %
GFR SERPLBLD BASED ON 1.73 SQ M-ARVRAT: 14 ML/MIN/1.73M2 (ref 60–?)
GLUCOSE BLD-MCNC: 105 MG/DL (ref 70–99)
GLUCOSE BLD-MCNC: 110 MG/DL (ref 70–99)
GLUCOSE BLD-MCNC: 129 MG/DL (ref 70–99)
GLUCOSE BLD-MCNC: 156 MG/DL (ref 70–99)
GLUCOSE BLD-MCNC: 158 MG/DL (ref 70–99)
HCT VFR BLD AUTO: 25.3 %
HGB BLD-MCNC: 7.8 G/DL
MCH RBC QN AUTO: 26.5 PG (ref 26–34)
MCHC RBC AUTO-ENTMCNC: 30.8 G/DL (ref 31–37)
MCV RBC AUTO: 86.1 FL
OSMOLALITY SERPL CALC.SUM OF ELEC: 307 MOSM/KG (ref 275–295)
PLATELET # BLD AUTO: 434 10(3)UL (ref 150–450)
POTASSIUM SERPL-SCNC: 3.9 MMOL/L (ref 3.5–5.1)
RBC # BLD AUTO: 2.94 X10(6)UL
SODIUM SERPL-SCNC: 139 MMOL/L (ref 136–145)
WBC # BLD AUTO: 16.3 X10(3) UL (ref 4–11)

## 2023-06-09 PROCEDURE — 99233 SBSQ HOSP IP/OBS HIGH 50: CPT | Performed by: INTERNAL MEDICINE

## 2023-06-09 PROCEDURE — 99232 SBSQ HOSP IP/OBS MODERATE 35: CPT | Performed by: HOSPITALIST

## 2023-06-09 NOTE — CM/SW NOTE
The patient has a mobility limitation due to right below knee amputation that significantly impairs his ability to participate in one or more mobility related activities of daily living such as toileting, feeding, dressing, grooming, and bathing in customary locations in the home. The mobility limitation cannot be sufficiently resolved by the use of an appropriately fitted cane or walker. Use of manual wheelchair will significantly improve the patient's ability to participate in MRADLs and the patient will use it on a regular basis in the home. The patient has a caregiver who is available, willing, and able to provide assistance with the wheelchair.

## 2023-06-09 NOTE — PLAN OF CARE
"Well  - 12 Months Old  Physical development  Your 12-month-old should be able to:  · Sit up without assistance.  · Creep on his or her hands and knees.  · Pull himself or herself to a stand. Your child may stand alone without holding onto something.  · Cruise around the furniture.  · Take a few steps alone or while holding onto something with one hand.  · Bang 2 objects together.  · Put objects in and out of containers.  · Feed himself or herself with fingers and drink from a cup.    Normal behavior  Your child prefers his or her parents over all other caregivers. Your child may become anxious or cry when you leave, when around strangers, or when in new situations.  Social and emotional development  Your 12-month-old:  · Should be able to indicate needs with gestures (such as by pointing and reaching toward objects).  · May develop an attachment to a toy or object.  · Imitates others and begins to pretend play (such as pretending to drink from a cup or eat with a spoon).  · Can wave \"bye-bye\" and play simple games such as peHopelaoo and rolling a ball back and forth.  · Will begin to test your reactions to his or her actions (such as by throwing food when eating or by dropping an object repeatedly).    Cognitive and language development  At 12 months, your child should be able to:  · Imitate sounds, try to say words that you say, and vocalize to music.  · Say \"mama\" and \"katharine\" and a few other words.  · Jabber by using vocal inflections.  · Find a hidden object (such as by looking under a blanket or taking a lid off a box).  · Turn pages in a book and look at the right picture when you say a familiar word (such as \"dog\" or \"ball\").  · Point to objects with an index finger.  · Follow simple instructions (\"give me book,\" \" toy,\" \"come here\").  · Respond to a parent who says \"no.\" Your child may repeat the same behavior again.    Encouraging development  · Recite nursery rhymes and sing songs to your " Pt A&Ox4. Glasses at bedside. Room air, . 2L nc overnight. Tele, NSR. Afebrile. Heparin subQ restarted. Plavix/aspirin on hold. IV abx. IVF. Voids, up to side of bed overnight with urinal. Right BKA done, dressing c/d/i. Dilauded PCA pump, see MAR. PRN PCA bolus given. Carb controlled diet, QID accuchecks. R arm precautions, midline in place. Pt and pt's wife updated on poc. No further needs at this time. Safety/fall precautions in place. Call light and personal belongings within reach. Problem: PAIN - ADULT  Goal: Verbalizes/displays adequate comfort level or patient's stated pain goal  Description: INTERVENTIONS:  - Encourage pt to monitor pain and request assistance  - Assess pain using appropriate pain scale  - Administer analgesics based on type and severity of pain and evaluate response  - Implement non-pharmacological measures as appropriate and evaluate response  - Consider cultural and social influences on pain and pain management  - Manage/alleviate anxiety  - Utilize distraction and/or relaxation techniques  - Monitor for opioid side effects  - Notify MD/LIP if interventions unsuccessful or patient reports new pain  - Anticipate increased pain with activity and pre-medicate as appropriate  Outcome: Progressing     Problem: RISK FOR INFECTION - ADULT  Goal: Absence of fever/infection during anticipated neutropenic period  Description: INTERVENTIONS  - Monitor WBC  - Administer growth factors as ordered  - Implement neutropenic guidelines  Outcome: Progressing     Problem: SAFETY ADULT - FALL  Goal: Free from fall injury  Description: INTERVENTIONS:  - Assess pt frequently for physical needs  - Identify cognitive and physical deficits and behaviors that affect risk of falls.   - [place] fall precautions as indicated by assessment.  - Educate pt/family on patient safety including physical limitations  - Instruct pt to call for assistance with activity based on assessment  - Modify environment to child.  · Read to your child every day. Choose books with interesting pictures, colors, and textures. Encourage your child to point to objects when they are named.  · Name objects consistently, and describe what you are doing while bathing or dressing your child or while he or she is eating or playing.  · Use imaginative play with dolls, blocks, or common household objects.  · Praise your child's good behavior with your attention.  · Interrupt your child's inappropriate behavior and show him or her what to do instead. You can also remove your child from the situation and encourage him or her to engage in a more appropriate activity. However, parents should know that children at this age have a limited ability to understand consequences.  · Set consistent limits. Keep rules clear, short, and simple.  · Provide a high chair at table level and engage your child in social interaction at mealtime.  · Allow your child to feed himself or herself with a cup and a spoon.  · Try not to let your child watch TV or play with computers until he or she is 2 years of age. Children at this age need active play and social interaction.  · Spend some one-on-one time with your child each day.  · Provide your child with opportunities to interact with other children.  · Note that children are generally not developmentally ready for toilet training until 18-24 months of age.  Recommended immunizations  · Hepatitis B vaccine. The third dose of a 3-dose series should be given at age 6-18 months. The third dose should be given at least 16 weeks after the first dose and at least 8 weeks after the second dose.  · Diphtheria and tetanus toxoids and acellular pertussis (DTaP) vaccine. Doses of this vaccine may be given, if needed, to catch up on missed doses.  · Haemophilus influenzae type b (Hib) booster. One booster dose should be given when your child is 12-15 months old. This may be the third dose or fourth dose of the series, depending on  reduce risk of injury  - Provide assistive devices as appropriate  - Consider OT/PT consult to assist with strengthening/mobility  - Encourage toileting schedule  Outcome: Progressing     Problem: DISCHARGE PLANNING  Goal: Discharge to home or other facility with appropriate resources  Description: INTERVENTIONS:  - Identify barriers to discharge w/pt and caregiver  - Include patient/family/discharge partner in discharge planning  - Arrange for needed discharge resources and transportation as appropriate  - Identify discharge learning needs (meds, wound care, etc)  - Arrange for interpreters to assist at discharge as needed  - Consider post-discharge preferences of patient/family/discharge partner  - Complete POLST form as appropriate  - Assess patient's ability to be responsible for managing their own health  - Refer to Case Management Department for coordinating discharge planning if the patient needs post-hospital services based on physician/LIP order or complex needs related to functional status, cognitive ability or social support system  Outcome: Progressing     Problem: Patient/Family Goals  Goal: Patient/Family Long Term Goal  Description: Patient's Long Term Goal: discharge home with adequate resources     Interventions:  - meds per [facility]   - consults   - See additional Care Plan goals for specific interventions  Outcome: Progressing  Goal: Patient/Family Short Term Goal  Description: Patient's Short Term Goal:   6/3am: MRI today   6/4 AM: Manage pain  6/4 NOC: Manage pain, sleep well   6/5 NOC: manage pain  6/6 NOC: Manage pain   6/7 NOC: manage pain, sleep  6/8 NOC: manage pain, remain stable overnight  Interventions:   - meds per [facility]  - consults   - See additional Care Plan goals for specific interventions  Outcome: Progressing the vaccine type given.  · Pneumococcal conjugate (PCV13) vaccine. The fourth dose of a 4-dose series should be given at age 12-15 months. The fourth dose should be given 8 weeks after the third dose. The fourth dose is only needed for children age 12-59 months who received 3 doses before their first birthday. This dose is also needed for high-risk children who received 3 doses at any age. If your child is on a delayed vaccine schedule in which the first dose was given at age 7 months or later, your child may receive a final dose at this time.  · Inactivated poliovirus vaccine. The third dose of a 4-dose series should be given at age 6-18 months. The third dose should be given at least 4 weeks after the second dose.  · Influenza vaccine. Starting at age 6 months, your child should be given the influenza vaccine every year. Children between the ages of 6 months and 8 years who receive the influenza vaccine for the first time should receive a second dose at least 4 weeks after the first dose. Thereafter, only a single yearly (annual) dose is recommended.  · Measles, mumps, and rubella (MMR) vaccine. The first dose of a 2-dose series should be given at age 12-15 months. The second dose of the series will be given at 4-6 years of age. If your child had the MMR vaccine before the age of 12 months due to travel outside of the country, he or she will still receive 2 more doses of the vaccine.  · Varicella vaccine. The first dose of a 2-dose series should be given at age 12-15 months. The second dose of the series will be given at 4-6 years of age.  · Hepatitis A vaccine. A 2-dose series of this vaccine should be given at age 12-23 months. The second dose of the 2-dose series should be given 6-18 months after the first dose. If a child has received only one dose of the vaccine by age 24 months, he or she should receive a second dose 6-18 months after the first dose.  · Meningococcal conjugate vaccine. Children who have  certain high-risk conditions, are present during an outbreak, or are traveling to a country with a high rate of meningitis should receive this vaccine.  Testing  · Your child's health care provider should screen for anemia by checking protein in the red blood cells (hemoglobin) or the amount of red blood cells in a small sample of blood (hematocrit).  · Hearing screening, lead testing, and tuberculosis (TB) testing may be performed, based upon individual risk factors.  · Screening for signs of autism spectrum disorder (ASD) at this age is also recommended. Signs that health care providers may look for include:  ? Limited eye contact with caregivers.  ? No response from your child when his or her name is called.  ? Repetitive patterns of behavior.  Nutrition  · If you are breastfeeding, you may continue to do so. Talk to your lactation consultant or health care provider about your child’s nutrition needs.  · You may stop giving your child infant formula and begin giving him or her whole vitamin D milk as directed by your healthcare provider.  · Daily milk intake should be about 16-32 oz (480-960 mL).  · Encourage your child to drink water. Give your child juice that contains vitamin C and is made from 100% juice without additives. Limit your child's daily intake to 4-6 oz (120-180 mL). Offer juice in a cup without a lid, and encourage your child to finish his or her drink at the table. This will help you limit your child's juice intake.  · Provide a balanced healthy diet. Continue to introduce your child to new foods with different tastes and textures.  · Encourage your child to eat vegetables and fruits, and avoid giving your child foods that are high in saturated fat, salt (sodium), or sugar.  · Transition your child to the family diet and away from baby foods.  · Provide 3 small meals and 2-3 nutritious snacks each day.  · Cut all foods into small pieces to minimize the risk of choking. Do not give your child  nuts, hard candies, popcorn, or chewing gum because these may cause your child to choke.  · Do not force your child to eat or to finish everything on the plate.  Oral health  · [place] your child's teeth after meals and before bedtime. Use a small amount of non-fluoride toothpaste.  · Take your child to a dentist to discuss oral health.  · Give your child fluoride supplements as directed by your child's health care provider.  · Apply fluoride varnish to your child's teeth as directed by his or her health care provider.  · Provide all beverages in a cup and not in a bottle. Doing this helps to prevent tooth decay.  Vision  Your health care provider will assess your child to look for normal structure (anatomy) and function (physiology) of his or her eyes.  Skin care  Protect your child from sun exposure by dressing him or her in weather-appropriate clothing, hats, or other coverings. Apply broad-spectrum sunscreen that protects against UVA and UVB radiation (SPF 15 or higher). Reapply sunscreen every 2 hours. Avoid taking your child outdoors during peak sun hours (between 10 a.m. and 4 p.m.). A sunburn can lead to more serious skin problems later in life.  Sleep  · At this age, children typically sleep 12 or more hours per day.  · Your child may start taking one nap per day in the afternoon. Let your child's morning nap fade out naturally.  · At this age, children generally sleep through the night, but they may wake up and cry from time to time.  · Keep naptime and bedtime routines consistent.  · Your child should sleep in his or her own sleep space.  Elimination  · It is normal for your child to have one or more stools each day or to miss a day or two. As your child eats new foods, you may see changes in stool color, consistency, and frequency.  · To prevent diaper rash, keep your child clean and dry. Over-the-counter diaper creams and ointments may be used if the diaper area becomes irritated. Avoid diaper wipes that  contain alcohol or irritating substances, such as fragrances.  · When cleaning a girl, wipe her bottom from front to back to prevent a urinary tract infection.  Safety  Creating a safe environment  · Set your home water heater at 120°F (49°C) or lower.  · Provide a tobacco-free and drug-free environment for your child.  · Equip your home with smoke detectors and carbon monoxide detectors. Change their batteries every 6 months.  · Keep night-lights away from curtains and bedding to decrease fire risk.  · Secure dangling electrical cords, window blind cords, and phone cords.  · Install a gate at the top of all stairways to help prevent falls. Install a fence with a self-latching gate around your pool, if you have one.  · Immediately empty water from all containers after use (including bathtubs) to prevent drowning.  · Keep all medicines, poisons, chemicals, and cleaning products capped and out of the reach of your child.  · Keep knives out of the reach of children.  · If guns and ammunition are kept in the home, make sure they are locked away separately.  · Make sure that TVs, bookshelves, and other heavy items or furniture are secure and cannot fall over on your child.  · Make sure that all windows are locked so your child cannot fall out the window.  Lowering the risk of choking and suffocating  · Make sure all of your child's toys are larger than his or her mouth.  · Keep small objects and toys with loops, strings, and cords away from your child.  · Make sure the pacifier shield (the plastic piece between the ring and nipple) is at least 1½ in (3.8 cm) wide.  · Check all of your child's toys for loose parts that could be swallowed or choked on.  · Never tie a pacifier around your child’s hand or neck.  · Keep plastic bags and balloons away from children.  When driving:  · Always keep your child restrained in a car seat.  · Use a rear-facing car seat until your child is age 2 years or older, or until he or she  reaches the upper weight or height limit of the seat.  · Place your child's car seat in the back seat of your vehicle. Never place the car seat in the front seat of a vehicle that has front-seat airbags.  · Never leave your child alone in a car after parking. Make a habit of checking your back seat before walking away.  General instructions  · Never shake your child, whether in play, to wake him or her up, or out of frustration.  · Supervise your child at all times, including during bath time. Do not leave your child unattended in water. Small children can drown in a small amount of water.  · Be careful when handling hot liquids and sharp objects around your child. Make sure that handles on the stove are turned inward rather than out over the edge of the stove.  · Supervise your child at all times, including during bath time. Do not ask or expect older children to supervise your child.  · Know the phone number for the poison control center in your area and keep it by the phone or on your refrigerator.  · Make sure your child wears shoes when outdoors. Shoes should have a flexible sole, have a wide toe area, and be long enough that your child's foot is not cramped.  · Make sure all of your child's toys are nontoxic and do not have sharp edges.  · Do not put your child in a baby walker. Baby walkers may make it easy for your child to access safety hazards. They do not promote earlier walking, and they may interfere with motor skills needed for walking. They may also cause falls. Stationary seats may be used for brief periods.  When to get help  · Call your child's health care provider if your child shows any signs of illness or has a fever. Do not give your child medicines unless your health care provider says it is okay.  · If your child stops breathing, turns blue, or is unresponsive, call your local emergency services (911 in [place]  What's next?  Your next visit should be when your child is 15 months old.  This  information is not intended to replace advice given to you by your health care provider. Make sure you discuss any questions you have with your health care provider.  Document Released: 01/07/2008 Document Revised: 2017 Document Reviewed: 2017  [employer] Patient Education © 2018 [employer]

## 2023-06-09 NOTE — CM/SW NOTE
Notified by Pojoa Kamara at [facility] an amputee support stump may be more beneficial for pt instead of elevating leg rests. Wheelchair order updated and attached to [facility] referral. Pooja Asad stated she spoke with pt's wife and will have wheelchair delivered to pt's hospital room per her request. Updated RN. Addendum: Notified by [facility] liaison pt is agreeable to [facility].     JULIUS Gamez  Discharge Planner

## 2023-06-09 NOTE — CM/SW NOTE
06/09/23 1200   CM/SW Referral Data   Referral Source Social Work (self-referral)   Reason for Referral Discharge planning   Informant Patient;Clinical Staff Member;EMR   Medical Hx   Does patient have an established PCP? Yes   Patient Info   Patient's Current Mental Status at Time of Assessment Alert;Oriented   Patient's Home Environment Condo/Apt with elevator   Number of Levels in Home 1   Patient lives with Spouse/Significant other   Patient Status Prior to Admission   Independent with ADLs and Mobility Yes   Discharge Needs   Anticipated D/C needs Home health care;Medical equipment; To be determined     HOME SITUATION  Type of Home: Condo   Home Layout: One level     Lives With: Spouse  Drives: No  Patient Owned Equipment: Rolling walker  Patient Regularly Uses: Reading glasses     Prior Level of [place] per PT eval: 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 Pt is s/p R BKA. Notified by PT pt will need a wheelchair and recommendation is for home health. Chart reviewed, pt has hx with [facility] for [facility] ABX. SW met with pt at bedside to discuss discharge planning. Pt lives in a condo with his spouse and has a walker for ambulation. Pt stated his spouse is able to assist with ADLs when needed. Discussed PT recommendation for , pt agreeable. [facility] referrals sent in [address] [place] [address], choice list will be provided once available. Referral for wheelchair sent to E in [address] [place] [address]. Wheelchair order placed and progress note with needed verbiage written, messaged hospitaist to cosign order and progress note. Will attach cosign order and progress note to [address] [place] [address] referral once available. SW will continue to follow.      Addendum: Notified by hospitalist wheelchair order and progress note have been cosigned, attached cosigned order and progress note to [address] [place] [address] referral.     Virgie Bourgeois, [facility]  Discharge Planner

## 2023-06-09 NOTE — HOME CARE LIAISON
Received referral via [facility] for Home Health services. Spoke w/ patient and provided with list of [facility] providers from [facility], choice is [facility]. Agency reserved in [facility] and contact information placed on AVS.Financial interest disclosure provided. Notified SW.

## 2023-06-09 NOTE — DISCHARGE INSTRUCTIONS
Wound Cleaning and Dressings:  Showering directions: May shower and/or cleanse wound with mild soap and water  Wound cleansing:  Cleanse with normal saline or wound cleanser  Wound cleaning frequency: Daily  Wound product: Silver foam, Dry gauze, ABD pad, Kerlix and Paper tape  Dressing change frequency:  Change dressing daily and/or PRN    Wear stump  daily  Daily soap and water on incision        Stump  wear daily  Follow up 3 weeks Dr. Domo Arzate and water on stump         It was recommended that you use a wheelchair at home to facilitate your recovery and compliment your treatment. The [facility] team has set up delivery with [facility]. Contact information: [place] [address] [facility], [address] [place] [address]. Phone: [phone]. If you experienced any difficulties with the delivery, please contact the [facility] Case Management department at [phone]. Sometimes managing your health at home requires assistance. The [facility] team has recognized your preference to use [facility]. They can be reached by phone at [phone]. The fax number for your reference is [phone]. A representative from the home health agency will contact you or your family to schedule your first visit.

## 2023-06-10 LAB
ANION GAP SERPL CALC-SCNC: 7 MMOL/L (ref 0–18)
BUN BLD-MCNC: 62 MG/DL (ref 7–18)
CALCIUM BLD-MCNC: 8.3 MG/DL (ref 8.5–10.1)
CHLORIDE SERPL-SCNC: 104 MMOL/L (ref 98–112)
CO2 SERPL-SCNC: 25 MMOL/L (ref 21–32)
CREAT BLD-MCNC: 4.26 MG/DL
GFR SERPLBLD BASED ON 1.73 SQ M-ARVRAT: 15 ML/MIN/1.73M2 (ref 60–?)
GLUCOSE BLD-MCNC: 104 MG/DL (ref 70–99)
GLUCOSE BLD-MCNC: 114 MG/DL (ref 70–99)
GLUCOSE BLD-MCNC: 121 MG/DL (ref 70–99)
GLUCOSE BLD-MCNC: 131 MG/DL (ref 70–99)
GLUCOSE BLD-MCNC: 141 MG/DL (ref 70–99)
GLUCOSE BLD-MCNC: 157 MG/DL (ref 70–99)
GLUCOSE BLD-MCNC: 52 MG/DL (ref 70–99)
GLUCOSE BLD-MCNC: 55 MG/DL (ref 70–99)
GLUCOSE BLD-MCNC: 62 MG/DL (ref 70–99)
GLUCOSE BLD-MCNC: 98 MG/DL (ref 70–99)
MAGNESIUM SERPL-MCNC: 2.3 MG/DL (ref 1.6–2.6)
OSMOLALITY SERPL CALC.SUM OF ELEC: 300 MOSM/KG (ref 275–295)
POTASSIUM SERPL-SCNC: 4.3 MMOL/L (ref 3.5–5.1)
SODIUM SERPL-SCNC: 136 MMOL/L (ref 136–145)

## 2023-06-10 PROCEDURE — 99232 SBSQ HOSP IP/OBS MODERATE 35: CPT | Performed by: HOSPITALIST

## 2023-06-10 PROCEDURE — 99233 SBSQ HOSP IP/OBS HIGH 50: CPT | Performed by: INTERNAL MEDICINE

## 2023-06-10 NOTE — PLAN OF CARE
Received patient awake and oriented with spouse at bedside. On room air, breath sounds diminished. On tele, SR. Using urinal. PCA Dilaudid with bolus dose for breakthrough pain. Dressing to RBKA dry and intact.  Vascular surgery and hospitalist notified

## 2023-06-10 NOTE — SIGNIFICANT EVENT
Significant Event - Fall Note    Date/Time of Fall: Leatha 10, 2023 at [address] [facility]    Fall huddle completed: Yes    Description of patient fall:     Patient fell from: Bed     Activity when fall occurred: Ambulating without assistance or assistive devices     Where did fall occur: Patient room     Was the fall assisted: Found on floor/unassisted to floor    Who witnessed the fall: Unwitnessed    Patient narrative of fall: Woke up from sleep attempted to go to the bathroom and fell  Staff narrative of fall: pt found on floor  Name of Provider notified of fall:Dr Ange Ruiz, Dr Jt Lees    Family notification: Family notified    Factors contributing to fall:     Physical: Impaired mobility/transfer     Psychological: Alert     Environmental: Equipment     Medications received in the past 8 hours:   Medication(s) Administered in past 8 Hours from 06/10/2023 0049 to 06/10/2023 0849     Date/Time Order Dose Route Action Action by Comments    06/10/2023 0819 CDT amLODIPine (Norvasc) tab 10 mg 10 mg Oral Given Satya Brown RN --    06/10/2023 0820 CDT ampicillin-sulbactam (Unasyn) 3 g in sodium chloride 0.9% 100mL IVPB-ADD 3 g Intravenous Lashay Estrada RN --    06/10/2023 0820 CDT aspirin DR tab 81 mg 81 mg Oral Given Satya Brown RN --    06/10/2023 0542 CDT glucose-vitamin C (Dex-4) chewable tab 4 tablet 4 tablet Oral Given Paulie Smith RN --    06/10/2023 0543 CDT heparin (Porcine) 5000 UNIT/ML injection 5,000 Units 5,000 Units Subcutaneous Given Paulie Smith RN --    06/10/2023 0727 CDT HYDROmorphone (Dilaudid) 1 MG/ML injection 0.4 mg 0.4 mg Intravenous Given Satya Brown RN --    06/10/2023 0543 CDT metoprolol succinate ER (Toprol XL) 24 hr tab 50 mg 50 mg Oral Given Paulie Smith RN --    06/10/2023 0814 CDT morphINE PF 4 MG/ML injection 2 mg 2 mg Intravenous Given Satya Brown RN --    06/10/2023 0820 CDT multivitamin with minerals (Thera M Plus) tab 1 tablet 1 tablet Oral Given Satya Brown RN -- 06/10/2023 0820 CDT sodium bicarbonate tab 650 mg 650 mg Oral Given Catracho Freeman RN --          Was patient identified as high fall risk prior to fall:         Fall Risk Level: High Fall Risk                      What interventions were in place prior to fall: Assistive devices (wheelchair, commode, walker, gait belt), Bed alarm, Bed in lowest position, Call light within reach, Reality orientation and Rounding    Interventions post fall: Assistive devices (wheelchair, commode, walker, gait belt), Bed alarm, Bed in lowest position and Call light within reach    Additional comments:

## 2023-06-10 NOTE — PROGRESS NOTES
Vascular Surgery Progress Note    No acute events overnight. Patient fell out of bed this AM trying to get to the bathroom by himself using a walker. Apparently hit his leg. No bleeding from the dressing. Denies any significantly increased pain in the leg. Right BKA dressing intact, no bleeding through. Will leave on today and remove tomorrow. Stressed that he needs to call before he gets out of bed. Would be at risk for above knee amputation if he splits open this amp.      Alexa Natarajan MD  06/10/23  2:10 PM

## 2023-06-10 NOTE — PROGRESS NOTES
Patient is a/o x4. SPO2>92% on room air. NSR on tele. Heparin. Carb control diet with QID accu checks. Voids. Urinal at bedside. PRN PCA pump bolus given (see MAR). IV abx. PT/OT eval today. All needs are met at this time. Updated on plan of care.

## 2023-06-10 NOTE — PLAN OF CARE
Patient is a/o x4. On room air. NSR on tele. Carb control diet. QID accu checks. Voids. Uses commode with 2 assist. Pain tx received post fall occurrence (see MAR). Unit draw from midline. IV antibiotics. Bed alarm on. Safety precautions in place. Updated on plan of care. All needs are met at this time. Problem: PAIN - ADULT  Goal: Verbalizes/displays adequate comfort level or patient's stated pain goal  Description: INTERVENTIONS:  - Encourage pt to monitor pain and request assistance  - Assess pain using appropriate pain scale  - Administer analgesics based on type and severity of pain and evaluate response  - Implement non-pharmacological measures as appropriate and evaluate response  - Consider cultural and social influences on pain and pain management  - Manage/alleviate anxiety  - Utilize distraction and/or relaxation techniques  - Monitor for opioid side effects  - Notify MD/LIP if interventions unsuccessful or patient reports new pain  - Anticipate increased pain with activity and pre-medicate as appropriate  Outcome: Progressing     Problem: RISK FOR INFECTION - ADULT  Goal: Absence of fever/infection during anticipated neutropenic period  Description: INTERVENTIONS  - Monitor WBC  - Administer growth factors as ordered  - Implement neutropenic guidelines  Outcome: Progressing     Problem: SAFETY ADULT - FALL  Goal: Free from fall injury  Description: INTERVENTIONS:  - Assess pt frequently for physical needs  - Identify cognitive and physical deficits and behaviors that affect risk of falls.   - [place] fall precautions as indicated by assessment.  - Educate pt/family on patient safety including physical limitations  - Instruct pt to call for assistance with activity based on assessment  - Modify environment to reduce risk of injury  - Provide assistive devices as appropriate  - Consider OT/PT consult to assist with strengthening/mobility  - Encourage toileting schedule  Outcome: Progressing     Problem: DISCHARGE PLANNING  Goal: Discharge to home or other facility with appropriate resources  Description: INTERVENTIONS:  - Identify barriers to discharge w/pt and caregiver  - Include patient/family/discharge partner in discharge planning  - Arrange for needed discharge resources and transportation as appropriate  - Identify discharge learning needs (meds, wound care, etc)  - Arrange for interpreters to assist at discharge as needed  - Consider post-discharge preferences of patient/family/discharge partner  - Complete POLST form as appropriate  - Assess patient's ability to be responsible for managing their own health  - Refer to Case Management Department for coordinating discharge planning if the patient needs post-hospital services based on physician/LIP order or complex needs related to functional status, cognitive ability or social support system  Outcome: Progressing     Problem: Patient/Family Goals  Goal: Patient/Family Long Term Goal  Description: Patient's Long Term Goal: discharge home with adequate resources     Interventions:  - meds per [facility]   - consults   - See additional Care Plan goals for specific interventions  Outcome: Progressing  Goal: Patient/Family Short Term Goal  Description: Patient's Short Term Goal:   6/3am: MRI today   6/4 AM: Manage pain  6/4 NOC: Manage pain, sleep well   6/5 NOC: manage pain  6/6 NOC: Manage pain   6/7 NOC: manage pain, sleep  6/8 NOC: manage pain, remain stable overnight  Interventions:   - meds per [facility]  - consults   - See additional Care Plan goals for specific interventions  Outcome: Progressing

## 2023-06-11 LAB
ANION GAP SERPL CALC-SCNC: 6 MMOL/L (ref 0–18)
BUN BLD-MCNC: 62 MG/DL (ref 7–18)
CALCIUM BLD-MCNC: 8.2 MG/DL (ref 8.5–10.1)
CHLORIDE SERPL-SCNC: 106 MMOL/L (ref 98–112)
CO2 SERPL-SCNC: 26 MMOL/L (ref 21–32)
CREAT BLD-MCNC: 4.37 MG/DL
GFR SERPLBLD BASED ON 1.73 SQ M-ARVRAT: 14 ML/MIN/1.73M2 (ref 60–?)
GLUCOSE BLD-MCNC: 108 MG/DL (ref 70–99)
GLUCOSE BLD-MCNC: 122 MG/DL (ref 70–99)
GLUCOSE BLD-MCNC: 157 MG/DL (ref 70–99)
GLUCOSE BLD-MCNC: 175 MG/DL (ref 70–99)
GLUCOSE BLD-MCNC: 55 MG/DL (ref 70–99)
GLUCOSE BLD-MCNC: 73 MG/DL (ref 70–99)
GLUCOSE BLD-MCNC: 95 MG/DL (ref 70–99)
OSMOLALITY SERPL CALC.SUM OF ELEC: 301 MOSM/KG (ref 275–295)
POTASSIUM SERPL-SCNC: 3.7 MMOL/L (ref 3.5–5.1)
SODIUM SERPL-SCNC: 138 MMOL/L (ref 136–145)

## 2023-06-11 PROCEDURE — 99232 SBSQ HOSP IP/OBS MODERATE 35: CPT | Performed by: INTERNAL MEDICINE

## 2023-06-11 PROCEDURE — 99232 SBSQ HOSP IP/OBS MODERATE 35: CPT | Performed by: HOSPITALIST

## 2023-06-11 RX ORDER — PANTOPRAZOLE SODIUM 40 MG/1
40 TABLET, DELAYED RELEASE ORAL
Status: DISCONTINUED | OUTPATIENT
Start: 2023-06-11 | End: 2023-06-20

## 2023-06-11 NOTE — PROGRESS NOTES
Vascular Surgery Progress Note    No acute events overnight. Pain is well controlled on current regimen. Tolerating a diet. Dressing removed today. Knee is very swollen but soft. Incision is c/d/i with staples and sutures in place. No hematoma or necrosis. Some blistering on the bottom of the stump and on the shin but skin is viable, stump is soft. Will discontinue PCA today. Ok for daily dry dressing with xeroform, 4x4 and stump . Patient cleared for discharge from surgical standpoint once rehab is arranged.  Will need to follow-up in three weeks for staple removal.     Ruddy Hill MD  06/11/23  9:56 AM

## 2023-06-11 NOTE — PROGRESS NOTES
06/11/23 0544   Provider Notification   Reason for Communication Critical value   Test/Procedure Name Blood Glucose  (55)   Provider Name Other (comment)  (Cody Hernandez)   Method of Communication Page   Response Per protocol;See orders   Finger stick was 68, MD requested to still follow hypoglycemic protocols. Pt is asymptomatic, see MAR for action. Addendum:    15 min BS retest was 95. Next actions are BS at [address] and [address].

## 2023-06-11 NOTE — PROGRESS NOTES
06/11/23 0551   Provider Notification   Reason for Communication Evaluate   Test/Procedure Name Electrolyte replacement   Provider Name Other (comment)  (Shereen Adams)   Method of Communication Page   Response Per protocol     Pt had a potassium of 3.7 and is on electrolyte protocol cardiac. Creatinine was 4.37. MD request Potassium replacement be on hold for now.

## 2023-06-11 NOTE — PROGRESS NOTES
Assumed care of pt at 0730. Chel x4, says he is generally feeling miserable, appears depressed and flat, with anxiety regarding care and mobility. RA. NSR on tele, heparin. PCA stopped today, pain meds per MAR. R BKA, dressing changed per vas surgery, stump  ordered thru [facility] today. Up with assist x2 with walker for pivot transfers, pt anxious with transfers after fall, PT/OT re-eval ordered for discharge planning. POC reviewed, call light within reach.

## 2023-06-11 NOTE — PLAN OF CARE
Pt is AOx4, VSS on RA, tele w/ NSR, receiving heparin, voids using urinal/commode up x2 w/ walker, QID accucheck, IV abx. Pt is on a PCA pump which allows him to receive 0.2 mg/10 min and 1.2 mg/hr. He can also get 3 boluses q4h of dilaudid from PCA if needed along with Estelle Current is first line before bolus. Pt has a midline and is a unit draw. Pt was hypoglycemic this morning, see other note for further detail. Call light within reach and pt/wife updated on POC. Problem: PAIN - ADULT  Goal: Verbalizes/displays adequate comfort level or patient's stated pain goal  Description: INTERVENTIONS:  - Encourage pt to monitor pain and request assistance  - Assess pain using appropriate pain scale  - Administer analgesics based on type and severity of pain and evaluate response  - Implement non-pharmacological measures as appropriate and evaluate response  - Consider cultural and social influences on pain and pain management  - Manage/alleviate anxiety  - Utilize distraction and/or relaxation techniques  - Monitor for opioid side effects  - Notify MD/LIP if interventions unsuccessful or patient reports new pain  - Anticipate increased pain with activity and pre-medicate as appropriate  Outcome: Progressing     Problem: RISK FOR INFECTION - ADULT  Goal: Absence of fever/infection during anticipated neutropenic period  Description: INTERVENTIONS  - Monitor WBC  - Administer growth factors as ordered  - Implement neutropenic guidelines  Outcome: Progressing     Problem: SAFETY ADULT - FALL  Goal: Free from fall injury  Description: INTERVENTIONS:  - Assess pt frequently for physical needs  - Identify cognitive and physical deficits and behaviors that affect risk of falls.   - [place] fall precautions as indicated by assessment.  - Educate pt/family on patient safety including physical limitations  - Instruct pt to call for assistance with activity based on assessment  - Modify environment to reduce risk of injury  - Provide assistive devices as appropriate  - Consider OT/PT consult to assist with strengthening/mobility  - Encourage toileting schedule  Outcome: Progressing     Problem: DISCHARGE PLANNING  Goal: Discharge to home or other facility with appropriate resources  Description: INTERVENTIONS:  - Identify barriers to discharge w/pt and caregiver  - Include patient/family/discharge partner in discharge planning  - Arrange for needed discharge resources and transportation as appropriate  - Identify discharge learning needs (meds, wound care, etc)  - Arrange for interpreters to assist at discharge as needed  - Consider post-discharge preferences of patient/family/discharge partner  - Complete POLST form as appropriate  - Assess patient's ability to be responsible for managing their own health  - Refer to Case Management Department for coordinating discharge planning if the patient needs post-hospital services based on physician/LIP order or complex needs related to functional status, cognitive ability or social support system  Outcome: Progressing     Problem: Patient/Family Goals  Goal: Patient/Family Long Term Goal  Description: Patient's Long Term Goal: discharge home with adequate resources     Interventions:  - meds per [facility]   - consults   - See additional Care Plan goals for specific interventions  Outcome: Progressing  Goal: Patient/Family Short Term Goal  Description: Patient's Short Term Goal:   6/3am: MRI today   6/4 AM: Manage pain  6/4 NOC: Manage pain, sleep well   6/5 NOC: manage pain  6/6 NOC: Manage pain   6/7 NOC: manage pain, sleep  6/8 NOC: manage pain, remain stable overnight  Interventions:   - meds per [facility]  - consults   - See additional Care Plan goals for specific interventions  Outcome: Progressing

## 2023-06-12 ENCOUNTER — APPOINTMENT (OUTPATIENT)
Dept: WOUND CARE | Facility: HOSPITAL | Age: 63
End: 2023-06-12
Attending: STUDENT IN AN ORGANIZED HEALTH CARE EDUCATION/TRAINING PROGRAM
Payer: COMMERCIAL

## 2023-06-12 LAB
ANION GAP SERPL CALC-SCNC: 9 MMOL/L (ref 0–18)
BUN BLD-MCNC: 56 MG/DL (ref 7–18)
CALCIUM BLD-MCNC: 8 MG/DL (ref 8.5–10.1)
CHLORIDE SERPL-SCNC: 107 MMOL/L (ref 98–112)
CO2 SERPL-SCNC: 24 MMOL/L (ref 21–32)
CREAT BLD-MCNC: 4.31 MG/DL
GFR SERPLBLD BASED ON 1.73 SQ M-ARVRAT: 15 ML/MIN/1.73M2 (ref 60–?)
GLUCOSE BLD-MCNC: 103 MG/DL (ref 70–99)
GLUCOSE BLD-MCNC: 107 MG/DL (ref 70–99)
GLUCOSE BLD-MCNC: 115 MG/DL (ref 70–99)
GLUCOSE BLD-MCNC: 153 MG/DL (ref 70–99)
GLUCOSE BLD-MCNC: 171 MG/DL (ref 70–99)
MAGNESIUM SERPL-MCNC: 2.3 MG/DL (ref 1.6–2.6)
OSMOLALITY SERPL CALC.SUM OF ELEC: 306 MOSM/KG (ref 275–295)
POTASSIUM SERPL-SCNC: 4.3 MMOL/L (ref 3.5–5.1)
SODIUM SERPL-SCNC: 140 MMOL/L (ref 136–145)

## 2023-06-12 PROCEDURE — 99232 SBSQ HOSP IP/OBS MODERATE 35: CPT | Performed by: HOSPITALIST

## 2023-06-12 PROCEDURE — 99233 SBSQ HOSP IP/OBS HIGH 50: CPT | Performed by: INTERNAL MEDICINE

## 2023-06-12 RX ORDER — GABAPENTIN 100 MG/1
100 CAPSULE ORAL 3 TIMES DAILY
Status: DISCONTINUED | OUTPATIENT
Start: 2023-06-12 | End: 2023-06-20

## 2023-06-12 RX ORDER — HYDROCODONE BITARTRATE AND ACETAMINOPHEN 10; 325 MG/1; MG/1
1 TABLET ORAL EVERY 6 HOURS PRN
Status: DISCONTINUED | OUTPATIENT
Start: 2023-06-12 | End: 2023-06-20

## 2023-06-12 RX ORDER — HYDROCODONE BITARTRATE AND ACETAMINOPHEN 10; 325 MG/1; MG/1
2 TABLET ORAL EVERY 6 HOURS PRN
Status: DISCONTINUED | OUTPATIENT
Start: 2023-06-12 | End: 2023-06-20

## 2023-06-12 NOTE — PLAN OF CARE
AO x 4, on room air satting> 90%, on tele NSR, on the cardiac electrolyte protocol, getting heparin, last BM was today, patient is briefed, on a carb controlled diet tolerating well, no complaints of pain this shift, right arm precautions in place for right midline, patient had dressing removed by vascular surgery today,  stump  order faxed to  today, along with placing the stump  on for the patient, getting IV Unasyn, call light within reach, bed in low locked position with alarm engaged, no further needs at this time.      Problem: Patient/Family Goals  Goal: Patient/Family Long Term Goal  Description: Patient's Long Term Goal: discharge home with adequate resources     Interventions:  - meds per [facility]   - consults   - See additional Care Plan goals for specific interventions  Outcome: Progressing  Goal: Patient/Family Short Term Goal  Description: Patient's Short Term Goal:   6/3am: MRI today   6/4 AM: Manage pain  6/4 NOC: Manage pain, sleep well   6/5 NOC: manage pain  6/6 NOC: Manage pain   6/7 NOC: manage pain, sleep  6/8 NOC: manage pain, remain stable overnight  6/12 manage pain, rest  Interventions:   - meds per [facility]  - consults   - See additional Care Plan goals for specific interventions  Outcome: Progressing     Problem: PAIN - ADULT  Goal: Verbalizes/displays adequate comfort level or patient's stated pain goal  Description: INTERVENTIONS:  - Encourage pt to monitor pain and request assistance  - Assess pain using appropriate pain scale  - Administer analgesics based on type and severity of pain and evaluate response  - Implement non-pharmacological measures as appropriate and evaluate response  - Consider cultural and social influences on pain and pain management  - Manage/alleviate anxiety  - Utilize distraction and/or relaxation techniques  - Monitor for opioid side effects  - Notify MD/LIP if interventions unsuccessful or patient reports new pain  - Anticipate increased pain with activity and pre-medicate as appropriate  Outcome: Progressing     Problem: RISK FOR INFECTION - ADULT  Goal: Absence of fever/infection during anticipated neutropenic period  Description: INTERVENTIONS  - Monitor WBC  - Administer growth factors as ordered  - Implement neutropenic guidelines  Outcome: Progressing     Problem: SAFETY ADULT - FALL  Goal: Free from fall injury  Description: INTERVENTIONS:  - Assess pt frequently for physical needs  - Identify cognitive and physical deficits and behaviors that affect risk of falls.   - [place] fall precautions as indicated by assessment.  - Educate pt/family on patient safety including physical limitations  - Instruct pt to call for assistance with activity based on assessment  - Modify environment to reduce risk of injury  - Provide assistive devices as appropriate  - Consider OT/PT consult to assist with strengthening/mobility  - Encourage toileting schedule  Outcome: Progressing     Problem: DISCHARGE PLANNING  Goal: Discharge to home or other facility with appropriate resources  Description: INTERVENTIONS:  - Identify barriers to discharge w/pt and caregiver  - Include patient/family/discharge partner in discharge planning  - Arrange for needed discharge resources and transportation as appropriate  - Identify discharge learning needs (meds, wound care, etc)  - Arrange for interpreters to assist at discharge as needed  - Consider post-discharge preferences of patient/family/discharge partner  - Complete POLST form as appropriate  - Assess patient's ability to be responsible for managing their own health  - Refer to Case Management Department for coordinating discharge planning if the patient needs post-hospital services based on physician/LIP order or complex needs related to functional status, cognitive ability or social support system  Outcome: Progressing

## 2023-06-12 NOTE — PLAN OF CARE
Received patient awake and oriented, sitting up in chair, with spouse at bedside. On room air, breath sounds clear. On tele, SR. Coverage given for elevated accucheck. Xeroform dressing placed in room for dressing changes. No requests for pain medication so far this shift.   [id number] patient c/o stump after using BSC, medicated with Norco.

## 2023-06-12 NOTE — PROGRESS NOTES
No complaints  Right bka clean and intact   Blisters on flap but flap is warm     Continue pain control

## 2023-06-13 LAB
ANION GAP SERPL CALC-SCNC: 9 MMOL/L (ref 0–18)
BUN BLD-MCNC: 55 MG/DL (ref 7–18)
CALCIUM BLD-MCNC: 8.1 MG/DL (ref 8.5–10.1)
CHLORIDE SERPL-SCNC: 109 MMOL/L (ref 98–112)
CO2 SERPL-SCNC: 22 MMOL/L (ref 21–32)
CREAT BLD-MCNC: 4.48 MG/DL
GFR SERPLBLD BASED ON 1.73 SQ M-ARVRAT: 14 ML/MIN/1.73M2 (ref 60–?)
GLUCOSE BLD-MCNC: 149 MG/DL (ref 70–99)
GLUCOSE BLD-MCNC: 170 MG/DL (ref 70–99)
GLUCOSE BLD-MCNC: 177 MG/DL (ref 70–99)
GLUCOSE BLD-MCNC: 182 MG/DL (ref 70–99)
GLUCOSE BLD-MCNC: 184 MG/DL (ref 70–99)
OSMOLALITY SERPL CALC.SUM OF ELEC: 309 MOSM/KG (ref 275–295)
PLATELET # BLD AUTO: 504 10(3)UL (ref 150–450)
POTASSIUM SERPL-SCNC: 4.3 MMOL/L (ref 3.5–5.1)
SODIUM SERPL-SCNC: 140 MMOL/L (ref 136–145)

## 2023-06-13 PROCEDURE — 99232 SBSQ HOSP IP/OBS MODERATE 35: CPT | Performed by: HOSPITALIST

## 2023-06-13 PROCEDURE — 99233 SBSQ HOSP IP/OBS HIGH 50: CPT | Performed by: INTERNAL MEDICINE

## 2023-06-13 NOTE — PLAN OF CARE
AO x 4, on room air satting >90%, on tele NSR, afebrile, getting heparin, electrolyte non cardiac protocol, last BM 6/13, on carb controlled renal diet with QID accuchek tolerating well, no complaints of pain during this shift, has right arm precaution with right midline, getting IV unasyn, up with 1-2 and walker, commode at bedside, call light within reach, bed in low locked position, patient resting in chair watching TV, no further needs at this time. Problem: PAIN - ADULT  Goal: Verbalizes/displays adequate comfort level or patient's stated pain goal  Description: INTERVENTIONS:  - Encourage pt to monitor pain and request assistance  - Assess pain using appropriate pain scale  - Administer analgesics based on type and severity of pain and evaluate response  - Implement non-pharmacological measures as appropriate and evaluate response  - Consider cultural and social influences on pain and pain management  - Manage/alleviate anxiety  - Utilize distraction and/or relaxation techniques  - Monitor for opioid side effects  - Notify MD/LIP if interventions unsuccessful or patient reports new pain  - Anticipate increased pain with activity and pre-medicate as appropriate  Outcome: Progressing     Problem: RISK FOR INFECTION - ADULT  Goal: Absence of fever/infection during anticipated neutropenic period  Description: INTERVENTIONS  - Monitor WBC  - Administer growth factors as ordered  - Implement neutropenic guidelines  Outcome: Progressing     Problem: SAFETY ADULT - FALL  Goal: Free from fall injury  Description: INTERVENTIONS:  - Assess pt frequently for physical needs  - Identify cognitive and physical deficits and behaviors that affect risk of falls.   - [place] fall precautions as indicated by assessment.  - Educate pt/family on patient safety including physical limitations  - Instruct pt to call for assistance with activity based on assessment  - Modify environment to reduce risk of injury  - Provide assistive devices as appropriate  - Consider OT/PT consult to assist with strengthening/mobility  - Encourage toileting schedule  Outcome: Progressing     Problem: DISCHARGE PLANNING  Goal: Discharge to home or other facility with appropriate resources  Description: INTERVENTIONS:  - Identify barriers to discharge w/pt and caregiver  - Include patient/family/discharge partner in discharge planning  - Arrange for needed discharge resources and transportation as appropriate  - Identify discharge learning needs (meds, wound care, etc)  - Arrange for interpreters to assist at discharge as needed  - Consider post-discharge preferences of patient/family/discharge partner  - Complete POLST form as appropriate  - Assess patient's ability to be responsible for managing their own health  - Refer to Case Management Department for coordinating discharge planning if the patient needs post-hospital services based on physician/LIP order or complex needs related to functional status, cognitive ability or social support system  Outcome: Progressing     Problem: Patient/Family Goals  Goal: Patient/Family Long Term Goal  Description: Patient's Long Term Goal: discharge home with adequate resources     Interventions:  - meds per [facility]   - consults   - See additional Care Plan goals for specific interventions  Outcome: Progressing  Goal: Patient/Family Short Term Goal  Description: Patient's Short Term Goal:   6/3am: MRI today   6/4 AM: Manage pain  6/4 NOC: Manage pain, sleep well   6/5 NOC: manage pain  6/6 NOC: Manage pain   6/7 NOC: manage pain, sleep  6/8 NOC: manage pain, remain stable overnight  6/12 NOC: sleep\  6/13 to go home, get rest    Interventions:   - meds per [facility]  - consults   - See additional Care Plan goals for specific interventions  Outcome: Progressing

## 2023-06-13 NOTE — PLAN OF CARE
Pt is A&Ox4. Wears glasses. VSS, afebrile. Maintaining O2 sats WDL on RA. Tele, NSR. ASA. Heparin. DW. Strict I&Os. Last BM 6/13. Carb controlled/renal diet, QID accuchek. Incontinent, briefed. Up x2 w/ walker. S/p R BKA. C/o surgical site pain- see mar. PIV, SL. RLE incision wound, c/d/i. No further needs at this time, continue POC. Safety precautions in place. Problem: PAIN - ADULT  Goal: Verbalizes/displays adequate comfort level or patient's stated pain goal  Description: INTERVENTIONS:  - Encourage pt to monitor pain and request assistance  - Assess pain using appropriate pain scale  - Administer analgesics based on type and severity of pain and evaluate response  - Implement non-pharmacological measures as appropriate and evaluate response  - Consider cultural and social influences on pain and pain management  - Manage/alleviate anxiety  - Utilize distraction and/or relaxation techniques  - Monitor for opioid side effects  - Notify MD/LIP if interventions unsuccessful or patient reports new pain  - Anticipate increased pain with activity and pre-medicate as appropriate  Outcome: Progressing     Problem: RISK FOR INFECTION - ADULT  Goal: Absence of fever/infection during anticipated neutropenic period  Description: INTERVENTIONS  - Monitor WBC  - Administer growth factors as ordered  - Implement neutropenic guidelines  Outcome: Progressing     Problem: SAFETY ADULT - FALL  Goal: Free from fall injury  Description: INTERVENTIONS:  - Assess pt frequently for physical needs  - Identify cognitive and physical deficits and behaviors that affect risk of falls.   - [place] fall precautions as indicated by assessment.  - Educate pt/family on patient safety including physical limitations  - Instruct pt to call for assistance with activity based on assessment  - Modify environment to reduce risk of injury  - Provide assistive devices as appropriate  - Consider OT/PT consult to assist with strengthening/mobility  - Encourage toileting schedule  Outcome: Progressing     Problem: DISCHARGE PLANNING  Goal: Discharge to home or other facility with appropriate resources  Description: INTERVENTIONS:  - Identify barriers to discharge w/pt and caregiver  - Include patient/family/discharge partner in discharge planning  - Arrange for needed discharge resources and transportation as appropriate  - Identify discharge learning needs (meds, wound care, etc)  - Arrange for interpreters to assist at discharge as needed  - Consider post-discharge preferences of patient/family/discharge partner  - Complete POLST form as appropriate  - Assess patient's ability to be responsible for managing their own health  - Refer to Case Management Department for coordinating discharge planning if the patient needs post-hospital services based on physician/LIP order or complex needs related to functional status, cognitive ability or social support system  Outcome: Progressing     Problem: Patient/Family Goals  Goal: Patient/Family Long Term Goal  Description: Patient's Long Term Goal: discharge home with adequate resources     Interventions:  - meds per [facility]   - consults   - See additional Care Plan goals for specific interventions  Outcome: Progressing  Goal: Patient/Family Short Term Goal  Description: Patient's Short Term Goal:   6/3am: MRI today   6/4 AM: Manage pain  6/4 NOC: Manage pain, sleep well   6/5 NOC: manage pain  6/6 NOC: Manage pain   6/7 NOC: manage pain, sleep  6/8 NOC: manage pain, remain stable overnight  6/12 NOC: sleep    Interventions:   - meds per [facility]  - consults   - See additional Care Plan goals for specific interventions  Outcome: Progressing

## 2023-06-13 NOTE — CM/SW NOTE
PMR in agreement with acute rehab recommendation. SW met with pt at bedside and provided AR choice list. Pt stated he will review AR facility options with his wife. Provided SW contact information and encouraged pt to reach out once preferred facility is selected since insurance authorization is needed. SW left VM for pt's wife. SW will f/u with pt regarding AR choice. Addendum: ALEXA received call from pt's wife stating [facility] in [address]. ALEXA messaged [facility] liaison [employer] to [employer] authorization. ALEXA will continue to follow.      JULIUS Cortes  Discharge Planner

## 2023-06-14 LAB
GLUCOSE BLD-MCNC: 131 MG/DL (ref 70–99)
GLUCOSE BLD-MCNC: 150 MG/DL (ref 70–99)
GLUCOSE BLD-MCNC: 152 MG/DL (ref 70–99)
GLUCOSE BLD-MCNC: 204 MG/DL (ref 70–99)

## 2023-06-14 PROCEDURE — 99233 SBSQ HOSP IP/OBS HIGH 50: CPT | Performed by: INTERNAL MEDICINE

## 2023-06-14 PROCEDURE — 99232 SBSQ HOSP IP/OBS MODERATE 35: CPT | Performed by: STUDENT IN AN ORGANIZED HEALTH CARE EDUCATION/TRAINING PROGRAM

## 2023-06-14 RX ORDER — HYDROCODONE BITARTRATE AND ACETAMINOPHEN 5; 325 MG/1; MG/1
1 TABLET ORAL EVERY 8 HOURS PRN
Qty: 12 TABLET | Refills: 0 | Status: SHIPPED | OUTPATIENT
Start: 2023-06-14

## 2023-06-14 NOTE — PROGRESS NOTES
No complaints  Stump  on     Follow up with Vascular office in [facility]   Vascular surgery will sign off

## 2023-06-14 NOTE — PLAN OF CARE
Alert x4. Room air. NSR on tele. Accuchecks QID- insulin coverage provided. Voids per bedside commode. BM today, refused scheduled colace. Up 1-2 assist w/ walker. Updated pt and wife at bedside on POC, verbalized understanding. No questions at this time. Call light within reach. Problem: PAIN - ADULT  Goal: Verbalizes/displays adequate comfort level or patient's stated pain goal  Description: INTERVENTIONS:  - Encourage pt to monitor pain and request assistance  - Assess pain using appropriate pain scale  - Administer analgesics based on type and severity of pain and evaluate response  - Implement non-pharmacological measures as appropriate and evaluate response  - Consider cultural and social influences on pain and pain management  - Manage/alleviate anxiety  - Utilize distraction and/or relaxation techniques  - Monitor for opioid side effects  - Notify MD/LIP if interventions unsuccessful or patient reports new pain  - Anticipate increased pain with activity and pre-medicate as appropriate  Outcome: Progressing     Problem: RISK FOR INFECTION - ADULT  Goal: Absence of fever/infection during anticipated neutropenic period  Description: INTERVENTIONS  - Monitor WBC  - Administer growth factors as ordered  - Implement neutropenic guidelines  Outcome: Progressing     Problem: SAFETY ADULT - FALL  Goal: Free from fall injury  Description: INTERVENTIONS:  - Assess pt frequently for physical needs  - Identify cognitive and physical deficits and behaviors that affect risk of falls.   - [place] fall precautions as indicated by assessment.  - Educate pt/family on patient safety including physical limitations  - Instruct pt to call for assistance with activity based on assessment  - Modify environment to reduce risk of injury  - Provide assistive devices as appropriate  - Consider OT/PT consult to assist with strengthening/mobility  - Encourage toileting schedule  Outcome: Progressing     Problem: DISCHARGE PLANNING  Goal: Discharge to home or other facility with appropriate resources  Description: INTERVENTIONS:  - Identify barriers to discharge w/pt and caregiver  - Include patient/family/discharge partner in discharge planning  - Arrange for needed discharge resources and transportation as appropriate  - Identify discharge learning needs (meds, wound care, etc)  - Arrange for interpreters to assist at discharge as needed  - Consider post-discharge preferences of patient/family/discharge partner  - Complete POLST form as appropriate  - Assess patient's ability to be responsible for managing their own health  - Refer to Case Management Department for coordinating discharge planning if the patient needs post-hospital services based on physician/LIP order or complex needs related to functional status, cognitive ability or social support system  Outcome: Progressing     Problem: Patient/Family Goals  Goal: Patient/Family Long Term Goal  Description: Patient's Long Term Goal: discharge home with adequate resources     Interventions:  - meds per [facility]   - consults   - See additional Care Plan goals for specific interventions  Outcome: Progressing  Goal: Patient/Family Short Term Goal  Description: Patient's Short Term Goal:   6/3am: MRI today   6/4 AM: Manage pain  6/4 NOC: Manage pain, sleep well   6/5 NOC: manage pain  6/6 NOC: Manage pain   6/7 NOC: manage pain, sleep  6/8 NOC: manage pain, remain stable overnight  6/12 NOC: sleep    Interventions:   - meds per [facility]  - consults   - See additional Care Plan goals for specific interventions  Outcome: Progressing

## 2023-06-14 NOTE — PLAN OF CARE
Patient is a/o x4. On room air. Discharge orders received. Awaiting insurance authorization. Carb control diet. QID accu checks. No complaints of pain. All needs met at this time. Safety precautions in place. Problem: PAIN - ADULT  Goal: Verbalizes/displays adequate comfort level or patient's stated pain goal  Description: INTERVENTIONS:  - Encourage pt to monitor pain and request assistance  - Assess pain using appropriate pain scale  - Administer analgesics based on type and severity of pain and evaluate response  - Implement non-pharmacological measures as appropriate and evaluate response  - Consider cultural and social influences on pain and pain management  - Manage/alleviate anxiety  - Utilize distraction and/or relaxation techniques  - Monitor for opioid side effects  - Notify MD/LIP if interventions unsuccessful or patient reports new pain  - Anticipate increased pain with activity and pre-medicate as appropriate  Outcome: Progressing     Problem: RISK FOR INFECTION - ADULT  Goal: Absence of fever/infection during anticipated neutropenic period  Description: INTERVENTIONS  - Monitor WBC  - Administer growth factors as ordered  - Implement neutropenic guidelines  Outcome: Progressing     Problem: SAFETY ADULT - FALL  Goal: Free from fall injury  Description: INTERVENTIONS:  - Assess pt frequently for physical needs  - Identify cognitive and physical deficits and behaviors that affect risk of falls.   - [place] fall precautions as indicated by assessment.  - Educate pt/family on patient safety including physical limitations  - Instruct pt to call for assistance with activity based on assessment  - Modify environment to reduce risk of injury  - Provide assistive devices as appropriate  - Consider OT/PT consult to assist with strengthening/mobility  - Encourage toileting schedule  Outcome: Progressing     Problem: DISCHARGE PLANNING  Goal: Discharge to home or other facility with appropriate resources  Description: INTERVENTIONS:  - Identify barriers to discharge w/pt and caregiver  - Include patient/family/discharge partner in discharge planning  - Arrange for needed discharge resources and transportation as appropriate  - Identify discharge learning needs (meds, wound care, etc)  - Arrange for interpreters to assist at discharge as needed  - Consider post-discharge preferences of patient/family/discharge partner  - Complete POLST form as appropriate  - Assess patient's ability to be responsible for managing their own health  - Refer to Case Management Department for coordinating discharge planning if the patient needs post-hospital services based on physician/LIP order or complex needs related to functional status, cognitive ability or social support system  Outcome: Progressing     Problem: Patient/Family Goals  Goal: Patient/Family Long Term Goal  Description: Patient's Long Term Goal: discharge home with adequate resources     Interventions:  - meds per [facility]   - consults   - See additional Care Plan goals for specific interventions  Outcome: Progressing  Goal: Patient/Family Short Term Goal  Description: Patient's Short Term Goal:   6/3am: MRI today   6/4 AM: Manage pain  6/4 NOC: Manage pain, sleep well   6/5 NOC: manage pain  6/6 NOC: Manage pain   6/7 NOC: manage pain, sleep  6/8 NOC: manage pain, remain stable overnight  6/12 NOC: sleep    Interventions:   - meds per [facility]  - consults   - See additional Care Plan goals for specific interventions  Outcome: Progressing

## 2023-06-14 NOTE — DISCHARGE PLANNING
Patient chart reviewed for discharge: Medication Reconciliation completed, Specialist/PCP follow up listed, and disease specific Instructions/Education included in After Visit Summary. Discharge RN notified patient's RN of AVS completion and verified all consultants have signed off. Talked with patient concerning if there are any additional needs at discharge. If there were needs present consulted and/or discussed with the SW. Patient's RN to notify DC RN if discharge status changes.

## 2023-06-14 NOTE — CM/SW NOTE
Noted DC order placed. ALEXA spoke with [address] [place] [address] who stated insurance authorization request was submitted this morning and is pending. ALEXA will continue to follow.      JULIUS Pereira  Discharge Planner

## 2023-06-15 ENCOUNTER — TELEPHONE (OUTPATIENT)
Dept: INTERNAL MEDICINE CLINIC | Facility: CLINIC | Age: 63
End: 2023-06-15

## 2023-06-15 LAB
GLUCOSE BLD-MCNC: 100 MG/DL (ref 70–99)
GLUCOSE BLD-MCNC: 156 MG/DL (ref 70–99)
GLUCOSE BLD-MCNC: 156 MG/DL (ref 70–99)
GLUCOSE BLD-MCNC: 200 MG/DL (ref 70–99)

## 2023-06-15 PROCEDURE — 99233 SBSQ HOSP IP/OBS HIGH 50: CPT | Performed by: INTERNAL MEDICINE

## 2023-06-15 PROCEDURE — 99231 SBSQ HOSP IP/OBS SF/LOW 25: CPT | Performed by: STUDENT IN AN ORGANIZED HEALTH CARE EDUCATION/TRAINING PROGRAM

## 2023-06-15 NOTE — PLAN OF CARE
Alert x4. Room air. NSR on tele. Accuchecks QID- insulin coverage provided. Voids per bedside commode. BM today, refused scheduled colace. R BKA with stump  on. Up 1 assist w/ walker. Updated pt and wife at bedside on POC, verbalized understanding. No questions at this time. All needs met. Call light within reach. Problem: PAIN - ADULT  Goal: Verbalizes/displays adequate comfort level or patient's stated pain goal  Description: INTERVENTIONS:  - Encourage pt to monitor pain and request assistance  - Assess pain using appropriate pain scale  - Administer analgesics based on type and severity of pain and evaluate response  - Implement non-pharmacological measures as appropriate and evaluate response  - Consider cultural and social influences on pain and pain management  - Manage/alleviate anxiety  - Utilize distraction and/or relaxation techniques  - Monitor for opioid side effects  - Notify MD/LIP if interventions unsuccessful or patient reports new pain  - Anticipate increased pain with activity and pre-medicate as appropriate  Outcome: Progressing     Problem: RISK FOR INFECTION - ADULT  Goal: Absence of fever/infection during anticipated neutropenic period  Description: INTERVENTIONS  - Monitor WBC  - Administer growth factors as ordered  - Implement neutropenic guidelines  Outcome: Progressing     Problem: SAFETY ADULT - FALL  Goal: Free from fall injury  Description: INTERVENTIONS:  - Assess pt frequently for physical needs  - Identify cognitive and physical deficits and behaviors that affect risk of falls.   - [place] fall precautions as indicated by assessment.  - Educate pt/family on patient safety including physical limitations  - Instruct pt to call for assistance with activity based on assessment  - Modify environment to reduce risk of injury  - Provide assistive devices as appropriate  - Consider OT/PT consult to assist with strengthening/mobility  - Encourage toileting schedule  Outcome: Progressing     Problem: DISCHARGE PLANNING  Goal: Discharge to home or other facility with appropriate resources  Description: INTERVENTIONS:  - Identify barriers to discharge w/pt and caregiver  - Include patient/family/discharge partner in discharge planning  - Arrange for needed discharge resources and transportation as appropriate  - Identify discharge learning needs (meds, wound care, etc)  - Arrange for interpreters to assist at discharge as needed  - Consider post-discharge preferences of patient/family/discharge partner  - Complete POLST form as appropriate  - Assess patient's ability to be responsible for managing their own health  - Refer to Case Management Department for coordinating discharge planning if the patient needs post-hospital services based on physician/LIP order or complex needs related to functional status, cognitive ability or social support system  Outcome: Progressing     Problem: Patient/Family Goals  Goal: Patient/Family Long Term Goal  Description: Patient's Long Term Goal: discharge home with adequate resources     Interventions:  - meds per [facility]   - consults   - See additional Care Plan goals for specific interventions  Outcome: Progressing  Goal: Patient/Family Short Term Goal  Description: Patient's Short Term Goal:   6/3am: MRI today   6/4 AM: Manage pain  6/4 NOC: Manage pain, sleep well   6/5 NOC: manage pain  6/6 NOC: Manage pain   6/7 NOC: manage pain, sleep  6/8 NOC: manage pain, remain stable overnight  6/12 NOC: sleep    Interventions:   - meds per [facility]  - consults   - See additional Care Plan goals for specific interventions  Outcome: Progressing

## 2023-06-15 NOTE — PLAN OF CARE
Problem: PAIN - ADULT  Goal: Verbalizes/displays adequate comfort level or patient's stated pain goal  Description: INTERVENTIONS:  - Encourage pt to monitor pain and request assistance  - Assess pain using appropriate pain scale  - Administer analgesics based on type and severity of pain and evaluate response  - Implement non-pharmacological measures as appropriate and evaluate response  - Consider cultural and social influences on pain and pain management  - Manage/alleviate anxiety  - Utilize distraction and/or relaxation techniques  - Monitor for opioid side effects  - Notify MD/LIP if interventions unsuccessful or patient reports new pain  - Anticipate increased pain with activity and pre-medicate as appropriate  Outcome: Progressing     Problem: RISK FOR INFECTION - ADULT  Goal: Absence of fever/infection during anticipated neutropenic period  Description: INTERVENTIONS  - Monitor WBC  - Administer growth factors as ordered  - Implement neutropenic guidelines  Outcome: Progressing     Problem: SAFETY ADULT - FALL  Goal: Free from fall injury  Description: INTERVENTIONS:  - Assess pt frequently for physical needs  - Identify cognitive and physical deficits and behaviors that affect risk of falls.   - [place] fall precautions as indicated by assessment.  - Educate pt/family on patient safety including physical limitations  - Instruct pt to call for assistance with activity based on assessment  - Modify environment to reduce risk of injury  - Provide assistive devices as appropriate  - Consider OT/PT consult to assist with strengthening/mobility  - Encourage toileting schedule  Outcome: Progressing     Problem: DISCHARGE PLANNING  Goal: Discharge to home or other facility with appropriate resources  Description: INTERVENTIONS:  - Identify barriers to discharge w/pt and caregiver  - Include patient/family/discharge partner in discharge planning  - Arrange for needed discharge resources and transportation as appropriate  - Identify discharge learning needs (meds, wound care, etc)  - Arrange for interpreters to assist at discharge as needed  - Consider post-discharge preferences of patient/family/discharge partner  - Complete POLST form as appropriate  - Assess patient's ability to be responsible for managing their own health  - Refer to Case Management Department for coordinating discharge planning if the patient needs post-hospital services based on physician/LIP order or complex needs related to functional status, cognitive ability or social support system  Outcome: Progressing     Problem: Patient/Family Goals  Goal: Patient/Family Long Term Goal  Description: Patient's Long Term Goal: discharge home with adequate resources     Interventions:  - meds per [facility]   - consults   - See additional Care Plan goals for specific interventions  Outcome: Progressing  Goal: Patient/Family Short Term Goal  Description: Patient's Short Term Goal:   6/3am: MRI today   6/4 AM: Manage pain  6/4 NOC: Manage pain, sleep well   6/5 NOC: manage pain  6/6 NOC: Manage pain   6/7 NOC: manage pain, sleep  6/8 NOC: manage pain, remain stable overnight  6/12 NOC: sleep    Interventions:   - meds per [facility]  - consults   - See additional Care Plan goals for specific interventions  Outcome: Progressing

## 2023-06-15 NOTE — CM/SW NOTE
SW spoke with [address] [place] [address] regarding auth status, insurance authorization is still pending at this time. Updated RN and pt. SW will continue to follow.      JULIUS Hope  Discharge Planner

## 2023-06-16 LAB
ANION GAP SERPL CALC-SCNC: 9 MMOL/L (ref 0–18)
ANTIBODY SCREEN: NEGATIVE
BASOPHILS # BLD AUTO: 0.04 X10(3) UL (ref 0–0.2)
BASOPHILS NFR BLD AUTO: 0.4 %
BUN BLD-MCNC: 66 MG/DL (ref 7–18)
CALCIUM BLD-MCNC: 8.4 MG/DL (ref 8.5–10.1)
CHLORIDE SERPL-SCNC: 111 MMOL/L (ref 98–112)
CO2 SERPL-SCNC: 23 MMOL/L (ref 21–32)
CREAT BLD-MCNC: 4.18 MG/DL
EOSINOPHIL # BLD AUTO: 0.55 X10(3) UL (ref 0–0.7)
EOSINOPHIL NFR BLD AUTO: 5.5 %
ERYTHROCYTE [DISTWIDTH] IN BLOOD BY AUTOMATED COUNT: 14.1 %
GFR SERPLBLD BASED ON 1.73 SQ M-ARVRAT: 15 ML/MIN/1.73M2 (ref 60–?)
GLUCOSE BLD-MCNC: 151 MG/DL (ref 70–99)
GLUCOSE BLD-MCNC: 159 MG/DL (ref 70–99)
GLUCOSE BLD-MCNC: 166 MG/DL (ref 70–99)
GLUCOSE BLD-MCNC: 197 MG/DL (ref 70–99)
GLUCOSE BLD-MCNC: 89 MG/DL (ref 70–99)
HCT VFR BLD AUTO: 25 %
HGB BLD-MCNC: 6.1 G/DL
HGB BLD-MCNC: 7.5 G/DL
IMM GRANULOCYTES # BLD AUTO: 0.03 X10(3) UL (ref 0–1)
IMM GRANULOCYTES NFR BLD: 0.3 %
LYMPHOCYTES # BLD AUTO: 1.44 X10(3) UL (ref 1–4)
LYMPHOCYTES NFR BLD AUTO: 14.4 %
MCH RBC QN AUTO: 26.9 PG (ref 26–34)
MCHC RBC AUTO-ENTMCNC: 30 G/DL (ref 31–37)
MCV RBC AUTO: 89.6 FL
MONOCYTES # BLD AUTO: 0.39 X10(3) UL (ref 0.1–1)
MONOCYTES NFR BLD AUTO: 3.9 %
NEUTROPHILS # BLD AUTO: 7.55 X10 (3) UL (ref 1.5–7.7)
NEUTROPHILS # BLD AUTO: 7.55 X10(3) UL (ref 1.5–7.7)
NEUTROPHILS NFR BLD AUTO: 75.5 %
OSMOLALITY SERPL CALC.SUM OF ELEC: 318 MOSM/KG (ref 275–295)
PLATELET # BLD AUTO: 457 10(3)UL (ref 150–450)
POTASSIUM SERPL-SCNC: 5.4 MMOL/L (ref 3.5–5.1)
RBC # BLD AUTO: 2.79 X10(6)UL
RH BLOOD TYPE: POSITIVE
SODIUM SERPL-SCNC: 143 MMOL/L (ref 136–145)
TSI SER-ACNC: 2.17 MIU/ML (ref 0.36–3.74)
WBC # BLD AUTO: 10 X10(3) UL (ref 4–11)

## 2023-06-16 RX ORDER — SODIUM CHLORIDE 9 MG/ML
INJECTION, SOLUTION INTRAVENOUS ONCE
Status: COMPLETED | OUTPATIENT
Start: 2023-06-16 | End: 2023-06-16

## 2023-06-16 NOTE — CM/SW NOTE
ALEXA spoke with [facility] who stated insurance authorization is still pending. ALEXA inquired if [facility]J can reach out to pt's insurance regarding status as pt is medically cleared to discharge. [facility] liaison stated the team will reach out to pt's insurance and try to escalate pt's auth request. Brianna Nixon will continue to follow. Addendum: ALEXA spoke with [address] [place] [address] who stated [employer] informed MJ insurance Antonella Public is 'pending but is currently being reviewed, they will expedite it to the nurses.'    ALEXA received call from pt's wife regarding discharge plan. Pt's wife stated pt informed her he will be discharged home today. ALEXA clarified pt is medically cleared to discharge, but insurance [facility] is still pending and pt is not discharging today unless pt decided to discharge home. Pt's wife raised concerns regarding pt's current mobility status and does not feel pt will be able to manage at home. Pt's wife stated she is employed and cannot care for pt during the day, and feels a caregiver would not be enough assistance. Pt's wife stated pt can be impatient, but she does not feel comfortable with pt discharging home with current needs. ALEXA informed pt's wife [facility] staff cannot force pt to remain in hospital, pt's wife verbalized understanding. ALEXA informed pt's wife Antonella Public request has been expedited, but determination is still pending. ALEXA spoke with pt who stated he has called [facility] to determine 'what the hold up is' and was informed by [facility] to have the submitting physician or medical director at [facility] call [phone] regarding his authorization request. ALEXA informed pt [facility] has expedited the request, pt requested information be passed along to [address] [place] [address] provided above information to [facility] liaison. Pt has [facility] arranged and wheelchair delivered from [address] [place] [address]. Referral sent to [facility] for ABX as backup plan if pt discharges home. Insurance authorization for [facility] is still pending. ALEXA will continue to follow.     Debbie Moran LSW  Discharge Planner

## 2023-06-16 NOTE — PLAN OF CARE
Alert x4. Room air. NSR on tele. Accuchecks QID- insulin coverage provided. Voids per bedside commode. BM today, refused scheduled colace. Up 1 assist w/ walker. 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 Updated pt and wife at bedside on POC, verbalized understanding. No questions at this time. Labs scheduled for AM. Otherwise awaiting insurance authorization. Call light within reach. Problem: PAIN - ADULT  Goal: Verbalizes/displays adequate comfort level or patient's stated pain goal  Description: INTERVENTIONS:  - Encourage pt to monitor pain and request assistance  - Assess pain using appropriate pain scale  - Administer analgesics based on type and severity of pain and evaluate response  - Implement non-pharmacological measures as appropriate and evaluate response  - Consider cultural and social influences on pain and pain management  - Manage/alleviate anxiety  - Utilize distraction and/or relaxation techniques  - Monitor for opioid side effects  - Notify MD/LIP if interventions unsuccessful or patient reports new pain  - Anticipate increased pain with activity and pre-medicate as appropriate  Outcome: Progressing     Problem: RISK FOR INFECTION - ADULT  Goal: Absence of fever/infection during anticipated neutropenic period  Description: INTERVENTIONS  - Monitor WBC  - Administer growth factors as ordered  - Implement neutropenic guidelines  Outcome: Progressing     Problem: SAFETY ADULT - FALL  Goal: Free from fall injury  Description: INTERVENTIONS:  - Assess pt frequently for physical needs  - Identify cognitive and physical deficits and behaviors that affect risk of falls.   - [place] fall precautions as indicated by assessment.  - Educate pt/family on patient safety including physical limitations  - Instruct pt to call for assistance with activity based on assessment  - Modify environment to reduce risk of injury  - Provide assistive devices as appropriate  - Consider OT/PT consult to assist with strengthening/mobility  - Encourage toileting schedule  Outcome: Progressing     Problem: DISCHARGE PLANNING  Goal: Discharge to home or other facility with appropriate resources  Description: INTERVENTIONS:  - Identify barriers to discharge w/pt and caregiver  - Include patient/family/discharge partner in discharge planning  - Arrange for needed discharge resources and transportation as appropriate  - Identify discharge learning needs (meds, wound care, etc)  - Arrange for interpreters to assist at discharge as needed  - Consider post-discharge preferences of patient/family/discharge partner  - Complete POLST form as appropriate  - Assess patient's ability to be responsible for managing their own health  - Refer to Case Management Department for coordinating discharge planning if the patient needs post-hospital services based on physician/LIP order or complex needs related to functional status, cognitive ability or social support system  Outcome: Progressing     Problem: Patient/Family Goals  Goal: Patient/Family Long Term Goal  Description: Patient's Long Term Goal: discharge home with adequate resources     Interventions:  - meds per [facility]   - consults   - See additional Care Plan goals for specific interventions  Outcome: Progressing  Goal: Patient/Family Short Term Goal  Description: Patient's Short Term Goal:   6/3am: MRI today   6/4 AM: Manage pain  6/4 NOC: Manage pain, sleep well   6/5 NOC: manage pain  6/6 NOC: Manage pain   6/7 NOC: manage pain, sleep  6/8 NOC: manage pain, remain stable overnight  6/12 NOC: sleep    Interventions:   - meds per [facility]  - consults   - See additional Care Plan goals for specific interventions  Outcome: Progressing

## 2023-06-17 LAB
BLOOD TYPE BARCODE: 7300
GLUCOSE BLD-MCNC: 117 MG/DL (ref 70–99)
GLUCOSE BLD-MCNC: 140 MG/DL (ref 70–99)
GLUCOSE BLD-MCNC: 182 MG/DL (ref 70–99)
GLUCOSE BLD-MCNC: 237 MG/DL (ref 70–99)
UNIT VOLUME: 350 ML

## 2023-06-17 PROCEDURE — 99231 SBSQ HOSP IP/OBS SF/LOW 25: CPT | Performed by: STUDENT IN AN ORGANIZED HEALTH CARE EDUCATION/TRAINING PROGRAM

## 2023-06-17 NOTE — PLAN OF CARE
Problem: PAIN - ADULT  Goal: Verbalizes/displays adequate comfort level or patient's stated pain goal  Description: INTERVENTIONS:  - Encourage pt to monitor pain and request assistance  - Assess pain using appropriate pain scale  - Administer analgesics based on type and severity of pain and evaluate response  - Implement non-pharmacological measures as appropriate and evaluate response  - Consider cultural and social influences on pain and pain management  - Manage/alleviate anxiety  - Utilize distraction and/or relaxation techniques  - Monitor for opioid side effects  - Notify MD/LIP if interventions unsuccessful or patient reports new pain  - Anticipate increased pain with activity and pre-medicate as appropriate  Outcome: Progressing     Problem: RISK FOR INFECTION - ADULT  Goal: Absence of fever/infection during anticipated neutropenic period  Description: INTERVENTIONS  - Monitor WBC  - Administer growth factors as ordered  - Implement neutropenic guidelines  Outcome: Progressing     Problem: SAFETY ADULT - FALL  Goal: Free from fall injury  Description: INTERVENTIONS:  - Assess pt frequently for physical needs  - Identify cognitive and physical deficits and behaviors that affect risk of falls.   - [place] fall precautions as indicated by assessment.  - Educate pt/family on patient safety including physical limitations  - Instruct pt to call for assistance with activity based on assessment  - Modify environment to reduce risk of injury  - Provide assistive devices as appropriate  - Consider OT/PT consult to assist with strengthening/mobility  - Encourage toileting schedule  Outcome: Progressing

## 2023-06-17 NOTE — PROGRESS NOTES
AO x4. RA. Tele - NSR. Heparin subcutanous. BSC. Urinal. R BKA. No reports of pain. Melatonin given as requested. Up x1 with walker. Unasyn. QID accuchecks. Carb controlled diet. Pt and wife updated on POC. No furthern needs at this moment.

## 2023-06-17 NOTE — CM/SW NOTE
ALEXA reached out to Anthony Clements to confirm if insurance authorization has been approved. ALEXA is awaiting response. ALEXA will continue to follow for plan of care changes and remain available for any additional DC needs or concerns.      Chapis Valadez MSW, LSW  Discharge Planner   [phone]

## 2023-06-18 LAB
GLUCOSE BLD-MCNC: 123 MG/DL (ref 70–99)
GLUCOSE BLD-MCNC: 141 MG/DL (ref 70–99)
GLUCOSE BLD-MCNC: 143 MG/DL (ref 70–99)
GLUCOSE BLD-MCNC: 183 MG/DL (ref 70–99)

## 2023-06-18 NOTE — CM/SW NOTE
Noted message in [phone] from [facility] liaison from yesterday that Leo Bonilla is still pending. Sent message this am requesting update. / to remain available for support and/or discharge planning.      Amita Gorman MBA MSN, RN CTL/  [id number]

## 2023-06-18 NOTE — PROGRESS NOTES
AO x4. RA. No tele - Q8 vitals. Heparin subcutanous. BSC. Urinal. R BKA. No reports of pain. Melatonin given as requested. Up x1 with walker. Unasyn. QID accuchecks. Carb controlled diet. Pt and wife updated on POC. No further needs at this moment.

## 2023-06-19 ENCOUNTER — APPOINTMENT (OUTPATIENT)
Dept: WOUND CARE | Facility: HOSPITAL | Age: 63
End: 2023-06-19
Attending: STUDENT IN AN ORGANIZED HEALTH CARE EDUCATION/TRAINING PROGRAM
Payer: COMMERCIAL

## 2023-06-19 LAB
ANION GAP SERPL CALC-SCNC: 4 MMOL/L (ref 0–18)
ANION GAP SERPL CALC-SCNC: 4 MMOL/L (ref 0–18)
ANION GAP SERPL CALC-SCNC: 5 MMOL/L (ref 0–18)
ANION GAP SERPL CALC-SCNC: 8 MMOL/L (ref 0–18)
BUN BLD-MCNC: 69 MG/DL (ref 7–18)
BUN BLD-MCNC: 69 MG/DL (ref 7–18)
BUN BLD-MCNC: 72 MG/DL (ref 7–18)
BUN BLD-MCNC: 73 MG/DL (ref 7–18)
CALCIUM BLD-MCNC: 8.1 MG/DL (ref 8.5–10.1)
CALCIUM BLD-MCNC: 8.3 MG/DL (ref 8.5–10.1)
CALCIUM BLD-MCNC: 8.4 MG/DL (ref 8.5–10.1)
CALCIUM BLD-MCNC: 8.5 MG/DL (ref 8.5–10.1)
CHLORIDE SERPL-SCNC: 115 MMOL/L (ref 98–112)
CHLORIDE SERPL-SCNC: 116 MMOL/L (ref 98–112)
CHLORIDE SERPL-SCNC: 117 MMOL/L (ref 98–112)
CHLORIDE SERPL-SCNC: 117 MMOL/L (ref 98–112)
CO2 SERPL-SCNC: 21 MMOL/L (ref 21–32)
CO2 SERPL-SCNC: 22 MMOL/L (ref 21–32)
CO2 SERPL-SCNC: 22 MMOL/L (ref 21–32)
CO2 SERPL-SCNC: 23 MMOL/L (ref 21–32)
CREAT BLD-MCNC: 4.36 MG/DL
CREAT BLD-MCNC: 4.41 MG/DL
CREAT BLD-MCNC: 4.42 MG/DL
CREAT BLD-MCNC: 4.48 MG/DL
ERYTHROCYTE [DISTWIDTH] IN BLOOD BY AUTOMATED COUNT: 14.4 %
GFR SERPLBLD BASED ON 1.73 SQ M-ARVRAT: 14 ML/MIN/1.73M2 (ref 60–?)
GLUCOSE BLD-MCNC: 110 MG/DL (ref 70–99)
GLUCOSE BLD-MCNC: 119 MG/DL (ref 70–99)
GLUCOSE BLD-MCNC: 135 MG/DL (ref 70–99)
GLUCOSE BLD-MCNC: 151 MG/DL (ref 70–99)
GLUCOSE BLD-MCNC: 166 MG/DL (ref 70–99)
GLUCOSE BLD-MCNC: 167 MG/DL (ref 70–99)
GLUCOSE BLD-MCNC: 200 MG/DL (ref 70–99)
GLUCOSE BLD-MCNC: 220 MG/DL (ref 70–99)
GLUCOSE BLD-MCNC: 95 MG/DL (ref 70–99)
HCT VFR BLD AUTO: 25.5 %
HGB BLD-MCNC: 7.4 G/DL
MCH RBC QN AUTO: 26.5 PG (ref 26–34)
MCHC RBC AUTO-ENTMCNC: 29 G/DL (ref 31–37)
MCV RBC AUTO: 91.4 FL
OSMOLALITY SERPL CALC.SUM OF ELEC: 316 MOSM/KG (ref 275–295)
OSMOLALITY SERPL CALC.SUM OF ELEC: 319 MOSM/KG (ref 275–295)
OSMOLALITY SERPL CALC.SUM OF ELEC: 323 MOSM/KG (ref 275–295)
OSMOLALITY SERPL CALC.SUM OF ELEC: 323 MOSM/KG (ref 275–295)
PLATELET # BLD AUTO: 387 10(3)UL (ref 150–450)
POTASSIUM SERPL-SCNC: 5.6 MMOL/L (ref 3.5–5.1)
POTASSIUM SERPL-SCNC: 5.9 MMOL/L (ref 3.5–5.1)
POTASSIUM SERPL-SCNC: 6.3 MMOL/L (ref 3.5–5.1)
POTASSIUM SERPL-SCNC: 6.3 MMOL/L (ref 3.5–5.1)
RBC # BLD AUTO: 2.79 X10(6)UL
SODIUM SERPL-SCNC: 142 MMOL/L (ref 136–145)
SODIUM SERPL-SCNC: 144 MMOL/L (ref 136–145)
WBC # BLD AUTO: 9.7 X10(3) UL (ref 4–11)

## 2023-06-19 PROCEDURE — 99232 SBSQ HOSP IP/OBS MODERATE 35: CPT | Performed by: STUDENT IN AN ORGANIZED HEALTH CARE EDUCATION/TRAINING PROGRAM

## 2023-06-19 PROCEDURE — 99232 SBSQ HOSP IP/OBS MODERATE 35: CPT | Performed by: INTERNAL MEDICINE

## 2023-06-19 RX ORDER — SODIUM POLYSTYRENE SULFONATE 4.1 MEQ/G
15 POWDER, FOR SUSPENSION ORAL; RECTAL ONCE
Status: COMPLETED | OUTPATIENT
Start: 2023-06-19 | End: 2023-06-19

## 2023-06-19 RX ORDER — SODIUM BICARBONATE 325 MG/1
1300 TABLET ORAL 3 TIMES DAILY
Status: DISCONTINUED | OUTPATIENT
Start: 2023-06-19 | End: 2023-06-20

## 2023-06-19 RX ORDER — TORSEMIDE 20 MG/1
20 TABLET ORAL DAILY
Status: DISCONTINUED | OUTPATIENT
Start: 2023-06-20 | End: 2023-06-20

## 2023-06-19 RX ORDER — BUMETANIDE 0.25 MG/ML
1 INJECTION INTRAMUSCULAR; INTRAVENOUS ONCE
Status: COMPLETED | OUTPATIENT
Start: 2023-06-19 | End: 2023-06-19

## 2023-06-19 RX ORDER — DEXTROSE MONOHYDRATE 25 G/50ML
50 INJECTION, SOLUTION INTRAVENOUS ONCE
Status: COMPLETED | OUTPATIENT
Start: 2023-06-19 | End: 2023-06-19

## 2023-06-19 RX ORDER — INSULIN ASPART 100 [IU]/ML
10 INJECTION, SOLUTION INTRAVENOUS; SUBCUTANEOUS ONCE
Status: DISCONTINUED | OUTPATIENT
Start: 2023-06-19 | End: 2023-06-19

## 2023-06-19 NOTE — PLAN OF CARE
Patient is alert and oriented x4, VSS, on room air and telemetry - NSR. R BKA. Denies pain, nausea, and SOB. Assisted to commode with one and a walker. Potassium 6.3 reported to Darra Cheadle, MD - orders received, nephrology at bedside. Repeat potassium 5.9 - MD Beba notified - orders received. Fall precautions in place, bed alarm on, call light within reach.

## 2023-06-19 NOTE — CM/SW NOTE
Per Hans King at [employer], Sells[facility] Mireya Adi is still pending at this time.   CBC, BMP labs drawn per [facility] request.

## 2023-06-20 VITALS
WEIGHT: 172.38 LBS | DIASTOLIC BLOOD PRESSURE: 70 MMHG | TEMPERATURE: 98 F | HEART RATE: 66 BPM | BODY MASS INDEX: 27.06 KG/M2 | HEIGHT: 67 IN | SYSTOLIC BLOOD PRESSURE: 164 MMHG | OXYGEN SATURATION: 95 % | RESPIRATION RATE: 18 BRPM

## 2023-06-20 LAB
ANION GAP SERPL CALC-SCNC: 6 MMOL/L (ref 0–18)
BUN BLD-MCNC: 70 MG/DL (ref 7–18)
CALCIUM BLD-MCNC: 8.6 MG/DL (ref 8.5–10.1)
CHLORIDE SERPL-SCNC: 117 MMOL/L (ref 98–112)
CO2 SERPL-SCNC: 22 MMOL/L (ref 21–32)
CREAT BLD-MCNC: 4.35 MG/DL
ERYTHROCYTE [DISTWIDTH] IN BLOOD BY AUTOMATED COUNT: 14.9 %
GFR SERPLBLD BASED ON 1.73 SQ M-ARVRAT: 14 ML/MIN/1.73M2 (ref 60–?)
GLUCOSE BLD-MCNC: 100 MG/DL (ref 70–99)
GLUCOSE BLD-MCNC: 102 MG/DL (ref 70–99)
GLUCOSE BLD-MCNC: 162 MG/DL (ref 70–99)
GLUCOSE BLD-MCNC: 171 MG/DL (ref 70–99)
GLUCOSE BLD-MCNC: 69 MG/DL (ref 70–99)
GLUCOSE BLD-MCNC: 78 MG/DL (ref 70–99)
HCT VFR BLD AUTO: 26.4 %
HGB BLD-MCNC: 7.8 G/DL
MCH RBC QN AUTO: 27.2 PG (ref 26–34)
MCHC RBC AUTO-ENTMCNC: 29.5 G/DL (ref 31–37)
MCV RBC AUTO: 92 FL
OSMOLALITY SERPL CALC.SUM OF ELEC: 321 MOSM/KG (ref 275–295)
PLATELET # BLD AUTO: 417 10(3)UL (ref 150–450)
POTASSIUM SERPL-SCNC: 5.1 MMOL/L (ref 3.5–5.1)
RBC # BLD AUTO: 2.87 X10(6)UL
SODIUM SERPL-SCNC: 145 MMOL/L (ref 136–145)
WBC # BLD AUTO: 9.6 X10(3) UL (ref 4–11)

## 2023-06-20 PROCEDURE — 99239 HOSP IP/OBS DSCHRG MGMT >30: CPT | Performed by: HOSPITALIST

## 2023-06-20 RX ORDER — TORSEMIDE 20 MG/1
20 TABLET ORAL DAILY
Qty: 30 TABLET | Refills: 0 | Status: SHIPPED | OUTPATIENT
Start: 2023-06-21 | End: 2023-06-20

## 2023-06-20 RX ORDER — SODIUM BICARBONATE 650 MG/1
1300 TABLET ORAL 2 TIMES DAILY
Qty: 180 TABLET | Refills: 3 | Status: SHIPPED | OUTPATIENT
Start: 2023-06-20

## 2023-06-20 NOTE — PLAN OF CARE
Pt is A&Ox4. Wears glasses, [place]. VSS, afebrile. Maintaining O2 sats WDL on RA. Encouraged IS. Tele, NSR. ASA. Heparin. DW. Strict I&Os. EP. Last BM 6/19, commode at bs. Carb controlled/renal diet, QID accuchek. Low blood sugar in AM- MD aware, see mar. Voids. Up x1 w/ walker. Denies pain. Midline, SL. Wound c/d/I- WC to come see. No further needs at this time, continue POC. Safety precautions in place. Problem: PAIN - ADULT  Goal: Verbalizes/displays adequate comfort level or patient's stated pain goal  Description: INTERVENTIONS:  - Encourage pt to monitor pain and request assistance  - Assess pain using appropriate pain scale  - Administer analgesics based on type and severity of pain and evaluate response  - Implement non-pharmacological measures as appropriate and evaluate response  - Consider cultural and social influences on pain and pain management  - Manage/alleviate anxiety  - Utilize distraction and/or relaxation techniques  - Monitor for opioid side effects  - Notify MD/LIP if interventions unsuccessful or patient reports new pain  - Anticipate increased pain with activity and pre-medicate as appropriate  Outcome: Progressing     Problem: RISK FOR INFECTION - ADULT  Goal: Absence of fever/infection during anticipated neutropenic period  Description: INTERVENTIONS  - Monitor WBC  - Administer growth factors as ordered  - Implement neutropenic guidelines  Outcome: Progressing     Problem: SAFETY ADULT - FALL  Goal: Free from fall injury  Description: INTERVENTIONS:  - Assess pt frequently for physical needs  - Identify cognitive and physical deficits and behaviors that affect risk of falls.   - [place] fall precautions as indicated by assessment.  - Educate pt/family on patient safety including physical limitations  - Instruct pt to call for assistance with activity based on assessment  - Modify environment to reduce risk of injury  - Provide assistive devices as appropriate  - Consider OT/PT consult to assist with strengthening/mobility  - Encourage toileting schedule  Outcome: Progressing     Problem: DISCHARGE PLANNING  Goal: Discharge to home or other facility with appropriate resources  Description: INTERVENTIONS:  - Identify barriers to discharge w/pt and caregiver  - Include patient/family/discharge partner in discharge planning  - Arrange for needed discharge resources and transportation as appropriate  - Identify discharge learning needs (meds, wound care, etc)  - Arrange for interpreters to assist at discharge as needed  - Consider post-discharge preferences of patient/family/discharge partner  - Complete POLST form as appropriate  - Assess patient's ability to be responsible for managing their own health  - Refer to Case Management Department for coordinating discharge planning if the patient needs post-hospital services based on physician/LIP order or complex needs related to functional status, cognitive ability or social support system  Outcome: Progressing     Problem: Patient/Family Goals  Goal: Patient/Family Long Term Goal  Description: Patient's Long Term Goal: discharge home with adequate resources     Interventions:  - meds per [facility]   - consults   - See additional Care Plan goals for specific interventions  Outcome: Progressing  Goal: Patient/Family Short Term Goal  Description: Patient's Short Term Goal:   6/3am: MRI today   6/4 AM: Manage pain  6/4 NOC: Manage pain, sleep well   6/5 NOC: manage pain  6/6 NOC: Manage pain   6/7 NOC: manage pain, sleep  6/8 NOC: manage pain, remain stable overnight  6/12 NOC: sleep  6/18 NOC: sleep  6/19 NOC: sleep    Interventions:   - meds per [facility]  - consults   - See additional Care Plan goals for specific interventions  Outcome: Progressing

## 2023-06-20 NOTE — PROGRESS NOTES
NURSING DISCHARGE NOTE    Discharged Rehab facility via Wheelchair. Accompanied by Support staff  Belongings Taken by patient/family. Pt assessed and ready for dc. Iv dcd. Report called to jun at [facility]. Dc via [employer].

## 2023-06-20 NOTE — CONSULTS
[facility]  Inpatient Wound Care Contact Note    Leobardo Price Patient Status:  Inpatient    1960 MRN [id number]   [facility] 5NW-A Attending No att. providers found   [address] PCP Danii Fofana MD     Attempted to see patient for follow up wound care session. Patient has been discharged per staff.       Thank you,    Reinier Worrell RN BSN  [facility]   [facility] 12  [place], [address] [place] [address] Rd  [phone]  Spectralink: [phone]  Pager: [phone]  VM: [phone]

## 2023-06-20 NOTE — CM/SW NOTE
Gio Miguel A from [employer] received insurance auth from [employer]. Pt is ready for dc today. Gio Grady has a room for pt today - he will go into Room 7140. RN to call report to [phone]. Discussed transportation with pt - he wants to go by [employer] - he understands and is agreeable to the cost.  [facility] is scheduled to  pt at 3:00pm today. PCS form completed.

## 2023-06-20 NOTE — PLAN OF CARE
Problem: PAIN - ADULT  Goal: Verbalizes/displays adequate comfort level or patient's stated pain goal  Description: INTERVENTIONS:  - Encourage pt to monitor pain and request assistance  - Assess pain using appropriate pain scale  - Administer analgesics based on type and severity of pain and evaluate response  - Implement non-pharmacological measures as appropriate and evaluate response  - Consider cultural and social influences on pain and pain management  - Manage/alleviate anxiety  - Utilize distraction and/or relaxation techniques  - Monitor for opioid side effects  - Notify MD/LIP if interventions unsuccessful or patient reports new pain  - Anticipate increased pain with activity and pre-medicate as appropriate  Outcome: Adequate for Discharge     Problem: RISK FOR INFECTION - ADULT  Goal: Absence of fever/infection during anticipated neutropenic period  Description: INTERVENTIONS  - Monitor WBC  - Administer growth factors as ordered  - Implement neutropenic guidelines  Outcome: Adequate for Discharge     Problem: SAFETY ADULT - FALL  Goal: Free from fall injury  Description: INTERVENTIONS:  - Assess pt frequently for physical needs  - Identify cognitive and physical deficits and behaviors that affect risk of falls.   - [place] fall precautions as indicated by assessment.  - Educate pt/family on patient safety including physical limitations  - Instruct pt to call for assistance with activity based on assessment  - Modify environment to reduce risk of injury  - Provide assistive devices as appropriate  - Consider OT/PT consult to assist with strengthening/mobility  - Encourage toileting schedule  Outcome: Adequate for Discharge     Problem: DISCHARGE PLANNING  Goal: Discharge to home or other facility with appropriate resources  Description: INTERVENTIONS:  - Identify barriers to discharge w/pt and caregiver  - Include patient/family/discharge partner in discharge planning  - Arrange for needed discharge resources and transportation as appropriate  - Identify discharge learning needs (meds, wound care, etc)  - Arrange for interpreters to assist at discharge as needed  - Consider post-discharge preferences of patient/family/discharge partner  - Complete POLST form as appropriate  - Assess patient's ability to be responsible for managing their own health  - Refer to Case Management Department for coordinating discharge planning if the patient needs post-hospital services based on physician/LIP order or complex needs related to functional status, cognitive ability or social support system  Outcome: Adequate for Discharge     Problem: Patient/Family Goals  Goal: Patient/Family Long Term Goal  Description: Patient's Long Term Goal: discharge home with adequate resources     Interventions:  - meds per [facility]   - consults   - See additional Care Plan goals for specific interventions  Outcome: Adequate for Discharge  Goal: Patient/Family Short Term Goal  Description: Patient's Short Term Goal:   6/3am: MRI today   6/4 AM: Manage pain  6/4 NOC: Manage pain, sleep well   6/5 NOC: manage pain  6/6 NOC: Manage pain   6/7 NOC: manage pain, sleep  6/8 NOC: manage pain, remain stable overnight  6/12 NOC: sleep  6/18 NOC: sleep  6/19 NOC: sleep    Interventions:   - meds per [facility]  - consults   - See additional Care Plan goals for specific interventions  Outcome: Adequate for Discharge

## 2023-06-21 ENCOUNTER — PATIENT OUTREACH (OUTPATIENT)
Dept: CASE MANAGEMENT | Age: 63
End: 2023-06-21

## 2023-06-21 LAB
ATRIAL RATE: 64 BPM
P AXIS: 37 DEGREES
P-R INTERVAL: 144 MS
Q-T INTERVAL: 434 MS
QRS DURATION: 86 MS
QTC CALCULATION (BEZET): 447 MS
R AXIS: -4 DEGREES
T AXIS: 24 DEGREES
VENTRICULAR RATE: 64 BPM

## 2023-06-21 RX ORDER — TORSEMIDE 20 MG/1
20 TABLET ORAL DAILY
Qty: 90 TABLET | Refills: 1 | Status: SHIPPED | OUTPATIENT
Start: 2023-06-21

## 2023-06-26 ENCOUNTER — APPOINTMENT (OUTPATIENT)
Dept: WOUND CARE | Facility: HOSPITAL | Age: 63
End: 2023-06-26
Attending: STUDENT IN AN ORGANIZED HEALTH CARE EDUCATION/TRAINING PROGRAM
Payer: COMMERCIAL

## 2023-06-26 NOTE — OPERATIVE REPORT
[facility]    PATIENT'S NAME: JULIUS Bassett   ATTENDING PHYSICIAN: Yevgeniy Bauer M.D. OPERATING PHYSICIAN: Santiaog Garcia M.D. PATIENT ACCOUNT#:   [de-identified]    LOCATION:  [address] [place] [address]  MEDICAL RECORD #:   [id number]       YOB: 1960  ADMISSION DATE:       05/31/2023      OPERATION DATE:  06/08/2023    OPERATIVE REPORT    PREOPERATIVE DIAGNOSIS:  Osteomyelitis of the right lower extremity. POSTOPERATIVE DIAGNOSIS:  Osteomyelitis of the right lower extremity. PROCEDURE:  Right below-knee amputation. ASSISTANT:  Operating room staff. ESTIMATED BLOOD LOSS:  300 mL. SPECIMEN:  Amputation specimen. INDICATIONS:  This is a 26-year-old male who has developed a recurrent wound on his foot, it is all the way down to the calcaneus and the cuneiform bone. It is no longer a salvageable situation and, after much discussion with myself and his podiatrist, we will be proceeding with a right below-knee amputation as described below. FINDINGS:  Healthy but edematous tissue at the amputation level. OPERATIVE TECHNIQUE:  The patient was taken to the operating room, placed under general anesthesia. The patient was prepped and draped in usual sterile fashion. Time-out performed. The patient's leg was marked appropriately for below-knee amputation. I then used an Esmarch bandage and decompressed the leg, and then a tourniquet was inflated on the thigh to 250 mmHg. At 12 cm distal to the tibial plateau, I made a transverse incision through the anterior two-thirds of the leg going through skin and subcutaneous fat and muscle down to the level of the tibia and the fibula. Based on my preoperative marks, I made 2 longitudinal incisions, one on the medial and one on the lateral aspect, going through the skin and subcutaneous fat and fascia to begin forming the posterior flap.   I then used a periosteal elevator and removed the tissues from the tibia and the fibula, and 1 cm proximal to the skin incision I then transected the tibia and 2 cm proximal to the skin incision I transected the fibula. I then used an amputation knife and formed the posterior flap with the gastrocnemius and the soleus, and the lower leg was passed off as specimen. I then identified the anterior tibial artery and vein and ligated them with a silk pop-off suture. I then identified the posterior tibial artery and ligated it with silk pop-off suture. I identified the peroneal artery and vein and ligated them both with silk pop-off sutures. I then identified the tibial nerve and pulled it into the field and then transected it so that it retracted away from the incision. I then had the tourniquet taken down, and any bleeding elsewhere was controlled with a combination of Bovie electrocautery or silk sutures. After achieving adequate hemostasis, to soften the edges of the tibia and the fibula we used the rasp and the saw, and after achieving adequate hemostasis and softening the edges, we then copiously irrigated the wound with Irrisept and then saline. We then began closing the tissues. There was some mild edema in the subcutaneous tissues but overall the tissues were healthy, and we reapproximated the posterior flap onto the anterior two-thirds with interrupted 2-0 Vicryl. This was completed. After closing the fascia together, we then closed the skin with horizontal mattress nylon sutures and staples. Dressings were applied. The patient was then awakened from anesthesia and taken to Recovery in stable condition.      Dictated By Madeline Bass M.D.  d: 06/25/2023 14:24:38  t: 06/25/2023 21:37:02  [facility] [id number]  [facility]/

## 2023-07-06 ENCOUNTER — TELEPHONE (OUTPATIENT)
Dept: PODIATRY CLINIC | Facility: CLINIC | Age: 63
End: 2023-07-06

## 2023-07-06 NOTE — TELEPHONE ENCOUNTER
Please advise if we would approve handicap placard and for how long. Patient had right below knee amp by Dr Bashir Liang on 6/8/23. Should patient reach out to Dr Bashir Liang?

## 2023-07-06 NOTE — TELEPHONE ENCOUNTER
Patient calling to find out how he can go about how to proceed in receiving a handicap placard due to recent amputation. Please call at [phone],BYUTRR.

## 2023-07-07 NOTE — TELEPHONE ENCOUNTER
I approve permament handi cap placard. Would be best to see Dr. Katie Carrington but I can provide for patient if he does not see him soon.

## 2023-07-24 ENCOUNTER — HOSPITAL ENCOUNTER (INPATIENT)
Facility: HOSPITAL | Age: 63
LOS: 1 days | Discharge: LEFT BEFORE DISCHARGE | DRG: 565 | End: 2023-07-27
Attending: EMERGENCY MEDICINE | Admitting: INTERNAL MEDICINE
Payer: COMMERCIAL

## 2023-07-24 DIAGNOSIS — N17.9 ACUTE RENAL FAILURE, UNSPECIFIED ACUTE RENAL FAILURE TYPE (HCC): ICD-10-CM

## 2023-07-24 DIAGNOSIS — E87.20 METABOLIC ACIDOSIS: ICD-10-CM

## 2023-07-24 DIAGNOSIS — L03.119 CELLULITIS OF LOWER EXTREMITY, UNSPECIFIED LATERALITY: ICD-10-CM

## 2023-07-24 DIAGNOSIS — E86.0 DEHYDRATION: Primary | ICD-10-CM

## 2023-07-24 LAB
ALBUMIN SERPL-MCNC: 3.1 G/DL (ref 3.4–5)
ALBUMIN/GLOB SERPL: 0.7 {RATIO} (ref 1–2)
ALP LIVER SERPL-CCNC: 124 U/L
ALT SERPL-CCNC: 19 U/L
ANION GAP SERPL CALC-SCNC: 10 MMOL/L (ref 0–18)
AST SERPL-CCNC: 16 U/L (ref 15–37)
BASOPHILS # BLD AUTO: 0.03 X10(3) UL (ref 0–0.2)
BASOPHILS NFR BLD AUTO: 0.2 %
BILIRUB SERPL-MCNC: 0.4 MG/DL (ref 0.1–2)
BUN BLD-MCNC: 100 MG/DL (ref 7–18)
CALCIUM BLD-MCNC: 8.6 MG/DL (ref 8.5–10.1)
CHLORIDE SERPL-SCNC: 116 MMOL/L (ref 98–112)
CO2 SERPL-SCNC: 14 MMOL/L (ref 21–32)
CREAT BLD-MCNC: 5.3 MG/DL
EGFRCR SERPLBLD CKD-EPI 2021: 11 ML/MIN/1.73M2 (ref 60–?)
EOSINOPHIL # BLD AUTO: 0.02 X10(3) UL (ref 0–0.7)
EOSINOPHIL NFR BLD AUTO: 0.1 %
ERYTHROCYTE [DISTWIDTH] IN BLOOD BY AUTOMATED COUNT: 14.7 %
GLOBULIN PLAS-MCNC: 4.2 G/DL (ref 2.8–4.4)
GLUCOSE BLD-MCNC: 165 MG/DL (ref 70–99)
HCT VFR BLD AUTO: 32.4 %
HGB BLD-MCNC: 10.2 G/DL
IMM GRANULOCYTES # BLD AUTO: 0.04 X10(3) UL (ref 0–1)
IMM GRANULOCYTES NFR BLD: 0.3 %
LYMPHOCYTES # BLD AUTO: 1.29 X10(3) UL (ref 1–4)
LYMPHOCYTES NFR BLD AUTO: 8.4 %
MCH RBC QN AUTO: 26.6 PG (ref 26–34)
MCHC RBC AUTO-ENTMCNC: 31.5 G/DL (ref 31–37)
MCV RBC AUTO: 84.4 FL
MONOCYTES # BLD AUTO: 0.57 X10(3) UL (ref 0.1–1)
MONOCYTES NFR BLD AUTO: 3.7 %
NEUTROPHILS # BLD AUTO: 13.38 X10 (3) UL (ref 1.5–7.7)
NEUTROPHILS # BLD AUTO: 13.38 X10(3) UL (ref 1.5–7.7)
NEUTROPHILS NFR BLD AUTO: 87.3 %
OSMOLALITY SERPL CALC.SUM OF ELEC: 325 MOSM/KG (ref 275–295)
PLATELET # BLD AUTO: 275 10(3)UL (ref 150–450)
POTASSIUM SERPL-SCNC: 4.4 MMOL/L (ref 3.5–5.1)
PROT SERPL-MCNC: 7.3 G/DL (ref 6.4–8.2)
RBC # BLD AUTO: 3.84 X10(6)UL
SODIUM SERPL-SCNC: 140 MMOL/L (ref 136–145)
WBC # BLD AUTO: 15.3 X10(3) UL (ref 4–11)

## 2023-07-25 ENCOUNTER — APPOINTMENT (OUTPATIENT)
Dept: GENERAL RADIOLOGY | Facility: HOSPITAL | Age: 63
DRG: 565 | End: 2023-07-25
Attending: HOSPITALIST
Payer: COMMERCIAL

## 2023-07-25 PROBLEM — L03.119 CELLULITIS OF LOWER EXTREMITY, UNSPECIFIED LATERALITY: Status: ACTIVE | Noted: 2023-07-25

## 2023-07-25 LAB
BILIRUB UR QL STRIP.AUTO: NEGATIVE
COLOR UR AUTO: YELLOW
CRP SERPL-MCNC: 0.59 MG/DL (ref ?–0.3)
ERYTHROCYTE [SEDIMENTATION RATE] IN BLOOD: 80 MM/HR
GLUCOSE BLD-MCNC: 113 MG/DL (ref 70–99)
GLUCOSE BLD-MCNC: 114 MG/DL (ref 70–99)
GLUCOSE BLD-MCNC: 133 MG/DL (ref 70–99)
GLUCOSE BLD-MCNC: 169 MG/DL (ref 70–99)
GLUCOSE UR STRIP.AUTO-MCNC: >=500 MG/DL
HYALINE CASTS #/AREA URNS AUTO: PRESENT /LPF
LACTATE SERPL-SCNC: 0.7 MMOL/L (ref 0.4–2)
LEUKOCYTE ESTERASE UR QL STRIP.AUTO: NEGATIVE
NITRITE UR QL STRIP.AUTO: NEGATIVE
PH UR STRIP.AUTO: 5 [PH] (ref 5–8)
PROT UR STRIP.AUTO-MCNC: >=500 MG/DL
SP GR UR STRIP.AUTO: 1.01 (ref 1–1.03)
UROBILINOGEN UR STRIP.AUTO-MCNC: <2 MG/DL

## 2023-07-25 PROCEDURE — 73590 X-RAY EXAM OF LOWER LEG: CPT | Performed by: HOSPITALIST

## 2023-07-25 PROCEDURE — 99223 1ST HOSP IP/OBS HIGH 75: CPT | Performed by: HOSPITALIST

## 2023-07-25 RX ORDER — LABETALOL HYDROCHLORIDE 5 MG/ML
10 INJECTION, SOLUTION INTRAVENOUS EVERY 4 HOURS PRN
Status: DISCONTINUED | OUTPATIENT
Start: 2023-07-25 | End: 2023-07-27

## 2023-07-25 RX ORDER — ECHINACEA PURPUREA EXTRACT 125 MG
1 TABLET ORAL
Status: DISCONTINUED | OUTPATIENT
Start: 2023-07-25 | End: 2023-07-27

## 2023-07-25 RX ORDER — MELATONIN
3 NIGHTLY PRN
Status: DISCONTINUED | OUTPATIENT
Start: 2023-07-25 | End: 2023-07-27

## 2023-07-25 RX ORDER — NICOTINE POLACRILEX 4 MG
30 LOZENGE BUCCAL
Status: DISCONTINUED | OUTPATIENT
Start: 2023-07-25 | End: 2023-07-27

## 2023-07-25 RX ORDER — PROCHLORPERAZINE EDISYLATE 5 MG/ML
5 INJECTION INTRAMUSCULAR; INTRAVENOUS EVERY 8 HOURS PRN
Status: DISCONTINUED | OUTPATIENT
Start: 2023-07-25 | End: 2023-07-27

## 2023-07-25 RX ORDER — MAGNESIUM HYDROXIDE/ALUMINUM HYDROXICE/SIMETHICONE 120; 1200; 1200 MG/30ML; MG/30ML; MG/30ML
30 SUSPENSION ORAL 4 TIMES DAILY PRN
Status: DISCONTINUED | OUTPATIENT
Start: 2023-07-25 | End: 2023-07-27

## 2023-07-25 RX ORDER — METOPROLOL SUCCINATE 25 MG/1
25 TABLET, EXTENDED RELEASE ORAL
Status: DISCONTINUED | OUTPATIENT
Start: 2023-07-25 | End: 2023-07-26

## 2023-07-25 RX ORDER — CEFAZOLIN SODIUM/WATER 2 G/20 ML
2 SYRINGE (ML) INTRAVENOUS ONCE
Status: COMPLETED | OUTPATIENT
Start: 2023-07-25 | End: 2023-07-25

## 2023-07-25 RX ORDER — HYDROCODONE BITARTRATE AND ACETAMINOPHEN 5; 325 MG/1; MG/1
1 TABLET ORAL EVERY 8 HOURS PRN
Status: DISCONTINUED | OUTPATIENT
Start: 2023-07-25 | End: 2023-07-27

## 2023-07-25 RX ORDER — DEXTROSE MONOHYDRATE 25 G/50ML
50 INJECTION, SOLUTION INTRAVENOUS
Status: DISCONTINUED | OUTPATIENT
Start: 2023-07-25 | End: 2023-07-27

## 2023-07-25 RX ORDER — SODIUM BICARBONATE 325 MG/1
1300 TABLET ORAL 2 TIMES DAILY
Status: DISCONTINUED | OUTPATIENT
Start: 2023-07-25 | End: 2023-07-27

## 2023-07-25 RX ORDER — NICOTINE POLACRILEX 4 MG
15 LOZENGE BUCCAL
Status: DISCONTINUED | OUTPATIENT
Start: 2023-07-25 | End: 2023-07-27

## 2023-07-25 RX ORDER — POLYETHYLENE GLYCOL 3350 17 G/17G
17 POWDER, FOR SOLUTION ORAL DAILY PRN
Status: DISCONTINUED | OUTPATIENT
Start: 2023-07-25 | End: 2023-07-27

## 2023-07-25 RX ORDER — AMLODIPINE BESYLATE 5 MG/1
5 TABLET ORAL DAILY
Status: DISCONTINUED | OUTPATIENT
Start: 2023-07-25 | End: 2023-07-26

## 2023-07-25 RX ORDER — SENNOSIDES 8.6 MG
17.2 TABLET ORAL NIGHTLY PRN
Status: DISCONTINUED | OUTPATIENT
Start: 2023-07-25 | End: 2023-07-27

## 2023-07-25 RX ORDER — ASPIRIN 81 MG/1
81 TABLET ORAL DAILY
Status: DISCONTINUED | OUTPATIENT
Start: 2023-07-25 | End: 2023-07-27

## 2023-07-25 RX ORDER — ONDANSETRON 2 MG/ML
4 INJECTION INTRAMUSCULAR; INTRAVENOUS EVERY 6 HOURS PRN
Status: DISCONTINUED | OUTPATIENT
Start: 2023-07-25 | End: 2023-07-27

## 2023-07-25 RX ORDER — BISACODYL 10 MG
10 SUPPOSITORY, RECTAL RECTAL
Status: DISCONTINUED | OUTPATIENT
Start: 2023-07-25 | End: 2023-07-27

## 2023-07-25 RX ORDER — ACETAMINOPHEN 500 MG
1000 TABLET ORAL EVERY 4 HOURS PRN
Status: DISCONTINUED | OUTPATIENT
Start: 2023-07-25 | End: 2023-07-27

## 2023-07-25 RX ORDER — HEPARIN SODIUM 5000 [USP'U]/ML
5000 INJECTION, SOLUTION INTRAVENOUS; SUBCUTANEOUS EVERY 8 HOURS SCHEDULED
Status: DISCONTINUED | OUTPATIENT
Start: 2023-07-25 | End: 2023-07-27

## 2023-07-25 NOTE — PROGRESS NOTES
Patient A&Ox4. Patient resting in bed, no apparent distress. Patient resting on RA. Patient remains   monitoring. R stump wound dressing placed, see Media and Flowsheets for details. Patient with HTN, PRN labetalol given and still hypertensive, patient refusing oral BP meds. MD notified. IV abx. QID ACHS. Safety/fall precautions in place. Call light in reach.

## 2023-07-25 NOTE — ED QUICK NOTES
Orders for admission, patient is aware of plan and ready to go upstairs. Any questions, please call ED RN Carleen Garcia at extension [phone].      Patient Covid vaccination status: Fully vaccinated     COVID Test Ordered in ED: None    COVID Suspicion at Admission: N/A    Running Infusions:  None    Mental Status/LOC at time of transport: A&Ox4    Other pertinent information:   CIWA score: N/A   NIH score:  N/A

## 2023-07-25 NOTE — PLAN OF CARE
Received patient on room air, saturating well. BP elevated, oral BP medications administered. All medications administered per the [facility]. Patient refused [place] stating he does not take that medication. Patient is A & O x4, a stand and pivot to the commode. Patient was to have an MRI done this am, but patient refused stating he wanted to speak to his surgeon first.  Patient is resting in bed, at lowest position with bed rails up and call light within reach.    Problem: Diabetes/Glucose Control  Goal: Glucose maintained within prescribed range  Description: INTERVENTIONS:  - Monitor Blood Glucose as ordered  - Assess for signs and symptoms of hyperglycemia and hypoglycemia  - Administer ordered medications to maintain glucose within target range  - Assess barriers to adequate nutritional intake and initiate nutrition consult as needed  - Instruct patient on self management of diabetes  Outcome: Progressing Relevant Problems   NEUROLOGY   (+) Stroke (cerebrum) (HCC)       Anesthetic History   No history of anesthetic complications            Review of Systems / Medical History  Patient summary reviewed, nursing notes reviewed and pertinent labs reviewed    Pulmonary  Within defined limits                 Neuro/Psych       CVA       Cardiovascular  Within defined limits                     GI/Hepatic/Renal  Within defined limits              Endo/Other  Within defined limits           Other Findings              Physical Exam    Airway  Mallampati: II  TM Distance: > 6 cm  Neck ROM: normal range of motion   Mouth opening: Normal     Cardiovascular  Regular rate and rhythm,  S1 and S2 normal,  no murmur, click, rub, or gallop             Dental  No notable dental hx       Pulmonary  Breath sounds clear to auscultation               Abdominal  GI exam deferred       Other Findings            Anesthetic Plan    ASA: 2  Anesthesia type: MAC            Anesthetic plan and risks discussed with: Patient

## 2023-07-25 NOTE — PROGRESS NOTES
[facility] Pharmacy Note:  Renal Adjustment for ampicillin/sulbactam (UNASYN)    [address] is a 57year old patient who has been prescribed ampicillin/sulbactam (UNASYN) 3g every 6 hrs. The estimated creatinine clearance is 13.2 mL/min (A) (based on SCr of 5.3 mg/dL (H)). The dose has been adjusted to ampicillin/sulbactam (UNASYN) 3g every 12 hrs per hospital renal dose adjustment protocol for treatment of diabetic foot. Pharmacy will follow and adjust dose as warranted for additional renal function changes.     Thank you,    Anabel Banuelos, [facility]  7/25/2023  5:00 AM

## 2023-07-25 NOTE — PROGRESS NOTES
Patient arrived to room in stable condition. VSS. Tele and  monitor in place. Admission navigator completed with patient bedside. MD paged for PTA med list reconciliation, see Orders. See Flowsheets for admission physical assessment. See MAR for medications given overnight.

## 2023-07-25 NOTE — ED INITIAL ASSESSMENT (HPI)
A/O x 4. Patient presents with weakness, dizziness, and nausea/vomiting x 3 days. Patient had Right BKA in May and reports that over the last week has had redness and slight drainage at the amputation site.

## 2023-07-25 NOTE — CM/SW NOTE
SW received order for wound vac. PT/OT pending. Chart reviewed, pt is s/p R BKA and was discharged to [place] [address] on 6/20/23. Per Vascular Surgery note, plan for debridement on Thursday 7/27/23. Pt has hx with [facility] and [facility] for IV ABX. SW spoke with [facility] liaison who stated pt is current for RN, PT, and OT services. DIEGO entered and [facility] liaison notified. Referral for wound vac sent to [facility] in [place]. Await PT/OT recommendations. ALEXA will continue to follow.      JULIUS Amaya  Discharge Planner

## 2023-07-26 LAB
ANION GAP SERPL CALC-SCNC: 9 MMOL/L (ref 0–18)
BASOPHILS # BLD AUTO: 0.05 X10(3) UL (ref 0–0.2)
BASOPHILS NFR BLD AUTO: 0.5 %
BUN BLD-MCNC: 85 MG/DL (ref 7–18)
CALCIUM BLD-MCNC: 8.4 MG/DL (ref 8.5–10.1)
CHLORIDE SERPL-SCNC: 113 MMOL/L (ref 98–112)
CO2 SERPL-SCNC: 17 MMOL/L (ref 21–32)
CREAT BLD-MCNC: 4.91 MG/DL
EGFRCR SERPLBLD CKD-EPI 2021: 12 ML/MIN/1.73M2 (ref 60–?)
EOSINOPHIL # BLD AUTO: 0.12 X10(3) UL (ref 0–0.7)
EOSINOPHIL NFR BLD AUTO: 1.2 %
ERYTHROCYTE [DISTWIDTH] IN BLOOD BY AUTOMATED COUNT: 14.6 %
GLUCOSE BLD-MCNC: 141 MG/DL (ref 70–99)
GLUCOSE BLD-MCNC: 142 MG/DL (ref 70–99)
GLUCOSE BLD-MCNC: 142 MG/DL (ref 70–99)
GLUCOSE BLD-MCNC: 86 MG/DL (ref 70–99)
GLUCOSE BLD-MCNC: 98 MG/DL (ref 70–99)
HCT VFR BLD AUTO: 31.3 %
HGB BLD-MCNC: 9.8 G/DL
IMM GRANULOCYTES # BLD AUTO: 0.02 X10(3) UL (ref 0–1)
IMM GRANULOCYTES NFR BLD: 0.2 %
LYMPHOCYTES # BLD AUTO: 1.19 X10(3) UL (ref 1–4)
LYMPHOCYTES NFR BLD AUTO: 12.3 %
MAGNESIUM SERPL-MCNC: 2.3 MG/DL (ref 1.6–2.6)
MCH RBC QN AUTO: 26.4 PG (ref 26–34)
MCHC RBC AUTO-ENTMCNC: 31.3 G/DL (ref 31–37)
MCV RBC AUTO: 84.4 FL
MONOCYTES # BLD AUTO: 0.61 X10(3) UL (ref 0.1–1)
MONOCYTES NFR BLD AUTO: 6.3 %
NEUTROPHILS # BLD AUTO: 7.66 X10 (3) UL (ref 1.5–7.7)
NEUTROPHILS # BLD AUTO: 7.66 X10(3) UL (ref 1.5–7.7)
NEUTROPHILS NFR BLD AUTO: 79.5 %
OSMOLALITY SERPL CALC.SUM OF ELEC: 316 MOSM/KG (ref 275–295)
PLATELET # BLD AUTO: 246 10(3)UL (ref 150–450)
POTASSIUM SERPL-SCNC: 4 MMOL/L (ref 3.5–5.1)
RBC # BLD AUTO: 3.71 X10(6)UL
SODIUM SERPL-SCNC: 139 MMOL/L (ref 136–145)
WBC # BLD AUTO: 9.7 X10(3) UL (ref 4–11)

## 2023-07-26 PROCEDURE — 99223 1ST HOSP IP/OBS HIGH 75: CPT | Performed by: INTERNAL MEDICINE

## 2023-07-26 PROCEDURE — 99233 SBSQ HOSP IP/OBS HIGH 50: CPT | Performed by: INTERNAL MEDICINE

## 2023-07-26 RX ORDER — TEMAZEPAM 15 MG/1
15 CAPSULE ORAL NIGHTLY PRN
Status: DISCONTINUED | OUTPATIENT
Start: 2023-07-26 | End: 2023-07-27

## 2023-07-26 RX ORDER — AMLODIPINE BESYLATE 5 MG/1
10 TABLET ORAL DAILY
Status: DISCONTINUED | OUTPATIENT
Start: 2023-07-27 | End: 2023-07-27

## 2023-07-26 RX ORDER — HYDRALAZINE HYDROCHLORIDE 25 MG/1
25 TABLET, FILM COATED ORAL EVERY 12 HOURS
Status: DISCONTINUED | OUTPATIENT
Start: 2023-07-26 | End: 2023-07-27

## 2023-07-26 RX ORDER — METOPROLOL SUCCINATE 50 MG/1
50 TABLET, EXTENDED RELEASE ORAL
Status: DISCONTINUED | OUTPATIENT
Start: 2023-07-27 | End: 2023-07-27

## 2023-07-26 RX ORDER — METOPROLOL SUCCINATE 25 MG/1
25 TABLET, EXTENDED RELEASE ORAL ONCE
Status: COMPLETED | OUTPATIENT
Start: 2023-07-26 | End: 2023-07-26

## 2023-07-26 RX ORDER — AMLODIPINE BESYLATE 5 MG/1
5 TABLET ORAL ONCE
Status: COMPLETED | OUTPATIENT
Start: 2023-07-26 | End: 2023-07-26

## 2023-07-26 NOTE — OCCUPATIONAL THERAPY NOTE
Att'd to see pt this AM as agreed upon yesterday. Pt continues to decline OT this day. Will continue to follow and re-attempt at later date.

## 2023-07-26 NOTE — PLAN OF CARE
Received patient awake in bed. Spouse at bedside. Denies any pain  On room air, clear lungs. Up w/ 1 assist and a walker using bedside commode. QID accuchecks. BP was high, 's-190's , Labetalol given IV, rechecked BP still 's.   2300: Patient is adamant that he needs to sleep tonight and he does not want the IVFluids. Explained the importance of his IV fluids but he still refused. \"I dont care, I need my sleep too, Im not getting better here without sleep for 2 nights. I can talk to the doctor if you like but Im not having the IV fluids\" Informed Dr Christine Lyman and gave order for sleeping pill. 0100: Pt is sleeping.   0400: Woke up and used the [facility] but still refusing IV fluids. Problem: Diabetes/Glucose Control  Goal: Glucose maintained within prescribed range  Description: INTERVENTIONS:  - Monitor Blood Glucose as ordered  - Assess for signs and symptoms of hyperglycemia and hypoglycemia  - Administer ordered medications to maintain glucose within target range  - Assess barriers to adequate nutritional intake and initiate nutrition consult as needed  - Instruct patient on self management of diabetes  Outcome: Progressing     Problem: Patient/Family Goals  Goal: Patient/Family Long Term Goal  Description: Patient's Long Term Goal: DC home    Interventions:  - Debridement on Thursday 7/27/2023  - monitor VS, labs, intake and output  - wound care nurse to see for wound vac  - administer meds as ordered. - QID accuchecks , sliding scale   - Up w/ walker/ bedside commode. - See additional Care Plan goals for specific interventions  Outcome: Progressing  Goal: Patient/Family Short Term Goal  Description: Patient's Short Term Goal: VS stable; no falls this shift    Interventions:   - Debridement on Thursday 7/27/2023  - monitor VS, labs, intake and output  - wound care nurse to see for wound vac  - administer meds as ordered.   - QID accuchecks    - See additional Care Plan goals for specific interventions  Outcome: Progressing

## 2023-07-26 NOTE — PLAN OF CARE
Received patient on room air, saturating well. BP elevated, MD aware. Orders received for additional doses metoprolol and amlodipine. Medications administered per the [facility]. Patient informed anaesthesia and this RN that he will not be having the wound debridement tomorrow. Hospitalist and Surgery informed. Patient refused to work with therapy. IVF infusing per MD order. Patient A & O x4, up to commode with walker. Patient is resting in bed, at lowest position with bed rails up and call light within reach.    Problem: Diabetes/Glucose Control  Goal: Glucose maintained within prescribed range  Description: INTERVENTIONS:  - Monitor Blood Glucose as ordered  - Assess for signs and symptoms of hyperglycemia and hypoglycemia  - Administer ordered medications to maintain glucose within target range  - Assess barriers to adequate nutritional intake and initiate nutrition consult as needed  - Instruct patient on self management of diabetes  Outcome: Progressing

## 2023-07-26 NOTE — CDS QUERY
Clarification - Chronic Kidney Disease   Jevon Del Cid  Dear Dr Elizabet Cabrera,  Clinical information (provided below) includes documentation of Chronic kidney disease requiring clarification. PLEASE CHECK THE SPECIFICITY THAT APPLIES. SELECTION BY PROVIDER ONLY  [  ] Chronic Kidney Disease Stage 4  [  ] Chronic Kidney Disease Stage 5  [  ]Other (please specify): ___________________________________________________________         Documentation from the medical record  Risk; HTN, DM2  Clinical indicators ; Historical GFR; 6/16-7/3/23- 12-15    7/24/23- GFR -11. 7/25/23-12    Treatment; BMP -repeat 7/25.                 For questions regarding this query, please contact:Prabhjot Dominique RN, BSN, CCDS,Certified Clinical    VAC:[id number]     THIS FORM IS A PERMANENT PART OF THE MEDICAL RECORD 88

## 2023-07-26 NOTE — CDS QUERY
Morene Epley     Dear Dr Brent Becerra. Ibrahim,  Clinical information (provided below) indicates a diagnosis of Acute Kidney Injury. Please provide additional documentation supportive of the diagnosis. BASED ON YOUR CLINICAL JUDGMENT, PLEASE CLARIFY    (  ) Acute Kidney Injury ruled out   ( x ) Acute Kidney Injury Confirmed (Please provide additional information supportive of the diagnosis):______________________  (   ) Other:   ______________________________________________________________________________________________  Documentation from the Medical Record:  RISK FACTORS:   HTN, DM2 CKD. CLINICAL INDICATORS:  Historical-Creatinine  -6/16- 4.18  6/19/23-4.41-4.48, 6/20-4.35, 6/21-4.62, 6/22-4.94, 6/23-4.65  6/25-4.57. 6/29-5.17, 7/3-5.12      7/24 sCR 5.30. 7/26-4. 91. Per Nephrology consult 7/26 Baseline creat of late 4.5 ml/dl or so. No recorded UOP. TREATMENT:    Repeated  Lab draws,  IVF. Holding diuretics               For questions regarding this query, please contact Clinical Documentation : Rosalia Hudson RN, BSN , CCDS Ext [phone]  [email or web address]   THIS FORM IS A PERMANENT PART OF THE MEDICAL RECORD

## 2023-07-27 VITALS
TEMPERATURE: 98 F | WEIGHT: 170 LBS | DIASTOLIC BLOOD PRESSURE: 78 MMHG | RESPIRATION RATE: 18 BRPM | OXYGEN SATURATION: 100 % | HEART RATE: 68 BPM | BODY MASS INDEX: 27 KG/M2 | SYSTOLIC BLOOD PRESSURE: 180 MMHG

## 2023-07-27 PROBLEM — E11.21 DIABETIC NEPHROPATHY ASSOCIATED WITH TYPE 2 DIABETES MELLITUS (HCC): Status: ACTIVE | Noted: 2020-06-26

## 2023-07-27 LAB
ALBUMIN SERPL-MCNC: 2.6 G/DL (ref 3.4–5)
ALBUMIN/GLOB SERPL: 0.7 {RATIO} (ref 1–2)
ALP LIVER SERPL-CCNC: 99 U/L
ALT SERPL-CCNC: 6 U/L
ANION GAP SERPL CALC-SCNC: 6 MMOL/L (ref 0–18)
AST SERPL-CCNC: 15 U/L (ref 15–37)
BASOPHILS # BLD AUTO: 0.07 X10(3) UL (ref 0–0.2)
BASOPHILS NFR BLD AUTO: 0.8 %
BILIRUB SERPL-MCNC: 0.3 MG/DL (ref 0.1–2)
BUN BLD-MCNC: 79 MG/DL (ref 7–18)
CALCIUM BLD-MCNC: 8 MG/DL (ref 8.5–10.1)
CHLORIDE SERPL-SCNC: 108 MMOL/L (ref 98–112)
CO2 SERPL-SCNC: 27 MMOL/L (ref 21–32)
CREAT BLD-MCNC: 4.79 MG/DL
EGFRCR SERPLBLD CKD-EPI 2021: 13 ML/MIN/1.73M2 (ref 60–?)
EOSINOPHIL # BLD AUTO: 0.25 X10(3) UL (ref 0–0.7)
EOSINOPHIL NFR BLD AUTO: 2.8 %
ERYTHROCYTE [DISTWIDTH] IN BLOOD BY AUTOMATED COUNT: 14.6 %
GLOBULIN PLAS-MCNC: 3.6 G/DL (ref 2.8–4.4)
GLUCOSE BLD-MCNC: 117 MG/DL (ref 70–99)
GLUCOSE BLD-MCNC: 128 MG/DL (ref 70–99)
GLUCOSE BLD-MCNC: 191 MG/DL (ref 70–99)
HCT VFR BLD AUTO: 28.2 %
HGB BLD-MCNC: 9.3 G/DL
IMM GRANULOCYTES # BLD AUTO: 0.02 X10(3) UL (ref 0–1)
IMM GRANULOCYTES NFR BLD: 0.2 %
LYMPHOCYTES # BLD AUTO: 1.74 X10(3) UL (ref 1–4)
LYMPHOCYTES NFR BLD AUTO: 19.8 %
MCH RBC QN AUTO: 26.9 PG (ref 26–34)
MCHC RBC AUTO-ENTMCNC: 33 G/DL (ref 31–37)
MCV RBC AUTO: 81.5 FL
MONOCYTES # BLD AUTO: 0.67 X10(3) UL (ref 0.1–1)
MONOCYTES NFR BLD AUTO: 7.6 %
NEUTROPHILS # BLD AUTO: 6.06 X10 (3) UL (ref 1.5–7.7)
NEUTROPHILS # BLD AUTO: 6.06 X10(3) UL (ref 1.5–7.7)
NEUTROPHILS NFR BLD AUTO: 68.8 %
OSMOLALITY SERPL CALC.SUM OF ELEC: 317 MOSM/KG (ref 275–295)
PLATELET # BLD AUTO: 221 10(3)UL (ref 150–450)
POTASSIUM SERPL-SCNC: 3.7 MMOL/L (ref 3.5–5.1)
PROT SERPL-MCNC: 6.2 G/DL (ref 6.4–8.2)
RBC # BLD AUTO: 3.46 X10(6)UL
SODIUM SERPL-SCNC: 141 MMOL/L (ref 136–145)
WBC # BLD AUTO: 8.8 X10(3) UL (ref 4–11)

## 2023-07-27 PROCEDURE — 99233 SBSQ HOSP IP/OBS HIGH 50: CPT | Performed by: INTERNAL MEDICINE

## 2023-07-27 PROCEDURE — 99239 HOSP IP/OBS DSCHRG MGMT >30: CPT | Performed by: INTERNAL MEDICINE

## 2023-07-27 RX ORDER — HYDRALAZINE HYDROCHLORIDE 25 MG/1
25 TABLET, FILM COATED ORAL EVERY 12 HOURS
Qty: 60 TABLET | Refills: 1 | Status: SHIPPED | OUTPATIENT
Start: 2023-07-27

## 2023-07-27 RX ORDER — METOPROLOL SUCCINATE 50 MG/1
50 TABLET, EXTENDED RELEASE ORAL
Qty: 30 TABLET | Refills: 1 | Status: SHIPPED | OUTPATIENT
Start: 2023-07-28

## 2023-07-27 RX ORDER — AMLODIPINE BESYLATE 10 MG/1
10 TABLET ORAL DAILY
Qty: 30 TABLET | Refills: 1 | Status: SHIPPED | OUTPATIENT
Start: 2023-07-28

## 2023-07-27 NOTE — PLAN OF CARE
Patient deciding to leave AMA, refusing to sign AMA form. MD aware. Patient understands he is not medically discharged.

## 2023-07-27 NOTE — PLAN OF CARE
Received patient on room air, saturating well. BP still elevated. MD aware. Patient is resting in bed, at lowest position with bed rails up and call light within reach.    Problem: Diabetes/Glucose Control  Goal: Glucose maintained within prescribed range  Description: INTERVENTIONS:  - Monitor Blood Glucose as ordered  - Assess for signs and symptoms of hyperglycemia and hypoglycemia  - Administer ordered medications to maintain glucose within target range  - Assess barriers to adequate nutritional intake and initiate nutrition consult as needed  - Instruct patient on self management of diabetes  Outcome: Progressing

## 2023-07-27 NOTE — DISCHARGE INSTRUCTIONS
Please make sure to perform daily dressings changes to your stump per wound care recommendations. You will need to followup with Dr. Magdalena Kay in the vascular clinic in 1- 2 weeks for stump check.

## 2023-07-27 NOTE — PLAN OF CARE
Pt A&O X 4. Room air. No Tele. Hypertensive, scheduled hydralazine given. Sodium bicarb gtt. Denies pain. Stands and pivots with walker. QID Accucheck. Hypertensive this AM, PRN Labetalol given. No further needs at this time. Problem: Diabetes/Glucose Control  Goal: Glucose maintained within prescribed range  Description: INTERVENTIONS:  - Monitor Blood Glucose as ordered  - Assess for signs and symptoms of hyperglycemia and hypoglycemia  - Administer ordered medications to maintain glucose within target range  - Assess barriers to adequate nutritional intake and initiate nutrition consult as needed  - Instruct patient on self management of diabetes  Outcome: Progressing     Problem: Patient/Family Goals  Goal: Patient/Family Long Term Goal  Description: Patient's Long Term Goal: DC home    Interventions:  - Debridement on Thursday 7/27/2023  - monitor VS, labs, intake and output  - wound care nurse to see for wound vac  - administer meds as ordered. - QID accuchecks , sliding scale   - Up w/ walker/ bedside commode. - See additional Care Plan goals for specific interventions  Outcome: Progressing  Goal: Patient/Family Short Term Goal  Description: Patient's Short Term Goal: VS stable; no falls this shift    Interventions:   - Debridement on Thursday 7/27/2023  - monitor VS, labs, intake and output  - wound care nurse to see for wound vac  - administer meds as ordered.   - QID accuchecks    - See additional Care Plan goals for specific interventions  Outcome: Progressing

## 2023-07-27 NOTE — CM/SW NOTE
Pt discussed in rounds and per RN, pt refusing debridement and wound vac at this time. SW updated Jin at [facility] to cancel wound vac order. ALEXA will continue to follow.      JULIUS Escobar  Discharge Planner

## 2023-07-27 NOTE — DISCHARGE SUMMARY
[facility] HOSPITALIST  DISCHARGE SUMMARY     Fabian Feliz Patient Status:  Inpatient    1960 MRN [id number]   [facility] 5NW-A Attending Adam Walton,    Hosp Day # 1 PCP Loc Solano MD     Date of Admission: 2023  Date of Discharge: 2023  Discharge Disposition: Home    Discharge Diagnosis:   RLE BKA with superficial wound dehiscence and mild drainage   YULISA on CKD stage IV, improved  Metabolic acidosis, resolved  DM type II  Hypertension  PAD  Anemia of chronic disease and CKD  GERD    History of Present Illness: Fabian Feliz is a 57year old male with a past medical history of hypertension, diabetes, CKD. He has a history of peripheral arterial disease and eventually required right lower extremity BKA. He completed a course of IV antibiotics. He now returns to the emergency room secondary to complaints of feeling general malaise and weakness for the past week or so. He has noted some mild erythema and drainage at the amputation site. Brief Synopsis: Patient discussed with vascular surgery and MRI was not pursued. IV antibiotics were discontinued the day after arrival.  Plan was for debridement but patient opted against it done vascular surgery recommended follow-up in 2 weeks. He did have acidosis and YULISA in the hospital which improved with bicarb drip. He does have hypertension which is uncontrolled. Metoprolol and home amlodipine increased. He was started on hydralazine but blood pressure still uncontrolled. Patient decided to leave AMA. Lace+ Score: 81  59-90 High Risk  29-58 Medium Risk  0-28   Low Risk  Patient was referred to the [facility]. TCM Follow-Up Recommendation:  LACE > 58:  High Risk of readmission after discharge from the hospital.    Procedures during hospitalization:   None    Incidental or significant findings and recommendations (brief descriptions):  None    Lab/Test results pending at Discharge: None    Consultants:  Vascular surgery  Nephrology    Discharge Medication List:     Discharge Medications        START taking these medications        Instructions Prescription details   hydrALAZINE 25 MG Tabs  Commonly known as: Apresoline      Take 1 tablet (25 mg total) by mouth every 12 (twelve) hours. Quantity: 60 tablet  Refills: 1            CHANGE how you take these medications        Instructions Prescription details   amLODIPine 10 MG Tabs  Commonly known as: Norvasc  Start taking on: July 28, 2023  What changed:   medication strength  how much to take      Take 1 tablet (10 mg total) by mouth daily. Quantity: 30 tablet  Refills: 1     metoprolol succinate ER 50 MG Tb24  Commonly known as: Toprol XL  Start taking on: July 28, 2023  What changed:   medication strength  how much to take      Take 1 tablet (50 mg total) by mouth Daily Beta Blocker. Quantity: 30 tablet  Refills: 1            CONTINUE taking these medications        Instructions Prescription details   aspirin 81 MG Tbec      Take 1 tablet (81 mg total) by mouth daily. Quantity: 360 tablet  Refills: 0     CALCIUM CARBONATE+VITAMIN D OR      Take 1 tablet by mouth daily. Refills: 0     Levemir FlexPen 100 UNIT/ML Sopn  Generic drug: insulin detemir      Inject 25 Units into the skin nightly. 3 month supply   Quantity: 30 mL  Refills: 1     patiromer 16.8 g Pack  Commonly known as: Veltassa      Take 1 packet (16.8 g total) by mouth daily. Quantity: 30 each  Refills: 0     sodium bicarbonate 650 MG Tabs      Take 2 tablets (1,300 mg total) by mouth 2 (two) times daily.    Quantity: 180 tablet  Refills: 3            STOP taking these medications      clopidogrel 75 MG Tabs  Commonly known as: Plavix        HYDROcodone-acetaminophen 5-325 MG Tabs  Commonly known as: Norco        multivitamin Chew        sodium chloride 0.9% SOLN 100 mL with ampicillin-sulbactam 3 (2-1) g SOLR 3 g        torsemide 20 MG Tabs  Commonly known as: Russ                  Where to Get Your Medications        These medications were sent to [address], [address] AT [place] [address], [phone], [address] [facility],[address], [place]  [address]      Phone: [phone]   amLODIPine 10 MG Tabs  hydrALAZINE 25 MG Tabs  metoprolol succinate ER 50 MG Tb24         [place] No [place] substances prescribed at discharge.     Follow-up appointment:   Myles Moreira MD  [address] [facility],[address]  [address] [facility] [address] [phone]    Follow up in 1 week(s)      Obed Acosta MD  [address] [facility]  [address] [facility]  [phone]    Follow up in 2 week(s)      Edy Moncada MD  [address] [place]  [phone]    Follow up in 2 week(s)      Appointments for Next 30 Days 7/27/2023 - 8/26/2023      None          -----------------------------------------------------------------------------------------------  PATIENT DISCHARGE INSTRUCTIONS: See electronic chart    Stephanie Puente DO    Time spent:  31 minutes

## 2023-07-28 ENCOUNTER — PATIENT OUTREACH (OUTPATIENT)
Dept: CASE MANAGEMENT | Age: 63
End: 2023-07-28

## 2023-07-28 NOTE — PROGRESS NOTES
SD s/w patient who stated that he is settling in and is doing good so far. He stated that he will call [facility] back next week. NC will await return call.

## 2023-07-28 NOTE — PROGRESS NOTES
[facility] contact information provided as well as [facility] office number, [phone]. To Dr. Quesada  LV: 11/21/2022  Patient is scheduled for 7/13/2023    Last refill: 3/7/2023    Please advise.

## 2023-08-11 NOTE — PROGRESS NOTES
Multiple attempts to reach the pt and message left with no returned phone call. Past TCM timeframe, closing encounter.

## 2023-08-29 ENCOUNTER — LAB ENCOUNTER (OUTPATIENT)
Dept: LAB | Age: 63
End: 2023-08-29
Attending: STUDENT IN AN ORGANIZED HEALTH CARE EDUCATION/TRAINING PROGRAM
Payer: COMMERCIAL

## 2023-08-29 DIAGNOSIS — N18.4 CKD (CHRONIC KIDNEY DISEASE) STAGE 4, GFR 15-29 ML/MIN (HCC): ICD-10-CM

## 2023-08-29 DIAGNOSIS — N18.4 ANEMIA DUE TO STAGE 4 CHRONIC KIDNEY DISEASE: ICD-10-CM

## 2023-08-29 DIAGNOSIS — D63.1 ANEMIA DUE TO STAGE 4 CHRONIC KIDNEY DISEASE: ICD-10-CM

## 2023-08-29 LAB
ANION GAP SERPL CALC-SCNC: 7 MMOL/L (ref 0–18)
BASOPHILS # BLD AUTO: 0.07 X10(3) UL (ref 0–0.2)
BASOPHILS NFR BLD AUTO: 0.7 %
BUN BLD-MCNC: 92 MG/DL (ref 7–18)
CALCIUM BLD-MCNC: 8.8 MG/DL (ref 8.5–10.1)
CHLORIDE SERPL-SCNC: 119 MMOL/L (ref 98–112)
CO2 SERPL-SCNC: 16 MMOL/L (ref 21–32)
CREAT BLD-MCNC: 5.79 MG/DL
EGFRCR SERPLBLD CKD-EPI 2021: 10 ML/MIN/1.73M2 (ref 60–?)
EOSINOPHIL # BLD AUTO: 0.14 X10(3) UL (ref 0–0.7)
EOSINOPHIL NFR BLD AUTO: 1.5 %
ERYTHROCYTE [DISTWIDTH] IN BLOOD BY AUTOMATED COUNT: 14.9 %
FASTING STATUS PATIENT QL REPORTED: NO
GLUCOSE BLD-MCNC: 171 MG/DL (ref 70–99)
HCT VFR BLD AUTO: 32.6 %
HGB BLD-MCNC: 10.3 G/DL
IMM GRANULOCYTES # BLD AUTO: 0.04 X10(3) UL (ref 0–1)
IMM GRANULOCYTES NFR BLD: 0.4 %
LYMPHOCYTES # BLD AUTO: 1.37 X10(3) UL (ref 1–4)
LYMPHOCYTES NFR BLD AUTO: 14.4 %
MCH RBC QN AUTO: 26.9 PG (ref 26–34)
MCHC RBC AUTO-ENTMCNC: 31.6 G/DL (ref 31–37)
MCV RBC AUTO: 85.1 FL
MONOCYTES # BLD AUTO: 0.39 X10(3) UL (ref 0.1–1)
MONOCYTES NFR BLD AUTO: 4.1 %
NEUTROPHILS # BLD AUTO: 7.53 X10 (3) UL (ref 1.5–7.7)
NEUTROPHILS # BLD AUTO: 7.53 X10(3) UL (ref 1.5–7.7)
NEUTROPHILS NFR BLD AUTO: 78.9 %
OSMOLALITY SERPL CALC.SUM OF ELEC: 326 MOSM/KG (ref 275–295)
PLATELET # BLD AUTO: 263 10(3)UL (ref 150–450)
POTASSIUM SERPL-SCNC: 5.2 MMOL/L (ref 3.5–5.1)
RBC # BLD AUTO: 3.83 X10(6)UL
SODIUM SERPL-SCNC: 142 MMOL/L (ref 136–145)
WBC # BLD AUTO: 9.5 X10(3) UL (ref 4–11)

## 2023-08-29 PROCEDURE — 80048 BASIC METABOLIC PNL TOTAL CA: CPT | Performed by: INTERNAL MEDICINE

## 2023-08-29 PROCEDURE — 85025 COMPLETE CBC W/AUTO DIFF WBC: CPT | Performed by: INTERNAL MEDICINE

## 2023-09-05 ENCOUNTER — TELEPHONE (OUTPATIENT)
Dept: INTERNAL MEDICINE CLINIC | Facility: CLINIC | Age: 63
End: 2023-09-05

## 2023-09-13 ENCOUNTER — PATIENT MESSAGE (OUTPATIENT)
Dept: NEPHROLOGY | Facility: CLINIC | Age: 63
End: 2023-09-13

## 2023-10-04 DIAGNOSIS — E87.20 METABOLIC ACIDOSIS: ICD-10-CM

## 2023-10-04 DIAGNOSIS — N18.4 CKD (CHRONIC KIDNEY DISEASE) STAGE 4, GFR 15-29 ML/MIN (HCC): ICD-10-CM

## 2023-10-04 RX ORDER — SODIUM BICARBONATE 650 MG/1
1300 TABLET ORAL 2 TIMES DAILY
Qty: 360 TABLET | Refills: 1 | Status: SHIPPED | OUTPATIENT
Start: 2023-10-04 | End: 2023-10-06

## 2023-10-05 ENCOUNTER — NURSE ONLY (OUTPATIENT)
Dept: NEPHROLOGY | Facility: CLINIC | Age: 63
End: 2023-10-05
Payer: COMMERCIAL

## 2023-10-05 VITALS — DIASTOLIC BLOOD PRESSURE: 68 MMHG | SYSTOLIC BLOOD PRESSURE: 120 MMHG

## 2023-10-05 DIAGNOSIS — N18.4 ANEMIA DUE TO STAGE 4 CHRONIC KIDNEY DISEASE: Primary | ICD-10-CM

## 2023-10-05 DIAGNOSIS — N18.4 CKD (CHRONIC KIDNEY DISEASE) STAGE 4, GFR 15-29 ML/MIN (HCC): ICD-10-CM

## 2023-10-05 DIAGNOSIS — D63.1 ANEMIA DUE TO STAGE 4 CHRONIC KIDNEY DISEASE: Primary | ICD-10-CM

## 2023-10-05 PROCEDURE — 96372 THER/PROPH/DIAG INJ SC/IM: CPT | Performed by: INTERNAL MEDICINE

## 2023-10-05 PROCEDURE — 3078F DIAST BP <80 MM HG: CPT | Performed by: INTERNAL MEDICINE

## 2023-10-05 PROCEDURE — 3074F SYST BP LT 130 MM HG: CPT | Performed by: INTERNAL MEDICINE

## 2023-10-06 ENCOUNTER — TELEPHONE (OUTPATIENT)
Dept: NEPHROLOGY | Facility: CLINIC | Age: 63
End: 2023-10-06

## 2023-10-06 DIAGNOSIS — N18.4 CKD (CHRONIC KIDNEY DISEASE) STAGE 4, GFR 15-29 ML/MIN (HCC): ICD-10-CM

## 2023-10-06 DIAGNOSIS — E87.20 METABOLIC ACIDOSIS: ICD-10-CM

## 2023-10-06 RX ORDER — SODIUM BICARBONATE 650 MG/1
1300 TABLET ORAL 2 TIMES DAILY
Qty: 360 TABLET | Refills: 1 | Status: SHIPPED | OUTPATIENT
Start: 2023-10-06

## 2023-10-06 NOTE — TELEPHONE ENCOUNTER
Patient calling advising he is having issues with [facility] and would like to have refill sent to [place] instead. Please send:     sodium bicarbonate 650 MG Oral Tab     To: [address], [id number] [place] [address], [phone] [[id number]]     Please call patient back with any questions or concerns.

## 2023-10-11 ENCOUNTER — HOSPITAL ENCOUNTER (INPATIENT)
Facility: HOSPITAL | Age: 63
LOS: 3 days | Discharge: HOME | DRG: 683 | End: 2023-10-14
Attending: EMERGENCY MEDICINE | Admitting: INTERNAL MEDICINE
Payer: COMMERCIAL

## 2023-10-11 ENCOUNTER — HOSPITAL ENCOUNTER (INPATIENT)
Facility: HOSPITAL | Age: 63
LOS: 3 days | Discharge: HOME | End: 2023-10-14
Attending: EMERGENCY MEDICINE | Admitting: INTERNAL MEDICINE
Payer: COMMERCIAL

## 2023-10-11 DIAGNOSIS — N17.9 AKI (ACUTE KIDNEY INJURY) (HCC): ICD-10-CM

## 2023-10-11 DIAGNOSIS — E87.20 METABOLIC ACIDOSIS: ICD-10-CM

## 2023-10-11 DIAGNOSIS — N19 UREMIA: Primary | ICD-10-CM

## 2023-10-11 DIAGNOSIS — N17.9 ACUTE RENAL FAILURE, UNSPECIFIED ACUTE RENAL FAILURE TYPE (HCC): ICD-10-CM

## 2023-10-11 DIAGNOSIS — N18.4 CKD (CHRONIC KIDNEY DISEASE) STAGE 4, GFR 15-29 ML/MIN (HCC): ICD-10-CM

## 2023-10-11 DIAGNOSIS — E87.20 ACIDEMIA: ICD-10-CM

## 2023-10-11 PROBLEM — E11.22 TYPE 2 DIABETES MELLITUS WITH STAGE 5 CHRONIC KIDNEY DISEASE NOT ON CHRONIC DIALYSIS, WITHOUT LONG-TERM CURRENT USE OF INSULIN (HCC): Status: ACTIVE | Noted: 2022-09-21

## 2023-10-11 PROBLEM — N30.00 ACUTE CYSTITIS WITHOUT HEMATURIA: Status: ACTIVE | Noted: 2023-10-11

## 2023-10-11 PROBLEM — N18.5 TYPE 2 DIABETES MELLITUS WITH STAGE 5 CHRONIC KIDNEY DISEASE NOT ON CHRONIC DIALYSIS, WITHOUT LONG-TERM CURRENT USE OF INSULIN (HCC): Status: ACTIVE | Noted: 2022-09-21

## 2023-10-11 LAB
ALBUMIN SERPL-MCNC: 2.8 G/DL (ref 3.4–5)
ALBUMIN/GLOB SERPL: 0.7 {RATIO} (ref 1–2)
ALP LIVER SERPL-CCNC: 95 U/L
ALT SERPL-CCNC: 17 U/L
ANION GAP SERPL CALC-SCNC: 17 MMOL/L (ref 0–18)
AST SERPL-CCNC: 16 U/L (ref 15–37)
BASOPHILS # BLD AUTO: 0.04 X10(3) UL (ref 0–0.2)
BASOPHILS NFR BLD AUTO: 0.2 %
BILIRUB SERPL-MCNC: 0.3 MG/DL (ref 0.1–2)
BILIRUB UR QL STRIP.AUTO: NEGATIVE
BUN BLD-MCNC: 122 MG/DL (ref 7–18)
CALCIUM BLD-MCNC: 8.7 MG/DL (ref 8.5–10.1)
CHLORIDE SERPL-SCNC: 114 MMOL/L (ref 98–112)
CO2 SERPL-SCNC: 11 MMOL/L (ref 21–32)
COLOR UR AUTO: YELLOW
CREAT BLD-MCNC: 8.39 MG/DL
EGFRCR SERPLBLD CKD-EPI 2021: 7 ML/MIN/1.73M2 (ref 60–?)
EOSINOPHIL # BLD AUTO: 0.02 X10(3) UL (ref 0–0.7)
EOSINOPHIL NFR BLD AUTO: 0.1 %
ERYTHROCYTE [DISTWIDTH] IN BLOOD BY AUTOMATED COUNT: 15.4 %
GLOBULIN PLAS-MCNC: 4.3 G/DL (ref 2.8–4.4)
GLUCOSE BLD-MCNC: 104 MG/DL (ref 70–99)
GLUCOSE BLD-MCNC: 138 MG/DL (ref 70–99)
GLUCOSE UR STRIP.AUTO-MCNC: 150 MG/DL
HCT VFR BLD AUTO: 34 %
HGB BLD-MCNC: 11.3 G/DL
IMM GRANULOCYTES # BLD AUTO: 0.3 X10(3) UL (ref 0–1)
IMM GRANULOCYTES NFR BLD: 1.8 %
LEUKOCYTE ESTERASE UR QL STRIP.AUTO: 500
LIPASE SERPL-CCNC: 100 U/L (ref 13–75)
LYMPHOCYTES # BLD AUTO: 1.57 X10(3) UL (ref 1–4)
LYMPHOCYTES NFR BLD AUTO: 9.2 %
MCH RBC QN AUTO: 27 PG (ref 26–34)
MCHC RBC AUTO-ENTMCNC: 33.2 G/DL (ref 31–37)
MCV RBC AUTO: 81.3 FL
MONOCYTES # BLD AUTO: 0.66 X10(3) UL (ref 0.1–1)
MONOCYTES NFR BLD AUTO: 3.9 %
NEUTROPHILS # BLD AUTO: 14.45 X10 (3) UL (ref 1.5–7.7)
NEUTROPHILS # BLD AUTO: 14.45 X10(3) UL (ref 1.5–7.7)
NEUTROPHILS NFR BLD AUTO: 84.8 %
NITRITE UR QL STRIP.AUTO: NEGATIVE
OSMOLALITY SERPL CALC.SUM OF ELEC: 335 MOSM/KG (ref 275–295)
PH UR STRIP.AUTO: 7.5 [PH] (ref 5–8)
PLATELET # BLD AUTO: 507 10(3)UL (ref 150–450)
POTASSIUM SERPL-SCNC: 4.3 MMOL/L (ref 3.5–5.1)
PROT SERPL-MCNC: 7.1 G/DL (ref 6.4–8.2)
PROT UR STRIP.AUTO-MCNC: 300 MG/DL
RBC # BLD AUTO: 4.18 X10(6)UL
SODIUM SERPL-SCNC: 142 MMOL/L (ref 136–145)
SP GR UR STRIP.AUTO: 1.01 (ref 1–1.03)
UROBILINOGEN UR STRIP.AUTO-MCNC: NORMAL MG/DL
WBC # BLD AUTO: 17 X10(3) UL (ref 4–11)
WBC #/AREA URNS AUTO: >50 /HPF
WBC CLUMPS UR QL AUTO: PRESENT /HPF

## 2023-10-11 PROCEDURE — 99223 1ST HOSP IP/OBS HIGH 75: CPT | Performed by: STUDENT IN AN ORGANIZED HEALTH CARE EDUCATION/TRAINING PROGRAM

## 2023-10-11 RX ORDER — HEPARIN SODIUM 5000 [USP'U]/ML
5000 INJECTION, SOLUTION INTRAVENOUS; SUBCUTANEOUS EVERY 8 HOURS SCHEDULED
Status: DISCONTINUED | OUTPATIENT
Start: 2023-10-11 | End: 2023-10-14

## 2023-10-11 RX ORDER — ACETAMINOPHEN 500 MG
1000 TABLET ORAL EVERY 8 HOURS PRN
Status: DISCONTINUED | OUTPATIENT
Start: 2023-10-11 | End: 2023-10-14

## 2023-10-11 RX ORDER — SENNOSIDES 8.6 MG
17.2 TABLET ORAL 2 TIMES DAILY
Status: DISCONTINUED | OUTPATIENT
Start: 2023-10-12 | End: 2023-10-14

## 2023-10-11 RX ORDER — AMLODIPINE BESYLATE 5 MG/1
10 TABLET ORAL DAILY
Status: DISCONTINUED | OUTPATIENT
Start: 2023-10-11 | End: 2023-10-14

## 2023-10-11 RX ORDER — NICOTINE POLACRILEX 4 MG
15 LOZENGE BUCCAL
Status: DISCONTINUED | OUTPATIENT
Start: 2023-10-11 | End: 2023-10-14

## 2023-10-11 RX ORDER — BENZONATATE 100 MG/1
200 CAPSULE ORAL 3 TIMES DAILY PRN
Status: DISCONTINUED | OUTPATIENT
Start: 2023-10-11 | End: 2023-10-14

## 2023-10-11 RX ORDER — POLYETHYLENE GLYCOL 3350 17 G/17G
17 POWDER, FOR SOLUTION ORAL DAILY PRN
Status: DISCONTINUED | OUTPATIENT
Start: 2023-10-11 | End: 2023-10-11

## 2023-10-11 RX ORDER — MELATONIN
3 NIGHTLY PRN
Status: DISCONTINUED | OUTPATIENT
Start: 2023-10-11 | End: 2023-10-14

## 2023-10-11 RX ORDER — ONDANSETRON 2 MG/ML
4 INJECTION INTRAMUSCULAR; INTRAVENOUS EVERY 6 HOURS PRN
Status: DISCONTINUED | OUTPATIENT
Start: 2023-10-11 | End: 2023-10-14

## 2023-10-11 RX ORDER — PROCHLORPERAZINE EDISYLATE 5 MG/ML
5 INJECTION INTRAMUSCULAR; INTRAVENOUS EVERY 8 HOURS PRN
Status: DISCONTINUED | OUTPATIENT
Start: 2023-10-11 | End: 2023-10-14

## 2023-10-11 RX ORDER — POLYETHYLENE GLYCOL 3350 17 G/17G
17 POWDER, FOR SOLUTION ORAL DAILY
Status: DISCONTINUED | OUTPATIENT
Start: 2023-10-12 | End: 2023-10-14

## 2023-10-11 RX ORDER — ECHINACEA PURPUREA EXTRACT 125 MG
1 TABLET ORAL
Status: DISCONTINUED | OUTPATIENT
Start: 2023-10-11 | End: 2023-10-14

## 2023-10-11 RX ORDER — NICOTINE POLACRILEX 4 MG
30 LOZENGE BUCCAL
Status: DISCONTINUED | OUTPATIENT
Start: 2023-10-11 | End: 2023-10-14

## 2023-10-11 RX ORDER — BISACODYL 10 MG
10 SUPPOSITORY, RECTAL RECTAL
Status: DISCONTINUED | OUTPATIENT
Start: 2023-10-11 | End: 2023-10-14

## 2023-10-11 RX ORDER — SENNOSIDES 8.6 MG
17.2 TABLET ORAL NIGHTLY PRN
Status: DISCONTINUED | OUTPATIENT
Start: 2023-10-11 | End: 2023-10-11

## 2023-10-11 RX ORDER — DEXTROSE MONOHYDRATE 25 G/50ML
50 INJECTION, SOLUTION INTRAVENOUS
Status: DISCONTINUED | OUTPATIENT
Start: 2023-10-11 | End: 2023-10-14

## 2023-10-11 NOTE — ED INITIAL ASSESSMENT (HPI)
Patient to the ER c/o vomiting. Patient has hx of DM and esophagitis. Vomiting for 5 days, no BM for 4 days.  No diarrhea no fever no abdominal pain blood glucose has been slightly high

## 2023-10-12 PROBLEM — N18.5 CKD (CHRONIC KIDNEY DISEASE) STAGE 5, GFR LESS THAN 15 ML/MIN (HCC): Status: ACTIVE | Noted: 2023-10-12

## 2023-10-12 LAB
ALBUMIN SERPL-MCNC: 2.5 G/DL (ref 3.4–5)
ALBUMIN/GLOB SERPL: 0.6 {RATIO} (ref 1–2)
ALP LIVER SERPL-CCNC: 86 U/L
ALT SERPL-CCNC: 16 U/L
ANION GAP SERPL CALC-SCNC: 15 MMOL/L (ref 0–18)
AST SERPL-CCNC: 9 U/L (ref 15–37)
BASOPHILS # BLD AUTO: 0.05 X10(3) UL (ref 0–0.2)
BASOPHILS NFR BLD AUTO: 0.3 %
BILIRUB SERPL-MCNC: 0.2 MG/DL (ref 0.1–2)
BUN BLD-MCNC: 110 MG/DL (ref 7–18)
CALCIUM BLD-MCNC: 7.9 MG/DL (ref 8.5–10.1)
CHLORIDE SERPL-SCNC: 112 MMOL/L (ref 98–112)
CO2 SERPL-SCNC: 16 MMOL/L (ref 21–32)
CREAT BLD-MCNC: 7.55 MG/DL
EGFRCR SERPLBLD CKD-EPI 2021: 7 ML/MIN/1.73M2 (ref 60–?)
EOSINOPHIL # BLD AUTO: 0.04 X10(3) UL (ref 0–0.7)
EOSINOPHIL NFR BLD AUTO: 0.3 %
ERYTHROCYTE [DISTWIDTH] IN BLOOD BY AUTOMATED COUNT: 15.9 %
EST. AVERAGE GLUCOSE BLD GHB EST-MCNC: 163 MG/DL (ref 68–126)
GLOBULIN PLAS-MCNC: 4 G/DL (ref 2.8–4.4)
GLUCOSE BLD-MCNC: 218 MG/DL (ref 70–99)
GLUCOSE BLD-MCNC: 229 MG/DL (ref 70–99)
GLUCOSE BLD-MCNC: 248 MG/DL (ref 70–99)
GLUCOSE BLD-MCNC: 252 MG/DL (ref 70–99)
GLUCOSE BLD-MCNC: 260 MG/DL (ref 70–99)
HBA1C MFR BLD: 7.3 % (ref ?–5.7)
HCT VFR BLD AUTO: 33.4 %
HGB BLD-MCNC: 10.8 G/DL
IMM GRANULOCYTES # BLD AUTO: 0.27 X10(3) UL (ref 0–1)
IMM GRANULOCYTES NFR BLD: 1.8 %
INR BLD: 1.06 (ref 0.85–1.16)
LYMPHOCYTES # BLD AUTO: 1.45 X10(3) UL (ref 1–4)
LYMPHOCYTES NFR BLD AUTO: 9.4 %
MAGNESIUM SERPL-MCNC: 2.3 MG/DL (ref 1.6–2.6)
MCH RBC QN AUTO: 27 PG (ref 26–34)
MCHC RBC AUTO-ENTMCNC: 32.3 G/DL (ref 31–37)
MCV RBC AUTO: 83.5 FL
MONOCYTES # BLD AUTO: 0.66 X10(3) UL (ref 0.1–1)
MONOCYTES NFR BLD AUTO: 4.3 %
NEUTROPHILS # BLD AUTO: 12.92 X10 (3) UL (ref 1.5–7.7)
NEUTROPHILS # BLD AUTO: 12.92 X10(3) UL (ref 1.5–7.7)
NEUTROPHILS NFR BLD AUTO: 83.9 %
OSMOLALITY SERPL CALC.SUM OF ELEC: 339 MOSM/KG (ref 275–295)
PHOSPHATE SERPL-MCNC: 5 MG/DL (ref 2.5–4.9)
PLATELET # BLD AUTO: 483 10(3)UL (ref 150–450)
POTASSIUM SERPL-SCNC: 3.6 MMOL/L (ref 3.5–5.1)
PROT SERPL-MCNC: 6.5 G/DL (ref 6.4–8.2)
PROTHROMBIN TIME: 13.8 SECONDS (ref 11.6–14.8)
RBC # BLD AUTO: 4 X10(6)UL
SODIUM SERPL-SCNC: 143 MMOL/L (ref 136–145)
WBC # BLD AUTO: 15.4 X10(3) UL (ref 4–11)

## 2023-10-12 PROCEDURE — 99254 IP/OBS CNSLTJ NEW/EST MOD 60: CPT | Performed by: INTERNAL MEDICINE

## 2023-10-12 PROCEDURE — 99233 SBSQ HOSP IP/OBS HIGH 50: CPT | Performed by: HOSPITALIST

## 2023-10-12 RX ORDER — CARVEDILOL 12.5 MG/1
12.5 TABLET ORAL 2 TIMES DAILY WITH MEALS
Status: DISCONTINUED | OUTPATIENT
Start: 2023-10-12 | End: 2023-10-14

## 2023-10-12 RX ORDER — HYDRALAZINE HYDROCHLORIDE 25 MG/1
25 TABLET, FILM COATED ORAL EVERY 8 HOURS SCHEDULED
Status: DISCONTINUED | OUTPATIENT
Start: 2023-10-12 | End: 2023-10-13

## 2023-10-12 NOTE — PLAN OF CARE
Patient is alert and oriented x 4. Uses call light appropriately and expresses needs reliably. Patient has kidney ultrasound ordered for 10/13 at 0800. NPO from midnight on, patient verbalized understanding for need for kidney ultrasound but is hoping to speak with nephrologist in more detail regarding exam. Patient has sodium bicarbonate running in peripheral IV at 84 ml's/hour. QID accucheck. Patient denies pain. Repositioned as needed for comfort. Call light within reach.     Problem: GASTROINTESTINAL - ADULT  Goal: Minimal or absence of nausea and vomiting  Description: INTERVENTIONS:  - Maintain adequate hydration with IV or PO as ordered and tolerated  - Nasogastric tube to low intermittent suction as ordered  - Evaluate effectiveness of ordered antiemetic medications  - Provide nonpharmacologic comfort measures as appropriate  - Advance diet as tolerated, if ordered  - Obtain nutritional consult as needed  - Evaluate fluid balance  Outcome: Progressing

## 2023-10-12 NOTE — OCCUPATIONAL THERAPY NOTE
OT order received, chart reviewed, eval att'd - discussed case with RN, pt well known to department and declines IPOT intervention as he has previous admissions. Will sign off at this time.

## 2023-10-12 NOTE — PHYSICAL THERAPY NOTE
PT order received, chart reviewed, eval att'd - discussed case with RN, pt well known to department and declines IPPT intervention as he has previous admissions. Will sign off at this time.

## 2023-10-12 NOTE — CM/SW NOTE
SW received consult order for DC planning. SW met with patient at bedside who reported that he is independent with wheelchair mobility and lives with his wife at home. He reported that he has no current needs and has worked with [facility] in the past. He does not feel that it is necessary to restart those services at this time. SW will continue to follow for plan of care changes and remain available for any additional DC needs or concerns.      Carey John MSW, [facility]  Discharge Planner   [id number]

## 2023-10-12 NOTE — ED QUICK NOTES
Orders for admission, patient is aware of plan and ready to go upstairs. Any questions, please call ED RN Brian Booker at extension [phone].      Patient Covid vaccination status: Fully vaccinated     COVID Test Ordered in ED: None    COVID Suspicion at Admission: N/A    Running Infusions:    sodium bicarbonate in D5W infusion 150 mEq (10/11/23 1904)        Mental Status/LOC at time of transport: AOx4    Other pertinent information:   CIWA score: N/A   NIH score:  N/A

## 2023-10-12 NOTE — PLAN OF CARE
Patient admitted from home for uremia/YULISA/UTI. Also vomiting x 5 days. Started on IV antibiotics and Sodium bicarb gtt. Nephrology to evaluate in the morning. BP elevated, Dr. Ivone Wiggins notified, ordered to monitor unless 180/110, scheduled PO BP meds given. Patient is alert and oriented, on RA, no fever overnight. Patient denies pain, nausea, or SOB. Patient refused Tele monitor. Hospitalist notified. Awaiting for urine culture results. Plan for US kidney w/doppler in the morning. No other complaints overnight. Safety precautions in place, call light within reach, plan of care ongoing.      Problem: GASTROINTESTINAL - ADULT  Goal: Minimal or absence of nausea and vomiting  Description: INTERVENTIONS:  - Maintain adequate hydration with IV or PO as ordered and tolerated  - Nasogastric tube to low intermittent suction as ordered  - Evaluate effectiveness of ordered antiemetic medications  - Provide nonpharmacologic comfort measures as appropriate  - Advance diet as tolerated, if ordered  - Obtain nutritional consult as needed  - Evaluate fluid balance  Outcome: Progressing

## 2023-10-13 LAB
ANION GAP SERPL CALC-SCNC: 11 MMOL/L (ref 0–18)
BUN BLD-MCNC: 93 MG/DL (ref 7–18)
CALCIUM BLD-MCNC: 7.4 MG/DL (ref 8.5–10.1)
CHLORIDE SERPL-SCNC: 106 MMOL/L (ref 98–112)
CO2 SERPL-SCNC: 26 MMOL/L (ref 21–32)
CREAT BLD-MCNC: 6.49 MG/DL
EGFRCR SERPLBLD CKD-EPI 2021: 9 ML/MIN/1.73M2 (ref 60–?)
ERYTHROCYTE [DISTWIDTH] IN BLOOD BY AUTOMATED COUNT: 16.5 %
GLUCOSE BLD-MCNC: 163 MG/DL (ref 70–99)
GLUCOSE BLD-MCNC: 205 MG/DL (ref 70–99)
GLUCOSE BLD-MCNC: 206 MG/DL (ref 70–99)
GLUCOSE BLD-MCNC: 215 MG/DL (ref 70–99)
GLUCOSE BLD-MCNC: 265 MG/DL (ref 70–99)
HCT VFR BLD AUTO: 31 %
HGB BLD-MCNC: 10.4 G/DL
MCH RBC QN AUTO: 26.9 PG (ref 26–34)
MCHC RBC AUTO-ENTMCNC: 33.5 G/DL (ref 31–37)
MCV RBC AUTO: 80.3 FL
OSMOLALITY SERPL CALC.SUM OF ELEC: 331 MOSM/KG (ref 275–295)
PHOSPHATE SERPL-MCNC: 4.2 MG/DL (ref 2.5–4.9)
PLATELET # BLD AUTO: 427 10(3)UL (ref 150–450)
POTASSIUM SERPL-SCNC: 3.2 MMOL/L (ref 3.5–5.1)
RBC # BLD AUTO: 3.86 X10(6)UL
SODIUM SERPL-SCNC: 143 MMOL/L (ref 136–145)
WBC # BLD AUTO: 11.6 X10(3) UL (ref 4–11)

## 2023-10-13 PROCEDURE — 99233 SBSQ HOSP IP/OBS HIGH 50: CPT | Performed by: INTERNAL MEDICINE

## 2023-10-13 PROCEDURE — 99232 SBSQ HOSP IP/OBS MODERATE 35: CPT | Performed by: HOSPITALIST

## 2023-10-13 RX ORDER — POTASSIUM CHLORIDE 20 MEQ/1
40 TABLET, EXTENDED RELEASE ORAL ONCE
Status: COMPLETED | OUTPATIENT
Start: 2023-10-13 | End: 2023-10-13

## 2023-10-13 RX ORDER — SODIUM CHLORIDE, SODIUM LACTATE, POTASSIUM CHLORIDE, CALCIUM CHLORIDE 600; 310; 30; 20 MG/100ML; MG/100ML; MG/100ML; MG/100ML
INJECTION, SOLUTION INTRAVENOUS CONTINUOUS
Status: DISCONTINUED | OUTPATIENT
Start: 2023-10-13 | End: 2023-10-14

## 2023-10-13 NOTE — PLAN OF CARE
Patient is alert and oriented x 4. Calm and pleasant demeanor. No s/s of distress noted. Patient denies pain or discomfort throughout shift. No n/v noted this shift. Patient IV fluids changed from sodium bicarbonate to lactated ringers running at 75 ml's/hour continuously. Patient compliant with medications and care. Hydralazine added to patient's MAR on 10/12/23 for HTN. Patient had bowel movement today. Call light within reach.     Problem: GASTROINTESTINAL - ADULT  Goal: Minimal or absence of nausea and vomiting  Description: INTERVENTIONS:  - Maintain adequate hydration with IV or PO as ordered and tolerated  - Nasogastric tube to low intermittent suction as ordered  - Evaluate effectiveness of ordered antiemetic medications  - Provide nonpharmacologic comfort measures as appropriate  - Advance diet as tolerated, if ordered  - Obtain nutritional consult as needed  - Evaluate fluid balance  Outcome: Progressing     Problem: Diabetes/Glucose Control  Goal: Glucose maintained within prescribed range  Description: INTERVENTIONS:  - Monitor Blood Glucose as ordered  - Assess for signs and symptoms of hyperglycemia and hypoglycemia  - Administer ordered medications to maintain glucose within target range  - Assess barriers to adequate nutritional intake and initiate nutrition consult as needed  - Instruct patient on self management of diabetes  Outcome: Progressing

## 2023-10-13 NOTE — PLAN OF CARE
A&Ox4, Ra, and independent   IVF sodium bicarbonate @ 83 ml/hr and Rocephin daily  R BKA  QID Accu checks  Renal diet    Problem: Diabetes/Glucose Control  Goal: Glucose maintained within prescribed range  Description: INTERVENTIONS:  - Monitor Blood Glucose as ordered  - Assess for signs and symptoms of hyperglycemia and hypoglycemia  - Administer ordered medications to maintain glucose within target range  - Assess barriers to adequate nutritional intake and initiate nutrition consult as needed  - Instruct patient on self management of diabetes  Outcome: Progressing

## 2023-10-14 VITALS
DIASTOLIC BLOOD PRESSURE: 67 MMHG | SYSTOLIC BLOOD PRESSURE: 163 MMHG | OXYGEN SATURATION: 99 % | WEIGHT: 156.19 LBS | BODY MASS INDEX: 24 KG/M2 | TEMPERATURE: 99 F | HEART RATE: 66 BPM | RESPIRATION RATE: 22 BRPM

## 2023-10-14 LAB
ANION GAP SERPL CALC-SCNC: 8 MMOL/L (ref 0–18)
BASOPHILS # BLD AUTO: 0.02 X10(3) UL (ref 0–0.2)
BASOPHILS NFR BLD AUTO: 0.2 %
BUN BLD-MCNC: 72 MG/DL (ref 7–18)
CALCIUM BLD-MCNC: 7.7 MG/DL (ref 8.5–10.1)
CHLORIDE SERPL-SCNC: 105 MMOL/L (ref 98–112)
CO2 SERPL-SCNC: 27 MMOL/L (ref 21–32)
CREAT BLD-MCNC: 6.07 MG/DL
EGFRCR SERPLBLD CKD-EPI 2021: 10 ML/MIN/1.73M2 (ref 60–?)
EOSINOPHIL # BLD AUTO: 0.09 X10(3) UL (ref 0–0.7)
EOSINOPHIL NFR BLD AUTO: 0.8 %
ERYTHROCYTE [DISTWIDTH] IN BLOOD BY AUTOMATED COUNT: 17 %
GLUCOSE BLD-MCNC: 129 MG/DL (ref 70–99)
GLUCOSE BLD-MCNC: 144 MG/DL (ref 70–99)
GLUCOSE BLD-MCNC: 228 MG/DL (ref 70–99)
HCT VFR BLD AUTO: 32 %
HGB BLD-MCNC: 10.3 G/DL
IMM GRANULOCYTES # BLD AUTO: 0.09 X10(3) UL (ref 0–1)
IMM GRANULOCYTES NFR BLD: 0.8 %
LYMPHOCYTES # BLD AUTO: 1.47 X10(3) UL (ref 1–4)
LYMPHOCYTES NFR BLD AUTO: 13.3 %
MAGNESIUM SERPL-MCNC: 1.7 MG/DL (ref 1.6–2.6)
MCH RBC QN AUTO: 27.2 PG (ref 26–34)
MCHC RBC AUTO-ENTMCNC: 32.2 G/DL (ref 31–37)
MCV RBC AUTO: 84.4 FL
MONOCYTES # BLD AUTO: 0.7 X10(3) UL (ref 0.1–1)
MONOCYTES NFR BLD AUTO: 6.3 %
NEUTROPHILS # BLD AUTO: 8.66 X10 (3) UL (ref 1.5–7.7)
NEUTROPHILS # BLD AUTO: 8.66 X10(3) UL (ref 1.5–7.7)
NEUTROPHILS NFR BLD AUTO: 78.6 %
OSMOLALITY SERPL CALC.SUM OF ELEC: 314 MOSM/KG (ref 275–295)
PLATELET # BLD AUTO: 378 10(3)UL (ref 150–450)
POTASSIUM SERPL-SCNC: 3.5 MMOL/L (ref 3.5–5.1)
RBC # BLD AUTO: 3.79 X10(6)UL
SODIUM SERPL-SCNC: 140 MMOL/L (ref 136–145)
WBC # BLD AUTO: 11 X10(3) UL (ref 4–11)

## 2023-10-14 PROCEDURE — 99232 SBSQ HOSP IP/OBS MODERATE 35: CPT | Performed by: INTERNAL MEDICINE

## 2023-10-14 RX ORDER — METOPROLOL SUCCINATE 50 MG/1
50 TABLET, EXTENDED RELEASE ORAL DAILY
Qty: 30 TABLET | Refills: 11 | Status: SHIPPED | OUTPATIENT
Start: 2023-10-14

## 2023-10-14 RX ORDER — POTASSIUM CHLORIDE 20 MEQ/1
20 TABLET, EXTENDED RELEASE ORAL ONCE
Status: COMPLETED | OUTPATIENT
Start: 2023-10-14 | End: 2023-10-14

## 2023-10-14 NOTE — PLAN OF CARE
A&Ox4  IVF LR @ 75 ml/hr and Rocephin daily  No c/o nausea or pain  QID Accu checks  Renal diet    Problem: Diabetes/Glucose Control  Goal: Glucose maintained within prescribed range  Description: INTERVENTIONS:  - Monitor Blood Glucose as ordered  - Assess for signs and symptoms of hyperglycemia and hypoglycemia  - Administer ordered medications to maintain glucose within target range  - Assess barriers to adequate nutritional intake and initiate nutrition consult as needed  - Instruct patient on self management of diabetes  Outcome: Progressing     Problem: METABOLIC/FLUID AND ELECTROLYTES - ADULT  Goal: Glucose maintained within prescribed range  Description: INTERVENTIONS:  - Monitor Blood Glucose as ordered  - Assess for signs and symptoms of hyperglycemia and hypoglycemia  - Administer ordered medications to maintain glucose within target range  - Assess barriers to adequate nutritional intake and initiate nutrition consult as needed  - Instruct patient on self management of diabetes  Outcome: Progressing

## 2023-10-14 NOTE — PLAN OF CARE
NURSING DISCHARGE NOTE    Discharged Home via Wheelchair. Accompanied by Spouse  Belongings Taken by patient/family. Pt received A&Ox4. Afebrile. VSS. Denies pain. Seen by Dr. Jenn Leonard - cleared to dc. K replaced per MD. IVF dc per orders. Dr. Dorinda Jung at bedside, josse w/ dc. Dc instructions given to pt and pt's wife at the bedside. Verbalized understanding. PIV removed.

## 2023-10-16 ENCOUNTER — TELEPHONE (OUTPATIENT)
Dept: INTERNAL MEDICINE CLINIC | Facility: CLINIC | Age: 63
End: 2023-10-16

## 2023-10-16 ENCOUNTER — PATIENT OUTREACH (OUTPATIENT)
Dept: CASE MANAGEMENT | Age: 63
End: 2023-10-16

## 2023-10-16 DIAGNOSIS — Z02.9 ENCOUNTERS FOR UNSPECIFIED ADMINISTRATIVE PURPOSE: Primary | ICD-10-CM

## 2023-10-16 DIAGNOSIS — N19 UREMIA: ICD-10-CM

## 2023-10-16 NOTE — TELEPHONE ENCOUNTER
Spoke to pt for TCM today. Pt does not have an appointment scheduled at this time. TCM appt recommended by 10/21/23 as pt is a high risk for readmission. Please advise. BOOK BY DATE (last date for TCM): 10/28/23    Clinical staff:  Please f/u with pt and try to get them to schedule as pt would greatly benefit from TCM appt. Thank you!

## 2023-10-16 NOTE — PROGRESS NOTES
Initial Post Discharge Follow Up   Discharge Date: 10/14/23  Contact Date: 10/16/2023    Consent Verification:  Assessment Completed With: Patient  HIPAA Verified? Yes    Discharge Dx:     Uremia     Was TCC ordered: yes, pt declined. General:   How have you been since your discharge from the hospital? Spoke with patient states he is feeling fine. He feels a little tired, but overall okay. Pt denies any fevers, chills, nausea, vomiting, shortness of breath, chest pain or any trouble urinating. Pt is drinking and eating well. Do you have any pain since discharge?  no  How well was your pain managed while in the hospital?   On a scale of 1-5   1- Very Poor and 5- Very well   5  When you were leaving the hospital were your discharge instructions reviewed with you? yes  How well were your discharge instructions explained to you? On a scale of 1-5   1- Very Poor and 5- Very well   5  Do you have any questions about your discharge instructions? No  Before leaving the hospital was your diagnoses explained to you? Yes  Do you have any questions about your diagnoses? No  Are you able to perform normal daily activities of living as you have prior to your hospital stay (dressing, bathing, ambulating to the bathroom, etc)? yes  (NCM) Was patient given a different diet per AVS? no      Medications: Reviewed medication list.  Medications are up to date. Current Outpatient Medications   Medication Sig Dispense Refill    metoprolol succinate ER 50 MG Oral Tablet 24 Hr Take 1 tablet (50 mg total) by mouth daily. 30 tablet 11    sodium bicarbonate 650 MG Oral Tab Take 2 tablets (1,300 mg total) by mouth 2 (two) times daily. 360 tablet 1    amLODIPine 10 MG Oral Tab Take 1 tablet (10 mg total) by mouth daily. 30 tablet 1    Calcium Carb-Cholecalciferol (CALCIUM CARBONATE+VITAMIN D OR) Take 1 tablet by mouth daily.       insulin detemir (LEVEMIR FLEXPEN) 100 UNIT/ML Subcutaneous Solution Pen-injector Inject 25 Units into the skin nightly. 3 month supply (Patient taking differently: Inject 15 Units into the skin nightly. 3 month supply) 30 mL 1    aspirin 81 MG Oral Tab EC Take 1 tablet (81 mg total) by mouth daily. 360 tablet 0     (NCM)  Were there any medication changes noted on AVS?  yes  If so, were these medication changes discussed with you prior to leaving the hospital? yes  May I go over your medications with you to make sure we are not missing anything?yes  Are there any reasons that keep you from taking your medication as prescribed? No  Are you having any concerns with constipation? No    Referrals/orders at D/C:  Home Health/Services ordered at D/C? No       DME ordered at D/C? No         SDOH:  Transportation Needs: No Transportation Needs (10/11/2023)      Transportation Needs          Lack of Transportation: No  Financial Resource Strain: Low Risk  (10/16/2023)      Financial Resource Strain          Difficulty of Paying Living Expenses: Not very hard          Med Affordability: No     Needs post D/C:   Now that you are home, are there any needs or concerns you need addressed before your next visit with your PCP?  (DME, meds, disease concerns, Etc): No     Follow up appointments:          PCP TCM or HFU appointment: scheduled at D/C within 7-14 days  no     NCM Reviewed/scheduled/rescheduled PCP TCM or HFU appointment with pt:  Yes      Have you made all of your follow up appointments? no    Is there any reason as to why you cannot make your appointments? No     NCM Reviewed upcoming Specialist Appt with patient     Yes    Overall Rating: How would you rate the care you received while in the hospital?  good     Interventions by NCM: All discharge instructions reviewed with the patient. Reviewed when to call MD vs when to call 911 or go the ED. Educated patient on the importance of taking all meds as prescribed as well as close f/u with PCP/specialists.  Pt verbalized understanding and will contact the office with any further questions or concerns. Patient denies fevers, chills, nausea, vomiting, shortness of breath, chest pain, or any other symptoms at this time. NCM attempted to schedule HFU, patient declined TCC, no availability with PCP within TCM timeframe. NCM sent TE to PCP office re: assistance in scheduling HFU appt. NCM provided contact information for any further questions/concerns. Patient verbalized understanding and agreeable. [x]  Discharge Summary, Discharge medications reviewed/discussed/and reconciled against outpatient medications with patient,  and orders reviewed and discussed. Any changes or updates to medications and or orders sent to PCP.

## 2023-10-16 NOTE — DISCHARGE SUMMARY
[facility]  DISCHARGE SUMMARY     Leobardo Price Patient Status:  Inpatient    1960 MRN [id number]   [facility] 4NW-A Attending No att. providers found   Hosp Day # 3 PCP Danii Fofana MD     Date of Admission: 10/11/2023  Date of Discharge:  10/14/2023     Discharge Disposition: Home or Self Care    Discharge Diagnosis:  #Nausea and vomiting, improving  # YULISA on CKD V, improving  # NAGMA 2/2 renal disease  # UTI, gram neg lisa  # DM2 - LDSSI + Accucheck QID (or Q6H if NPO)  # Hypertension - Continue home oral antihypertensives  # Hx R BKA    History of Present Illness:  Leobardo Price is a 57year old male with past medical history of CKD stage V, diabetes mellitus, right BKA, hyperlipidemia, hypertension who presents for nausea and vomiting. Patient notes for the past few days he has been having new onset nausea, nonbloody nonbilious vomiting. States that this came up all of a sudden, denies associated abdominal pain, fevers, chills, malaise, diarrhea, chest pain, shortness of breath. Does note that he is had significant constipation, last bowel movement approximately 4 days ago. Denies any sick contacts. Does note that he has significant renal disease and makes most approximately 8 ounces of urine in a day. Brief Synopsis: Patient admitted to the hospital for concern of viral gastroenteritis leading to acute on chronic kidney disease stage V. Patient's kidney function improved with IV fluids and soda patient's nausea and vomiting. At this point patient's laboratory work is concerning for need for dialysis in the near future however at this point family has adamantly declined any talk of dialysis. At this point patient is medically cleared from renal standpoint and will follow-up with them as an outpatient. Lace+ Score: 81  59-90 High Risk  29-58 Medium Risk  0-28   Low Risk  Patient was referred to the [facility].     TCM Follow-Up Recommendation:  LACE > 58: High Risk of readmission after discharge from the hospital.      Procedures during hospitalization:       Incidental or significant findings and recommendations (brief descriptions):      Lab/Test results pending at Discharge:       Consultants:  Nephrology    Discharge Medication List:     Discharge Medications        CHANGE how you take these medications        Instructions Prescription details   metoprolol succinate ER 50 MG Tb24  Commonly known as: Toprol XL  What changed: when to take this      Take 1 tablet (50 mg total) by mouth daily. Quantity: 30 tablet  Refills: 11            CONTINUE taking these medications        Instructions Prescription details   amLODIPine 10 MG Tabs  Commonly known as: Norvasc      Take 1 tablet (10 mg total) by mouth daily. Quantity: 30 tablet  Refills: 1     aspirin 81 MG Tbec      Take 1 tablet (81 mg total) by mouth daily. Quantity: 360 tablet  Refills: 0     CALCIUM CARBONATE+VITAMIN D OR      Take 1 tablet by mouth daily. Refills: 0     Levemir FlexPen 100 UNIT/ML Sopn  Generic drug: insulin detemir      Inject 25 Units into the skin nightly. 3 month supply   Quantity: 30 mL  Refills: 1     sodium bicarbonate 650 MG Tabs      Take 2 tablets (1,300 mg total) by mouth 2 (two) times daily. Quantity: 360 tablet  Refills: 1            STOP taking these medications      hydrALAZINE 25 MG Tabs  Commonly known as: Apresoline        patiromer 16.8 g Pack  Commonly known as: Veltassa                  Where to Get Your Medications        These medications were sent to [facility] [address], [address] [place] [address] [address] [facility]      Phone: [phone]   metoprolol succinate ER 50 MG Tb24         [place]: Follow-up appointment:   No follow-up provider specified.   Appointments for Next 30 Days 10/16/2023 - 11/15/2023      None            Vital signs:       Physical Exam:    General: No acute distress   Lungs: clear to auscultation  Cardiovascular: S1, S2  Abdomen: Soft      -----------------------------------------------------------------------------------------------  PATIENT DISCHARGE INSTRUCTIONS: See electronic chart    Lillian Lee DO    Total time spent on discharge plannin minutes     The [facility] makes medical notes like these available to patients in the interest of transparency. Please be advised this is a medical document. Medical documents are intended to carry relevant information, facts as evident, and the clinical opinion of the practitioner. The medical note is intended as peer to peer communication and may appear blunt or direct. It is written in medical language and may contain abbreviations or verbiage that are unfamiliar.

## 2023-10-17 NOTE — PROGRESS NOTES
Weekly Wound Education Note    Teaching Provided To: Patient; Family  Training Topics: Discharge instructions;Dressing;Off-loading  Training Method: Demonstration;Explain/Verbal;Written  Training Response: Patient responds and understands        Notes: Right lateral foot: cleanse with saline or wound cleanser,. apply zinc to periwound as needed for moisture, jerel to area of exposed bone, cover with silver alginate, abd pad secured with kerlix and Ace wrap not too tight. Change dressing every other day with silver alginate. Continue strict nonweightbearing to right lower extremity Cryotherapy Text: The wound bed was treated with cryotherapy after the biopsy was performed.

## 2023-11-12 ENCOUNTER — HOSPITAL ENCOUNTER (INPATIENT)
Facility: HOSPITAL | Age: 63
LOS: 3 days | Discharge: HOME | End: 2023-11-15
Attending: EMERGENCY MEDICINE | Admitting: INTERNAL MEDICINE
Payer: COMMERCIAL

## 2023-11-12 DIAGNOSIS — N17.9 AKI (ACUTE KIDNEY INJURY) (HCC): Primary | ICD-10-CM

## 2023-11-12 DIAGNOSIS — E86.0 DEHYDRATION: ICD-10-CM

## 2023-11-12 DIAGNOSIS — Z79.4 TYPE 2 DIABETES MELLITUS WITH HYPERGLYCEMIA, WITH LONG-TERM CURRENT USE OF INSULIN (HCC): ICD-10-CM

## 2023-11-12 DIAGNOSIS — E87.20 METABOLIC ACIDOSIS: ICD-10-CM

## 2023-11-12 DIAGNOSIS — E11.65 TYPE 2 DIABETES MELLITUS WITH HYPERGLYCEMIA, WITH LONG-TERM CURRENT USE OF INSULIN (HCC): ICD-10-CM

## 2023-11-12 DIAGNOSIS — R11.2 NAUSEA AND VOMITING, UNSPECIFIED VOMITING TYPE: ICD-10-CM

## 2023-11-12 PROBLEM — D63.1 ANEMIA IN STAGE 5 CHRONIC KIDNEY DISEASE, NOT ON CHRONIC DIALYSIS (HCC): Status: ACTIVE | Noted: 2023-03-29

## 2023-11-12 PROBLEM — I16.1 HYPERTENSIVE EMERGENCY: Status: ACTIVE | Noted: 2023-11-12

## 2023-11-12 PROBLEM — N18.5 ANEMIA IN STAGE 5 CHRONIC KIDNEY DISEASE, NOT ON CHRONIC DIALYSIS (HCC): Status: ACTIVE | Noted: 2023-03-29

## 2023-11-12 PROBLEM — D63.1 ANEMIA IN STAGE 5 CHRONIC KIDNEY DISEASE, NOT ON CHRONIC DIALYSIS: Status: ACTIVE | Noted: 2023-03-29

## 2023-11-12 PROBLEM — D63.1 ANEMIA IN STAGE 5 CHRONIC KIDNEY DISEASE, NOT ON CHRONIC DIALYSIS  (HCC): Status: ACTIVE | Noted: 2023-03-29

## 2023-11-12 PROBLEM — N18.5 ANEMIA IN STAGE 5 CHRONIC KIDNEY DISEASE, NOT ON CHRONIC DIALYSIS: Status: ACTIVE | Noted: 2023-03-29

## 2023-11-12 PROBLEM — N18.5 ANEMIA IN STAGE 5 CHRONIC KIDNEY DISEASE, NOT ON CHRONIC DIALYSIS  (HCC): Status: ACTIVE | Noted: 2023-03-29

## 2023-11-12 LAB
ALBUMIN SERPL-MCNC: 3.3 G/DL (ref 3.4–5)
ALBUMIN/GLOB SERPL: 0.8 {RATIO} (ref 1–2)
ALP LIVER SERPL-CCNC: 100 U/L
ALT SERPL-CCNC: 22 U/L
ANION GAP SERPL CALC-SCNC: 11 MMOL/L (ref 0–18)
AST SERPL-CCNC: 10 U/L (ref 15–37)
BASOPHILS # BLD AUTO: 0.04 X10(3) UL (ref 0–0.2)
BASOPHILS NFR BLD AUTO: 0.2 %
BILIRUB SERPL-MCNC: 0.5 MG/DL (ref 0.1–2)
BUN BLD-MCNC: 113 MG/DL (ref 9–23)
CALCIUM BLD-MCNC: 9.1 MG/DL (ref 8.5–10.1)
CHLORIDE SERPL-SCNC: 116 MMOL/L (ref 98–112)
CO2 SERPL-SCNC: 15 MMOL/L (ref 21–32)
CREAT BLD-MCNC: 6.66 MG/DL
EGFRCR SERPLBLD CKD-EPI 2021: 9 ML/MIN/1.73M2 (ref 60–?)
EOSINOPHIL # BLD AUTO: 0 X10(3) UL (ref 0–0.7)
EOSINOPHIL NFR BLD AUTO: 0 %
ERYTHROCYTE [DISTWIDTH] IN BLOOD BY AUTOMATED COUNT: 15.5 %
GLOBULIN PLAS-MCNC: 4 G/DL (ref 2.8–4.4)
GLUCOSE BLD-MCNC: 181 MG/DL (ref 70–99)
GLUCOSE BLD-MCNC: 183 MG/DL (ref 70–99)
GLUCOSE BLD-MCNC: 188 MG/DL (ref 70–99)
GLUCOSE BLD-MCNC: 212 MG/DL (ref 70–99)
GLUCOSE BLD-MCNC: 220 MG/DL (ref 70–99)
GLUCOSE BLD-MCNC: 241 MG/DL (ref 70–99)
HCT VFR BLD AUTO: 36.2 %
HGB BLD-MCNC: 11.7 G/DL
IMM GRANULOCYTES # BLD AUTO: 0.08 X10(3) UL (ref 0–1)
IMM GRANULOCYTES NFR BLD: 0.4 %
LYMPHOCYTES # BLD AUTO: 0.82 X10(3) UL (ref 1–4)
LYMPHOCYTES NFR BLD AUTO: 4 %
MCH RBC QN AUTO: 26.8 PG (ref 26–34)
MCHC RBC AUTO-ENTMCNC: 32.3 G/DL (ref 31–37)
MCV RBC AUTO: 83 FL
MONOCYTES # BLD AUTO: 0.36 X10(3) UL (ref 0.1–1)
MONOCYTES NFR BLD AUTO: 1.7 %
NEUTROPHILS # BLD AUTO: 19.34 X10 (3) UL (ref 1.5–7.7)
NEUTROPHILS # BLD AUTO: 19.34 X10(3) UL (ref 1.5–7.7)
NEUTROPHILS NFR BLD AUTO: 93.7 %
OSMOLALITY SERPL CALC.SUM OF ELEC: 338 MOSM/KG (ref 275–295)
PLATELET # BLD AUTO: 332 10(3)UL (ref 150–450)
POTASSIUM SERPL-SCNC: 4.4 MMOL/L (ref 3.5–5.1)
PROT SERPL-MCNC: 7.3 G/DL (ref 6.4–8.2)
RBC # BLD AUTO: 4.36 X10(6)UL
SODIUM SERPL-SCNC: 142 MMOL/L (ref 136–145)
WBC # BLD AUTO: 20.6 X10(3) UL (ref 4–11)

## 2023-11-12 PROCEDURE — 99223 1ST HOSP IP/OBS HIGH 75: CPT | Performed by: INTERNAL MEDICINE

## 2023-11-12 RX ORDER — AMLODIPINE BESYLATE 5 MG/1
10 TABLET ORAL ONCE
Status: COMPLETED | OUTPATIENT
Start: 2023-11-12 | End: 2023-11-12

## 2023-11-12 RX ORDER — METOPROLOL TARTRATE 50 MG/1
50 TABLET, FILM COATED ORAL ONCE
Status: COMPLETED | OUTPATIENT
Start: 2023-11-12 | End: 2023-11-12

## 2023-11-12 RX ORDER — BISACODYL 10 MG
10 SUPPOSITORY, RECTAL RECTAL
Status: DISCONTINUED | OUTPATIENT
Start: 2023-11-12 | End: 2023-11-15

## 2023-11-12 RX ORDER — HEPARIN SODIUM 5000 [USP'U]/ML
5000 INJECTION, SOLUTION INTRAVENOUS; SUBCUTANEOUS EVERY 8 HOURS SCHEDULED
Status: DISCONTINUED | OUTPATIENT
Start: 2023-11-12 | End: 2023-11-15

## 2023-11-12 RX ORDER — SENNOSIDES 8.6 MG
17.2 TABLET ORAL NIGHTLY PRN
Status: DISCONTINUED | OUTPATIENT
Start: 2023-11-12 | End: 2023-11-15

## 2023-11-12 RX ORDER — SODIUM CHLORIDE 9 MG/ML
125 INJECTION, SOLUTION INTRAVENOUS CONTINUOUS
Status: DISCONTINUED | OUTPATIENT
Start: 2023-11-12 | End: 2023-11-12

## 2023-11-12 RX ORDER — HYDRALAZINE HYDROCHLORIDE 20 MG/ML
10 INJECTION INTRAMUSCULAR; INTRAVENOUS EVERY 4 HOURS PRN
Status: DISCONTINUED | OUTPATIENT
Start: 2023-11-12 | End: 2023-11-12

## 2023-11-12 RX ORDER — SODIUM CHLORIDE, SODIUM LACTATE, POTASSIUM CHLORIDE, CALCIUM CHLORIDE 600; 310; 30; 20 MG/100ML; MG/100ML; MG/100ML; MG/100ML
INJECTION, SOLUTION INTRAVENOUS CONTINUOUS
Status: DISCONTINUED | OUTPATIENT
Start: 2023-11-12 | End: 2023-11-12

## 2023-11-12 RX ORDER — ONDANSETRON 2 MG/ML
4 INJECTION INTRAMUSCULAR; INTRAVENOUS EVERY 4 HOURS PRN
Status: DISCONTINUED | OUTPATIENT
Start: 2023-11-12 | End: 2023-11-12

## 2023-11-12 RX ORDER — ASPIRIN 81 MG/1
81 TABLET ORAL DAILY
Status: DISCONTINUED | OUTPATIENT
Start: 2023-11-12 | End: 2023-11-15

## 2023-11-12 RX ORDER — SODIUM BICARBONATE 325 MG/1
1300 TABLET ORAL 2 TIMES DAILY
Status: DISCONTINUED | OUTPATIENT
Start: 2023-11-12 | End: 2023-11-12

## 2023-11-12 RX ORDER — ACETAMINOPHEN 500 MG
500 TABLET ORAL EVERY 4 HOURS PRN
Status: DISCONTINUED | OUTPATIENT
Start: 2023-11-12 | End: 2023-11-15

## 2023-11-12 RX ORDER — HYDRALAZINE HYDROCHLORIDE 20 MG/ML
10 INJECTION INTRAMUSCULAR; INTRAVENOUS ONCE
Status: COMPLETED | OUTPATIENT
Start: 2023-11-12 | End: 2023-11-12

## 2023-11-12 RX ORDER — DEXTROSE MONOHYDRATE 25 G/50ML
50 INJECTION, SOLUTION INTRAVENOUS
Status: DISCONTINUED | OUTPATIENT
Start: 2023-11-12 | End: 2023-11-15

## 2023-11-12 RX ORDER — AMLODIPINE BESYLATE 5 MG/1
10 TABLET ORAL DAILY
Status: DISCONTINUED | OUTPATIENT
Start: 2023-11-12 | End: 2023-11-15

## 2023-11-12 RX ORDER — POLYETHYLENE GLYCOL 3350 17 G/17G
17 POWDER, FOR SOLUTION ORAL DAILY PRN
Status: DISCONTINUED | OUTPATIENT
Start: 2023-11-12 | End: 2023-11-15

## 2023-11-12 RX ORDER — HYDROMORPHONE HYDROCHLORIDE 1 MG/ML
0.5 INJECTION, SOLUTION INTRAMUSCULAR; INTRAVENOUS; SUBCUTANEOUS EVERY 30 MIN PRN
Status: DISCONTINUED | OUTPATIENT
Start: 2023-11-12 | End: 2023-11-12 | Stop reason: HOSPADM

## 2023-11-12 RX ORDER — METOPROLOL SUCCINATE 50 MG/1
50 TABLET, EXTENDED RELEASE ORAL DAILY
Status: DISCONTINUED | OUTPATIENT
Start: 2023-11-12 | End: 2023-11-15

## 2023-11-12 RX ORDER — MELATONIN
3 NIGHTLY PRN
Status: DISCONTINUED | OUTPATIENT
Start: 2023-11-12 | End: 2023-11-15

## 2023-11-12 RX ORDER — NICOTINE POLACRILEX 4 MG
30 LOZENGE BUCCAL
Status: DISCONTINUED | OUTPATIENT
Start: 2023-11-12 | End: 2023-11-15

## 2023-11-12 RX ORDER — PROCHLORPERAZINE EDISYLATE 5 MG/ML
5 INJECTION INTRAMUSCULAR; INTRAVENOUS EVERY 8 HOURS PRN
Status: DISCONTINUED | OUTPATIENT
Start: 2023-11-12 | End: 2023-11-15

## 2023-11-12 RX ORDER — HYDRALAZINE HYDROCHLORIDE 20 MG/ML
10 INJECTION INTRAMUSCULAR; INTRAVENOUS EVERY 4 HOURS PRN
Status: DISCONTINUED | OUTPATIENT
Start: 2023-11-12 | End: 2023-11-15

## 2023-11-12 RX ORDER — NICOTINE POLACRILEX 4 MG
15 LOZENGE BUCCAL
Status: DISCONTINUED | OUTPATIENT
Start: 2023-11-12 | End: 2023-11-15

## 2023-11-12 RX ORDER — ONDANSETRON 2 MG/ML
4 INJECTION INTRAMUSCULAR; INTRAVENOUS EVERY 6 HOURS PRN
Status: DISCONTINUED | OUTPATIENT
Start: 2023-11-12 | End: 2023-11-15

## 2023-11-12 RX ORDER — SODIUM CHLORIDE 9 MG/ML
INJECTION, SOLUTION INTRAVENOUS CONTINUOUS
Status: CANCELLED | OUTPATIENT
Start: 2023-11-12 | End: 2023-11-12

## 2023-11-12 NOTE — PROGRESS NOTES
Patient admitted via bed from PACU. Oriented to room. Safety precautions initiated. Call light in reach. Spouse at bedside. Very small coffee ground emesis x 1, Zofran given >> Dr. Giles Colon notified, hold subcutaneous heparin but okay to continue aspirin.

## 2023-11-12 NOTE — ED INITIAL ASSESSMENT (HPI)
Patient presents for evaluation of dehydration and nausea/vomiting. He reports similar episodes in the past as a result of his diabetes/kidney issues. Blood sugars running in the 140s per patient.

## 2023-11-12 NOTE — ED QUICK NOTES
Orders for admission, patient is aware of plan and ready to go upstairs. Any questions, please call ED RN shanita at extension [phone]. Patient Covid vaccination status: Fully vaccinated     COVID Test Ordered in ED: None    COVID Suspicion at Admission: N/A    Running Infusions:    sodium chloride 125 mL/hr (11/12/23 1340)        Mental Status/LOC at time of transport: AxO x4     Other pertinent information: Wheelchair bound mostly. Had Right BKA. On room air. Continent x2.    CIWA score: N/A   NIH score:  N/A

## 2023-11-13 LAB
ANION GAP SERPL CALC-SCNC: 11 MMOL/L (ref 0–18)
BASOPHILS # BLD AUTO: 0.03 X10(3) UL (ref 0–0.2)
BASOPHILS NFR BLD AUTO: 0.2 %
BUN BLD-MCNC: 107 MG/DL (ref 9–23)
CALCIUM BLD-MCNC: 8.4 MG/DL (ref 8.5–10.1)
CHLORIDE SERPL-SCNC: 117 MMOL/L (ref 98–112)
CO2 SERPL-SCNC: 16 MMOL/L (ref 21–32)
CREAT BLD-MCNC: 6.19 MG/DL
EGFRCR SERPLBLD CKD-EPI 2021: 9 ML/MIN/1.73M2 (ref 60–?)
EOSINOPHIL # BLD AUTO: 0.01 X10(3) UL (ref 0–0.7)
EOSINOPHIL NFR BLD AUTO: 0.1 %
ERYTHROCYTE [DISTWIDTH] IN BLOOD BY AUTOMATED COUNT: 15 %
GLUCOSE BLD-MCNC: 115 MG/DL (ref 70–99)
GLUCOSE BLD-MCNC: 122 MG/DL (ref 70–99)
GLUCOSE BLD-MCNC: 133 MG/DL (ref 70–99)
GLUCOSE BLD-MCNC: 187 MG/DL (ref 70–99)
GLUCOSE BLD-MCNC: 208 MG/DL (ref 70–99)
HCT VFR BLD AUTO: 34.6 %
HGB BLD-MCNC: 10.9 G/DL
IMM GRANULOCYTES # BLD AUTO: 0.07 X10(3) UL (ref 0–1)
IMM GRANULOCYTES NFR BLD: 0.4 %
LYMPHOCYTES # BLD AUTO: 1.35 X10(3) UL (ref 1–4)
LYMPHOCYTES NFR BLD AUTO: 7.3 %
MCH RBC QN AUTO: 27.1 PG (ref 26–34)
MCHC RBC AUTO-ENTMCNC: 31.5 G/DL (ref 31–37)
MCV RBC AUTO: 86.1 FL
MONOCYTES # BLD AUTO: 0.59 X10(3) UL (ref 0.1–1)
MONOCYTES NFR BLD AUTO: 3.2 %
NEUTROPHILS # BLD AUTO: 16.52 X10 (3) UL (ref 1.5–7.7)
NEUTROPHILS # BLD AUTO: 16.52 X10(3) UL (ref 1.5–7.7)
NEUTROPHILS NFR BLD AUTO: 88.8 %
OSMOLALITY SERPL CALC.SUM OF ELEC: 333 MOSM/KG (ref 275–295)
PLATELET # BLD AUTO: 300 10(3)UL (ref 150–450)
POTASSIUM SERPL-SCNC: 4.2 MMOL/L (ref 3.5–5.1)
RBC # BLD AUTO: 4.02 X10(6)UL
SODIUM SERPL-SCNC: 144 MMOL/L (ref 136–145)
WBC # BLD AUTO: 18.6 X10(3) UL (ref 4–11)

## 2023-11-13 PROCEDURE — 99233 SBSQ HOSP IP/OBS HIGH 50: CPT | Performed by: INTERNAL MEDICINE

## 2023-11-13 PROCEDURE — 99232 SBSQ HOSP IP/OBS MODERATE 35: CPT | Performed by: INTERNAL MEDICINE

## 2023-11-13 NOTE — PLAN OF CARE
ED admit 11/12 YULISA on CKD, metabolic acidosis, Pt is AAOX4, VSS, BP slightly elevated meds to correct will monitor, glucose monitor and treated per orders, nausea present, refusing meds, sodium bicarb gtt running, Pt able to stand pivot D/T Rt BKA, Pt doing well, all needs met, all safety measures in place, call light within reach, will CTM.

## 2023-11-13 NOTE — PLAN OF CARE
Patient A&Ox4, VSS on RA. . Refusing tele. Refusing SCD. Patient denies pain at this time. Patient reports mild intermittent nausea, refuses anti-emetic at this time, patient provided with non-pharm anti-nausea methods. No episodes of emesis at this time. Hx of right BKA, stand-pivot with walker to commode, otherwise uses wheelchair. Voiding freely. Patient updated on plan of care, verbalizes understanding. Call light within reach. 2200: Patients BP elevated at 171/76. MD notified. IV hydralazine given as ordered. 2245: Patient's /69.

## 2023-11-14 ENCOUNTER — APPOINTMENT (OUTPATIENT)
Dept: INTERVENTIONAL RADIOLOGY/VASCULAR | Facility: HOSPITAL | Age: 63
End: 2023-11-14
Attending: INTERNAL MEDICINE
Payer: COMMERCIAL

## 2023-11-14 LAB
ANION GAP SERPL CALC-SCNC: 8 MMOL/L (ref 0–18)
BASOPHILS # BLD AUTO: 0.03 X10(3) UL (ref 0–0.2)
BASOPHILS NFR BLD AUTO: 0.3 %
BUN BLD-MCNC: 98 MG/DL (ref 9–23)
CALCIUM BLD-MCNC: 7.9 MG/DL (ref 8.5–10.1)
CHLORIDE SERPL-SCNC: 111 MMOL/L (ref 98–112)
CO2 SERPL-SCNC: 25 MMOL/L (ref 21–32)
CREAT BLD-MCNC: 5.66 MG/DL
EGFRCR SERPLBLD CKD-EPI 2021: 10 ML/MIN/1.73M2 (ref 60–?)
EOSINOPHIL # BLD AUTO: 0.03 X10(3) UL (ref 0–0.7)
EOSINOPHIL NFR BLD AUTO: 0.3 %
ERYTHROCYTE [DISTWIDTH] IN BLOOD BY AUTOMATED COUNT: 14.7 %
GLUCOSE BLD-MCNC: 155 MG/DL (ref 70–99)
GLUCOSE BLD-MCNC: 176 MG/DL (ref 70–99)
GLUCOSE BLD-MCNC: 73 MG/DL (ref 70–99)
GLUCOSE BLD-MCNC: 75 MG/DL (ref 70–99)
GLUCOSE BLD-MCNC: 77 MG/DL (ref 70–99)
GLUCOSE BLD-MCNC: 83 MG/DL (ref 70–99)
HCT VFR BLD AUTO: 32.1 %
HGB BLD-MCNC: 10.4 G/DL
IMM GRANULOCYTES # BLD AUTO: 0.16 X10(3) UL (ref 0–1)
IMM GRANULOCYTES NFR BLD: 1.4 %
INR BLD: 1.07 (ref 0.8–1.2)
LYMPHOCYTES # BLD AUTO: 1.02 X10(3) UL (ref 1–4)
LYMPHOCYTES NFR BLD AUTO: 8.6 %
MCH RBC QN AUTO: 27.3 PG (ref 26–34)
MCHC RBC AUTO-ENTMCNC: 32.4 G/DL (ref 31–37)
MCV RBC AUTO: 84.3 FL
MONOCYTES # BLD AUTO: 0.58 X10(3) UL (ref 0.1–1)
MONOCYTES NFR BLD AUTO: 4.9 %
NEUTROPHILS # BLD AUTO: 9.99 X10 (3) UL (ref 1.5–7.7)
NEUTROPHILS # BLD AUTO: 9.99 X10(3) UL (ref 1.5–7.7)
NEUTROPHILS NFR BLD AUTO: 84.5 %
OSMOLALITY SERPL CALC.SUM OF ELEC: 327 MOSM/KG (ref 275–295)
PHOSPHATE SERPL-MCNC: 4.8 MG/DL (ref 2.5–4.9)
PLATELET # BLD AUTO: 247 10(3)UL (ref 150–450)
POTASSIUM SERPL-SCNC: 3.5 MMOL/L (ref 3.5–5.1)
PROTHROMBIN TIME: 13.9 SECONDS (ref 11.6–14.8)
PTH-INTACT SERPL-MCNC: 278 PG/ML (ref 18.5–88)
RBC # BLD AUTO: 3.81 X10(6)UL
SODIUM SERPL-SCNC: 144 MMOL/L (ref 136–145)
WBC # BLD AUTO: 11.8 X10(3) UL (ref 4–11)

## 2023-11-14 PROCEDURE — 0JH63XZ INSERTION OF TUNNELED VASCULAR ACCESS DEVICE INTO CHEST SUBCUTANEOUS TISSUE AND FASCIA, PERCUTANEOUS APPROACH: ICD-10-PCS | Performed by: RADIOLOGY

## 2023-11-14 PROCEDURE — 5A1D70Z PERFORMANCE OF URINARY FILTRATION, INTERMITTENT, LESS THAN 6 HOURS PER DAY: ICD-10-PCS | Performed by: INTERNAL MEDICINE

## 2023-11-14 PROCEDURE — 02HV33Z INSERTION OF INFUSION DEVICE INTO SUPERIOR VENA CAVA, PERCUTANEOUS APPROACH: ICD-10-PCS | Performed by: RADIOLOGY

## 2023-11-14 PROCEDURE — 99232 SBSQ HOSP IP/OBS MODERATE 35: CPT | Performed by: INTERNAL MEDICINE

## 2023-11-14 PROCEDURE — 99233 SBSQ HOSP IP/OBS HIGH 50: CPT | Performed by: INTERNAL MEDICINE

## 2023-11-14 RX ORDER — HEPARIN SODIUM 1000 [USP'U]/ML
5000 INJECTION, SOLUTION INTRAVENOUS; SUBCUTANEOUS ONCE
Status: COMPLETED | OUTPATIENT
Start: 2023-11-14 | End: 2023-11-14

## 2023-11-14 RX ORDER — LISINOPRIL 10 MG/1
10 TABLET ORAL DAILY
Status: DISCONTINUED | OUTPATIENT
Start: 2023-11-14 | End: 2023-11-15

## 2023-11-14 RX ORDER — MIDAZOLAM HYDROCHLORIDE 1 MG/ML
INJECTION INTRAMUSCULAR; INTRAVENOUS
Status: COMPLETED
Start: 2023-11-14 | End: 2023-11-14

## 2023-11-14 RX ORDER — HEPARIN SODIUM 5000 [USP'U]/ML
INJECTION, SOLUTION INTRAVENOUS; SUBCUTANEOUS
Status: COMPLETED
Start: 2023-11-14 | End: 2023-11-14

## 2023-11-14 RX ORDER — DEXTROSE, SODIUM CHLORIDE, SODIUM LACTATE, POTASSIUM CHLORIDE, AND CALCIUM CHLORIDE 5; .6; .31; .03; .02 G/100ML; G/100ML; G/100ML; G/100ML; G/100ML
INJECTION, SOLUTION INTRAVENOUS CONTINUOUS
Status: DISCONTINUED | OUTPATIENT
Start: 2023-11-14 | End: 2023-11-15

## 2023-11-14 RX ORDER — LIDOCAINE HYDROCHLORIDE 10 MG/ML
INJECTION, SOLUTION INFILTRATION; PERINEURAL
Status: COMPLETED
Start: 2023-11-14 | End: 2023-11-14

## 2023-11-14 RX ORDER — ONDANSETRON 2 MG/ML
INJECTION INTRAMUSCULAR; INTRAVENOUS
Status: COMPLETED
Start: 2023-11-14 | End: 2023-11-14

## 2023-11-14 RX ORDER — CEFAZOLIN SODIUM 1 G/3ML
INJECTION, POWDER, FOR SOLUTION INTRAMUSCULAR; INTRAVENOUS
Status: COMPLETED
Start: 2023-11-14 | End: 2023-11-14

## 2023-11-14 RX ORDER — SODIUM CHLORIDE, SODIUM LACTATE, POTASSIUM CHLORIDE, CALCIUM CHLORIDE 600; 310; 30; 20 MG/100ML; MG/100ML; MG/100ML; MG/100ML
INJECTION, SOLUTION INTRAVENOUS CONTINUOUS
Status: DISCONTINUED | OUTPATIENT
Start: 2023-11-14 | End: 2023-11-14

## 2023-11-14 RX ORDER — LIDOCAINE HYDROCHLORIDE AND EPINEPHRINE BITARTRATE 20; .01 MG/ML; MG/ML
INJECTION, SOLUTION SUBCUTANEOUS
Status: COMPLETED
Start: 2023-11-14 | End: 2023-11-14

## 2023-11-14 NOTE — PLAN OF CARE
Pt Aox4, RA spot check pulse ox. IVF infusing. Declining tele and SCDs. ASA. Heparin on hold. Last BM 11/12. Voiding per urinal. Stand pivot to commode with standby assist. D5LR @50cc/hr infusing. Permacath placed today, [facility]. [facility] Plan for HD today and tomorrow, possible Dc after HD tomorrow.

## 2023-11-14 NOTE — PROCEDURES
[facility]  Pre-Procedure Note    Name: Yoli Correia  MRN#: [id number]  : 1960    Procedure:  Ultrasound and Fluoro Guided Tunneled Permanent Dialysis Catheter Placement    Indication: ESRD  Chronic Kidney Disease requiring HD    Allergies:    No Known Allergies    Pertinent Medications:    Is patient on any Aspirin, Coumadin, or any other Anticoagulations/Antiplatelet medications? no      Mental Status:  Alert and Oriented      Health Status: Acceptable for Procedure    Impression and Plans:    Patient requires HD and tunneled Permanent dialysis catheter placement requested by Nephrology. I have reviewed the above information prior to procedure. I have discussed the risks and benefits and alternatives with the patient. The patient understands and agrees to proceed with plan of care.     Maggie Ny MD

## 2023-11-14 NOTE — PLAN OF CARE
Pt denies any pain or discomfort at present, states nausea better tonight. Sodium bicarb infusing at 83 ml/hr, labs monitored. Will be NPO at midnight. Pt updated with plan of care, verbalized understanding.

## 2023-11-14 NOTE — PROGRESS NOTES
Pt and wife informed about Permacath placement and HD tomorrow as well as being NPO at midnight for the procedure. Pt refused, stated he is aware of eventually having those done but claimed not told it will be tomorrow, also stated cannot handle being NPO that long. He is aware that Dr Jose Chicas talked to him today and he admits that he might forgotten about the discussion, states memory's been foggy d/t his recent medical events. Tried to explain importance but pt still refuse. Will message Dr Jose Chicas.

## 2023-11-14 NOTE — CM/SW NOTE
11/14/23 1300   CM/SW Referral Data   Referral Source Physician   Reason for Referral Discharge planning   Informant EMR;Clinical Staff Member   Patient Info   Patient's Current Mental Status at Time of Assessment Alert;Oriented   Discharge Needs   Anticipated D/C needs Outpatient dialysis         Order received for patient needing new outpatient HD. Pt is a 56 y/o man admitted for YULISA on CKD. MD notes indicated CKD stage 5. Plan for permcath placement today. Referral created in [facility] online portal.  Clinical records faxed attached to portal.  Spoke with pt's RN to request hepatitis panel for new referral.  [facility] clinic requested as closest clinic to pt's home. Anticipate insurance approval from [employer] will be needed. Will need to add flowsheets to referral when available. / to remain available for support and/or discharge planning.      Maria Del Rosario Fuller \[facility]\""RICHARD  Discharge Planner  [phone]

## 2023-11-14 NOTE — PROCEDURES
[facility] Patient Status:  Inpatient    1960 MRN [id number]   Location [address] [facility] Attending Mathew Ochoa, [address] [facility] Day # 2 PCP Berwyn Dandy, MD         Brief Procedure Report    Pre-Operative Diagnosis: Chronic Kidney Disease    Post-Operative Diagnosis: Same as above. Procedure Performed: Ultrasound and Fluoro Guided Permanent Dialysis Catheter Placement    Anesthesia: 1% lidocaine. Moderate sedation    EBL: 1cc    Complications: None    Summary of Case:    Right internal jugular Bard Hemosplit temporary tunneled dialysis catheter placed with sonographic and fluoroscopic guidance. Catheter in good position and may be used. Patient tolerated procedure well without immediate complication. Full report to follow in PACS.     Kandy Beatty

## 2023-11-14 NOTE — PROGRESS NOTES
RN talked to patient again, continued to encourage about procedure tomorrow. Pt amenable this time, agreed to be NPO at midnight and to proceed.

## 2023-11-15 VITALS
HEART RATE: 70 BPM | RESPIRATION RATE: 18 BRPM | OXYGEN SATURATION: 100 % | TEMPERATURE: 98 F | SYSTOLIC BLOOD PRESSURE: 146 MMHG | BODY MASS INDEX: 23.69 KG/M2 | HEIGHT: 68 IN | WEIGHT: 156.31 LBS | DIASTOLIC BLOOD PRESSURE: 74 MMHG

## 2023-11-15 PROBLEM — N18.6 ESRD (END STAGE RENAL DISEASE) (HCC): Status: ACTIVE | Noted: 2023-11-15

## 2023-11-15 LAB
ANION GAP SERPL CALC-SCNC: 4 MMOL/L (ref 0–18)
BUN BLD-MCNC: 52 MG/DL (ref 9–23)
CALCIUM BLD-MCNC: 7.8 MG/DL (ref 8.5–10.1)
CHLORIDE SERPL-SCNC: 107 MMOL/L (ref 98–112)
CO2 SERPL-SCNC: 29 MMOL/L (ref 21–32)
CREAT BLD-MCNC: 3.87 MG/DL
EGFRCR SERPLBLD CKD-EPI 2021: 17 ML/MIN/1.73M2 (ref 60–?)
ERYTHROCYTE [DISTWIDTH] IN BLOOD BY AUTOMATED COUNT: 14.2 %
GLUCOSE BLD-MCNC: 102 MG/DL (ref 70–99)
GLUCOSE BLD-MCNC: 106 MG/DL (ref 70–99)
GLUCOSE BLD-MCNC: 71 MG/DL (ref 70–99)
GLUCOSE BLD-MCNC: 89 MG/DL (ref 70–99)
HAV IGM SER QL: NONREACTIVE
HBV CORE IGM SER QL: NONREACTIVE
HBV SURFACE AG SERPL QL IA: NONREACTIVE
HCT VFR BLD AUTO: 31.7 %
HCV AB SERPL QL IA: NONREACTIVE
HGB BLD-MCNC: 10.1 G/DL
MCH RBC QN AUTO: 27.2 PG (ref 26–34)
MCHC RBC AUTO-ENTMCNC: 31.9 G/DL (ref 31–37)
MCV RBC AUTO: 85.4 FL
OSMOLALITY SERPL CALC.SUM OF ELEC: 304 MOSM/KG (ref 275–295)
PLATELET # BLD AUTO: 212 10(3)UL (ref 150–450)
POTASSIUM SERPL-SCNC: 4.4 MMOL/L (ref 3.5–5.1)
RBC # BLD AUTO: 3.71 X10(6)UL
SODIUM SERPL-SCNC: 140 MMOL/L (ref 136–145)
WBC # BLD AUTO: 10.4 X10(3) UL (ref 4–11)

## 2023-11-15 PROCEDURE — 99233 SBSQ HOSP IP/OBS HIGH 50: CPT | Performed by: INTERNAL MEDICINE

## 2023-11-15 PROCEDURE — 99239 HOSP IP/OBS DSCHRG MGMT >30: CPT | Performed by: INTERNAL MEDICINE

## 2023-11-15 RX ORDER — INSULIN DETEMIR 100 [IU]/ML
15 INJECTION, SOLUTION SUBCUTANEOUS NIGHTLY
Status: SHIPPED | COMMUNITY
Start: 2023-11-15

## 2023-11-15 RX ORDER — HEPARIN SODIUM 1000 [USP'U]/ML
5000 INJECTION, SOLUTION INTRAVENOUS; SUBCUTANEOUS EVERY 8 HOURS
Status: DISCONTINUED | OUTPATIENT
Start: 2023-11-15 | End: 2023-11-15

## 2023-11-15 RX ORDER — HEPARIN SODIUM 5000 [USP'U]/ML
5000 INJECTION, SOLUTION INTRAVENOUS; SUBCUTANEOUS EVERY 8 HOURS SCHEDULED
Status: DISCONTINUED | OUTPATIENT
Start: 2023-11-15 | End: 2023-11-15

## 2023-11-15 RX ORDER — HEPARIN SODIUM 1000 [USP'U]/ML
5000 INJECTION, SOLUTION INTRAVENOUS; SUBCUTANEOUS ONCE
Status: COMPLETED | OUTPATIENT
Start: 2023-11-15 | End: 2023-11-15

## 2023-11-15 RX ORDER — LISINOPRIL 10 MG/1
10 TABLET ORAL DAILY
Qty: 30 TABLET | Refills: 11 | Status: SHIPPED | OUTPATIENT
Start: 2023-11-15

## 2023-11-15 NOTE — CM/SW NOTE
Lab had some problems with their instruments that run hepatitis panel so results are still pending. Sw spoke to [place] take at [facility]( [phone]). [address] [place] [address] HD schedule for pt will be Tu-Th-SAT at 520am- they will need Hep in order to give him approval to start though. Pt updated re: above.

## 2023-11-15 NOTE — CM/SW NOTE
HD flowsheets sent to [facility] intake earlier today. Hep panel just came back and was also sent to [facility] intake- waiting on approval for start of care- attempting to reach Dr Gino Chaudhary to see if start of care can be delayed until Saturday.     Analia Thakkar, JOSE LUISW  /Discharge Planner

## 2023-11-15 NOTE — PLAN OF CARE
A&oriented x4 . VSS. Declining  and Tele. IS encouraged. SCDs. Ankle pumps encouraged. Tolerating diet. Voiding. Denies pain. Ambulating with  x1 assist. Plan is  possible Dc today once OP HD is arranged. Patient updated and in agreement with plan of care. Safety precautions in place. Instructed patient to call for assistance, call light within reach.

## 2023-11-15 NOTE — CM/SW NOTE
11/15/23 1600   Discharge disposition   Expected discharge disposition Home or Self   Outpatient services Dialysis     Sw spoke to manager at [address] [place] [address] and confirmed the following:    DIALYSIS SCHEDULE:  [facility]  [address] [place] [address]  [phone]     Tuesday- Thursday-Saturday 540 AM  First appointment is Thursday 11/16 at 540am     Pt updated on above.     Marc Ontiveros LCSW  /Discharge Planner

## 2023-11-15 NOTE — DISCHARGE SUMMARY
[facility]  DISCHARGE SUMMARY     Lennie Whitaker Patient Status:  Inpatient    1960 MRN [id number]   [facility] 3SW-A Attending No att. providers found   Hosp Day # 3 PCP Kely Kee MD     Date of Admission: 2023  Date of Discharge:  11/15/2023     Discharge Disposition: Home or Self Care    Discharge Diagnosis:  #Recurrent nausea/vomiting; d/t uremia  - resolved      #YULISA on CKD5-> now ESRD   -HD per renal      #metabolic acidosis d/t above  - resolved     #Leukocytosis,resolved   - no clinical s/s infection  - monitor off abx  - ID consulted per IR request > low suspicion for bacteremia    #DM2 with hyperglycemia    #HTN emergency with YULISA/end-organ damage  - improved      #AOCD  - Hgb at baseline  - follow CBC     #Hx of R BKA       History of Present Illness: Lennie Whitaker is a 57year old male with pmhx of CKD stage V, DM2, right BKA, HLD, HTN who presents with complaint of nausea and vomiting. Pt was just recently admitted from 10/11- for similar presentation and had several hospital admission prior to that for similar symptoms/YULISA. His GFR has been ~14-15 for the last several months, but he has not initiated dialysis yet. He and his wife at bedside report that he is certainly open to future dialysis, but are waiting on indication from Dr. [place] when he will need to start HD. He reports that he started vomiting 2 days ago and has had difficulty keeping any PO down for the last 2 days. No unusual food consumption, no diarrhea, abdominal pain, fevers/chills, sick contacts or recent travel. He feels n/v is likely d/t worsening kidney function. Brief Synopsis: patient admitted for YULISA on CKD and required initiation of dialysis. He has tolerated HD well and will be set up as outpatient to continue treatment. Lace+ Score: 81  59-90 High Risk  29-58 Medium Risk  0-28   Low Risk  Patient was referred to the [facility].     TCM Follow-Up Recommendation:  LACE > 58: High Risk of readmission after discharge from the hospital.      Consultants:  Nephrology, ID    Discharge Medication List:     Discharge Medications        START taking these medications        Instructions Prescription details   lisinopril 10 MG Tabs  Commonly known as: Zestril      Take 1 tablet (10 mg total) by mouth daily. Quantity: 30 tablet  Refills: 11            CONTINUE taking these medications        Instructions Prescription details   amLODIPine 10 MG Tabs  Commonly known as: Norvasc      Take 1 tablet (10 mg total) by mouth daily. Quantity: 30 tablet  Refills: 1     aspirin 81 MG Tbec      Take 1 tablet (81 mg total) by mouth daily. Quantity: 360 tablet  Refills: 0     Levemir FlexPen 100 UNIT/ML Sopn  Generic drug: insulin detemir      Inject 15 Units into the skin nightly. 3 month supply   Refills: 0     metoprolol succinate ER 50 MG Tb24  Commonly known as: Toprol XL      Take 1 tablet (50 mg total) by mouth daily.    Quantity: 30 tablet  Refills: 11            STOP taking these medications      CALCIUM CARBONATE+VITAMIN D OR        sodium bicarbonate 650 MG Tabs                  Where to Get Your Medications        These medications were sent to [facility] [address], [address] [place] [address], [address] [place] [address]      Phone: [phone]   lisinopril 10 MG Tabs         [place]    Follow-up appointment:   [facility], [address] [phone]    Schedule an appointment as soon as possible for a visit in 1 week(s)      Ching Arthur MD  [address] [place] [address] [address] Replaced by [facility] [phone]    Schedule an appointment as soon as possible for a visit in 1 week(s)      Appointments for Next 30 Days 11/19/2023 - 12/19/2023      None            Vital signs:       Physical Exam:    General: No acute distress   Lungs: clear to auscultation  Cardiovascular: S1, S2  Abdomen: Soft NTND    -----------------------------------------------------------------------------------------------  PATIENT DISCHARGE INSTRUCTIONS: See electronic chart    Girish Agustin MD    Total time spent on discharge plannin minutes     The [facility] makes medical notes like these available to patients in the interest of transparency. Please be advised this is a medical document. Medical documents are intended to carry relevant information, facts as evident, and the clinical opinion of the practitioner. The medical note is intended as peer to peer communication and may appear blunt or direct. It is written in medical language and may contain abbreviations or verbiage that are unfamiliar.

## 2023-11-15 NOTE — PLAN OF CARE
Pt A&Ox4 vital signs stable on RA. IVF infusing per MAR. Spot checks for pulse ox. Voiding via urinal. Permacath C/D/I. Plan for HD tomorrow. POC discussed. Pt verbalized understanding. No further questions at this time. Call light within reach.

## 2023-11-15 NOTE — DISCHARGE INSTRUCTIONS
DIALYSIS SCHEDULE:  [facility]  [address]  [phone]    Tuesday- [id number] 814 AM  First appointment is Thursday 11/16 at 540am

## 2023-11-16 ENCOUNTER — PATIENT OUTREACH (OUTPATIENT)
Dept: CASE MANAGEMENT | Age: 63
End: 2023-11-16

## 2023-11-16 ENCOUNTER — TELEPHONE (OUTPATIENT)
Dept: INTERNAL MEDICINE CLINIC | Facility: CLINIC | Age: 63
End: 2023-11-16

## 2023-11-16 NOTE — PROGRESS NOTES
Initial Post Discharge Follow Up   Discharge Date: 11/15/23  Contact Date: 11/16/2023    Consent Verification:  Assessment Completed With: Patient  HIPAA Verified? Yes    Discharge Dx:   YULISA (acute kidney injury)     General:   How have you been since your discharge from the hospital? I'm feeling fine. Just still weak. Do you have any pain since discharge? No    When you were leaving the hospital were your discharge instructions reviewed with you? Yes  How well were your discharge instructions explained to you? On a scale of 1-5   1- Very Poor and 5- Very well   Very Well  Do you have any questions about your discharge instructions? No  Before leaving the hospital was your diagnoses explained to you? Yes  Do you have any questions about your diagnoses? No  Are you able to perform normal daily activities of living as you have prior to your hospital stay (dressing, bathing, ambulating to the bathroom, etc)? yes  (NCM) Was patient given a different diet per AVS? no      Medications:   Current Outpatient Medications   Medication Sig Dispense Refill    lisinopril 10 MG Oral Tab Take 1 tablet (10 mg total) by mouth daily. 30 tablet 11    insulin detemir (LEVEMIR FLEXPEN) 100 UNIT/ML Subcutaneous Solution Pen-injector Inject 15 Units into the skin nightly. 3 month supply      metoprolol succinate ER 50 MG Oral Tablet 24 Hr Take 1 tablet (50 mg total) by mouth daily. 30 tablet 11    amLODIPine 10 MG Oral Tab Take 1 tablet (10 mg total) by mouth daily. 30 tablet 1    aspirin 81 MG Oral Tab EC Take 1 tablet (81 mg total) by mouth daily. 360 tablet 0     Were there any changes to your current medication(s) noted on the AVS? Yes  If so, were these medication changes discussed with you prior to leaving the hospital? Yes  If a new medication was prescribed:    Was the new medication's purpose & side effects reviewed? Yes  Do you have any questions about your new medication?  No  Did you  your discharge medications when you left the hospital? Yes  Let's go over your medications together to make sure we are not missing anything. Patient Mau Varela states that his wife takes care of all of his medications and he did want to go through them and wife is at work. Are there any reasons that keep you from taking your medication as prescribed? No  Are you having any concerns with constipation? No  Did patient receive their flu shot (Sept-March)? Yes    Discharge medications reviewed/discussed/and reconciled against outpatient medications with patient. Any changes or updates to medications sent to PCP. Patient Declined     Referrals/orders at D/C:  Referrals/orders placed at D/C? no    DME ordered at D/C? No      Discharge orders, AVS reviewed and discussed with patient. Any changes or updates to orders sent to PCP. Patient Acknowledged      SDOH:   Transportation Needs: No Transportation Needs (11/12/2023)    Transportation Needs     Lack of Transportation: No     Financial Resource Strain: Low Risk  (10/16/2023)    Financial Resource Strain     Difficulty of Paying Living Expenses: Not very hard     Med Affordability: No           TCC  Was TCC ordered: Yes  Was TCC scheduled: No, Explain-pt prefers to see PCP      PCP (If no TCC appointment)  Does patient already have a PCP appointment scheduled? No  NCM Attempted to schedule PCP office TCM appointment with patient   If no appointment scheduled: Explain-pt states that he will call back to schedule, NCM sent TE to PCP office. Specialist    Does the patient have any other follow up appointment(s) needing to be scheduled? No, pt going to dialysis on Saturday      Is there any reason as to why you cannot make your appointment(s)?   No     Needs post D/C:   Now that you are home, are there any needs or concerns you need addressed before your next visit with your PCP?  (DME, meds, questions, etc.): No    Interventions by NCM:   NCM reviewed discharge instructions and when to seek medical attention with the patient. He states that he went this morning to his first scheduled dialysis and was told he was not on the schedule until Saturday morning, therefor he did not get dialysis today. NCM sent TE to PCP office. He understands when to return to the ER. He states that he is still feeling weak but not any worse. Nausea and vomiting have resolved. NCM instructed on s/s of infection; he v/u and states that HD cath site looks fine. He denies having any fever, n/v/c/d, sob, pain, HA or any new or worsening symptoms. He has not checked his bp or bs yet but states that he will. He denied having any questions or concerns at this time.        CCM referral placed:    No    BOOK BY DATE: 11/29/2023

## 2023-11-16 NOTE — PLAN OF CARE
NURSING DISCHARGE NOTE    Discharged Home via Wheelchair. Accompanied by Spouse  Belongings Taken by patient/family. Pt dc to home with wife, pt and wife verbalized understanding of all dc instructions and followup info.

## 2023-11-16 NOTE — TELEPHONE ENCOUNTER
S/w patient for TCM. He states that he went to dialysis center this morning for 1st scheduled session but was told they did not have him on the schedule until Saturday morning and therefor he will not be receiving dialysis until then. He states that he is feeling fine, still weak but not any worse. Nausea and vomiting have resolved and he has no new symptoms. He states that he will call back to schedule appt with PCP. He is a high risk for readmission (already readmitted within a month) and it is recommended he be seen by 11/22/23.      TCM book by date 11-29/2023

## 2023-11-29 NOTE — LETTER
[facility] [address], [address] [place] [address]    Consent for Anesthesia   1.    I, Brain Nam agree to be cared for by a physician anesthesiologist alone and/or with a nurse anesthetist, who is specially trained to monitor me and give me allergic reactions to medications, injury to my airway, heart, lungs, vision, nerves, or muscles and in extremely rare instances death. 5. My doctor has explained to me other choices available to me for my care (alternatives).   6. Pregnant Patients (“epid Printed: 6/26/2020 at 9:14 PM    Medical Record #: [id number]                                            Page 1 of 1 Detail Level: Generalized Hide Additional Notes?: No

## 2023-12-02 DIAGNOSIS — Z79.4 TYPE 2 DIABETES MELLITUS WITH HYPERGLYCEMIA, WITH LONG-TERM CURRENT USE OF INSULIN (HCC): Primary | ICD-10-CM

## 2023-12-02 DIAGNOSIS — E11.65 TYPE 2 DIABETES MELLITUS WITH HYPERGLYCEMIA, WITH LONG-TERM CURRENT USE OF INSULIN (HCC): Primary | ICD-10-CM

## 2023-12-04 RX ORDER — PEN NEEDLE, DIABETIC 32GX 5/32"
NEEDLE, DISPOSABLE MISCELLANEOUS
Qty: 100 EACH | Refills: 0 | Status: SHIPPED | OUTPATIENT
Start: 2023-12-04

## 2023-12-04 NOTE — TELEPHONE ENCOUNTER
Travon requesting refill of pt's BD Lara 2nd generation pen 32G X 4 MM needles.   Sig: use a new needle with each injection as directed by your doctor  Disp: 100  Refill 0    LOV w/ Es- 1/24/23  Per Es's LOV note dated-1/24/23  \"RTC 1 month w/ MISSY, 2 months with MD\"    LOV w/ Dr. Raya Christensen- 12/28/23    RTC- no future appointments    Canceled appointments-  2/28/23 4/4/23 6/23/23    Rx pended and routed to Dr. Raya Christensen.

## 2024-01-03 ENCOUNTER — PATIENT MESSAGE (OUTPATIENT)
Facility: CLINIC | Age: 64
End: 2024-01-03

## 2024-01-03 NOTE — TELEPHONE ENCOUNTER
From: Richy Goins  To: Praveena Real  Sent: 1/3/2024 1:12 PM CST  Subject: DIALYSIS    Frandy Suggs,    Just checking - did you know that I'm on dialysis now?    Thank You,  Ramos  [phone]

## 2024-01-12 ENCOUNTER — OFFICE VISIT (OUTPATIENT)
Facility: CLINIC | Age: 64
End: 2024-01-12
Payer: COMMERCIAL

## 2024-01-12 VITALS
DIASTOLIC BLOOD PRESSURE: 88 MMHG | OXYGEN SATURATION: 98 % | WEIGHT: 156 LBS | HEIGHT: 68 IN | SYSTOLIC BLOOD PRESSURE: 134 MMHG | HEART RATE: 91 BPM | BODY MASS INDEX: 23.64 KG/M2

## 2024-01-12 DIAGNOSIS — Z79.4 TYPE 2 DIABETES MELLITUS WITH HYPERGLYCEMIA, WITH LONG-TERM CURRENT USE OF INSULIN (HCC): Primary | ICD-10-CM

## 2024-01-12 DIAGNOSIS — E11.65 TYPE 2 DIABETES MELLITUS WITH HYPERGLYCEMIA, WITH LONG-TERM CURRENT USE OF INSULIN (HCC): Primary | ICD-10-CM

## 2024-01-12 DIAGNOSIS — Z86.39 HISTORY OF DIABETIC KETOACIDOSIS: ICD-10-CM

## 2024-01-12 LAB
CARTRIDGE LOT#: ABNORMAL NUMERIC
HEMOGLOBIN A1C: 7.5 % (ref 4.3–5.6)

## 2024-01-12 PROCEDURE — 3051F HG A1C>EQUAL 7.0%<8.0%: CPT

## 2024-01-12 PROCEDURE — 83036 HEMOGLOBIN GLYCOSYLATED A1C: CPT

## 2024-01-12 PROCEDURE — 3075F SYST BP GE 130 - 139MM HG: CPT

## 2024-01-12 PROCEDURE — 3079F DIAST BP 80-89 MM HG: CPT

## 2024-01-12 PROCEDURE — 3008F BODY MASS INDEX DOCD: CPT

## 2024-01-12 PROCEDURE — 99214 OFFICE O/P EST MOD 30 MIN: CPT

## 2024-01-12 RX ORDER — PEN NEEDLE, DIABETIC 32GX 5/32"
NEEDLE, DISPOSABLE MISCELLANEOUS
Qty: 450 EACH | Refills: 1 | Status: SHIPPED | OUTPATIENT
Start: 2024-01-12

## 2024-01-12 RX ORDER — INSULIN DETEMIR 100 [IU]/ML
15 INJECTION, SOLUTION SUBCUTANEOUS NIGHTLY
Qty: 15 ML | Refills: 1 | Status: SHIPPED | OUTPATIENT
Start: 2024-01-12 | End: 2024-01-12

## 2024-01-12 RX ORDER — INSULIN ASPART 100 [IU]/ML
INJECTION, SOLUTION INTRAVENOUS; SUBCUTANEOUS
Qty: 30 ML | Refills: 1 | Status: SHIPPED | OUTPATIENT
Start: 2024-01-12

## 2024-01-12 RX ORDER — BLOOD-GLUCOSE SENSOR
1 EACH MISCELLANEOUS
Qty: 2 EACH | Refills: 5 | Status: SHIPPED | OUTPATIENT
Start: 2024-01-12

## 2024-01-12 RX ORDER — INSULIN DETEMIR 100 [IU]/ML
17 INJECTION, SOLUTION SUBCUTANEOUS NIGHTLY
Qty: 15 ML | Refills: 1 | Status: SHIPPED | OUTPATIENT
Start: 2024-01-12

## 2024-01-12 NOTE — PROGRESS NOTES
[facility] Note    Name: Richy Goins  Date: 1/12/2024      HISTORY OF PRESENT ILLNESS:  Richy Goins is a 63 year old male who presents for follow up for uncontrolled DM, seen initially as hospital follow up for DKA.    Initial consultation w/ Dr. Herndon:  DM history:  Previously had been following with DM APYASSINE, LV in Nov 2020, had been on ozempic 0.5mg weekly and lantus 20U daily. Due to renal function, was not on Metformin or SGLT2i. Pt doesn't recall taking lantus and/or why it was stopped (thinks perhaps it was causing dizziness)     Was admitted to [facility] 8/15-8/19/22 for DKA. At the time A1C 7.8%. Had only been taking ozempic 0.5mg weekly as DM monotherapy. Was transitioned off insulin gtt to MDI and eventually DC'd home on levemir 15U at bedtime + novolog 4U qAC TID.    Interim hx:   Oct 2022  W/ cande MD: Seen in clinic, levemir 17 ->19 U, novolog 4U TID qAC, not doing ISF or DM education    Dec 2022  Postprandial c-peptide 8.2, neg BRUNA Ab  A1C 8.1%. Had worn CGM x 1 day only, it was constantly beeping on his phone, so he took off the lilli. Added on Ozempic    Jan 2023-  Started Ozempic at start of January; due to ongoing retching/vomiting, decided to stop.  Is taking his insulin as prescribed last visit, however unable to state any of his doses because his wife does all of the insulin dosing for him.   Not using Trudi. Doesn't have a glucometer with him today.     Jan 2024  Last A1c value was 7.3% done 10/11/2023.  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 Limited in ability to drive to appts d/t prosthesis  Current DM meds: levemir 15u nightly; stopped short acting  Checking BG 1x/day- -130s on average    Previously trialed/failed- ozempic- GI sx's  intolerable/retching, novolog    CURRENT MEDICATION:    Current Outpatient Medications   Medication Sig Dispense Refill    Continuous Blood Gluc Sensor (FREESTYLE OMER 3 SENSOR) Does not apply Misc 1 Device every 14 (fourteen) days. 2 each 5    insulin aspart (NOVOLOG FLEXPEN) 100 Units/mL Subcutaneous Solution Pen-injector Take 1u for every 40 points over 140 up to three times daily before meals. Max TDD 20u 30 mL 1    Insulin Pen Needle (BD PEN NEEDLE AMBER 2ND GEN) 32G X 4 MM Does not apply Misc Use to inject insulin up to 5 times daily 450 each 1    insulin detemir (LEVEMIR FLEXPEN) 100 UNIT/ML Subcutaneous Solution Pen-injector Inject 17 Units into the skin nightly. 3 month supply 15 mL 1    lisinopril 10 MG Oral Tab Take 1 tablet (10 mg total) by mouth daily. 30 tablet 11    metoprolol succinate ER 50 MG Oral Tablet 24 Hr Take 1 tablet (50 mg total) by mouth daily. 30 tablet 11    amLODIPine 10 MG Oral Tab Take 1 tablet (10 mg total) by mouth daily. 30 tablet 1    aspirin 81 MG Oral Tab EC Take 1 tablet (81 mg total) by mouth daily. 360 tablet 0         ALLERGY:  No Known Allergies    PAST MEDICAL, SOCIAL AND FAMILY HISTORY:  See past medical history marked as reviewed.  See past surgical history marked as reviewed.  See past family history marked as reviewed.  See past social history marked as reviewed.    REVIEW OF SYSTEMS:  Ten point review of systems has been performed and is otherwise negative and/or non-contributory, except as described above.    PHYSICAL EXAM  Vitals:    01/12/24 0936   BP: 134/88   Pulse: 91       General Appearance:  alert, well developed, in no acute distress. Using cane  Eyes:  normal conjunctivae, sclera  Respiratory:  breathing comfortably  Musculoskeletal:  normal muscle strength and tone  Skin:  normal moisture and skin texture  Hair & Nails:  normal scalp hair     Psychiatric:  oriented to time, self, and place  Neuro:  sensory grossly intact and motor grossly intact      DATA:      Pertinent data reviewed    ASSESSMENT AND PLAN:  (E11.65,  Z79.4) Type 2 diabetes mellitus with hyperglycemia, with long-term current use of insulin ([facility])  (primary encounter diagnosis)  A1C 7.5%, goal <7%. Pt with longstanding DM2 dx, hx of DKA 8/2022.  Given CKD5, limited non-insulin options. Robust post-prandial C-peptide, along with negative autoantibodies. BG generally well controlled on current regimen, discussed w/ pt he would likely benefit from SSI for corrections as needed. Encouraged CGM use, he is hesitant to start. We discussed the importance of good glycemic control in the light of other medical co morbidities.    - incr Levemir 15 --> 17u daily  - restart novolog ISF 1:40>140   - ISF 1:40>140  - CI: SGTL2i, metformin (ESRD)  - no strict CI for GLP1a but previously intolerant (retching)  - restart Trudi 3 CGM to monitor BG more closely, states he will think about it    DM quality metrics:  - Ophtho: No data recorded No data recorded - needs updated eye exam  - BP is controlled, on ACE-I  - LDL is not at goal 8/2022, due for repeat  - ESRD; follows with nephro; on ACEi  - Neuropathy/ Foot exam: following with podiatry /re No data recorded  - CAD: none       No follow-ups on file.      1/12/2024  MISSY Jansen

## 2024-01-12 NOTE — PATIENT INSTRUCTIONS
Medications:   - start trudi 3 CGM or dexcom G7 CGM -- I am sending Trudi 3 to your pharmacy to start. If it's expensive, call the office- dexcom might be covered instead. Office phone number: [phone]  (Unruly singleton has been preferring dexcom over trudi, but I sent trudi for you because its 14 days instead of 10 which might be better for you, but dexcom is still good)  - increase levemir from 15u --> 17u daily  - check your sugar before meals and once before bed. Restart novolog short acting insulin: take 1u for every 40 points over 140 up to three times daily before meals and once before bed if needed    Here is your CORRECTION SCALE DOSING using novolog:  Take 1u for every 40 points over 140.  <140: no additional correction insulin, just take mealtime dose if eating  141-180- take 1u extra  181-220- take 2u  221-260- take 3u  261-300- take 4u  301-340- take 5u  341-380- take 6u  381-420- take 7u  421-460- take 8u    Blood sugar targets:  Before breakfast: , 2 hours after meals: <180 (preferably <150), A1C goal: <7.0%  Time in Range goal is higher than 80% if using a continuous glucose monitor (Dexcom or Trudi).  Time in Range can be found within the Dexcom G7 lilli, on Clarity if using Dexcom G6, or on LibreView if using Trudi.    HOW TO TREAT LOW BLOOD SUGAR (Hypoglycemia)  Low blood sugar= Less than 70, or if you start to have symptoms (below)  Symptoms: Shaking or trembling, fast heart rate, extreme hunger, sweating, confusion/difficulty concentrating, dizziness.    How to treat a low blood sugar if you are able to eat/drink: The Rule of 15  If you are using continuous glucose monitor that says you are low, but you do not have any symptoms, verify on fingerstick that your blood sugar is actually low before treating.   Eat 15 grams of carbs (see examples below)  Check your blood sugar after 15 minutes. If it’s still below your target range, have another serving.   Repeat these steps until it’s in your  target range. Once it’s in range, if you're nervous about your sugar going low again, have a protein source (ie, a spoonful of peanut butter).   If you have a CGM you want to look for how your arrow has changed. If you arrow is pointed up or sideways after 15 min, give your CGM more time OR check with a finger stick. Try not to eat more food until at least 15 min after the first BG check - otherwise you risk having a rebound high.  If you are experiencing symptoms and you are unable to check your blood glucose for any reason, treat the hypoglycemia.  If someone has a low blood sugar and is unconscious: Don’t hesitate to call 911. If someone is unconscious and glucagon is not available or someone does not know how to use it, call 911 immediately.    To treat a low, I recommend you carry with you easy, pre-portioned treatment for low blood sugars that are 15G of carbs:   - Children sized squeeze pouch applesauce (high fiber + carbs help prevent too high of a spike)  - Small children's sized juicebox- 15g carb --> 4oz juice box  - Glucose tablets from [employer], you can find them near diabetes supplies --> Note, you will need to eat 3-4 tablets to get to 15g of carbs  - Children sized fruit snack pack- look for one with 15 grams of total carbohydrate  - 3-4 Starburst candies  - 1 pack of fun sized skittles  - Choice of how to treat your low is important. Complex carbs, or foods that containf ats along with carbs (like chocolate) can slow the absorption of glucose and should NOT be used to treat an emergency low      Diabetes annual care reminders:  - If you haven't, remember to complete a yearly diabetes eye exam. This exam is critical to preventing and treating eye conditions caused by diabetes that could potentially lead to vision loss. Once complete, please call your eye care provider and ask them to fax your latest report to our office. This will help us update our records. Our fax number is: [phone]  - If  you ever have an upcoming surgery, please notify the office. We will make adjustments to your diabetes medications prior to the procedure.    Diet & Exercise:  Helpful apps for Carb Counting: remember, carb goal is 30-45g of carb per meal, plus 15-30g of carbs with snacks.   Increase your protein and fiber intake to feel winters, longer.   If you want more dietary guidance, let me know and we can get you scheduled with Siobhan, our diabetes educator or a dietician who specializes in diabetes  MyFitnessPal- Calorie counter and diet tracker, [employer] food search lilli or www.calorieking.com, LoseIt!, Carb Manager  Nutrition analyzer: Copy and paste any recipe into this analyzer, it will give you an estimated carb count for homemade recipes:  https://www.Labels That Talk/recipe-nutrition-analyzer-6867779    General exercise guidelines from the American [employer]:  Recommendations for Adults: Get at least 150 minutes per week of moderate-intensity aerobic activity or 75 minutes per week of vigorous aerobic activity, or a combination of both, preferably spread throughout the week.  Add moderate- to high-intensity muscle-strengthening activity (such as resistance or weights) on at least 2 days per week.  Spend less time sitting. Even light-intensity activity can offset some of the risks of being sedentary. Taking a 10-15 minute walk after meals is very beneficial to blood sugar regulation.  Increase amount and intensity of exercise gradually over time.      Thank you for visiting our office. We look forward to working with you to reach your health goals. As a reminder, if you need refills, please request early so there is enough time to process the request. We ask that you provide at least 5 days' notice before a refill is due, so there is time to send a request to pharmacy, process the refill, and ensure there are no other problems with obtaining the medication (backorders, prior authorization paperwork, etc). Routine  refills will not be addressed on weekends, so please submit these requests during the week.

## 2024-01-30 ENCOUNTER — APPOINTMENT (OUTPATIENT)
Dept: GENERAL RADIOLOGY | Facility: HOSPITAL | Age: 64
End: 2024-01-30
Attending: STUDENT IN AN ORGANIZED HEALTH CARE EDUCATION/TRAINING PROGRAM
Payer: COMMERCIAL

## 2024-01-30 ENCOUNTER — TELEPHONE (OUTPATIENT)
Dept: INTERNAL MEDICINE CLINIC | Facility: CLINIC | Age: 64
End: 2024-01-30

## 2024-01-30 ENCOUNTER — HOSPITAL ENCOUNTER (INPATIENT)
Facility: HOSPITAL | Age: 64
LOS: 2 days | Discharge: HOME | End: 2024-02-01
Attending: STUDENT IN AN ORGANIZED HEALTH CARE EDUCATION/TRAINING PROGRAM | Admitting: HOSPITALIST
Payer: COMMERCIAL

## 2024-01-30 DIAGNOSIS — N19 UREMIA: Primary | ICD-10-CM

## 2024-01-30 PROBLEM — Z99.2 ANEMIA IN CHRONIC KIDNEY DISEASE, ON CHRONIC DIALYSIS (HCC): Status: ACTIVE | Noted: 2024-01-30

## 2024-01-30 PROBLEM — D63.1 ANEMIA IN CHRONIC KIDNEY DISEASE, ON CHRONIC DIALYSIS (HCC): Status: ACTIVE | Noted: 2024-01-30

## 2024-01-30 PROBLEM — R53.1 WEAKNESS: Status: ACTIVE | Noted: 2024-01-30

## 2024-01-30 PROBLEM — N18.6 ANEMIA IN CHRONIC KIDNEY DISEASE, ON CHRONIC DIALYSIS (HCC): Status: ACTIVE | Noted: 2024-01-30

## 2024-01-30 LAB
ALBUMIN SERPL-MCNC: 3.6 G/DL (ref 3.4–5)
ALBUMIN/GLOB SERPL: 0.9 {RATIO} (ref 1–2)
ALP LIVER SERPL-CCNC: 112 U/L
ANION GAP SERPL CALC-SCNC: 13 MMOL/L (ref 0–18)
AST SERPL-CCNC: 10 U/L (ref 15–37)
ATRIAL RATE: 98 BPM
BASE EXCESS BLDV CALC-SCNC: -7.8 MMOL/L
BASOPHILS # BLD AUTO: 0.03 X10(3) UL (ref 0–0.2)
BASOPHILS NFR BLD AUTO: 0.3 %
BILIRUB SERPL-MCNC: 0.4 MG/DL (ref 0.1–2)
BUN BLD-MCNC: 120 MG/DL (ref 9–23)
CALCIUM BLD-MCNC: 8.9 MG/DL (ref 8.5–10.1)
CHLORIDE SERPL-SCNC: 109 MMOL/L (ref 98–112)
CO2 SERPL-SCNC: 18 MMOL/L (ref 21–32)
CREAT BLD-MCNC: 8.22 MG/DL
EGFRCR SERPLBLD CKD-EPI 2021: 7 ML/MIN/1.73M2 (ref 60–?)
EOSINOPHIL # BLD AUTO: 0.01 X10(3) UL (ref 0–0.7)
EOSINOPHIL NFR BLD AUTO: 0.1 %
ERYTHROCYTE [DISTWIDTH] IN BLOOD BY AUTOMATED COUNT: 14.1 %
FLUAV + FLUBV RNA SPEC NAA+PROBE: NEGATIVE
FLUAV + FLUBV RNA SPEC NAA+PROBE: NEGATIVE
GLOBULIN PLAS-MCNC: 4.1 G/DL (ref 2.8–4.4)
GLUCOSE BLD-MCNC: 151 MG/DL (ref 70–99)
GLUCOSE BLD-MCNC: 177 MG/DL (ref 70–99)
GLUCOSE BLD-MCNC: 265 MG/DL (ref 70–99)
GLUCOSE BLD-MCNC: 99 MG/DL (ref 70–99)
HBV SURFACE AG SER-ACNC: <0.1 [IU]/L
HBV SURFACE AG SERPL QL IA: NONREACTIVE
HCO3 BLDV-SCNC: 18.2 MEQ/L (ref 22–26)
HCT VFR BLD AUTO: 41.4 %
HGB BLD-MCNC: 13.5 G/DL
IMM GRANULOCYTES # BLD AUTO: 0.03 X10(3) UL (ref 0–1)
IMM GRANULOCYTES NFR BLD: 0.3 %
LYMPHOCYTES # BLD AUTO: 0.67 X10(3) UL (ref 1–4)
LYMPHOCYTES NFR BLD AUTO: 6.5 %
MCH RBC QN AUTO: 28.4 PG (ref 26–34)
MCHC RBC AUTO-ENTMCNC: 32.6 G/DL (ref 31–37)
MCV RBC AUTO: 87 FL
MONOCYTES # BLD AUTO: 0.26 X10(3) UL (ref 0.1–1)
MONOCYTES NFR BLD AUTO: 2.5 %
NEUTROPHILS # BLD AUTO: 9.31 X10 (3) UL (ref 1.5–7.7)
NEUTROPHILS # BLD AUTO: 9.31 X10(3) UL (ref 1.5–7.7)
NEUTROPHILS NFR BLD AUTO: 90.3 %
OSMOLALITY SERPL CALC.SUM OF ELEC: 338 MOSM/KG (ref 275–295)
OXYHGB MFR BLDV: 73.3 % (ref 72–78)
P AXIS: 61 DEGREES
P-R INTERVAL: 166 MS
PCO2 BLDV: 35 MM HG (ref 38–50)
PH BLDV: 7.31 [PH] (ref 7.33–7.43)
PLATELET # BLD AUTO: 264 10(3)UL (ref 150–450)
PO2 BLDV: 45 MM HG (ref 30–50)
POTASSIUM SERPL-SCNC: 5.2 MMOL/L (ref 3.5–5.1)
PROT SERPL-MCNC: 7.7 G/DL (ref 6.4–8.2)
Q-T INTERVAL: 360 MS
QRS DURATION: 88 MS
QTC CALCULATION (BEZET): 459 MS
R AXIS: -9 DEGREES
RBC # BLD AUTO: 4.76 X10(6)UL
RSV RNA SPEC NAA+PROBE: NEGATIVE
SARS-COV-2 RNA RESP QL NAA+PROBE: NOT DETECTED
SODIUM SERPL-SCNC: 140 MMOL/L (ref 136–145)
T AXIS: 72 DEGREES
VENTRICULAR RATE: 98 BPM
WBC # BLD AUTO: 10.3 X10(3) UL (ref 4–11)

## 2024-01-30 PROCEDURE — 71045 X-RAY EXAM CHEST 1 VIEW: CPT | Performed by: STUDENT IN AN ORGANIZED HEALTH CARE EDUCATION/TRAINING PROGRAM

## 2024-01-30 PROCEDURE — 99223 1ST HOSP IP/OBS HIGH 75: CPT | Performed by: HOSPITALIST

## 2024-01-30 PROCEDURE — 99232 SBSQ HOSP IP/OBS MODERATE 35: CPT | Performed by: INTERNAL MEDICINE

## 2024-01-30 RX ORDER — POLYETHYLENE GLYCOL 3350 17 G/17G
17 POWDER, FOR SOLUTION ORAL DAILY PRN
Status: DISCONTINUED | OUTPATIENT
Start: 2024-01-30 | End: 2024-02-01

## 2024-01-30 RX ORDER — ALBUMIN (HUMAN) 12.5 G/50ML
25 SOLUTION INTRAVENOUS
Status: ACTIVE | OUTPATIENT
Start: 2024-01-30 | End: 2024-02-01

## 2024-01-30 RX ORDER — AMLODIPINE BESYLATE 10 MG/1
10 TABLET ORAL DAILY
Status: DISCONTINUED | OUTPATIENT
Start: 2024-01-30 | End: 2024-02-01

## 2024-01-30 RX ORDER — ASPIRIN 81 MG/1
81 TABLET ORAL DAILY
Status: DISCONTINUED | OUTPATIENT
Start: 2024-01-30 | End: 2024-02-01

## 2024-01-30 RX ORDER — ONDANSETRON 2 MG/ML
4 INJECTION INTRAMUSCULAR; INTRAVENOUS EVERY 6 HOURS PRN
Status: DISCONTINUED | OUTPATIENT
Start: 2024-01-30 | End: 2024-02-01

## 2024-01-30 RX ORDER — MELATONIN
3 NIGHTLY PRN
Status: DISCONTINUED | OUTPATIENT
Start: 2024-01-30 | End: 2024-02-01

## 2024-01-30 RX ORDER — METOCLOPRAMIDE HYDROCHLORIDE 5 MG/ML
5 INJECTION INTRAMUSCULAR; INTRAVENOUS EVERY 8 HOURS PRN
Status: DISCONTINUED | OUTPATIENT
Start: 2024-01-30 | End: 2024-02-01

## 2024-01-30 RX ORDER — ONDANSETRON 2 MG/ML
4 INJECTION INTRAMUSCULAR; INTRAVENOUS ONCE
Status: COMPLETED | OUTPATIENT
Start: 2024-01-30 | End: 2024-01-30

## 2024-01-30 RX ORDER — HEPARIN SODIUM 5000 [USP'U]/ML
5000 INJECTION, SOLUTION INTRAVENOUS; SUBCUTANEOUS EVERY 8 HOURS SCHEDULED
Status: DISCONTINUED | OUTPATIENT
Start: 2024-01-30 | End: 2024-02-01

## 2024-01-30 RX ORDER — SODIUM CHLORIDE 9 MG/ML
INJECTION, SOLUTION INTRAVENOUS CONTINUOUS
Status: DISCONTINUED | OUTPATIENT
Start: 2024-01-30 | End: 2024-01-31

## 2024-01-30 RX ORDER — DEXTROSE MONOHYDRATE 25 G/50ML
50 INJECTION, SOLUTION INTRAVENOUS
Status: DISCONTINUED | OUTPATIENT
Start: 2024-01-30 | End: 2024-02-01

## 2024-01-30 RX ORDER — HEPARIN SODIUM 1000 [USP'U]/ML
1.5 INJECTION, SOLUTION INTRAVENOUS; SUBCUTANEOUS ONCE
Status: COMPLETED | OUTPATIENT
Start: 2024-01-30 | End: 2024-01-30

## 2024-01-30 RX ORDER — SENNOSIDES 8.6 MG
17.2 TABLET ORAL NIGHTLY PRN
Status: DISCONTINUED | OUTPATIENT
Start: 2024-01-30 | End: 2024-02-01

## 2024-01-30 RX ORDER — ACETAMINOPHEN 500 MG
500 TABLET ORAL EVERY 4 HOURS PRN
Status: DISCONTINUED | OUTPATIENT
Start: 2024-01-30 | End: 2024-02-01

## 2024-01-30 RX ORDER — NICOTINE POLACRILEX 4 MG
15 LOZENGE BUCCAL
Status: DISCONTINUED | OUTPATIENT
Start: 2024-01-30 | End: 2024-02-01

## 2024-01-30 RX ORDER — BISACODYL 10 MG
10 SUPPOSITORY, RECTAL RECTAL
Status: DISCONTINUED | OUTPATIENT
Start: 2024-01-30 | End: 2024-02-01

## 2024-01-30 RX ORDER — NICOTINE POLACRILEX 4 MG
30 LOZENGE BUCCAL
Status: DISCONTINUED | OUTPATIENT
Start: 2024-01-30 | End: 2024-02-01

## 2024-01-30 RX ORDER — SODIUM CHLORIDE 9 MG/ML
INJECTION, SOLUTION INTRAVENOUS ONCE
Status: COMPLETED | OUTPATIENT
Start: 2024-01-30 | End: 2024-01-30

## 2024-01-30 NOTE — CONSULTS
[facility]  Report of Consultation    Richy Goins Patient Status:  Inpatient    1960 MRN [id number]   Location [facility] 4NW-A Attending Jeanne Redman MD   Hosp Day # 0 PCP Woody Dahl MD     Reason for Consultation:  ESRd    History of Present Illness:  Richy Goins is a a(n) 63 year old male with hx of ESRD on HD (recent start)- TTS -missed last 2 txs due to not feeling well. States he felt weak past Friday and skipped his Saturday tx  Denies f/c  No cp/sob  No n/v  Not eating/drinking much due to poor appetite  No diarrhea    He feels better now w/ some fluids he received in ER  Denies any abd pain/n/v         History:  Past Medical History:   Diagnosis Date    Belching     Diarrhea, unspecified     Essential hypertension     Fatigue     Flatulence/gas pain/belching     Hyperlipidemia     Indigestion     Irregular bowel habits     Night sweats     Osteoporosis     Type II or unspecified type diabetes mellitus without mention of complication, not stated as uncontrolled     Visual impairment     reading glasses    Vomiting      Past Surgical History:   Procedure Laterality Date    AMPUTATION TOE,I-P JT Right     COLONOSCOPY N/A 2023    Procedure: COLONOSCOPY;  Surgeon: Sarmad Gonzalez MD;  Location:  ENDOSCOPY     Family History   Problem Relation Age of Onset    Heart Attack Father     Heart Disorder Father 57    Diabetes Maternal Grandfather     Diabetes Maternal Aunt       reports that he has never smoked. He has never used smokeless tobacco. He reports that he does not currently use alcohol. He reports that he does not use drugs.    Allergies:  No Known Allergies    Current Medications:    Current Facility-Administered Medications:     sodium chloride 0.9 % IV bolus 100 mL, 100 mL, Intravenous, Q30 Min PRN **AND** albumin human (Albumin) 25% injection 25 g, 25 g, Intravenous, PRN Dialysis    heparin (Porcine) 1000 UNIT/ML injection 1,500 Units, 1.5 mL, Intracatheter, Once     amLODIPine (Norvasc) tab 10 mg, 10 mg, Oral, Daily    aspirin DR tab 81 mg, 81 mg, Oral, Daily    insulin detemir (Levemir) 100 UNIT/ML FlexPen/FlexTouch 17 Units, 17 Units, Subcutaneous, Nightly    glucose (Dex4) 15 GM/59ML oral liquid 15 g, 15 g, Oral, Q15 Min PRN **OR** glucose (Glutose) 40% oral gel 15 g, 15 g, Oral, Q15 Min PRN **OR** glucose-vitamin C (Dex-4) chewable tab 4 tablet, 4 tablet, Oral, Q15 Min PRN **OR** dextrose 50% injection 50 mL, 50 mL, Intravenous, Q15 Min PRN **OR** glucose (Dex4) 15 GM/59ML oral liquid 30 g, 30 g, Oral, Q15 Min PRN **OR** glucose (Glutose) 40% oral gel 30 g, 30 g, Oral, Q15 Min PRN **OR** glucose-vitamin C (Dex-4) chewable tab 8 tablet, 8 tablet, Oral, Q15 Min PRN    heparin (Porcine) 5000 UNIT/ML injection 5,000 Units, 5,000 Units, Subcutaneous, Q8H SUJATA    acetaminophen (Tylenol Extra Strength) tab 500 mg, 500 mg, Oral, Q4H PRN    ondansetron (Zofran) 4 MG/2ML injection 4 mg, 4 mg, Intravenous, Q6H PRN    metoclopramide (Reglan) 5 mg/mL injection 5 mg, 5 mg, Intravenous, Q8H PRN    polyethylene glycol (PEG 3350) (Miralax) 17 g oral packet 17 g, 17 g, Oral, Daily PRN    sennosides (Senokot) tab 17.2 mg, 17.2 mg, Oral, Nightly PRN    bisacodyl (Dulcolax) 10 MG rectal suppository 10 mg, 10 mg, Rectal, Daily PRN    melatonin tab 3 mg, 3 mg, Oral, Nightly PRN    insulin aspart (NovoLOG) 100 Units/mL FlexPen 1-5 Units, 1-5 Units, Subcutaneous, TID AC and HS  Home Medications:  No current outpatient medications on file.       Review of Systems:  See HPI; A total of 12 systems reviewed and otherwise unremarkable.    Physical Exam:  Vital signs: Blood pressure (!) 170/92, pulse 88, temperature 97.4 °F (36.3 °C), temperature source Temporal, resp. rate 19, weight 175 lb (79.4 kg), SpO2 98%.  General: No acute distress. Alert and oriented x 3.  HEENT: Moist mucous membranes. EOM-I. PERRL  Neck: No lymphadenopathy.  No JVD. No carotid bruits.  Respiratory: Clear to auscultation  bilaterally.  No wheezes. No rhonchi.  Cardiovascular: S1, S2.  Regular rate and rhythm.  No murmurs. Equal pulses   Abdomen: Soft, nontender, nondistended.  Positive bowel sounds. No rebound tenderness   Neurologic: No focal neurological deficits.   Musculoskeletal: Full range of motion of all extremities.  No swelling noted.   Integument: No lesions. No erythema.  Psychiatric: Appropriate mood and affect.    Laboratory:  Lab Results   Component Value Date    WBC 10.3 01/30/2024    HGB 13.5 01/30/2024    HCT 41.4 01/30/2024    .0 01/30/2024    CREATSERUM 8.22 01/30/2024     01/30/2024     01/30/2024    K 5.2 01/30/2024     01/30/2024    CO2 18.0 01/30/2024     01/30/2024    CA 8.9 01/30/2024    ALB 3.6 01/30/2024    ALKPHO 112 01/30/2024    BILT 0.4 01/30/2024    TP 7.7 01/30/2024    AST 10 01/30/2024    ALT  01/30/2024      Comment:      Due to  backorder we are temporarily unable to offer hospital-based ALT testing at [facility].   If urgently needed, please order ALT test code [id number].   The new order will need a new venipuncture and will be sent to [facility] for testing.   The expected turnaround time will be within 24 hours.           BUN (mg/dL)   Date Value   01/30/2024 120 (H)   11/15/2023 52 (H)   11/14/2023 98 (H)     Creatinine (mg/dL)   Date Value   01/30/2024 8.22 (H)   11/15/2023 3.87 (H)   11/14/2023 5.66 (H)         Imaging:  Reviewed     Impression:  ESRD - on HD ; TTS; missed Saturday tx  Hyperkalemia  - HD today w/ limited UF  - continue fluids  HTN- uncontrolled; resume o/p meds- has not taken his meds today  - monitor  DM  Weakness- ? Viral illness; covid negative  - supportive care  Anemia due to CkD-  @ goal; no JURGEN       Thank you for allowing me to participate in this patient's care.  Please feel free to call me with any questions or concerns.    Paul Barnhart MD  1/30/2024  11:24 AM

## 2024-01-30 NOTE — H&P
[facility]     Richy Goins Patient Status:  Emergency    1960 MRN [id number]   Location [facility] EMERGENCY DEPARTMENT Attending Marybeth Moreno MD   Hosp Day # 0 PCP Woody Dahl MD     Chief Complaint: weakness nausea vomiting    Subjective:    History of Present Illness:     Richy Goins is a 63 year old male with poor appetite and cold symptoms missed last 2 HD.he started to have nausea on Friday, assoc with dizziness and weakness and he didn't go to HD on Sat.He feels weak and tired today, he missed HD today again.No CP SOB fever chills.    History/Other:    Past Medical History:  Past Medical History:   Diagnosis Date    Belching     Diarrhea, unspecified     Essential hypertension     Fatigue     Flatulence/gas pain/belching     Hyperlipidemia     Indigestion     Irregular bowel habits     Night sweats     Osteoporosis     Type II or unspecified type diabetes mellitus without mention of complication, not stated as uncontrolled     Visual impairment     reading glasses    Vomiting      Past Surgical History:   Past Surgical History:   Procedure Laterality Date    AMPUTATION TOE,I-P JT Right     COLONOSCOPY N/A 2023    Procedure: COLONOSCOPY;  Surgeon: Sarmad Gonzalez MD;  Location:  ENDOSCOPY      Family History:   Family History   Problem Relation Age of Onset    Heart Attack Father     Heart Disorder Father 57    Diabetes Maternal Grandfather     Diabetes Maternal Aunt      Social History:    reports that he has never smoked. He has never used smokeless tobacco. He reports that he does not currently use alcohol. He reports that he does not use drugs.     Allergies: No Known Allergies    Medications:    Current Facility-Administered Medications on File Prior to Encounter   Medication Dose Route Frequency Provider Last Rate Last Admin    [COMPLETED] epoetin juan josé (Epogen, Procrit) 47876 UNIT/ML injection 10,000 Units  10,000 Units Intravenous Once Fang,  MD Keeley   10,000 Units at 11/15/23 1128    [COMPLETED] heparin (Porcine) 1000 UNIT/ML injection 5,000 Units  5,000 Units Intravenous Once Keeely Blackwell MD   5,000 Units at 11/15/23 1127    [COMPLETED] lidocaine-EPINEPHrine (Xylocaine-Epinephrine) 2 %-1:854833 injection             [COMPLETED] lidocaine (Xylocaine) 1 % injection             [COMPLETED] fentaNYL (Sublimaze) 50 mcg/mL injection             [COMPLETED] midazolam (Versed) 2 MG/2ML injection             [COMPLETED] ceFAZolin (Ancef) 1 g injection             [COMPLETED] heparin (Porcine) 5000 UNIT/ML injection             [COMPLETED] ondansetron (Zofran) 4 MG/2ML injection             [COMPLETED] heparin (Porcine) 1000 UNIT/ML injection 5,000 Units  5,000 Units Intravenous Once Keeley Blackwell MD   5,000 Units at 11/14/23 1951    [COMPLETED] sodium chloride 0.9 % IV bolus 1,000 mL  1,000 mL Intravenous Once Franki Garrett MD   Stopped at 11/12/23 1224    [COMPLETED] amLODIPine (Norvasc) tab 10 mg  10 mg Oral Once Franki Garrett MD   10 mg at 11/12/23 1244    [COMPLETED] metoprolol tartrate (Lopressor) tab 50 mg  50 mg Oral Once Franki Garrett MD   50 mg at 11/12/23 1230    [COMPLETED] influenza vaccine split quad (Fluzone QIV) 0.5 mL IM injection (ages 6 months to 64 years) 0.5 mL  0.5 mL Intramuscular Prior to discharge Tessie Joshi DO   0.5 mL at 11/15/23 1659    [COMPLETED] hydrALAzine (Apresoline) 20 mg/mL injection 10 mg  10 mg Intravenous Once Live Ott MD   10 mg at 11/12/23 2158     Current Outpatient Medications on File Prior to Encounter   Medication Sig Dispense Refill    Continuous Blood Gluc Sensor (FREESTYLE OMER 3 SENSOR) Does not apply Misc 1 Device every 14 (fourteen) days. 2 each 5    insulin aspart (NOVOLOG FLEXPEN) 100 Units/mL Subcutaneous Solution Pen-injector Take 1u for every 40 points over 140 up to three times daily before meals. Max TDD 20u 30 mL 1    Insulin Pen Needle (BD PEN NEEDLE AMBER 2ND GEN) 32G  X 4 MM Does not apply Misc Use to inject insulin up to 5 times daily 450 each 1    insulin detemir (LEVEMIR FLEXPEN) 100 UNIT/ML Subcutaneous Solution Pen-injector Inject 17 Units into the skin nightly. 3 month supply 15 mL 1    lisinopril 10 MG Oral Tab Take 1 tablet (10 mg total) by mouth daily. 30 tablet 11    metoprolol succinate ER 50 MG Oral Tablet 24 Hr Take 1 tablet (50 mg total) by mouth daily. 30 tablet 11    amLODIPine 10 MG Oral Tab Take 1 tablet (10 mg total) by mouth daily. 30 tablet 1    aspirin 81 MG Oral Tab EC Take 1 tablet (81 mg total) by mouth daily. 360 tablet 0       Review of Systems:   A comprehensive review of systems was completed.    Pertinent positives and negatives noted in the HPI.    Objective:   Physical Exam:    BP (!) 181/93   Pulse 99   Temp 97.4 °F (36.3 °C) (Temporal)   Resp 25   Wt 175 lb (79.4 kg)   SpO2 99%   BMI 26.61 kg/m²   General: No acute distress, Alert  Respiratory: No rhonchi, no wheezes  Cardiovascular: S1, S2. Regular rate and rhythm  Abdomen: Soft, Non-tender, non-distended, positive bowel sounds  Neuro: No new focal deficits  Extremities: No edema      Results:    Labs:      Labs Last 24 Hours:    Recent Labs   Lab 01/30/24  0617   RBC 4.76   HGB 13.5   HCT 41.4   MCV 87.0   MCH 28.4   MCHC 32.6   RDW 14.1   NEPRELIM 9.31*   WBC 10.3   .0       Recent Labs   Lab 01/30/24  0617   *   *   CREATSERUM 8.22*   EGFRCR 7*   CA 8.9   ALB 3.6      K 5.2*      CO2 18.0*   ALKPHO 112   AST 10*   BILT 0.4   TP 7.7       Lab Results   Component Value Date    INR 1.07 11/14/2023    INR 1.06 10/12/2023    INR 1.04 06/26/2020       No results for input(s): \"TROP\", \"TROPHS\", \"CK\" in the last 168 hours.    No results for input(s): \"TROP\", \"PBNP\" in the last 168 hours.    No results for input(s): \"PCT\" in the last 168 hours.    Imaging: Imaging data reviewed in Epic.    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 Please be advised this is a medical document. Medical documents are intended to carry relevant information, facts as evident, and the clinical opinion of the practitioner. The medical note is intended as peer to peer communication and may appear blunt or direct. It is written in medical language and may contain abbreviations or verbiage that are unfamiliar.

## 2024-01-30 NOTE — ED PROVIDER NOTES
Patient Seen in: [facility] Emergency Department      History     Chief Complaint   Patient presents with    Dizziness     Stated Complaint: dehydration, , weakness    Subjective:   HPI    Patient is a 63-year-old male with past medical history as listed below who presents to the emergency room with reports of dehydration and weakness.  He states he started having what he thought was sinus issues on Saturday.  Notes poor appetite and nausea.  Wife states he has just been retching.  States this happens to him quite often.  Per chart review he was admitted back in November for similar symptoms.  Patient thinks he just needs some IV fluids.  He did miss dialysis on Saturday and today.  Objective:   Past Medical History:   Diagnosis Date    Belching     Diarrhea, unspecified     Essential hypertension     Fatigue     Flatulence/gas pain/belching     Hyperlipidemia     Indigestion     Irregular bowel habits     Night sweats     Osteoporosis     Type II or unspecified type diabetes mellitus without mention of complication, not stated as uncontrolled     Visual impairment     reading glasses    Vomiting               Past Surgical History:   Procedure Laterality Date    AMPUTATION TOE,I-P JT Right     COLONOSCOPY N/A 2/20/2023    Procedure: COLONOSCOPY;  Surgeon: Sarmad Gonzalez MD;  Location:  ENDOSCOPY                Social History     Socioeconomic History    Marital status:    Tobacco Use    Smoking status: Never    Smokeless tobacco: Never    Tobacco comments:     none   Vaping Use    Vaping Use: Never used   Substance and Sexual Activity    Alcohol use: Not Currently     Comment: none    Drug use: Never    Sexual activity: Yes     Partners: Female     Social Determinants of Health     Financial Resource Strain: Low Risk  (10/16/2023)    Financial Resource Strain     Difficulty of Paying Living Expenses: Not very hard     Med Affordability: No   Food Insecurity: No Food Insecurity (1/30/2024)     Food Insecurity     Food Insecurity: Never true   Transportation Needs: No Transportation Needs (1/30/2024)    Transportation Needs     Lack of Transportation: No   Housing Stability: Low Risk  (1/30/2024)    Housing Stability     Housing Instability: No              Review of Systems    Positive for stated complaint: dehydration, , weakness  Other systems are as noted in HPI.  Constitutional and vital signs reviewed.      All other systems reviewed and negative except as noted above.    Physical Exam     ED Triage Vitals [01/30/24 0557]   BP (!) 172/95   Pulse 100   Resp 20   Temp 97.4 °F (36.3 °C)   Temp src Temporal   SpO2 98 %   O2 Device None (Room air)       Current:BP (!) 167/93 (BP Location: Right arm)   Pulse 100   Temp 98 °F (36.7 °C) (Oral)   Resp 24   Wt 79.4 kg   SpO2 99%   BMI 26.61 kg/m²         Physical Exam  Vitals and nursing note reviewed.   Constitutional:       Appearance: Normal appearance.      Comments: Appearing on exam   HENT:      Head: Normocephalic.      Nose: Nose normal.      Mouth/Throat:      Mouth: Mucous membranes are moist.   Eyes:      Extraocular Movements: Extraocular movements intact.      Pupils: Pupils are equal, round, and reactive to light.   Cardiovascular:      Rate and Rhythm: Normal rate and regular rhythm.      Pulses: Normal pulses.   Pulmonary:      Effort: Pulmonary effort is normal.   Chest:      Comments: Right internal jugular tunneled dialysis catheter    Abdominal:      General: Abdomen is flat. Bowel sounds are normal. There is no distension.      Palpations: Abdomen is soft.      Tenderness: There is no abdominal tenderness. There is no right CVA tenderness, left CVA tenderness, guarding or rebound.      Hernia: No hernia is present.   Musculoskeletal:         General: No swelling or tenderness. Normal range of motion.      Cervical back: Normal range of motion.   Skin:     General: Skin is warm and dry.   Neurological:      Mental Status: He is  alert and oriented to person, place, and time. Mental status is at baseline.   Psychiatric:         Mood and Affect: Mood normal.               ED Course     Labs Reviewed   COMP METABOLIC PANEL (14) - Abnormal; Notable for the following components:       Result Value    Glucose 265 (*)     Potassium 5.2 (*)     CO2 18.0 (*)      (*)     Creatinine 8.22 (*)     Calculated Osmolality 338 (*)     eGFR-Cr 7 (*)     AST 10 (*)     A/G Ratio 0.9 (*)     All other components within normal limits   VENOUS BLOOD GAS - Abnormal; Notable for the following components:    Venous pH 7.31 (*)     Venous pCO2 35 (*)     Venous HCO3 18.2 (*)     All other components within normal limits   POCT GLUCOSE - Abnormal; Notable for the following components:    POC Glucose 177 (*)     All other components within normal limits   CBC W/ DIFFERENTIAL - Abnormal; Notable for the following components:    Neutrophil Absolute Prelim 9.31 (*)     Neutrophil Absolute 9.31 (*)     Lymphocyte Absolute 0.67 (*)     All other components within normal limits   POCT GLUCOSE - Normal   SARS-COV-2/FLU A AND B/RSV BY PCR (VixloPERT) - Normal    Narrative:     This test is intended for the qualitative detection and differentiation of SARS-CoV-2, influenza A, influenza B, and respiratory syncytial virus (RSV) viral RNA in nasopharyngeal or nares swabs from individuals suspected of respiratory viral infection consistent with COVID-19 by their healthcare provider. Signs and symptoms of respiratory viral infection due to SARS-CoV-2, influenza, and RSV can be similar.    Test performed using the Xpert Xpress SARS-CoV-2/FLU/RSV (real time RT-PCR)  assay on the [employer] instrument, [employer], [place] [address].   This test is being used under the Food and Drug Administration's Emergency Use Authorization.    The authorized Fact Sheet for Healthcare Providers for this assay is available upon request from the laboratory.   CBC WITH DIFFERENTIAL WITH PLATELET     Narrative:     The following orders were created for panel order CBC With Differential With Platelet.  Procedure                               Abnormality         Status                     ---------                               -----------         ------                     CBC W/ DIFFERENTIAL[[id number]]          Abnormal            Final result                 Please view results for these tests on the individual orders.   HEPATITIS B SURFACE ANTIGEN   RAINBOW DRAW BLUE     EKG    Rate, intervals and axes as noted on EKG Report.  Rate: 54  Rhythm: Sinus Rhythm  Reading: no luke/dep or t wave inversions          XR CHEST AP PORTABLE  (CPT=71045)    Result Date: 1/30/2024  CONCLUSION:   Right-sided pheresis catheter with tip in the right atrium.  Mild pulmonary vascular congestion.   LOCATION:  [facility]      Dictated by (CST): Kvng He MD on 1/30/2024 at 8:47 AM     Finalized by (CST): Kvng He MD on 1/30/2024 at 8:47 AM              MDM        Admission disposition: 1/30/2024  7:32 AM                Medical Decision Making    The differential includes the following  Viral respiratory infection, anemia, uremia, DKA, gastroparesis     Pertinent comorbidities include  Listed above    Pertinent social history includes  Listed above    ER course  Patient hypertensive but otherwise hemodynamically stable.  No focal findings on exam.  Given 500 cc    Labs  BG with pH of 7.31, metabolic panel with a bicarb of 18 BUN of 120 creatinine of 0.2.  Patient's viral panel was negative for COVID, influenza and RSV.    Imaging studies  I personally reviewed the following radiology study cxr and my independent interpretation is it  showed no pneumonia    External data reviewed  Discharge summary from November  Nephrology note from November    Discussion of management with external providers  Jax hospitalist and [facility]    Pt admitted for further management and dialysis     Disposition and Plan      Clinical Impression:  1. Uremia         Disposition:  Admit  1/30/2024  7:32 am    Follow-up:  No follow-up provider specified.        Medications Prescribed:  Current Discharge Medication List                            Hospital Problems       Present on Admission  Date Reviewed: 1/12/2024            ICD-10-CM Noted POA    * (Principal) Uremia N19 10/11/2023 Unknown

## 2024-01-30 NOTE — ED QUICK NOTES
Orders for admission, patient is aware of plan and ready to go upstairs. Any questions, please call ED RN Mikayla at extension [phone].     Patient Covid vaccination status: Fully vaccinated     COVID Test Ordered in ED: SARS-CoV-2/Flu A and B/RSV by PCR (GeneXpert)    COVID Suspicion at Admission: N/A    Running Infusions: sodium chloride 0.9% infusion at 83mL/hr    Mental Status/LOC at time of transport: A&Ox4    Other pertinent information:   CIWA score: N/A   NIH score:  N/A

## 2024-01-30 NOTE — ED INITIAL ASSESSMENT (HPI)
Pt presents to ed with multiple complaints, including n/v, dehydration. Pt is Tuesday, Thursday, Saturday hemodialysis pt.

## 2024-01-31 LAB
ADENOVIRUS PCR:: NOT DETECTED
B PARAPERT DNA SPEC QL NAA+PROBE: NOT DETECTED
B PERT DNA SPEC QL NAA+PROBE: NOT DETECTED
C PNEUM DNA SPEC QL NAA+PROBE: NOT DETECTED
CORONAVIRUS 229E PCR:: NOT DETECTED
CORONAVIRUS HKU1 PCR:: NOT DETECTED
CORONAVIRUS NL63 PCR:: NOT DETECTED
CORONAVIRUS OC43 PCR:: NOT DETECTED
FLUAV RNA SPEC QL NAA+PROBE: NOT DETECTED
FLUBV RNA SPEC QL NAA+PROBE: NOT DETECTED
GLUCOSE BLD-MCNC: 100 MG/DL (ref 70–99)
GLUCOSE BLD-MCNC: 112 MG/DL (ref 70–99)
GLUCOSE BLD-MCNC: 120 MG/DL (ref 70–99)
GLUCOSE BLD-MCNC: 151 MG/DL (ref 70–99)
METAPNEUMOVIRUS PCR:: NOT DETECTED
MYCOPLASMA PNEUMONIA PCR:: NOT DETECTED
PARAINFLUENZA 1 PCR:: NOT DETECTED
PARAINFLUENZA 2 PCR:: NOT DETECTED
PARAINFLUENZA 3 PCR:: NOT DETECTED
PARAINFLUENZA 4 PCR:: NOT DETECTED
RHINOVIRUS/ENTERO PCR:: NOT DETECTED
RSV RNA SPEC QL NAA+PROBE: NOT DETECTED
SARS-COV-2 RNA NPH QL NAA+NON-PROBE: NOT DETECTED

## 2024-01-31 PROCEDURE — 5A1D70Z PERFORMANCE OF URINARY FILTRATION, INTERMITTENT, LESS THAN 6 HOURS PER DAY: ICD-10-PCS | Performed by: INTERNAL MEDICINE

## 2024-01-31 PROCEDURE — 99232 SBSQ HOSP IP/OBS MODERATE 35: CPT | Performed by: INTERNAL MEDICINE

## 2024-01-31 PROCEDURE — 99233 SBSQ HOSP IP/OBS HIGH 50: CPT | Performed by: STUDENT IN AN ORGANIZED HEALTH CARE EDUCATION/TRAINING PROGRAM

## 2024-01-31 RX ORDER — SODIUM CHLORIDE 9 MG/ML
INJECTION, SOLUTION INTRAVENOUS CONTINUOUS
Status: DISCONTINUED | OUTPATIENT
Start: 2024-01-31 | End: 2024-02-01

## 2024-01-31 NOTE — PROGRESS NOTES
[facility]  Progress Note    Richy Goins Patient Status:  Inpatient    1960 MRN [id number]   [facility] 4NW-A Attending Jeanne Redman MD   Hosp Day # 1 PCP Woody Dahl MD     No acute events  Feels better today    Current Medications:    Current Facility-Administered Medications:     sodium chloride 0.9 % IV bolus 100 mL, 100 mL, Intravenous, Q30 Min PRN **AND** albumin human (Albumin) 25% injection 25 g, 25 g, Intravenous, PRN Dialysis    amLODIPine (Norvasc) tab 10 mg, 10 mg, Oral, Daily    aspirin DR tab 81 mg, 81 mg, Oral, Daily    insulin detemir (Levemir) 100 UNIT/ML FlexPen/FlexTouch 17 Units, 17 Units, Subcutaneous, Nightly    glucose (Dex4) 15 GM/59ML oral liquid 15 g, 15 g, Oral, Q15 Min PRN **OR** glucose (Glutose) 40% oral gel 15 g, 15 g, Oral, Q15 Min PRN **OR** glucose-vitamin C (Dex-4) chewable tab 4 tablet, 4 tablet, Oral, Q15 Min PRN **OR** dextrose 50% injection 50 mL, 50 mL, Intravenous, Q15 Min PRN **OR** glucose (Dex4) 15 GM/59ML oral liquid 30 g, 30 g, Oral, Q15 Min PRN **OR** glucose (Glutose) 40% oral gel 30 g, 30 g, Oral, Q15 Min PRN **OR** glucose-vitamin C (Dex-4) chewable tab 8 tablet, 8 tablet, Oral, Q15 Min PRN    heparin (Porcine) 5000 UNIT/ML injection 5,000 Units, 5,000 Units, Subcutaneous, Q8H SUJATA    acetaminophen (Tylenol Extra Strength) tab 500 mg, 500 mg, Oral, Q4H PRN    ondansetron (Zofran) 4 MG/2ML injection 4 mg, 4 mg, Intravenous, Q6H PRN    metoclopramide (Reglan) 5 mg/mL injection 5 mg, 5 mg, Intravenous, Q8H PRN    polyethylene glycol (PEG 3350) (Miralax) 17 g oral packet 17 g, 17 g, Oral, Daily PRN    sennosides (Senokot) tab 17.2 mg, 17.2 mg, Oral, Nightly PRN    bisacodyl (Dulcolax) 10 MG rectal suppository 10 mg, 10 mg, Rectal, Daily PRN    melatonin tab 3 mg, 3 mg, Oral, Nightly PRN    insulin aspart (NovoLOG) 100 Units/mL FlexPen 1-5 Units, 1-5 Units, Subcutaneous, TID AC and HS  Home Medications:  No current outpatient medications on  file.       Review of Systems:  See HPI; A total of 12 systems reviewed and otherwise unremarkable.    Physical Exam:  Vital signs: Blood pressure (!) 162/79, pulse 83, temperature 98.2 °F (36.8 °C), temperature source Oral, resp. rate 18, weight 175 lb (79.4 kg), SpO2 98%.  General: No acute distress. Alert and oriented x 3.  HEENT: Moist mucous membranes. EOM-I. PERRL  Neck: No lymphadenopathy.  No JVD. No carotid bruits.  Respiratory: Clear to auscultation bilaterally.  No wheezes. No rhonchi.  Cardiovascular: S1, S2.  Regular rate and rhythm.  No murmurs. Equal pulses   Abdomen: Soft, nontender, nondistended.  Positive bowel sounds. No rebound tenderness   Neurologic: No focal neurological deficits.   Musculoskeletal: Full range of motion of all extremities.  No swelling noted.   Integument: No lesions. No erythema.  Psychiatric: Appropriate mood and affect.       Recent Labs     01/30/24  0617   WBC 10.3   HGB 13.5   MCV 87.0   .0       Recent Labs     01/30/24  0617      K 5.2*      CO2 18.0*   *   CREATSERUM 8.22*   CA 8.9       Recent Labs     01/30/24  0617   AST 10*   ALB 3.6             Imaging:  Reviewed     Impression:  ESRD - on HD ; TTS; missed Saturday tx  Hyperkalemia  - manage w/ HD; tx tomorrow again   HTN- improved; cpm  - monitor  DM  Weakness- ? Viral illness; covid negative  - supportive care  Anemia due to CkD-  @ goal; no JURGEN       Thank you for allowing me to participate in this patient's care.  Please feel free to call me with any questions or concerns.    Paul Barnhart MD  1/31/2024

## 2024-01-31 NOTE — PROGRESS NOTES
NURSING ADMISSION NOTE      Patient admitted via Cart  Oriented to room.  Safety precautions initiated.  Bed in low position.  Call light in reach.    Pt. A&X x4. VSS. Afebrile. Admission navigator completed. Pt. Tolerated HD well. No reports of N/V upon arrival to the floor. IV fluids infusing per Nephrology.

## 2024-01-31 NOTE — PLAN OF CARE
Patient is A/O x4. VSS. Room air. Afebrile. No complaints of pain. 1800 ADA ; tolerated well. Accucheck QID.  R BKA with prosthetic, ambulates with assist. Voids. All medications given per MAR. IVF infusing per orders. Safety precautions in place. Call light within reach. Patient will have HD tomorrow. [facility] confirmed.  Problem: METABOLIC/FLUID AND ELECTROLYTES - ADULT  Goal: Glucose maintained within prescribed range  Description: INTERVENTIONS:  - Monitor Blood Glucose as ordered  - Assess for signs and symptoms of hyperglycemia and hypoglycemia  - Administer ordered medications to maintain glucose within target range  - Assess barriers to adequate nutritional intake and initiate nutrition consult as needed  - Instruct patient on self management of diabetes  Outcome: Progressing  Goal: Hemodynamic stability and optimal renal function maintained  Description: INTERVENTIONS:  - Monitor labs and assess for signs and symptoms of volume excess or deficit  - Monitor intake, output and patient weight  - Monitor urine specific gravity, serum osmolarity and serum sodium as indicated or ordered  - Monitor response to interventions for patient's volume status, including labs, urine output, blood pressure (other measures as available)  - Encourage oral intake as appropriate  - Instruct patient on fluid and nutrition restrictions as appropriate  Outcome: Progressing

## 2024-01-31 NOTE — PROGRESS NOTES
[facility]     Hospitalist Progress Note     Richy Goins Patient Status:  Inpatient    1960 MRN [id number]   Location [facility] 4NW-A Attending Nan Lopez MD   Hosp Day # 1 PCP Woody Dahl MD     Chief Complaint: Missed HD     Subjective:     Patient  feels betetr, still overall feels unwell     Objective:    Review of Systems:   A comprehensive review of systems was completed; pertinent positive and negatives stated in subjective.    Vital signs:  Temp:  [97.6 °F (36.4 °C)-98.2 °F (36.8 °C)] 98.2 °F (36.8 °C)  Pulse:  [] 83  Resp:  [16-18] 18  BP: (147-181)/(68-85) 162/79  SpO2:  [97 %-100 %] 98 %    Physical Exam:    General: No acute distress  Respiratory: No wheezes, no rhonchi  Cardiovascular: S1, S2, regular rate and rhythm  Abdomen: Soft, Non-tender, non-distended, positive bowel sounds  Neuro: No new focal deficits.   Extremities: No edema      Diagnostic Data:    Labs:  Recent Labs   Lab 24  0617   WBC 10.3   HGB 13.5   MCV 87.0   .0       Recent Labs   Lab 24  0617   *   *   CREATSERUM 8.22*   CA 8.9   ALB 3.6      K 5.2*      CO2 18.0*   ALKPHO 112   AST 10*   BILT 0.4   TP 7.7       Estimated Creatinine Clearance: 8.7 mL/min (A) (based on SCr of 8.22 mg/dL (H)).    No results for input(s): \"TROP\", \"TROPHS\", \"CK\" in the last 168 hours.    No results for input(s): \"PTP\", \"INR\" in the last 168 hours.               Microbiology    No results found for this visit on 24.      Imaging: Reviewed in Epic.    Medications:    amLODIPine  10 mg Oral Daily    aspirin  81 mg Oral Daily    insulin detemir  17 Units Subcutaneous Nightly    heparin  5,000 Units Subcutaneous Q8H SUJATA    insulin aspart  1-5 Units Subcutaneous TID AC and HS       Assessment & Plan:      #Uremia due to misse dHD  HD er ernal  #Malaise diffuse  Work up infectious etiology  Blood Cx, Expanded resp panel  #ESRD on DH  # R BKA 3/2023  # H/o hypertension   # DM  type 2       Nan Lopez MD    Supplementary Documentation:     Quality:  DVT Mechanical Prophylaxis:   SCDs, Early ambuation  DVT Pharmacologic Prophylaxis   Medication    heparin (Porcine) 5000 UNIT/ML injection 5,000 Units         DVT Pharmacologic prophylaxis: Aspirin 81 mg      Code Status: Full Code  Soni: No urinary catheter in place  Soni Duration (in days):   Central line:    [place]:     Discharge is dependent on: clinical , blood Cx  At this point Mr. Goins is expected to be discharge to: home     The 21st Century Cures Act makes medical notes like these available to patients in the interest of transparency. Please be advised this is a medical document. Medical documents are intended to carry relevant information, facts as evident, and the clinical opinion of the practitioner. The medical note is intended as peer to peer communication and may appear blunt or direct. It is written in medical language and may contain abbreviations or verbiage that are unfamiliar.             **Certification      PHYSICIAN Certification of Need for Inpatient Hospitalization - Initial Certification    Patient will require inpatient services that will reasonably be expected to span two midnight's based on the clinical documentation in H+P.   Based on patients current state of illness, I anticipate that, after discharge, patient will require TBD.

## 2024-01-31 NOTE — CM/SW NOTE
SW noted that patient is an HD patient. SW called [facility] to confirm patient's current with them. Patient's current schedule is:     DIALYSIS SCHEDULE:  [address] [address] [phone]     Tuesday- Thursday-Saturday 540 AM    SW will send updated flow sheets to HD clinic once patient is medically stable for DC.     SW will continue to follow for plan of care changes and remain available for any additional DC needs or concerns.     Adela Merrill MSW, LSW  Discharge Planner   [id number]

## 2024-02-01 VITALS
SYSTOLIC BLOOD PRESSURE: 189 MMHG | HEART RATE: 81 BPM | RESPIRATION RATE: 16 BRPM | OXYGEN SATURATION: 100 % | BODY MASS INDEX: 27 KG/M2 | DIASTOLIC BLOOD PRESSURE: 80 MMHG | TEMPERATURE: 98 F | WEIGHT: 175 LBS

## 2024-02-01 LAB
ANION GAP SERPL CALC-SCNC: 8 MMOL/L (ref 0–18)
BUN BLD-MCNC: 72 MG/DL (ref 9–23)
CALCIUM BLD-MCNC: 7.9 MG/DL (ref 8.5–10.1)
CHLORIDE SERPL-SCNC: 112 MMOL/L (ref 98–112)
CO2 SERPL-SCNC: 21 MMOL/L (ref 21–32)
CREAT BLD-MCNC: 6.14 MG/DL
EGFRCR SERPLBLD CKD-EPI 2021: 9 ML/MIN/1.73M2 (ref 60–?)
GLUCOSE BLD-MCNC: 131 MG/DL (ref 70–99)
GLUCOSE BLD-MCNC: 77 MG/DL (ref 70–99)
GLUCOSE BLD-MCNC: 96 MG/DL (ref 70–99)
OSMOLALITY SERPL CALC.SUM OF ELEC: 313 MOSM/KG (ref 275–295)
POTASSIUM SERPL-SCNC: 4.3 MMOL/L (ref 3.5–5.1)
SODIUM SERPL-SCNC: 141 MMOL/L (ref 136–145)

## 2024-02-01 PROCEDURE — 99239 HOSP IP/OBS DSCHRG MGMT >30: CPT | Performed by: STUDENT IN AN ORGANIZED HEALTH CARE EDUCATION/TRAINING PROGRAM

## 2024-02-01 PROCEDURE — 3079F DIAST BP 80-89 MM HG: CPT | Performed by: INTERNAL MEDICINE

## 2024-02-01 PROCEDURE — 3077F SYST BP >= 140 MM HG: CPT | Performed by: INTERNAL MEDICINE

## 2024-02-01 PROCEDURE — 90935 HEMODIALYSIS ONE EVALUATION: CPT | Performed by: INTERNAL MEDICINE

## 2024-02-01 RX ORDER — HEPARIN SODIUM 1000 [USP'U]/ML
4000 INJECTION, SOLUTION INTRAVENOUS; SUBCUTANEOUS ONCE
Status: COMPLETED | OUTPATIENT
Start: 2024-02-01 | End: 2024-02-01

## 2024-02-01 NOTE — PROGRESS NOTES
NURSING DISCHARGE NOTE    Discharged Home via Ambulatory.  Accompanied by Family member  Belongings Taken by patient/family.      Patient today tolerated dialysis well and is ready to discharge. No pain or questions at this time.

## 2024-02-01 NOTE — PAYOR COMM NOTE
--------------  ADMISSION REVIEW   1/30-1/31  Payor: JD GARCIA  Subscriber #:  [id number]  Authorization Number: [id number]    Admit date: 1/30/24  Admit time: 10:34 AM       REVIEW DOCUMENTATION:  ED Provider Notes signed by Marybeth Moreno MD at 1/30/2024  6:45 PM      Patient Seen in: [facility] Emergency Department      History     Chief Complaint   Patient presents with    Dizziness     Stated Complaint: dehydration, , weakness    Subjective:   HPI    Patient is a 63-year-old male with past medical history as listed below who presents to the emergency room with reports of dehydration and weakness.  He states he started having what he thought was sinus issues on Saturday.  Notes poor appetite and nausea.  Wife states he has just been retching.  States this happens to him quite often.  Per chart review he was admitted back in November for similar symptoms.  Patient thinks he just needs some IV fluids.  He did miss dialysis on Saturday and today.  Objective:   Past Medical History:   Diagnosis Date    Belching     Diarrhea, unspecified     Essential hypertension     Fatigue     Flatulence/gas pain/belching     Hyperlipidemia     Indigestion     Irregular bowel habits     Night sweats     Osteoporosis     Type II or unspecified type diabetes mellitus without mention of complication, not stated as uncontrolled     Visual impairment     reading glasses    Vomiting      Positive for stated complaint: dehydration, , weakness  Other systems are as noted in HPI.  Constitutional and vital signs reviewed.      All other systems reviewed and negative except as noted above.    Physical Exam     ED Triage Vitals [01/30/24 0557]   BP (!) 172/95   Pulse 100   Resp 20   Temp 97.4 °F (36.3 °C)   Temp src Temporal   SpO2 98 %   O2 Device None (Room air)       Current:BP (!) 167/93 (BP Location: Right arm)   Pulse 100   Temp 98 °F (36.7 °C) (Oral)   Resp 24   Wt 79.4 kg   SpO2 99%   BMI 26.61 kg/m²          Physical Exam  Vitals and nursing note reviewed.   Constitutional:       Appearance: Normal appearance.      Comments: Appearing on exam   HENT:      Head: Normocephalic.      Nose: Nose normal.      Mouth/Throat:      Mouth: Mucous membranes are moist.   Eyes:      Extraocular Movements: Extraocular movements intact.      Pupils: Pupils are equal, round, and reactive to light.   Cardiovascular:      Rate and Rhythm: Normal rate and regular rhythm.      Pulses: Normal pulses.   Pulmonary:      Effort: Pulmonary effort is normal.   Chest:      Comments: Right internal jugular tunneled dialysis catheter    Abdominal:      General: Abdomen is flat. Bowel sounds are normal. There is no distension.      Palpations: Abdomen is soft.      Tenderness: There is no abdominal tenderness. There is no right CVA tenderness, left CVA tenderness, guarding or rebound.      Hernia: No hernia is present.   Musculoskeletal:         General: No swelling or tenderness. Normal range of motion.      Cervical back: Normal range of motion.   Skin:     General: Skin is warm and dry.   Neurological:      Mental Status: He is alert and oriented to person, place, and time. Mental status is at baseline.   Psychiatric:         Mood and Affect: Mood normal.     Labs Reviewed   COMP METABOLIC PANEL (14) - Abnormal; Notable for the following components:       Result Value    Glucose 265 (*)     Potassium 5.2 (*)     CO2 18.0 (*)      (*)     Creatinine 8.22 (*)     Calculated Osmolality 338 (*)     eGFR-Cr 7 (*)     AST 10 (*)     A/G Ratio 0.9 (*)     All other components within normal limits   VENOUS BLOOD GAS - Abnormal; Notable for the following components:    Venous pH 7.31 (*)     Venous pCO2 35 (*)     Venous HCO3 18.2 (*)     All other components within normal limits   POCT GLUCOSE - Abnormal; Notable for the following components:    POC Glucose 177 (*)     All other components within normal limits   CBC W/ DIFFERENTIAL -  Abnormal; Notable for the following components:    Neutrophil Absolute Prelim 9.31 (*)     Neutrophil Absolute 9.31 (*)     Lymphocyte Absolute 0.67 (*)     All other components within normal limits   POCT GLUCOSE - Normal   SARS-COV-2/FLU A AND B/RSV BY PCR (GENEXPERT) - Normal           CBC WITH DIFFERENTIAL WITH PLATELET           HEPATITIS B SURFACE ANTIGEN   RAINBOW DRAW BLUE     EKG    Rate, intervals and axes as noted on EKG Report.  Rate: 54  Rhythm: Sinus Rhythm  Reading: no luke/dep or t wave inversions          XR CHEST AP PORTABLE  (CPT=71045)    Result Date: 1/30/2024  CONCLUSION:   Right-sided pheresis catheter with tip in the right atrium.  Mild pulmonary vascular congestion.   LOCATION:  [facility]      Dictated by (CST): Kvng He MD on 1/30/2024 at 8:47 AM     Finalized by (CST): Kvng He MD on 1/30/2024 at 8:47 AM          Admission disposition: 1/30/2024  7:32 AM      Medical Decision Making    The differential includes the following  Viral respiratory infection, anemia, uremia, DKA, gastroparesis     Pertinent comorbidities include  Listed above    Pertinent social history includes  Listed above    ER course  Patient hypertensive but otherwise hemodynamically stable.  No focal findings on exam.  Given 500 cc    Labs  BG with pH of 7.31, metabolic panel with a bicarb of 18 BUN of 120 creatinine of 0.2.  Patient's viral panel was negative for COVID, influenza and RSV.    Imaging studies  I personally reviewed the following radiology study cxr and my independent interpretation is it  showed no pneumonia    External data reviewed  Discharge summary from November  Nephrology note from November    Discussion of management with external providers  Jax hospitalist and [facility]    Pt admitted for further management and dialysis     Disposition and Plan     Clinical Impression:  1. Uremia         Disposition:  Admit  1/30/2024  7:32 am    Hospital Problems       Present on Admission   Date Reviewed: 1/12/2024            ICD-10-CM Noted POA    * (Principal) Uremia N19 10/11/2023 Unknown                 H&P signed by Jeanne Redman MD at 1/30/2024 10:52 AM      Chief Complaint: weakness nausea vomiting    Subjective:    History of Present Illness:     Richy Goins is a 63 year old male with poor appetite and cold symptoms missed last 2 HD.he started to have nausea on Friday, assoc with dizziness and weakness and he didn't go to HD on Sat.He feels weak and tired today, he missed HD today again.  Objective:   Physical Exam:    BP (!) 181/93   Pulse 99   Temp 97.4 °F (36.3 °C) (Temporal)   Resp 25   Wt 175 lb (79.4 kg)   SpO2 99%   BMI 26.61 kg/m²   General: No acute distress, Alert  Respiratory: No rhonchi, no wheezes  Cardiovascular: S1, S2. Regular rate and rhythm  Abdomen: Soft, Non-tender, non-distended, positive bowel sounds  Neuro: No new focal deficits  Extremities: No edema    Assessment & Plan:      #Recurrent nausea/vomiting; d/t uremia  - resolved      # ESRD   -HD per renal  -missed last 2 HD       - no clinical s/s infection  - monitor off abx      #DM2 with hyperglycemia     #HTN emergency with YULISA/end-organ damage  - improved      #AOCD  - Hgb at baseline  - follow CBC     #Hx of R BKA-march 2023 1/30 Nephrology  Reason for Consultation:  ESRd     History of Present Illness:  Richy Goins is a a(n) 63 year old male with hx of ESRD on HD (recent start)- TTS -missed last 2 txs due to not feeling well. States he felt weak past Friday and skipped his Saturday tx    Impression:  ESRD - on HD ; TTS; missed Saturday tx  Hyperkalemia  - HD today w/ limited UF  - continue fluids  HTN- uncontrolled; resume o/p meds- has not taken his meds today  - monitor  DM  Weakness- ? Viral illness; covid negative  - supportive care  Anemia due to CkD-  @ goal; no JURGEN           1/31 IM   still overall feels unwell        Temp:  [97.6 °F (36.4 °C)-98.2 °F (36.8 °C)] 98.2 °F (36.8  °C)  Pulse:  [] 83  Resp:  [16-18] 18  BP: (147-181)/(68-85) 162/79  SpO2:  [97 %-100 %] 98 %  Medications:    amLODIPine  10 mg Oral Daily    aspirin  81 mg Oral Daily    insulin detemir  17 Units Subcutaneous Nightly    heparin  5,000 Units Subcutaneous Q8H SUJATA    insulin aspart  1-5 Units Subcutaneous TID AC and HS               Assessment & Plan:   #Uremia due to missjing dHD  HD er ernal  #Malaise diffuse  Work up infectious etiology  Blood Cx, Expanded resp panel  #ESRD on DH  # R BKA 3/2023  # H/o hypertension   # DM type 2     **Certification  PHYSICIAN Certification of Need for Inpatient Hospitalization - Initial Certification  Patient will require inpatient services that will reasonably be expected to span two midnight's based on the clinical documentation in H+P.   Based on patients current state of illness, I anticipate that, after discharge, patient will require TBD.         1/31 Nephrology  Impression:  ESRD - on HD ; TTS; missed Saturday tx  Hyperkalemia  - manage w/ HD; tx tomorrow again   HTN- improved; cpm  - monitor  DM  Weakness- ? Viral illness; covid negative  - supportive care  Anemia due to CkD-  @ goal; no JURGEN                Medications 01/30/24 01/31/24 02/01/24   amLODIPine (Norvasc) tab 10 mg  Dose: 10 mg  Freq: Daily Route: OR  Start: 01/30/24 1100    1643 KF-Given        0901 JS-Given        (0649 KB)-Not Given [C]   0801 MD-Given         aspirin DR tab 81 mg  Dose: 81 mg  Freq: Daily Route: OR  Start: 01/30/24 1100   Admin Instructions:   Do not crush    1643 KF-Given        0901 JS-Given        0930          heparin (Porcine) 1000 UNIT/ML injection 1,500 Units  Dose: 1.5 mL  Freq: Once Route: IF  Start: 01/30/24 1100 End: 01/30/24 1643   Admin Instructions:   Instill 1.5 ml in each port unless otherwise indicated on catheter    1643 KF-Given            heparin (Porcine) 5000 UNIT/ML injection 5,000 Units  Dose: 5,000 Units  Freq: Every 8 hours scheduled Route: SC  Start: 01/30/24  1400    (1400 KF)-Not Given   2240 SG-Given       0537 SG-Given   1412 JS-Given   2117 KB-Given      0515 KB-Given   1400   2200        insulin aspart (NovoLOG) 100 Units/mL FlexPen 1-5 Units  Dose: 1-5 Units  Freq: 3 times daily before meals and nightly Route: SC  Start: 01/30/24 1100   Admin Instructions:   CORRECTION FACTOR - COLUMN LOW DOSE  Continue to give correction insulin (Novolog/aspart) even if NPO; DO NOT HOLD OR ALTER INSULIN DOSE WITHOUT A PHYSICIAN ORDER  1 unit of Novolog/aspart insulin expected to decrease blood glucose by 40 mg/dL    Give 1 unit if blood glucose 141-180 mg/dL  Give 2 units if blood glucose 181-220 mg/dL  Give 3 units if blood glucose 221-260 mg/dL  Give 4 units if blood glucose 261-300 mg/dL  Give 5 units if blood glucose 301-350 mg/dL  Call Physician if blood glucose is greater than 351 mg/dL with time and last dose of correction insulin given.    1203 KF-Given   (1600 KF)-Not Given   (2238 SG)-Not Given      (0700 SG)-Not Given   (1100 JS)-Not Given   (1600 JS)-Not Given     (2122 KB)-Not Given [C]        (0516 KB)-Not Given   1100 1600     2100          insulin detemir (Levemir) 100 UNIT/ML FlexPen/FlexTouch 17 Units  Dose: 17 Units  Freq: Nightly Route: SC  Start: 01/30/24 2100   Admin Instructions:   This is LONG ACTING insulin.  Continue to give basal insulin (insulin detemir - LEVEMIR) even if NPO.  DO NOT hold or alter insulin dose without a physician order.    2238 SG-Given        2117 KB-Given        2100          ondansetron (Zofran) 4 MG/2ML injection 4 mg  Dose: 4 mg  Freq: Once Route: IV  Start: 01/30/24 0642 End: 01/30/24 0648    0648 EC-Given            pantoprazole (Protonix) 40 mg in sodium chloride 0.9% PF 10 mL IV push  Dose: 40 mg  Freq: Once Route: IV  Start: 01/30/24 0644 End: 01/30/24 0655   Admin Instructions:   Dilute with 10 ml NS; IV push over 2 minutes    0653 EC-Given            sodium chloride 0.9% infusion  Freq: Once Route: IV  Start: 01/30/24  0641 End: 01/30/24 0649    0649 EC-New Bag   0700 EC-Handoff                   Medications 01/30/24 01/31/24 02/01/24   sodium chloride 0.9% infusion  Rate: 83 mL/hr Freq: Continuous Route: IV  Start: 01/31/24 1300     1411 JS-New Bag        0155 KB-New Bag          sodium chloride 0.9% infusion  Rate: 83 mL/hr Freq: Continuous Route: IV  Start: 01/30/24 1215 End: 01/31/24 1150    1215 KF-New Bag        0407 SG-New Bag   1150-D/C'd            Vitals (last day)       Date/Time Temp Pulse Resp BP SpO2 Weight O2 Device O2 Flow Rate (L/min) [facility]    02/01/24 0428 -- -- -- 176/87 -- -- -- --     02/01/24 0428 97.3 °F (36.3 °C) 86 18 -- 99 % -- None (Room air) --     01/31/24 2128 -- -- -- 169/79 -- -- -- --     01/31/24 2128 98.1 °F (36.7 °C) 80 18 -- 98 % -- None (Room air) --     01/31/24 1140 98.2 °F (36.8 °C) 83 18 162/79 98 % -- None (Room air) --     01/31/24 0903 -- 89 -- 148/77 -- -- -- --     01/31/24 0600 98.2 °F (36.8 °C) 89 16 147/68 97 % -- None (Room air) -- AK    01/31/24 0104 -- 100 18 162/85 -- -- -- --      01/30/24 1954 97.6 °F (36.4 °C) 92 18 181/85 Abnormal  100 % -- None (Room air) -- AK   01/30/24 1123 -- -- -- -- -- 175 lb -- --    01/30/24 1050 98 °F (36.7 °C) 100 24 167/93 Abnormal  99 % 167 lb 8 oz None (Room air) --    01/30/24 1000 -- 88 19 170/92 Abnormal  98 % -- None (Room air) --    01/30/24 0900 -- 90 20 177/91 Abnormal  99 % -- None (Room air) --    01/30/24 0830 -- 91 18 178/92 Abnormal  99 % -- None (Room air) --    01/30/24 0730 -- 96 20 177/94 Abnormal  99 % -- None (Room air) --    01/30/24 0645 -- 99 25 181/93 Abnormal  99 % -- None (Room air) --    01/30/24 0630 -- 99 26 168/98 Abnormal  100 % -- None (Room air) --    01/30/24 0557 97.4 °F (36.3 °C) 100 20 172/95 Abnormal  98 % 175 lb None (Room air) -- TK

## 2024-02-01 NOTE — PROGRESS NOTES
[facility]  Progress Note    Richy Goins Patient Status:  Inpatient    1960 MRN [id number]   [facility] 4NW-A Attending Jeanne Redman MD   Hosp Day # 2 PCP Woody Dahl MD     No acute events  HD today    Current Medications:    Current Facility-Administered Medications:     sodium chloride 0.9% infusion, , Intravenous, Continuous    sodium chloride 0.9 % IV bolus 100 mL, 100 mL, Intravenous, Q30 Min PRN **AND** albumin human (Albumin) 25% injection 25 g, 25 g, Intravenous, PRN Dialysis    amLODIPine (Norvasc) tab 10 mg, 10 mg, Oral, Daily    aspirin DR tab 81 mg, 81 mg, Oral, Daily    insulin detemir (Levemir) 100 UNIT/ML FlexPen/FlexTouch 17 Units, 17 Units, Subcutaneous, Nightly    glucose (Dex4) 15 GM/59ML oral liquid 15 g, 15 g, Oral, Q15 Min PRN **OR** glucose (Glutose) 40% oral gel 15 g, 15 g, Oral, Q15 Min PRN **OR** glucose-vitamin C (Dex-4) chewable tab 4 tablet, 4 tablet, Oral, Q15 Min PRN **OR** dextrose 50% injection 50 mL, 50 mL, Intravenous, Q15 Min PRN **OR** glucose (Dex4) 15 GM/59ML oral liquid 30 g, 30 g, Oral, Q15 Min PRN **OR** glucose (Glutose) 40% oral gel 30 g, 30 g, Oral, Q15 Min PRN **OR** glucose-vitamin C (Dex-4) chewable tab 8 tablet, 8 tablet, Oral, Q15 Min PRN    heparin (Porcine) 5000 UNIT/ML injection 5,000 Units, 5,000 Units, Subcutaneous, Q8H SUJATA    acetaminophen (Tylenol Extra Strength) tab 500 mg, 500 mg, Oral, Q4H PRN    ondansetron (Zofran) 4 MG/2ML injection 4 mg, 4 mg, Intravenous, Q6H PRN    metoclopramide (Reglan) 5 mg/mL injection 5 mg, 5 mg, Intravenous, Q8H PRN    polyethylene glycol (PEG 3350) (Miralax) 17 g oral packet 17 g, 17 g, Oral, Daily PRN    sennosides (Senokot) tab 17.2 mg, 17.2 mg, Oral, Nightly PRN    bisacodyl (Dulcolax) 10 MG rectal suppository 10 mg, 10 mg, Rectal, Daily PRN    melatonin tab 3 mg, 3 mg, Oral, Nightly PRN    insulin aspart (NovoLOG) 100 Units/mL FlexPen 1-5 Units, 1-5 Units, Subcutaneous, TID  and HS  Home  Medications:  No current outpatient medications on file.       Review of Systems:  See HPI; A total of 12 systems reviewed and otherwise unremarkable.    Physical Exam:  Vital signs: Blood pressure (!) 176/87, pulse 86, temperature 97.3 °F (36.3 °C), temperature source Oral, resp. rate 18, weight 175 lb (79.4 kg), SpO2 99%.  General: No acute distress. Alert and oriented x 3.  HEENT: Moist mucous membranes. EOM-I. PERRL  Neck: No lymphadenopathy.  No JVD. No carotid bruits.  Respiratory: Clear to auscultation bilaterally.  No wheezes. No rhonchi.  Cardiovascular: S1, S2.  Regular rate and rhythm.  No murmurs. Equal pulses   Abdomen: Soft, nontender, nondistended.  Positive bowel sounds. No rebound tenderness   Neurologic: No focal neurological deficits.   Musculoskeletal: Full range of motion of all extremities.  No swelling noted.   Integument: No lesions. No erythema.  Psychiatric: Appropriate mood and affect.    Recent Labs     01/30/24  0617   WBC 10.3   HGB 13.5   MCV 87.0   .0       Recent Labs     01/30/24  0617 02/01/24  0647    141   K 5.2* 4.3    112   CO2 18.0* 21.0   * 72*   CREATSERUM 8.22* 6.14*   CA 8.9 7.9*       Recent Labs     01/30/24  0617   AST 10*   ALB 3.6           Imaging:  Reviewed     Impression:  ESRD - on HD ; TTS; missed Saturday tx  Hyperkalemia  - HD today   HTN- improved; cpm  - monitor  DM  Weakness- ? Viral illness; covid negative  - supportive care  Anemia due to CkD-  @ goal; no JURGEN     DC planning; will contact HD unit regarding weight adjusment    Thank you for allowing me to participate in this patient's care.  Please feel free to call me with any questions or concerns.    Paul Barnhart MD  2/1/2024

## 2024-02-01 NOTE — PLAN OF CARE
Patient is A/O x4. Has high BP but remains asymptomatic, refused IVF to be discontinued, NS running at 83ml/hr. Required 1 unit insulin aspart but declined. Afebrile and denies any respiratory distress. Needs addressed. Call light placed within reach. Instructed to call for assistance especially when transferring.    0500 Pt BP trending up from 160s to 170s, wants to continue IVF until seen by MD and declines to take BP med now, wants after breakfast. MD notified.    Problem: METABOLIC/FLUID AND ELECTROLYTES - ADULT  Goal: Glucose maintained within prescribed range  Description: INTERVENTIONS:  - Monitor Blood Glucose as ordered  - Assess for signs and symptoms of hyperglycemia and hypoglycemia  - Administer ordered medications to maintain glucose within target range  - Assess barriers to adequate nutritional intake and initiate nutrition consult as needed  - Instruct patient on self management of diabetes  Outcome: Progressing

## 2024-02-02 ENCOUNTER — PATIENT OUTREACH (OUTPATIENT)
Dept: CASE MANAGEMENT | Age: 64
End: 2024-02-02

## 2024-02-02 ENCOUNTER — TELEPHONE (OUTPATIENT)
Dept: INTERNAL MEDICINE CLINIC | Facility: CLINIC | Age: 64
End: 2024-02-02

## 2024-02-02 DIAGNOSIS — Z02.9 ENCOUNTERS FOR UNSPECIFIED ADMINISTRATIVE PURPOSE: Primary | ICD-10-CM

## 2024-02-02 DIAGNOSIS — N19 UREMIA: ICD-10-CM

## 2024-02-02 PROCEDURE — 1111F DSCHRG MED/CURRENT MED MERGE: CPT

## 2024-02-02 NOTE — TELEPHONE ENCOUNTER
LOV 5/26/23     Called and spoke w/ pt. Pt was discharged yesterday from hospital. Stated he is feeling a little better but still a little weak. Offered to help schedule HFU. Pt stated he works full time and doesn't drive due to a foot amputation and therefore he needs apt time when he will be able to get a ride here. Offered multiple appointments in February and pt stated he can't do any apts in February. Pt stated he is free 3/15/24. Notified I can schedule but need to check with AD if too far out. Advised to call us if plans change and can come in sooner. Pt verbalizes understanding.     AD - Offered multiple apts in Feb, pt declined. Pt wanted apt 3/15 for HFU....too far out?

## 2024-02-02 NOTE — PROGRESS NOTES
Initial Post Discharge Follow Up   Discharge Date: 2/1/24  Contact Date: 2/2/2024    Consent Verification:  Assessment Completed With: Patient  HIPAA Verified?  Yes    Discharge Dx:   Uremia     General:   How have you been since your discharge from the hospital? NCM spoke with patient states he is feeling a little better, he denies any nausea, vomiting, no shortness of breath, no chest pain, no other symptoms at this time. Pt states feels a little weak. He indicates his blood sugars are normal, would not provide reading.   Do you have any pain since discharge?  No    How well was your pain managed while in the hospital?   On a scale of 1-5   1- Very Poor and 5- Very well   5  When you were leaving the hospital were your discharge instructions reviewed with you? Yes  How well were your discharge instructions explained to you?   On a scale of 1-5   1- Very Poor and 5- Very well   5  Do you have any questions about your discharge instructions?  No  Before leaving the hospital was your diagnoses explained to you? Yes  Do you have any questions about your diagnoses? No  Are you able to perform normal daily activities of living as you have prior to your hospital stay (dressing, bathing, ambulating to the bathroom, etc)? yes  (NCM) Was patient given a different diet per AVS? no      Medications: Reviewed medication list.  Medications are up to date.  Current Outpatient Medications   Medication Sig Dispense Refill    Insulin Pen Needle (BD PEN NEEDLE AMBER 2ND GEN) 32G X 4 MM Does not apply Misc Use to inject insulin up to 5 times daily 450 each 1    insulin detemir (LEVEMIR FLEXPEN) 100 UNIT/ML Subcutaneous Solution Pen-injector Inject 17 Units into the skin nightly. 3 month supply 15 mL 1    amLODIPine 10 MG Oral Tab Take 1 tablet (10 mg total) by mouth daily. 30 tablet 1    aspirin 81 MG Oral Tab EC Take 1 tablet (81 mg total) by mouth daily. 360 tablet 0     Were there any changes to your current medication(s) noted on  the AVS? Yes  If so, were these medication changes discussed with you prior to leaving the hospital? Yes  If a new medication was prescribed:    Was the new medication's purpose & side effects reviewed? Yes  Do you have any questions about your new medication? No  Did you  your discharge medications when you left the hospital? Yes  Let's go over your medications together to make sure we are not missing anything. Medications Reviewed  Are there any reasons that keep you from taking your medication as prescribed? No  Are you having any concerns with constipation? No  Did patient receive their flu shot (Sept-March)? Yes    Discharge medications reviewed/discussed/and reconciled against outpatient medications with patient.  Any changes or updates to medications sent to PCP.  Patient Acknowledged     Referrals/orders at D/C:  Referrals/orders placed at D/C? no    DME ordered at D/C? No      Discharge orders, AVS reviewed and discussed with patient. Any changes or updates to orders sent to PCP.  Patient Acknowledged      SDOH:   Transportation Needs: No Transportation Needs (1/30/2024)    Transportation Needs     Lack of Transportation: No     Financial Resource Strain: Low Risk  (10/16/2023)    Financial Resource Strain     Difficulty of Paying Living Expenses: Not very hard     Med Affordability: No           Follow up appointments:      Your appointments       Date & Time Appointment Department (Center)    Feb 12, 2024  9:00 AM CST Consult with Fermin De La Fuente MD [facility] ([facility])              [facility]  [facility]  [address]  4th Floor  [facility] [address]  [phone]            TCC  Was TCC ordered: Yes  Was TCC scheduled: No, Explain: pt declined.       PCP (If no TCC appointment)  Does patient already have a PCP appointment scheduled? No  NCM Attempted to schedule PCP  office TCM appointment with patient   If no appointment scheduled: Explain: pt declined.     Specialist    Does the patient have any other follow up appointment(s) needing to be scheduled? No      Is there any reason as to why you cannot make your appointment(s)?  No     Needs post D/C:   Now that you are home, are there any needs or concerns you need addressed before your next visit with your PCP?  (DME, meds, questions, etc.): No    Interventions by NCM:   All discharge instructions reviewed with the patient. Reviewed when to call MD vs when to call 911 or go the ED. Educated patient on the importance of taking all meds as prescribed as well as close f/u with PCP/specialists. Pt verbalized understanding and will contact the office with any further questions or concerns. Patient denies fevers, chills, nausea, vomiting, shortness of breath, chest pain, or any other symptoms at this time.  [facility] attempted to schedule HFU, patient declined will call PCP/TCC office directly. [facility] sent TE to PCP office re: assistance in scheduling HFU appt. NCM provided contact information for any further questions/concerns. Patient verbalized understanding and agreeable.       Overall Rating:   How would you rate the care you received while in the hospital? good    CCM referral placed:    No    BOOK BY DATE: 02/15/24

## 2024-02-02 NOTE — TELEPHONE ENCOUNTER
Spoke to pt for TCM today.  Pt does not have an appointment scheduled at this time.  TCM appt recommended by 02/08/24 as pt is a high risk for readmission.  Please advise.    BOOK BY DATE (last date for TCM): 02/15/24    Clinical staff:  Please f/u with pt and try to get them to schedule as pt would greatly benefit from TCM appt.  Thank you!    SD attempted to schedule pt for appointment, he declined. Thank you.

## 2024-02-03 NOTE — DISCHARGE SUMMARY
[facility]     Richy Goins Patient Status:  Inpatient    1960 MRN [id number]   Location [facility] 4NW-A Attending No att. 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       Brief Synopsis:     Pt was admitted underwent HD per renal. PT overall improved with supportive care and was DC home.     Lace+ Score: 81  59-90 High Risk  29-58 Medium Risk  0-28   Low Risk  Patient was referred to the [facility].    TCM Follow-Up Recommendation:  LACE > 58: High Risk of readmission after discharge from the hospital.      Procedures during hospitalization:   no    Incidental or significant findings and recommendations (brief descriptions):  no    Lab/Test results pending at Discharge:   no    Consultants:  CRenal     Discharge Medication List:     Discharge Medications        CONTINUE taking these medications        Instructions Prescription details   amLODIPine 10 MG Tabs  Commonly known as: Norvasc      Take 1 tablet (10 mg total) by mouth daily.   Quantity: 30 tablet  Refills: 1     aspirin 81 MG Tbec      Take 1 tablet (81 mg total) by mouth daily.   Quantity: 360 tablet  Refills: 0     BD Pen Needle Lara 2nd Gen 32G X 4 MM Misc  Generic drug: Insulin Pen Needle      Use to inject insulin up to 5 times daily   Quantity: 450 each  Refills: 1      Levemir FlexPen 100 UNIT/ML Sopn  Generic drug: insulin detemir      Inject 17 Units into the skin nightly. 3 month supply   Quantity: 15 mL  Refills: 1              [place]    Follow-up appointment:   Woody Dahl MD  [address]  [facility] [address]  [facility] [address]  [phone]    Follow up in 2 week(s)  Follow up    Appointments for Next 30 Days 2/3/2024 - 3/4/2024        Date Arrival Time Visit Type Length Department Provider     2024  9:00 AM  CONSULT [47200] 10 min [facility] Fermin De La Fuente MD    Patient Instructions:                           Vital signs:       Physical Exam:    General: No acute distress   Lungs: clear to auscultation  Cardiovascular: S1, S2  Abdomen: Soft      -----------------------------------------------------------------------------------------------  PATIENT DISCHARGE INSTRUCTIONS: See electronic chart    Nan Lopez MD    Total time spent on discharge plannin minutes     The  Century Cures Act makes medical notes like these available to patients in the interest of transparency. Please be advised this is a medical document. Medical documents are intended to carry relevant information, facts as evident, and the clinical opinion of the practitioner. The medical note is intended as peer to peer communication and may appear blunt or direct. It is written in medical language and may contain abbreviations or verbiage that are unfamiliar.

## 2024-03-05 NOTE — PAT NURSING NOTE
PAT nursing note: denies taking ACEs, ARBs, blood thinners or weight loss medications; no PPM, ICD or nerve stimulators; fall risk-right BKA; patient verbalized understanding of all instructions.      Pre-Surgical Instructions for 3/13/24      Pre-Surgical Testing:     -You are scheduled for fasting blood work, an EKG, vein mapping test, ultrasound of the left arm and ultrasound of the legs on Friday, 3/8 starting at 0830 am at the [facility]. [place] in [facility].       Visitor Instructions    -Visitors are welcome to accompany you the day of surgery.        Pre-Op Instructions    Scheduled Surgery: You are scheduled for Vascular Surgery      Date of Procedure: Wednesday, 03/13/24      Diet Instructions:     -Do not eat or drink after midnight. This includes water, gum, candy and food.      Medication Instructions:     CONTINUE to take your prescribed medications as directed EXCEPT:    -The morning of surgery only take your Amlodipine and Aspirin with a sip of water.        Medications to Stop:    -Hold all vitamins and supplements the morning of surgery.         Diabetic Instructions:    -Take your insulin the night before surgery.    -If you wear a CGM (continuous glucose monitor), you will need to remove the entire device (sensor/transmitter) and leave at home prior to your procedure.      Skin Prep:     -Shower with Dial Soap the morning of your surgery (or any antibacterial soap).       Time of Arrival: 09:30 am     -This is going to be a tentative time of arrival for surgery.   -We will call you the buisness day prior to your surgery in the late afternoon to reconfirm your arrival.   -Please check your voicemail for a message.      Admit/Discharge Status:     -Your recovery will be approximately 2 hours after the completion of your surgery prior to your discharge to go home.  -If sedation is given, you WILL NOT be able to drive home. You will need a responsible adult  to drive  you home.      Discharge Teaching:     -Your nurse will give you specific instructions before discharge.  -Any questions, please call the surgeon's office.    Additional Instructions:     -Bring insurance card(s) and picture ID.  -Leave all valuables at home, including jewelry.  -Wear comfortable clothing.  -No contacts, wear glasses-bring eye glass case.    -Park in the [place] parking garage at [facility].    -Check in at the [facility] reception desk in the [facility].   -Our  will be there to check you in for your procedure.   -Complimentary  parking is available starting at 6:00 am.  -Wheelchairs are at all entrances for your convenience.      If you are scheduled to arrive early for 5:30 am, the [facility] reception desk does not open till 5:30 am. It may be dark, but you are in the correct location.     We are open M-F from 8am- 5pm. We are closed on holidays and weekends. We can be reached at [phone].       Thank you

## 2024-03-06 ENCOUNTER — LAB ENCOUNTER (OUTPATIENT)
Dept: LAB | Age: 64
End: 2024-03-06
Attending: INTERNAL MEDICINE
Payer: COMMERCIAL

## 2024-03-06 DIAGNOSIS — N18.4 CKD (CHRONIC KIDNEY DISEASE) STAGE 4, GFR 15-29 ML/MIN (HCC): ICD-10-CM

## 2024-03-06 DIAGNOSIS — D63.1 ANEMIA DUE TO STAGE 4 CHRONIC KIDNEY DISEASE (HCC): ICD-10-CM

## 2024-03-06 DIAGNOSIS — N18.4 ANEMIA DUE TO STAGE 4 CHRONIC KIDNEY DISEASE (HCC): ICD-10-CM

## 2024-03-06 LAB
ANION GAP SERPL CALC-SCNC: 9 MMOL/L (ref 0–18)
BASOPHILS # BLD AUTO: 0.06 X10(3) UL (ref 0–0.2)
BASOPHILS NFR BLD AUTO: 0.6 %
BUN BLD-MCNC: 81 MG/DL (ref 9–23)
CALCIUM BLD-MCNC: 8.2 MG/DL (ref 8.5–10.1)
CHLORIDE SERPL-SCNC: 104 MMOL/L (ref 98–112)
CO2 SERPL-SCNC: 25 MMOL/L (ref 21–32)
CREAT BLD-MCNC: 6.04 MG/DL
EGFRCR SERPLBLD CKD-EPI 2021: 10 ML/MIN/1.73M2 (ref 60–?)
EOSINOPHIL # BLD AUTO: 0.23 X10(3) UL (ref 0–0.7)
EOSINOPHIL NFR BLD AUTO: 2.2 %
ERYTHROCYTE [DISTWIDTH] IN BLOOD BY AUTOMATED COUNT: 13.7 %
FASTING STATUS PATIENT QL REPORTED: NO
GLUCOSE BLD-MCNC: 173 MG/DL (ref 70–99)
HCT VFR BLD AUTO: 35.7 %
HGB BLD-MCNC: 11.3 G/DL
IMM GRANULOCYTES # BLD AUTO: 0.03 X10(3) UL (ref 0–1)
IMM GRANULOCYTES NFR BLD: 0.3 %
LYMPHOCYTES # BLD AUTO: 2.57 X10(3) UL (ref 1–4)
LYMPHOCYTES NFR BLD AUTO: 24.9 %
MCH RBC QN AUTO: 28 PG (ref 26–34)
MCHC RBC AUTO-ENTMCNC: 31.7 G/DL (ref 31–37)
MCV RBC AUTO: 88.6 FL
MONOCYTES # BLD AUTO: 0.76 X10(3) UL (ref 0.1–1)
MONOCYTES NFR BLD AUTO: 7.4 %
NEUTROPHILS # BLD AUTO: 6.67 X10 (3) UL (ref 1.5–7.7)
NEUTROPHILS # BLD AUTO: 6.67 X10(3) UL (ref 1.5–7.7)
NEUTROPHILS NFR BLD AUTO: 64.6 %
OSMOLALITY SERPL CALC.SUM OF ELEC: 315 MOSM/KG (ref 275–295)
PLATELET # BLD AUTO: 237 10(3)UL (ref 150–450)
POTASSIUM SERPL-SCNC: 4.7 MMOL/L (ref 3.5–5.1)
RBC # BLD AUTO: 4.03 X10(6)UL
SODIUM SERPL-SCNC: 138 MMOL/L (ref 136–145)
WBC # BLD AUTO: 10.3 X10(3) UL (ref 4–11)

## 2024-03-06 PROCEDURE — 80048 BASIC METABOLIC PNL TOTAL CA: CPT | Performed by: INTERNAL MEDICINE

## 2024-03-06 PROCEDURE — 85025 COMPLETE CBC W/AUTO DIFF WBC: CPT | Performed by: INTERNAL MEDICINE

## 2024-03-08 ENCOUNTER — APPOINTMENT (OUTPATIENT)
Dept: LAB | Age: 64
End: 2024-03-08
Attending: SURGERY
Payer: COMMERCIAL

## 2024-03-08 ENCOUNTER — EKG ENCOUNTER (OUTPATIENT)
Dept: LAB | Facility: HOSPITAL | Age: 64
End: 2024-03-08
Attending: SURGERY
Payer: COMMERCIAL

## 2024-03-08 ENCOUNTER — HOSPITAL ENCOUNTER (OUTPATIENT)
Dept: ULTRASOUND IMAGING | Facility: HOSPITAL | Age: 64
Discharge: HOME | End: 2024-03-08
Attending: SURGERY
Payer: COMMERCIAL

## 2024-03-08 ENCOUNTER — APPOINTMENT (OUTPATIENT)
Dept: ULTRASOUND IMAGING | Facility: HOSPITAL | Age: 64
End: 2024-03-08
Attending: SURGERY
Payer: COMMERCIAL

## 2024-03-08 ENCOUNTER — LAB ENCOUNTER (OUTPATIENT)
Dept: LAB | Facility: HOSPITAL | Age: 64
End: 2024-03-08
Attending: SURGERY
Payer: COMMERCIAL

## 2024-03-08 ENCOUNTER — HOSPITAL ENCOUNTER (OUTPATIENT)
Dept: ULTRASOUND IMAGING | Age: 64
End: 2024-03-08
Attending: SURGERY
Payer: COMMERCIAL

## 2024-03-08 DIAGNOSIS — N18.6 END STAGE RENAL DISEASE (HCC): ICD-10-CM

## 2024-03-08 DIAGNOSIS — Z01.818 PRE-OP TESTING: ICD-10-CM

## 2024-03-08 DIAGNOSIS — I70.261 ATHEROSCLEROSIS OF NATIVE ARTERIES OF EXTREMITIES WITH GANGRENE, RIGHT LEG (HCC): ICD-10-CM

## 2024-03-08 DIAGNOSIS — N18.6 ESRD (END STAGE RENAL DISEASE) (HCC): ICD-10-CM

## 2024-03-08 DIAGNOSIS — E13.51 PERIPHERAL VASCULAR DISEASE DUE TO SECONDARY DIABETES (HCC): ICD-10-CM

## 2024-03-08 LAB
ALBUMIN SERPL-MCNC: 3.7 G/DL (ref 3.4–5)
ALBUMIN/GLOB SERPL: 0.9 {RATIO} (ref 1–2)
ALP LIVER SERPL-CCNC: 109 U/L
ALT SERPL-CCNC: 16 U/L
ANION GAP SERPL CALC-SCNC: 8 MMOL/L (ref 0–18)
APTT PPP: 27.9 SECONDS (ref 23.3–35.6)
AST SERPL-CCNC: 16 U/L (ref 15–37)
ATRIAL RATE: 92 BPM
BASOPHILS # BLD AUTO: 0.05 X10(3) UL (ref 0–0.2)
BASOPHILS NFR BLD AUTO: 0.5 %
BILIRUB SERPL-MCNC: 0.4 MG/DL (ref 0.1–2)
BUN BLD-MCNC: 74 MG/DL (ref 9–23)
CALCIUM BLD-MCNC: 8.2 MG/DL (ref 8.5–10.1)
CHLORIDE SERPL-SCNC: 99 MMOL/L (ref 98–112)
CHOLEST SERPL-MCNC: 260 MG/DL (ref ?–200)
CO2 SERPL-SCNC: 27 MMOL/L (ref 21–32)
CREAT BLD-MCNC: 6.28 MG/DL
EGFRCR SERPLBLD CKD-EPI 2021: 9 ML/MIN/1.73M2 (ref 60–?)
EOSINOPHIL # BLD AUTO: 0.19 X10(3) UL (ref 0–0.7)
EOSINOPHIL NFR BLD AUTO: 1.9 %
ERYTHROCYTE [DISTWIDTH] IN BLOOD BY AUTOMATED COUNT: 13.4 %
FASTING PATIENT LIPID ANSWER: YES
FASTING STATUS PATIENT QL REPORTED: YES
GLOBULIN PLAS-MCNC: 3.9 G/DL (ref 2.8–4.4)
GLUCOSE BLD-MCNC: 268 MG/DL (ref 70–99)
HCT VFR BLD AUTO: 35.8 %
HDLC SERPL-MCNC: 43 MG/DL (ref 40–59)
HGB BLD-MCNC: 11.6 G/DL
IMM GRANULOCYTES # BLD AUTO: 0.05 X10(3) UL (ref 0–1)
IMM GRANULOCYTES NFR BLD: 0.5 %
INR BLD: 0.94 (ref 0.8–1.2)
LDLC SERPL CALC-MCNC: 161 MG/DL (ref ?–100)
LYMPHOCYTES # BLD AUTO: 1.37 X10(3) UL (ref 1–4)
LYMPHOCYTES NFR BLD AUTO: 13.4 %
MCH RBC QN AUTO: 28.1 PG (ref 26–34)
MCHC RBC AUTO-ENTMCNC: 32.4 G/DL (ref 31–37)
MCV RBC AUTO: 86.7 FL
MONOCYTES # BLD AUTO: 0.43 X10(3) UL (ref 0.1–1)
MONOCYTES NFR BLD AUTO: 4.2 %
NEUTROPHILS # BLD AUTO: 8.17 X10 (3) UL (ref 1.5–7.7)
NEUTROPHILS # BLD AUTO: 8.17 X10(3) UL (ref 1.5–7.7)
NEUTROPHILS NFR BLD AUTO: 79.5 %
NONHDLC SERPL-MCNC: 217 MG/DL (ref ?–130)
OSMOLALITY SERPL CALC.SUM OF ELEC: 309 MOSM/KG (ref 275–295)
P AXIS: 57 DEGREES
P-R INTERVAL: 140 MS
PLATELET # BLD AUTO: 227 10(3)UL (ref 150–450)
POTASSIUM SERPL-SCNC: 5.1 MMOL/L (ref 3.5–5.1)
PROT SERPL-MCNC: 7.6 G/DL (ref 6.4–8.2)
PROTHROMBIN TIME: 12.6 SECONDS (ref 11.6–14.8)
Q-T INTERVAL: 382 MS
QRS DURATION: 98 MS
QTC CALCULATION (BEZET): 472 MS
R AXIS: -2 DEGREES
RBC # BLD AUTO: 4.13 X10(6)UL
SODIUM SERPL-SCNC: 134 MMOL/L (ref 136–145)
T AXIS: 58 DEGREES
TRIGL SERPL-MCNC: 297 MG/DL (ref 30–149)
VENTRICULAR RATE: 92 BPM
VLDLC SERPL CALC-MCNC: 59 MG/DL (ref 0–30)
WBC # BLD AUTO: 10.3 X10(3) UL (ref 4–11)

## 2024-03-08 PROCEDURE — 85025 COMPLETE CBC W/AUTO DIFF WBC: CPT

## 2024-03-08 PROCEDURE — 36415 COLL VENOUS BLD VENIPUNCTURE: CPT

## 2024-03-08 PROCEDURE — 93010 ELECTROCARDIOGRAM REPORT: CPT | Performed by: INTERNAL MEDICINE

## 2024-03-08 PROCEDURE — 85730 THROMBOPLASTIN TIME PARTIAL: CPT

## 2024-03-08 PROCEDURE — 93005 ELECTROCARDIOGRAM TRACING: CPT

## 2024-03-08 PROCEDURE — 93971 EXTREMITY STUDY: CPT | Performed by: SURGERY

## 2024-03-08 PROCEDURE — 85610 PROTHROMBIN TIME: CPT

## 2024-03-08 PROCEDURE — 93925 LOWER EXTREMITY STUDY: CPT | Performed by: SURGERY

## 2024-03-08 PROCEDURE — 93931 UPPER EXTREMITY STUDY: CPT | Performed by: SURGERY

## 2024-03-08 PROCEDURE — 80053 COMPREHEN METABOLIC PANEL: CPT

## 2024-03-08 PROCEDURE — 80061 LIPID PANEL: CPT

## 2024-03-12 NOTE — H&P
[facility]    PATIENT'S NAME: JULIUS FOY   ATTENDING PHYSICIAN: Fermin De La Fuente M.D.   PATIENT ACCOUNT#:   [id number]    LOCATION:    MEDICAL RECORD #:   [id number]       YOB: 1960  ADMISSION DATE:       2024    HISTORY AND PHYSICAL EXAMINATION    HISTORY OF PRESENT ILLNESS:  This is a 63-year-old male who was diagnosed with end-stage renal disease.  He has had a dialysis catheter in his right jugular since November.  Scheduled for fistula placement.  Patient has history of diabetes, may have been the cause of his renal failure, for over 20 years.  He has a history of gangrene with a blister, gradually worsened, and then had amputation in the past but has developed problems again last  and was seen by Dr. Coello who did multiple interventions, though ended up losing the leg below the knee.  The patient has been followed by Dr. Woody Dahl.  At that time, I was out of town on medical leave.  He now comes in here for his dialysis.  He undergoes dialysis on Tuesday, Thursday, and Saturday.  The patient is right-handed.     PAST MEDICAL HISTORY:  History of hypertension and dyslipidemia.    PAST SURGICAL HISTORY:  The foot surgery and below-knee amputation.    MEDICATIONS:  He is not on a statin medication, but he is on amlodipine, insulin, and aspirin.    ALLERGIES:  He has no known allergies.      FAMILY HISTORY:  History of diabetes in his grandfather.  Mother still alive.  Father  from myocardial infarction.    SOCIAL HISTORY:  No EtOH abuse, drug abuse, or tobacco abuse.  He is  with 1 child.  He is still working in marketing.     REVIEW OF SYSTEMS:  No hematuria, dysuria, hemoptysis, cough, sputum production.  No weight loss, fever, chills, hematemesis, or bright red blood per rectum.      PHYSICAL EXAMINATION:    GENERAL:  He is awake, alert.  He is appropriate, in no acute distress.  VITAL SIGNS:  Blood pressure 136/70, heart rate 72 respirations 20.  HEENT:  Pupils  equal, round.  Sclerae clear.  Mouth without lesions.  NECK:  No cervical bruit.  No JVD.  LUNGS:  Clear to auscultation.  No rales or rhonchi.  HEART:  Normal.  No murmur.  ABDOMEN:  Soft.  No tenderness or masses.  EXTREMITIES:  He has right below-knee amputation.  He has left popliteal pulses palpable.  The pedals are diminished on the right side.  Upper extremity pulses are palpable.  NEUROLOGIC:  Moving all 4 extremities.    LABORATORY DATA:  Hemoglobin A1c was 7.  No liver profile seen on him.      ASSESSMENT AND PLAN:  End-stage renal disease, with diabetes, hypertension, possible dyslipidemia, previous right below-knee amputation, and need for an access.  Discussed with the patient about his leg in terms of diabetes.  Will try to place a fistula in the patient.  Discussed about a perm-a-cath, peritoneal dialysis, and autogenous fistula.  The risks and complications including death, myocardial infarction, bleeding, infection, limb loss, arterial steal, venous hypertension with swelling of the arm, failure to mature, prosthetic graft infection were all explained.  We will try to place an autogenous fistula replaced.  If the veins appear to be inadequate, will then place a brachial axillary prosthetic graft fistula with the risks and complications as previously explained.  He had vein mapping in the left arm which showed that he did have a good vein in the cephalic vein but somewhat small distally at 1 cm.  I will image any ultrasound in the operating room.  He has a basilic vein between 4 to 3 mm.  He has a normal Jordan's test.  The patient understands and agrees.  All questions answered.    Dictated By Fermin De La Fuente M.D.  d: 03/12/2024 12:27:28  t: 03/12/2024 12:37:07  Job [id number]  HERRERA/

## 2024-03-13 ENCOUNTER — HOSPITAL ENCOUNTER (OUTPATIENT)
Facility: HOSPITAL | Age: 64
Setting detail: HOSPITAL OUTPATIENT SURGERY
Discharge: HOME | End: 2024-03-13
Attending: SURGERY | Admitting: SURGERY
Payer: COMMERCIAL

## 2024-03-13 VITALS
WEIGHT: 173.31 LBS | TEMPERATURE: 98 F | HEIGHT: 68 IN | DIASTOLIC BLOOD PRESSURE: 63 MMHG | BODY MASS INDEX: 26.27 KG/M2 | SYSTOLIC BLOOD PRESSURE: 119 MMHG | RESPIRATION RATE: 23 BRPM | HEART RATE: 79 BPM | OXYGEN SATURATION: 99 %

## 2024-03-13 DIAGNOSIS — Z01.818 PRE-OP TESTING: ICD-10-CM

## 2024-03-13 DIAGNOSIS — N18.6 ESRD (END STAGE RENAL DISEASE) (HCC): Primary | ICD-10-CM

## 2024-03-13 LAB — GLUCOSE BLD-MCNC: 173 MG/DL (ref 70–99)

## 2024-03-13 PROCEDURE — 82962 GLUCOSE BLOOD TEST: CPT

## 2024-03-13 NOTE — CONSULTS
[facility]    PATIENT'S NAME: JULIUS FOY   ATTENDING PHYSICIAN: Fermin De La Fuente M.D.   CONSULTING PHYSICIAN: Fermin De La Fuente M.D.   PATIENT ACCOUNT#:   [id number]    LOCATION:  [facility]  MEDICAL RECORD #:   [id number]       YOB: 1960  ADMISSION DATE:       03/13/2024      CONSULT DATE:  03/13/2024    REPORT OF CONSULTATION    PROGRESS NOTE    A 63-year-old male who was scheduled for creation of a left arm fistula with an autogenous prosthetic graft based upon the findings in the operating room with the vein sized during preoperative ultrasound imaging.  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  In my mind, this was never going to happen at that time.  The patient was given the option to try to reschedule this and go later on, but he was adamant that he wanted to leave.  When the nurse went back to talk to him for the final time, he had already left the hospital.  I left a voicemail on his phone that I would be happy to be his surgeon if he wants to come back and see me.  Otherwise, if he can go elsewhere, he has my blessings, but if he wants to have the surgery done then we could reschedule it.  I tried to explain why this happened, as scheduling was not under my control.  We will wait to hear what he wants to do.    Dictated By Fermin De La Fuente M.D.  d: 03/13/2024 13:11:21  t: 03/13/2024 13:24:27  Job [id number]  JJW/    cc: Fermin De La Fuente M.D.

## 2024-03-13 NOTE — PLAN OF CARE
Patient delayed 20 minutes from what he was told and did not want to wait. He stated that he would prefer to reschedule. IV D/C'd. Patient discharged to [facility].

## 2024-03-15 ENCOUNTER — OFFICE VISIT (OUTPATIENT)
Dept: INTERNAL MEDICINE CLINIC | Facility: CLINIC | Age: 64
End: 2024-03-15
Payer: COMMERCIAL

## 2024-03-15 VITALS
HEART RATE: 88 BPM | SYSTOLIC BLOOD PRESSURE: 142 MMHG | OXYGEN SATURATION: 98 % | TEMPERATURE: 97 F | DIASTOLIC BLOOD PRESSURE: 72 MMHG | BODY MASS INDEX: 27.74 KG/M2 | WEIGHT: 183 LBS | HEIGHT: 68 IN

## 2024-03-15 DIAGNOSIS — Z99.2 ANEMIA IN CHRONIC KIDNEY DISEASE, ON CHRONIC DIALYSIS (HCC): ICD-10-CM

## 2024-03-15 DIAGNOSIS — E11.65 TYPE 2 DIABETES MELLITUS WITH HYPERGLYCEMIA, WITH LONG-TERM CURRENT USE OF INSULIN (HCC): ICD-10-CM

## 2024-03-15 DIAGNOSIS — D63.1 ANEMIA IN CHRONIC KIDNEY DISEASE, ON CHRONIC DIALYSIS (HCC): ICD-10-CM

## 2024-03-15 DIAGNOSIS — I10 PRIMARY HYPERTENSION: ICD-10-CM

## 2024-03-15 DIAGNOSIS — Z00.00 ROUTINE GENERAL MEDICAL EXAMINATION AT A HEALTH CARE FACILITY: Primary | ICD-10-CM

## 2024-03-15 DIAGNOSIS — N18.6 ESRD (END STAGE RENAL DISEASE) (HCC): ICD-10-CM

## 2024-03-15 DIAGNOSIS — E78.00 PURE HYPERCHOLESTEROLEMIA: ICD-10-CM

## 2024-03-15 DIAGNOSIS — E13.319 RETINOPATHY DUE TO SECONDARY DIABETES MELLITUS (HCC): ICD-10-CM

## 2024-03-15 DIAGNOSIS — I70.235 ATHEROSCLEROSIS OF NATIVE ARTERY OF RIGHT LOWER EXTREMITY WITH ULCERATION OF OTHER PART OF FOOT (HCC): ICD-10-CM

## 2024-03-15 DIAGNOSIS — I73.9 PAD (PERIPHERAL ARTERY DISEASE) (HCC): ICD-10-CM

## 2024-03-15 DIAGNOSIS — Z79.4 TYPE 2 DIABETES MELLITUS WITH HYPERGLYCEMIA, WITH LONG-TERM CURRENT USE OF INSULIN (HCC): ICD-10-CM

## 2024-03-15 DIAGNOSIS — N18.6 ANEMIA IN CHRONIC KIDNEY DISEASE, ON CHRONIC DIALYSIS (HCC): ICD-10-CM

## 2024-03-15 DIAGNOSIS — E11.21 DIABETIC NEPHROPATHY ASSOCIATED WITH TYPE 2 DIABETES MELLITUS (HCC): ICD-10-CM

## 2024-03-15 PROBLEM — L03.115 CELLULITIS OF RIGHT FOOT: Status: RESOLVED | Noted: 2020-06-26 | Resolved: 2024-03-15

## 2024-03-15 PROBLEM — F41.9 ANXIETY: Status: RESOLVED | Noted: 2020-07-06 | Resolved: 2024-03-15

## 2024-03-15 PROBLEM — E87.1 HYPONATREMIA: Status: RESOLVED | Noted: 2020-06-22 | Resolved: 2024-03-15

## 2024-03-15 PROBLEM — U07.1 COVID-19: Status: RESOLVED | Noted: 2022-01-03 | Resolved: 2024-03-15

## 2024-03-15 PROBLEM — I16.1 HYPERTENSIVE EMERGENCY: Status: RESOLVED | Noted: 2023-11-12 | Resolved: 2024-03-15

## 2024-03-15 PROBLEM — N18.5 TYPE 2 DIABETES MELLITUS WITH STAGE 5 CHRONIC KIDNEY DISEASE NOT ON CHRONIC DIALYSIS, WITHOUT LONG-TERM CURRENT USE OF INSULIN (HCC): Status: RESOLVED | Noted: 2022-09-21 | Resolved: 2024-03-15

## 2024-03-15 PROBLEM — N19 UREMIA: Status: RESOLVED | Noted: 2023-10-11 | Resolved: 2024-03-15

## 2024-03-15 PROBLEM — D72.829 LEUKOCYTOSIS: Status: RESOLVED | Noted: 2023-03-29 | Resolved: 2024-03-15

## 2024-03-15 PROBLEM — D72.829 LEUKOCYTOSIS, UNSPECIFIED TYPE: Status: RESOLVED | Noted: 2023-03-29 | Resolved: 2024-03-15

## 2024-03-15 PROBLEM — N17.9 ACUTE RENAL FAILURE, UNSPECIFIED ACUTE RENAL FAILURE TYPE (HCC): Status: RESOLVED | Noted: 2022-08-15 | Resolved: 2024-03-15

## 2024-03-15 PROBLEM — L03.119 CELLULITIS OF LOWER EXTREMITY, UNSPECIFIED LATERALITY: Status: RESOLVED | Noted: 2023-07-25 | Resolved: 2024-03-15

## 2024-03-15 PROBLEM — L08.9 DIABETIC FOOT INFECTION (HCC): Status: RESOLVED | Noted: 2023-03-29 | Resolved: 2024-03-15

## 2024-03-15 PROBLEM — Z86.2 HISTORY OF ANEMIA DUE TO CHRONIC KIDNEY DISEASE: Status: RESOLVED | Noted: 2020-08-06 | Resolved: 2024-03-15

## 2024-03-15 PROBLEM — L03.90 CELLULITIS: Status: RESOLVED | Noted: 2023-03-29 | Resolved: 2024-03-15

## 2024-03-15 PROBLEM — E11.22 TYPE 2 DIABETES MELLITUS WITH STAGE 5 CHRONIC KIDNEY DISEASE NOT ON CHRONIC DIALYSIS, WITHOUT LONG-TERM CURRENT USE OF INSULIN (HCC): Status: RESOLVED | Noted: 2022-09-21 | Resolved: 2024-03-15

## 2024-03-15 PROBLEM — N17.9 AKI (ACUTE KIDNEY INJURY): Status: RESOLVED | Noted: 2022-01-03 | Resolved: 2024-03-15

## 2024-03-15 PROBLEM — J18.9 PNEUMONIA OF RIGHT LOWER LOBE DUE TO INFECTIOUS ORGANISM: Status: RESOLVED | Noted: 2023-05-31 | Resolved: 2024-03-15

## 2024-03-15 PROBLEM — N17.9 AKI (ACUTE KIDNEY INJURY) (HCC): Status: RESOLVED | Noted: 2022-01-03 | Resolved: 2024-03-15

## 2024-03-15 PROBLEM — E86.0 DEHYDRATION: Status: RESOLVED | Noted: 2023-05-31 | Resolved: 2024-03-15

## 2024-03-15 PROBLEM — E87.20 METABOLIC ACIDOSIS: Status: RESOLVED | Noted: 2022-01-03 | Resolved: 2024-03-15

## 2024-03-15 PROBLEM — R79.89 AZOTEMIA: Status: RESOLVED | Noted: 2022-01-03 | Resolved: 2024-03-15

## 2024-03-15 PROBLEM — E78.5 DYSLIPIDEMIA: Status: RESOLVED | Noted: 2020-09-07 | Resolved: 2024-03-15

## 2024-03-15 PROBLEM — E11.628 DIABETIC FOOT INFECTION (HCC): Status: RESOLVED | Noted: 2023-03-29 | Resolved: 2024-03-15

## 2024-03-15 PROBLEM — E11.10 DIABETIC KETOACIDOSIS WITHOUT COMA ASSOCIATED WITH TYPE 2 DIABETES MELLITUS (HCC): Status: RESOLVED | Noted: 2022-08-15 | Resolved: 2024-03-15

## 2024-03-15 PROBLEM — E11.10 DKA (DIABETIC KETOACIDOSIS) (HCC): Status: RESOLVED | Noted: 2022-08-15 | Resolved: 2024-03-15

## 2024-03-15 PROBLEM — N18.5 ANEMIA IN STAGE 5 CHRONIC KIDNEY DISEASE, NOT ON CHRONIC DIALYSIS (HCC): Status: RESOLVED | Noted: 2023-03-29 | Resolved: 2024-03-15

## 2024-03-15 PROBLEM — N17.9 ACUTE KIDNEY INJURY (HCC): Status: RESOLVED | Noted: 2023-10-11 | Resolved: 2024-03-15

## 2024-03-15 PROBLEM — R73.9 HYPERGLYCEMIA: Status: RESOLVED | Noted: 2022-01-03 | Resolved: 2024-03-15

## 2024-03-15 PROBLEM — R78.81 BACTEREMIA: Status: RESOLVED | Noted: 2020-07-06 | Resolved: 2024-03-15

## 2024-03-15 PROBLEM — N17.9 ACUTE RENAL FAILURE (ARF) (HCC): Status: RESOLVED | Noted: 2023-10-11 | Resolved: 2024-03-15

## 2024-03-15 PROBLEM — D64.9 ANEMIA: Status: RESOLVED | Noted: 2020-06-22 | Resolved: 2024-03-15

## 2024-03-15 PROBLEM — R53.1 WEAKNESS: Status: RESOLVED | Noted: 2024-01-30 | Resolved: 2024-03-15

## 2024-03-15 PROBLEM — E87.20 ACIDEMIA: Status: RESOLVED | Noted: 2023-10-11 | Resolved: 2024-03-15

## 2024-03-15 PROBLEM — N30.00 ACUTE CYSTITIS WITHOUT HEMATURIA: Status: RESOLVED | Noted: 2023-10-11 | Resolved: 2024-03-15

## 2024-03-15 PROBLEM — N18.9 CHRONIC RENAL IMPAIRMENT, UNSPECIFIED CKD STAGE: Status: RESOLVED | Noted: 2023-03-29 | Resolved: 2024-03-15

## 2024-03-15 PROBLEM — N18.9 HISTORY OF ANEMIA DUE TO CHRONIC KIDNEY DISEASE: Status: RESOLVED | Noted: 2020-08-06 | Resolved: 2024-03-15

## 2024-03-15 PROBLEM — N28.9 RENAL INSUFFICIENCY: Status: RESOLVED | Noted: 2023-05-31 | Resolved: 2024-03-15

## 2024-03-15 PROBLEM — M86.9 OSTEOMYELITIS OF RIGHT FOOT (HCC): Status: RESOLVED | Noted: 2020-07-06 | Resolved: 2024-03-15

## 2024-03-15 PROBLEM — N18.5 CKD (CHRONIC KIDNEY DISEASE) STAGE 5, GFR LESS THAN 15 ML/MIN (HCC): Status: RESOLVED | Noted: 2023-10-12 | Resolved: 2024-03-15

## 2024-03-15 PROBLEM — R11.2 NAUSEA AND VOMITING, UNSPECIFIED VOMITING TYPE: Status: RESOLVED | Noted: 2023-11-12 | Resolved: 2024-03-15

## 2024-03-15 PROBLEM — N17.9 ACUTE RENAL FAILURE, UNSPECIFIED ACUTE RENAL FAILURE TYPE: Status: RESOLVED | Noted: 2022-08-15 | Resolved: 2024-03-15

## 2024-03-15 PROBLEM — N17.9 ACUTE KIDNEY INJURY: Status: RESOLVED | Noted: 2023-10-11 | Resolved: 2024-03-15

## 2024-03-15 PROBLEM — N17.9 ACUTE RENAL FAILURE (ARF): Status: RESOLVED | Noted: 2023-10-11 | Resolved: 2024-03-15

## 2024-03-15 PROCEDURE — 3008F BODY MASS INDEX DOCD: CPT | Performed by: INTERNAL MEDICINE

## 2024-03-15 PROCEDURE — 3077F SYST BP >= 140 MM HG: CPT | Performed by: INTERNAL MEDICINE

## 2024-03-15 PROCEDURE — 3078F DIAST BP <80 MM HG: CPT | Performed by: INTERNAL MEDICINE

## 2024-03-15 PROCEDURE — 1111F DSCHRG MED/CURRENT MED MERGE: CPT | Performed by: INTERNAL MEDICINE

## 2024-03-15 PROCEDURE — 99397 PER PM REEVAL EST PAT 65+ YR: CPT | Performed by: INTERNAL MEDICINE

## 2024-03-15 RX ORDER — LANTHANUM CARBONATE 1000 MG/1
TABLET, CHEWABLE ORAL
COMMUNITY
Start: 2024-03-12

## 2024-03-15 RX ORDER — LISINOPRIL 10 MG/1
10 TABLET ORAL DAILY
COMMUNITY
Start: 2024-02-14

## 2024-03-15 NOTE — PROGRESS NOTES
Richy Goins  12/7/1960    Chief Complaint   Patient presents with    Hospital F/U           Diabetes       HPI:   Richy Goins is a 63 year old male who presents for an annual physical examination.        Current Outpatient Medications   Medication Sig Dispense Refill    lisinopril 10 MG Oral Tab Take 1 tablet (10 mg total) by mouth daily.      Insulin Pen Needle (BD PEN NEEDLE AMBER 2ND GEN) 32G X 4 MM Does not apply Misc Use to inject insulin up to 5 times daily 450 each 1    insulin detemir (LEVEMIR FLEXPEN) 100 UNIT/ML Subcutaneous Solution Pen-injector Inject 17 Units into the skin nightly. 3 month supply 15 mL 1    amLODIPine 10 MG Oral Tab Take 1 tablet (10 mg total) by mouth daily. 30 tablet 1    aspirin 81 MG Oral Tab EC Take 1 tablet (81 mg total) by mouth daily. 360 tablet 0    Lanthanum Carbonate 1000 MG Oral Chew Tab  (Patient not taking: Reported on 3/15/2024)        No Known Allergies   Past Medical History:   Diagnosis Date    Belching     Dialysis patient (HCC)     Diarrhea, unspecified     Essential hypertension     Fatigue     Flatulence/gas pain/belching     High blood pressure     Hyperlipidemia     Indigestion     Irregular bowel habits     Night sweats     Osteoporosis     Peripheral vascular disease (HCC)     Type II or unspecified type diabetes mellitus without mention of complication, not stated as uncontrolled     Visual impairment     reading glasses    Vomiting       Patient Active Problem List   Diagnosis    Diabetes mellitus type 2 in nonobese (HCC)    Hyponatremia    Anemia    Cellulitis of right foot    Diabetic nephropathy associated with type 2 diabetes mellitus (HCC)    Primary hypertension    Bacteremia    Osteomyelitis of right foot (HCC)    Anxiety    History of anemia due to chronic kidney disease    Type 2 diabetes mellitus with hyperglycemia (HCC)    Dyslipidemia    Stage 3 chronic kidney disease (HCC)    Retinopathy due to secondary diabetes mellitus (HCC)     Azotemia    Metabolic acidosis    Hyperglycemia    YULISA (acute kidney injury) (HCC)    CKD (chronic kidney disease) stage 4, GFR 15-29 ml/min ([facility])    COVID-19    HHNC (hyperglycemic hyperosmolar nonketotic coma) ([facility])    Diabetic ketoacidosis without coma associated with type 2 diabetes mellitus (HCC)    Uremia    Acute renal failure, unspecified acute renal failure type (HCC)    DKA (diabetic ketoacidosis) ([facility])    ATN (acute tubular necrosis) ([facility])    Hypernatremia    Hypokalemia    Type 2 diabetes mellitus with stage 5 chronic kidney disease not on chronic dialysis, without long-term current use of insulin (HCC)    Leukocytosis    Diabetic foot infection (HCC)    Cellulitis    Leukocytosis, unspecified type    Chronic renal impairment, unspecified CKD stage    Anemia in stage 5 chronic kidney disease, not on chronic dialysis (HCC)    PAD (peripheral artery disease) ([facility])    Pneumonia of right lower lobe due to infectious organism    Pleural effusion    Renal insufficiency    Dehydration    Stage 4 chronic kidney disease ([facility])    Acidosis    RTA (renal tubular acidosis)    Hyperkalemia    Cellulitis of lower extremity, unspecified laterality    Acute renal failure (ARF) (HCC)    Acute kidney injury ([facility])    Acidemia    Acute cystitis without hematuria    CKD (chronic kidney disease) stage 5, GFR less than 15 ml/min (HCC)    Hypertensive emergency    Nausea and vomiting, unspecified vomiting type    ESRD (end stage renal disease) ([facility])    Weakness    Anemia in chronic kidney disease, on chronic dialysis (HCC)      Past Surgical History:   Procedure Laterality Date    AMPUTATION TOE,I-P JT Right     COLONOSCOPY N/A 2/20/2023    Procedure: COLONOSCOPY;  Surgeon: Sarmad Gonzalez MD;  Location:  ENDOSCOPY      Family History   Problem Relation Age of Onset    Heart Attack Father     Heart Disorder Father 57    Diabetes Maternal Grandfather     Diabetes Maternal Aunt       Social History     Socioeconomic History     Marital status:    Tobacco Use    Smoking status: Never    Smokeless tobacco: Never    Tobacco comments:     none   Vaping Use    Vaping Use: Never used   Substance and Sexual Activity    Alcohol use: Not Currently     Comment: none    Drug use: Never    Sexual activity: Yes     Partners: Female     Social Determinants of Health     Financial Resource Strain: Low Risk  (10/16/2023)    Financial Resource Strain     Difficulty of Paying Living Expenses: Not very hard     Med Affordability: No   Food Insecurity: No Food Insecurity (1/30/2024)    Food Insecurity     Food Insecurity: Never true   Transportation Needs: No Transportation Needs (1/30/2024)    Transportation Needs     Lack of Transportation: No   Housing Stability: Low Risk  (1/30/2024)    Housing Stability     Housing Instability: No         REVIEW OF SYSTEMS:   GENERAL: feels well otherwise  SKIN: no rashes  EYES:denies blurred vision or double vision  HEENT: not congested  LUNGS: denies shortness of breath with exertion  CARDIOVASCULAR: denies chest pain on exertion  GI: no nausea or abdominal pain  NEURO: denies headaches    EXAM:   /72   Pulse 88   Temp 97.1 °F (36.2 °C)   Ht 5' 8\" (1.727 m)   Wt 183 lb (83 kg)   SpO2 98%   BMI 27.83 kg/m²   GENERAL: Well developed, well nourished,in no apparent distress  SKIN: No rashes,no suspicious lesions  EYES: bilateral conjunctiva are clear  HEENT: atraumatic, normocephalic. TM WNL BL.  NECK: supple,no adenopathy,no bruits  LUNGS: clear to auscultation  CARDIO: RRR without murmur  GI: good BS's,no masses, HSM or tenderness    ASSESSMENT AND PLAN:   Richy Goins is a 63 year old male who presents for an annual physical examination.    Outstanding screening and preventive measures:   Shingles immunization: Advised to obtain from pharmacy    DM2:  Overall improved control with most recent hemoglobin A1c of 7.5%.  Following with endocrinology service for management.  Complicated by neuropathy,  nephropathy, and retinopathy    Diabetes neuropathy and peripheral vascular disease:  Foot ulceration complicated by osteomyelitis and necrosis of bone; post right BKA    End-stage renal disease:  Secondary to diabetes nephropathy  Undergoing hemodialysis on Tuesdays, Thursdays, and Saturdays  Rescheduled for AV fistula creation with vascular surgery service  Following regularly with nephrology service  Associated anemia: Will continue to monitor    Hypertension:  Stable and controlled with lisinopril 10 mg daily and amlodipine 10 mg daily    Pure hypercholesterolemia:      The patient indicates understanding of these issues and agrees to the plan.  TODAY'S ORDERS     No orders of the defined types were placed in this encounter.      Meds & Refills:  Requested Prescriptions      No prescriptions requested or ordered in this encounter       Imaging & Consults:  None    No follow-ups on file.  There are no Patient Instructions on file for this visit.    All questions were answered and the patient agrees with the plan.     Thank you,  Woody Dahl MD

## 2024-03-26 ENCOUNTER — ANESTHESIA EVENT (OUTPATIENT)
Dept: ENDOSCOPY | Facility: HOSPITAL | Age: 64
End: 2024-03-26
Payer: COMMERCIAL

## 2024-03-26 ENCOUNTER — HOSPITAL ENCOUNTER (INPATIENT)
Facility: HOSPITAL | Age: 64
LOS: 3 days | Discharge: HOME | End: 2024-03-29
Attending: STUDENT IN AN ORGANIZED HEALTH CARE EDUCATION/TRAINING PROGRAM | Admitting: HOSPITALIST
Payer: COMMERCIAL

## 2024-03-26 ENCOUNTER — APPOINTMENT (OUTPATIENT)
Dept: CT IMAGING | Facility: HOSPITAL | Age: 64
End: 2024-03-26
Attending: STUDENT IN AN ORGANIZED HEALTH CARE EDUCATION/TRAINING PROGRAM
Payer: COMMERCIAL

## 2024-03-26 DIAGNOSIS — K92.0 HEMATEMESIS, UNSPECIFIED WHETHER NAUSEA PRESENT: ICD-10-CM

## 2024-03-26 DIAGNOSIS — K29.01 ACUTE GASTRITIS WITH HEMORRHAGE, UNSPECIFIED GASTRITIS TYPE: ICD-10-CM

## 2024-03-26 DIAGNOSIS — R04.2 HEMOPTYSIS: Primary | ICD-10-CM

## 2024-03-26 DIAGNOSIS — N19 UREMIA: ICD-10-CM

## 2024-03-26 PROBLEM — I16.0 HYPERTENSIVE URGENCY: Status: ACTIVE | Noted: 2024-03-26

## 2024-03-26 PROBLEM — N18.6 ESRD ON HEMODIALYSIS (HCC): Status: ACTIVE | Noted: 2024-01-30

## 2024-03-26 PROBLEM — Z99.2 ESRD ON HEMODIALYSIS (HCC): Status: ACTIVE | Noted: 2024-01-30

## 2024-03-26 LAB
ALBUMIN SERPL-MCNC: 3.9 G/DL (ref 3.4–5)
ALBUMIN/GLOB SERPL: 1.1 {RATIO} (ref 1–2)
ALP LIVER SERPL-CCNC: 103 U/L
ALT SERPL-CCNC: 18 U/L
ANION GAP SERPL CALC-SCNC: 12 MMOL/L (ref 0–18)
ANTIBODY SCREEN: NEGATIVE
APTT PPP: 28.7 SECONDS (ref 23.3–35.6)
AST SERPL-CCNC: 14 U/L (ref 15–37)
ATRIAL RATE: 114 BPM
BASOPHILS # BLD AUTO: 0.02 X10(3) UL (ref 0–0.2)
BASOPHILS NFR BLD AUTO: 0.2 %
BILIRUB SERPL-MCNC: 0.4 MG/DL (ref 0.1–2)
BUN BLD-MCNC: 127 MG/DL (ref 9–23)
CALCIUM BLD-MCNC: 8.7 MG/DL (ref 8.5–10.1)
CHLORIDE SERPL-SCNC: 120 MMOL/L (ref 98–112)
CO2 SERPL-SCNC: 13 MMOL/L (ref 21–32)
CREAT BLD-MCNC: 9 MG/DL
EGFRCR SERPLBLD CKD-EPI 2021: 6 ML/MIN/1.73M2 (ref 60–?)
EOSINOPHIL # BLD AUTO: 0 X10(3) UL (ref 0–0.7)
EOSINOPHIL NFR BLD AUTO: 0 %
ERYTHROCYTE [DISTWIDTH] IN BLOOD BY AUTOMATED COUNT: 14.2 %
FLUAV + FLUBV RNA SPEC NAA+PROBE: NEGATIVE
FLUAV + FLUBV RNA SPEC NAA+PROBE: NEGATIVE
GLOBULIN PLAS-MCNC: 3.7 G/DL (ref 2.8–4.4)
GLUCOSE BLD-MCNC: 137 MG/DL (ref 70–99)
GLUCOSE BLD-MCNC: 225 MG/DL (ref 70–99)
GLUCOSE BLD-MCNC: 235 MG/DL (ref 70–99)
GLUCOSE BLD-MCNC: 276 MG/DL (ref 70–99)
GLUCOSE BLD-MCNC: 290 MG/DL (ref 70–99)
HBV SURFACE AG SER-ACNC: <0.1 [IU]/L
HBV SURFACE AG SERPL QL IA: NONREACTIVE
HCT VFR BLD AUTO: 33.9 %
HGB BLD-MCNC: 11.2 G/DL
IMM GRANULOCYTES # BLD AUTO: 0.04 X10(3) UL (ref 0–1)
IMM GRANULOCYTES NFR BLD: 0.3 %
INR BLD: 1.08 (ref 0.8–1.2)
LYMPHOCYTES # BLD AUTO: 0.74 X10(3) UL (ref 1–4)
LYMPHOCYTES NFR BLD AUTO: 5.8 %
MCH RBC QN AUTO: 28.3 PG (ref 26–34)
MCHC RBC AUTO-ENTMCNC: 33 G/DL (ref 31–37)
MCV RBC AUTO: 85.6 FL
MONOCYTES # BLD AUTO: 0.22 X10(3) UL (ref 0.1–1)
MONOCYTES NFR BLD AUTO: 1.7 %
NEUTROPHILS # BLD AUTO: 11.8 X10 (3) UL (ref 1.5–7.7)
NEUTROPHILS # BLD AUTO: 11.8 X10(3) UL (ref 1.5–7.7)
NEUTROPHILS NFR BLD AUTO: 92 %
OSMOLALITY SERPL CALC.SUM OF ELEC: 351 MOSM/KG (ref 275–295)
P AXIS: 62 DEGREES
P-R INTERVAL: 146 MS
PLATELET # BLD AUTO: 258 10(3)UL (ref 150–450)
POTASSIUM SERPL-SCNC: 5.5 MMOL/L (ref 3.5–5.1)
PROT SERPL-MCNC: 7.6 G/DL (ref 6.4–8.2)
PROTHROMBIN TIME: 14.1 SECONDS (ref 11.6–14.8)
Q-T INTERVAL: 362 MS
QRS DURATION: 128 MS
QTC CALCULATION (BEZET): 498 MS
R AXIS: -28 DEGREES
RBC # BLD AUTO: 3.96 X10(6)UL
RH BLOOD TYPE: POSITIVE
RSV RNA SPEC NAA+PROBE: NEGATIVE
SARS-COV-2 RNA RESP QL NAA+PROBE: NOT DETECTED
SODIUM SERPL-SCNC: 145 MMOL/L (ref 136–145)
T AXIS: 28 DEGREES
VENTRICULAR RATE: 114 BPM
WBC # BLD AUTO: 12.8 X10(3) UL (ref 4–11)

## 2024-03-26 PROCEDURE — 71275 CT ANGIOGRAPHY CHEST: CPT | Performed by: STUDENT IN AN ORGANIZED HEALTH CARE EDUCATION/TRAINING PROGRAM

## 2024-03-26 PROCEDURE — 99255 IP/OBS CONSLTJ NEW/EST HI 80: CPT | Performed by: INTERNAL MEDICINE

## 2024-03-26 PROCEDURE — 99223 1ST HOSP IP/OBS HIGH 75: CPT | Performed by: HOSPITALIST

## 2024-03-26 PROCEDURE — 5A1D70Z PERFORMANCE OF URINARY FILTRATION, INTERMITTENT, LESS THAN 6 HOURS PER DAY: ICD-10-PCS | Performed by: INTERNAL MEDICINE

## 2024-03-26 PROCEDURE — 74177 CT ABD & PELVIS W/CONTRAST: CPT | Performed by: STUDENT IN AN ORGANIZED HEALTH CARE EDUCATION/TRAINING PROGRAM

## 2024-03-26 RX ORDER — SODIUM CHLORIDE 9 MG/ML
INJECTION, SOLUTION INTRAVENOUS CONTINUOUS
Status: ACTIVE | OUTPATIENT
Start: 2024-03-26 | End: 2024-03-26

## 2024-03-26 RX ORDER — DEXTROSE MONOHYDRATE 25 G/50ML
50 INJECTION, SOLUTION INTRAVENOUS
Status: DISCONTINUED | OUTPATIENT
Start: 2024-03-26 | End: 2024-03-29

## 2024-03-26 RX ORDER — METOCLOPRAMIDE HYDROCHLORIDE 5 MG/ML
10 INJECTION INTRAMUSCULAR; INTRAVENOUS EVERY 8 HOURS PRN
Status: DISCONTINUED | OUTPATIENT
Start: 2024-03-26 | End: 2024-03-26

## 2024-03-26 RX ORDER — SODIUM CHLORIDE 9 MG/ML
INJECTION, SOLUTION INTRAVENOUS CONTINUOUS
Status: DISCONTINUED | OUTPATIENT
Start: 2024-03-26 | End: 2024-03-29

## 2024-03-26 RX ORDER — ECHINACEA PURPUREA EXTRACT 125 MG
1 TABLET ORAL
Status: DISCONTINUED | OUTPATIENT
Start: 2024-03-26 | End: 2024-03-29

## 2024-03-26 RX ORDER — HYDROCODONE BITARTRATE AND ACETAMINOPHEN 5; 325 MG/1; MG/1
2 TABLET ORAL EVERY 4 HOURS PRN
Status: DISCONTINUED | OUTPATIENT
Start: 2024-03-26 | End: 2024-03-29

## 2024-03-26 RX ORDER — INSULIN DEGLUDEC 100 U/ML
8 INJECTION, SOLUTION SUBCUTANEOUS NIGHTLY
Status: DISCONTINUED | OUTPATIENT
Start: 2024-03-26 | End: 2024-03-29

## 2024-03-26 RX ORDER — ONDANSETRON 2 MG/ML
4 INJECTION INTRAMUSCULAR; INTRAVENOUS EVERY 6 HOURS PRN
Status: DISCONTINUED | OUTPATIENT
Start: 2024-03-26 | End: 2024-03-26 | Stop reason: SDUPTHER

## 2024-03-26 RX ORDER — ACETAMINOPHEN 325 MG/1
650 TABLET ORAL EVERY 4 HOURS PRN
Status: DISCONTINUED | OUTPATIENT
Start: 2024-03-26 | End: 2024-03-29

## 2024-03-26 RX ORDER — BENZONATATE 200 MG/1
200 CAPSULE ORAL 3 TIMES DAILY PRN
Status: DISCONTINUED | OUTPATIENT
Start: 2024-03-26 | End: 2024-03-29

## 2024-03-26 RX ORDER — NICOTINE POLACRILEX 4 MG
30 LOZENGE BUCCAL
Status: DISCONTINUED | OUTPATIENT
Start: 2024-03-26 | End: 2024-03-29

## 2024-03-26 RX ORDER — LABETALOL HYDROCHLORIDE 5 MG/ML
20 INJECTION, SOLUTION INTRAVENOUS EVERY 4 HOURS PRN
Status: DISCONTINUED | OUTPATIENT
Start: 2024-03-26 | End: 2024-03-29

## 2024-03-26 RX ORDER — HYDRALAZINE HYDROCHLORIDE 20 MG/ML
10 INJECTION INTRAMUSCULAR; INTRAVENOUS EVERY 4 HOURS PRN
Status: DISCONTINUED | OUTPATIENT
Start: 2024-03-26 | End: 2024-03-29

## 2024-03-26 RX ORDER — ASPIRIN 81 MG/1
81 TABLET ORAL DAILY
Status: DISCONTINUED | OUTPATIENT
Start: 2024-03-26 | End: 2024-03-29

## 2024-03-26 RX ORDER — HEPARIN SODIUM 1000 [USP'U]/ML
1.5 INJECTION, SOLUTION INTRAVENOUS; SUBCUTANEOUS
Status: COMPLETED | OUTPATIENT
Start: 2024-03-26 | End: 2024-03-26

## 2024-03-26 RX ORDER — LISINOPRIL 10 MG/1
10 TABLET ORAL DAILY
Status: DISCONTINUED | OUTPATIENT
Start: 2024-03-26 | End: 2024-03-29

## 2024-03-26 RX ORDER — SENNOSIDES 8.6 MG
17.2 TABLET ORAL NIGHTLY PRN
Status: DISCONTINUED | OUTPATIENT
Start: 2024-03-26 | End: 2024-03-29

## 2024-03-26 RX ORDER — METOCLOPRAMIDE HYDROCHLORIDE 5 MG/ML
5 INJECTION INTRAMUSCULAR; INTRAVENOUS EVERY 8 HOURS PRN
Status: DISCONTINUED | OUTPATIENT
Start: 2024-03-26 | End: 2024-03-29

## 2024-03-26 RX ORDER — ONDANSETRON 2 MG/ML
4 INJECTION INTRAMUSCULAR; INTRAVENOUS EVERY 6 HOURS PRN
Status: DISCONTINUED | OUTPATIENT
Start: 2024-03-26 | End: 2024-03-29

## 2024-03-26 RX ORDER — IOHEXOL 350 MG/ML
100 INJECTION, SOLUTION INTRAVENOUS
Status: COMPLETED | OUTPATIENT
Start: 2024-03-26 | End: 2024-03-26

## 2024-03-26 RX ORDER — AMLODIPINE BESYLATE 10 MG/1
10 TABLET ORAL DAILY
Status: DISCONTINUED | OUTPATIENT
Start: 2024-03-26 | End: 2024-03-29

## 2024-03-26 RX ORDER — MELATONIN
3 NIGHTLY PRN
Status: DISCONTINUED | OUTPATIENT
Start: 2024-03-26 | End: 2024-03-29

## 2024-03-26 RX ORDER — ENALAPRILAT 1.25 MG/ML
1.25 INJECTION INTRAVENOUS EVERY 6 HOURS PRN
Status: DISCONTINUED | OUTPATIENT
Start: 2024-03-26 | End: 2024-03-29

## 2024-03-26 RX ORDER — HYDROCODONE BITARTRATE AND ACETAMINOPHEN 5; 325 MG/1; MG/1
1 TABLET ORAL EVERY 4 HOURS PRN
Status: DISCONTINUED | OUTPATIENT
Start: 2024-03-26 | End: 2024-03-29

## 2024-03-26 RX ORDER — ONDANSETRON 2 MG/ML
INJECTION INTRAMUSCULAR; INTRAVENOUS
Status: COMPLETED
Start: 2024-03-26 | End: 2024-03-26

## 2024-03-26 RX ORDER — ONDANSETRON 2 MG/ML
4 INJECTION INTRAMUSCULAR; INTRAVENOUS ONCE
Status: COMPLETED | OUTPATIENT
Start: 2024-03-26 | End: 2024-03-26

## 2024-03-26 RX ORDER — SODIUM CHLORIDE 9 MG/ML
INJECTION, SOLUTION INTRAVENOUS ONCE
Status: COMPLETED | OUTPATIENT
Start: 2024-03-26 | End: 2024-03-26

## 2024-03-26 RX ORDER — BISACODYL 10 MG
10 SUPPOSITORY, RECTAL RECTAL
Status: DISCONTINUED | OUTPATIENT
Start: 2024-03-26 | End: 2024-03-29

## 2024-03-26 RX ORDER — ACETAMINOPHEN 500 MG
1000 TABLET ORAL EVERY 4 HOURS PRN
Status: DISCONTINUED | OUTPATIENT
Start: 2024-03-26 | End: 2024-03-29

## 2024-03-26 RX ORDER — DEXTROSE MONOHYDRATE 25 G/50ML
50 INJECTION, SOLUTION INTRAVENOUS ONCE
Status: COMPLETED | OUTPATIENT
Start: 2024-03-26 | End: 2024-03-26

## 2024-03-26 RX ORDER — NICOTINE POLACRILEX 4 MG
15 LOZENGE BUCCAL
Status: DISCONTINUED | OUTPATIENT
Start: 2024-03-26 | End: 2024-03-29

## 2024-03-26 RX ORDER — ALBUMIN (HUMAN) 12.5 G/50ML
25 SOLUTION INTRAVENOUS
Status: ACTIVE | OUTPATIENT
Start: 2024-03-26 | End: 2024-03-28

## 2024-03-26 RX ORDER — POLYETHYLENE GLYCOL 3350 17 G/17G
17 POWDER, FOR SOLUTION ORAL DAILY PRN
Status: DISCONTINUED | OUTPATIENT
Start: 2024-03-26 | End: 2024-03-29

## 2024-03-26 NOTE — ED PROVIDER NOTES
Patient Seen in: [facility] Emergency Department      History     Chief Complaint   Patient presents with    Hemoptysis     Stated Complaint: couging up blood starting last night    Subjective:   HPI    53-year-old male with history of chronic renal failure, on dialysis, diabetes, peripheral vascular disease, high cholesterol, hypertension, indigestion, presents to the emergency department reporting that he has been coughing up blood.  States that he has had generalized weakness and chills with no fever for about 1 week.  Since last night he reports coughing up blood.  Patient asked if there was any chance he might of vomited the blood he states that he has been doing both.  Patient thinks that he first coughed up blood his wife thinks that he first vomited blood.  Initially they stated that he has not had this type of bleeding before.  After CT imaging ordered patient's wife is now stating that he has had bleeding in his stomach multiple times and \"it happens all the time\".  Patient asked if this is different he states that he has also coughed up blood this time around which is different.  He is supposed to receive dialysis Tuesday/Thursday /Saturday.  Last dialysis was done on March 16.    Advised to remain NPO.  Refused and insisted on consuming ice chips.  Risks of aspiration, delay of surgical procedure and other complication discussed.  Patient states that he will take ice chips will not drink water or eat other things.      Objective:   Past Medical History:   Diagnosis Date    Belching     Dialysis patient (HCC)     Diarrhea, unspecified     Essential hypertension     Fatigue     Flatulence/gas pain/belching     High blood pressure     Hyperlipidemia     Indigestion     Irregular bowel habits     Night sweats     Osteoporosis     Peripheral vascular disease (HCC)     Pure hypercholesterolemia 3/15/2024    Type II or unspecified type diabetes mellitus without mention of complication, not stated as  uncontrolled     Visual impairment     reading glasses    Vomiting               Past Surgical History:   Procedure Laterality Date    AMPUTATION TOE,I-P JT Right     COLONOSCOPY N/A 2/20/2023    Procedure: COLONOSCOPY;  Surgeon: Sarmad Gonzalez MD;  Location:  ENDOSCOPY                Social History     Socioeconomic History    Marital status:    Tobacco Use    Smoking status: Never    Smokeless tobacco: Never    Tobacco comments:     none   Vaping Use    Vaping Use: Never used   Substance and Sexual Activity    Alcohol use: Not Currently     Comment: none    Drug use: Never    Sexual activity: Yes     Partners: Female     Social Determinants of Health     Financial Resource Strain: Low Risk  (10/16/2023)    Financial Resource Strain     Difficulty of Paying Living Expenses: Not very hard     Med Affordability: No   Food Insecurity: No Food Insecurity (1/30/2024)    Food Insecurity     Food Insecurity: Never true   Transportation Needs: No Transportation Needs (1/30/2024)    Transportation Needs     Lack of Transportation: No   Housing Stability: Low Risk  (1/30/2024)    Housing Stability     Housing Instability: No              Review of Systems    Positive for stated complaint: couging up blood starting last night  Other systems are as noted in HPI.  Constitutional and vital signs reviewed.      All other systems reviewed and negative except as noted above.    Physical Exam     ED Triage Vitals [03/26/24 0950]   BP (!) 193/110   Pulse 111   Resp 20   Temp 99.3 °F (37.4 °C)   Temp src Temporal   SpO2 98 %   O2 Device None (Room air)       Current:BP (!) 207/103   Pulse 111   Temp 99.3 °F (37.4 °C) (Temporal)   Resp 12   Ht 172.7 cm (5' 8\")   Wt 78.5 kg   SpO2 99%   BMI 26.30 kg/m²         Physical Exam    Constitutional: No apparent distress, patient intermittently coughing.  Emesis seen at bedside with residue of coffee-ground emesis noted.  Eyes: No scleral icterus  Mouth/Throat: Tongue appears  black, dry mucous membranes.  Heart: regular rate rhythm, no murmurs  Lungs: Clear to auscultation bilaterally  Abdomen: Soft.  Marked tenderness noted in the epigastric region, mild tenderness noted in all quadrants.  Skin: No rash  Neuro: Alert and oriented ×3          ED Course/ My interpretations:     Labs Reviewed   COMP METABOLIC PANEL (14) - Abnormal; Notable for the following components:       Result Value    Glucose 290 (*)     Potassium 5.5 (*)     Chloride 120 (*)     CO2 13.0 (*)      (*)     Creatinine 9.00 (*)     Calculated Osmolality 351 (*)     eGFR-Cr 6 (*)     AST 14 (*)     All other components within normal limits   POCT GLUCOSE - Abnormal; Notable for the following components:    POC Glucose 276 (*)     All other components within normal limits   POCT GLUCOSE - Abnormal; Notable for the following components:    POC Glucose 225 (*)     All other components within normal limits   CBC W/ DIFFERENTIAL - Abnormal; Notable for the following components:    WBC 12.8 (*)     HGB 11.2 (*)     HCT 33.9 (*)     Neutrophil Absolute Prelim 11.80 (*)     Neutrophil Absolute 11.80 (*)     Lymphocyte Absolute 0.74 (*)     All other components within normal limits   PROTHROMBIN TIME (PT) - Normal   PTT, ACTIVATED - Normal   SARS-COV-2/FLU A AND B/RSV BY PCR ([employer]) - Normal    Narrative:     This test is intended for the qualitative detection and differentiation of SARS-CoV-2, influenza A, influenza B, and respiratory syncytial virus (RSV) viral RNA in nasopharyngeal or nares swabs from individuals suspected of respiratory viral infection consistent with COVID-19 by their healthcare provider. Signs and symptoms of respiratory viral infection due to SARS-CoV-2, influenza, and RSV can be similar.    Test performed using the Xpert Xpress SARS-CoV-2/FLU/RSV (real time RT-PCR)  assay on the [employer] instrument, [employer], [place] [address].   This test is being used under the Food and Drug  Administration's Emergency Use Authorization.    The authorized Fact Sheet for Healthcare Providers for this assay is available upon request from the laboratory.   CBC WITH DIFFERENTIAL WITH PLATELET    Narrative:     The following orders were created for panel order CBC With Differential With Platelet.  Procedure                               Abnormality         Status                     ---------                               -----------         ------                     CBC W/ DIFFERENTIAL[[id number]]          Abnormal            Final result                 Please view results for these tests on the individual orders.   RAINBOW DRAW LAVENDER   RAINBOW DRAW LIGHT GREEN   RAINBOW DRAW BLUE     EKG    Rate, intervals and axes as noted on EKG Report.  Rate: 114  Rhythm: Sinus Rhythm  Reading: Una, right bundle branch block, no ST elevations.  No previous EKG available for comparison at this time.             My independent imaging interpretation:      Procedures    Comp Metabolic Panel (14)    CBC With Differential With Platelet    Prothrombin Time (PT)    PTT, Activated    CTA CHEST + CT ABD (W) + CT PEL (W) SH(CPT=71275/50480)      ER patient vomited approximately 20 mL of black liquid.  All available radiology reports for this visit reviewed.      Medications given:  Orders Placed This Encounter    Comp Metabolic Panel (14)    CBC With Differential With Platelet    Prothrombin Time (PT)    PTT, Activated    Surgical Case Request Once    FOLLOWED BY Linked Order Group     sodium chloride 0.9 % IV bolus 500 mL     sodium chloride 0.9% infusion    ondansetron (Zofran) 4 MG/2ML injection 4 mg    sodium bicarbonate injection 50 mEq    insulin regular human (Novolin R, Humulin R) 100 UNIT/ML injection 10 Units    dextrose 50% injection 50 mL    pantoprazole (Protonix) 40 mg in sodium chloride 0.9% PF 10 mL IV push    iohexol (Omnipaque) 350 MG/ML injection via power injector 100 mL    sodium chloride 0.9% infusion         [facility] consulted.     14:10 is discussed in detail with GI agreed with PPI and will evaluate.    14:15 evaluated by nephrology in the ED, planning for dialysis today.       MDM      Partial diagnosis was considered in setting of both hemoptysis and hematemesis.  It is possible the patient had been vomiting bloody contents and aspirated some and is now coughing up small amounts of though it is also possible that hemoptysis is the primary source and the patient has swallowed blood and vomiting has resulted from that.  Admission disposition: 3/26/2024  2:13 PM         Initially patient declined ever having had bleeding issues from his stomach or from his lungs.  Later patient's family stated that he has had prior bleeding from his stomach due to gastritis.    Patient has not had dialysis since March 16 therefore evaluation with EKG and chemistry was done as soon as possible.  No sign of concerning life-threatening cardiac arrhythmia was identified.  Patient's potassium of 5.5 addressed with IV medication.    CTA of the chest ordered to rule out PE or pulmonary mass, CT of abdomen pelvis also obtained given abdominal tenderness and hematemesis described by the patient.    Findings are concerning as imaging demonstrated distended esophagus with retained fluid and distal esophageal wall thickening possibly related to esophagitis and/or tumor.    Nephrology, GI and the hospitalist were all consulted.    Patient admitted for further care.    15:05 hypertensive at this time.      Disposition and Plan     Clinical Impression:  1. Hemoptysis    2. Hematemesis, unspecified whether nausea present    3. Acute gastritis with hemorrhage, unspecified gastritis type    4. Uremia         Disposition:  Admit  3/26/2024  2:13 pm    Follow-up:  No follow-up provider specified.        Medications Prescribed:  Current Discharge Medication List                   Approximately 50 minutes of critical care time (exclusive of  billable procedures) was administered to manage the patient's uremia with him having missed dialysis for 10 days as well as upper GI bleed. This involved direct patient intervention, complex decision making, and/or extensive discussions with the patient, family, and clinical staff.           Hospital Problems       Present on Admission  Date Reviewed: 3/15/2024            ICD-10-CM Noted POA    * (Principal) Hemoptysis R04.2 3/26/2024 Unknown    Acidosis E87.20 Unknown Yes    ESRD on hemodialysis (HCC) N18.6, Z99.2 1/30/2024 Yes    Hematemesis with nausea K92.0 3/26/2024 Unknown    Hypertensive urgency I16.0 3/26/2024 Unknown           Documentation created with the aid of Dragon voice recognition software.  Although efforts were made to ensure the accuracy of the note, some inaccuracies may persist.

## 2024-03-26 NOTE — CONSULTS
[facility]                       [facility]    Richy Goins Patient Status:  Emergency    1960 MRN [id number]   Location [facility] EMERGENCY DEPARTMENT Attending Lauerl Loaiza MD   Hosp Day # 0 PCP Woody Dahl MD     Reason for consultation: Hematemesis   HPI: This is a 63 yr-old male with complex PMHx that includes HTN, dyslipidemia, PVD, osteomyelitis s/p right BKA (2023), ESRD on HD, DM, and prior hx of hematemesis s/p EGD (2023; Dr Gonzalez) revealing LA grade C esophagitis, esophageal ulcer, and gastric polyps who presented to ER today with weakness, nausea, and hematemesis. Pt reports developing \"viral\" symptoms over the last week and missed ~5 HD sessions. He has progressively become weaker and now developed nausea which progressed to hematemesis. Pt reports a stable appetite until the last few days. Pt also reports heartburn--no PPI use for >5 months and denies dysphagia or odynophagia. No diarrhea, melena, or hematochezia. No NSAID use.   CTA C + CT a/p suggests distended esophagus with retained fluid and distal esophageal wall thickening; labs with YULISA/CKD (/creat 9), mild lipase elevation (100), leukocytosis (12.8), and Hgb 11.2 from baseline of 11's  PMHx:   Past Medical History:   Diagnosis Date    Belching     Dialysis patient (HCC)     Diarrhea, unspecified     Essential hypertension     Fatigue     Flatulence/gas pain/belching     High blood pressure     Hyperlipidemia     Indigestion     Irregular bowel habits     Night sweats     Osteoporosis     Peripheral vascular disease (HCC)     Pure hypercholesterolemia 3/15/2024    Type II or unspecified type diabetes mellitus without mention of complication, not stated as uncontrolled     Visual impairment     reading glasses    Vomiting                 PSHx:   Past Surgical History:   Procedure Laterality Date    AMPUTATION TOE,I-P JT Right     COLONOSCOPY N/A 2023     Procedure: COLONOSCOPY;  Surgeon: Sarmad Gonzalez MD;  Location:  ENDOSCOPY     Medications:    [COMPLETED] sodium chloride 0.9 % IV bolus 500 mL  500 mL Intravenous Once    Followed by    sodium chloride 0.9% infusion   Intravenous Continuous    [COMPLETED] ondansetron (Zofran) 4 MG/2ML injection 4 mg  4 mg Intravenous Once    [COMPLETED] sodium bicarbonate injection 50 mEq  50 mEq Intravenous Once    [COMPLETED] insulin regular human (Novolin R, Humulin R) 100 UNIT/ML injection 10 Units  10 Units Intravenous Once    [COMPLETED] dextrose 50% injection 50 mL  50 mL Intravenous Once    [COMPLETED] pantoprazole (Protonix) 40 mg in sodium chloride 0.9% PF 10 mL IV push  40 mg Intravenous Once    [COMPLETED] iohexol (Omnipaque) 350 MG/ML injection via power injector 100 mL  100 mL Intravenous ONCE PRN    [COMPLETED] sodium chloride 0.9% infusion   Intravenous Once     Allergies: No Known Allergies  Social HX:   Social History     Socioeconomic History    Marital status:    Tobacco Use    Smoking status: Never    Smokeless tobacco: Never    Tobacco comments:     none   Vaping Use    Vaping Use: Never used   Substance and Sexual Activity    Alcohol use: Not Currently     Comment: none    Drug use: Never    Sexual activity: Yes     Partners: Female     Social Determinants of Health     Financial Resource Strain: Low Risk  (10/16/2023)    Financial Resource Strain     Difficulty of Paying Living Expenses: Not very hard     Med Affordability: No   Food Insecurity: No Food Insecurity (1/30/2024)    Food Insecurity     Food Insecurity: Never true   Transportation Needs: No Transportation Needs (1/30/2024)    Transportation Needs     Lack of Transportation: No   Housing Stability: Low Risk  (1/30/2024)    Housing Stability     Housing Instability: No      FamHx: The patient has no family history of colon cancer or other gastrointestinal malignancies;  No family history of ulcer disease, or inflammatory bowel  disease  ROS:  In addition to the pertinent positives described above:            Infectious Disease:  No chronic infections or recent fevers prior to the acute illness            Cardiovascular: + PVD, HTN, dyslipidemia             Respiratory: No shortness of breath, asthma, copd, recurrent pneumonia            Hematologic: The patient reports no easy bruising, frequent gum bleeding or nose bleeding;  The patient has no history of known chronic anemia            Dermatologic: The patient reports no recent rashes or chronic skin disorders            Rheumatologic: The patient reports no history of chronic arthritis, myalgias, arthralgias            Genitourinary:  + ESRD on HD           Psychiatric: The patient reports no history of depression, anxiety, suicidal ideation, or homicidal ideation           Oncologic: The patient reports no history of prior solid tumor or hematologic malignancy           ENT: The patient reports no hoarseness of voice, hearing loss, sinus congestion, tinnitus           Neurologic: The patient reports no history of seizure, stroke, or frequent headaches  PE: BP (!) 207/103   Pulse 111   Temp 99.3 °F (37.4 °C) (Temporal)   Resp 12   Ht 5' 8\" (1.727 m)   Wt 173 lb (78.5 kg)   SpO2 99%   BMI 26.30 kg/m²   Gen: AAO x 3, able to speak in complete sentences  HENT: EOMI, PERRL, oropharynx is clear with moist mucosal membranes  Eyes: Sclerae are anicteric  Neck:  Supple without nuchal rigidity  CV: Regular rate and rhythm, with normal S1 and S2  Resp: Clear to auscultation bilaterally without wheezes; rubs, rhonchi, or rales  Abdomen: Soft, non-tender, non-distended with the presence of bowel sounds; No hepatosplenomegaly; no rebound or guarding; No ascites is clinically apparent; no tympany to percussion  Ext: No peripheral edema or cyanosis  Skin: Warm and dry  Psychiatric: Appropriate mood and congruent affect without obvious depression or anxiety  Labs:   Lab Results   Component  Value Date    WBC 12.8 03/26/2024    HGB 11.2 03/26/2024    HCT 33.9 03/26/2024    .0 03/26/2024    CREATSERUM 9.00 03/26/2024     03/26/2024     03/26/2024    K 5.5 03/26/2024     03/26/2024    CO2 13.0 03/26/2024     03/26/2024    CA 8.7 03/26/2024    ALB 3.9 03/26/2024    ALKPHO 103 03/26/2024    BILT 0.4 03/26/2024    AST 14 03/26/2024    ALT 18 03/26/2024    PTT 28.7 03/26/2024    INR 1.08 03/26/2024    PTP 14.1 03/26/2024     Recent Labs   Lab 03/26/24  1015   *   *   CREATSERUM 9.00*   CA 8.7      K 5.5*   *   CO2 13.0*     Recent Labs   Lab 03/26/24  1015   RBC 3.96   HGB 11.2*   HCT 33.9*   MCV 85.6   MCH 28.3   MCHC 33.0   RDW 14.2   NEPRELIM 11.80*   WBC 12.8*   .0       Recent Labs   Lab 03/26/24  1015   ALT 18   AST 14*       Imaging:   PROCEDURE:  CTA CHEST + CT ABD (W) + CT PEL (W) SH(CPT=71275/15361)     COMPARISON:  [facility] , CT, CT ABDOMEN+PELVIS(CPT=74176), 1/03/2022, 1:42 PM.  [facility] , CT, CT CHEST (CPT=71250), 5/31/2023, 10:49 AM.     INDICATIONS:  hematemesis, hemoptysis, abd pain     TECHNIQUE:  CT of the chest (with CTA imaging), abdomen, and pelvis were obtained post- injection of non-ionic intravenous contrast material. Multi-planar reformatted/3-D images were created to optimize visualization of vascular anatomy.  Dose reduction  techniques were used. Dose information is transmitted to the ACR (American College of Radiology) NRDR (National Radiology Data Registry) which includes the Dose Index Registry.     PATIENT STATED HISTORY:(As transcribed by Technologist)  Patient with hematemesis, hemoptysis, and abdominal pain all four quadrants.      CONTRAST USED:  100cc of Omnipaque 350     FINDINGS:       CHEST:    LUNGS:  4 mm left upper lobe nodule previously measured 12 x 10 mm.  Thin linear strands of scarring or atelectasis in the lateral right middle lobe appear improved.  Similar appearance of subpleural interstitial  opacities in both lower lobes suggestive  of fibrosis and likely accentuated by shallow inspiration.  No focal consolidation.    MEDIASTINUM:  Mildly distended esophagus with fluid and distal esophageal wall thickening.  No lymphadenopathy.  GRACE:  No mass or adenopathy.    CARDIAC:  Coronary artery calcium.  PLEURA:  No mass or effusion.    CHEST WALL:  No mass or axillary adenopathy.  Tunneled right hemodialysis catheter in place.  AORTA:  Trace atherosclerotic calcification.  No aneurysm or dissection.    VASCULATURE:  No visible pulmonary arterial thrombus or attenuation.       ABDOMEN/PELVIS:  LIVER:  No enlargement, atrophy, abnormal density, or significant focal lesion.    BILIARY:  No visible dilatation or calcification.    PANCREAS:  No lesion, fluid collection, ductal dilatation, or atrophy.    SPLEEN:  No enlargement or focal lesion.    KIDNEYS:  Small bilateral nonobstructing renal calculi measure less than 4 mm.  No mass or hydronephrosis.  ADRENALS:  No mass or enlargement.    AORTA:  Atherosclerosis.  No aneurysm.  RETROPERITONEUM:  No mass or adenopathy.    BOWEL/MESENTERY:  No visible mass, obstruction, or bowel wall thickening.  Normal appendix.  ABDOMINAL WALL:  No mass or hernia.    URINARY BLADDER:  No visible focal wall thickening, lesion, or calculus.    PELVIC NODES:  No adenopathy.    PELVIC ORGANS:  No visible mass.  Pelvic organs appropriate for patient age.    BONES:  Stable mild degenerative changes of spine.     Impression   CONCLUSION:    1. Distended esophagus with retained fluid and distal esophageal wall thickening.  Differential considerations include esophagitis and tumor.  Recommend endoscopy for further evaluation.  2. Bilateral nephrolithiasis.  This was not present on 1/3/2022.  3. Near complete resolution of previously demonstrated left upper lobe nodule.  4. Decreased scarring or atelectasis in right middle lobe.  Subpleural fibrosis in both lower lobes is unchanged.  5.  Coronary artery calcium.        LOCATION:  [facility]        Dictated by (CST): Oskar Lynn MD on 3/26/2024 at 1:42 PM      Finalized by (CST): Oskar Lynn MD on 3/26/2024 at 1:57 PM     Impression: 63 yr-old male with complex PMHx that includes HTN, dyslipidemia, PVD, osteomyelitis s/p right BKA (6/2023), ESRD on HD, DM, and prior hx of hematemesis s/p EGD (2/2023; Dr Gonzalez) revealing LA grade C esophagitis, esophageal ulcer, and gastric polyps admitted today with weakness, nausea, and hematemesis in setting of missing ~5 HD sessions.  No chronic acid suppression. Imaging suggests distended esophagus with retained fluid and distal esophageal wall thickening; labs with YULISA/CKD (/creat 9), mild lipase elevation (100), leukocytosis (12.8), and Hgb 11.2 from baseline of 11's. Nausea maybe in part relayed to uremia from missed HD however given concern for hematemesis and esophageal thickening will treat with PPI IV BID + plan for EGD in AM once pt has received HD     The risks, benefits, alternatives of the procedure including the risks of anesthesia, bleeding, perforation, missed lesions, need for surgery, and infection were discussed with the patient. He expressed understanding of the risks and was agreeable to proceed.    Recommendations:     Plan for EGD in AM under MAC with Dr Gonzalez  NPO given concern for ongoing nausea, vomiting   Protonix 40 mg IV BID   Antiemetics as needed   CBC, BMP, Mg in AM    Thank you for the consultation, we will follow the patient with you.  Attending addendum (Dr Gonzalez) to follow later today and provide formal, final recommendations at that time   Felisha Castaneda, JAMEL  2:57 PM  3/26/2024  [facility]  [phone]    GI Attending Addendum:  I have personally seen and examined this patient and agree with above nurse practitioner's history, physical exam, assessment and recommendations with the following modifications and/or additions: Informed Consent: The planned  procedure(s), the explanation of the procedure, its expected benefits, the potential complications and risks, and possible alternatives (including their benefits and risks) were discussed with the patient in full. The discussion of risks, not limited to but including bleeding, infection, perforation or tear in the gastrointestinal tract,  adverse effects from anesthesia, and possible prolonged hospitalization, emergency surgery, risk of morbidity or mortality, and risk of missed lesion(s) were discussed with patient.Patient understood the proposed procedure(s), and elected to proceed with the procedure(s) with possible intervention (such as polypectomy, biopsy, control of bleeding, etc.) and its risks, benefits and alternatives and wish to proceed with procedure(s). All questions answered in full to patient's satisfaction. Ample time was given to patient to ask all questions in full.               Patient is a  62 yo M w/ PVD, ESRD on HD, DM, and GI consult for hematemesis. Hx of LA grade C esophagitis. Pt missed last several days of hematemesis. No melena or BRBPR. Abnormal CT, esophagus. In setting of marked uremia, likely worsening of plts function, causing increased risk of UGIB from esophagitis, PUD or gastritis.     Appreciate renal consult, HD   Plan for EGD torres, pending optimization of lytes  PPI IV q12 hrs  NPO at MN  IVF, analgesia, anti-emetics per primary team      Sarmad Gonzalez MD  3/26/2024  [facility]

## 2024-03-26 NOTE — H&P
[facility]     Richy Goins Patient Status:  Emergency    1960 MRN [id number]   Location [facility] EMERGENCY DEPARTMENT Attending Laurel Loaiza MD   Hosp Day # 0 PCP Woody Dahl MD     Chief Complaint: Coffee-ground emesis    Subjective:    History of Present Illness:     Richy Goins is a 63 year old male with past medical history ESRD on HD, hypertension, hyperlipidemia, PVD, DM2 who presents with coffee-ground emesis.  Patient has also been coughing up blood.  Has had generalized weakness and chills for about 1 week.  Has also been vomiting up blood.  He thinks the vomiting blood came out first and eventually became coffee-ground.  Does have a history of bleeding in his stomach multiple times.  Since he has not been feeling well he has not gone to dialysis since .    History/Other:    Past Medical History:  Past Medical History:   Diagnosis Date    Belching     Dialysis patient (HCC)     Diarrhea, unspecified     Essential hypertension     Fatigue     Flatulence/gas pain/belching     High blood pressure     Hyperlipidemia     Indigestion     Irregular bowel habits     Night sweats     Osteoporosis     Peripheral vascular disease (HCC)     Pure hypercholesterolemia 3/15/2024    Type II or unspecified type diabetes mellitus without mention of complication, not stated as uncontrolled     Visual impairment     reading glasses    Vomiting      Past Surgical History:   Past Surgical History:   Procedure Laterality Date    AMPUTATION TOE,I-P JT Right     COLONOSCOPY N/A 2023    Procedure: COLONOSCOPY;  Surgeon: Sarmad Gonzalez MD;  Location:  ENDOSCOPY      Family History:   Family History   Problem Relation Age of Onset    Heart Attack Father     Heart Disorder Father 57    Diabetes Maternal Grandfather     Diabetes Maternal Aunt      Social History:    reports that he has never smoked. He has never used smokeless tobacco. He reports that he does  not currently use alcohol. He reports that he does not use drugs.     Allergies: No Known Allergies    Medications:    Current Facility-Administered Medications on File Prior to Encounter   Medication Dose Route Frequency Provider Last Rate Last Admin    [COMPLETED] heparin (Porcine) 1000 UNIT/ML injection 4,000 Units  4,000 Units Intravenous Once Paul Barnhart MD   4,000 Units at 24 1206    [COMPLETED] sodium chloride 0.9% infusion   Intravenous Once Marybeth Moreno MD 83 mL/hr at 24 0649 New Bag at 24 0649    [COMPLETED] ondansetron (Zofran) 4 MG/2ML injection 4 mg  4 mg Intravenous Once Marybeth Moreno MD   4 mg at 24 0648    [COMPLETED] pantoprazole (Protonix) 40 mg in sodium chloride 0.9% PF 10 mL IV push  40 mg Intravenous Once Marybeth Moreno MD   40 mg at 24 0653    [] sodium chloride 0.9 % IV bolus 100 mL  100 mL Intravenous Q30 Min PRN Paul Barnhart MD        And    [] albumin human (Albumin) 25% injection 25 g  25 g Intravenous PRN Dialysis Paul Barnhart MD        [COMPLETED] heparin (Porcine) 1000 UNIT/ML injection 1,500 Units  1.5 mL Intracatheter Once Paul Barnhart MD   1,500 Units at 24 1643     Current Outpatient Medications on File Prior to Encounter   Medication Sig Dispense Refill    lisinopril 10 MG Oral Tab Take 1 tablet (10 mg total) by mouth daily.      Lanthanum Carbonate 1000 MG Oral Chew Tab  (Patient not taking: Reported on 3/15/2024)      Insulin Pen Needle (BD PEN NEEDLE AMBER 2ND GEN) 32G X 4 MM Does not apply Misc Use to inject insulin up to 5 times daily 450 each 1    insulin detemir (LEVEMIR FLEXPEN) 100 UNIT/ML Subcutaneous Solution Pen-injector Inject 17 Units into the skin nightly. 3 month supply 15 mL 1    amLODIPine 10 MG Oral Tab Take 1 tablet (10 mg total) by mouth daily. 30 tablet 1    aspirin 81 MG Oral Tab EC Take 1 tablet (81 mg total) by mouth daily. 360 tablet 0       Review of Systems:   A comprehensive review of systems  was completed.    Pertinent positives and negatives noted in the HPI.    Objective:   Physical Exam:    BP (!) 187/109   Pulse 108   Temp 99.3 °F (37.4 °C) (Temporal)   Resp 22   Ht 5' 8\" (1.727 m)   Wt 173 lb (78.5 kg)   SpO2 99%   BMI 26.30 kg/m²   General: No acute distress, Alert  Respiratory: No rhonchi, no wheezes  Cardiovascular: S1, S2. Regular rate and rhythm  Abdomen: Soft, Non-tender, non-distended, positive bowel sounds  Neuro: No new focal deficits  Extremities: No edema      Results:    Labs:      Labs Last 24 Hours:    Recent Labs   Lab 03/26/24  1015   RBC 3.96   HGB 11.2*   HCT 33.9*   MCV 85.6   MCH 28.3   MCHC 33.0   RDW 14.2   NEPRELIM 11.80*   WBC 12.8*   .0       Recent Labs   Lab 03/26/24  1015   *   *   CREATSERUM 9.00*   EGFRCR 6*   CA 8.7   ALB 3.9      K 5.5*   *   CO2 13.0*   ALKPHO 103   AST 14*   ALT 18   BILT 0.4   TP 7.6       Lab Results   Component Value Date    INR 1.08 03/26/2024    INR 0.94 03/08/2024    INR 1.07 11/14/2023       No results for input(s): \"TROP\", \"TROPHS\", \"CK\" in the last 168 hours.    No results for input(s): \"TROP\", \"PBNP\" in the last 168 hours.    No results for input(s): \"PCT\" in the last 168 hours.    Imaging: Imaging data reviewed in Epic.    Assessment & Plan:      # Upper GI bleed  # Retained food in dilated esophagus  -Consult GI  -IV PPI twice daily  -N.p.o.  -Transfuse less than 7    #Hemoptysis  -Suspect secondary to GI bleeding  -If GI workup is negative, will consult pulmonology  -CTA chest/abdomen/pelvis noted    #ESRD on HD  #uremia  #Severe electrolyte abnormalities including hyperkalemia due to lack of dialysis  -Has not had dialysis since March 16  -Consult nephrology    #Hypertension  #Hyperlipidemia  #PVD  #DM2  -Insulin sliding scale    Plan of care discussed with, ED physician    Dallas Drew MD    Supplementary Documentation:     The 21st Century Cures Act makes medical notes like these available to  patients in the interest of transparency. Please be advised this is a medical document. Medical documents are intended to carry relevant information, facts as evident, and the clinical opinion of the practitioner. The medical note is intended as peer to peer communication and may appear blunt or direct. It is written in medical language and may contain abbreviations or verbiage that are unfamiliar.

## 2024-03-26 NOTE — ED INITIAL ASSESSMENT (HPI)
PT TO THE ED WITH C/O WEAKNESS AND CHILLS BUT NO FEVER  X 1 WEEK. HX OF DIABETES AND DIALYSIS. HEMOPTYSIS SINCE LAST NIGHT, PT DENIES BLOOD THINNERS. PT STATES HE MAY BE DEHYDRATED. DIALYSIS USUALLY TUES/THURS/SATURDAY, BUT LAST DIALYSIS WAS ON 3/16 SINCE PT HAS BEEN SICK PER WIFE.

## 2024-03-26 NOTE — ANESTHESIA PREPROCEDURE EVALUATION
PRE-OP EVALUATION    Patient Name: Richy Goins    Admit Diagnosis: Uremia [N19]  Hemoptysis [R04.2]  Acute gastritis with hemorrhage, unspecified gastritis type [K29.01]  Hematemesis, unspecified whether nausea present [K92.0]    Pre-op Diagnosis: INPT    ESOPHAGOGASTRODUODENOSCOPY (EGD)    Anesthesia Procedure: ESOPHAGOGASTRODUODENOSCOPY (EGD)    Surgeon(s) and Role:     * Sarmad Gonzalez MD - Primary    Pre-op vitals reviewed.  Temp: 99.3 °F (37.4 °C)  Pulse: 108  Resp: 22  BP: 187/109  SpO2: 99 %  Body mass index is 26.3 kg/m².    Current medications reviewed.  Hospital Medications:  • [COMPLETED] sodium chloride 0.9 % IV bolus 500 mL  500 mL Intravenous Once    Followed by   • sodium chloride 0.9% infusion   Intravenous Continuous   • [COMPLETED] ondansetron (Zofran) 4 MG/2ML injection 4 mg  4 mg Intravenous Once   • [COMPLETED] sodium bicarbonate injection 50 mEq  50 mEq Intravenous Once   • [COMPLETED] insulin regular human (Novolin R, Humulin R) 100 UNIT/ML injection 10 Units  10 Units Intravenous Once   • [COMPLETED] dextrose 50% injection 50 mL  50 mL Intravenous Once   • [COMPLETED] pantoprazole (Protonix) 40 mg in sodium chloride 0.9% PF 10 mL IV push  40 mg Intravenous Once   • [COMPLETED] iohexol (Omnipaque) 350 MG/ML injection via power injector 100 mL  100 mL Intravenous ONCE PRN   • sodium chloride 0.9% infusion   Intravenous Continuous   • [COMPLETED] sodium chloride 0.9% infusion   Intravenous Once   • sodium chloride 0.9 % IV bolus 100 mL  100 mL Intravenous Q30 Min PRN    And   • albumin human (Albumin) 25% injection 25 g  25 g Intravenous PRN Dialysis   • heparin (Porcine) 1000 UNIT/ML injection 1,500 Units  1.5 mL Intracatheter Once   • enalaprilat (Vasotec) 1.25 mg/mL injection 1.25 mg  1.25 mg Intravenous Q6H PRN   • labetalol (Trandate) 5 mg/mL injection 20 mg  20 mg Intravenous Q4H PRN   • amLODIPine (Norvasc) tab 10 mg  10 mg Oral Daily   • aspirin DR tab 81 mg  81 mg Oral Daily   •  insulin degludec 100 units/mL flextouch 8 Units  8 Units Subcutaneous Nightly   • lisinopril (Zestril) tab 10 mg  10 mg Oral Daily   • insulin aspart (NovoLOG) 100 Units/mL FlexPen 1-68 Units  1-68 Units Subcutaneous TID CC   • glucose (Dex4) 15 GM/59ML oral liquid 15 g  15 g Oral Q15 Min PRN    Or   • glucose (Glutose) 40% oral gel 15 g  15 g Oral Q15 Min PRN    Or   • glucose-vitamin C (Dex-4) chewable tab 4 tablet  4 tablet Oral Q15 Min PRN    Or   • dextrose 50% injection 50 mL  50 mL Intravenous Q15 Min PRN    Or   • glucose (Dex4) 15 GM/59ML oral liquid 30 g  30 g Oral Q15 Min PRN    Or   • glucose (Glutose) 40% oral gel 30 g  30 g Oral Q15 Min PRN    Or   • glucose-vitamin C (Dex-4) chewable tab 8 tablet  8 tablet Oral Q15 Min PRN   • ondansetron (Zofran) 4 MG/2ML injection 4 mg  4 mg Intravenous Q6H PRN   • pantoprazole (Protonix) 40 mg in sodium chloride 0.9% PF 10 mL IV push  40 mg Intravenous Q12H   • acetaminophen (Tylenol Extra Strength) tab 1,000 mg  1,000 mg Oral Q4H PRN   • melatonin tab 3 mg  3 mg Oral Nightly PRN   • polyethylene glycol (PEG 3350) (Miralax) 17 g oral packet 17 g  17 g Oral Daily PRN   • sennosides (Senokot) tab 17.2 mg  17.2 mg Oral Nightly PRN   • bisacodyl (Dulcolax) 10 MG rectal suppository 10 mg  10 mg Rectal Daily PRN   • ondansetron (Zofran) 4 MG/2ML injection 4 mg  4 mg Intravenous Q6H PRN   • metoclopramide (Reglan) 5 mg/mL injection 10 mg  10 mg Intravenous Q8H PRN   • benzonatate (Tessalon) cap 200 mg  200 mg Oral TID PRN   • acetaminophen (Tylenol) tab 650 mg  650 mg Oral Q4H PRN    Or   • HYDROcodone-acetaminophen (Norco) 5-325 MG per tab 1 tablet  1 tablet Oral Q4H PRN    Or   • HYDROcodone-acetaminophen (Norco) 5-325 MG per tab 2 tablet  2 tablet Oral Q4H PRN   • insulin aspart (NovoLOG) 100 Units/mL FlexPen 1-10 Units  1-10 Units Subcutaneous TID AC and HS   • glycerin-hypromellose- (Artifical Tears) 0.2-0.2-1 % ophthalmic solution 1 drop  1 drop Both Eyes QID  PRN   • sodium chloride (Saline Mist) 0.65 % nasal solution 1 spray  1 spray Each Nare Q3H PRN       Outpatient Medications:     Medications Prior to Admission   Medication Sig Dispense Refill Last Dose   • lisinopril 10 MG Oral Tab Take 1 tablet (10 mg total) by mouth daily.      • Lanthanum Carbonate 1000 MG Oral Chew Tab  (Patient not taking: Reported on 3/15/2024)      • Insulin Pen Needle (BD PEN NEEDLE AMBER 2ND GEN) 32G X 4 MM Does not apply Misc Use to inject insulin up to 5 times daily 450 each 1    • insulin detemir (LEVEMIR FLEXPEN) 100 UNIT/ML Subcutaneous Solution Pen-injector Inject 17 Units into the skin nightly. 3 month supply 15 mL 1    • amLODIPine 10 MG Oral Tab Take 1 tablet (10 mg total) by mouth daily. 30 tablet 1    • aspirin 81 MG Oral Tab EC Take 1 tablet (81 mg total) by mouth daily. 360 tablet 0        Allergies: Patient has no known allergies.      Anesthesia Evaluation    Patient summary reviewed.    Anesthetic Complications           GI/Hepatic/Renal  Comment: Hemoptysis  Hematemesis           (+) chronic renal disease and ESRD and hemodialysis                   Cardiovascular      ECG reviewed.            (+) hypertension   (+) hyperlipidemia                                  Endo/Other      (+) diabetes  type 2,                          Pulmonary                           Neuro/Psych                                    Past Surgical History:   Procedure Laterality Date   • AMPUTATION TOE,I-P JT Right    • COLONOSCOPY N/A 02/20/2023    Procedure: COLONOSCOPY;  Surgeon: Sarmad Gonzalez MD;  Location:  ENDOSCOPY   • COLONOSCOPY     • UPPER GI ENDOSCOPY,EXAM       Social History     Socioeconomic History   • Marital status:    Tobacco Use   • Smoking status: Never   • Smokeless tobacco: Never   • Tobacco comments:     none   Vaping Use   • Vaping Use: Never used   Substance and Sexual Activity   • Alcohol use: Not Currently     Comment: none   • Drug use: Never   • Sexual activity:  Yes     Partners: Female     History   Drug Use Unknown     Available pre-op labs reviewed.  Lab Results   Component Value Date    WBC 12.8 (H) 03/26/2024    RBC 3.96 03/26/2024    HGB 11.2 (L) 03/26/2024    HCT 33.9 (L) 03/26/2024    MCV 85.6 03/26/2024    MCH 28.3 03/26/2024    MCHC 33.0 03/26/2024    RDW 14.2 03/26/2024    .0 03/26/2024     Lab Results   Component Value Date     03/26/2024    K 5.5 (H) 03/26/2024     (H) 03/26/2024    CO2 13.0 (L) 03/26/2024     (H) 03/26/2024    CREATSERUM 9.00 (H) 03/26/2024     (H) 03/26/2024    CA 8.7 03/26/2024     Lab Results   Component Value Date    INR 1.08 03/26/2024         Airway      Mallampati: II  Mouth opening: 3 FB  TM distance: > 6 cm  Neck ROM: full Cardiovascular    Cardiovascular exam normal.  Rhythm: regular  Rate: normal     Dental             Pulmonary    Pulmonary exam normal.                 Other findings          ASA: 3   Plan: MAC             Plan/risks discussed with: patient and significant other              Present on Admission:  • Acidosis  • ESRD on hemodialysis (HCC)

## 2024-03-26 NOTE — ED QUICK NOTES
Pt refused to go to CT scan until pt got ice chips. ED MD made aware. Ice chips given to patient, educated pt on risk of aspiration. Pt verbalized understanding.

## 2024-03-26 NOTE — CONSULTS
[facility]  Report of Consultation    Richy Goins Patient Status:  Emergency    1960 MRN [id number]   Location [facility] EMERGENCY DEPARTMENT Attending Laurel Loaiza MD   Hosp Day # 0 PCP Woody Dahl MD     Reason for Consultation:  ESRD on HD    History of Present Illness:  Richy Goins is a a(n) 63 year old man known to our service with mult med probs incl ESRD due to DM on HD TTHS at [facility] who presents to ED with c/o nasuea/hematemesis/weakness.  Has not gone to dialysis for the past week due to weakness.  CT notable for distended esophagus with retained fluid.    History:  Past Medical History:   Diagnosis Date    Belching     Dialysis patient (HCC)     Diarrhea, unspecified     Essential hypertension     Fatigue     Flatulence/gas pain/belching     High blood pressure     Hyperlipidemia     Indigestion     Irregular bowel habits     Night sweats     Osteoporosis     Peripheral vascular disease (HCC)     Pure hypercholesterolemia 3/15/2024    Type II or unspecified type diabetes mellitus without mention of complication, not stated as uncontrolled     Visual impairment     reading glasses    Vomiting      Past Surgical History:   Procedure Laterality Date    AMPUTATION TOE,I-P JT Right     COLONOSCOPY N/A 2023    Procedure: COLONOSCOPY;  Surgeon: Sarmad Gonzalez MD;  Location:  ENDOSCOPY     Family History   Problem Relation Age of Onset    Heart Attack Father     Heart Disorder Father 57    Diabetes Maternal Grandfather     Diabetes Maternal Aunt       reports that he has never smoked. He has never used smokeless tobacco. He reports that he does not currently use alcohol. He reports that he does not use drugs.    Allergies:  No Known Allergies    Medications:    Current Facility-Administered Medications:     [COMPLETED] sodium chloride 0.9 % IV bolus 500 mL, 500 mL, Intravenous, Once **FOLLOWED BY** sodium chloride 0.9% infusion, , Intravenous,  Continuous  Prior to Admission Medications   Medication Sig    lisinopril 10 MG Oral Tab Take 1 tablet (10 mg total) by mouth daily.    Lanthanum Carbonate 1000 MG Oral Chew Tab  (Patient not taking: Reported on 3/15/2024)    Insulin Pen Needle (BD PEN NEEDLE AMBER 2ND GEN) 32G X 4 MM Does not apply Misc Use to inject insulin up to 5 times daily    insulin detemir (LEVEMIR FLEXPEN) 100 UNIT/ML Subcutaneous Solution Pen-injector Inject 17 Units into the skin nightly. 3 month supply    amLODIPine 10 MG Oral Tab Take 1 tablet (10 mg total) by mouth daily.    aspirin 81 MG Oral Tab EC Take 1 tablet (81 mg total) by mouth daily.       Review of Systems:  Denies fever/chills  Denies wt loss/gain  Denies HA or visual changes  Denies CP or palpitations  Denies SOB/cough/hemoptysis  Denies abd or flank pain  + N/V  Denies change in urinary habits or gross hematuria  Denies LE edema  Denies skin rashes/myalgias/arthralgias      Physical Exam:   BP (!) 207/103   Pulse 111   Temp 99.3 °F (37.4 °C) (Temporal)   Resp 12   Ht 5' 8\" (1.727 m)   Wt 173 lb (78.5 kg)   SpO2 99%   BMI 26.30 kg/m²   Temp (24hrs), Av.3 °F (37.4 °C), Min:99.3 °F (37.4 °C), Max:99.3 °F (37.4 °C)       Intake/Output Summary (Last 24 hours) at 3/26/2024 1424  Last data filed at 3/26/2024 1235  Gross per 24 hour   Intake 500 ml   Output --   Net 500 ml     Last 3 Weights   24 0950 173 lb (78.5 kg)   03/15/24 1027 183 lb (83 kg)   24 1000 173 lb 4.5 oz (78.6 kg)   24 1637 173 lb (78.5 kg)   24 1123 175 lb (79.4 kg)   24 1050 167 lb 8 oz (76 kg)   24 0557 175 lb (79.4 kg)   24 0936 156 lb (70.8 kg)     General: Alert and oriented appears uncomfortable.  HEENT: No scleral icterus, MMM  Neck: Supple, no SARITA or thyromegaly  Cardiac: tachycardic, S1, S2 normal, no murmur or rub  Lungs: Clear without wheezes, rales, rhonchi.    Abdomen: Soft, non-tender. + bowel sounds, no palpable organomegaly  Extremities:  Without clubbing, cyanosis or edema. R BKA noted  Neurologic: moving all extremities  Skin: Warm and dry, no rashes      Laboratory Data:  Lab Results   Component Value Date    WBC 12.8 03/26/2024    HGB 11.2 03/26/2024    HCT 33.9 03/26/2024    .0 03/26/2024    CREATSERUM 9.00 03/26/2024     03/26/2024     03/26/2024    K 5.5 03/26/2024     03/26/2024    CO2 13.0 03/26/2024     03/26/2024    CA 8.7 03/26/2024    ALB 3.9 03/26/2024    ALKPHO 103 03/26/2024    BILT 0.4 03/26/2024    TP 7.6 03/26/2024    AST 14 03/26/2024    ALT 18 03/26/2024    PTT 28.7 03/26/2024    INR 1.08 03/26/2024    PTP 14.1 03/26/2024    PGLU 225 03/26/2024       BUN (mg/dL)   Date Value   03/26/2024 127 (H)   03/08/2024 74 (H)   03/06/2024 81 (H)     Creatinine (mg/dL)   Date Value   03/26/2024 9.00 (H)   03/08/2024 6.28 (H)   03/06/2024 6.04 (H)       Malb/Cre Calc   Date Value Ref Range Status   08/10/2022 1,893.2 (H) <=30.0 ug/mg Final     Comment:     <30 ug/mg creatinine       Normal     ug/mg creatinine   Microalbuminuria   >300 ug/mg creatinine      Albuminuria       11/10/2020 2,380.2 (H) <=30.0 ug/mg Final     Comment:     <30 ug/mg creatinine       Normal     ug/mg creatinine   Microalbuminuria   >300 ug/mg creatinine      Albuminuria       05/31/2019 893.4 (H) <=30.0 ug/mg Final     Comment:       <30 ug/mg creatinine       Normal     ug/mg creatinine   Microalbuminuria   >300 ug/mg creatinine      Albuminuria           Recent Labs   Lab 03/26/24  1015   WBC 12.8*   HGB 11.2*   MCV 85.6   .0   INR 1.08       Recent Labs   Lab 03/26/24  1015      K 5.5*   *   CO2 13.0*   *   CREATSERUM 9.00*   CA 8.7   *       Recent Labs   Lab 03/26/24  1015   ALT 18   AST 14*   ALB 3.9       Recent Labs   Lab 03/26/24  1105 03/26/24  1327   PGLU 276* 225*           Imaging:  CT chest/abd/pelvis noted    Impression/Plan:    #1.  ESRD- due to DM.  HD today per  typical TTHS routine    #2. Acidosis- due to missed dialysis treatments.  Manage with HD    #3.  HTN- BP markedly elevated, unable to take antihypertensives orally. IV meds for now    #4.  N/V/ hematemesis- CT noted.  GI has been consulted    Thank you for allowing me to participate in the care of your patient. Please do not hesitate to call with any questions or concerns.       Ciro Perez MD  3/26/2024  2:24 PM

## 2024-03-26 NOTE — ED QUICK NOTES
Orders for admission, patient is aware of plan and ready to go upstairs. Any questions, please call ED RN Radha at extension [phone].     Patient Covid vaccination status: Fully vaccinated     COVID Test Ordered in ED: SARS-CoV-2/Flu A and B/RSV by PCR (GeneXpert)    COVID Suspicion at Admission: N/A    Running Infusions:    sodium chloride Stopped (03/26/24 1406)    Sodium chloride at 50 ml/hr    Mental Status/LOC at time of transport: a/ox4    Other pertinent information: permacath. HD pt (t,th,sa). Pt will be dialyzed today. Sorry can't give bp meds per ED MD  CIWA score: N/A   NIH score:  N/A

## 2024-03-27 ENCOUNTER — ANESTHESIA (OUTPATIENT)
Dept: ENDOSCOPY | Facility: HOSPITAL | Age: 64
End: 2024-03-27
Payer: COMMERCIAL

## 2024-03-27 PROBLEM — N18.6 ANEMIA IN ESRD (END-STAGE RENAL DISEASE) (HCC): Status: ACTIVE | Noted: 2024-01-30

## 2024-03-27 PROBLEM — D63.1 ANEMIA IN ESRD (END-STAGE RENAL DISEASE) (HCC): Status: ACTIVE | Noted: 2024-01-30

## 2024-03-27 LAB
ALBUMIN SERPL-MCNC: 3.3 G/DL (ref 3.4–5)
ALBUMIN/GLOB SERPL: 1.1 {RATIO} (ref 1–2)
ALP LIVER SERPL-CCNC: 86 U/L
ALT SERPL-CCNC: 14 U/L
ANION GAP SERPL CALC-SCNC: 10 MMOL/L (ref 0–18)
AST SERPL-CCNC: 18 U/L (ref 15–37)
BASOPHILS # BLD AUTO: 0.01 X10(3) UL (ref 0–0.2)
BASOPHILS NFR BLD AUTO: 0.1 %
BILIRUB SERPL-MCNC: 0.7 MG/DL (ref 0.1–2)
BUN BLD-MCNC: 58 MG/DL (ref 9–23)
CALCIUM BLD-MCNC: 8.2 MG/DL (ref 8.5–10.1)
CHLORIDE SERPL-SCNC: 108 MMOL/L (ref 98–112)
CO2 SERPL-SCNC: 24 MMOL/L (ref 21–32)
CREAT BLD-MCNC: 5.05 MG/DL
EGFRCR SERPLBLD CKD-EPI 2021: 12 ML/MIN/1.73M2 (ref 60–?)
EOSINOPHIL # BLD AUTO: 0.01 X10(3) UL (ref 0–0.7)
EOSINOPHIL NFR BLD AUTO: 0.1 %
ERYTHROCYTE [DISTWIDTH] IN BLOOD BY AUTOMATED COUNT: 14.3 %
GLOBULIN PLAS-MCNC: 3 G/DL (ref 2.8–4.4)
GLUCOSE BLD-MCNC: 136 MG/DL (ref 70–99)
GLUCOSE BLD-MCNC: 171 MG/DL (ref 70–99)
GLUCOSE BLD-MCNC: 172 MG/DL (ref 70–99)
GLUCOSE BLD-MCNC: 175 MG/DL (ref 70–99)
GLUCOSE BLD-MCNC: 195 MG/DL (ref 70–99)
HCT VFR BLD AUTO: 28.6 %
HGB BLD-MCNC: 10 G/DL
HGB BLD-MCNC: 9.3 G/DL
HGB BLD-MCNC: 9.3 G/DL
HGB BLD-MCNC: 9.4 G/DL
IMM GRANULOCYTES # BLD AUTO: 0.08 X10(3) UL (ref 0–1)
IMM GRANULOCYTES NFR BLD: 0.6 %
LYMPHOCYTES # BLD AUTO: 0.96 X10(3) UL (ref 1–4)
LYMPHOCYTES NFR BLD AUTO: 6.8 %
MAGNESIUM SERPL-MCNC: 2 MG/DL (ref 1.6–2.6)
MCH RBC QN AUTO: 27.6 PG (ref 26–34)
MCHC RBC AUTO-ENTMCNC: 32.5 G/DL (ref 31–37)
MCV RBC AUTO: 84.9 FL
MONOCYTES # BLD AUTO: 0.91 X10(3) UL (ref 0.1–1)
MONOCYTES NFR BLD AUTO: 6.4 %
NEUTROPHILS # BLD AUTO: 12.22 X10 (3) UL (ref 1.5–7.7)
NEUTROPHILS # BLD AUTO: 12.22 X10(3) UL (ref 1.5–7.7)
NEUTROPHILS NFR BLD AUTO: 86 %
OSMOLALITY SERPL CALC.SUM OF ELEC: 314 MOSM/KG (ref 275–295)
PLATELET # BLD AUTO: 215 10(3)UL (ref 150–450)
POTASSIUM SERPL-SCNC: 4.3 MMOL/L (ref 3.5–5.1)
PROT SERPL-MCNC: 6.3 G/DL (ref 6.4–8.2)
RBC # BLD AUTO: 3.37 X10(6)UL
SODIUM SERPL-SCNC: 142 MMOL/L (ref 136–145)
WBC # BLD AUTO: 14.2 X10(3) UL (ref 4–11)

## 2024-03-27 PROCEDURE — 99232 SBSQ HOSP IP/OBS MODERATE 35: CPT | Performed by: INTERNAL MEDICINE

## 2024-03-27 PROCEDURE — 0DJ68ZZ INSPECTION OF STOMACH, VIA NATURAL OR ARTIFICIAL OPENING ENDOSCOPIC: ICD-10-PCS | Performed by: STUDENT IN AN ORGANIZED HEALTH CARE EDUCATION/TRAINING PROGRAM

## 2024-03-27 PROCEDURE — 99232 SBSQ HOSP IP/OBS MODERATE 35: CPT | Performed by: HOSPITALIST

## 2024-03-27 RX ORDER — ONDANSETRON 2 MG/ML
4 INJECTION INTRAMUSCULAR; INTRAVENOUS EVERY 6 HOURS PRN
Status: DISCONTINUED | OUTPATIENT
Start: 2024-03-27 | End: 2024-03-27 | Stop reason: HOSPADM

## 2024-03-27 RX ORDER — LIDOCAINE HYDROCHLORIDE 10 MG/ML
INJECTION, SOLUTION EPIDURAL; INFILTRATION; INTRACAUDAL; PERINEURAL AS NEEDED
Status: DISCONTINUED | OUTPATIENT
Start: 2024-03-27 | End: 2024-03-27 | Stop reason: SURG

## 2024-03-27 RX ORDER — ALBUMIN (HUMAN) 12.5 G/50ML
25 SOLUTION INTRAVENOUS
Status: DISCONTINUED | OUTPATIENT
Start: 2024-03-27 | End: 2024-03-29

## 2024-03-27 RX ORDER — SODIUM CHLORIDE, SODIUM LACTATE, POTASSIUM CHLORIDE, CALCIUM CHLORIDE 600; 310; 30; 20 MG/100ML; MG/100ML; MG/100ML; MG/100ML
INJECTION, SOLUTION INTRAVENOUS CONTINUOUS PRN
Status: DISCONTINUED | OUTPATIENT
Start: 2024-03-27 | End: 2024-03-27 | Stop reason: SURG

## 2024-03-27 RX ORDER — SODIUM CHLORIDE, SODIUM LACTATE, POTASSIUM CHLORIDE, CALCIUM CHLORIDE 600; 310; 30; 20 MG/100ML; MG/100ML; MG/100ML; MG/100ML
INJECTION, SOLUTION INTRAVENOUS CONTINUOUS
Status: DISCONTINUED | OUTPATIENT
Start: 2024-03-27 | End: 2024-03-27

## 2024-03-27 RX ORDER — NALOXONE HYDROCHLORIDE 0.4 MG/ML
0.08 INJECTION, SOLUTION INTRAMUSCULAR; INTRAVENOUS; SUBCUTANEOUS AS NEEDED
Status: DISCONTINUED | OUTPATIENT
Start: 2024-03-27 | End: 2024-03-27 | Stop reason: HOSPADM

## 2024-03-27 RX ORDER — HYDROMORPHONE HYDROCHLORIDE 1 MG/ML
0.4 INJECTION, SOLUTION INTRAMUSCULAR; INTRAVENOUS; SUBCUTANEOUS EVERY 5 MIN PRN
Status: DISCONTINUED | OUTPATIENT
Start: 2024-03-27 | End: 2024-03-27 | Stop reason: HOSPADM

## 2024-03-27 RX ORDER — HYDROMORPHONE HYDROCHLORIDE 1 MG/ML
0.6 INJECTION, SOLUTION INTRAMUSCULAR; INTRAVENOUS; SUBCUTANEOUS EVERY 5 MIN PRN
Status: DISCONTINUED | OUTPATIENT
Start: 2024-03-27 | End: 2024-03-27 | Stop reason: HOSPADM

## 2024-03-27 RX ORDER — HEPARIN SODIUM 1000 [USP'U]/ML
1.5 INJECTION, SOLUTION INTRAVENOUS; SUBCUTANEOUS
Status: COMPLETED | OUTPATIENT
Start: 2024-03-27 | End: 2024-03-28

## 2024-03-27 RX ORDER — METOCLOPRAMIDE HYDROCHLORIDE 5 MG/ML
5 INJECTION INTRAMUSCULAR; INTRAVENOUS EVERY 8 HOURS PRN
Status: DISCONTINUED | OUTPATIENT
Start: 2024-03-27 | End: 2024-03-27 | Stop reason: HOSPADM

## 2024-03-27 RX ORDER — SODIUM CHLORIDE 450 MG/100ML
INJECTION, SOLUTION INTRAVENOUS CONTINUOUS
Status: DISCONTINUED | OUTPATIENT
Start: 2024-03-27 | End: 2024-03-29

## 2024-03-27 RX ORDER — HYDROMORPHONE HYDROCHLORIDE 1 MG/ML
0.2 INJECTION, SOLUTION INTRAMUSCULAR; INTRAVENOUS; SUBCUTANEOUS EVERY 5 MIN PRN
Status: DISCONTINUED | OUTPATIENT
Start: 2024-03-27 | End: 2024-03-27 | Stop reason: HOSPADM

## 2024-03-27 RX ADMIN — LIDOCAINE HYDROCHLORIDE 25 MG: 10 INJECTION, SOLUTION EPIDURAL; INFILTRATION; INTRACAUDAL; PERINEURAL at 14:35:00

## 2024-03-27 RX ADMIN — SODIUM CHLORIDE, SODIUM LACTATE, POTASSIUM CHLORIDE, CALCIUM CHLORIDE: 600; 310; 30; 20 INJECTION, SOLUTION INTRAVENOUS at 14:33:00

## 2024-03-27 NOTE — PLAN OF CARE
Received pt a/o x4. VSS. Afebrile. Denies pain. Some nausea- declined anti-emetics. Occasional maroon spit-up, no emesis episode. HD completed. NPO for planned EGD. Rt leg BKA, prothesis in room. Weakness & unsteady gait when attempting to use bedside commode. Spouse at beside aware of POC, verbalized understanding. Call light within reach. Safety precautions in place.     Problem: SAFETY ADULT - FALL  Goal: Free from fall injury  Description: INTERVENTIONS:  - Assess pt frequently for physical needs  - Identify cognitive and physical deficits and behaviors that affect risk of falls.  - [place] fall precautions as indicated by assessment.  - Educate pt/family on patient safety including physical limitations  - Instruct pt to call for assistance with activity based on assessment  - Modify environment to reduce risk of injury  - Provide assistive devices as appropriate  - Consider OT/PT consult to assist with strengthening/mobility  - Encourage toileting schedule  Outcome: Progressing     Problem: GASTROINTESTINAL - ADULT  Goal: Minimal or absence of nausea and vomiting  Description: INTERVENTIONS:  - Maintain adequate hydration with IV or PO as ordered and tolerated  - Nasogastric tube to low intermittent suction as ordered  - Evaluate effectiveness of ordered antiemetic medications  - Provide nonpharmacologic comfort measures as appropriate  - Advance diet as tolerated, if ordered  - Obtain nutritional consult as needed  - Evaluate fluid balance  Outcome: Progressing     Problem: HEMATOLOGIC - ADULT  Goal: Maintains hematologic stability  Description: INTERVENTIONS  - Assess for signs and symptoms of bleeding or hemorrhage  - Monitor labs and vital signs for trends  - Administer supportive blood products/factors, fluids and medications as ordered and appropriate  - Administer supportive blood products/factors as ordered and appropriate  Outcome: Progressing

## 2024-03-27 NOTE — PROGRESS NOTES
[facility]   part of [facility]     Nephrology Progress Note    Richy Goins Patient Status:  Inpatient    1960 MRN [id number]   Location [facility] 4NW-A Attending Dallas Drew MD   Hosp Day # 1 PCP Woody Dahl MD       SUBJECTIVE:  Pt feels better this afternoon        Physical Exam:   /74 (BP Location: Left arm)   Pulse 93   Temp 98.7 °F (37.1 °C) (Oral)   Resp 20   Ht 5' 8\" (1.727 m)   Wt 169 lb 3.2 oz (76.7 kg)   SpO2 98%   BMI 25.73 kg/m²   Temp (24hrs), Av.7 °F (37.1 °C), Min:98.1 °F (36.7 °C), Max:99.4 °F (37.4 °C)       Intake/Output Summary (Last 24 hours) at 3/27/2024 1304  Last data filed at 3/26/2024 1826  Gross per 24 hour   Intake --   Output 250 ml   Net -250 ml     Last 3 Weights   24 0530 169 lb 3.2 oz (76.7 kg)   24 0950 173 lb (78.5 kg)   03/15/24 1027 183 lb (83 kg)   24 1000 173 lb 4.5 oz (78.6 kg)   24 1637 173 lb (78.5 kg)   24 1123 175 lb (79.4 kg)   24 1050 167 lb 8 oz (76 kg)   24 0557 175 lb (79.4 kg)   24 0936 156 lb (70.8 kg)     General: Alert and oriented in no apparent distress.  HEENT: No scleral icterus, MMM  Neck: Supple, no SARITA or thyromegaly  Cardiac: Regular rate and rhythm, S1, S2 normal, no murmur or rub  Lungs: Clear without wheezes, rales, rhonchi.    Abdomen: Soft, non-tender. + bowel sounds, no palpable organomegaly  Extremities: Without clubbing, cyanosis or edema. R BKA  Neurologic: Alert and oriented, cranial nerves grossly intact, moving all extremities  Skin: Warm and dry, no rash        Labs:     Recent Labs   Lab 24  1015 24  0049 24  0707   WBC 12.8*  --  14.2*   HGB 11.2* 10.0* 9.3*  9.3*   MCV 85.6  --  84.9   .0  --  215.0   INR 1.08  --   --        Recent Labs   Lab 24  1015 24  0707    142   K 5.5* 4.3   * 108   CO2 13.0* 24.0   * 58*   CREATSERUM 9.00* 5.05*   CA 8.7 8.2*   MG  --  2.0   * 175*        Recent Labs   Lab 03/26/24  1015 03/27/24  0707   ALT 18 14*   AST 14* 18   ALB 3.9 3.3*       Recent Labs   Lab 03/26/24  1327 03/26/24  1720 03/26/24  2344 03/27/24  0528 03/27/24  1217   PGLU 225* 235* 137* 171* 195*       Meds:   Current Facility-Administered Medications   Medication Dose Route Frequency    sodium chloride 0.9% infusion   Intravenous Continuous    sodium chloride 0.9 % IV bolus 100 mL  100 mL Intravenous Q30 Min PRN    And    albumin human (Albumin) 25% injection 25 g  25 g Intravenous PRN Dialysis    enalaprilat (Vasotec) 1.25 mg/mL injection 1.25 mg  1.25 mg Intravenous Q6H PRN    labetalol (Trandate) 5 mg/mL injection 20 mg  20 mg Intravenous Q4H PRN    amLODIPine (Norvasc) tab 10 mg  10 mg Oral Daily    aspirin DR tab 81 mg  81 mg Oral Daily    insulin degludec 100 units/mL flextouch 8 Units  8 Units Subcutaneous Nightly    lisinopril (Zestril) tab 10 mg  10 mg Oral Daily    insulin aspart (NovoLOG) 100 Units/mL FlexPen 1-68 Units  1-68 Units Subcutaneous TID CC    glucose (Dex4) 15 GM/59ML oral liquid 15 g  15 g Oral Q15 Min PRN    Or    glucose (Glutose) 40% oral gel 15 g  15 g Oral Q15 Min PRN    Or    glucose-vitamin C (Dex-4) chewable tab 4 tablet  4 tablet Oral Q15 Min PRN    Or    dextrose 50% injection 50 mL  50 mL Intravenous Q15 Min PRN    Or    glucose (Dex4) 15 GM/59ML oral liquid 30 g  30 g Oral Q15 Min PRN    Or    glucose (Glutose) 40% oral gel 30 g  30 g Oral Q15 Min PRN    Or    glucose-vitamin C (Dex-4) chewable tab 8 tablet  8 tablet Oral Q15 Min PRN    ondansetron (Zofran) 4 MG/2ML injection 4 mg  4 mg Intravenous Q6H PRN    pantoprazole (Protonix) 40 mg in sodium chloride 0.9% PF 10 mL IV push  40 mg Intravenous Q12H    acetaminophen (Tylenol Extra Strength) tab 1,000 mg  1,000 mg Oral Q4H PRN    melatonin tab 3 mg  3 mg Oral Nightly PRN    polyethylene glycol (PEG 3350) (Miralax) 17 g oral packet 17 g  17 g Oral Daily PRN    sennosides (Senokot) tab 17.2 mg  17.2  mg Oral Nightly PRN    bisacodyl (Dulcolax) 10 MG rectal suppository 10 mg  10 mg Rectal Daily PRN    benzonatate (Tessalon) cap 200 mg  200 mg Oral TID PRN    acetaminophen (Tylenol) tab 650 mg  650 mg Oral Q4H PRN    Or    HYDROcodone-acetaminophen (Norco) 5-325 MG per tab 1 tablet  1 tablet Oral Q4H PRN    Or    HYDROcodone-acetaminophen (Norco) 5-325 MG per tab 2 tablet  2 tablet Oral Q4H PRN    insulin aspart (NovoLOG) 100 Units/mL FlexPen 1-10 Units  1-10 Units Subcutaneous TID AC and HS    glycerin-hypromellose- (Artifical Tears) 0.2-0.2-1 % ophthalmic solution 1 drop  1 drop Both Eyes QID PRN    sodium chloride (Saline Mist) 0.65 % nasal solution 1 spray  1 spray Each Nare Q3H PRN    hydrALAzine (Apresoline) 20 mg/mL injection 10 mg  10 mg Intravenous Q4H PRN    metoclopramide (Reglan) 5 mg/mL injection 5 mg  5 mg Intravenous Q8H PRN         Impression/Plan:      #1.  ESRD- due to DM.  HD to continue per typical TTHS routine     #2. Acidosis- due to missed dialysis treatments.  Manage with HD     #3.  HTN- BP much better today, cont usual med regimen     #4.  N/V/ hematemesis- CT noted.  GI following    Questions/concerns were discussed with patient and/or family by bedside.          Ciro Perez MD  3/27/2024  1:04 PM

## 2024-03-27 NOTE — PHYSICAL THERAPY NOTE
PT order received, chart reviewed, discussed case with RN who reports pt declines intervention at this time.

## 2024-03-27 NOTE — OPERATIVE REPORT
EGD Operative Report  Patient Name: Richy Goins  YOB: 1960  MRN: [id number]  Procedure: Esophagogastroduodenoscopy (EGD)   Pre-operative Diagnosis & Indication:   Hematemesis   Post-operative Diagnosis:  Gastric Food bezoar - procedure aborted  Attending Endoscopist: Sarmad Gonzalez M.D.  Informed Consent: The planned procedure(s), the explanation of the procedure, its expected benefits, the potential complications and risks and possible alternatives and their benefits and risks were discussed with the patient or the patient's surrogate. The discussion of risks, not limited to but including bleeding, infection, perforation, adverse effects from anesthesia, need for emergency surgery/prolonged hospitalization,  cardiac arrhythmias,  and aspiration were discussed with patient.  Pt and/or surrogate understood the proposed procedure(s), its risks, benefits and alternatives and wish to proceed with procedure(s). All questions answered in full.  After all questions were answered to their satisfaction, a signed, informed, and witnessed consent was obtained.  Physical Exam: Heart: regular rate and rhythm. No rubs, murmurs, or gallops. Lungs: Clear to auscultation bilaterally. Abdomen: Soft, non-tender, non distended. No rebound tenderness, no guarding.   A TIME OUT WAS COMPLETED prior to the procedure to confirm the patient, procedure(s) and complete endoscopy safety procedure.  Sedation: Monitored Anesthesia Care; ASA class per anesthesiology team   Monitoring: Pulsoximetry, pulse, respirations, and blood pressure , vitals were monitored throughout the entire procedure under monitored anesthesia care.   Procedure: The patient was then brought to the endoscopy suite where his/her pulse, pulse oximetry and blood pressure were monitored. The patient was placed in the left lateral decubitus position and deep sedation was administered. Once adequate sedation was achieved, a bite block was placed and a lubricated  tip of an Olympus video upper endoscope was inserted through the oropharynx and gently manipulated through the esophagus into the stomach and the second portion of the duodenum. Upon withdrawal of the endoscope, careful visualization of the mucosa was performed. The endoscope was then withdrawn into the gastric antrum and placed in a retroflexed position.  The endoscope was then righted, and air was suctioned from the stomach.  The endoscope was then withdrawn from the patient, with careful visual inspection of the mucosa. The patient left the procedure room in stable condition for recovery. Findings and endotherapy as listed below  Toleration: Patient tolerated procedure well   Complications: No immediate complications   Technical Difficulty:  The procedure was not technically difficult   Estimated Blood Loss: Minimal, less than 5mL of estimated blood loss.   Findings and Therapeutics:  Stomach:    Large solid food bezoar, and solid food remnants in the stomach. Procedure aborted immediately.   Upon withdrawal of endoscope, given above, esophagus with friability and oozing of blood, although unable to completely visualized.   Recommendations:  Post EGD precautions, watch for bleeding, infection, perforation, adverse drug reactions   Plan for EGD tomorrow  Liquid diet, NPO at MN  PPI BID   Avoid non-aspirin NSAIDs        Sarmad Gonzalez MD  3/27/2024  2:43 PM

## 2024-03-27 NOTE — PROGRESS NOTES
[facility]   part of [facility]     Hospitalist Progress Note     Richy Goins Patient Status:  Inpatient    1960 MRN [id number]   Location [facility] ENDOSCOPY PAIN CENTER Attending Dallas Drew MD   Hosp Day # 1 PCP Woody Dahl MD     Chief Complaint: GIB    Subjective:     Patient had some bleeding o/n    Objective:    Review of Systems:   A comprehensive review of systems was completed; pertinent positive and negatives stated in subjective.    Vital signs:  Temp:  [98.1 °F (36.7 °C)-99.4 °F (37.4 °C)] 98.7 °F (37.1 °C)  Pulse:  [] 93  Resp:  [18-22] 20  BP: (103-209)/() 155/74  SpO2:  [87 %-100 %] 98 %    Physical Exam:    General: No acute distress  Respiratory: No wheezes, no rhonchi  Cardiovascular: S1, S2, regular rate and rhythm  Abdomen: Soft, Non-tender, non-distended, positive bowel sounds  Neuro: No new focal deficits.   Extremities: No edema      Diagnostic Data:    Labs:  Recent Labs   Lab 24  1015 24  0049 24  0707   WBC 12.8*  --  14.2*   HGB 11.2* 10.0* 9.3*  9.3*   MCV 85.6  --  84.9   .0  --  215.0   INR 1.08  --   --        Recent Labs   Lab 24  1015 24  0707   * 175*   * 58*   CREATSERUM 9.00* 5.05*   CA 8.7 8.2*   ALB 3.9 3.3*    142   K 5.5* 4.3   * 108   CO2 13.0* 24.0   ALKPHO 103 86   AST 14* 18   ALT 18 14*   BILT 0.4 0.7   TP 7.6 6.3*       Estimated Creatinine Clearance: 14.1 mL/min (A) (based on SCr of 5.05 mg/dL (H)).    No results for input(s): \"TROP\", \"TROPHS\", \"CK\" in the last 168 hours.    Recent Labs   Lab 24  1015   PTP 14.1   INR 1.08                  Microbiology    No results found for this visit on 24.      Imaging: Reviewed in Epic.    Medications:    [START ON 3/28/2024] epoetin juan josé  10,000 Units Intravenous Once in dialysis    heparin  1.5 mL Intracatheter Once    amLODIPine  10 mg Oral Daily    aspirin  81 mg Oral Daily    insulin degludec  8 Units  Subcutaneous Nightly    lisinopril  10 mg Oral Daily    insulin aspart  1-68 Units Subcutaneous TID CC    pantoprazole  40 mg Intravenous Q12H    insulin aspart  1-10 Units Subcutaneous TID AC and HS       Assessment & Plan:      # Upper GI bleed  # Retained food in dilated esophagus  -Consult GI  -IV PPI twice daily  -N.p.o.  -Transfuse less than 7  -EGD today      #Hemoptysis  -Suspect secondary to GI bleeding  -If GI workup is negative, will consult pulmonology  -CTA chest/abdomen/pelvis noted     #ESRD on HD  #uremia  #Severe electrolyte abnormalities including hyperkalemia due to lack of dialysis  -Has not had dialysis since March 16  -Consult nephrology  -HD yesterday     #Hypertension  #Hyperlipidemia  #PVD  #DM2  -Insulin sliding scale      Dallas Drew MD    Supplementary Documentation:     Quality:  DVT Mechanical Prophylaxis:   SCDs,    DVT Pharmacologic Prophylaxis   Medication    heparin (Porcine) 1000 UNIT/ML injection 1,500 Units         DVT Pharmacologic prophylaxis: Aspirin 81 mg      Code Status: Full Code  Soni: No urinary catheter in place  Soni Duration (in days):   Central line:    DENN[place]:     Discharge is dependent on: course  At this point Mr. Goins is expected to be discharge to: home    The 21st Century Cures Act makes medical notes like these available to patients in the interest of transparency. Please be advised this is a medical document. Medical documents are intended to carry relevant information, facts as evident, and the clinical opinion of the practitioner. The medical note is intended as peer to peer communication and may appear blunt or direct. It is written in medical language and may contain abbreviations or verbiage that are unfamiliar.             **Certification      PHYSICIAN Certification of Need for Inpatient Hospitalization - Initial Certification    Patient will require inpatient services that will reasonably be expected to span two midnight's based on the clinical  documentation in H+P.   Based on patients current state of illness, I anticipate that, after discharge, patient will require TBD.

## 2024-03-27 NOTE — OCCUPATIONAL THERAPY NOTE
OT order received, chart reviewed, discussed case with RN who reports pt declines intervention at this time. OT will continue to follow and re-attempt at later date.

## 2024-03-27 NOTE — ANESTHESIA POSTPROCEDURE EVALUATION
[facility]    Richy Goins Patient Status:  Inpatient   Age/Gender 63 year old male MRN [id number]   Location [facility] ENDOSCOPY PAIN CENTER Attending Dallas Drew MD   Hosp Day # 1 PCP Woody Dahl MD       Anesthesia Post-op Note    ESOPHAGOGASTRODUODENOSCOPY (EGD)    Procedure Summary       Date: 03/27/24 Room / Location:  ENDOSCOPY 02 / [facility] ENDOSCOPY    Anesthesia Start: 1433 Anesthesia Stop: 1444    Procedure: ESOPHAGOGASTRODUODENOSCOPY (EGD) Diagnosis: (FOOD BEZOAR)    Surgeons: Sarmad Gonzalez MD Anesthesiologist: Jj Grace MD    Anesthesia Type: MAC ASA Status: 3            Anesthesia Type: MAC    Vitals Value Taken Time   /60 03/27/24 1444   Temp 98.3 °F (36.8 °C) 03/27/24 1444   Pulse 87 03/27/24 1445   Resp 15 03/27/24 1445   SpO2 99 % 03/27/24 1444   Vitals shown include unfiled device data.    Patient Location: Endoscopy    Anesthesia Type: MAC    Airway Patency: patent    Postop Pain Control: adequate    Mental Status: mildly sedated but able to meaningfully participate in the post-anesthesia evaluation    Nausea/Vomiting: none    Cardiopulmonary/Hydration status: stable euvolemic    Complications: no apparent anesthesia related complications    Postop vital signs: stable    Dental Exam: Unchanged from Preop

## 2024-03-27 NOTE — CM/SW NOTE
SW noted that patient is an HD patient. SW called [facility] to confirm patient's current with them. Patient's current schedule is:      DIALYSIS SCHEDULE:  [address] [address] [phone]     Tuesday- Thursday-Saturday 540 AM     SW will send updated flow sheets to HD clinic once patient is medically stable for DC.         SW will continue to follow for plan of care changes and remain available for any additional DC needs or concerns.     Adela Merrill MSW, LSW  Discharge Planner   [phone]

## 2024-03-27 NOTE — PLAN OF CARE
Assumed care @ 0730. Patient denies nausea, denies abdominal pain. Patient went for EGD this afternoon, procedure aborted due to large food bezoar. Patient's vital signs stable 1740- Patient c/o nausea, declined antiemetic. Full liquid diet offered, patient declined.

## 2024-03-27 NOTE — PROGRESS NOTES
NURSING ADMISSION NOTE      Patient admitted via Cart  Oriented to room.  Safety precautions initiated.  Bed in low position.  Call light in reach.  Pt admitted from ED , aaox4, denies pain, had brown/black emesis, zofran given as needed, BP on the high side, scheduled BP meds given, enalapril as needed given, monitored.

## 2024-03-28 ENCOUNTER — ANESTHESIA EVENT (OUTPATIENT)
Dept: ENDOSCOPY | Facility: HOSPITAL | Age: 64
End: 2024-03-28
Payer: COMMERCIAL

## 2024-03-28 ENCOUNTER — ANESTHESIA (OUTPATIENT)
Dept: ENDOSCOPY | Facility: HOSPITAL | Age: 64
End: 2024-03-28
Payer: COMMERCIAL

## 2024-03-28 LAB
ANION GAP SERPL CALC-SCNC: 14 MMOL/L (ref 0–18)
BASOPHILS # BLD AUTO: 0.01 X10(3) UL (ref 0–0.2)
BASOPHILS NFR BLD AUTO: 0.1 %
BUN BLD-MCNC: 89 MG/DL (ref 9–23)
CALCIUM BLD-MCNC: 6.9 MG/DL (ref 8.5–10.1)
CHLORIDE SERPL-SCNC: 109 MMOL/L (ref 98–112)
CO2 SERPL-SCNC: 20 MMOL/L (ref 21–32)
CREAT BLD-MCNC: 7.64 MG/DL
EGFRCR SERPLBLD CKD-EPI 2021: 7 ML/MIN/1.73M2 (ref 60–?)
EOSINOPHIL # BLD AUTO: 0.01 X10(3) UL (ref 0–0.7)
EOSINOPHIL NFR BLD AUTO: 0.1 %
ERYTHROCYTE [DISTWIDTH] IN BLOOD BY AUTOMATED COUNT: 13.9 %
GLUCOSE BLD-MCNC: 103 MG/DL (ref 70–99)
GLUCOSE BLD-MCNC: 103 MG/DL (ref 70–99)
GLUCOSE BLD-MCNC: 110 MG/DL (ref 70–99)
GLUCOSE BLD-MCNC: 87 MG/DL (ref 70–99)
GLUCOSE BLD-MCNC: 88 MG/DL (ref 70–99)
HCT VFR BLD AUTO: 28.8 %
HGB BLD-MCNC: 9.2 G/DL
HGB BLD-MCNC: 9.4 G/DL
HGB BLD-MCNC: 9.4 G/DL
IMM GRANULOCYTES # BLD AUTO: 0.04 X10(3) UL (ref 0–1)
IMM GRANULOCYTES NFR BLD: 0.4 %
LYMPHOCYTES # BLD AUTO: 1.35 X10(3) UL (ref 1–4)
LYMPHOCYTES NFR BLD AUTO: 11.9 %
MAGNESIUM SERPL-MCNC: 2.1 MG/DL (ref 1.6–2.6)
MCH RBC QN AUTO: 28.1 PG (ref 26–34)
MCHC RBC AUTO-ENTMCNC: 32.6 G/DL (ref 31–37)
MCV RBC AUTO: 86.2 FL
MONOCYTES # BLD AUTO: 0.77 X10(3) UL (ref 0.1–1)
MONOCYTES NFR BLD AUTO: 6.8 %
NEUTROPHILS # BLD AUTO: 9.12 X10 (3) UL (ref 1.5–7.7)
NEUTROPHILS # BLD AUTO: 9.12 X10(3) UL (ref 1.5–7.7)
NEUTROPHILS NFR BLD AUTO: 80.7 %
OSMOLALITY SERPL CALC.SUM OF ELEC: 324 MOSM/KG (ref 275–295)
PHOSPHATE SERPL-MCNC: 9.6 MG/DL (ref 2.5–4.9)
PLATELET # BLD AUTO: 216 10(3)UL (ref 150–450)
POTASSIUM SERPL-SCNC: 4.5 MMOL/L (ref 3.5–5.1)
RBC # BLD AUTO: 3.34 X10(6)UL
SODIUM SERPL-SCNC: 143 MMOL/L (ref 136–145)
WBC # BLD AUTO: 11.3 X10(3) UL (ref 4–11)

## 2024-03-28 PROCEDURE — 99232 SBSQ HOSP IP/OBS MODERATE 35: CPT | Performed by: HOSPITALIST

## 2024-03-28 PROCEDURE — 0DB68ZX EXCISION OF STOMACH, VIA NATURAL OR ARTIFICIAL OPENING ENDOSCOPIC, DIAGNOSTIC: ICD-10-PCS | Performed by: STUDENT IN AN ORGANIZED HEALTH CARE EDUCATION/TRAINING PROGRAM

## 2024-03-28 PROCEDURE — 0DB58ZX EXCISION OF ESOPHAGUS, VIA NATURAL OR ARTIFICIAL OPENING ENDOSCOPIC, DIAGNOSTIC: ICD-10-PCS | Performed by: STUDENT IN AN ORGANIZED HEALTH CARE EDUCATION/TRAINING PROGRAM

## 2024-03-28 PROCEDURE — 99232 SBSQ HOSP IP/OBS MODERATE 35: CPT | Performed by: INTERNAL MEDICINE

## 2024-03-28 RX ORDER — PANTOPRAZOLE SODIUM 40 MG/1
40 TABLET, DELAYED RELEASE ORAL
Qty: 60 TABLET | Refills: 0 | Status: SHIPPED | OUTPATIENT
Start: 2024-03-28 | End: 2024-04-27

## 2024-03-28 RX ORDER — SODIUM CHLORIDE, SODIUM LACTATE, POTASSIUM CHLORIDE, CALCIUM CHLORIDE 600; 310; 30; 20 MG/100ML; MG/100ML; MG/100ML; MG/100ML
INJECTION, SOLUTION INTRAVENOUS CONTINUOUS PRN
Status: DISCONTINUED | OUTPATIENT
Start: 2024-03-28 | End: 2024-03-28 | Stop reason: SURG

## 2024-03-28 RX ORDER — LIDOCAINE HYDROCHLORIDE 10 MG/ML
INJECTION, SOLUTION EPIDURAL; INFILTRATION; INTRACAUDAL; PERINEURAL AS NEEDED
Status: DISCONTINUED | OUTPATIENT
Start: 2024-03-28 | End: 2024-03-28 | Stop reason: SURG

## 2024-03-28 RX ADMIN — SODIUM CHLORIDE, SODIUM LACTATE, POTASSIUM CHLORIDE, CALCIUM CHLORIDE: 600; 310; 30; 20 INJECTION, SOLUTION INTRAVENOUS at 08:56:00

## 2024-03-28 RX ADMIN — LIDOCAINE HYDROCHLORIDE 25 MG: 10 INJECTION, SOLUTION EPIDURAL; INFILTRATION; INTRACAUDAL; PERINEURAL at 08:58:00

## 2024-03-28 NOTE — DISCHARGE SUMMARY
[facility]     Richy Goins Patient Status:  Inpatient    1960 MRN [id number]   Location [facility] 4NW-A Attending Dallas Drew MD   Hosp Day # 2 PCP Woody Dahl MD     Date of Admission: 3/26/2024  Date of Discharge:   3/29/24    Discharge Disposition: Home or Self Care    Discharge Diagnosis:  # Upper GI bleed 2/2 esophagitis  # Retained food in dilated esophagus  -Consult GI  -PPI twice daily x 8 weeks then daily. F/u GI. Repeat EGD in 8 weeks  -Transfuse less than 7  -EGD w/ esophagitis - oozing. Also some mild gastritis     #Hemoptysis  -Suspect secondary to GI bleeding  -If GI workup is negative, will consult pulmonology  -CTA chest/abdomen/pelvis noted     #ESRD on HD  #uremia  #Severe electrolyte abnormalities including hyperkalemia due to lack of dialysis  -Has not had dialysis since   -Consult nephrology  -HD yesterday     #Hypertension  #Hyperlipidemia  #PVD  #DM2  -Insulin sliding scale    History of Present Illness: Richy Goins is a 63 year old male with past medical history ESRD on HD, hypertension, hyperlipidemia, PVD, DM2 who presents with coffee-ground emesis.  Patient has also been coughing up blood.  Has had generalized weakness and chills for about 1 week.  Has also been vomiting up blood.  He thinks the vomiting blood came out first and eventually became coffee-ground.  Does have a history of bleeding in his stomach multiple times.  Since he has not been feeling well he has not gone to dialysis since .     Brief Synopsis: pt w/ GI bleed. Felt weak so did not get HD for over a week. Went for EGD and found oozing esophagitis and mild gastritis. Start PPI BID x8 weeks, then daily. Repeat EGD 8 weeks. Hgb stable. Got HD here. Ok to DC home.     Lace+ Score: 81  59-90 High Risk  29-58 Medium Risk  0-28   Low Risk       TCM Follow-Up Recommendation:  LACE > 58: High Risk of readmission after discharge from the  [facility].      Procedures during hospitalization:   EGD    Incidental or significant findings and recommendations (brief descriptions):  none    Lab/Test results pending at Discharge:   none    Consultants:  Neph, GI    Discharge Medication List:     Discharge Medications        START taking these medications        Instructions Prescription details   pantoprazole 40 MG Tbec  Commonly known as: Protonix      Take 1 tablet (40 mg total) by mouth 2 (two) times daily before meals.   Stop taking on: April 27, 2024  Quantity: 60 tablet  Refills: 0            CONTINUE taking these medications        Instructions Prescription details   amLODIPine 10 MG Tabs  Commonly known as: Norvasc      Take 1 tablet (10 mg total) by mouth daily.   Quantity: 30 tablet  Refills: 1     aspirin 81 MG Tbec      Take 1 tablet (81 mg total) by mouth daily.   Quantity: 360 tablet  Refills: 0     BD Pen Needle Lara 2nd Gen 32G X 4 MM Misc  Generic drug: Insulin Pen Needle      Use to inject insulin up to 5 times daily   Quantity: 450 each  Refills: 1     Levemir FlexPen 100 UNIT/ML Sopn  Generic drug: insulin detemir      Inject 17 Units into the skin nightly. 3 month supply   Quantity: 15 mL  Refills: 1     lisinopril 10 MG Tabs  Commonly known as: Zestril      Take 1 tablet (10 mg total) by mouth daily.   Refills: 0               Where to Get Your Medications        These medications were sent to [id number] - [place], [place] - [address] [phone], [phone]  [address] [address]      Phone: [phone]   pantoprazole 40 MG Tbec         [place]    Follow-up appointment:   Woody Dahl MD  [address] [place] [address] [address]  [facility] [address] [phone]    Schedule an appointment as soon as possible for a visit      Sarmad Gonzalez MD  [address]  [facility] [address] [phone]    Schedule an appointment as soon as possible for a visit in 3 week(s)      Appointments for Next 30 Days 3/28/2024 - 4/27/2024       None            Vital signs:  Temp:  [97.9 °F (36.6 °C)-98.9 °F (37.2 °C)] 98.2 °F (36.8 °C)  Pulse:  [71-95] 95  Resp:  [11-27] 20  BP: (106-165)/(60-91) 156/79  SpO2:  [95 %-99 %] 98 %    Physical Exam:    General: No acute distress   Lungs: clear to auscultation  Cardiovascular: S1, S2  Abdomen: Soft      -----------------------------------------------------------------------------------------------  PATIENT DISCHARGE INSTRUCTIONS: See electronic chart    Dallas Drew MD    Total time spent on discharge plannin minutes     The  Century Cures Act makes medical notes like these available to patients in the interest of transparency. Please be advised this is a medical document. Medical documents are intended to carry relevant information, facts as evident, and the clinical opinion of the practitioner. The medical note is intended as peer to peer communication and may appear blunt or direct. It is written in medical language and may contain abbreviations or verbiage that are unfamiliar.

## 2024-03-28 NOTE — ANESTHESIA POSTPROCEDURE EVALUATION
[facility]    Richy WALDEN Buster Patient Status:  Inpatient   Age/Gender 63 year old male MRN [id number]   Location [facility] ENDOSCOPY PAIN CENTER Attending Dallas Drew MD   Hosp Day # 2 PCP Woody Dahl MD       Anesthesia Post-op Note    ESOPHAGOGASTRODUODENOSCOPY (EGD) with biopsies    Procedure Summary       Date: 03/28/24 Room / Location:  ENDOSCOPY 02 /  ENDOSCOPY    Anesthesia Start: 0856 Anesthesia Stop:     Procedure: ESOPHAGOGASTRODUODENOSCOPY (EGD) with biopsies Diagnosis: (LA grade D esophagitis)    Surgeons: Sarmad Gonzalez MD Anesthesiologist: Nic George MD    Anesthesia Type: MAC ASA Status: 3            Anesthesia Type: MAC    Vitals Value Taken Time   /68 03/28/24 0916   Temp 98.0 03/28/24 0916   Pulse 70 03/28/24 0916   Resp 10 03/28/24 0916   SpO2 97 % 03/28/24 0915   Vitals shown include unfiled device data.    Patient Location: Endoscopy    Anesthesia Type: MAC    Airway Patency: patent    Postop Pain Control: adequate    Mental Status: mildly sedated but able to meaningfully participate in the post-anesthesia evaluation    Nausea/Vomiting: none    Cardiopulmonary/Hydration status: stable euvolemic    Complications: no apparent anesthesia related complications    Postop vital signs: stable    Dental Exam: Unchanged from Preop    Patient to be discharged from PACU when criteria met.

## 2024-03-28 NOTE — ANESTHESIA PREPROCEDURE EVALUATION
PRE-OP EVALUATION    Patient Name: Richy Goins    Admit Diagnosis: Uremia [N19]  Hemoptysis [R04.2]  Acute gastritis with hemorrhage, unspecified gastritis type [K29.01]  Hematemesis, unspecified whether nausea present [K92.0]    Pre-op Diagnosis: INPT    ESOPHAGOGASTRODUODENOSCOPY (EGD)    Anesthesia Procedure: ESOPHAGOGASTRODUODENOSCOPY (EGD)    Surgeon(s) and Role:     * Sarmad Gonzalez MD - Primary    Pre-op vitals reviewed.  Temp: 97.9 °F (36.6 °C)  Pulse: 80  Resp: 16  BP: 163/79  SpO2: 97 %  Body mass index is 25.38 kg/m².    Current medications reviewed.  Hospital Medications:   epoetin juan josé (Epogen, Procrit) 12941 UNIT/ML injection 10,000 Units  10,000 Units Intravenous Once in dialysis    sodium chloride 0.9 % IV bolus 100 mL  100 mL Intravenous Q30 Min PRN    And    albumin human (Albumin) 25% injection 25 g  25 g Intravenous PRN Dialysis    heparin (Porcine) 1000 UNIT/ML injection 1,500 Units  1.5 mL Intracatheter Once    sodium chloride 0.45% infusion   Intravenous Continuous    [COMPLETED] sodium chloride 0.9 % IV bolus 500 mL  500 mL Intravenous Once    Followed by    sodium chloride 0.9% infusion   Intravenous Continuous    [COMPLETED] ondansetron (Zofran) 4 MG/2ML injection 4 mg  4 mg Intravenous Once    [COMPLETED] sodium bicarbonate injection 50 mEq  50 mEq Intravenous Once    [COMPLETED] insulin regular human (Novolin R, Humulin R) 100 UNIT/ML injection 10 Units  10 Units Intravenous Once    [COMPLETED] dextrose 50% injection 50 mL  50 mL Intravenous Once    [COMPLETED] pantoprazole (Protonix) 40 mg in sodium chloride 0.9% PF 10 mL IV push  40 mg Intravenous Once    [COMPLETED] iohexol (Omnipaque) 350 MG/ML injection via power injector 100 mL  100 mL Intravenous ONCE PRN    [] sodium chloride 0.9% infusion   Intravenous Continuous    [COMPLETED] sodium chloride 0.9% infusion   Intravenous Once    sodium chloride 0.9 % IV bolus 100 mL  100 mL Intravenous Q30 Min PRN    And    albumin  human (Albumin) 25% injection 25 g  25 g Intravenous PRN Dialysis    [COMPLETED] heparin (Porcine) 1000 UNIT/ML injection 1,500 Units  1.5 mL Intracatheter Once    enalaprilat (Vasotec) 1.25 mg/mL injection 1.25 mg  1.25 mg Intravenous Q6H PRN    labetalol (Trandate) 5 mg/mL injection 20 mg  20 mg Intravenous Q4H PRN    amLODIPine (Norvasc) tab 10 mg  10 mg Oral Daily    aspirin DR tab 81 mg  81 mg Oral Daily    insulin degludec 100 units/mL flextouch 8 Units  8 Units Subcutaneous Nightly    lisinopril (Zestril) tab 10 mg  10 mg Oral Daily    insulin aspart (NovoLOG) 100 Units/mL FlexPen 1-68 Units  1-68 Units Subcutaneous TID CC    glucose (Dex4) 15 GM/59ML oral liquid 15 g  15 g Oral Q15 Min PRN    Or    glucose (Glutose) 40% oral gel 15 g  15 g Oral Q15 Min PRN    Or    glucose-vitamin C (Dex-4) chewable tab 4 tablet  4 tablet Oral Q15 Min PRN    Or    dextrose 50% injection 50 mL  50 mL Intravenous Q15 Min PRN    Or    glucose (Dex4) 15 GM/59ML oral liquid 30 g  30 g Oral Q15 Min PRN    Or    glucose (Glutose) 40% oral gel 30 g  30 g Oral Q15 Min PRN    Or    glucose-vitamin C (Dex-4) chewable tab 8 tablet  8 tablet Oral Q15 Min PRN    ondansetron (Zofran) 4 MG/2ML injection 4 mg  4 mg Intravenous Q6H PRN    pantoprazole (Protonix) 40 mg in sodium chloride 0.9% PF 10 mL IV push  40 mg Intravenous Q12H    acetaminophen (Tylenol Extra Strength) tab 1,000 mg  1,000 mg Oral Q4H PRN    melatonin tab 3 mg  3 mg Oral Nightly PRN    polyethylene glycol (PEG 3350) (Miralax) 17 g oral packet 17 g  17 g Oral Daily PRN    sennosides (Senokot) tab 17.2 mg  17.2 mg Oral Nightly PRN    bisacodyl (Dulcolax) 10 MG rectal suppository 10 mg  10 mg Rectal Daily PRN    benzonatate (Tessalon) cap 200 mg  200 mg Oral TID PRN    acetaminophen (Tylenol) tab 650 mg  650 mg Oral Q4H PRN    Or    HYDROcodone-acetaminophen (Norco) 5-325 MG per tab 1 tablet  1 tablet Oral Q4H PRN    Or    HYDROcodone-acetaminophen (Norco) 5-325 MG per tab 2  tablet  2 tablet Oral Q4H PRN    insulin aspart (NovoLOG) 100 Units/mL FlexPen 1-10 Units  1-10 Units Subcutaneous TID AC and HS    glycerin-hypromellose- (Artifical Tears) 0.2-0.2-1 % ophthalmic solution 1 drop  1 drop Both Eyes QID PRN    sodium chloride (Saline Mist) 0.65 % nasal solution 1 spray  1 spray Each Nare Q3H PRN    [COMPLETED] ondansetron (Zofran) 4 MG/2ML injection        hydrALAzine (Apresoline) 20 mg/mL injection 10 mg  10 mg Intravenous Q4H PRN    metoclopramide (Reglan) 5 mg/mL injection 5 mg  5 mg Intravenous Q8H PRN       Outpatient Medications:     Medications Prior to Admission   Medication Sig Dispense Refill Last Dose    lisinopril 10 MG Oral Tab Take 1 tablet (10 mg total) by mouth daily.   3/25/2024 at 0900    Insulin Pen Needle (BD PEN NEEDLE AMBER 2ND GEN) 32G X 4 MM Does not apply Misc Use to inject insulin up to 5 times daily 450 each 1 Past Week    insulin detemir (LEVEMIR FLEXPEN) 100 UNIT/ML Subcutaneous Solution Pen-injector Inject 17 Units into the skin nightly. 3 month supply 15 mL 1 3/25/2024 at 2100    amLODIPine 10 MG Oral Tab Take 1 tablet (10 mg total) by mouth daily. 30 tablet 1 3/25/2024 at 0900    aspirin 81 MG Oral Tab EC Take 1 tablet (81 mg total) by mouth daily. 360 tablet 0 3/25/2024 at 0900       Allergies: Patient has no known allergies.      Anesthesia Evaluation    Patient summary reviewed.    Anesthetic Complications  (-) history of anesthetic complications         GI/Hepatic/Renal      (+) GERD       (+) chronic renal disease and ESRD                   Cardiovascular        Exercise tolerance: good     MET: >4      (+) hypertension   (+) hyperlipidemia                                  Endo/Other      (+) diabetes and poorly controlled, type 2, using insulin                         Pulmonary    Negative pulmonary ROS.                       Neuro/Psych    Negative neuro/psych ROS.                                  Past Surgical History:   Procedure  Laterality Date    AMPUTATION TOE,I-P JT Right     COLONOSCOPY N/A 02/20/2023    Procedure: COLONOSCOPY;  Surgeon: Sarmad Gonzalez MD;  Location:  ENDOSCOPY    COLONOSCOPY      UPPER GI ENDOSCOPY,EXAM       Social History     Socioeconomic History    Marital status:    Tobacco Use    Smoking status: Never    Smokeless tobacco: Never    Tobacco comments:     none   Vaping Use    Vaping Use: Never used   Substance and Sexual Activity    Alcohol use: Not Currently     Comment: none    Drug use: Never    Sexual activity: Yes     Partners: Female     History   Drug Use Unknown     Available pre-op labs reviewed.  Lab Results   Component Value Date    WBC 11.3 (H) 03/28/2024    RBC 3.34 (L) 03/28/2024    HGB 9.4 (L) 03/28/2024    HGB 9.4 (L) 03/28/2024    HCT 28.8 (L) 03/28/2024    MCV 86.2 03/28/2024    MCH 28.1 03/28/2024    MCHC 32.6 03/28/2024    RDW 13.9 03/28/2024    .0 03/28/2024     Lab Results   Component Value Date     03/28/2024    K 4.5 03/28/2024     03/28/2024    CO2 20.0 (L) 03/28/2024    BUN 89 (H) 03/28/2024    CREATSERUM 7.64 (H) 03/28/2024     (H) 03/28/2024    CA 6.9 (L) 03/28/2024     Lab Results   Component Value Date    INR 1.08 03/26/2024         Airway      Mallampati: II  Mouth opening: >3 FB  TM distance: > 6 cm  Neck ROM: full Cardiovascular    Cardiovascular exam normal.  Rhythm: regular  Rate: normal     Dental    Dentition appears grossly intact         Pulmonary    Pulmonary exam normal.  Breath sounds clear to auscultation bilaterally.               Other findings              ASA: 3   Plan: MAC  NPO status verified and patient meets guidelines.  Patient has not taken beta blockers in last 24 hours.  Post-procedure pain management plan discussed with surgeon and patient.      Plan/risks discussed with: patient                Present on Admission:   Acidosis   ESRD on hemodialysis (HCC)   Essential hypertension

## 2024-03-28 NOTE — PLAN OF CARE
Assumed care of pt at 0700.  Alert/oriented x 4.  Nausea this morning, dry heaves, no emesis noted.  Antiemetic offered, pt declined.  Hgb 9.4 today.  Lungs clear.  NPO for EGD this morning.  Up with assist x 1 and use of walker.  Right BKA noted.  Updated pt re: plan of care, verbalizes understanding.  Needs addressed.  Safety measures in place.  Transport here, pt taken down to GI lab.      1010:  Returned fr Gi lab.  Awake and alert.  Ok for full liquid diet per Dr. Gonzalez.  Mild nausea, declined antiemetic offered.     Problem: GASTROINTESTINAL - ADULT  Goal: Minimal or absence of nausea and vomiting  Description: INTERVENTIONS:  - Maintain adequate hydration with IV or PO as ordered and tolerated  - Nasogastric tube to low intermittent suction as ordered  - Evaluate effectiveness of ordered antiemetic medications  - Provide nonpharmacologic comfort measures as appropriate  - Advance diet as tolerated, if ordered  - Obtain nutritional consult as needed  - Evaluate fluid balance  Outcome: Not Progressing     Problem: HEMATOLOGIC - ADULT  Goal: Maintains hematologic stability  Description: INTERVENTIONS  - Assess for signs and symptoms of bleeding or hemorrhage  - Monitor labs and vital signs for trends  - Administer supportive blood products/factors, fluids and medications as ordered and appropriate  - Administer supportive blood products/factors as ordered and appropriate  Outcome: Progressing  Goal: Free from bleeding injury  Description: (Example usage: patient with low platelets)  INTERVENTIONS:  - Avoid intramuscular injections, enemas and rectal medication administration  - Ensure safe mobilization of patient  - Hold pressure on venipuncture sites to achieve adequate hemostasis  - Assess for signs and symptoms of internal bleeding  - Monitor lab trends  - Patient is to report abnormal signs of bleeding to staff  - Avoid use of toothpicks and dental floss  - Use electric shaver for shaving  - Use soft bristle  tooth brush  - Limit straining and forceful nose blowing  Outcome: Progressing

## 2024-03-28 NOTE — PROGRESS NOTES
[facility]   part of [facility]     Nephrology Progress Note    Richy Goins Patient Status:  Inpatient    1960 MRN [id number]   Location [facility] 4NW-A Attending Dallas Drew MD   Hosp Day # 2 PCP Woody Dahl MD       SUBJECTIVE:  Pt seen on HD, stable after EGD        Physical Exam:   /79 (BP Location: Left arm)   Pulse 95   Temp 98.2 °F (36.8 °C) (Oral)   Resp 20   Ht 5' 8\" (1.727 m)   Wt 166 lb 14.4 oz (75.7 kg)   SpO2 98%   BMI 25.38 kg/m²   Temp (24hrs), Av.4 °F (36.9 °C), Min:97.9 °F (36.6 °C), Max:98.9 °F (37.2 °C)       Intake/Output Summary (Last 24 hours) at 3/28/2024 1205  Last data filed at 3/28/2024 0925  Gross per 24 hour   Intake 1587 ml   Output --   Net 1587 ml     Last 3 Weights   24 0530 166 lb 14.4 oz (75.7 kg)   24 0530 169 lb 3.2 oz (76.7 kg)   24 0950 173 lb (78.5 kg)   03/15/24 1027 183 lb (83 kg)   24 1000 173 lb 4.5 oz (78.6 kg)   24 1637 173 lb (78.5 kg)   24 1123 175 lb (79.4 kg)   24 1050 167 lb 8 oz (76 kg)   24 0557 175 lb (79.4 kg)   24 0936 156 lb (70.8 kg)     General: Alert and oriented in no apparent distress.  HEENT: No scleral icterus, MMM  Neck: Supple, no SARITA or thyromegaly  Cardiac: Regular rate and rhythm, S1, S2 normal, no murmur or rub  Lungs: Clear without wheezes, rales, rhonchi.    Abdomen: Soft, non-tender. + bowel sounds, no palpable organomegaly  Extremities: Without clubbing, cyanosis or edema. R BKA  Neurologic: Alert and oriented, cranial nerves grossly intact, moving all extremities  Skin: Warm and dry, no rash        Labs:     Recent Labs   Lab 24  1015 24  0049 24  0707 24  1651 24  0109 24  0615   WBC 12.8*  --  14.2*  --   --  11.3*   HGB 11.2* 10.0* 9.3*  9.3* 9.4* 9.2* 9.4*  9.4*   MCV 85.6  --  84.9  --   --  86.2   .0  --  215.0  --   --  216.0   INR 1.08  --   --   --   --   --        Recent Labs   Lab  03/26/24  1015 03/27/24  0707 03/28/24  0615    142 143   K 5.5* 4.3 4.5   * 108 109   CO2 13.0* 24.0 20.0*   * 58* 89*   CREATSERUM 9.00* 5.05* 7.64*   CA 8.7 8.2* 6.9*   MG  --  2.0 2.1   PHOS  --   --  9.6*   * 175* 103*       Recent Labs   Lab 03/26/24  1015 03/27/24  0707   ALT 18 14*   AST 14* 18   ALB 3.9 3.3*       Recent Labs   Lab 03/27/24  1217 03/27/24  1603 03/27/24  2110 03/28/24  0525 03/28/24  1147   PGLU 195* 172* 136* 103* 110*       Meds:           Impression/Plan:      #1.  ESRD- due to DM.  HD to continue per typical TTHS routine     #2..  HTN- BP stable cont usual med regimen     #3. Esophagitis- mgmt per GI    Questions/concerns were discussed with patient and/or family by bedside.        Ciro Perez MD  3/28/2024  12:06 PM

## 2024-03-28 NOTE — PHYSICAL THERAPY NOTE
PT order received, chart reviewed.  Pt set for dc this PM after dialysis.  Will dc from acute PT as no skilled intervention required.  Please re-consult if POC changes and pt remains in house.

## 2024-03-28 NOTE — OPERATIVE REPORT
EGD Operative Report  Patient Name: Richy Goins  YOB: 1960  MRN: [id number]  Procedure: Esophagogastroduodenoscopy (EGD)  with biopsies  Pre-operative Diagnosis & Indication:   Hematemesis   Post-operative Diagnosis:  LA Grade D esophagitis   Mild gastritis   Attending Endoscopist: Sarmad Gonzalez M.D.  Informed Consent: The planned procedure(s), the explanation of the procedure, its expected benefits, the potential complications and risks and possible alternatives and their benefits and risks were discussed with the patient or the patient's surrogate. The discussion of risks, not limited to but including bleeding, infection, perforation, adverse effects from anesthesia, need for emergency surgery/prolonged hospitalization,  cardiac arrhythmias,  and aspiration were discussed with patient.  Pt and/or surrogate understood the proposed procedure(s), its risks, benefits and alternatives and wish to proceed with procedure(s). All questions answered in full.  After all questions were answered to their satisfaction, a signed, informed, and witnessed consent was obtained.  Physical Exam: Heart: regular rate and rhythm. No rubs, murmurs, or gallops. Lungs: Clear to auscultation bilaterally. Abdomen: Soft, non-tender, non distended. No rebound tenderness, no guarding.   A TIME OUT WAS COMPLETED prior to the procedure to confirm the patient, procedure(s) and complete endoscopy safety procedure.  Sedation: Monitored Anesthesia Care; ASA class per anesthesiology team   Monitoring: Pulsoximetry, pulse, respirations, and blood pressure , vitals were monitored throughout the entire procedure under monitored anesthesia care.   Procedure: The patient was then brought to the endoscopy suite where his/her pulse, pulse oximetry and blood pressure were monitored. The patient was placed in the left lateral decubitus position and deep sedation was administered. Once adequate sedation was achieved, a bite block was placed  and a lubricated tip of an Olympus video upper endoscope was inserted through the oropharynx and gently manipulated through the esophagus into the stomach and the second portion of the duodenum. Upon withdrawal of the endoscope, careful visualization of the mucosa was performed. The endoscope was then withdrawn into the gastric antrum and placed in a retroflexed position.  The endoscope was then righted, and air was suctioned from the stomach.  The endoscope was then withdrawn from the patient, with careful visual inspection of the mucosa. The patient left the procedure room in stable condition for recovery. Findings and endotherapy as listed below  Toleration: Patient tolerated procedure well   Complications: No immediate complications   Technical Difficulty:  The procedure was not technically difficult   Estimated Blood Loss: Minimal, less than 5mL of estimated blood loss.   Findings and Therapeutics:  Esophagus:   LA Grade D esophagitis, in the Mid to Distal esophagus, with friability and oozing of blood. No visible vessel noted. Biopsies with cold forceps for histology.   There were no strictures or stenosis. GEJ junction traversed with endoscope without resistance.  The Z-line was irregular, appreciated at 36 cm from the incisors.    Stomach:    Mild gastric erythema in the stomach. No ulcers. Endoscope was placed in a retroflexion view in the stomach. There was  no evidence of a hiatal hernia. Biopsies with cold forceps were obtained.  Duodenum:   The entire examined duodenum was normal.    Recommendations:  Post EGD precautions, watch for bleeding, infection, perforation, adverse drug reactions   Follow-up biopsies  Pantoprazole 40 mg twice daily x 8 weeks, then once daily.   Avoid non-aspirin NSAIDs  Repeat EGD in 8 weeks.  OK for full liquid diet; anti-emetics per primary team    GI will signoff, please page with questions.     Sarmad Gonzalez MD  3/28/2024  9:05 AM

## 2024-03-28 NOTE — PLAN OF CARE
Pt a/o x4. VSS on RA. No c/o pain. Meds per MAR. Pt reports feeling dehydrated & poor PO fluid intake, requesting IVF. MD updated, new order carried out. R BKA + prosthesis. Plan for EGD 3/28.    Fall risk protocol followed, call light within reach.     /79 this morning, pt declining prn BP med.

## 2024-03-28 NOTE — PAYOR COMM NOTE
--------------  ADMISSION REVIEW     Payor: JD GARCIA  Subscriber #:  [id number]  Authorization Number: [id number]    Admit date: 3/26/24  Admit time:  4:08 PM       REVIEW DOCUMENTATION:      Patient Seen in: [facility]      History     Chief Complaint   Patient presents with    Hemoptysis     Stated Complaint: couging up blood starting last night    Subjective:   HPI    53-year-old male with history of chronic renal failure, on dialysis, diabetes, peripheral vascular disease, high cholesterol, hypertension, indigestion, presents to the emergency department reporting that he has been coughing up blood.  States that he has had generalized weakness and chills with no fever for about 1 week.  Since last night he reports coughing up blood.  Patient asked if there was any chance he might of vomited the blood he states that he has been doing both.  Patient thinks that he first coughed up blood his wife thinks that he first vomited blood.  Initially they stated that he has not had this type of bleeding before.  After CT imaging ordered patient's wife is now stating that he has had bleeding in his stomach multiple times and \"it happens all the time\".  Patient asked if this is different he states that he has also coughed up blood this time around which is different.  He is supposed to receive dialysis Tuesday/Thursday /Saturday.  Last dialysis was done on March 16.    Advised to remain NPO.  Refused and insisted on consuming ice chips.  Risks of aspiration, delay of surgical procedure and other complication discussed.  Patient states that he will take ice chips will not drink water or eat other things.    Social Determinants of Health     Financial Resource Strain: Low Risk  (10/16/2023)    Financial Resource Strain     Difficulty of Paying Living Expenses: Not very hard     Med Affordability: No   Food Insecurity: No Food Insecurity (1/30/2024)    Food Insecurity     Food Insecurity: Never true    Transportation Needs: No Transportation Needs (1/30/2024)    Transportation Needs     Lack of Transportation: No   Housing Stability: Low Risk  (1/30/2024)    Housing Stability     Housing Instability: No       Review of Systems    Positive for stated complaint: couging up blood starting last night  Physical Exam     ED Triage Vitals [03/26/24 0950]   BP (!) 193/110   Pulse 111   Resp 20   Temp 99.3 °F (37.4 °C)   Temp src Temporal   SpO2 98 %   O2 Device None (Room air)       Current:BP (!) 207/103   Pulse 111   Temp 99.3 °F (37.4 °C) (Temporal)   Resp 12   Ht 172.7 cm (5' 8\")   Wt 78.5 kg   SpO2 99%   BMI 26.30 kg/m²         Physical Exam    Constitutional: No apparent distress, patient intermittently coughing.  Emesis seen at bedside with residue of coffee-ground emesis noted.  Mouth/Throat: Tongue appears black, dry mucous membranes.  Abdomen: Soft.  Marked tenderness noted in the epigastric region, mild tenderness noted in all quadrants.      ED Course/ My interpretations:     Labs Reviewed   COMP METABOLIC PANEL (14) - Abnormal; Notable for the following components:       Result Value    Glucose 290 (*)     Potassium 5.5 (*)     Chloride 120 (*)     CO2 13.0 (*)      (*)     Creatinine 9.00 (*)     Calculated Osmolality 351 (*)     eGFR-Cr 6 (*)     AST 14 (*)     All other components within normal limits   POCT GLUCOSE - Abnormal; Notable for the following components:    POC Glucose 276 (*)     All other components within normal limits   POCT GLUCOSE - Abnormal; Notable for the following components:    POC Glucose 225 (*)     All other components within normal limits   CBC W/ DIFFERENTIAL - Abnormal; Notable for the following components:    WBC 12.8 (*)     HGB 11.2 (*)     HCT 33.9 (*)     Neutrophil Absolute Prelim 11.80 (*)     Neutrophil Absolute 11.80 (*)     Lymphocyte Absolute 0.74 (*)     All other components within normal limits   PROTHROMBIN TIME (PT) - Normal   PTT, ACTIVATED -  Normal   SARS-COV-2/FLU A AND B/RSV BY PCR (GENEXPERT) - Normal   EKG    Rate, intervals and axes as noted on EKG Report.  Rate: 114  Rhythm: Sinus Rhythm  Reading: [place], right bundle branch block, no ST elevations.  No previous EKG available for comparison at this time.       ER patient vomited approximately 20 mL of black liquid.  All available radiology reports for this visit reviewed.      Medications given:  Orders Placed This Encounter    Comp Metabolic Panel (14)    CBC With Differential With Platelet    Prothrombin Time (PT)    PTT, Activated    Surgical Case Request Once    FOLLOWED BY Linked Order Group     sodium chloride 0.9 % IV bolus 500 mL     sodium chloride 0.9% infusion    ondansetron (Zofran) 4 MG/2ML injection 4 mg    sodium bicarbonate injection 50 mEq    insulin regular human (Novolin R, Humulin R) 100 UNIT/ML injection 10 Units    dextrose 50% injection 50 mL    pantoprazole (Protonix) 40 mg in sodium chloride 0.9% PF 10 mL IV push    iohexol (Omnipaque) 350 MG/ML injection via power injector 100 mL    sodium chloride 0.9% infusion        [facility]     14:10 is discussed in detail with GI agreed with PPI and will evaluate.  14:15 evaluated by nephrology in the ED, planning for dialysis today.      MDM      Partial diagnosis was considered in setting of both hemoptysis and hematemesis.  It is possible the patient had been vomiting bloody contents and aspirated some and is now coughing up small amounts of though it is also possible that hemoptysis is the primary source and the patient has swallowed blood and vomiting has resulted from that.  Admission disposition: 3/26/2024  2:13 PM         Initially patient declined ever having had bleeding issues from his stomach or from his lungs.  Later patient's family stated that he has had prior bleeding from his stomach due to gastritis.    Patient has not had dialysis since March 16 therefore evaluation with EKG and chemistry was  done as soon as possible.  No sign of concerning life-threatening cardiac arrhythmia was identified.  Patient's potassium of 5.5 addressed with IV medication.    CTA of the chest ordered to rule out PE or pulmonary mass, CT of abdomen pelvis also obtained given abdominal tenderness and hematemesis described by the patient.    Findings are concerning as imaging demonstrated distended esophagus with retained fluid and distal esophageal wall thickening possibly related to esophagitis and/or tumor.    Nephrology, GI and the hospitalist were all consulted.    Patient admitted for further care.    15:05 hypertensive at this time.      Disposition and Plan     Clinical Impression:  1. Hemoptysis    2. Hematemesis, unspecified whether nausea present    3. Acute gastritis with hemorrhage, unspecified gastritis type    4. Uremia         Disposition:  Admit  3/26/2024  2:13 pm        [facility]     Richy Goins Patient Status:  Emergency    1960 MRN [id number]   Location [facility] EMERGENCY DEPARTMENT Attending Laurel Loaiza MD   Hosp Day # 0 PCP Woody Dahl MD     Chief Complaint: Coffee-ground emesis    Subjective:    History of Present Illness:     Richy Goins is a 63 year old male with past medical history ESRD on HD, hypertension, hyperlipidemia, PVD, DM2 who presents with coffee-ground emesis.  Patient has also been coughing up blood.  Has had generalized weakness and chills for about 1 week.  Has also been vomiting up blood.  He thinks the vomiting blood came out first and eventually became coffee-ground.  Does have a history of bleeding in his stomach multiple times.  Since he has not been feeling well he has not gone to dialysis since .    History/Other:    Past Medical History:  Past Medical History:   Diagnosis Date    Belching     Dialysis patient (HCC)     Diarrhea, unspecified     Essential hypertension     Fatigue     Flatulence/gas pain/belching      High blood pressure     Hyperlipidemia     Indigestion     Irregular bowel habits     Night sweats     Osteoporosis     Peripheral vascular disease (HCC)     Pure hypercholesterolemia 3/15/2024    Type II or unspecified type diabetes mellitus without mention of complication, not stated as uncontrolled     Visual impairment     reading glasses    Vomiting      Past Surgical History:   Past Surgical History:   Procedure Laterality Date    AMPUTATION TOE,I-P JT Right     COLONOSCOPY N/A 2/20/2023    Procedure: COLONOSCOPY;  Surgeon: Sarmad Gonzalez MD;  Location:  ENDOSCOPY      Family History:   Family History   Problem Relation Age of Onset    Heart Attack Father     Heart Disorder Father 57    Diabetes Maternal Grandfather     Diabetes Maternal Aunt      Social History:    reports that he has never smoked. He has never used smokeless tobacco. He reports that he does not currently use alcohol. He reports that he does not use drugs.     Allergies: No Known Allergies    Current Outpatient Medications on File Prior to Encounter   Medication Sig Dispense Refill    lisinopril 10 MG Oral Tab Take 1 tablet (10 mg total) by mouth daily.      Lanthanum Carbonate 1000 MG Oral Chew Tab  (Patient not taking: Reported on 3/15/2024)      Insulin Pen Needle (BD PEN NEEDLE AMBER 2ND GEN) 32G X 4 MM Does not apply Misc Use to inject insulin up to 5 times daily 450 each 1    insulin detemir (LEVEMIR FLEXPEN) 100 UNIT/ML Subcutaneous Solution Pen-injector Inject 17 Units into the skin nightly. 3 month supply 15 mL 1    amLODIPine 10 MG Oral Tab Take 1 tablet (10 mg total) by mouth daily. 30 tablet 1    aspirin 81 MG Oral Tab EC Take 1 tablet (81 mg total) by mouth daily. 360 tablet 0       Assessment & Plan:      # Upper GI bleed  # Retained food in dilated esophagus  -Consult GI  -IV PPI twice daily  -N.p.o.  -Transfuse less than 7    #Hemoptysis  -Suspect secondary to GI bleeding  -If GI workup is negative, will consult  pulmonology  -CTA chest/abdomen/pelvis noted    #ESRD on HD  #uremia  #Severe electrolyte abnormalities including hyperkalemia due to lack of dialysis  -Has not had dialysis since March 16  -Consult nephrology    #Hypertension  #Hyperlipidemia  #PVD  #DM2  -Insulin sliding scale    Plan of care discussed with, ED physician    Dallas Drew MD      3/26/24 Nephrology      Reason for Consultation:  ESRD on HD     History of Present Illness:  Richy Goins is a a(n) 63 year old man known to our service with mult med probs incl ESRD due to DM on HD TTHS at [facility] who presents to ED with c/o nasuea/hematemesis/weakness.  Has not gone to dialysis for the past week due to weakness.  CT notable for distended esophagus with retained fluid.    Impression/Plan:     #1.  ESRD- due to DM.  HD today per typical TTHS routine     #2. Acidosis- due to missed dialysis treatments.  Manage with HD     #3.  HTN- BP markedly elevated, unable to take antihypertensives orally. IV meds for now     #4.  N/V/ hematemesis- CT noted.  GI has been consulted     Thank you for allowing me to participate in the care of your patient. Please do not hesitate to call with any questions or concerns.        Ciro Perez MD      3/26 GI    Reason for consultation: Hematemesis   HPI: This is a 63 yr-old male with complex PMHx that includes HTN, dyslipidemia, PVD, osteomyelitis s/p right BKA (6/2023), ESRD on HD, DM, and prior hx of hematemesis s/p EGD (2/2023; Dr Gonzalez) revealing LA grade C esophagitis, esophageal ulcer, and gastric polyps who presented to ER today with weakness, nausea, and hematemesis. Pt reports developing \"viral\" symptoms over the last week and missed ~5 HD sessions. He has progressively become weaker and now developed nausea which progressed to hematemesis. Pt reports a stable appetite until the last few days. Pt also reports heartburn--no PPI use for >5 months and denies dysphagia or odynophagia. No diarrhea,  melena, or hematochezia. No NSAID use.   CTA C + CT a/p suggests distended esophagus with retained fluid and distal esophageal wall thickening; labs with YULISA/CKD (/creat 9), mild lipase elevation (100), leukocytosis (12.8), and Hgb 11.2 from baseline of 11's    Impression: 63 yr-old male with complex PMHx that includes HTN, dyslipidemia, PVD, osteomyelitis s/p right BKA (6/2023), ESRD on HD, DM, and prior hx of hematemesis s/p EGD (2/2023; Dr Gonzalez) revealing LA grade C esophagitis, esophageal ulcer, and gastric polyps admitted today with weakness, nausea, and hematemesis in setting of missing ~5 HD sessions.  No chronic acid suppression. Imaging suggests distended esophagus with retained fluid and distal esophageal wall thickening; labs with YULISA/CKD (/creat 9), mild lipase elevation (100), leukocytosis (12.8), and Hgb 11.2 from baseline of 11's. Nausea maybe in part relayed to uremia from missed HD however given concern for hematemesis and esophageal thickening will treat with PPI IV BID + plan for EGD in AM once pt has received HD      The risks, benefits, alternatives of the procedure including the risks of anesthesia, bleeding, perforation, missed lesions, need for surgery, and infection were discussed with the patient. He expressed understanding of the risks and was agreeable to proceed.     Recommendations:      Plan for EGD in AM under MAC with Dr Gonzalez  NPO given concern for ongoing nausea, vomiting   Protonix 40 mg IV BID   Antiemetics as needed   CBC, BMP, Mg in AM     Thank you for the consultation, we will follow the patient with you.  Attending addendum (Dr Gonzalez) to follow later today and provide formal, final recommendations at that time   Felisha Castaneda, APRN  2:57 PM     GI Attending Addendum:  I have personally seen and examined this patient and agree with above nurse practitioner's history, physical exam, assessment and recommendations with the following modifications and/or  additions: Informed Consent: The planned procedure(s), the explanation of the procedure, its expected benefits, the potential complications and risks, and possible alternatives (including their benefits and risks) were discussed with the patient in full. The discussion of risks, not limited to but including bleeding, infection, perforation or tear in the gastrointestinal tract,  adverse effects from anesthesia, and possible prolonged hospitalization, emergency surgery, risk of morbidity or mortality, and risk of missed lesion(s) were discussed with patient.Patient understood the proposed procedure(s), and elected to proceed with the procedure(s) with possible intervention (such as polypectomy, biopsy, control of bleeding, etc.) and its risks, benefits and alternatives and wish to proceed with procedure(s). All questions answered in full to patient's satisfaction. Ample time was given to patient to ask all questions in full.     3/27/24    EGD Operative Report  Patient Name: Richy Goins  YOB: 1960  MRN: [id number]  Procedure: Esophagogastroduodenoscopy (EGD)   Pre-operative Diagnosis & Indication:   Hematemesis   Post-operative Diagnosis:  Gastric Food bezoar - procedure aborted  Findings and Therapeutics:  Stomach:    Large solid food bezoar, and solid food remnants in the stomach. Procedure aborted immediately.   Upon withdrawal of endoscope, given above, esophagus with friability and oozing of blood, although unable to completely visualized.   Recommendations:  Post EGD precautions, watch for bleeding, infection, perforation, adverse drug reactions   Plan for EGD tomorrow  Liquid diet, NPO at MN  PPI BID   Avoid non-aspirin NSAIDs     3/27 Hospitalist   Upper GI bleed  # Retained food in dilated esophagus  -Consult GI  -IV PPI twice daily  -N.p.o.  -Transfuse less than 7  -EGD today      #Hemoptysis  -Suspect secondary to GI bleeding  -If GI workup is negative, will consult pulmonology  -CTA  chest/abdomen/pelvis noted     #ESRD on HD  #uremia  #Severe electrolyte abnormalities including hyperkalemia due to lack of dialysis  -Has not had dialysis since March 16  -Consult nephrology  -HD yesterday     #Hypertension  #Hyperlipidemia  #PVD  #DM2  -Insulin sliding scale      Dallas Drew MD      Nursing     03/27/24 2009   Gastrointestinal   Bowel Sounds (All Quadrants) Hypoactive   GI Symptoms Nausea   Relieved by Antiemetic       Laboratory   Latest Reference Range & Units 03/26/24 10:15 03/27/24 00:49 03/27/24 07:07 03/27/24 16:51 03/28/24 01:09   WBC 4.0 - 11.0 x10(3) uL 12.8 (H)  14.2 (H)     Hemoglobin 13.0 - 17.5 g/dL 11.2 (L) 10.0 (L) 9.3 (L)  9.3 (L) 9.4 (L) 9.2 (L)   Hematocrit 39.0 - 53.0 % 33.9 (L)  28.6 (L)     Platelet Count 150.0 - 450.0 10(3)uL 258.0  215.0     (H): Data is abnormally high  (L): Data is abnormally low     Latest Reference Range & Units 03/06/24 14:15 03/08/24 08:26 03/26/24 10:15 03/27/24 07:07   Glucose 70 - 99 mg/dL 173 (H) 268 (H) 290 (H) 175 (H)   Sodium 136 - 145 mmol/L 138 134 (L) 145 142   Potassium 3.5 - 5.1 mmol/L 4.7 5.1 5.5 (H) 4.3   Chloride 98 - 112 mmol/L 104 99 120 (H) 108   Carbon Dioxide, Total 21.0 - 32.0 mmol/L 25.0 27.0 13.0 (L) 24.0   BUN 9 - 23 mg/dL 81 (H) 74 (H) 127 (H) 58 (H)   CREATININE 0.70 - 1.30 mg/dL 6.04 (H) 6.28 (H) 9.00 (H) 5.05 (H)   CALCIUM 8.5 - 10.1 mg/dL 8.2 (L) 8.2 (L) 8.7 8.2 (L)   EGFR >=60 mL/min/1.73m2 10 (L) 9 (L) 6 (L) 12 (L)   ANION GAP 0 - 18 mmol/L 9 8 12 10   CALCULATED OSMOLALITY 275 - 295 mOsm/kg 315 (H) 309 (H) 351 (H) 314 (H)   (H): Data is abnormally high  (L): Data is abnormally low            Imaging:   PROCEDURE:  CTA CHEST + CT ABD (W) + CT PEL (W) SH(CPT=71275/72401)     COMPARISON:  [facility] , CT, CT ABDOMEN+PELVIS(CPT=74176), 1/03/2022, 1:42 PM.  [facility] , CT, CT CHEST (CPT=71250), 5/31/2023, 10:49 AM.     INDICATIONS:  hematemesis, hemoptysis, abd pain     TECHNIQUE:  CT of the chest (with CTA imaging), abdomen, and  pelvis were obtained post- injection of non-ionic intravenous contrast material. Multi-planar reformatted/3-D images were created to optimize visualization of vascular anatomy.  Dose reduction  techniques were used. Dose information is transmitted to the ACR (American College of Radiology) NRDR (National Radiology Data Registry) which includes the Dose Index Registry.     PATIENT STATED HISTORY:(As transcribed by Technologist)  Patient with hematemesis, hemoptysis, and abdominal pain all four quadrants.      CONTRAST USED:  100cc of Omnipaque 350     FINDINGS:       CHEST:    LUNGS:  4 mm left upper lobe nodule previously measured 12 x 10 mm.  Thin linear strands of scarring or atelectasis in the lateral right middle lobe appear improved.  Similar appearance of subpleural interstitial opacities in both lower lobes suggestive  of fibrosis and likely accentuated by shallow inspiration.  No focal consolidation.    MEDIASTINUM:  Mildly distended esophagus with fluid and distal esophageal wall thickening.  No lymphadenopathy.  GRACE:  No mass or adenopathy.    CARDIAC:  Coronary artery calcium.  PLEURA:  No mass or effusion.    CHEST WALL:  No mass or axillary adenopathy.  Tunneled right hemodialysis catheter in place.  AORTA:  Trace atherosclerotic calcification.  No aneurysm or dissection.    VASCULATURE:  No visible pulmonary arterial thrombus or attenuation.       ABDOMEN/PELVIS:  LIVER:  No enlargement, atrophy, abnormal density, or significant focal lesion.    BILIARY:  No visible dilatation or calcification.    PANCREAS:  No lesion, fluid collection, ductal dilatation, or atrophy.    SPLEEN:  No enlargement or focal lesion.    KIDNEYS:  Small bilateral nonobstructing renal calculi measure less than 4 mm.  No mass or hydronephrosis.  ADRENALS:  No mass or enlargement.    AORTA:  Atherosclerosis.  No aneurysm.  RETROPERITONEUM:  No mass or adenopathy.    BOWEL/MESENTERY:  No visible mass, obstruction, or bowel wall  thickening.  Normal appendix.  ABDOMINAL WALL:  No mass or hernia.    URINARY BLADDER:  No visible focal wall thickening, lesion, or calculus.    PELVIC NODES:  No adenopathy.    PELVIC ORGANS:  No visible mass.  Pelvic organs appropriate for patient age.    BONES:  Stable mild degenerative changes of spine.      Impression   CONCLUSION:    1. Distended esophagus with retained fluid and distal esophageal wall thickening.  Differential considerations include esophagitis and tumor.  Recommend endoscopy for further evaluation.  2. Bilateral nephrolithiasis.  This was not present on 1/3/2022.  3. Near complete resolution of previously demonstrated left upper lobe nodule.  4. Decreased scarring or atelectasis in right middle lobe.  Subpleural fibrosis in both lower lobes is unchanged.  5. Coronary artery calcium.              MEDICATIONS ADMINISTERED IN LAST 1 DAY:  insulin aspart (NovoLOG) 100 Units/mL FlexPen 1-10 Units       Date Action Dose Route User    3/27/2024 1300 Given 1 Units Subcutaneous (Left Lower Abdomen) Cuong Richardson RN          insulin degludec 100 units/mL flextouch 8 Units       Date Action Dose Route User    3/27/2024 2133 Given 8 Units Subcutaneous (Left Lower Abdomen) Mulu Crump RN          lactated ringers infusion       Date Action Dose Route User    3/27/2024 1433 New Bag (none) Intravenous Jj Grace MD          lidocaine PF (Xylocaine-MPF) 1% injection       Date Action Dose Route User    3/27/2024 1435 Given 25 mg Intravenous Jj Grace MD          pantoprazole (Protonix) 40 mg in sodium chloride 0.9% PF 10 mL IV push       Date Action Dose Route User    3/28/2024 0528 Given 40 mg Intravenous Mulu Crump RN    3/27/2024 1732 Given 40 mg Intravenous Cuong Richardson RN          propofol (Diprivan) 10 MG/ML injection       Date Action Dose Route User    3/27/2024 1435 Given 120 mg Intravenous Jj Grace MD          propofol (Diprivan) 10 mg/mL infusion  premix       Date Action Dose Route User    3/27/2024 1435 New Bag 100 mcg/kg/min × 76.7 kg Intravenous Jj Grace MD          sodium chloride 0.45% infusion       Date Action Dose Route User    3/27/2024 2011 New Bag (none) Intravenous Mulu Crump, RN            Vitals (last day)       Date/Time Temp Pulse Resp BP SpO2 Weight O2 Device O2 Flow Rate (L/min) [facility]    03/28/24 0530 97.9 °F (36.6 °C) 80 16 -- 97 % 166 lb 14.4 oz -- --     03/28/24 0530 -- -- -- 163/79 -- -- -- -- AM    03/28/24 0100 -- -- -- 158/81 -- -- -- -- AM    03/28/24 0000 98.9 °F (37.2 °C) 91 20 161/90 98 % -- None (Room air) --     03/27/24 2016 98.5 °F (36.9 °C) 88 16 146/76 -- -- -- --     03/27/24 1604 98.4 °F (36.9 °C) 86 14 158/75 98 % -- None (Room air) -- [place]    03/27/24 1505 -- 73 13 143/71 97 % -- None (Room air) --     03/27/24 1500 -- 75 11 129/70 95 % -- None (Room air) --     03/27/24 1455 -- 75 15 125/66 98 % -- None (Room air) --     03/27/24 1450 -- 76 21 106/60 97 % -- None (Room air) --     03/27/24 1445 -- 81 15 110/60 99 % -- None (Room air) --     03/27/24 1444 98.3 °F (36.8 °C) 79 16 110/60 99 % -- -- -- ANN    03/27/24 1218 98.7 °F (37.1 °C) 93 20 155/74 98 % -- None (Room air) -- [place]    03/27/24 0900 98.1 °F (36.7 °C) 92 18 154/74 100 % -- None (Room air) -- [place]    03/27/24 0530 99.1 °F (37.3 °C) 101 18 145/77 100 % 169 lb 3.2 oz None (Room air) -- SF    03/27/24 0100 98.7 °F (37.1 °C) 104 20 132/76 100 % -- None (Room air) -- BETY TAM RN

## 2024-03-29 VITALS
RESPIRATION RATE: 20 BRPM | HEIGHT: 68 IN | OXYGEN SATURATION: 99 % | SYSTOLIC BLOOD PRESSURE: 159 MMHG | DIASTOLIC BLOOD PRESSURE: 83 MMHG | WEIGHT: 168.13 LBS | HEART RATE: 81 BPM | BODY MASS INDEX: 25.48 KG/M2 | TEMPERATURE: 99 F

## 2024-03-29 LAB
GLUCOSE BLD-MCNC: 92 MG/DL (ref 70–99)
HGB BLD-MCNC: 9.6 G/DL
HGB BLD-MCNC: 9.8 G/DL

## 2024-03-29 PROCEDURE — 99239 HOSP IP/OBS DSCHRG MGMT >30: CPT | Performed by: HOSPITALIST

## 2024-03-29 PROCEDURE — 99232 SBSQ HOSP IP/OBS MODERATE 35: CPT | Performed by: INTERNAL MEDICINE

## 2024-03-29 NOTE — PROGRESS NOTES
NURSING DISCHARGE NOTE    Discharged Home via Wheelchair.  Accompanied by Spouse  Belongings Taken by patient/family.    Patient in his room ready to discharge with wife.     Questions answered and belongings taken home.

## 2024-03-29 NOTE — PROGRESS NOTES
[facility]   part of [facility]     Hospitalist Progress Note     Richy Goins Patient Status:  Inpatient    1960 MRN [id number]   Location [facility] Attending Dallas Drew MD   Hosp Day # 3 PCP Woody Dahl MD     Chief Complaint: GIB    Subjective:     Patient stable    Objective:    Review of Systems:   A comprehensive review of systems was completed; pertinent positive and negatives stated in subjective.    Vital signs:  Temp:  [98.1 °F (36.7 °C)-99.1 °F (37.3 °C)] 99.1 °F (37.3 °C)  Pulse:  [79-95] 81  Resp:  [12-22] 20  BP: (136-187)/(66-91) 159/83  SpO2:  [98 %-100 %] 99 %    Physical Exam:    General: No acute distress  Respiratory: No wheezes, no rhonchi  Cardiovascular: S1, S2, regular rate and rhythm  Abdomen: Soft, Non-tender, non-distended, positive bowel sounds  Neuro: No new focal deficits.   Extremities: No edema      Diagnostic Data:    Labs:  Recent Labs   Lab 24  1015 24  0049 24  0707 24  1651 24  0109 24  0615 24  0016 24  0758   WBC 12.8*  --  14.2*  --   --  11.3*  --   --    HGB 11.2*   < > 9.3*  9.3* 9.4* 9.2* 9.4*  9.4* 9.6* 9.8*   MCV 85.6  --  84.9  --   --  86.2  --   --    .0  --  215.0  --   --  216.0  --   --    INR 1.08  --   --   --   --   --   --   --     < > = values in this interval not displayed.       Recent Labs   Lab 24  1015 24  0707 24  0615   * 175* 103*   * 58* 89*   CREATSERUM 9.00* 5.05* 7.64*   CA 8.7 8.2* 6.9*   ALB 3.9 3.3*  --     142 143   K 5.5* 4.3 4.5   * 108 109   CO2 13.0* 24.0 20.0*   ALKPHO 103 86  --    AST 14* 18  --    ALT 18 14*  --    BILT 0.4 0.7  --    TP 7.6 6.3*  --        Estimated Creatinine Clearance: 9.3 mL/min (A) (based on SCr of 7.64 mg/dL (H)).    No results for input(s): \"TROP\", \"TROPHS\", \"CK\" in the last 168 hours.    Recent Labs   Lab 24  1015   PTP 14.1   INR 1.08                   Microbiology    No results found for this visit on 03/26/24.      Imaging: Reviewed in Epic.    Medications:    amLODIPine  10 mg Oral Daily    aspirin  81 mg Oral Daily    insulin degludec  8 Units Subcutaneous Nightly    lisinopril  10 mg Oral Daily    insulin aspart  1-68 Units Subcutaneous TID CC    pantoprazole  40 mg Intravenous Q12H    insulin aspart  1-10 Units Subcutaneous TID AC and HS       Assessment & Plan:      # Upper GI bleed 2/2 esophagitis  # Retained food in dilated esophagus  -Consult GI  -PPI twice daily x 8 weeks then daily. F/u GI. Repeat EGD in 8 weeks  -Transfuse less than 7  -EGD w/ esophagitis - oozing. Also some mild gastritis     #Hemoptysis  -Suspect secondary to GI bleeding  -If GI workup is negative, will consult pulmonology  -CTA chest/abdomen/pelvis noted     #ESRD on HD  #uremia  #Severe electrolyte abnormalities including hyperkalemia due to lack of dialysis  -Has not had dialysis since March 16  -Consult nephrology  -HD today     #Hypertension  #Hyperlipidemia  #PVD  #DM2  -Insulin sliding scale    Ok to DC from my standpoint      Dallas Drew MD    Supplementary Documentation:     Quality:  DVT Mechanical Prophylaxis:   SCDs,    DVT Pharmacologic Prophylaxis   Medication   None         DVT Pharmacologic prophylaxis: Aspirin 81 mg      Code Status: Full Code  Soni: No urinary catheter in place  Soni Duration (in days):   Central line:    DENN[place]: 3/28/2024    Discharge is dependent on: course  At this point Mr. Goins is expected to be discharge to: home    The 21st Century Cures Act makes medical notes like these available to patients in the interest of transparency. Please be advised this is a medical document. Medical documents are intended to carry relevant information, facts as evident, and the clinical opinion of the practitioner. The medical note is intended as peer to peer communication and may appear blunt or direct. It is written in medical language and may  contain abbreviations or verbiage that are unfamiliar.             **Certification      PHYSICIAN Certification of Need for Inpatient Hospitalization - Initial Certification    Patient will require inpatient services that will reasonably be expected to span two midnight's based on the clinical documentation in H+P.   Based on patients current state of illness, I anticipate that, after discharge, patient will require TBD.

## 2024-03-29 NOTE — OCCUPATIONAL THERAPY NOTE
OT following for eval, spoke to RN, pt with mobility nor ADL concerns and actively D/Cing OT will sign off as no skilled needs present.

## 2024-03-29 NOTE — PROGRESS NOTES
[facility]   part of [facility]     Nephrology Progress Note    Richy Goins Patient Status:  Inpatient    1960 MRN [id number]   Location [facility] Attending Dallas Drew MD   Hosp Day # 3 PCP Woody Dahl MD       SUBJECTIVE:  Pt eating breakfast, no c/o this AM        Physical Exam:   /83 (BP Location: Left arm)   Pulse 81   Temp 99.1 °F (37.3 °C) (Oral)   Resp 20   Ht 5' 8\" (1.727 m)   Wt 168 lb 1.6 oz (76.2 kg)   SpO2 99%   BMI 25.56 kg/m²   Temp (24hrs), Av.4 °F (36.9 °C), Min:98.1 °F (36.7 °C), Max:99.1 °F (37.3 °C)       Intake/Output Summary (Last 24 hours) at 3/29/2024 0837  Last data filed at 3/29/2024 0535  Gross per 24 hour   Intake 1923 ml   Output --   Net 1923 ml     Last 3 Weights   24 0450 168 lb 1.6 oz (76.2 kg)   24 0530 166 lb 14.4 oz (75.7 kg)   24 0530 169 lb 3.2 oz (76.7 kg)   24 0950 173 lb (78.5 kg)   03/15/24 1027 183 lb (83 kg)   24 1000 173 lb 4.5 oz (78.6 kg)   24 1637 173 lb (78.5 kg)   24 1123 175 lb (79.4 kg)   24 1050 167 lb 8 oz (76 kg)   24 0557 175 lb (79.4 kg)   24 0936 156 lb (70.8 kg)     General: Alert and oriented in no apparent distress.  HEENT: No scleral icterus, MMM  Neck: Supple, no SARITA or thyromegaly  Cardiac: Regular rate and rhythm, S1, S2 normal, no murmur or rub  Lungs: Clear without wheezes, rales, rhonchi.    Abdomen: Soft, non-tender. + bowel sounds, no palpable organomegaly  Extremities: Without clubbing, cyanosis or edema. R BKA  Neurologic: Alert and oriented, cranial nerves grossly intact, moving all extremities  Skin: Warm and dry, no rash        Labs:     Recent Labs   Lab 24  1015 24  0049 24  0707 24  1651 24  0109 24  0615 24  0016   WBC 12.8*  --  14.2*  --   --  11.3*  --    HGB 11.2*   < > 9.3*  9.3* 9.4* 9.2* 9.4*  9.4* 9.6*   MCV 85.6  --  84.9  --   --  86.2  --    .0  --  215.0  --   --   216.0  --    INR 1.08  --   --   --   --   --   --     < > = values in this interval not displayed.       Recent Labs   Lab 03/26/24  1015 03/27/24  0707 03/28/24  0615    142 143   K 5.5* 4.3 4.5   * 108 109   CO2 13.0* 24.0 20.0*   * 58* 89*   CREATSERUM 9.00* 5.05* 7.64*   CA 8.7 8.2* 6.9*   MG  --  2.0 2.1   PHOS  --   --  9.6*   * 175* 103*       Recent Labs   Lab 03/26/24  1015 03/27/24  0707   ALT 18 14*   AST 14* 18   ALB 3.9 3.3*       Recent Labs   Lab 03/28/24  0525 03/28/24  1147 03/28/24  1600 03/28/24  2133 03/29/24  0534   PGLU 103* 110* 88 87 92       Meds:           Impression/Plan:      #1.  ESRD- due to DM.  HD to continue per typical TTHS routine     #2..  HTN- BP stable cont usual med regimen     #3. Esophagitis- mgmt per GI, much better today    Questions/concerns were discussed with patient and/or family by bedside.    OK for home from nephrology standpoint.  F/u outpt HD tomorrow      Ciro Perez MD  3/29/2024  8:41 AM

## 2024-03-29 NOTE — PLAN OF CARE
Pt a/o x4. VSS on RA. No c/o pain. Meds per MAR. IVF per order. Plan to dc.     Fall risk protocol followed, call light within reach.

## 2024-03-29 NOTE — CM/SW NOTE
SW noted that patient Dc'ed home. SW faxed updated flow sheets to patient's HD clinic [facility] and notified them of patient's DC today.   SW will continue to follow for plan of care changes and remain available for any additional DC needs or concerns.     Adela Merrill MSW, LSW  Discharge Planner   [id number]

## 2024-04-01 ENCOUNTER — TELEPHONE (OUTPATIENT)
Dept: INTERNAL MEDICINE CLINIC | Facility: CLINIC | Age: 64
End: 2024-04-01

## 2024-04-01 ENCOUNTER — PATIENT OUTREACH (OUTPATIENT)
Dept: CASE MANAGEMENT | Age: 64
End: 2024-04-01

## 2024-04-01 DIAGNOSIS — R04.2 HEMOPTYSIS: ICD-10-CM

## 2024-04-01 DIAGNOSIS — Z02.9 ENCOUNTERS FOR UNSPECIFIED ADMINISTRATIVE PURPOSE: Primary | ICD-10-CM

## 2024-04-01 PROCEDURE — 1111F DSCHRG MED/CURRENT MED MERGE: CPT

## 2024-04-01 NOTE — PROGRESS NOTES
Initial Post Discharge Follow Up   Discharge Date: 3/29/24  Contact Date: 4/1/2024    Consent Verification:  Assessment Completed With: Patient  HIPAA Verified?  Yes    Discharge Dx:   Hemoptysis     General:   How have you been since your discharge from the hospital? NCM spoke with patient states he is feeling fine. Pt denies any fevers, chills, nausea, vomiting, shortness of breath, chest pain, feeling dizzy or lightheaded or any other symptoms. Pt states his BS are fine at home, but would not provide readings.    Do you have any pain since discharge?  No    How well was your pain managed while in the hospital?   On a scale of 1-5   1- Very Poor and 5- Very well   5  When you were leaving the hospital were your discharge instructions reviewed with you? Yes  How well were your discharge instructions explained to you?   On a scale of 1-5   1- Very Poor and 5- Very well   5  Do you have any questions about your discharge instructions?  No  Before leaving the hospital was your diagnoses explained to you? Yes  Do you have any questions about your diagnoses? No  Are you able to perform normal daily activities of living as you have prior to your hospital stay (dressing, bathing, ambulating to the bathroom, etc)? yes  (SD) Was patient given a different diet per AVS? no      Medications: Reviewed medication list.  Medications are up to date.  Current Outpatient Medications   Medication Sig Dispense Refill    pantoprazole 40 MG Oral Tab EC Take 1 tablet (40 mg total) by mouth 2 (two) times daily before meals. 60 tablet 0    lisinopril 10 MG Oral Tab Take 1 tablet (10 mg total) by mouth daily.      Insulin Pen Needle (BD PEN NEEDLE AMBER 2ND GEN) 32G X 4 MM Does not apply Misc Use to inject insulin up to 5 times daily 450 each 1    insulin detemir (LEVEMIR FLEXPEN) 100 UNIT/ML Subcutaneous Solution Pen-injector Inject 17 Units into the skin nightly. 3 month supply 15 mL 1    amLODIPine 10 MG Oral Tab Take 1 tablet (10 mg  total) by mouth daily. 30 tablet 1     Were there any changes to your current medication(s) noted on the AVS? Yes  If so, were these medication changes discussed with you prior to leaving the hospital? Yes  If a new medication was prescribed:    Was the new medication's purpose & side effects reviewed? Yes  Do you have any questions about your new medication? No  Did you  your discharge medications when you left the hospital? Yes  Let's go over your medications together to make sure we are not missing anything. Medications Reviewed  Are there any reasons that keep you from taking your medication as prescribed? No  Are you having any concerns with constipation? No      Discharge medications reviewed/discussed/and reconciled against outpatient medications with patient.  Any changes or updates to medications sent to PCP.  Patient Acknowledged     Referrals/orders at D/C:  Referrals/orders placed at D/C? no    DME ordered at D/C? No      Discharge orders, AVS reviewed and discussed with patient. Any changes or updates to orders sent to PCP.  Patient Acknowledged      SDOH:   Transportation Needs: No Transportation Needs (1/30/2024)    Transportation Needs     Lack of Transportation: No     Financial Resource Strain: Low Risk  (10/16/2023)    Financial Resource Strain     Difficulty of Paying Living Expenses: Not very hard     Med Affordability: No         Follow up appointments:          TCC  Was TCC ordered: Yes  Was TCC scheduled: No, Explain: pt declined prefers to follow up with PCP.       PCP (If no TCC appointment)  Does patient already have a PCP appointment scheduled? No  NCM Attempted to schedule PCP office TCM appointment with patient   If no appointment scheduled: Explain: pt declined states will schedule on his own, he needs to coordinate transportation.     Specialist    Does the patient have any other follow up appointment(s) needing to be scheduled? Yes  If yes: NCM reviewed upcoming specialist  appointment with patient: Yes  Does the patient need assistance scheduling appointment(s): No    Is there any reason as to why you cannot make your appointment(s)?  No     Needs post D/C:   Now that you are home, are there any needs or concerns you need addressed before your next visit with your PCP?  (DME, meds, questions, etc.): No    Interventions by NCM:   All discharge instructions reviewed with the patient. Reviewed when to call MD vs when to call 911 or go the ED. Educated patient on the importance of taking all meds as prescribed as well as close f/u with PCP/specialists. Pt verbalized understanding and will contact the office with any further questions or concerns. Patient denies fevers, chills, nausea, vomiting, shortness of breath, chest pain, or any other symptoms at this time.  NCM attempted to schedule HFU, patient declined will call PCP/TCC office directly. NCM sent TE to PCP office re: assistance in scheduling HFU appt. NCM provided contact information for any further questions/concerns. Patient verbalized understanding and agreeable.       Overall Rating:   How would you rate the care you received while in the hospital? good    CCM referral placed:    No    BOOK BY DATE: 4/12/24

## 2024-04-01 NOTE — TELEPHONE ENCOUNTER
Spoke to pt for TCM today.  Pt does not have an appointment scheduled at this time.  TCM appt recommended by 4/5/24 as pt is a high risk for readmission.  Please advise.    BOOK BY DATE (last date for TCM): 4/12/24    Clinical staff:  Please f/u with pt and try to get them to schedule as pt would greatly benefit from TCM appt.  Thank you!

## 2024-04-01 NOTE — PROGRESS NOTES
-Please schedule EGD at [facility] in 8 weeks.     ---            Sim Lockhart,    You recently had an upper endoscopy (EGD) procedure completed with biopsies of the esophagus, stomach.    The biopsies of the esophagus show some reactive changes. Please continue taking your pantoprazole medication twice daily as prescribed, and also complete a repeat upper endoscopy (EGD) in 8 weeks for surveillance.     Please call the office if you have any questions or concerns about these results.     Sincerely,    Sarmad Gonzalez MD  [facility]  [phone]

## 2024-04-03 NOTE — PLAN OF CARE
Pt is A&Ox4. Wears glasses, [place]. VSS, afebrile. Maintaining O2 sats WDL on RA. Encouraged IS. Tele, NSR. ASA. Heparin. DW. Strict I&Os. EP. Last BM 6/18, commode at bs. Carb controlled/renal diet, QID accuchek. Voids. Up x1 w/ walker. Denies pain. Midline, SL. Wound c/d/I- WC to come see. No further needs at this time, continue POC. Safety precautions in place. Problem: PAIN - ADULT  Goal: Verbalizes/displays adequate comfort level or patient's stated pain goal  Description: INTERVENTIONS:  - Encourage pt to monitor pain and request assistance  - Assess pain using appropriate pain scale  - Administer analgesics based on type and severity of pain and evaluate response  - Implement non-pharmacological measures as appropriate and evaluate response  - Consider cultural and social influences on pain and pain management  - Manage/alleviate anxiety  - Utilize distraction and/or relaxation techniques  - Monitor for opioid side effects  - Notify MD/LIP if interventions unsuccessful or patient reports new pain  - Anticipate increased pain with activity and pre-medicate as appropriate  Outcome: Progressing     Problem: RISK FOR INFECTION - ADULT  Goal: Absence of fever/infection during anticipated neutropenic period  Description: INTERVENTIONS  - Monitor WBC  - Administer growth factors as ordered  - Implement neutropenic guidelines  Outcome: Progressing     Problem: SAFETY ADULT - FALL  Goal: Free from fall injury  Description: INTERVENTIONS:  - Assess pt frequently for physical needs  - Identify cognitive and physical deficits and behaviors that affect risk of falls.   - [place] fall precautions as indicated by assessment.  - Educate pt/family on patient safety including physical limitations  - Instruct pt to call for assistance with activity based on assessment  - Modify environment to reduce risk of injury  - Provide assistive devices as appropriate  - Consider OT/PT consult to assist with strengthening/mobility  - Encourage toileting schedule  Outcome: Progressing     Problem: DISCHARGE PLANNING  Goal: Discharge to home or other facility with appropriate resources  Description: INTERVENTIONS:  - Identify barriers to discharge w/pt and caregiver  - Include patient/family/discharge partner in discharge planning  - Arrange for needed discharge resources and transportation as appropriate  - Identify discharge learning needs (meds, wound care, etc)  - Arrange for interpreters to assist at discharge as needed  - Consider post-discharge preferences of patient/family/discharge partner  - Complete POLST form as appropriate  - Assess patient's ability to be responsible for managing their own health  - Refer to Case Management Department for coordinating discharge planning if the patient needs post-hospital services based on physician/LIP order or complex needs related to functional status, cognitive ability or social support system  Outcome: Progressing     Problem: Patient/Family Goals  Goal: Patient/Family Long Term Goal  Description: Patient's Long Term Goal: discharge home with adequate resources     Interventions:  - meds per [facility]   - consults   - See additional Care Plan goals for specific interventions  Outcome: Progressing  Goal: Patient/Family Short Term Goal  Description: Patient's Short Term Goal:   6/3am: MRI today   6/4 AM: Manage pain  6/4 NOC: Manage pain, sleep well   6/5 NOC: manage pain  6/6 NOC: Manage pain   6/7 NOC: manage pain, sleep  6/8 NOC: manage pain, remain stable overnight  6/12 NOC: sleep  6/18 NOC: sleep    Interventions:   - meds per [facility]  - consults   - See additional Care Plan goals for specific interventions  Outcome: Progressing Price (Do Not Change): 0.00 Instructions: This plan will send the code FBSD to the PM system.  DO NOT or CHANGE the price. Detail Level: Simple

## 2024-04-15 ENCOUNTER — WALK IN (OUTPATIENT)
Dept: URGENT CARE | Age: 64
End: 2024-04-15

## 2024-04-15 ENCOUNTER — IMAGING SERVICES (OUTPATIENT)
Dept: GENERAL RADIOLOGY | Age: 64
End: 2024-04-15
Attending: FAMILY MEDICINE

## 2024-04-15 VITALS
RESPIRATION RATE: 16 BRPM | DIASTOLIC BLOOD PRESSURE: 71 MMHG | SYSTOLIC BLOOD PRESSURE: 113 MMHG | HEART RATE: 90 BPM | TEMPERATURE: 99.1 F | OXYGEN SATURATION: 99 %

## 2024-04-15 DIAGNOSIS — M25.552 LEFT HIP PAIN: ICD-10-CM

## 2024-04-15 DIAGNOSIS — M25.552 LEFT HIP PAIN: Primary | ICD-10-CM

## 2024-04-15 PROBLEM — E11.21 DIABETIC NEPHROPATHY ASSOCIATED WITH TYPE 2 DIABETES MELLITUS (CMD): Status: ACTIVE | Noted: 2020-06-26

## 2024-04-15 PROBLEM — S88.111S: Status: ACTIVE | Noted: 2023-06-20

## 2024-04-15 PROBLEM — N18.30 CKD (CHRONIC KIDNEY DISEASE), STAGE III (CMD): Status: ACTIVE | Noted: 2023-01-01

## 2024-04-15 PROBLEM — E11.9 DIABETES MELLITUS, TYPE 2 (CMD): Status: ACTIVE | Noted: 2024-04-15

## 2024-04-15 PROBLEM — E78.5 DYSLIPIDEMIA: Status: ACTIVE | Noted: 2020-09-07

## 2024-04-15 PROBLEM — M86.9 OSTEOMYELITIS OF RIGHT FOOT (CMD): Status: ACTIVE | Noted: 2020-07-06

## 2024-04-15 PROBLEM — E10.10 TYPE 1 DIABETES MELLITUS WITH KETOACIDOSIS WITHOUT COMA (CMD): Status: ACTIVE | Noted: 2022-01-03

## 2024-04-15 PROBLEM — I10 ESSENTIAL HYPERTENSION: Status: ACTIVE | Noted: 2024-04-15

## 2024-04-15 PROBLEM — E78.00 PURE HYPERCHOLESTEROLEMIA: Status: ACTIVE | Noted: 2024-03-15

## 2024-04-15 PROBLEM — N18.6 ESRD (END STAGE RENAL DISEASE) (CMD): Status: ACTIVE | Noted: 2023-11-15

## 2024-04-15 PROBLEM — Z86.2 HISTORY OF ANEMIA DUE TO CHRONIC KIDNEY DISEASE: Status: ACTIVE | Noted: 2020-08-06

## 2024-04-15 PROBLEM — E13.319 RETINOPATHY DUE TO SECONDARY DIABETES MELLITUS (CMD): Status: ACTIVE | Noted: 2021-12-20

## 2024-04-15 PROBLEM — Z99.2 END-STAGE RENAL DISEASE ON HEMODIALYSIS (CMD): Status: ACTIVE | Noted: 2024-01-30

## 2024-04-15 PROBLEM — I16.0 HYPERTENSIVE URGENCY: Status: ACTIVE | Noted: 2024-03-26

## 2024-04-15 PROBLEM — N18.9 HISTORY OF ANEMIA DUE TO CHRONIC KIDNEY DISEASE: Status: ACTIVE | Noted: 2020-08-06

## 2024-04-15 PROBLEM — N18.6 END-STAGE RENAL DISEASE ON HEMODIALYSIS (CMD): Status: ACTIVE | Noted: 2024-01-30

## 2024-04-15 PROCEDURE — 99203 OFFICE O/P NEW LOW 30 MIN: CPT | Performed by: FAMILY MEDICINE

## 2024-04-15 PROCEDURE — 3074F SYST BP LT 130 MM HG: CPT | Performed by: FAMILY MEDICINE

## 2024-04-15 PROCEDURE — 73502 X-RAY EXAM HIP UNI 2-3 VIEWS: CPT | Performed by: RADIOLOGY

## 2024-04-15 PROCEDURE — 3078F DIAST BP <80 MM HG: CPT | Performed by: FAMILY MEDICINE

## 2024-04-15 RX ORDER — AMLODIPINE BESYLATE 10 MG/1
10 TABLET ORAL DAILY
COMMUNITY
Start: 2024-01-04

## 2024-04-15 RX ORDER — INSULIN DETEMIR 100 [IU]/ML
17 INJECTION, SOLUTION SUBCUTANEOUS
COMMUNITY
Start: 2024-01-12

## 2024-04-15 RX ORDER — ASPIRIN 81 MG/1
1 TABLET ORAL DAILY
COMMUNITY

## 2024-04-15 RX ORDER — LISINOPRIL 10 MG/1
1 TABLET ORAL DAILY
COMMUNITY
Start: 2024-02-14

## 2024-04-15 RX ORDER — PANTOPRAZOLE SODIUM 40 MG/1
40 TABLET, DELAYED RELEASE ORAL
COMMUNITY
Start: 2024-03-28 | End: 2024-04-27

## 2024-04-15 RX ORDER — PEN NEEDLE, DIABETIC 32GX 5/32"
NEEDLE, DISPOSABLE MISCELLANEOUS
COMMUNITY
Start: 2024-01-15

## 2024-04-17 ENCOUNTER — APPOINTMENT (OUTPATIENT)
Dept: CT IMAGING | Facility: HOSPITAL | Age: 64
End: 2024-04-17
Attending: EMERGENCY MEDICINE
Payer: COMMERCIAL

## 2024-04-17 ENCOUNTER — APPOINTMENT (OUTPATIENT)
Dept: GENERAL RADIOLOGY | Facility: HOSPITAL | Age: 64
End: 2024-04-17
Attending: EMERGENCY MEDICINE
Payer: COMMERCIAL

## 2024-04-17 ENCOUNTER — HOSPITAL ENCOUNTER (EMERGENCY)
Facility: HOSPITAL | Age: 64
Discharge: HOME | End: 2024-04-17
Attending: EMERGENCY MEDICINE
Payer: COMMERCIAL

## 2024-04-17 VITALS
RESPIRATION RATE: 16 BRPM | SYSTOLIC BLOOD PRESSURE: 162 MMHG | HEIGHT: 68 IN | OXYGEN SATURATION: 99 % | TEMPERATURE: 98 F | HEART RATE: 88 BPM | WEIGHT: 170 LBS | BODY MASS INDEX: 25.76 KG/M2 | DIASTOLIC BLOOD PRESSURE: 72 MMHG

## 2024-04-17 DIAGNOSIS — M25.552 PAIN OF LEFT HIP: Primary | ICD-10-CM

## 2024-04-17 PROCEDURE — 73502 X-RAY EXAM HIP UNI 2-3 VIEWS: CPT | Performed by: EMERGENCY MEDICINE

## 2024-04-17 PROCEDURE — 73700 CT LOWER EXTREMITY W/O DYE: CPT | Performed by: EMERGENCY MEDICINE

## 2024-04-17 PROCEDURE — 99284 EMERGENCY DEPT VISIT MOD MDM: CPT

## 2024-04-17 RX ORDER — CYCLOBENZAPRINE HCL 10 MG
10 TABLET ORAL 3 TIMES DAILY PRN
Qty: 20 TABLET | Refills: 0 | Status: SHIPPED | OUTPATIENT
Start: 2024-04-17 | End: 2024-04-24

## 2024-04-17 RX ORDER — NAPROXEN 500 MG/1
500 TABLET ORAL 2 TIMES DAILY PRN
Qty: 20 TABLET | Refills: 0 | Status: SHIPPED | OUTPATIENT
Start: 2024-04-17

## 2024-04-17 NOTE — ED INITIAL ASSESSMENT (HPI)
Patient fell last Thursday and landed on left knee. Pain now radiating to hip area.  Patient went to IC Monday morning, they did xray there but unable to tell if patient has any fractures. Sent him here to get further testing.

## 2024-04-17 NOTE — ED QUICK NOTES
Patient resting in bed with family at bedside. Bed in lowest position with side rails up in place, unlabored respirations.  Patient states he is not in need of anything at this time and is in no pain when not moving

## 2024-04-17 NOTE — ED PROVIDER NOTES
Patient Seen in: [facility] Emergency Department      History     Chief Complaint   Patient presents with    Leg or Foot Injury     Stated Complaint: left hip pain, fall last thursday    Subjective:   HPI    63-year-old male presents to the emergency department with complaints of left hip pain ever since a fall last Thursday.  Patient had gone to the urgent care and had an x-ray and was told that there was potentially a nondisplaced fracture.  Patient was sent to the emerged part for further evaluation.  He denies any other injuries or complaints.  He did not strike his head neck or back.    Objective:   Past Medical History:    Belching    Dialysis patient (HCC)    Diarrhea, unspecified    Esophageal reflux    Essential hypertension    Fatigue    Flatulence/gas pain/belching    High blood pressure    High cholesterol    History of blood transfusion    Hyperlipidemia    Indigestion    Irregular bowel habits    Night sweats    Osteoporosis    Peripheral vascular disease (HCC)    Pure hypercholesterolemia    Type II or unspecified type diabetes mellitus without mention of complication, not stated as uncontrolled    Visual impairment    reading glasses    Vomiting              Past Surgical History:   Procedure Laterality Date    Amputation toe,i-p jt Right     Colonoscopy N/A 02/20/2023    Procedure: COLONOSCOPY;  Surgeon: Sarmad Gonzalez MD;  Location:  ENDOSCOPY    Colonoscopy      Upper gi endoscopy,exam                  Social History     Socioeconomic History    Marital status:    Tobacco Use    Smoking status: Never    Smokeless tobacco: Never    Tobacco comments:     none   Vaping Use    Vaping status: Never Used   Substance and Sexual Activity    Alcohol use: Not Currently     Comment: none    Drug use: Never    Sexual activity: Yes     Partners: Female     Social Determinants of Health     Financial Resource Strain: Low Risk  (10/16/2023)    Financial Resource Strain     Difficulty of Paying  Living Expenses: Not very hard     Med Affordability: No   Food Insecurity: No Food Insecurity (1/30/2024)    Food Insecurity     Food Insecurity: Never true   Transportation Needs: Unmet Transportation Needs (4/1/2024)    Transportation Needs     Lack of Transportation: Yes   Housing Stability: Low Risk  (1/30/2024)    Housing Stability     Housing Instability: No              Review of Systems   All other systems reviewed and are negative.      Positive for stated complaint: left hip pain, fall last thursday  Other systems are as noted in HPI.  Constitutional and vital signs reviewed.      All other systems reviewed and negative except as noted above.    Physical Exam     ED Triage Vitals [04/17/24 1139]   /81   Pulse 90   Resp 18   Temp 98.4 °F (36.9 °C)   Temp src Temporal   SpO2 97 %   O2 Device None (Room air)       Current:BP (!) 162/72   Pulse 88   Temp 98.4 °F (36.9 °C) (Temporal)   Resp 16   Ht 172.7 cm (5' 8\")   Wt 77.1 kg   SpO2 99%   BMI 25.85 kg/m²         Physical Exam  Vitals and nursing note reviewed. Chaperone present: Wife in room.   Constitutional:       General: He is not in acute distress.     Appearance: Normal appearance. He is well-developed.   HENT:      Head: Normocephalic and atraumatic.   Cardiovascular:      Rate and Rhythm: Normal rate and regular rhythm.      Pulses: Normal pulses.      Heart sounds: Normal heart sounds.   Pulmonary:      Effort: Pulmonary effort is normal.      Breath sounds: Normal breath sounds. No stridor.   Abdominal:      General: Bowel sounds are normal.      Palpations: Abdomen is soft.   Musculoskeletal:         General: Tenderness present.      Cervical back: Normal range of motion and neck supple.      Comments: Patient has pain with range of motion of left lower extremity with internal rotation.  He does not have any rest external rotation although he prefers to keep his leg bent.  He has a prosthesis of his right lower extremity and therefore  cannot truly assess for shortening   Lymphadenopathy:      Cervical: No cervical adenopathy.   Skin:     General: Skin is warm and dry.   Neurological:      General: No focal deficit present.      Mental Status: He is alert and oriented to person, place, and time.              ED Course   Labs Reviewed - No data to display          CT HIP(BONE) LEFT (CPT=73700)    Result Date: 4/17/2024  PROCEDURE:  CT HIP(BONE) LEFT (CPT=73700)  COMPARISON:  [facility] , XR, XR HIP W OR WO PELVIS 2 OR 3 VIEWS, LEFT (CPT=73502), 4/17/2024, 12:38 PM.  INDICATIONS:  left hip pain, status post fall last Thursday.  TECHNIQUE:  Multi-planar CT images were created without intravenous contrast. Shaded surface renderings are generated on an independent CT scanner workstation.  Dose reduction techniques were used. Dose information is transmitted to the ACR (American College of Radiology) NRDR (National Radiology Data Registry) which includes the Dose Index Registry  3-D RENDERING:   PATIENT STATED HISTORY:(As transcribed by Technologist)  Patient is here for a fall on Saturday, now with left hip pain.    FINDINGS:  There is no evidence of an acute process.  No evidence of fractures.  Mild subchondral sclerosis along the acetabular roof of the left hip noted, perhaps representing mild underlying chondromalacia.  No nadia osteoarthritis is identified.  The musculature surrounding left hip/left hemipelvis appears within normal limits without evidence of acute muscular strains, edema or atrophy.  No significant tendinopathy is appreciated on CT.            CONCLUSION:  1. No acute process. 2. Suspicion for mild chondromalacia of the left hip without nadia osteoarthritis. 3. No acute soft tissue injuries or soft tissue inflammatory changes identified.   LOCATION:  [facility]   Dictated by (CST): Kendrick Millard DO on 4/17/2024 at 3:00 PM     Finalized by (CST): Kendrick Millard DO on 4/17/2024 at 3:03 PM       XR HIP W OR WO PELVIS 2 OR 3 VIEWS, LEFT  (MMK=16121)    Result Date: 4/17/2024  PROCEDURE:  XR HIP W OR WO PELVIS 2 OR 3 VIEWS, LEFT (CPT=73502)  TECHNIQUE:  Unilateral 2 to 3 views of the hip and pelvis if performed.  COMPARISON:  None.  INDICATIONS:  left hip pain, fall last thursday  PATIENT STATED HISTORY: (As transcribed by Technologist)  Patient fell last thursday, went to urgent care had xrays there. He was told everythng looked ok. He bent down at dialysisand fell on knee only. Alka silva can barely bear weight on the leg, and he fells something pinching in groin area.    FINDINGS:  No acute fractures or osseous lesions are identified.  Femoral acetabular relationships are within normal limits.  Osteoarthritic changes within bilateral shoulders.  Vascular calcifications.  Degenerative changes of lower lumbar spine.            CONCLUSION:  No acute fractures.  Osteoarthritic changes within bilateral hips.   LOCATION:  [facility]   Dictated by (CST): Shi Crystal MD on 4/17/2024 at 12:59 PM     Finalized by (CST): Shi Crystal MD on 4/17/2024 at 12:59 PM              MDM      Patient had initial x-rays they reviewed did not appreciate any obvious fractures.  Radiology did not appreciate a distinct fracture either.  We did do a CT scan of his hip as well and there is no evidence of fracture there either.  He does have chondromalacia.  I reviewed all this with him and his wife.  We discussed that he may benefit from following up with orthopedics for further evaluation if he is having persistent problem of that hip.  We discussed that he could have increased wear-and-tear particularly since he has a prosthetic right leg.  Currently patient is able to have assistance and get around.  He will be given medication for pain.  He was discharged in good condition                                   Medical Decision Making      Disposition and Plan     Clinical Impression:  1. Pain of left hip         Disposition:  Discharge  4/17/2024  3:33  pm    Follow-up:  Woody Dahl MD  [address]  [facility] [address] [phone]    Schedule an appointment as soon as possible for a visit      [facility]  [address]  [facility] [address]  [phone]  Schedule an appointment as soon as possible for a visit            Medications Prescribed:  Discharge Medication List as of 4/17/2024  3:38 PM        START taking these medications    Details   naproxen 500 MG Oral Tab Take 1 tablet (500 mg total) by mouth 2 (two) times daily as needed., Normal, Disp-20 tablet, R-0      cyclobenzaprine 10 MG Oral Tab Take 1 tablet (10 mg total) by mouth 3 (three) times daily as needed for Muscle spasms., Normal, Disp-20 tablet, R-0

## 2024-04-26 NOTE — PAT NURSING NOTE
PAT nursing note: patient unable to come in for labs/EKG prior to surgery d/I lack of transportation; patient has right BKA and is unable to drive; strict npo after 2200; continue to take the prescribed medications as directed except: the morning of surgery only take ASA and Amlodipine with a sip of water; last dose of vitamins, herbal products and supplements 5/2 am; park north garage; register in [facility] at 0600; wash with antibacterial soap; needs ride home; 2 visitors welcome; wear comfortable clothing; fall risk-right BKA; LUE limb restriction; patient verbalized understanding of all instructions.

## 2024-05-02 ENCOUNTER — ANESTHESIA EVENT (OUTPATIENT)
Dept: CARDIAC SURGERY | Facility: HOSPITAL | Age: 64
End: 2024-05-02
Payer: COMMERCIAL

## 2024-05-03 ENCOUNTER — ANESTHESIA (OUTPATIENT)
Dept: CARDIAC SURGERY | Facility: HOSPITAL | Age: 64
End: 2024-05-03
Payer: COMMERCIAL

## 2024-05-03 ENCOUNTER — HOSPITAL ENCOUNTER (OUTPATIENT)
Facility: HOSPITAL | Age: 64
Setting detail: HOSPITAL OUTPATIENT SURGERY
Discharge: HOME | End: 2024-05-03
Attending: SURGERY | Admitting: SURGERY
Payer: COMMERCIAL

## 2024-05-03 VITALS
BODY MASS INDEX: 26.96 KG/M2 | RESPIRATION RATE: 18 BRPM | SYSTOLIC BLOOD PRESSURE: 170 MMHG | WEIGHT: 177.88 LBS | OXYGEN SATURATION: 99 % | HEART RATE: 82 BPM | TEMPERATURE: 97 F | HEIGHT: 68 IN | DIASTOLIC BLOOD PRESSURE: 76 MMHG

## 2024-05-03 DIAGNOSIS — N18.6 ESRD (END STAGE RENAL DISEASE) (HCC): Primary | ICD-10-CM

## 2024-05-03 DIAGNOSIS — Z01.818 PRE-OP TESTING: ICD-10-CM

## 2024-05-03 DIAGNOSIS — G89.18 POST-OP PAIN: ICD-10-CM

## 2024-05-03 LAB
ALBUMIN SERPL-MCNC: 3.1 G/DL (ref 3.4–5)
ALBUMIN/GLOB SERPL: 0.8 {RATIO} (ref 1–2)
ALP LIVER SERPL-CCNC: 157 U/L
ALT SERPL-CCNC: 16 U/L
ANION GAP SERPL CALC-SCNC: 10 MMOL/L (ref 0–18)
APTT PPP: 29.1 SECONDS (ref 23.3–35.6)
AST SERPL-CCNC: 13 U/L (ref 15–37)
ATRIAL RATE: 77 BPM
BASOPHILS # BLD AUTO: 0.08 X10(3) UL (ref 0–0.2)
BASOPHILS NFR BLD AUTO: 0.8 %
BILIRUB SERPL-MCNC: 0.6 MG/DL (ref 0.1–2)
BUN BLD-MCNC: 73 MG/DL (ref 9–23)
CALCIUM BLD-MCNC: 8.4 MG/DL (ref 8.5–10.1)
CHLORIDE SERPL-SCNC: 105 MMOL/L (ref 98–112)
CO2 SERPL-SCNC: 26 MMOL/L (ref 21–32)
CREAT BLD-MCNC: 5.48 MG/DL
EGFRCR SERPLBLD CKD-EPI 2021: 11 ML/MIN/1.73M2 (ref 60–?)
EOSINOPHIL # BLD AUTO: 0.24 X10(3) UL (ref 0–0.7)
EOSINOPHIL NFR BLD AUTO: 2.4 %
ERYTHROCYTE [DISTWIDTH] IN BLOOD BY AUTOMATED COUNT: 14 %
GLOBULIN PLAS-MCNC: 3.8 G/DL (ref 2.8–4.4)
GLUCOSE BLD-MCNC: 230 MG/DL (ref 70–99)
GLUCOSE BLD-MCNC: 281 MG/DL (ref 70–99)
GLUCOSE BLD-MCNC: 302 MG/DL (ref 70–99)
HCT VFR BLD AUTO: 28.3 %
HGB BLD-MCNC: 8.8 G/DL
IMM GRANULOCYTES # BLD AUTO: 0.07 X10(3) UL (ref 0–1)
IMM GRANULOCYTES NFR BLD: 0.7 %
INR BLD: 0.99 (ref 0.85–1.16)
LYMPHOCYTES # BLD AUTO: 1.27 X10(3) UL (ref 1–4)
LYMPHOCYTES NFR BLD AUTO: 12.6 %
MCH RBC QN AUTO: 27.9 PG (ref 26–34)
MCHC RBC AUTO-ENTMCNC: 31.1 G/DL (ref 31–37)
MCV RBC AUTO: 89.8 FL
MONOCYTES # BLD AUTO: 0.59 X10(3) UL (ref 0.1–1)
MONOCYTES NFR BLD AUTO: 5.9 %
NEUTROPHILS # BLD AUTO: 7.79 X10 (3) UL (ref 1.5–7.7)
NEUTROPHILS # BLD AUTO: 7.79 X10(3) UL (ref 1.5–7.7)
NEUTROPHILS NFR BLD AUTO: 77.6 %
OSMOLALITY SERPL CALC.SUM OF ELEC: 324 MOSM/KG (ref 275–295)
P AXIS: 50 DEGREES
P-R INTERVAL: 150 MS
PLATELET # BLD AUTO: 299 10(3)UL (ref 150–450)
POTASSIUM SERPL-SCNC: 4.4 MMOL/L (ref 3.5–5.1)
PROT SERPL-MCNC: 6.9 G/DL (ref 6.4–8.2)
PROTHROMBIN TIME: 13.1 SECONDS (ref 11.6–14.8)
Q-T INTERVAL: 428 MS
QRS DURATION: 144 MS
QTC CALCULATION (BEZET): 484 MS
R AXIS: -5 DEGREES
RBC # BLD AUTO: 3.15 X10(6)UL
SODIUM SERPL-SCNC: 141 MMOL/L (ref 136–145)
T AXIS: 38 DEGREES
VENTRICULAR RATE: 77 BPM
WBC # BLD AUTO: 10 X10(3) UL (ref 4–11)

## 2024-05-03 PROCEDURE — 82962 GLUCOSE BLOOD TEST: CPT

## 2024-05-03 PROCEDURE — 80053 COMPREHEN METABOLIC PANEL: CPT | Performed by: SURGERY

## 2024-05-03 PROCEDURE — 85610 PROTHROMBIN TIME: CPT | Performed by: SURGERY

## 2024-05-03 PROCEDURE — 85730 THROMBOPLASTIN TIME PARTIAL: CPT | Performed by: SURGERY

## 2024-05-03 PROCEDURE — 85025 COMPLETE CBC W/AUTO DIFF WBC: CPT | Performed by: SURGERY

## 2024-05-03 PROCEDURE — 93010 ELECTROCARDIOGRAM REPORT: CPT | Performed by: INTERNAL MEDICINE

## 2024-05-03 PROCEDURE — 93005 ELECTROCARDIOGRAM TRACING: CPT

## 2024-05-03 PROCEDURE — 03180JD BYPASS LEFT BRACHIAL ARTERY TO UPPER ARM VEIN WITH SYNTHETIC SUBSTITUTE, OPEN APPROACH: ICD-10-PCS | Performed by: SURGERY

## 2024-05-03 DEVICE — PROPATEN VASCULAR GRAFT SW 4-6MMX45CM TAPERED HEPARIN
Type: IMPLANTABLE DEVICE | Site: ARM | Status: FUNCTIONAL
Brand: GORE PROPATEN VASCULAR GRAFT

## 2024-05-03 RX ORDER — MEPERIDINE HYDROCHLORIDE 25 MG/ML
12.5 INJECTION INTRAMUSCULAR; INTRAVENOUS; SUBCUTANEOUS AS NEEDED
Status: DISCONTINUED | OUTPATIENT
Start: 2024-05-03 | End: 2024-05-03

## 2024-05-03 RX ORDER — DEXAMETHASONE SODIUM PHOSPHATE 4 MG/ML
VIAL (ML) INJECTION AS NEEDED
Status: DISCONTINUED | OUTPATIENT
Start: 2024-05-03 | End: 2024-05-03 | Stop reason: SURG

## 2024-05-03 RX ORDER — HYDROCODONE BITARTRATE AND ACETAMINOPHEN 5; 325 MG/1; MG/1
2 TABLET ORAL ONCE AS NEEDED
Status: DISCONTINUED | OUTPATIENT
Start: 2024-05-03 | End: 2024-05-03

## 2024-05-03 RX ORDER — HEPARIN SODIUM 5000 [USP'U]/ML
INJECTION, SOLUTION INTRAVENOUS; SUBCUTANEOUS AS NEEDED
Status: DISCONTINUED | OUTPATIENT
Start: 2024-05-03 | End: 2024-05-03 | Stop reason: SURG

## 2024-05-03 RX ORDER — ESMOLOL HYDROCHLORIDE 10 MG/ML
INJECTION INTRAVENOUS AS NEEDED
Status: DISCONTINUED | OUTPATIENT
Start: 2024-05-03 | End: 2024-05-03 | Stop reason: SURG

## 2024-05-03 RX ORDER — HYDROCODONE BITARTRATE AND ACETAMINOPHEN 5; 325 MG/1; MG/1
1 TABLET ORAL EVERY 6 HOURS PRN
Status: DISCONTINUED | OUTPATIENT
Start: 2024-05-03 | End: 2024-05-03

## 2024-05-03 RX ORDER — DEXTROSE MONOHYDRATE 25 G/50ML
50 INJECTION, SOLUTION INTRAVENOUS
Status: DISCONTINUED | OUTPATIENT
Start: 2024-05-03 | End: 2024-05-03

## 2024-05-03 RX ORDER — INSULIN ASPART 100 [IU]/ML
INJECTION, SOLUTION INTRAVENOUS; SUBCUTANEOUS ONCE
Status: COMPLETED | OUTPATIENT
Start: 2024-05-03 | End: 2024-05-03

## 2024-05-03 RX ORDER — BUPIVACAINE HYDROCHLORIDE 5 MG/ML
INJECTION, SOLUTION EPIDURAL; INTRACAUDAL AS NEEDED
Status: DISCONTINUED | OUTPATIENT
Start: 2024-05-03 | End: 2024-05-03

## 2024-05-03 RX ORDER — NICOTINE POLACRILEX 4 MG
30 LOZENGE BUCCAL
Status: DISCONTINUED | OUTPATIENT
Start: 2024-05-03 | End: 2024-05-03

## 2024-05-03 RX ORDER — EPHEDRINE SULFATE 50 MG/ML
INJECTION INTRAVENOUS AS NEEDED
Status: DISCONTINUED | OUTPATIENT
Start: 2024-05-03 | End: 2024-05-03 | Stop reason: SURG

## 2024-05-03 RX ORDER — HYDROMORPHONE HYDROCHLORIDE 1 MG/ML
0.4 INJECTION, SOLUTION INTRAMUSCULAR; INTRAVENOUS; SUBCUTANEOUS EVERY 5 MIN PRN
Status: DISCONTINUED | OUTPATIENT
Start: 2024-05-03 | End: 2024-05-03

## 2024-05-03 RX ORDER — NICOTINE POLACRILEX 4 MG
15 LOZENGE BUCCAL
Status: DISCONTINUED | OUTPATIENT
Start: 2024-05-03 | End: 2024-05-03

## 2024-05-03 RX ORDER — SODIUM CHLORIDE, SODIUM LACTATE, POTASSIUM CHLORIDE, CALCIUM CHLORIDE 600; 310; 30; 20 MG/100ML; MG/100ML; MG/100ML; MG/100ML
INJECTION, SOLUTION INTRAVENOUS CONTINUOUS
Status: DISCONTINUED | OUTPATIENT
Start: 2024-05-03 | End: 2024-05-03

## 2024-05-03 RX ORDER — NEOSTIGMINE METHYLSULFATE 1 MG/ML
INJECTION, SOLUTION INTRAVENOUS AS NEEDED
Status: DISCONTINUED | OUTPATIENT
Start: 2024-05-03 | End: 2024-05-03 | Stop reason: SURG

## 2024-05-03 RX ORDER — CISATRACURIUM BESYLATE 2 MG/ML
INJECTION INTRAVENOUS AS NEEDED
Status: DISCONTINUED | OUTPATIENT
Start: 2024-05-03 | End: 2024-05-03 | Stop reason: SURG

## 2024-05-03 RX ORDER — GLYCOPYRROLATE 0.2 MG/ML
INJECTION, SOLUTION INTRAMUSCULAR; INTRAVENOUS AS NEEDED
Status: DISCONTINUED | OUTPATIENT
Start: 2024-05-03 | End: 2024-05-03 | Stop reason: SURG

## 2024-05-03 RX ORDER — CEFAZOLIN SODIUM 1 G/3ML
INJECTION, POWDER, FOR SOLUTION INTRAMUSCULAR; INTRAVENOUS AS NEEDED
Status: DISCONTINUED | OUTPATIENT
Start: 2024-05-03 | End: 2024-05-03 | Stop reason: SURG

## 2024-05-03 RX ORDER — NALOXONE HYDROCHLORIDE 0.4 MG/ML
0.08 INJECTION, SOLUTION INTRAMUSCULAR; INTRAVENOUS; SUBCUTANEOUS AS NEEDED
Status: DISCONTINUED | OUTPATIENT
Start: 2024-05-03 | End: 2024-05-03

## 2024-05-03 RX ORDER — PROTAMINE SULFATE 10 MG/ML
INJECTION, SOLUTION INTRAVENOUS AS NEEDED
Status: DISCONTINUED | OUTPATIENT
Start: 2024-05-03 | End: 2024-05-03 | Stop reason: SURG

## 2024-05-03 RX ORDER — ONDANSETRON 2 MG/ML
4 INJECTION INTRAMUSCULAR; INTRAVENOUS EVERY 6 HOURS PRN
Status: DISCONTINUED | OUTPATIENT
Start: 2024-05-03 | End: 2024-05-03

## 2024-05-03 RX ORDER — HYDROCODONE BITARTRATE AND ACETAMINOPHEN 5; 325 MG/1; MG/1
1 TABLET ORAL ONCE AS NEEDED
Status: DISCONTINUED | OUTPATIENT
Start: 2024-05-03 | End: 2024-05-03

## 2024-05-03 RX ORDER — HYDROCODONE BITARTRATE AND ACETAMINOPHEN 5; 325 MG/1; MG/1
1-2 TABLET ORAL EVERY 4 HOURS PRN
Qty: 30 TABLET | Refills: 0 | Status: SHIPPED | OUTPATIENT
Start: 2024-05-03

## 2024-05-03 RX ORDER — INSULIN ASPART 100 [IU]/ML
INJECTION, SOLUTION INTRAVENOUS; SUBCUTANEOUS
Status: COMPLETED
Start: 2024-05-03 | End: 2024-05-03

## 2024-05-03 RX ORDER — ACETAMINOPHEN 500 MG
1000 TABLET ORAL ONCE AS NEEDED
Status: DISCONTINUED | OUTPATIENT
Start: 2024-05-03 | End: 2024-05-03

## 2024-05-03 RX ORDER — HYDROMORPHONE HYDROCHLORIDE 1 MG/ML
0.6 INJECTION, SOLUTION INTRAMUSCULAR; INTRAVENOUS; SUBCUTANEOUS EVERY 5 MIN PRN
Status: DISCONTINUED | OUTPATIENT
Start: 2024-05-03 | End: 2024-05-03

## 2024-05-03 RX ORDER — PHENYLEPHRINE HCL 10 MG/ML
VIAL (ML) INJECTION AS NEEDED
Status: DISCONTINUED | OUTPATIENT
Start: 2024-05-03 | End: 2024-05-03 | Stop reason: SURG

## 2024-05-03 RX ORDER — LABETALOL HYDROCHLORIDE 5 MG/ML
5 INJECTION, SOLUTION INTRAVENOUS EVERY 5 MIN PRN
Status: DISCONTINUED | OUTPATIENT
Start: 2024-05-03 | End: 2024-05-03

## 2024-05-03 RX ORDER — HYDROMORPHONE HYDROCHLORIDE 1 MG/ML
0.2 INJECTION, SOLUTION INTRAMUSCULAR; INTRAVENOUS; SUBCUTANEOUS EVERY 5 MIN PRN
Status: DISCONTINUED | OUTPATIENT
Start: 2024-05-03 | End: 2024-05-03

## 2024-05-03 RX ORDER — METOCLOPRAMIDE HYDROCHLORIDE 5 MG/ML
5 INJECTION INTRAMUSCULAR; INTRAVENOUS EVERY 8 HOURS PRN
Status: DISCONTINUED | OUTPATIENT
Start: 2024-05-03 | End: 2024-05-03

## 2024-05-03 RX ORDER — ONDANSETRON 2 MG/ML
INJECTION INTRAMUSCULAR; INTRAVENOUS AS NEEDED
Status: DISCONTINUED | OUTPATIENT
Start: 2024-05-03 | End: 2024-05-03 | Stop reason: SURG

## 2024-05-03 RX ORDER — LABETALOL HYDROCHLORIDE 5 MG/ML
INJECTION, SOLUTION INTRAVENOUS
Status: DISCONTINUED
Start: 2024-05-03 | End: 2024-05-03 | Stop reason: WASHOUT

## 2024-05-03 RX ORDER — LIDOCAINE HYDROCHLORIDE 10 MG/ML
INJECTION, SOLUTION EPIDURAL; INFILTRATION; INTRACAUDAL; PERINEURAL AS NEEDED
Status: DISCONTINUED | OUTPATIENT
Start: 2024-05-03 | End: 2024-05-03 | Stop reason: SURG

## 2024-05-03 RX ORDER — AMLODIPINE BESYLATE 2.5 MG/1
10 TABLET ORAL DAILY
Qty: 30 TABLET | Refills: 0 | Status: SHIPPED | OUTPATIENT
Start: 2024-05-03

## 2024-05-03 RX ADMIN — NEOSTIGMINE METHYLSULFATE 3 MG: 1 INJECTION, SOLUTION INTRAVENOUS at 09:01:00

## 2024-05-03 RX ADMIN — ONDANSETRON 4 MG: 2 INJECTION INTRAMUSCULAR; INTRAVENOUS at 09:01:00

## 2024-05-03 RX ADMIN — GLYCOPYRROLATE 0.6 MG: 0.2 INJECTION, SOLUTION INTRAMUSCULAR; INTRAVENOUS at 09:01:00

## 2024-05-03 RX ADMIN — HEPARIN SODIUM 11000 UNITS: 5000 INJECTION, SOLUTION INTRAVENOUS; SUBCUTANEOUS at 08:08:00

## 2024-05-03 RX ADMIN — PHENYLEPHRINE HCL 50 MCG: 10 MG/ML VIAL (ML) INJECTION at 07:54:00

## 2024-05-03 RX ADMIN — PHENYLEPHRINE HCL 50 MCG: 10 MG/ML VIAL (ML) INJECTION at 07:43:00

## 2024-05-03 RX ADMIN — PROTAMINE SULFATE 40 MG: 10 INJECTION, SOLUTION INTRAVENOUS at 08:49:00

## 2024-05-03 RX ADMIN — ESMOLOL HYDROCHLORIDE 50 MG: 10 INJECTION INTRAVENOUS at 07:03:00

## 2024-05-03 RX ADMIN — LIDOCAINE HYDROCHLORIDE 20 MG: 10 INJECTION, SOLUTION EPIDURAL; INFILTRATION; INTRACAUDAL; PERINEURAL at 07:05:00

## 2024-05-03 RX ADMIN — EPHEDRINE SULFATE 10 MG: 50 INJECTION INTRAVENOUS at 08:05:00

## 2024-05-03 RX ADMIN — DEXAMETHASONE SODIUM PHOSPHATE 4 MG: 4 MG/ML VIAL (ML) INJECTION at 07:14:00

## 2024-05-03 RX ADMIN — CEFAZOLIN SODIUM 2 G: 1 INJECTION, POWDER, FOR SOLUTION INTRAMUSCULAR; INTRAVENOUS at 07:15:00

## 2024-05-03 RX ADMIN — SODIUM CHLORIDE: 9 INJECTION, SOLUTION INTRAVENOUS at 07:01:00

## 2024-05-03 RX ADMIN — SODIUM CHLORIDE: 9 INJECTION, SOLUTION INTRAVENOUS at 09:26:00

## 2024-05-03 RX ADMIN — CISATRACURIUM BESYLATE 10 MG: 2 INJECTION INTRAVENOUS at 07:05:00

## 2024-05-03 NOTE — DISCHARGE INSTRUCTIONS
No shower one week. Paint incisions betadine q day after 2 days/ cover dry gauze. No lifting more than 5 pounds 6 weeks. No driving one week. Call office for wound drainage/ temperature> 100.5

## 2024-05-03 NOTE — H&P
[facility]    PATIENT'S NAME: JULIUS FOY   ATTENDING PHYSICIAN: Fermin De La Fuente M.D.   PATIENT ACCOUNT#:   [id number]    LOCATION:  [address]  MEDICAL RECORD #:   [id number]       YOB: 1960  ADMISSION DATE:       2024    HISTORY AND PHYSICAL EXAMINATION    HISTORY OF PRESENT ILLNESS:  A 63-year-old [place] male I last saw back on .  He is being admitted for placement of a dialysis access fistula.  The patient has had procedures done in the past by Dr. Coello with multiple interventions for peripheral vascular disease.  Eventually, he lost his leg.  He has been followed in the past by Dr. Gilmar Dahl.  He undergoes dialysis currently every Tuesday, Thursday, and Saturday.  The patient is right-handed.  He has had a jugular catheter in his right side since November.      PAST MEDICAL HISTORY:  He has a history of hypertension and dyslipidemia.      PAST SURGICAL HISTORY:  He has the below-knee amputation as well as foot surgery.     MEDICATIONS:  Currently not on statin medication, but he is on amlodipine, insulin, and aspirin.      ALLERGIES:  He has no known allergies.      FAMILY HISTORY:  He has a history of diabetes in his grandfather.  Mother is still alive.  Father  from myocardial infarction.    SOCIAL HISTORY:  No EtOH abuse, drug abuse, or tobacco abuse.  He is  with 1 child.  He is still working in marketing.      REVIEW OF SYSTEMS:  No hematuria, dysuria, hemoptysis, cough, sputum production.  No weight loss.  No fever or chills.  No hematemesis or bright red blood per rectum.      PHYSICAL EXAMINATION:    GENERAL:  He is awake, alert.  He is appropriate, in no acute distress.  VITAL SIGNS:  Blood pressure was 136/70, heart rate 72, respirations 20.   HEENT:  Pupils equal and round.  Sclerae clear.  Mouth without lesions.  NECK:  No cervical bruit.  No JVD.  LUNGS:  Clear to auscultation.  No rales or rhonchi.  HEART:  Normal, no murmur.  ABDOMEN:   Soft.  There is no tenderness or masses.    EXTREMITIES:  He has a right below-knee amputation.  He has a left popliteal pulse that is palpable.  Pedal pulses diminished.  Upper extremity pulses are palpable with normal Jordan test.      RECOMMENDATIONS:  The patient was recommended for an autogenous fistula in his left arm with the risks and complications including death, myocardial infarction, bleeding, infection, arterial steal with hand loss, venous hypertension with swelling of the arm, congestive heart failure, failure for the fistula to mature, future thrombosis, future infection.  The options for peritoneal dialysis as well as permacath continuing were also discussed.  The patient had vein mapping in February which showed a basilic vein between 4 to 5 mm.  The cephalic vein was somewhat small though in the arm.  The distal basilic vein, however, branched at the elbow.    His arterial flow showed adequate flow in his upper arms, but there was some diffuse plaque, but no sign of any occlusion with triphasic waveforms.    The patient is aware that if the vein is inadequate and not enough length that we will do a prosthetic graft.  All questions have been answered for the patient.  In his lower extremities, he still has some disease on the left side of about 50% to 75% blockage in the tibioperoneal trunk and some disease of the anterior tibial artery, but currently no ischemic symptoms of the leg.     Dictated By Fermin De La Fuente M.D.  d: 05/02/2024 18:43:15  t: 05/02/2024 19:05:16  [facility] [id number]  JJW/

## 2024-05-03 NOTE — ANESTHESIA PREPROCEDURE EVALUATION
PRE-OP EVALUATION    Patient Name: Richy Goins    Admit Diagnosis: End Stage Renal Disease    Pre-op Diagnosis: End Stage Renal Disease    CREATION OF LEFT ARM FISTULA    Anesthesia Procedure: CREATION OF LEFT ARM FISTULA (Left)    Surgeons and Role:     * Fermin De La Fuente MD - Primary    Pre-op vitals reviewed.        Body mass index is 25.54 kg/m².    Current medications reviewed.  Current Facility-Administered Medications on File Prior to Encounter   Medication Dose Route Frequency Provider Last Rate Last Admin    [COMPLETED] epoetin juan josé (Epogen, Procrit) 21474 UNIT/ML injection 10,000 Units  10,000 Units Intravenous Once in dialysis Ciro Perez MD   10,000 Units at 24 1515    [COMPLETED] heparin (Porcine) 1000 UNIT/ML injection 1,500 Units  1.5 mL Intracatheter Once Ciro Perez MD   1,500 Units at 24 1530    [COMPLETED] sodium chloride 0.9 % IV bolus 500 mL  500 mL Intravenous Once Laurel Loaiza MD   Stopped at 24 1235    [COMPLETED] ondansetron (Zofran) 4 MG/2ML injection 4 mg  4 mg Intravenous Once Laurel Loaiza MD   4 mg at 24 1102    [COMPLETED] sodium bicarbonate injection 50 mEq  50 mEq Intravenous Once Laurel Loaiza MD   50 mEq at 24 1103    [COMPLETED] insulin regular human (Novolin R, Humulin R) 100 UNIT/ML injection 10 Units  10 Units Intravenous Once Laurel Loaiza MD   10 Units at 24 1103    [COMPLETED] dextrose 50% injection 50 mL  50 mL Intravenous Once Laurel Loaiza MD   50 mL at 24 1103    [COMPLETED] pantoprazole (Protonix) 40 mg in sodium chloride 0.9% PF 10 mL IV push  40 mg Intravenous Once Laurel Loaiza MD   40 mg at 24 1237    [COMPLETED] iohexol (Omnipaque) 350 MG/ML injection via power injector 100 mL  100 mL Intravenous ONCE PRN Laurel Loaiza MD   100 mL at 24 1314    [] sodium chloride 0.9% infusion   Intravenous Continuous Laurel Loaiza MD   Stopped at 24 1630     [COMPLETED] sodium chloride 0.9% infusion   Intravenous Once Laurel Loaiza MD 50 mL/hr at 24 1422 New Bag at 24 1422    [] sodium chloride 0.9 % IV bolus 100 mL  100 mL Intravenous Q30 Min PRN Ciro Perez MD        And    [] albumin human (Albumin) 25% injection 25 g  25 g Intravenous PRN Dialysis Ciro Perez MD        [COMPLETED] heparin (Porcine) 1000 UNIT/ML injection 1,500 Units  1.5 mL Intracatheter Once Ciro Perez MD   1,500 Units at 24 2345    [COMPLETED] ondansetron (Zofran) 4 MG/2ML injection        4 mg at 24 1638     Current Outpatient Medications on File Prior to Encounter   Medication Sig Dispense Refill    [] pantoprazole 40 MG Oral Tab EC Take 1 tablet (40 mg total) by mouth 2 (two) times daily before meals. 60 tablet 0    insulin detemir (LEVEMIR FLEXPEN) 100 UNIT/ML Subcutaneous Solution Pen-injector Inject 17 Units into the skin nightly. 3 month supply 15 mL 1    amLODIPine 10 MG Oral Tab Take 1 tablet (10 mg total) by mouth daily. 30 tablet 1    [] aspirin 81 MG Oral Tab EC Take 1 tablet (81 mg total) by mouth daily. 360 tablet 0    Insulin Pen Needle (BD PEN NEEDLE AMBER 2ND GEN) 32G X 4 MM Does not apply Misc Use to inject insulin up to 5 times daily 450 each 1           Allergies: Patient has no known allergies.      Anesthesia Evaluation  Patient Active Problem List   Diagnosis    Diabetic nephropathy associated with type 2 diabetes mellitus (HCC)    Essential hypertension    Type 2 diabetes mellitus with hyperglycemia (HCC)    Stage 3 chronic kidney disease (HCC)    Retinopathy due to secondary diabetes mellitus (HCC)    Atherosclerosis of native artery of extremity (HCC)    Stage 4 chronic kidney disease (HCC)    Acidosis    ESRD (end stage renal disease) (HCC)    ESRD on hemodialysis (HCC)    Pure hypercholesterolemia    Hemoptysis    Hypertensive urgency    Hematemesis with nausea    Hematemesis, unspecified whether  nausea present    Acute gastritis with hemorrhage, unspecified gastritis type    Uremia        Past Medical History:    Belching    Dialysis patient (HCC)    Diarrhea, unspecified    Esophageal reflux    Essential hypertension    Fatigue    Flatulence/gas pain/belching    High blood pressure    High cholesterol    History of blood transfusion    Hyperlipidemia    Indigestion    Irregular bowel habits    Night sweats    Osteoporosis    Peripheral vascular disease (HCC)    Pure hypercholesterolemia    Type II or unspecified type diabetes mellitus without mention of complication, not stated as uncontrolled    Visual impairment    reading glasses    Vomiting        Past Surgical History:   Procedure Laterality Date    Amputation toe,i-p jt Right     Colonoscopy N/A 02/20/2023    Procedure: COLONOSCOPY;  Surgeon: Sarmad Gonzalez MD;  Location:  ENDOSCOPY    Colonoscopy      Upper gi endoscopy,exam       Social History     Socioeconomic History    Marital status:    Tobacco Use    Smoking status: Never    Smokeless tobacco: Never    Tobacco comments:     none   Vaping Use    Vaping status: Never Used   Substance and Sexual Activity    Alcohol use: Not Currently     Comment: none    Drug use: Never    Sexual activity: Yes     Partners: Female     History   Drug Use Unknown     Available pre-op labs reviewed.  Lab Results   Component Value Date    WBC 11.3 (H) 03/28/2024    RBC 3.34 (L) 03/28/2024    HGB 9.8 (L) 03/29/2024    HCT 28.8 (L) 03/28/2024    MCV 86.2 03/28/2024    MCH 28.1 03/28/2024    MCHC 32.6 03/28/2024    RDW 13.9 03/28/2024    .0 03/28/2024     Lab Results   Component Value Date     03/28/2024    K 4.5 03/28/2024     03/28/2024    CO2 20.0 (L) 03/28/2024    BUN 89 (H) 03/28/2024    CREATSERUM 7.64 (H) 03/28/2024     (H) 03/28/2024    CA 6.9 (L) 03/28/2024            Airway      Mallampati: I  Mouth opening: >3 FB  TM distance: 4 - 6 cm  Neck ROM: full  Cardiovascular      Rhythm: regular  Rate: normal     Dental    Dentition appears grossly intact         Pulmonary    Pulmonary exam normal.  Breath sounds clear to auscultation bilaterally.               Other findings              ASA: 3   Plan: general  NPO status verified and     Post-procedure pain management plan discussed with surgeon and patient.    Comment:  I explained intrinsic risks of general anesthesia, including nausea, dental damage, sore throat, mouth injury,and hoarseness from airway management.  All questions were answered and understanding was demonstrated of risks.  Informed permission was obtained to proceed as documented in the signed consent form.      Plan/risks discussed with: patient  Use of blood product(s) discussed with: patient    Consented to blood products.          Present on Admission:  **None**

## 2024-05-03 NOTE — ANESTHESIA POSTPROCEDURE EVALUATION
[facility] Patient Status:  Hospital Outpatient Surgery   Age/Gender 63 year old male MRN [id number]   Location [facility] POST ANESTHESIA CARE UNIT Attending Fermin De La Fuente MD   Hosp Day # 0 PCP Woody Dahl MD       Anesthesia Post-op Note    CREATION OF LEFT ARM FISTULA USING [id number] GORE PROPATEN VASCULAR GRAFT    Procedure Summary       Date: 05/03/24 Room / Location:  [facility] /  [facility]    Anesthesia Start: 0701 Anesthesia Stop: 0927    Procedure: CREATION OF LEFT ARM FISTULA USING [id number] GORE PROPATEN VASCULAR GRAFT (Left) Diagnosis: (SAME)    Surgeons: Fermin De La Fuente MD Anesthesiologist: Dread Carrasco MD    Anesthesia Type: general ASA Status: 3            Anesthesia Type: general    Vitals Value Taken Time   /64 05/03/24 0927   Temp 98.6 05/03/24 0927   Pulse 76 05/03/24 0927   Resp 12 05/03/24 0927   SpO2 100 % 05/03/24 0927   Vitals shown include unfiled device data.    Patient Location: PACU    Anesthesia Type: general    Airway Patency: patent    Postop Pain Control: adequate    Mental Status: preanesthetic baseline    Nausea/Vomiting: none    Cardiopulmonary/Hydration status: stable euvolemic    Complications: no apparent anesthesia related complications    Postop vital signs: stable    Dental Exam: Unchanged from Preop    Patient to be discharged from PACU when criteria met.

## 2024-05-04 NOTE — OPERATIVE REPORT
[facility]    PATIENT'S NAME: JULIUS FOY   ATTENDING PHYSICIAN: Fermin De La Fuente M.D.   OPERATING PHYSICIAN: Fermin De La Fuente M.D.   PATIENT ACCOUNT#:   [id number]    LOCATION:  [facility] 12 [facility] 10  MEDICAL RECORD #:   [id number]       YOB: 1960  ADMISSION DATE:       05/03/2024      OPERATION DATE:  05/03/2024    OPERATIVE REPORT      PREOPERATIVE DIAGNOSIS:  End-stage renal disease, for creation of a fistula.  POSTOPERATIVE DIAGNOSIS:  End-stage renal disease, for creation of a fistula; inadequate cephalic vein in the left arm and lack of length of left basilic vein.  PROCEDURE:  Creation of a left brachial artery to axillary vein 4 x 6 mm tapered [place]-Yemi graft fistula.     ASSISTANT:  ANASTASIIA Hansen.    INDICATIONS:  This is a 63-year-old male who needs a fistula for dialysis, currently with a right jugular permacath.  He had duplex ultrasound imaging of the left arm, did not have an adequate cephalic vein.  Had a basilic vein but not have enough length.  He had normal Jordan test.  He is right-handed.  He is recommended for a fistula in his left arm.  Risks and complications of autogenous versus prosthetic graft fistula were explained with death, cardiac complications, bleeding, infection, arterial steal with hand loss, venous hypertension with swelling of the arm, congestive heart failure, graft thrombosis, graft infection, future graft thrombosis, future graft infection.  Risks and complications of not doing the procedure were explained.  Alternative treatment of permacath and peritoneal dialysis also explained.  Patient understood, agreed.  All questions answered.    OPERATIVE TECHNIQUE:  The patient placed supine on procedure table, underwent general anesthesia, received preoperative antibiotics.  SCDs on both lower legs.  IV started by Anesthesia.  The left arm was imaged prior to procedure, and the cephalic vein was inadequate as well about 1 to 2 mm entire length.   The basilic vein was good, but it connected to the deep system in the mid arm to proximal arm and then it ended at the antecubital fossa with bunch of smaller branches and it seemed that not enough length for an adequate autogenous fistula.  Therefore, a PTFE graft was used.    The patient was prepped and draped in usual sterile technique with Ioban Vi-Drape used to cover the operative field.  The patient after time-out was done, incision in the left arm at the brachial artery and at the axillary vein.  Dissection brought down to expose both.  There were good quality vessels.  The patient had tunneling done with a Julianne-Wick tunneler and then had placement of a 4 x 6 mm tapered [place]-Yemi graft and being stretched.  The patient after greater than 5-minute period of time then had the Cotton clamp placed to the proximal brachial artery and a profunda clamp distally.  Arteriotomy was performed, extended with Ventura scissors approximately 4 to 5 mm.  Graft was anastomosed with 6-0 Prolene suture.  At the completion of this, hemostasis was obtained and flow established down the arm with care being taken to prevent any embolization of air or debris.    The graft had a good forward bleeding, was then trimmed to appropriate length, and then make sure it was not twisted and anastomosed to the proximal brachial vein, distal axillary vein also with 6-0 Prolene suture.  At the completion of this, flow was established through the fistula with care being taken to prevent any embolization of air or debris.  Hemostasis was obtained with Prolene, hemoclips, Bovie coagulation, Gelfoam and thrombin, and then with protamine.  The wounds were then closed in multiple layers with 2-0 Vicryl, skin was closed with 3-0 Vicryl subcuticular.  The area was anesthetized approximately 30 mL of 0.25% Marcaine plain.  Sponge and instrument counts correct.  No pathology specimen.  Doppler flow appreciated in the hand.  The blood loss about 100  mL.    Dictated By Fermin De La Fuente M.D.  d: 05/03/2024 09:07:57  t: 05/03/2024 19:03:21  Job [id number]  JJW/

## 2024-05-04 NOTE — DISCHARGE SUMMARY
[facility]    PATIENT'S NAME: JULIUS FOY   ATTENDING PHYSICIAN: Fermin De La Fuente M.D.   PATIENT ACCOUNT#:   [id number]    LOCATION:  [facility] 12 [facility] 10  MEDICAL RECORD #:   [id number]       YOB: 1960  ADMISSION DATE:       05/03/2024      DISCHARGE DATE:  05/03/2024    DISCHARGE SUMMARY    HISTORY AND HOSPITAL COURSE:  The patient underwent a left brachial-axillary prosthetic graft bypass of fistula for end-stage renal disease and inadequate vein available.  He tolerated the procedure well.  Wounds are clean and dry.    Dictated By Fermin De La Fuente M.D.  d: 05/03/2024 09:47:08  t: 05/03/2024 10:49:59  Job [id number]  JJW/

## 2024-05-29 ENCOUNTER — OFFICE VISIT (OUTPATIENT)
Dept: ORTHOPEDICS CLINIC | Facility: CLINIC | Age: 64
End: 2024-05-29

## 2024-05-29 VITALS — HEIGHT: 68 IN | BODY MASS INDEX: 26.67 KG/M2 | WEIGHT: 176 LBS

## 2024-05-29 DIAGNOSIS — M25.552 LEFT HIP PAIN: Primary | ICD-10-CM

## 2024-05-29 PROCEDURE — 99213 OFFICE O/P EST LOW 20 MIN: CPT | Performed by: PHYSICIAN ASSISTANT

## 2024-05-29 PROCEDURE — 3008F BODY MASS INDEX DOCD: CPT | Performed by: PHYSICIAN ASSISTANT

## 2024-05-29 RX ORDER — ASPIRIN 81 MG/1
1 TABLET ORAL DAILY
COMMUNITY

## 2024-05-29 NOTE — H&P
[facility] New Patient Note    CC:   Chief Complaint   Patient presents with    Knee Pain     He states that he fell about 6 weeks ago and hit his knee but once at home he developed pain in his left hip. He states that he went into see his pcp and ER.States no fx had xray's completed. States that he was prescribed medication for pain but now has no pain.        HPI: This 63 year old male presents today with complaints of history of left hip pain.  Patient reports that on 4/11/2024, he was exiting his dialysis facility with a walker and tripped on carpeting, causing him to land directly onto the front of his left knee.  He reports no significant knee pain or hip pain at the time but subsequently that night he began developing pain in around the left groin region with radiation to the lateral hip that worsened over the following 3 days.  He visited emergency department where x-rays and CT scans failed to demonstrate any acute fracture.  He was discharged with naproxen and Flexeril which gradually resolved his hip pain.  He has since been doing well and able to ambulate independently.  He reports no history of hip pain prior to the fall.    Past Medical History:    Belching    Dialysis patient (HCC)    Diarrhea, unspecified    Esophageal reflux    Essential hypertension    Fatigue    Flatulence/gas pain/belching    High blood pressure    High cholesterol    History of blood transfusion    Hyperlipidemia    Indigestion    Irregular bowel habits    Night sweats    Osteoporosis    Peripheral vascular disease (HCC)    Pure hypercholesterolemia    Type II or unspecified type diabetes mellitus without mention of complication, not stated as uncontrolled    Visual impairment    reading glasses    Vomiting     Past Surgical History:   Procedure Laterality Date    Amputation toe,i-p jt Right     Colonoscopy N/A 02/20/2023    Procedure: COLONOSCOPY;  Surgeon: Sarmad Gonzalez MD;  Location:  ENDOSCOPY    Colonoscopy       Upper gi endoscopy,exam       Current Outpatient Medications   Medication Sig Dispense Refill    aspirin 81 MG Oral Tab EC Take 1 tablet (81 mg total) by mouth daily.      amLODIPine 2.5 MG Oral Tab Take 4 tablets (10 mg total) by mouth daily. 30 tablet 0    HYDROcodone-acetaminophen 5-325 MG Oral Tab Take 1-2 tablets by mouth every 4 (four) hours as needed for Pain. 30 tablet 0    Insulin Pen Needle (BD PEN NEEDLE AMBER 2ND GEN) 32G X 4 MM Does not apply Misc Use to inject insulin up to 5 times daily 450 each 1    insulin detemir (LEVEMIR FLEXPEN) 100 UNIT/ML Subcutaneous Solution Pen-injector Inject 17 Units into the skin nightly. 3 month supply 15 mL 1    amLODIPine 10 MG Oral Tab Take 1 tablet (10 mg total) by mouth daily. 30 tablet 1     No Known Allergies  Family History   Problem Relation Age of Onset    Heart Attack Father     Heart Disorder Father 57    Diabetes Maternal Grandfather     Diabetes Maternal Aunt      Social History     Occupational History    Not on file   Tobacco Use    Smoking status: Never    Smokeless tobacco: Never    Tobacco comments:     none   Vaping Use    Vaping status: Never Used   Substance and Sexual Activity    Alcohol use: Not Currently     Comment: none    Drug use: Never    Sexual activity: Yes     Partners: Female        ROS:  Comprehensive system review obtained and negative except as mentioned above    Physical Exam:    Ht 5' 8\" (1.727 m)   Wt 176 lb (79.8 kg)   BMI 26.76 kg/m²   Constitutional: Awake, alert, no distress. Very pleasant.  Psychological: Appropriate affect.  Respiratory: Unlabored breathing.  Left lower extremity:  Inspection: skin is intact without any redness, deformity, or effusion.   Palpation: No tenderness about the greater trochanter of the left hip.  Range of motion: Internal rotation of hip to approximately 35 degrees. External rotation of hip to approximately 45 degrees.  No pain with range of motion of the hip.  Stinchfield test negative.    Neuromuscular: Strength is normal and sensation is intact.  Vascular: Extremities are warm and well-perfused.  Lymph: Unremarkable.    Imaging: Imaging was personally viewed, independently interpreted and radiology report read.  Radiographs of the left hip demonstrate subtle subchondral sclerosis along the superior acetabulum but otherwise no significant joint space narrowing or Degenerative changes.    Assessment/Plan:  Diagnoses and all orders for this visit:    Left hip pain      Assessment: 63-year-old male with resolved left hip pain    Plan: I reassured the patient that his radiographs demonstrate only minimal degenerative changes about the hip and I would not anticipate any significant long-term issues at this point in time.  He may continue with activities as tolerated, continuing to maintain a healthy lifestyle to maintain the strength around the hip.  I did discuss possible turmeric and glucosamine/chondroitin supplementation but I would recommend he clear this with his nephrologist first.  He can follow-up with us in clinic as needed at this point in time.  His questions were sought and answered satisfactorily.  He is very happy with the plan will follow as advised.      Adalid Landon PA-C  [facility]    This note was dictated using Dragon software.  While it was briefly proofread prior to completion, some grammatical, spelling, and word choice errors due to dictation may still occur.

## 2024-07-24 PROBLEM — E11.22 TYPE 2 DIABETES MELLITUS WITH DIABETIC CHRONIC KIDNEY DISEASE (HCC): Status: ACTIVE | Noted: 2023-01-01

## 2024-07-24 PROBLEM — Z89.511 ACQUIRED ABSENCE OF RIGHT LEG BELOW KNEE (HCC): Status: ACTIVE | Noted: 2023-01-01

## 2024-07-24 PROBLEM — N17.9 ACUTE KIDNEY FAILURE, UNSPECIFIED: Status: ACTIVE | Noted: 2023-01-01

## 2024-07-24 PROBLEM — N18.9 ANEMIA IN CHRONIC KIDNEY DISEASE: Status: ACTIVE | Noted: 2023-01-01

## 2024-07-24 PROBLEM — M81.0 AGE-RELATED OSTEOPOROSIS WITHOUT CURRENT PATHOLOGICAL FRACTURE: Status: ACTIVE | Noted: 2023-01-01

## 2024-07-24 PROBLEM — Z47.81 ENCOUNTER FOR ORTHOPEDIC AFTERCARE FOLLOWING SURGICAL AMPUTATION: Status: ACTIVE | Noted: 2023-01-01

## 2024-07-24 PROBLEM — E10.10 TYPE 1 DIABETES MELLITUS WITH KETOACIDOSIS WITHOUT COMA (HCC): Status: ACTIVE | Noted: 2022-01-03

## 2024-07-24 PROBLEM — N18.4 CHRONIC KIDNEY DISEASE, STAGE 4 (SEVERE) (HCC): Status: ACTIVE | Noted: 2023-01-01

## 2024-07-24 PROBLEM — N17.9 ACUTE KIDNEY FAILURE, UNSPECIFIED (HCC): Status: ACTIVE | Noted: 2023-01-01

## 2024-07-24 PROBLEM — I12.9 HYPERTENSIVE CHRONIC KIDNEY DISEASE W STG 1-4/UNSP CHR KDNY: Status: ACTIVE | Noted: 2023-01-01

## 2024-07-24 PROBLEM — S88.111S UNILATERAL COMPLETE BKA, RIGHT, SEQUELA (HCC): Status: ACTIVE | Noted: 2023-06-20

## 2024-07-24 PROBLEM — Z99.2 END-STAGE RENAL DISEASE ON HEMODIALYSIS (HCC): Status: ACTIVE | Noted: 2024-01-30

## 2024-07-24 PROBLEM — Z79.82 LONG TERM (CURRENT) USE OF ASPIRIN: Status: ACTIVE | Noted: 2023-01-01

## 2024-07-24 PROBLEM — E78.5 DYSLIPIDEMIA: Status: ACTIVE | Noted: 2020-09-07

## 2024-07-24 PROBLEM — M86.9 OSTEOMYELITIS OF RIGHT FOOT (HCC): Status: ACTIVE | Noted: 2020-07-06

## 2024-07-24 PROBLEM — N18.6 END-STAGE RENAL DISEASE ON HEMODIALYSIS (HCC): Status: ACTIVE | Noted: 2024-01-30

## 2024-07-24 PROBLEM — E11.51 TYPE 2 DIABETES MELLITUS WITH DIABETIC PERIPHERAL ANGIOPATHY WITHOUT GANGRENE (HCC): Status: ACTIVE | Noted: 2023-01-01

## 2024-07-24 PROBLEM — D63.1 ANEMIA IN CHRONIC KIDNEY DISEASE: Status: ACTIVE | Noted: 2023-01-01

## 2024-07-24 PROBLEM — N18.30 CKD (CHRONIC KIDNEY DISEASE), STAGE III (HCC): Status: ACTIVE | Noted: 2023-01-01

## 2024-08-08 ENCOUNTER — PATIENT MESSAGE (OUTPATIENT)
Dept: INTERNAL MEDICINE CLINIC | Facility: CLINIC | Age: 64
End: 2024-08-08

## 2024-08-09 NOTE — TELEPHONE ENCOUNTER
From: Richy Goins  To: Woody Dahl  Sent: 8/8/2024 12:08 PM CDT  Subject: EAR RINGING    Frandy Dahl,    I have this constant ringing in my ear. Would you happen to have a referral?    I appreciate your help.    Thank You,  Ramos Goins  [phone]

## 2024-08-16 ENCOUNTER — NURSE TRIAGE (OUTPATIENT)
Dept: INTERNAL MEDICINE CLINIC | Facility: CLINIC | Age: 64
End: 2024-08-16

## 2024-08-16 NOTE — TELEPHONE ENCOUNTER
Action Requested: Summary for Provider     []  Critical Lab, Recommendations Needed  [] Need Additional Advice  []   FYI    []   Need Orders  [] Need Medications Sent to Pharmacy  []  Other     SUMMARY: Pt called. He reports ringing/buzzing in bilateral ears for the past month. He states the ringing is constant and has been interfering with his sleep. He denies any injury, hearing loss, pain, redness or swelling around ears. OV scheduled with AD for 8/19/24.    Reason for call: Ear Problem  Onset: 1 month ago  LOV: 3/15/24               Ringing/buzzing in bilateral ears x1 month  Denies injury, hearing loss, pain, fever, redness or swelling around ears  Describes it as constant, interferes with his sleep, can hear it even when talking    Reason for Disposition   MODERATE-SEVERE tinnitus (i.e., interferes with work, school, or sleep)    Protocols used: Tinnitus-A-OH

## 2024-08-19 ENCOUNTER — OFFICE VISIT (OUTPATIENT)
Dept: INTERNAL MEDICINE CLINIC | Facility: CLINIC | Age: 64
End: 2024-08-19
Payer: COMMERCIAL

## 2024-08-19 VITALS
HEIGHT: 68 IN | SYSTOLIC BLOOD PRESSURE: 138 MMHG | RESPIRATION RATE: 18 BRPM | DIASTOLIC BLOOD PRESSURE: 76 MMHG | BODY MASS INDEX: 29.13 KG/M2 | WEIGHT: 192.19 LBS | HEART RATE: 82 BPM | OXYGEN SATURATION: 98 % | TEMPERATURE: 97 F

## 2024-08-19 DIAGNOSIS — E11.65 TYPE 2 DIABETES MELLITUS WITH HYPERGLYCEMIA, WITH LONG-TERM CURRENT USE OF INSULIN (HCC): Primary | ICD-10-CM

## 2024-08-19 DIAGNOSIS — Z79.4 TYPE 2 DIABETES MELLITUS WITH HYPERGLYCEMIA, WITH LONG-TERM CURRENT USE OF INSULIN (HCC): Primary | ICD-10-CM

## 2024-08-19 DIAGNOSIS — H93.13 TINNITUS, BILATERAL: ICD-10-CM

## 2024-08-19 DIAGNOSIS — D69.2 SENILE PURPURA (HCC): ICD-10-CM

## 2024-08-19 DIAGNOSIS — Z12.5 PROSTATE CANCER SCREENING: ICD-10-CM

## 2024-08-19 PROCEDURE — 3075F SYST BP GE 130 - 139MM HG: CPT | Performed by: INTERNAL MEDICINE

## 2024-08-19 PROCEDURE — 3078F DIAST BP <80 MM HG: CPT | Performed by: INTERNAL MEDICINE

## 2024-08-19 PROCEDURE — 3008F BODY MASS INDEX DOCD: CPT | Performed by: INTERNAL MEDICINE

## 2024-08-19 PROCEDURE — 99214 OFFICE O/P EST MOD 30 MIN: CPT | Performed by: INTERNAL MEDICINE

## 2024-08-19 NOTE — PROGRESS NOTES
Richy Goins  12/7/1960    Chief Complaint   Patient presents with    Tinnitus     EJ RM 6- Pt is here for possible tinnitus for a month       SUBJECTIVE   Richy Goins is a 63 year old male who presents for evaluation.    The patient reports a 4-week duration of a bilateral tinnitus, the symptoms of which were spontaneous in onset and without any changes in medical management.  No nasal or sinus congestion.  No headache or hearing loss.  No dizziness.  Furthermore, he reports favorable blood glucose control.  No recent laboratory evaluation, with most recent hemoglobin A1c completed in January of 7.5%.  He is due for follow-up with the diabetes clinic.  He also reports bruising involving the bilateral upper extremities without preceding trauma or without any bleeding.  No further acute concerns at this time.    Review of Systems   No f/c/chest pain or sob. No cough. No abd pain/n/v/d. No ha or dizziness. No numbness, tingling, or weakness. No other complaints today.    Current Outpatient Medications   Medication Sig Dispense Refill    lisinopril 10 MG Oral Tab Take 1 tablet (10 mg total) by mouth daily.      pantoprazole 40 MG Oral Tab EC Take one tablet (40 mg total) by mouth once daily, 30 minutes prior to breakfast. 90 tablet 3    aspirin 81 MG Oral Tab EC Take 1 tablet (81 mg total) by mouth daily.      amLODIPine 2.5 MG Oral Tab Take 4 tablets (10 mg total) by mouth daily. 30 tablet 0    HYDROcodone-acetaminophen 5-325 MG Oral Tab Take 1-2 tablets by mouth every 4 (four) hours as needed for Pain. 30 tablet 0    Insulin Pen Needle (BD PEN NEEDLE AMBER 2ND GEN) 32G X 4 MM Does not apply Misc Use to inject insulin up to 5 times daily 450 each 1    insulin detemir (LEVEMIR FLEXPEN) 100 UNIT/ML Subcutaneous Solution Pen-injector Inject 17 Units into the skin nightly. 3 month supply 15 mL 1    amLODIPine 10 MG Oral Tab Take 1 tablet (10 mg total) by mouth daily. 30 tablet 1      No Known Allergies   Past  Medical History:    Belching    Dialysis patient (HCC)    Diarrhea, unspecified    Esophageal reflux    Essential hypertension    Fatigue    Flatulence/gas pain/belching    High blood pressure    High cholesterol    History of blood transfusion    Hyperlipidemia    Indigestion    Irregular bowel habits    Night sweats    Osteoporosis    Peripheral vascular disease (HCC)    Pure hypercholesterolemia    Type II or unspecified type diabetes mellitus without mention of complication, not stated as uncontrolled    Visual impairment    reading glasses    Vomiting      Patient Active Problem List   Diagnosis    Diabetic nephropathy associated with type 2 diabetes mellitus (HCC)    Essential hypertension    Type 2 diabetes mellitus with hyperglycemia (HCC)    Stage 3 chronic kidney disease (HCC)    Retinopathy due to secondary diabetes mellitus (HCC)    Atherosclerosis of native artery of extremity (HCC)    Stage 4 chronic kidney disease (HCC)    Acidosis    ESRD (end stage renal disease) (HCC)    ESRD on hemodialysis (HCC)    Pure hypercholesterolemia    Hemoptysis    Hypertensive urgency    Hematemesis with nausea    Hematemesis, unspecified whether nausea present    Acute gastritis with hemorrhage, unspecified gastritis type    Uremia    Acquired absence of right leg below knee (HCC)    Chronic kidney disease, stage 4 (severe) (HCC)    End-stage renal disease on hemodialysis (HCC)    Osteomyelitis of right foot (HCC)    Dyslipidemia    Diabetes mellitus type 2 in nonobese (HCC)    Unilateral complete BKA, right, sequela (HCC)    Type 2 diabetes mellitus with diabetic peripheral angiopathy without gangrene (HCC)    Type 2 diabetes mellitus with diabetic chronic kidney disease (HCC)    Long term (current) use of aspirin    Hypertensive chronic kidney disease w stg 1-4/unsp chr kdny    Encounter for orthopedic aftercare following surgical amputation    Anemia in chronic kidney disease    Age-related osteoporosis without  current pathological fracture    Acute kidney failure, unspecified (HCC)    CKD (chronic kidney disease), stage III (HCC)    Type 1 diabetes mellitus with ketoacidosis without coma (HCC)    Senile purpura (HCC)      Past Surgical History:   Procedure Laterality Date    Amputation toe,i-p jt Right     Colonoscopy N/A 02/20/2023    Procedure: COLONOSCOPY;  Surgeon: Sarmad Gonzalez MD;  Location:  ENDOSCOPY    Colonoscopy      Upper gi endoscopy,exam        Social History     Socioeconomic History    Marital status:    Tobacco Use    Smoking status: Never    Smokeless tobacco: Never    Tobacco comments:     none   Vaping Use    Vaping status: Never Used   Substance and Sexual Activity    Alcohol use: Not Currently     Comment: none    Drug use: Never    Sexual activity: Yes     Partners: Female     Social Determinants of Health     Financial Resource Strain: Low Risk  (10/16/2023)    Financial Resource Strain     Difficulty of Paying Living Expenses: Not very hard     Med Affordability: No   Food Insecurity: No Food Insecurity (1/30/2024)    Food Insecurity     Food Insecurity: Never true   Transportation Needs: Unmet Transportation Needs (4/1/2024)    Transportation Needs     Lack of Transportation: Yes   Housing Stability: Low Risk  (1/30/2024)    Housing Stability     Housing Instability: No         OBJECTIVE:   /76   Pulse 82   Temp 97.2 °F (36.2 °C) (Temporal)   Resp 18   Ht 5' 8\" (1.727 m)   Wt 192 lb 3.2 oz (87.2 kg)   SpO2 98%   BMI 29.22 kg/m²   Constitutional: Oriented to person, place, and time. No distress.   HEENT:  Normocephalic and atraumatic.TM's wnl.    Cardiovascular: Normal rate, regular rhythm and intact distal pulses.  No murmur, rubs or gallops.   Pulmonary/Chest: Effort normal and breath sounds normal. No respiratory distress.  Abdominal: Soft. Bowel sounds are normal. Non tender, no masses, no organomegaly or hernias.  Musculoskeletal: No edema   Skin: Purpura involving the  distal bilateral upper extremities  Psychiatric: Normal mood and affect.     ASSESSMENT AND PLAN:   Richy Goins is a 63 year old male who presents for evaluation.    Outstanding screening and preventive measures:  Shingles immunization: To be obtained from pharmacy  Screening for prostate cancer: PSA ordered     Diabetes mellitus type 2:  Most recent hemoglobin A1c of 7.5%; advised to follow-up with endocrinology service.  Laboratory evaluation ordered.    Senile purpura:  May also be worsened by uremia induced platelet dysfunction and aspirin use  Patient reassured    Bilateral tinnitus:  Current aspirin use  No associated symptoms, including dizziness, headache, or hearing loss  Discussed that there is likely not anything that can be done for his symptoms, however the patient was referred to the ENT service for evaluation      The patient indicates understanding of these issues and agrees to the plan.  TODAY'S ORDERS     Orders Placed This Encounter   Procedures    CMP Now    Lipids Now    Hemoglobin A1C Now    Microalb/Creat Ratio, Random Urine Now    PSA, Total (Screening) [E]       Meds & Refills:  Requested Prescriptions      No prescriptions requested or ordered in this encounter       Imaging & Consults:  None    No follow-ups on file.  There are no Patient Instructions on file for this visit.    All questions were answered and the patient agrees with the plan.     Thank you,  Woody Dahl MD

## 2024-08-28 ENCOUNTER — HOSPITAL ENCOUNTER (OUTPATIENT)
Dept: ULTRASOUND IMAGING | Facility: HOSPITAL | Age: 64
Discharge: HOME | End: 2024-08-28
Attending: SURGERY
Payer: COMMERCIAL

## 2024-08-28 DIAGNOSIS — T82.848A PAIN FROM ARTERIOVENOUS FISTULA (HCC): ICD-10-CM

## 2024-08-28 PROCEDURE — 93990 DOPPLER FLOW TESTING: CPT | Performed by: SURGERY

## 2024-10-01 ENCOUNTER — HOSPITAL ENCOUNTER (INPATIENT)
Facility: HOSPITAL | Age: 64
LOS: 2 days | Discharge: HOME | End: 2024-10-03
Attending: EMERGENCY MEDICINE | Admitting: HOSPITALIST
Payer: COMMERCIAL

## 2024-10-01 DIAGNOSIS — R74.8 ELEVATED LIPASE: ICD-10-CM

## 2024-10-01 DIAGNOSIS — R11.0 NAUSEA: ICD-10-CM

## 2024-10-01 DIAGNOSIS — N18.6 ESRD (END STAGE RENAL DISEASE) (HCC): ICD-10-CM

## 2024-10-01 DIAGNOSIS — N19 UREMIA: Primary | ICD-10-CM

## 2024-10-01 PROBLEM — N17.9 ACUTE RENAL FAILURE (ARF) (HCC): Status: ACTIVE | Noted: 2024-10-01

## 2024-10-01 PROBLEM — E87.20 METABOLIC ACIDOSIS: Status: ACTIVE | Noted: 2024-10-01

## 2024-10-01 PROBLEM — D64.9 ANEMIA: Status: ACTIVE | Noted: 2024-10-01

## 2024-10-01 PROBLEM — E87.0 HYPERNATREMIA: Status: ACTIVE | Noted: 2024-10-01

## 2024-10-01 PROBLEM — N17.9 ACUTE KIDNEY INJURY (HCC): Status: ACTIVE | Noted: 2024-10-01

## 2024-10-01 PROBLEM — N17.9 ACUTE RENAL FAILURE (ARF): Status: ACTIVE | Noted: 2024-10-01

## 2024-10-01 PROBLEM — N17.9 ACUTE KIDNEY INJURY: Status: ACTIVE | Noted: 2024-10-01

## 2024-10-01 PROBLEM — R73.9 HYPERGLYCEMIA: Status: ACTIVE | Noted: 2024-10-01

## 2024-10-01 LAB
ADENOVIRUS PCR:: NOT DETECTED
ALBUMIN SERPL-MCNC: 4.3 G/DL (ref 3.2–4.8)
ALBUMIN/GLOB SERPL: 1.4 {RATIO} (ref 1–2)
ALP LIVER SERPL-CCNC: 112 U/L
ALT SERPL-CCNC: 13 U/L
ANION GAP SERPL CALC-SCNC: 15 MMOL/L (ref 0–18)
AST SERPL-CCNC: 13 U/L (ref ?–34)
B PARAPERT DNA SPEC QL NAA+PROBE: NOT DETECTED
B PERT DNA SPEC QL NAA+PROBE: NOT DETECTED
BASOPHILS # BLD AUTO: 0.02 X10(3) UL (ref 0–0.2)
BASOPHILS NFR BLD AUTO: 0.2 %
BILIRUB SERPL-MCNC: 0.4 MG/DL (ref 0.2–1.1)
BUN BLD-MCNC: 124 MG/DL (ref 9–23)
C PNEUM DNA SPEC QL NAA+PROBE: NOT DETECTED
CALCIUM BLD-MCNC: 8.1 MG/DL (ref 8.7–10.4)
CHLORIDE SERPL-SCNC: 116 MMOL/L (ref 98–112)
CHOLEST SERPL-MCNC: 204 MG/DL (ref ?–200)
CO2 SERPL-SCNC: 14 MMOL/L (ref 21–32)
CORONAVIRUS 229E PCR:: NOT DETECTED
CORONAVIRUS HKU1 PCR:: NOT DETECTED
CORONAVIRUS NL63 PCR:: NOT DETECTED
CORONAVIRUS OC43 PCR:: NOT DETECTED
CREAT BLD-MCNC: 8.39 MG/DL
EGFRCR SERPLBLD CKD-EPI 2021: 7 ML/MIN/1.73M2 (ref 60–?)
EOSINOPHIL # BLD AUTO: 0.05 X10(3) UL (ref 0–0.7)
EOSINOPHIL NFR BLD AUTO: 0.4 %
ERYTHROCYTE [DISTWIDTH] IN BLOOD BY AUTOMATED COUNT: 14.2 %
EST. AVERAGE GLUCOSE BLD GHB EST-MCNC: 203 MG/DL (ref 68–126)
FLUAV RNA SPEC QL NAA+PROBE: NOT DETECTED
FLUBV RNA SPEC QL NAA+PROBE: NOT DETECTED
GLOBULIN PLAS-MCNC: 3.1 G/DL (ref 2–3.5)
GLUCOSE BLD-MCNC: 129 MG/DL (ref 70–99)
GLUCOSE BLD-MCNC: 145 MG/DL (ref 70–99)
GLUCOSE BLD-MCNC: 159 MG/DL (ref 70–99)
GLUCOSE BLD-MCNC: 162 MG/DL (ref 70–99)
GLUCOSE BLD-MCNC: 93 MG/DL (ref 70–99)
HBA1C MFR BLD: 8.7 % (ref ?–5.7)
HBV SURFACE AG SER-ACNC: <0.1 [IU]/L
HBV SURFACE AG SERPL QL IA: NONREACTIVE
HCT VFR BLD AUTO: 32 %
HDLC SERPL-MCNC: 25 MG/DL (ref 40–59)
HGB BLD-MCNC: 10.7 G/DL
IMM GRANULOCYTES # BLD AUTO: 0.06 X10(3) UL (ref 0–1)
IMM GRANULOCYTES NFR BLD: 0.5 %
LDLC SERPL CALC-MCNC: 130 MG/DL (ref ?–100)
LIPASE SERPL-CCNC: 60 U/L (ref 12–53)
LYMPHOCYTES # BLD AUTO: 0.81 X10(3) UL (ref 1–4)
LYMPHOCYTES NFR BLD AUTO: 6.7 %
MCH RBC QN AUTO: 28.5 PG (ref 26–34)
MCHC RBC AUTO-ENTMCNC: 33.4 G/DL (ref 31–37)
MCV RBC AUTO: 85.3 FL
METAPNEUMOVIRUS PCR:: NOT DETECTED
MONOCYTES # BLD AUTO: 0.53 X10(3) UL (ref 0.1–1)
MONOCYTES NFR BLD AUTO: 4.4 %
MYCOPLASMA PNEUMONIA PCR:: NOT DETECTED
NEUTROPHILS # BLD AUTO: 10.71 X10 (3) UL (ref 1.5–7.7)
NEUTROPHILS # BLD AUTO: 10.71 X10(3) UL (ref 1.5–7.7)
NEUTROPHILS NFR BLD AUTO: 87.8 %
NONHDLC SERPL-MCNC: 179 MG/DL (ref ?–130)
OSMOLALITY SERPL CALC.SUM OF ELEC: 343 MOSM/KG (ref 275–295)
PARAINFLUENZA 1 PCR:: NOT DETECTED
PARAINFLUENZA 2 PCR:: NOT DETECTED
PARAINFLUENZA 3 PCR:: NOT DETECTED
PARAINFLUENZA 4 PCR:: DETECTED
PLATELET # BLD AUTO: 238 10(3)UL (ref 150–450)
POTASSIUM SERPL-SCNC: 5 MMOL/L (ref 3.5–5.1)
PROT SERPL-MCNC: 7.4 G/DL (ref 5.7–8.2)
RBC # BLD AUTO: 3.75 X10(6)UL
RHINOVIRUS/ENTERO PCR:: NOT DETECTED
RSV RNA SPEC QL NAA+PROBE: NOT DETECTED
SARS-COV-2 RNA NPH QL NAA+NON-PROBE: NOT DETECTED
SODIUM SERPL-SCNC: 145 MMOL/L (ref 136–145)
TRIGL SERPL-MCNC: 272 MG/DL (ref 30–149)
TROPONIN I SERPL HS-MCNC: 59 NG/L
VLDLC SERPL CALC-MCNC: 50 MG/DL (ref 0–30)
WBC # BLD AUTO: 12.2 X10(3) UL (ref 4–11)

## 2024-10-01 PROCEDURE — 99223 1ST HOSP IP/OBS HIGH 75: CPT | Performed by: HOSPITALIST

## 2024-10-01 PROCEDURE — 99223 1ST HOSP IP/OBS HIGH 75: CPT | Performed by: INTERNAL MEDICINE

## 2024-10-01 PROCEDURE — 5A1D70Z PERFORMANCE OF URINARY FILTRATION, INTERMITTENT, LESS THAN 6 HOURS PER DAY: ICD-10-PCS | Performed by: INTERNAL MEDICINE

## 2024-10-01 RX ORDER — ECHINACEA PURPUREA EXTRACT 125 MG
1 TABLET ORAL
Status: DISCONTINUED | OUTPATIENT
Start: 2024-10-01 | End: 2024-10-03

## 2024-10-01 RX ORDER — NICOTINE POLACRILEX 4 MG
15 LOZENGE BUCCAL
Status: DISCONTINUED | OUTPATIENT
Start: 2024-10-01 | End: 2024-10-03

## 2024-10-01 RX ORDER — BENZONATATE 200 MG/1
200 CAPSULE ORAL 3 TIMES DAILY PRN
Status: DISCONTINUED | OUTPATIENT
Start: 2024-10-01 | End: 2024-10-03

## 2024-10-01 RX ORDER — ACETAMINOPHEN 500 MG
1000 TABLET ORAL EVERY 4 HOURS PRN
Status: DISCONTINUED | OUTPATIENT
Start: 2024-10-01 | End: 2024-10-03

## 2024-10-01 RX ORDER — MELATONIN
3 NIGHTLY PRN
Status: DISCONTINUED | OUTPATIENT
Start: 2024-10-01 | End: 2024-10-03

## 2024-10-01 RX ORDER — POLYETHYLENE GLYCOL 3350 17 G/17G
17 POWDER, FOR SOLUTION ORAL DAILY PRN
Status: DISCONTINUED | OUTPATIENT
Start: 2024-10-01 | End: 2024-10-03

## 2024-10-01 RX ORDER — GUAIFENESIN 600 MG/1
600 TABLET, EXTENDED RELEASE ORAL 2 TIMES DAILY
Status: DISCONTINUED | OUTPATIENT
Start: 2024-10-01 | End: 2024-10-03

## 2024-10-01 RX ORDER — METOCLOPRAMIDE HYDROCHLORIDE 5 MG/ML
5 INJECTION INTRAMUSCULAR; INTRAVENOUS EVERY 8 HOURS PRN
Status: DISCONTINUED | OUTPATIENT
Start: 2024-10-01 | End: 2024-10-03

## 2024-10-01 RX ORDER — ONDANSETRON 2 MG/ML
4 INJECTION INTRAMUSCULAR; INTRAVENOUS EVERY 6 HOURS PRN
Status: DISCONTINUED | OUTPATIENT
Start: 2024-10-01 | End: 2024-10-01

## 2024-10-01 RX ORDER — DEXTROSE MONOHYDRATE 25 G/50ML
50 INJECTION, SOLUTION INTRAVENOUS
Status: DISCONTINUED | OUTPATIENT
Start: 2024-10-01 | End: 2024-10-03

## 2024-10-01 RX ORDER — NICOTINE POLACRILEX 4 MG
30 LOZENGE BUCCAL
Status: DISCONTINUED | OUTPATIENT
Start: 2024-10-01 | End: 2024-10-03

## 2024-10-01 RX ORDER — ONDANSETRON 2 MG/ML
4 INJECTION INTRAMUSCULAR; INTRAVENOUS ONCE
Status: COMPLETED | OUTPATIENT
Start: 2024-10-01 | End: 2024-10-01

## 2024-10-01 RX ORDER — PANTOPRAZOLE SODIUM 40 MG/1
40 TABLET, DELAYED RELEASE ORAL
Status: DISCONTINUED | OUTPATIENT
Start: 2024-10-01 | End: 2024-10-03

## 2024-10-01 RX ORDER — HEPARIN SODIUM 5000 [USP'U]/ML
5000 INJECTION, SOLUTION INTRAVENOUS; SUBCUTANEOUS EVERY 12 HOURS SCHEDULED
Status: DISCONTINUED | OUTPATIENT
Start: 2024-10-01 | End: 2024-10-03

## 2024-10-01 RX ORDER — LISINOPRIL 10 MG/1
10 TABLET ORAL DAILY
Status: DISCONTINUED | OUTPATIENT
Start: 2024-10-01 | End: 2024-10-03

## 2024-10-01 RX ORDER — AMLODIPINE BESYLATE 10 MG/1
10 TABLET ORAL DAILY
Status: DISCONTINUED | OUTPATIENT
Start: 2024-10-01 | End: 2024-10-03

## 2024-10-01 RX ORDER — SENNOSIDES 8.6 MG
17.2 TABLET ORAL NIGHTLY PRN
Status: DISCONTINUED | OUTPATIENT
Start: 2024-10-01 | End: 2024-10-03

## 2024-10-01 RX ORDER — BISACODYL 10 MG
10 SUPPOSITORY, RECTAL RECTAL
Status: DISCONTINUED | OUTPATIENT
Start: 2024-10-01 | End: 2024-10-03

## 2024-10-01 RX ORDER — ONDANSETRON 2 MG/ML
4 INJECTION INTRAMUSCULAR; INTRAVENOUS EVERY 4 HOURS PRN
Status: DISCONTINUED | OUTPATIENT
Start: 2024-10-01 | End: 2024-10-01

## 2024-10-01 RX ORDER — ONDANSETRON 2 MG/ML
4 INJECTION INTRAMUSCULAR; INTRAVENOUS EVERY 6 HOURS PRN
Status: DISCONTINUED | OUTPATIENT
Start: 2024-10-01 | End: 2024-10-03

## 2024-10-01 RX ORDER — SODIUM CHLORIDE 9 MG/ML
1000 INJECTION, SOLUTION INTRAVENOUS ONCE
Status: COMPLETED | OUTPATIENT
Start: 2024-10-01 | End: 2024-10-01

## 2024-10-01 RX ORDER — ASPIRIN 81 MG/1
81 TABLET ORAL DAILY
Status: DISCONTINUED | OUTPATIENT
Start: 2024-10-01 | End: 2024-10-03

## 2024-10-01 NOTE — ED PROVIDER NOTES
Patient Seen in: [facility]      History     Chief Complaint   Patient presents with    Nausea/Vomiting/Diarrhea     Stated Complaint: Dialysis pt, \"not feeling good, retching x 3days\"    Subjective:   HPI    With viral syndrome going to the house that started about 2 weeks ago whole family seem to have some sort of virus with nausea diarrhea body aches some coughing patient beginning to feel better from the viral standpoint but now nauseous very fatigued brought into the emergency department did not take his blood pressure medication this morning has not been to dialysis since September 20 states she just feels generally unwell and having waves of retching.  No significant diarrhea does make urine nephrologist is Dr. Blackwell did not take COVID test when sick    Objective:   Past Medical History:    Belching    Dialysis patient (HCC)    Diarrhea, unspecified    Esophageal reflux    Essential hypertension    Fatigue    Flatulence/gas pain/belching    High blood pressure    High cholesterol    History of blood transfusion    Hyperlipidemia    Indigestion    Irregular bowel habits    Night sweats    Osteoporosis    Peripheral vascular disease (HCC)    Pure hypercholesterolemia    Type II or unspecified type diabetes mellitus without mention of complication, not stated as uncontrolled    Visual impairment    reading glasses    Vomiting              Past Surgical History:   Procedure Laterality Date    Amputation toe,i-p jt Right     Colonoscopy N/A 02/20/2023    Procedure: COLONOSCOPY;  Surgeon: Sarmad Gonzalez MD;  Location:  ENDOSCOPY    Colonoscopy      Upper gi endoscopy,exam                  Social History     Socioeconomic History    Marital status:    Tobacco Use    Smoking status: Never    Smokeless tobacco: Never    Tobacco comments:     none   Vaping Use    Vaping status: Never Used   Substance and Sexual Activity    Alcohol use: Not Currently     Comment: none    Drug use:  Never    Sexual activity: Yes     Partners: Female     Social Determinants of Health     Financial Resource Strain: Low Risk  (10/16/2023)    Financial Resource Strain     Difficulty of Paying Living Expenses: Not very hard     Med Affordability: No   Food Insecurity: No Food Insecurity (1/30/2024)    Food Insecurity     Food Insecurity: Never true   Transportation Needs: Unmet Transportation Needs (4/1/2024)    Transportation Needs     Lack of Transportation: Yes   Housing Stability: Low Risk  (1/30/2024)    Housing Stability     Housing Instability: No              Review of Systems    Positive for stated Chief Complaint: Nausea/Vomiting/Diarrhea    Other systems are as noted in HPI.  Constitutional and vital signs reviewed.      All other systems reviewed and negative except as noted above.    Physical Exam     ED Triage Vitals [10/01/24 0544]   BP (!) 181/100   Pulse 103   Resp 18   Temp 98.5 °F (36.9 °C)   Temp src Temporal   SpO2 100 %   O2 Device None (Room air)       Current Vitals:   Vital Signs  BP: (!) 183/96  Pulse: 104  Resp: 20  Temp: 98.5 °F (36.9 °C)  Temp src: Temporal  MAP (mmHg): (!) 123    Oxygen Therapy  SpO2: 100 %  O2 Device: None (Room air)            Physical Exam  Constitutional:       General: He is not in acute distress.     Appearance: He is not ill-appearing or toxic-appearing.   HENT:      Head: Normocephalic.      Nose: Nose normal. No congestion or rhinorrhea.      Mouth/Throat:      Mouth: Mucous membranes are moist.      Pharynx: No oropharyngeal exudate or posterior oropharyngeal erythema.   Eyes:      Extraocular Movements: Extraocular movements intact.      Conjunctiva/sclera: Conjunctivae normal.      Pupils: Pupils are equal, round, and reactive to light.   Neck:      Vascular: No carotid bruit.   Cardiovascular:      Rate and Rhythm: Normal rate and regular rhythm.      Pulses: Normal pulses.      Heart sounds: Normal heart sounds.   Pulmonary:      Effort: Pulmonary effort  is normal. No respiratory distress.      Breath sounds: Normal breath sounds. No stridor. No wheezing, rhonchi or rales.   Chest:      Chest wall: No tenderness.   Abdominal:      General: Abdomen is flat. There is no distension.      Palpations: There is no mass.      Tenderness: There is no abdominal tenderness. There is no right CVA tenderness, left CVA tenderness, guarding or rebound.      Hernia: No hernia is present.   Musculoskeletal:      Cervical back: Normal range of motion and neck supple. No rigidity or tenderness.      Comments: Extremity below the knee amputation with prosthetic right lower extremity   Lymphadenopathy:      Cervical: No cervical adenopathy.   Skin:     Coloration: Skin is pale.   Neurological:      Mental Status: He is alert.               ED Course     Labs Reviewed   COMP METABOLIC PANEL (14) - Abnormal; Notable for the following components:       Result Value    Glucose 162 (*)     Chloride 116 (*)     CO2 14.0 (*)      (*)     Creatinine 8.39 (*)     Calcium, Total 8.1 (*)     Calculated Osmolality 343 (*)     eGFR-Cr 7 (*)     All other components within normal limits   CBC WITH DIFFERENTIAL WITH PLATELET - Abnormal; Notable for the following components:    WBC 12.2 (*)     RBC 3.75 (*)     HGB 10.7 (*)     HCT 32.0 (*)     Neutrophil Absolute Prelim 10.71 (*)     Neutrophil Absolute 10.71 (*)     Lymphocyte Absolute 0.81 (*)     All other components within normal limits   TROPONIN I HIGH SENSITIVITY - Abnormal; Notable for the following components:    Troponin I (High Sensitivity) 59 (*)     All other components within normal limits   LIPASE - Abnormal; Notable for the following components:    Lipase 60 (*)     All other components within normal limits   LIPID PANEL - Abnormal; Notable for the following components:    Cholesterol, Total 204 (*)     HDL Cholesterol 25 (*)     Triglycerides 272 (*)     LDL Cholesterol 130 (*)     VLDL 50 (*)     Non HDL Chol 179 (*)     All  other components within normal limits   POCT GLUCOSE - Abnormal; Notable for the following components:    POC Glucose 145 (*)     All other components within normal limits   RAINBOW DRAW LAVENDER   RAINBOW DRAW LIGHT GREEN   RAINBOW DRAW BLUE   RAINBOW DRAW GOLD   UA HOLD     EKG    Rate, intervals and axes as noted on EKG Report.  Rate: 111  Rhythm: Sinus Rhythm  Reading: Sinus tachycardia right bundle branch block no acute ST elevation or significant ST depression acute ischemia there are some Q waves in the anterior leads              ED Course as of 10/01/24 0752  ------------------------------------------------------------  Time: 10/01 0750  Comment: I discussed the patient with Dr. Blackwell patient will need dialysis today as well as Dr. Norwood who will be admitting the patient     No imaging done or necessary at this time         MDM      63-year-old gentleman who has end-stage renal disease from a history of diabetes presents to the emergency department with his wife for retching and generally feeling of unwellness  Admission disposition: 10/1/2024  7:52 AM         Diagnosis includes COVID-19, uremia, hyperkalemia, acidosis, bacteremia, gastroenteritis.  Patient is actually fairly well-appearing considering he has not been dialysis since 20 September.      Labs reveal a significant uremia with a BUN of 124.  Patient was receiving 125 cc of fluid an hour he does make urine he will absolutely need dialysis today as discussed with Dr. Blackwell.  Dr. Norwood will see the patient as well patient will be admitted to the hospital for dialysis he has a mild leukocytosis as well of unclear significance he is not coughing he is not short of breath he is going to take his morning blood pressure medications now                         Medical Decision Making      Disposition and Plan     Clinical Impression:  1. Uremia    2. ESRD (end stage renal disease) (HCC)    3. Nausea    4. Elevated lipase          Disposition:  Admit  10/1/2024  7:52 am    Follow-up:  No follow-up provider specified.        Medications Prescribed:  Current Discharge Medication List                            Hospital Problems       Present on Admission  Date Reviewed: 8/19/2024            ICD-10-CM Noted POA    * (Principal) Uremia N19 3/26/2024 Unknown    Acute kidney injury (HCC) N17.9 10/1/2024 Yes    Acute renal failure (ARF) (HCC) N17.9 10/1/2024 Yes    Anemia D64.9 10/1/2024 Yes    Hyperglycemia R73.9 10/1/2024 Yes    Hypernatremia E87.0 10/1/2024 Yes    Metabolic acidosis E87.20 10/1/2024 Yes

## 2024-10-01 NOTE — ED QUICK NOTES
Orders for admission, patient is aware of plan and ready to go upstairs. Any questions, please call ED RN Rosy at extension [phone].     Patient Covid vaccination status: Fully vaccinated     COVID Test Ordered in ED: None    COVID Suspicion at Admission: N/A    Running Infusions:      Mental Status/LOC at time of transport: AOx4    Other pertinent information:   CIWA score: N/A   NIH score:  N/A

## 2024-10-01 NOTE — [facility]&P
[facility]     Richy Goins Patient Status:  Inpatient    1960 MRN [id number]   Location [facility] 4NW-A Attending Jeremi Norwood MD   Hosp Day # 0 PCP Woody Dahl MD     Chief Complaint:   Chief Complaint   Patient presents with    Nausea/Vomiting/Diarrhea       Subjective:    History of Present Illness:     Richy Goins is a 63 year old male with a past medical history of ESRD, HTN, GERD, PVD, DM2.  He has had URI symptoms at home and +ve contact via his wife.  Due to his illness he did not go to dialysis.  He comes to the ED now due to malaise/weakness.      History/Other:    Past Medical History:  Past Medical History:    Belching    Dialysis patient (HCC)    Diarrhea, unspecified    Esophageal reflux    Essential hypertension    Fatigue    Flatulence/gas pain/belching    High blood pressure    High cholesterol    History of blood transfusion    Hyperlipidemia    Indigestion    Irregular bowel habits    Night sweats    Osteoporosis    Peripheral vascular disease (HCC)    Pure hypercholesterolemia    Type II or unspecified type diabetes mellitus without mention of complication, not stated as uncontrolled    Visual impairment    reading glasses    Vomiting     Past Surgical History:   Past Surgical History:   Procedure Laterality Date    Amputation toe,i-p jt Right     Colonoscopy N/A 2023    Procedure: COLONOSCOPY;  Surgeon: Sarmad Gonzalez MD;  Location:  ENDOSCOPY    Colonoscopy      Upper gi endoscopy,exam        Family History:   Family History   Problem Relation Age of Onset    Heart Attack Father     Heart Disorder Father 57    Diabetes Maternal Grandfather     Diabetes Maternal Aunt      Social History:    reports that he has never smoked. He has never used smokeless tobacco. He reports that he does not currently use alcohol. He reports that he does not use drugs.     Allergies: No Known Allergies    Medications:    No current  facility-administered medications on file prior to encounter.     Current Outpatient Medications on File Prior to Encounter   Medication Sig Dispense Refill    lisinopril 10 MG Oral Tab Take 1 tablet (10 mg total) by mouth daily.      pantoprazole 40 MG Oral Tab EC Take one tablet (40 mg total) by mouth once daily, 30 minutes prior to breakfast. 90 tablet 3    aspirin 81 MG Oral Tab EC Take 1 tablet (81 mg total) by mouth daily.      Insulin Pen Needle (BD PEN NEEDLE AMBER 2ND GEN) 32G X 4 MM Does not apply Misc Use to inject insulin up to 5 times daily 450 each 1    insulin detemir (LEVEMIR FLEXPEN) 100 UNIT/ML Subcutaneous Solution Pen-injector Inject 17 Units into the skin nightly. 3 month supply 15 mL 1    amLODIPine 2.5 MG Oral Tab Take 4 tablets (10 mg total) by mouth daily. 30 tablet 0    HYDROcodone-acetaminophen 5-325 MG Oral Tab Take 1-2 tablets by mouth every 4 (four) hours as needed for Pain. 30 tablet 0    amLODIPine 10 MG Oral Tab Take 1 tablet (10 mg total) by mouth daily. 30 tablet 1       Review of Systems:   A comprehensive review of systems was completed.    Pertinent positives and negatives noted in the HPI.    Objective:   Physical Exam:    BP (!) 182/84 (BP Location: Right arm)   Pulse 97   Temp 97.9 °F (36.6 °C) (Oral)   Resp 20   Ht 5' 8\" (1.727 m)   Wt 175 lb 0.7 oz (79.4 kg)   SpO2 96%   BMI 26.62 kg/m²   General: No acute distress, Alert  Respiratory: No rhonchi, no wheezes  Cardiovascular: S1, S2.   Abdomen: Soft, Non-tender, Non-distended, Positive bowel sounds  Neuro: No new focal deficits  Extremities: R BKA      Results:    Labs:      Labs Last 24 Hours:  Recent Labs   Lab 10/01/24  0617   WBC 12.2*   HGB 10.7*   MCV 85.3   .0       Recent Labs   Lab 10/01/24  0617   *   *   CREATSERUM 8.39*   CA 8.1*   ALB 4.3      K 5.0   *   CO2 14.0*   ALKPHO 112   AST 13   ALT 13   BILT 0.4   TP 7.4       Estimated Creatinine Clearance: 8.5 mL/min (A) (based  on SCr of 8.39 mg/dL (H)).    Recent Labs   Lab 10/01/24  0617   TROPHS 59*       No results for input(s): \"PTP\", \"INR\" in the last 168 hours.    No results for input(s): \"TROP\", \"CK\" in the last 168 hours.      Imaging: Imaging data reviewed in Epic.    Assessment & Plan:      #ESRD with azotemia/uremia  Last HD 9/20  AVF LUE  Emergent HD today    #Viral URI  RVP+ve for parainfluenza  Symptomatic care    #Uncontrolled HTN  Has not been taking his BP meds  Resume home regimen    #PAD s/p R BKA  ASA    #DM2  insulin      All diagnosis' and recommendations discussed with patient and/or family in detail.      Plan of care discussed with ED physician      Jeremi Norwood MD    Supplementary Documentation:     The 21st Century Cures Act makes medical notes like these available to patients in the interest of transparency. Please be advised this is a medical document. Medical documents are intended to carry relevant information, facts as evident, and the clinical opinion of the practitioner. The medical note is intended as peer to peer communication and may appear blunt or direct. It is written in medical language and may contain abbreviations or verbiage that are unfamiliar.               **Certification      PHYSICIAN Certification of Need for Inpatient Hospitalization - Initial Certification    Patient will require inpatient services that will reasonably be expected to span two midnight's based on the clinical documentation in H+P.   Based on patients current state of illness, I anticipate that, after discharge, patient will require TBD.

## 2024-10-01 NOTE — PLAN OF CARE
Patient admitted this shift from ER. Alert and oriented x 4. Having nausea/vomiting per report from ER. Patient missed several outpatient dialysis sessions due to illness/nausea/vomitting. On droplet precautions for parainfluenza 4. Patient refusing PO medications due to nausea. Hospitalist made aware as patient arrived to unit with elevated blood pressure. No new orders received. Patient dialyzed this shift and tolerated procedure well. BP still elevated. Encouraged patient to eat a small sandwich in order to take BP medication. Continuing to monitor patient appetite and willingness to take PO medications. QID accucheck. Blood sugar WNL. Safety precautions maintained this shift. Medications given as per patient compliance. Call light within reach.    Problem: METABOLIC/FLUID AND ELECTROLYTES - ADULT  Goal: Glucose maintained within prescribed range  Description: INTERVENTIONS:  - Monitor Blood Glucose as ordered  - Assess for signs and symptoms of hyperglycemia and hypoglycemia  - Administer ordered medications to maintain glucose within target range  - Assess barriers to adequate nutritional intake and initiate nutrition consult as needed  - Instruct patient on self management of diabetes  Outcome: Progressing     Problem: METABOLIC/FLUID AND ELECTROLYTES - ADULT  Goal: Electrolytes maintained within normal limits  Description: INTERVENTIONS:  - Monitor labs and rhythm and assess patient for signs and symptoms of electrolyte imbalances  - Administer electrolyte replacement as ordered  - Monitor response to electrolyte replacements, including rhythm and repeat lab results as appropriate  - Fluid restriction as ordered  - Instruct patient on fluid and nutrition restrictions as appropriate  Outcome: Progressing     Problem: METABOLIC/FLUID AND ELECTROLYTES - ADULT  Goal: Hemodynamic stability and optimal renal function maintained  Description: INTERVENTIONS:  - Monitor labs and assess for signs and symptoms of volume  excess or deficit  - Monitor intake, output and patient weight  - Monitor urine specific gravity, serum osmolarity and serum sodium as indicated or ordered  - Monitor response to interventions for patient's volume status, including labs, urine output, blood pressure (other measures as available)  - Encourage oral intake as appropriate  - Instruct patient on fluid and nutrition restrictions as appropriate  Outcome: Progressing

## 2024-10-01 NOTE — CONSULTS
[facility]  Report of Consultation    Richy Goins Patient Status:  Inpatient    1960 MRN [id number]   Location [facility] 4NW-A Attending Jeremi Norwood MD   Hosp Day # 0 PCP Woody Dahl MD       Assessment / Plan:    1) ESRD- due to longstanding DM 2; last HD  now with marked azotemia / uremia. PLAN- emergent HD for clearance     2) Viral syndrome -> sx have likely merged into uremic sx -> HD / supportive care     3) Anemia- due to CKD; EPO with HD to maintain hgb > 10 g/dl      4) Longstanding DM 2     5) HTN- higher BP's due to not taking meds x 1 week; resume usual lisinopril / amlodipine     6) PAD s/p R BKA      Reason for Consultation:  ESRD    History of Present Illness:  Richy Goins is a a(n) 63 year old male.  Very pleasant 63-year-old male known to our service with a history of end-stage renal disease and longstanding type 2 diabetes with peripheral arterial disease status post below-knee amputation several months ago presents with cough body aches diarrhea about 2 weeks ago affecting his entire family but now presents with worsening fatigue, loss of appetite and just feeling poorly overall.  He has not had dialysis since  and has canceled subsequent dialysis due to not feeling well; he is also not been taking his blood pressure medications.    History:  Past Medical History:    Belching    Dialysis patient (HCC)    Diarrhea, unspecified    Esophageal reflux    Essential hypertension    Fatigue    Flatulence/gas pain/belching    High blood pressure    High cholesterol    History of blood transfusion    Hyperlipidemia    Indigestion    Irregular bowel habits    Night sweats    Osteoporosis    Peripheral vascular disease (HCC)    Pure hypercholesterolemia    Type II or unspecified type diabetes mellitus without mention of complication, not stated as uncontrolled    Visual impairment    reading glasses    Vomiting     Past Surgical History:   Procedure Laterality  Date    Amputation toe,i-p jt Right     Colonoscopy N/A 2023    Procedure: COLONOSCOPY;  Surgeon: Sarmad Gonzalez MD;  Location:  ENDOSCOPY    Colonoscopy      Upper gi endoscopy,exam       Family History   Problem Relation Age of Onset    Heart Attack Father     Heart Disorder Father 57    Diabetes Maternal Grandfather     Diabetes Maternal Aunt      Denies family history of kidney disease.    reports that he has never smoked. He has never used smokeless tobacco. He reports that he does not currently use alcohol. He reports that he does not use drugs.    Allergies:  No Known Allergies    Medications:    Current Facility-Administered Medications:     ondansetron (Zofran) 4 MG/2ML injection 4 mg, 4 mg, Intravenous, Q4H PRN  No current outpatient medications on file.       Review of Systems:  Please see HPI for pertinent positives. 10 point review of systems otherwise reviewed and negative.     Physical Exam:  BP (!) 179/99   Pulse 105   Temp 98.5 °F (36.9 °C) (Temporal)   Resp 16   Ht 5' 8\" (1.727 m)   Wt 175 lb (79.4 kg)   SpO2 100%   BMI 26.61 kg/m²   Temp (24hrs), Av.5 °F (36.9 °C), Min:98.5 °F (36.9 °C), Max:98.5 °F (36.9 °C)     No intake or output data in the 24 hours ending 10/01/24 1013  Wt Readings from Last 3 Encounters:   10/01/24 175 lb (79.4 kg)   24 192 lb 3.2 oz (87.2 kg)   24 196 lb (88.9 kg)     General: awake alert  HEENT: No scleral icterus, MMM  Neck: Supple, no SARITA or thyromegaly  Cardiac: Regular rate and rhythm, S1, S2 normal, no murmur, rub, or gallop  Lungs: Decreased breath sounds at the bases bilaterally.   Abdomen: Soft, non-tender. + bowel sounds, no palpable organomegaly  Extremities: Without clubbing, cyanosis; no edema  Neurologic: Cranial nerves grossly intact, moving all extremities  Skin: Warm and dry, no rashes      Laboratory Data:  Lab Results   Component Value Date    WBC 12.2 10/01/2024    HGB 10.7 10/01/2024    HCT 32.0 10/01/2024    .0  10/01/2024    CREATSERUM 8.39 10/01/2024     10/01/2024     10/01/2024    K 5.0 10/01/2024     10/01/2024    CO2 14.0 10/01/2024     10/01/2024    CA 8.1 10/01/2024    ALB 4.3 10/01/2024    ALKPHO 112 10/01/2024    BILT 0.4 10/01/2024    TP 7.4 10/01/2024    AST 13 10/01/2024    ALT 13 10/01/2024    LIP 60 10/01/2024    PGLU 145 10/01/2024       Imaging:  All imaging studies reviewed.      Thank you for allowing me to participate in the care of your patient.    Keeley Blackwell MD  10/1/2024  10:13 AM

## 2024-10-02 PROBLEM — R79.89 AZOTEMIA: Status: ACTIVE | Noted: 2024-10-02

## 2024-10-02 LAB
ANION GAP SERPL CALC-SCNC: 12 MMOL/L (ref 0–18)
BASOPHILS # BLD AUTO: 0.03 X10(3) UL (ref 0–0.2)
BASOPHILS NFR BLD AUTO: 0.3 %
BUN BLD-MCNC: 64 MG/DL (ref 9–23)
CALCIUM BLD-MCNC: 7.8 MG/DL (ref 8.7–10.4)
CHLORIDE SERPL-SCNC: 103 MMOL/L (ref 98–112)
CO2 SERPL-SCNC: 25 MMOL/L (ref 21–32)
CREAT BLD-MCNC: 6.11 MG/DL
EGFRCR SERPLBLD CKD-EPI 2021: 10 ML/MIN/1.73M2 (ref 60–?)
EOSINOPHIL # BLD AUTO: 0.07 X10(3) UL (ref 0–0.7)
EOSINOPHIL NFR BLD AUTO: 0.6 %
ERYTHROCYTE [DISTWIDTH] IN BLOOD BY AUTOMATED COUNT: 13.7 %
GLUCOSE BLD-MCNC: 129 MG/DL (ref 70–99)
GLUCOSE BLD-MCNC: 160 MG/DL (ref 70–99)
GLUCOSE BLD-MCNC: 171 MG/DL (ref 70–99)
GLUCOSE BLD-MCNC: 175 MG/DL (ref 70–99)
GLUCOSE BLD-MCNC: 194 MG/DL (ref 70–99)
HCT VFR BLD AUTO: 30.5 %
HGB BLD-MCNC: 10.3 G/DL
IMM GRANULOCYTES # BLD AUTO: 0.06 X10(3) UL (ref 0–1)
IMM GRANULOCYTES NFR BLD: 0.5 %
LYMPHOCYTES # BLD AUTO: 0.91 X10(3) UL (ref 1–4)
LYMPHOCYTES NFR BLD AUTO: 8.1 %
MAGNESIUM SERPL-MCNC: 1.8 MG/DL (ref 1.6–2.6)
MCH RBC QN AUTO: 28.9 PG (ref 26–34)
MCHC RBC AUTO-ENTMCNC: 33.8 G/DL (ref 31–37)
MCV RBC AUTO: 85.4 FL
MONOCYTES # BLD AUTO: 0.74 X10(3) UL (ref 0.1–1)
MONOCYTES NFR BLD AUTO: 6.6 %
NEUTROPHILS # BLD AUTO: 9.39 X10 (3) UL (ref 1.5–7.7)
NEUTROPHILS # BLD AUTO: 9.39 X10(3) UL (ref 1.5–7.7)
NEUTROPHILS NFR BLD AUTO: 83.9 %
OSMOLALITY SERPL CALC.SUM OF ELEC: 314 MOSM/KG (ref 275–295)
PLATELET # BLD AUTO: 224 10(3)UL (ref 150–450)
POTASSIUM SERPL-SCNC: 4.2 MMOL/L (ref 3.5–5.1)
RBC # BLD AUTO: 3.57 X10(6)UL
SODIUM SERPL-SCNC: 140 MMOL/L (ref 136–145)
WBC # BLD AUTO: 11.2 X10(3) UL (ref 4–11)

## 2024-10-02 PROCEDURE — 99232 SBSQ HOSP IP/OBS MODERATE 35: CPT | Performed by: HOSPITALIST

## 2024-10-02 PROCEDURE — 99233 SBSQ HOSP IP/OBS HIGH 50: CPT | Performed by: INTERNAL MEDICINE

## 2024-10-02 NOTE — PLAN OF CARE
Patient is A/O x4, nauseous and had episode of vomiting at start of shift with  BP 170s, missed PO meds, restless and pulled IV med by mistake. Given Zofran IV, agreed to take BP med and shows improvement. Right AC PIV inserted. Denies other types of discomfort, nausea meds given PRN. Voiding small amount of urine. Needs addressed. Call light within reach.    Usually do HD every T/TH/Sat.     Problem: METABOLIC/FLUID AND ELECTROLYTES - ADULT  Goal: Glucose maintained within prescribed range  Description: INTERVENTIONS:  - Monitor Blood Glucose as ordered  - Assess for signs and symptoms of hyperglycemia and hypoglycemia  - Administer ordered medications to maintain glucose within target range  - Assess barriers to adequate nutritional intake and initiate nutrition consult as needed  - Instruct patient on self management of diabetes  Outcome: Progressing

## 2024-10-02 NOTE — PROGRESS NOTES
[facility]  Nephrology Progress Note    Richy Goins Attending:  Toan Vogt MD       Assessment and Plan:    1) ESRD- due to longstanding DM 2; last HD  now with marked azotemia / uremia. Pt feeling much better after HD- next HD Thurs per usual routine     2) Viral syndrome (parainfluenza) -> sx have likely \"transitioned\" into uremic sx -> feeling much better after HD yesterday     3) Anemia- due to CKD; EPO with HD to maintain hgb > 10 g/dl      4) Longstanding DM 2     5) HTN- higher BP's due to not taking meds x 1 week; resume usual lisinopril / amlodipine     6) PAD s/p R BKA    Mobilize. Pt wants to dc home Thurs after HD      Subjective:  Awake alert feeling a lot better no nausea vomiting fevers, etc.    Physical Exam:   /55   Pulse 86   Temp 98.4 °F (36.9 °C) (Oral)   Resp 17   Ht 5' 8\" (1.727 m)   Wt 181 lb (82.1 kg)   SpO2 97%   BMI 27.52 kg/m²   Temp (24hrs), Av.1 °F (36.7 °C), Min:97.8 °F (36.6 °C), Max:98.4 °F (36.9 °C)       Intake/Output Summary (Last 24 hours) at 10/2/2024 1228  Last data filed at 10/1/2024 2000  Gross per 24 hour   Intake 340 ml   Output --   Net 340 ml     Wt Readings from Last 3 Encounters:   10/02/24 181 lb (82.1 kg)   24 192 lb 3.2 oz (87.2 kg)   24 196 lb (88.9 kg)     General: awake alert  HEENT: No scleral icterus, MMM  Neck: Supple, no SARITA or thyromegaly  Cardiac: Regular rate and rhythm, S1, S2 normal, no murmur or tub  Lungs: Decreased BS at bases bilaterally   Abdomen: Soft, non-tender. + bowel sounds, no palpable organomegaly  Extremities: Without clubbing, cyanosis; no edema  Neurologic: Cranial nerves grossly intact, moving all extremities  Skin: Warm and dry, no rashes       Labs:   Lab Results   Component Value Date    WBC 11.2 10/02/2024    HGB 10.3 10/02/2024    HCT 30.5 10/02/2024    .0 10/02/2024    CREATSERUM 6.11 10/02/2024    BUN 64 10/02/2024     10/02/2024    K 4.2 10/02/2024     10/02/2024     CO2 25.0 10/02/2024     10/02/2024    CA 7.8 10/02/2024    MG 1.8 10/02/2024    PGLU 160 10/02/2024       Imaging:  All imaging studies reviewed.    Meds:   Current Facility-Administered Medications   Medication Dose Route Frequency    amLODIPine (Norvasc) tab 10 mg  10 mg Oral Daily    pantoprazole (Protonix) DR tab 40 mg  40 mg Oral QAM AC    aspirin DR tab 81 mg  81 mg Oral Daily    acetaminophen (Tylenol Extra Strength) tab 1,000 mg  1,000 mg Oral Q4H PRN    melatonin tab 3 mg  3 mg Oral Nightly PRN    glycerin-hypromellose- (Artificial Tears) 0.2-0.2-1 % ophthalmic solution 1 drop  1 drop Both Eyes QID PRN    sodium chloride (Saline Mist) 0.65 % nasal solution 1 spray  1 spray Each Nare Q3H PRN    heparin (Porcine) 5000 UNIT/ML injection 5,000 Units  5,000 Units Subcutaneous 2 times per day    ondansetron (Zofran) 4 MG/2ML injection 4 mg  4 mg Intravenous Q6H PRN    metoclopramide (Reglan) 5 mg/mL injection 5 mg  5 mg Intravenous Q8H PRN    polyethylene glycol (PEG 3350) (Miralax) 17 g oral packet 17 g  17 g Oral Daily PRN    sennosides (Senokot) tab 17.2 mg  17.2 mg Oral Nightly PRN    bisacodyl (Dulcolax) 10 MG rectal suppository 10 mg  10 mg Rectal Daily PRN    guaiFENesin ER (Mucinex) 12 hr tab 600 mg  600 mg Oral BID    benzonatate (Tessalon) cap 200 mg  200 mg Oral TID PRN    glucose (Dex4) 15 GM/59ML oral liquid 15 g  15 g Oral Q15 Min PRN    Or    glucose (Glutose) 40% oral gel 15 g  15 g Oral Q15 Min PRN    Or    glucose-vitamin C (Dex-4) chewable tab 4 tablet  4 tablet Oral Q15 Min PRN    Or    dextrose 50% injection 50 mL  50 mL Intravenous Q15 Min PRN    Or    glucose (Dex4) 15 GM/59ML oral liquid 30 g  30 g Oral Q15 Min PRN    Or    glucose (Glutose) 40% oral gel 30 g  30 g Oral Q15 Min PRN    Or    glucose-vitamin C (Dex-4) chewable tab 8 tablet  8 tablet Oral Q15 Min PRN    insulin aspart (NovoLOG) 100 Units/mL FlexPen 1-10 Units  1-10 Units Subcutaneous TID AC and HS    insulin  aspart (NovoLOG) 100 Units/mL FlexPen 1-68 Units  1-68 Units Subcutaneous TID CC    lisinopril (Zestril) tab 10 mg  10 mg Oral Daily         Questions/concerns were discussed with patient and/or family by bedside.          Keeley Blackwell MD  10/2/2024  12:28 PM

## 2024-10-02 NOTE — OCCUPATIONAL THERAPY NOTE
OCCUPATIONAL THERAPY EVALUATION - INPATIENT    Room Number: 405/405-A  Evaluation Date: 10/2/2024     Type of Evaluation: Initial  Presenting Problem: Parainfluenza    Physician Order: IP Consult to Occupational Therapy  Reason for Therapy:  ADL/IADL Dysfunction and Discharge Planning    OCCUPATIONAL THERAPY ASSESSMENT   Patient is a 63 year old male admitted on 10/1/2024 with Presenting Problem: Parainfluenza. Co-Morbidities : R BKA, HTN, CKD, HD  Patient is currently functioning at baseline with toileting, upper body dressing, lower body dressing, grooming, bed mobility, transfers, static sitting balance, dynamic sitting balance, static standing balance, dynamic standing balance, maintaining seated position, functional standing tolerance, and energy conservation strategies.  Prior to admission, patient's baseline is Mod I.  Patient met all OT goals at Sup level.  Patient reports no further questions/concerns at this time.         Recommendations for nursing staff:   Transfers: Sup  Toileting location: Toilet    EVALUATION SESSION:  Patient at start of session: supine in bed for session    FUNCTIONAL TRANSFER ASSESSMENT  Sit to Stand: Edge of Bed  Edge of Bed: Supervision    BED MOBILITY  Rolling: Supervision  Supine to Sit : Supervision  Scooting: Sup to EOB    BALANCE ASSESSMENT  Static Sitting: Supervision  Static Standing: Supervision    FUNCTIONAL ADL ASSESSMENT  UB Dressing Seated: Supervision (for shirt)  LB Dressing Seated: Supervision (for prostetic and shoe on LLE)  LB Dressing Standing: Supervision (to pul up shorts and to finish donning leg)    ACTIVITY TOLERANCE: vitals stable                         O2 SATURATIONS       COGNITION  Overall Cognitive Status:  WFL - within functional limits    COGNITION ASSESSMENTS     Upper Extremity:   ROM: within functional limits   Strength: is within functional limits   Coordination:  Gross motor: WNL  Fine motor: WNL  Sensation: Light touch:  intact    EDUCATION  PROVIDED  Patient Education : Role of Occupational Therapy; Plan of Care  Patient's Response to Education: Verbalized Understanding; Returned Demonstration    Equipment used: RW  Demonstrates functional use    Therapist comments: Pt reported fatigue at home, pt educated on work simplification and energy conservation for at home to assist with independence with fatigue at home.  Pt with dizziness upon standing, BP stable.    Patient End of Session: Up in chair;Needs met;Call light within reach;All patient questions and concerns addressed;Ice applied;Alarm set    OCCUPATIONAL PROFILE    HOME SITUATION  Type of Home: House  Home Layout: Multi-level  Lives With: Spouse    Toilet and Equipment: Standard height toilet  Shower/Tub and Equipment: Walk-in shower  Other Equipment: None    Occupation/Status: marketing  Hand Dominance: Right  Drives: Yes       Prior Level of Function: Pt typically independent with ADLs and mobility. Pt does not use right ear except a RW after HD 2/2 fatigue.    SUBJECTIVE  Pt stated, \"I just got my leg not that long ago still getting used to it.\"    PAIN ASSESSMENT  Ratin  Location: no pain at this time       OBJECTIVE     Fall Risk: Standard fall risk    WEIGHT BEARING RESTRICTION       AM-PAC ‘6-Clicks’ Inpatient Daily Activity Short Form  -   Putting on and taking off regular lower body clothing?: A Little  -   Bathing (including washing, rinsing, drying)?: A Little  -   Toileting, which includes using toilet, bedpan or urinal? : A Little  -   Putting on and taking off regular upper body clothing?: A Little  -   Taking care of personal grooming such as brushing teeth?: A Little  -   Eating meals?: A Little    AM-PAC Score:  Score: 18  Approx Degree of Impairment: 46.65%  Standardized Score (AM-PAC Scale): 38.66    ADDITIONAL TESTS     NEUROLOGICAL FINDINGS      PLAN   Patient has been evaluated and presents with no skilled Occupational Therapy needs at this time.  Patient discharged from  Occupational Therapy services.  Please re-order if a new functional limitation presents during this admission.         Patient Evaluation Complexity Level:   Occupational Profile/Medical History LOW - Brief history including review of medical or therapy records    Specific performance deficits impacting engagement in ADL/IADL LOW  1 - 3 performance deficits    Client Assessment/Performance Deficits LOW - No comorbidities nor modifications of tasks    Clinical Decision Making LOW - Analysis of occupational profile, problem-focused assessments, limited treatment options    Overall Complexity LOW     OT Session Time: 30 minutes  Self-Care Home Management: 15 minutes  Therapeutic Activity: 0 minutes  Neuromuscular Re-education: 0 minutes  Therapeutic Exercise: 0 minutes  Cognitive Skills: 0 minutes  Sensory Integrative: 0 minutes  Orthotic Management and Trainin minutes  Can add/delete any of these

## 2024-10-02 NOTE — PHYSICAL THERAPY NOTE
PHYSICAL THERAPY EVALUATION - INPATIENT     Room Number: 405/405-A  Evaluation Date: 10/2/2024  Type of Evaluation: Initial  Physician Order: PT Eval and Treat    Presenting Problem: Parainfluenza, YULISA, hypernatremia  Co-Morbidities : R BKA, HTN, CKD, HD  Reason for Therapy: Mobility Dysfunction and Discharge Planning    PHYSICAL THERAPY ASSESSMENT   Patient is a 63 year old male admitted 10/1/2024 for parainfluenza in setting of ESRD.  Prior to admission, patient's baseline is mod I for gait during day w/ prosthesis, no device, uses rw after dialysis, uses w/c at night.  Patient is currently functioning near baseline with bed mobility, transfers, and gait.  Patient is requiring supervision and stand-by assist as a result of the following impairments: decreased functional strength, decreased muscular endurance, and ongoing c/o's of dizziness, active retching .  Physical Therapy will continue to follow for duration of hospitalization.    Patient will benefit from continued skilled PT Services for duration of hospitalization, however, given the patient is functioning near baseline level do not anticipate skilled therapy needs at discharge .    PLAN DURING HOSPITALIZATION  Nursing Mobility Recommendation : 1 Assist  PT Device Recommendation: Rolling walker;Wheelchair (Has both at home)  PT Treatment Plan: Patient education;Gait training;Strengthening;Transfer training;Balance training  Rehab Potential : Good  Frequency (Obs): 3-5x/week     CURRENT GOALS    Goal #1 Patient is able to demonstrate supine - sit EOB @ level: modified independent  Met 10/2   Goal #2 Patient is able to demonstrate transfers EOB to/from BSC at assistance level: modified independent     Goal #3 Patient is able to ambulate 300 feet with assist device:  least restrictive  at assistance level: modified independent     Goal #4    Goal #5    Goal #6    Goal Comments: Goals established on 10/2/2024      PHYSICAL THERAPY MEDICAL/SOCIAL  HISTORY  History related to current admission: Patient is a 63 year old male admitted on 10/1/2024 from home for nausea, vomiting, body aches x3 days. Unable to attend dialysis.  Pt diagnosed with parainfluenza and YULISA in setting of ESRD.    HOME SITUATION  Type of Home: Condo  Home Layout: One level  Stairs to Enter : 0        Stairs to Bedroom: 0         Lives With: Spouse    Drives: Yes          Prior Level of [place]: Pt typically walks w/o a device and his prosthesis throughout the day.  Will use a rolling walker for gait following dialysis.  Uses a wheelchair in evening/night.  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    SUBJECTIVE  \"I'm feeling a little dizzy\" - with position changes.    OBJECTIVE  Precautions: Orthotic/prosthesis;Limb alert - left  Fall Risk: High fall risk    WEIGHT BEARING RESTRICTION     PAIN ASSESSMENT  Ratin          COGNITION  Overall Cognitive Status:  WFL - within functional limits    RANGE OF MOTION AND STRENGTH ASSESSMENT  Upper extremity ROM and strength -see OT evaluation    Lower extremity ROM is within functional limits     Lower extremity strength is within functional limits     BALANCE  Static Sitting: Good  Dynamic Sitting: Good  Static Standing: Fair -  Dynamic Standing: Poor +    ADDITIONAL TESTS                                    ACTIVITY TOLERANCE           BP: 114/56  BP Location: Right arm  BP Method: Automatic  Patient Position: Sitting    O2 WALK       NEUROLOGICAL FINDINGS                        AM-PAC '6-Clicks' INPATIENT SHORT FORM - BASIC MOBILITY  How much difficulty does the patient currently have...  Patient Difficulty: Turning over in bed (including adjusting bedclothes, sheets and blankets)?: None   Patient Difficulty: Sitting down on and standing up from a chair with arms (e.g., wheelchair, bedside commode, etc.): None   Patient Difficulty: Moving from lying on back to sitting on the side of the bed?: None   How much help from another person  does the patient currently need...   Help from Another: Moving to and from a bed to a chair (including a wheelchair)?: A Little   Help from Another: Need to walk in hospital room?: A Little   Help from Another: Climbing 3-5 steps with a railing?: A Little     AM-PAC Score:  Raw Score: 21   Approx Degree of Impairment: 28.97%   Standardized Score (AM-PAC Scale): 50.25   CMS Modifier (G-Code): CJ    FUNCTIONAL ABILITY STATUS  Gait Assessment   Functional Mobility/Gait Assessment  Gait Assistance: Contact guard assist  Distance (ft): 125  Assistive Device: Rolling walker  Pattern: R Circumduction (Slight)    Skilled Therapy Provided     Bed Mobility:  Rolling: Mod I w/ hob flat  Supine to sit: Mod I w/ hob flat.  Able to don his prosthesis independently while sitting eob.  Pt did note dizziness w/ the position change- bp taken and was 124/62.  Given several minutes to adjust to position change.   Sit to supine: NT     Transfer Mobility:  Sit to stand: Completed 3x over course of session - 1st 2 attempts pt needed to sit back down due to c/o of dizziness and episode of retching.  Took bp in standing during 2nd standing attempt and was 114/56. Ultimately, dizziness did clear up.   Stand to sit: Mod I  Gait = Completed gait 125'x1 w/ rw and chair follow (simply due to his dizziness) and CGA.  Pt reports that he felt good to get up and walk.    Therapist's Comments: Pt reports feeling comfortable in bedside recliner at end of session. Encouraged pt to be up more during the day, pt in agreement.    Exercise/Education Provided:  Bed mobility  Functional activity tolerated  Gait training  Transfer training    Patient End of Session: Up in chair;Needs met;Call light within reach;RN aware of session/findings;All patient questions and concerns addressed      Patient Evaluation Complexity Level:  History Moderate - 1 or 2 personal factors and/or co-morbidities   Examination of body systems Moderate - addressing a total of 3 or  more elements   Clinical Presentation  Moderate - Evolving   Clinical Decision Making Moderate Complexity       PT Session Time: 30 minutes  Gait Trainin minutes  Therapeutic Activity: 10 minutes  Neuromuscular Re-education: 0 minutes  Therapeutic Exercise: 0 minutes

## 2024-10-02 NOTE — CM/SW NOTE
SW noted that patient is an HD patient. SW called [facility] to confirm patient's current with them. Patient's current schedule is:      DIALYSIS SCHEDULE:  [address] [address] [phone]     Tuesday- Thursday-Saturday 540 AM     SW will send updated flow sheets to HD clinic once patient is medically stable for DC.      SW will continue to follow for plan of care changes and remain available for any additional DC needs or concerns.      Adela Merrill MSW, LSW  Discharge Planner   [id number]

## 2024-10-02 NOTE — DIETARY NOTE
[facility]   part of [facility]   CLINICAL NUTRITION    Richy Goins     Admitting diagnosis:  Nausea [R11.0]  Uremia [N19]  ESRD (end stage renal disease) (HCC) [N18.6]  Elevated lipase [R74.8]    Ht: 172.7 cm (5' 8\")  Wt: 82.1 kg (181 lb).   Body mass index is 27.52 kg/m².  IBW: 67.2kg    Wt Readings from Last 6 Encounters:   10/02/24 82.1 kg (181 lb)   08/19/24 87.2 kg (192 lb 3.2 oz)   07/24/24 88.9 kg (196 lb)   05/29/24 79.8 kg (176 lb)   05/03/24 80.7 kg (177 lb 14.4 oz)   04/17/24 77.1 kg (170 lb)    Patient ESRD, reports no weight loss. Weight fluctuation due to fluid shifts?    Labs/Meds reviewed    Diet:       Procedures    Regular/General diet Calorie Restriction/Carb Controlled: 1800 kcal/60 grams; Is Patient on Accuchecks? Yes; Is Patient on Suicide Precautions? No; Misc Restriction: Renal     Percent Meals Eaten (last 3 days)       Date/Time Percent Meals Eaten (%)    10/01/24 1752 50 %     Percent Meals Eaten (%): half sandwich at 10/01/24 1752    10/02/24 1020 50 %    10/02/24 1341 25 %              Pt chart reviewed d/t A1c 8.7% on 10/1/24.  Patient reports no appetite for 2-3 days PTA. Good appetite prior to feeling sick. Patient reports appetite is improving since admission.   Pt declined diabetes diet education, reports talking frequently to RD at dialysis center. Provided handouts.   Patient is at low nutrition risk at this time.    Please consult if patient status changes or nutrition issues arise.    Breana Collazo RDN, LDN, [facility]  Clinical Dietitian   [phone]

## 2024-10-02 NOTE — PROGRESS NOTES
[facility]   part of [facility]     Hospitalist Progress Note     Richy Goins Patient Status:  Inpatient    1960 MRN [id number]   Location [facility] 4NW-A Attending Toan Vogt MD   Hosp Day # 1 PCP Woody Dahl MD     Chief Complaint: URI    Subjective:   Patient feeling a bit better today. Cough improving. On room air. No nausea, vomiting. No diarrhea. On room air. Tolerating diet.     Current medications:   [START ON 10/3/2024] epoetin juan josé  10,000 Units Intravenous Once in dialysis    amLODIPine  10 mg Oral Daily    pantoprazole  40 mg Oral QAM AC    aspirin  81 mg Oral Daily    heparin  5,000 Units Subcutaneous 2 times per day    guaiFENesin ER  600 mg Oral BID    insulin aspart  1-10 Units Subcutaneous TID AC and HS    insulin aspart  1-68 Units Subcutaneous TID CC    lisinopril  10 mg Oral Daily       Objective:    Review of Systems:   10 point ROS completed and was negative, except for pertinent positive and negatives stated in subjective.    Vital signs:  Temp:  [97.8 °F (36.6 °C)-98.4 °F (36.9 °C)] 98.4 °F (36.9 °C)  Pulse:  [] 86  Resp:  [16-22] 17  BP: (108-174)/(55-88) 111/55  SpO2:  [91 %-97 %] 97 %  Patient Weight for the past 72 hrs:   Weight   10/01/24 0544 175 lb (79.4 kg)   10/01/24 1056 175 lb 0.7 oz (79.4 kg)   10/02/24 0458 181 lb (82.1 kg)     Physical Exam:    General: No acute distress.   Respiratory: Diminished breath sounds  Cardiovascular: S1, S2. Regular rate and rhythm.   Abdomen: Soft, nontender, nondistended.  Positive bowel sounds.   Extremities: No edema.  Neuro: AAOx3    Diagnostic Data:    Labs:  Recent Labs   Lab 10/01/24  0617 10/02/24  0704   WBC 12.2* 11.2*   HGB 10.7* 10.3*   MCV 85.3 85.4   .0 224.0       Recent Labs   Lab 10/01/24  0617 10/02/24  0704   * 194*   * 64*   CREATSERUM 8.39* 6.11*   CA 8.1* 7.8*   ALB 4.3  --     140   K 5.0 4.2   * 103   CO2 14.0* 25.0   ALKPHO 112  --    AST 13  --    ALT  13  --    BILT 0.4  --    TP 7.4  --        Estimated Creatinine Clearance: 11.7 mL/min (A) (based on SCr of 6.11 mg/dL (H)).    No results for input(s): \"PTP\", \"INR\" in the last 168 hours.         COVID-19 Lab Results    COVID-19  Lab Results   Component Value Date    COVID19 Not Detected 10/01/2024    COVID19 Not Detected 03/26/2024    COVID19 Not Detected 01/31/2024       Pro-Calcitonin  No results for input(s): \"PCT\" in the last 168 hours.    Cardiac  No results for input(s): \"TROP\", \"PBNP\" in the last 168 hours.    Creatinine Kinase  No results for input(s): \"CK\" in the last 168 hours.    Inflammatory Markers  No results for input(s): \"CRP\", \"DAR\", \"LDH\", \"DDIMER\" in the last 168 hours.    Recent Labs   Lab 10/01/24  0617   TROPHS 59*       Imaging: Imaging data reviewed in Epic.    Medications:    [START ON 10/3/2024] epoetin juan josé  10,000 Units Intravenous Once in dialysis    amLODIPine  10 mg Oral Daily    pantoprazole  40 mg Oral QAM AC    aspirin  81 mg Oral Daily    heparin  5,000 Units Subcutaneous 2 times per day    guaiFENesin ER  600 mg Oral BID    insulin aspart  1-10 Units Subcutaneous TID AC and HS    insulin aspart  1-68 Units Subcutaneous TID CC    lisinopril  10 mg Oral Daily       Assessment & Plan:    URI d/t Parainfluenza  Atelectasis   Mucinex  Supportive measures  Incentive spirometry   ESRD on HD  HD per nephrology   Diabetes mellitus, a1c 8.7  Correctional scale  Carb scale  Essential hypertension  Lisinopril   Norvasc  GERD   PPI  Dyslipidemia   Leukocytosis, improving   Anemia of chronic disease   Elevated troponin d/t uncontrolled hypertension and ESRD, not consistent with ACS   PVD sp right BKA    Supplementary Documentation:   Quality:  DVT Prophylaxis: Heparin    At this point Mr. Goins is expected to be discharge to: Home    Plan of care discussed with patient and RN.    Toan Vogt MD

## 2024-10-02 NOTE — PLAN OF CARE
Patient c/o nausea. Alert with non-productive cough, respirations unlabored, lungs sound diminished. Patient's vital signs stable.  Problem: METABOLIC/FLUID AND ELECTROLYTES - ADULT  Goal: Glucose maintained within prescribed range  Description: INTERVENTIONS:  - Monitor Blood Glucose as ordered  - Assess for signs and symptoms of hyperglycemia and hypoglycemia  - Administer ordered medications to maintain glucose within target range  - Assess barriers to adequate nutritional intake and initiate nutrition consult as needed  - Instruct patient on self management of diabetes  10/2/2024 1636 by Cuong Richardson RN  Outcome: Progressing  10/2/2024 1531 by Cuong Richardson RN  Outcome: Progressing  10/2/2024 1531 by Cuong Richardson RN  Outcome: Progressing  10/2/2024 1530 by Cuong Richardson RN  Outcome: Progressing  Goal: Electrolytes maintained within normal limits  Description: INTERVENTIONS:  - Monitor labs and rhythm and assess patient for signs and symptoms of electrolyte imbalances  - Administer electrolyte replacement as ordered  - Monitor response to electrolyte replacements, including rhythm and repeat lab results as appropriate  - Fluid restriction as ordered  - Instruct patient on fluid and nutrition restrictions as appropriate  10/2/2024 1636 by Cuong Richardson RN  Outcome: Progressing  10/2/2024 1531 by Cuong Richardson RN  Outcome: Progressing  10/2/2024 1531 by Cuong Richardson RN  Outcome: Progressing  10/2/2024 1530 by Cuong Richardson RN  Outcome: Progressing  Goal: Hemodynamic stability and optimal renal function maintained  Description: INTERVENTIONS:  - Monitor labs and assess for signs and symptoms of volume excess or deficit  - Monitor intake, output and patient weight  - Monitor urine specific gravity, serum osmolarity and serum sodium as indicated or ordered  - Monitor response to interventions for patient's volume status, including labs, urine output, blood pressure  (other measures as available)  - Encourage oral intake as appropriate  - Instruct patient on fluid and nutrition restrictions as appropriate  10/2/2024 1636 by Cuong Richardson RN  Outcome: Progressing  10/2/2024 1531 by Cuong Richardson RN  Outcome: Progressing  10/2/2024 1531 by Cuong Richardson RN  Outcome: Progressing  10/2/2024 1530 by Cuong Richardson RN  Outcome: Progressing     Problem: METABOLIC/FLUID AND ELECTROLYTES - ADULT  Goal: Electrolytes maintained within normal limits  Description: INTERVENTIONS:  - Monitor labs and rhythm and assess patient for signs and symptoms of electrolyte imbalances  - Administer electrolyte replacement as ordered  - Monitor response to electrolyte replacements, including rhythm and repeat lab results as appropriate  - Fluid restriction as ordered  - Instruct patient on fluid and nutrition restrictions as appropriate  10/2/2024 1636 by Cuong Richardson RN  Outcome: Progressing  10/2/2024 1531 by Cuong Richardson RN  Outcome: Progressing  10/2/2024 1531 by Cuong Richardson RN  Outcome: Progressing  10/2/2024 1530 by Cuong Richardson RN  Outcome: Progressing     Problem: METABOLIC/FLUID AND ELECTROLYTES - ADULT  Goal: Hemodynamic stability and optimal renal function maintained  Description: INTERVENTIONS:  - Monitor labs and assess for signs and symptoms of volume excess or deficit  - Monitor intake, output and patient weight  - Monitor urine specific gravity, serum osmolarity and serum sodium as indicated or ordered  - Monitor response to interventions for patient's volume status, including labs, urine output, blood pressure (other measures as available)  - Encourage oral intake as appropriate  - Instruct patient on fluid and nutrition restrictions as appropriate  10/2/2024 1636 by Cuong Richardson RN  Outcome: Progressing  10/2/2024 1531 by Cuong Richardson RN  Outcome: Progressing  10/2/2024 1531 by Cuong Richardson RN  Outcome:  Progressing  10/2/2024 1530 by Cuong Richardson RN  Outcome: Progressing     Problem: RESPIRATORY - ADULT  Goal: Achieves optimal ventilation and oxygenation  Description: INTERVENTIONS:  - Assess for changes in respiratory status  - Assess for changes in mentation and behavior  - Position to facilitate oxygenation and minimize respiratory effort  - Oxygen supplementation based on oxygen saturation or ABGs  - Provide Smoking Cessation handout, if applicable  - Encourage broncho-pulmonary hygiene including cough, deep breathe, Incentive Spirometry  - Assess the need for suctioning and perform as needed  - Assess and instruct to report SOB or any respiratory difficulty  - Respiratory Therapy support as indicated  - Manage/alleviate anxiety  - Monitor for signs/symptoms of CO2 retention  10/2/2024 1636 by Cuong Richardson RN  Outcome: Progressing  10/2/2024 1531 by Cuong Richardson RN  Outcome: Progressing  10/2/2024 1531 by Cuong Richardson RN  Outcome: Progressing     Problem: GASTROINTESTINAL - ADULT  Goal: Minimal or absence of nausea and vomiting  Description: INTERVENTIONS:  - Maintain adequate hydration with IV or PO as ordered and tolerated  - Nasogastric tube to low intermittent suction as ordered  - Evaluate effectiveness of ordered antiemetic medications  - Provide nonpharmacologic comfort measures as appropriate  - Advance diet as tolerated, if ordered  - Obtain nutritional consult as needed  - Evaluate fluid balance  10/2/2024 1636 by Cuong Richardson RN  Outcome: Progressing  10/2/2024 1531 by Cuong Richardson RN  Outcome: Progressing  10/2/2024 1531 by Cuong Richardson RN  Outcome: Progressing

## 2024-10-03 VITALS
RESPIRATION RATE: 17 BRPM | TEMPERATURE: 98 F | BODY MASS INDEX: 27.43 KG/M2 | DIASTOLIC BLOOD PRESSURE: 67 MMHG | HEART RATE: 98 BPM | OXYGEN SATURATION: 95 % | WEIGHT: 181 LBS | SYSTOLIC BLOOD PRESSURE: 129 MMHG | HEIGHT: 68 IN

## 2024-10-03 LAB
ATRIAL RATE: 111 BPM
GLUCOSE BLD-MCNC: 129 MG/DL (ref 70–99)
P AXIS: 57 DEGREES
P-R INTERVAL: 138 MS
Q-T INTERVAL: 364 MS
QRS DURATION: 142 MS
QTC CALCULATION (BEZET): 495 MS
R AXIS: 9 DEGREES
T AXIS: 28 DEGREES
VENTRICULAR RATE: 111 BPM

## 2024-10-03 PROCEDURE — 99239 HOSP IP/OBS DSCHRG MGMT >30: CPT | Performed by: HOSPITALIST

## 2024-10-03 PROCEDURE — 99232 SBSQ HOSP IP/OBS MODERATE 35: CPT | Performed by: INTERNAL MEDICINE

## 2024-10-03 NOTE — PROGRESS NOTES
NURSING DISCHARGE NOTE    Discharged Home via Wheelchair.  Accompanied by RN and Spouse  Belongings Taken by patient/family.    Dc instructions given to pt at the bedside. Verbalized understanding. PIV removed.

## 2024-10-03 NOTE — PLAN OF CARE
Assumed care 0730.  Alert and oriented x4.  RA  IS encouraged. Denies SOB,  PIV TKO.   Accu checks  1800 Carb controlled diet  Heparin for VTE prophylaxis.   Tele- NSR  HD today. 1L removed.   Left and right precautions. arm precautions. Fistula to rarm.   Droplet Isolation precautions.   Up with pivot to commode.   BKA. Prosthetic at bedside. STBY while on.  Pt to discharge around 1330.   Continue to monitor pt.

## 2024-10-03 NOTE — PLAN OF CARE
Patient is alert and oriented, vitals stable. Patient denies pain or SOB. Ambulatory on the hallway. Plan for HD in the morning and discharge home after. Safety precautions in place, call light within reach, plan of care ongoing.     Problem: METABOLIC/FLUID AND ELECTROLYTES - ADULT  Goal: Glucose maintained within prescribed range  Description: INTERVENTIONS:  - Monitor Blood Glucose as ordered  - Assess for signs and symptoms of hyperglycemia and hypoglycemia  - Administer ordered medications to maintain glucose within target range  - Assess barriers to adequate nutritional intake and initiate nutrition consult as needed  - Instruct patient on self management of diabetes  Outcome: Progressing  Goal: Electrolytes maintained within normal limits  Description: INTERVENTIONS:  - Monitor labs and rhythm and assess patient for signs and symptoms of electrolyte imbalances  - Administer electrolyte replacement as ordered  - Monitor response to electrolyte replacements, including rhythm and repeat lab results as appropriate  - Fluid restriction as ordered  - Instruct patient on fluid and nutrition restrictions as appropriate  Outcome: Progressing     Problem: RESPIRATORY - ADULT  Goal: Achieves optimal ventilation and oxygenation  Description: INTERVENTIONS:  - Assess for changes in respiratory status  - Assess for changes in mentation and behavior  - Position to facilitate oxygenation and minimize respiratory effort  - Oxygen supplementation based on oxygen saturation or ABGs  - Provide Smoking Cessation handout, if applicable  - Encourage broncho-pulmonary hygiene including cough, deep breathe, Incentive Spirometry  - Assess the need for suctioning and perform as needed  - Assess and instruct to report SOB or any respiratory difficulty  - Respiratory Therapy support as indicated  - Manage/alleviate anxiety  - Monitor for signs/symptoms of CO2 retention  Outcome: Progressing     Problem: GASTROINTESTINAL - ADULT  Goal:  Minimal or absence of nausea and vomiting  Description: INTERVENTIONS:  - Maintain adequate hydration with IV or PO as ordered and tolerated  - Nasogastric tube to low intermittent suction as ordered  - Evaluate effectiveness of ordered antiemetic medications  - Provide nonpharmacologic comfort measures as appropriate  - Advance diet as tolerated, if ordered  - Obtain nutritional consult as needed  - Evaluate fluid balance  Outcome: Progressing

## 2024-10-03 NOTE — CM/SW NOTE
SW noted that patient is medically cleared for DC today. SW notified patient's OP HD clinic and faxed updated clinical information including recent flow sheets.     SW will continue to follow for plan of care changes and remain available for any additional DC needs or concerns.     Adela Merrill MSW, LSW  Discharge Planner   [id number]

## 2024-10-03 NOTE — PROGRESS NOTES
[facility]   part of [facility]     Hospitalist Progress Note     Richy Goins Patient Status:  Inpatient    1960 MRN [id number]   Location [facility] 4NW-A Attending Toan Vogt MD   Hosp Day # 2 PCP Woody Dahl MD     Chief Complaint: URI    Subjective:   Patient doing well. No new complaints. No cough.     Current medications:   epoetin juan josé  10,000 Units Intravenous Once in dialysis    amLODIPine  10 mg Oral Daily    pantoprazole  40 mg Oral QAM AC    aspirin  81 mg Oral Daily    heparin  5,000 Units Subcutaneous 2 times per day    guaiFENesin ER  600 mg Oral BID    insulin aspart  1-10 Units Subcutaneous TID AC and HS    insulin aspart  1-68 Units Subcutaneous TID CC    lisinopril  10 mg Oral Daily       Objective:    Review of Systems:   10 point ROS completed and was negative, except for pertinent positive and negatives stated in subjective.    Vital signs:  Temp:  [97.6 °F (36.4 °C)-98.4 °F (36.9 °C)] 97.9 °F (36.6 °C)  Pulse:  [74-86] 78  Resp:  [16-18] 16  BP: (111-122)/(53-57) 122/53  SpO2:  [95 %-99 %] 95 %  Patient Weight for the past 72 hrs:   Weight   10/01/24 0544 175 lb (79.4 kg)   10/01/24 1056 175 lb 0.7 oz (79.4 kg)   10/02/24 0458 181 lb (82.1 kg)     Physical Exam:    General: No acute distress.   Respiratory: Diminished breath sounds  Cardiovascular: S1, S2. Regular rate and rhythm.   Abdomen: Soft, nontender, nondistended.  Positive bowel sounds.   Extremities: No edema.  Neuro: AAOx3    Diagnostic Data:    Labs:  Recent Labs   Lab 10/01/24  0617 10/02/24  0704   WBC 12.2* 11.2*   HGB 10.7* 10.3*   MCV 85.3 85.4   .0 224.0       Recent Labs   Lab 10/01/24  0617 10/02/24  0704   * 194*   * 64*   CREATSERUM 8.39* 6.11*   CA 8.1* 7.8*   ALB 4.3  --     140   K 5.0 4.2   * 103   CO2 14.0* 25.0   ALKPHO 112  --    AST 13  --    ALT 13  --    BILT 0.4  --    TP 7.4  --        Estimated Creatinine Clearance: 11.7 mL/min (A) (based on  SCr of 6.11 mg/dL (H)).    No results for input(s): \"PTP\", \"INR\" in the last 168 hours.         COVID-19 Lab Results    COVID-19  Lab Results   Component Value Date    COVID19 Not Detected 10/01/2024    COVID19 Not Detected 03/26/2024    COVID19 Not Detected 01/31/2024       Pro-Calcitonin  No results for input(s): \"PCT\" in the last 168 hours.    Cardiac  No results for input(s): \"TROP\", \"PBNP\" in the last 168 hours.    Creatinine Kinase  No results for input(s): \"CK\" in the last 168 hours.    Inflammatory Markers  No results for input(s): \"CRP\", \"DAR\", \"LDH\", \"DDIMER\" in the last 168 hours.    Recent Labs   Lab 10/01/24  0617   TROPHS 59*       Imaging: Imaging data reviewed in Epic.    Medications:    epoetin juan josé  10,000 Units Intravenous Once in dialysis    amLODIPine  10 mg Oral Daily    pantoprazole  40 mg Oral QAM AC    aspirin  81 mg Oral Daily    heparin  5,000 Units Subcutaneous 2 times per day    guaiFENesin ER  600 mg Oral BID    insulin aspart  1-10 Units Subcutaneous TID AC and HS    insulin aspart  1-68 Units Subcutaneous TID CC    lisinopril  10 mg Oral Daily       Assessment & Plan:    URI d/t Parainfluenza  Atelectasis   Supportive measures  Incentive spirometry   ESRD on HD  HD per nephrology   Diabetes mellitus, a1c 8.7  Basal insulin - resume on DC  Correctional scale  Carb scale  Essential hypertension  Lisinopril   Norvasc   GERD   PPI  Dyslipidemia   Leukocytosis, improving   Anemia of chronic disease   Elevated troponin d/t uncontrolled hypertension and ESRD, not consistent with ACS   PVD sp right BKA    Supplementary Documentation:   Quality:  DVT Prophylaxis: Heparin    Home today.    Plan of care discussed with patient and RN.     Toan Vogt MD

## 2024-10-03 NOTE — PLAN OF CARE
Problem: GENITOURINARY - ADULT  Goal: Absence of urinary retention  Description: INTERVENTIONS:  - Assess patient’s ability to void and empty bladder  - Monitor intake/output and perform bladder scan as needed  - Follow urinary retention protocol/standard of care  - Consider collaborating with pharmacy to review patient's medication profile  - Implement strategies to promote bladder emptying  Outcome: Adequate for Discharge     Problem: METABOLIC/FLUID AND ELECTROLYTES - ADULT  Goal: Glucose maintained within prescribed range  Description: INTERVENTIONS:  - Monitor Blood Glucose as ordered  - Assess for signs and symptoms of hyperglycemia and hypoglycemia  - Administer ordered medications to maintain glucose within target range  - Assess barriers to adequate nutritional intake and initiate nutrition consult as needed  - Instruct patient on self management of diabetes  Outcome: Adequate for Discharge  Goal: Electrolytes maintained within normal limits  Description: INTERVENTIONS:  - Monitor labs and rhythm and assess patient for signs and symptoms of electrolyte imbalances  - Administer electrolyte replacement as ordered  - Monitor response to electrolyte replacements, including rhythm and repeat lab results as appropriate  - Fluid restriction as ordered  - Instruct patient on fluid and nutrition restrictions as appropriate  Outcome: Adequate for Discharge  Goal: Hemodynamic stability and optimal renal function maintained  Description: INTERVENTIONS:  - Monitor labs and assess for signs and symptoms of volume excess or deficit  - Monitor intake, output and patient weight  - Monitor urine specific gravity, serum osmolarity and serum sodium as indicated or ordered  - Monitor response to interventions for patient's volume status, including labs, urine output, blood pressure (other measures as available)  - Encourage oral intake as appropriate  - Instruct patient on fluid and nutrition restrictions as  appropriate  Outcome: Adequate for Discharge     Problem: RESPIRATORY - ADULT  Goal: Achieves optimal ventilation and oxygenation  Description: INTERVENTIONS:  - Assess for changes in respiratory status  - Assess for changes in mentation and behavior  - Position to facilitate oxygenation and minimize respiratory effort  - Oxygen supplementation based on oxygen saturation or ABGs  - Provide Smoking Cessation handout, if applicable  - Encourage broncho-pulmonary hygiene including cough, deep breathe, Incentive Spirometry  - Assess the need for suctioning and perform as needed  - Assess and instruct to report SOB or any respiratory difficulty  - Respiratory Therapy support as indicated  - Manage/alleviate anxiety  - Monitor for signs/symptoms of CO2 retention  Outcome: Adequate for Discharge     Problem: GASTROINTESTINAL - ADULT  Goal: Minimal or absence of nausea and vomiting  Description: INTERVENTIONS:  - Maintain adequate hydration with IV or PO as ordered and tolerated  - Nasogastric tube to low intermittent suction as ordered  - Evaluate effectiveness of ordered antiemetic medications  - Provide nonpharmacologic comfort measures as appropriate  - Advance diet as tolerated, if ordered  - Obtain nutritional consult as needed  - Evaluate fluid balance  Outcome: Adequate for Discharge

## 2024-10-03 NOTE — PROGRESS NOTES
[facility]  Nephrology Progress Note    Richy Goins Attending:  Toan Vogt MD       Assessment and Plan:    1) ESRD- due to longstanding DM 2; last HD  now with marked azotemia / uremia. HD today per usual routine     2) Viral syndrome (parainfluenza) -> sx have likely \"transitioned\" into uremic sx -> feeling much better after HD Tues     3) Anemia- due to CKD; EPO with HD to maintain hgb > 10 g/dl      4) Longstanding DM 2     5) HTN- higher BP's due to not taking meds x 1 week; resume usual lisinopril / amlodipine     6) PAD s/p R BKA    Anticipate dc home after HD      Subjective:  Awake alert feeling a lot better no nausea vomiting fevers, etc.    Physical Exam:   /53 (BP Location: Left arm)   Pulse 78   Temp 97.9 °F (36.6 °C) (Oral)   Resp 16   Ht 5' 8\" (1.727 m)   Wt 181 lb (82.1 kg)   SpO2 95%   BMI 27.52 kg/m²   Temp (24hrs), Av °F (36.7 °C), Min:97.6 °F (36.4 °C), Max:98.4 °F (36.9 °C)       Intake/Output Summary (Last 24 hours) at 10/3/2024 0938  Last data filed at 10/2/2024 2200  Gross per 24 hour   Intake 560 ml   Output --   Net 560 ml     Wt Readings from Last 3 Encounters:   10/02/24 181 lb (82.1 kg)   24 192 lb 3.2 oz (87.2 kg)   24 196 lb (88.9 kg)     General: awake alert  HEENT: No scleral icterus, MMM  Neck: Supple, no SARITA or thyromegaly  Cardiac: Regular rate and rhythm, S1, S2 normal, no murmur or tub  Lungs: Decreased BS at bases bilaterally   Abdomen: Soft, non-tender. + bowel sounds, no palpable organomegaly  Extremities: Without clubbing, cyanosis; no edema  Neurologic: Cranial nerves grossly intact, moving all extremities  Skin: Warm and dry, no rashes       Labs:   Lab Results   Component Value Date    PGLU 129 10/03/2024       Imaging:  All imaging studies reviewed.    Meds:   Current Facility-Administered Medications   Medication Dose Route Frequency    epoetin juan josé (Epogen, Procrit) 22041 UNIT/ML injection 10,000 Units  10,000 Units  Intravenous Once in dialysis    amLODIPine (Norvasc) tab 10 mg  10 mg Oral Daily    pantoprazole (Protonix) DR tab 40 mg  40 mg Oral QAM AC    aspirin DR tab 81 mg  81 mg Oral Daily    acetaminophen (Tylenol Extra Strength) tab 1,000 mg  1,000 mg Oral Q4H PRN    melatonin tab 3 mg  3 mg Oral Nightly PRN    glycerin-hypromellose- (Artificial Tears) 0.2-0.2-1 % ophthalmic solution 1 drop  1 drop Both Eyes QID PRN    sodium chloride (Saline Mist) 0.65 % nasal solution 1 spray  1 spray Each Nare Q3H PRN    heparin (Porcine) 5000 UNIT/ML injection 5,000 Units  5,000 Units Subcutaneous 2 times per day    ondansetron (Zofran) 4 MG/2ML injection 4 mg  4 mg Intravenous Q6H PRN    metoclopramide (Reglan) 5 mg/mL injection 5 mg  5 mg Intravenous Q8H PRN    polyethylene glycol (PEG 3350) (Miralax) 17 g oral packet 17 g  17 g Oral Daily PRN    sennosides (Senokot) tab 17.2 mg  17.2 mg Oral Nightly PRN    bisacodyl (Dulcolax) 10 MG rectal suppository 10 mg  10 mg Rectal Daily PRN    guaiFENesin ER (Mucinex) 12 hr tab 600 mg  600 mg Oral BID    benzonatate (Tessalon) cap 200 mg  200 mg Oral TID PRN    glucose (Dex4) 15 GM/59ML oral liquid 15 g  15 g Oral Q15 Min PRN    Or    glucose (Glutose) 40% oral gel 15 g  15 g Oral Q15 Min PRN    Or    glucose-vitamin C (Dex-4) chewable tab 4 tablet  4 tablet Oral Q15 Min PRN    Or    dextrose 50% injection 50 mL  50 mL Intravenous Q15 Min PRN    Or    glucose (Dex4) 15 GM/59ML oral liquid 30 g  30 g Oral Q15 Min PRN    Or    glucose (Glutose) 40% oral gel 30 g  30 g Oral Q15 Min PRN    Or    glucose-vitamin C (Dex-4) chewable tab 8 tablet  8 tablet Oral Q15 Min PRN    insulin aspart (NovoLOG) 100 Units/mL FlexPen 1-10 Units  1-10 Units Subcutaneous TID AC and HS    insulin aspart (NovoLOG) 100 Units/mL FlexPen 1-68 Units  1-68 Units Subcutaneous TID CC    lisinopril (Zestril) tab 10 mg  10 mg Oral Daily         Questions/concerns were discussed with patient and/or family by  bedside.          Keeley Blackwell MD  10/3/2024  938 AM

## 2024-10-03 NOTE — DISCHARGE SUMMARY
[facility]     Richy Goins Patient Status:  Inpatient    1960 MRN [id number]   Location [facility] 4NW-A Attending Toan Vogt MD   Hosp Day # 2 PCP Woody Dahl MD     Date of Admission: 10/1/2024  Date of Discharge:   10/3/2024    Discharge Disposition: Home or Self Care    Discharge Diagnosis:  URI d/t Parainfluenza  Atelectasis   ESRD on HD  Diabetes mellitus, a1c 8.7  Essential hypertension  GERD  Dyslipidemia   Leukocytosis, improving   Anemia of chronic disease   Elevated troponin d/t uncontrolled hypertension and ESRD, not consistent with ACS   PVD sp right BKA    History of Present Illness: Richy Goins is a 63 year old male with a past medical history of ESRD, HTN, GERD, PVD, DM2.  He has had URI symptoms at home and +ve contact via his wife.  Due to his illness he did not go to dialysis.  He comes to the ED now due to malaise/weakness.      Brief Synopsis: Patient presented with weakness resulting in missed HD. Patient diagnosed with parainfluenza, supportive measure initiated. Patient underwent HD. He had clinical improvement. Home today on regimen outlined below.     Lace+ Score: 78  59-90 High Risk  29-58 Medium Risk  0-28   Low Risk       TCM Follow-Up Recommendation:  LACE > 58: High Risk of readmission after discharge from the hospital.    Discharge Medication List:     Discharge Medications        CHANGE how you take these medications        Instructions Prescription details   amLODIPine 10 MG Tabs  Commonly known as: Norvasc  What changed: Another medication with the same name was removed. Continue taking this medication, and follow the directions you see here.      Take 1 tablet (10 mg total) by mouth daily.   Quantity: 30 tablet  Refills: 1            CONTINUE taking these medications        Instructions Prescription details   aspirin 81 MG Tbec      Take 1 tablet (81 mg total) by mouth daily.   Refills: 0     BD Pen Needle Lara 2nd Gen 32G X 4  MM Misc  Generic drug: Insulin Pen Needle      Use to inject insulin up to 5 times daily   Quantity: 450 each  Refills: 1     HYDROcodone-acetaminophen 5-325 MG Tabs  Commonly known as: Norco      Take 1-2 tablets by mouth every 4 (four) hours as needed for Pain.   Quantity: 30 tablet  Refills: 0     Levemir FlexPen 100 UNIT/ML Sopn  Generic drug: insulin detemir      Inject 17 Units into the skin nightly. 3 month supply   Quantity: 15 mL  Refills: 1     lisinopril 10 MG Tabs  Commonly known as: Zestril      Take 1 tablet (10 mg total) by mouth daily.   Refills: 0     pantoprazole 40 MG Tbec  Commonly known as: Protonix      Take one tablet (40 mg total) by mouth once daily, 30 minutes prior to breakfast.   Quantity: 90 tablet  Refills: 3              [place]MP [place]    Follow-up appointment:   Woody Dahl MD  [address] [address]  [phone]    Call  You have requested to schedule your own appointment for your diabetes follow-up with Dr. Woody Dahl.  Please call Dr. Dahl's office as soon as possible to schedule an appointment to be within 7 days.    Appointments for Next 30 Days 10/4/2024 - 11/3/2024      None            -----------------------------------------------------------------------------------------------  PATIENT DISCHARGE INSTRUCTIONS: See electronic chart    Toan Vogt MD    Total time spent on discharge plannin minutes     The  Cures Act makes medical notes like these available to patients in the interest of transparency. Please be advised this is a medical document. Medical documents are intended to carry relevant information, facts as evident, and the clinical opinion of the practitioner. The medical note is intended as peer to peer communication and may appear blunt or direct. It is written in medical language and may contain abbreviations or verbiage that are unfamiliar.

## 2024-10-04 ENCOUNTER — PATIENT OUTREACH (OUTPATIENT)
Dept: CASE MANAGEMENT | Age: 64
End: 2024-10-04

## 2024-10-04 NOTE — PROGRESS NOTES
DM appointment request (discharged 10/03)    Dr Woody Dahl  Internal Medicine  [address] [id number] [phone]  Pt declined apt; pt advised he will call later to schedule  Closing encounter

## 2024-10-07 NOTE — PROGRESS NOTES
A [employer] message was sent to the patient for outreach to complete TCM. It was requested the patient call [facility] back at, [phone].

## 2024-10-18 ENCOUNTER — MED REC SCAN ONLY (OUTPATIENT)
Dept: INTERNAL MEDICINE CLINIC | Facility: CLINIC | Age: 64
End: 2024-10-18

## 2024-10-21 NOTE — PROGRESS NOTES
Multiple attempts to reach the pt and messages left with no returned phone call. Past TCM timeframe, closing encounter.

## 2025-01-07 ENCOUNTER — APPOINTMENT (OUTPATIENT)
Dept: GENERAL RADIOLOGY | Facility: HOSPITAL | Age: 65
End: 2025-01-07
Attending: STUDENT IN AN ORGANIZED HEALTH CARE EDUCATION/TRAINING PROGRAM
Payer: COMMERCIAL

## 2025-01-07 ENCOUNTER — HOSPITAL ENCOUNTER (INPATIENT)
Facility: HOSPITAL | Age: 65
LOS: 5 days | Discharge: HOME | End: 2025-01-13
Attending: STUDENT IN AN ORGANIZED HEALTH CARE EDUCATION/TRAINING PROGRAM | Admitting: HOSPITALIST
Payer: COMMERCIAL

## 2025-01-07 DIAGNOSIS — E83.39 HYPERPHOSPHATEMIA: ICD-10-CM

## 2025-01-07 DIAGNOSIS — R06.09 DYSPNEA ON EXERTION: ICD-10-CM

## 2025-01-07 DIAGNOSIS — Z99.2 ESRD ON HEMODIALYSIS (HCC): ICD-10-CM

## 2025-01-07 DIAGNOSIS — J90 PLEURAL EFFUSION: Primary | ICD-10-CM

## 2025-01-07 DIAGNOSIS — N18.6 ESRD ON HEMODIALYSIS (HCC): ICD-10-CM

## 2025-01-07 PROBLEM — I99.8 ISCHEMIC FOOT: Status: ACTIVE | Noted: 2025-01-07

## 2025-01-07 PROBLEM — D63.1 ANEMIA IN ESRD (END-STAGE RENAL DISEASE) (HCC): Status: ACTIVE | Noted: 2025-01-07

## 2025-01-07 LAB
ANION GAP SERPL CALC-SCNC: 18 MMOL/L (ref 0–18)
BASOPHILS # BLD AUTO: 0.04 X10(3) UL (ref 0–0.2)
BASOPHILS NFR BLD AUTO: 0.3 %
BUN BLD-MCNC: 122 MG/DL (ref 9–23)
CALCIUM BLD-MCNC: 8 MG/DL (ref 8.7–10.4)
CHLORIDE SERPL-SCNC: 102 MMOL/L (ref 98–112)
CO2 SERPL-SCNC: 20 MMOL/L (ref 21–32)
CREAT BLD-MCNC: 9.45 MG/DL
D DIMER PPP FEU-MCNC: 4.88 UG/ML FEU (ref ?–0.66)
EGFRCR SERPLBLD CKD-EPI 2021: 6 ML/MIN/1.73M2 (ref 60–?)
EOSINOPHIL # BLD AUTO: 0.07 X10(3) UL (ref 0–0.7)
EOSINOPHIL NFR BLD AUTO: 0.6 %
ERYTHROCYTE [DISTWIDTH] IN BLOOD BY AUTOMATED COUNT: 14.9 %
EST. AVERAGE GLUCOSE BLD GHB EST-MCNC: 174 MG/DL (ref 68–126)
GLUCOSE BLD-MCNC: 123 MG/DL (ref 70–99)
GLUCOSE BLD-MCNC: 167 MG/DL (ref 70–99)
HBA1C MFR BLD: 7.7 % (ref ?–5.7)
HBV CORE AB SERPL QL IA: NONREACTIVE
HBV SURFACE AB SER QL: NONREACTIVE
HBV SURFACE AB SERPL IA-ACNC: <3.1 MIU/ML
HBV SURFACE AG SER-ACNC: 0.15 [IU]/L
HBV SURFACE AG SERPL QL IA: NONREACTIVE
HCT VFR BLD AUTO: 33.4 %
HGB BLD-MCNC: 10.9 G/DL
IMM GRANULOCYTES # BLD AUTO: 0.05 X10(3) UL (ref 0–1)
IMM GRANULOCYTES NFR BLD: 0.4 %
LYMPHOCYTES # BLD AUTO: 0.71 X10(3) UL (ref 1–4)
LYMPHOCYTES NFR BLD AUTO: 5.9 %
MAGNESIUM SERPL-MCNC: 2.1 MG/DL (ref 1.6–2.6)
MCH RBC QN AUTO: 27.7 PG (ref 26–34)
MCHC RBC AUTO-ENTMCNC: 32.6 G/DL (ref 31–37)
MCV RBC AUTO: 85 FL
MONOCYTES # BLD AUTO: 0.55 X10(3) UL (ref 0.1–1)
MONOCYTES NFR BLD AUTO: 4.6 %
NEUTROPHILS # BLD AUTO: 10.6 X10 (3) UL (ref 1.5–7.7)
NEUTROPHILS # BLD AUTO: 10.6 X10(3) UL (ref 1.5–7.7)
NEUTROPHILS NFR BLD AUTO: 88.2 %
OSMOLALITY SERPL CALC.SUM OF ELEC: 333 MOSM/KG (ref 275–295)
PHOSPHATE SERPL-MCNC: 9.9 MG/DL (ref 2.4–5.1)
PLATELET # BLD AUTO: 258 10(3)UL (ref 150–450)
POTASSIUM SERPL-SCNC: 4.3 MMOL/L (ref 3.5–5.1)
RBC # BLD AUTO: 3.93 X10(6)UL
SODIUM SERPL-SCNC: 140 MMOL/L (ref 136–145)
TROPONIN I SERPL HS-MCNC: 26 NG/L
WBC # BLD AUTO: 12 X10(3) UL (ref 4–11)

## 2025-01-07 PROCEDURE — 5A1D70Z PERFORMANCE OF URINARY FILTRATION, INTERMITTENT, LESS THAN 6 HOURS PER DAY: ICD-10-PCS | Performed by: INTERNAL MEDICINE

## 2025-01-07 PROCEDURE — 71045 X-RAY EXAM CHEST 1 VIEW: CPT | Performed by: STUDENT IN AN ORGANIZED HEALTH CARE EDUCATION/TRAINING PROGRAM

## 2025-01-07 PROCEDURE — 99223 1ST HOSP IP/OBS HIGH 75: CPT | Performed by: HOSPITALIST

## 2025-01-07 PROCEDURE — 99223 1ST HOSP IP/OBS HIGH 75: CPT | Performed by: INTERNAL MEDICINE

## 2025-01-07 PROCEDURE — 99222 1ST HOSP IP/OBS MODERATE 55: CPT | Performed by: STUDENT IN AN ORGANIZED HEALTH CARE EDUCATION/TRAINING PROGRAM

## 2025-01-07 RX ORDER — INSULIN DETEMIR 100 [IU]/ML
15 INJECTION, SOLUTION SUBCUTANEOUS NIGHTLY
COMMUNITY
Start: 2023-11-24

## 2025-01-07 RX ORDER — ASPIRIN 81 MG/1
81 TABLET ORAL DAILY
Status: DISCONTINUED | OUTPATIENT
Start: 2025-01-08 | End: 2025-01-13

## 2025-01-07 RX ORDER — ONDANSETRON 2 MG/ML
4 INJECTION INTRAMUSCULAR; INTRAVENOUS EVERY 6 HOURS PRN
Status: DISCONTINUED | OUTPATIENT
Start: 2025-01-07 | End: 2025-01-13

## 2025-01-07 RX ORDER — PANTOPRAZOLE SODIUM 40 MG/1
40 TABLET, DELAYED RELEASE ORAL
Status: DISCONTINUED | OUTPATIENT
Start: 2025-01-08 | End: 2025-01-13

## 2025-01-07 RX ORDER — HEPARIN SODIUM 5000 [USP'U]/ML
5000 INJECTION, SOLUTION INTRAVENOUS; SUBCUTANEOUS EVERY 12 HOURS SCHEDULED
Status: DISCONTINUED | OUTPATIENT
Start: 2025-01-07 | End: 2025-01-10

## 2025-01-07 RX ORDER — BISACODYL 10 MG
10 SUPPOSITORY, RECTAL RECTAL
Status: DISCONTINUED | OUTPATIENT
Start: 2025-01-07 | End: 2025-01-13

## 2025-01-07 RX ORDER — KETOROLAC TROMETHAMINE 15 MG/ML
15 INJECTION, SOLUTION INTRAMUSCULAR; INTRAVENOUS ONCE
Status: DISCONTINUED | OUTPATIENT
Start: 2025-01-07 | End: 2025-01-07

## 2025-01-07 RX ORDER — SENNOSIDES 8.6 MG
17.2 TABLET ORAL NIGHTLY PRN
Status: DISCONTINUED | OUTPATIENT
Start: 2025-01-07 | End: 2025-01-13

## 2025-01-07 RX ORDER — BENZONATATE 100 MG/1
200 CAPSULE ORAL 3 TIMES DAILY PRN
Status: DISCONTINUED | OUTPATIENT
Start: 2025-01-07 | End: 2025-01-12

## 2025-01-07 RX ORDER — ACETAMINOPHEN 500 MG
1000 TABLET ORAL EVERY 4 HOURS PRN
Status: DISCONTINUED | OUTPATIENT
Start: 2025-01-07 | End: 2025-01-13

## 2025-01-07 RX ORDER — METOCLOPRAMIDE HYDROCHLORIDE 5 MG/ML
10 INJECTION INTRAMUSCULAR; INTRAVENOUS EVERY 8 HOURS PRN
Status: DISCONTINUED | OUTPATIENT
Start: 2025-01-07 | End: 2025-01-08

## 2025-01-07 RX ORDER — ALBUMIN (HUMAN) 12.5 G/50ML
25 SOLUTION INTRAVENOUS
Status: DISCONTINUED | OUTPATIENT
Start: 2025-01-07 | End: 2025-01-08

## 2025-01-07 RX ORDER — DEXTROSE MONOHYDRATE 25 G/50ML
50 INJECTION, SOLUTION INTRAVENOUS
Status: DISCONTINUED | OUTPATIENT
Start: 2025-01-07 | End: 2025-01-13

## 2025-01-07 RX ORDER — AMLODIPINE BESYLATE 10 MG/1
10 TABLET ORAL DAILY
Status: DISCONTINUED | OUTPATIENT
Start: 2025-01-08 | End: 2025-01-13

## 2025-01-07 RX ORDER — INSULIN DEGLUDEC 100 U/ML
13 INJECTION, SOLUTION SUBCUTANEOUS NIGHTLY
Status: DISCONTINUED | OUTPATIENT
Start: 2025-01-07 | End: 2025-01-09

## 2025-01-07 RX ORDER — POLYETHYLENE GLYCOL 3350 17 G/17G
17 POWDER, FOR SOLUTION ORAL DAILY PRN
Status: DISCONTINUED | OUTPATIENT
Start: 2025-01-07 | End: 2025-01-13

## 2025-01-07 RX ORDER — NICOTINE POLACRILEX 4 MG
15 LOZENGE BUCCAL
Status: DISCONTINUED | OUTPATIENT
Start: 2025-01-07 | End: 2025-01-13

## 2025-01-07 RX ORDER — NICOTINE POLACRILEX 4 MG
30 LOZENGE BUCCAL
Status: DISCONTINUED | OUTPATIENT
Start: 2025-01-07 | End: 2025-01-13

## 2025-01-07 NOTE — PLAN OF CARE
NURSING ADMISSION NOTE      Patient admitted via Cart  Oriented to room.  Safety precautions initiated.  Bed in low position.  Call light in reach.  Navigator completed.

## 2025-01-07 NOTE — CONSULTS
[facility]  Report of Consultation    Richy Goins Patient Status:  Emergency    1960 MRN [id number]   Location [facility] EMERGENCY DEPARTMENT Attending David Ireland MD   Hosp Day # 0 PCP Woody Dahl MD     Reason for Consultation:  ESRD    History of Present Illness:  Richy Goins is a 64 year old male with history of ESRD, DM2, HTN, HLD who presents with weakness. Nephrology consulted for inpatient ESRD management.     He reports having dyspnea on exertion and overall feeling weak for the past month. Has had progressively worsening exertional fatigue during this time. Last HD was , often misses Saturday sessions for various reasons but this Saturday, he reports feeling unwell and did not go as a result of that. In the ED, , K 4.3, bicarb 20. CXR with opacification of the left mid to lower lung zone as well as trace right sided pleural effusion.    History:  Past Medical History:    Belching    Dialysis patient (HCC)    Diarrhea, unspecified    Esophageal reflux    Essential hypertension    Fatigue    Flatulence/gas pain/belching    High blood pressure    High cholesterol    History of blood transfusion    Hyperlipidemia    Indigestion    Irregular bowel habits    Night sweats    Osteoporosis    Peripheral vascular disease (HCC)    Pure hypercholesterolemia    Type II or unspecified type diabetes mellitus without mention of complication, not stated as uncontrolled    Visual impairment    reading glasses    Vomiting     Past Surgical History:   Procedure Laterality Date    Amputation toe,i-p jt Right     Colonoscopy N/A 2023    Procedure: COLONOSCOPY;  Surgeon: Sarmad Gonzalez MD;  Location:  ENDOSCOPY    Colonoscopy      Upper gi endoscopy,exam       Family History   Problem Relation Age of Onset    Heart Attack Father     Heart Disorder Father 57    Diabetes Maternal Grandfather     Diabetes Maternal Aunt      Denies family history of kidney disease.    reports that he  has never smoked. He has never used smokeless tobacco. He reports that he does not currently use alcohol. He reports that he does not use drugs.    Allergies:  Allergies[1]    Medications:  No current facility-administered medications for this encounter.  Prior to Admission Medications   Medication Sig    lisinopril 10 MG Oral Tab Take 1 tablet (10 mg total) by mouth daily.    pantoprazole 40 MG Oral Tab EC Take one tablet (40 mg total) by mouth once daily, 30 minutes prior to breakfast.    aspirin 81 MG Oral Tab EC Take 1 tablet (81 mg total) by mouth daily.    HYDROcodone-acetaminophen 5-325 MG Oral Tab Take 1-2 tablets by mouth every 4 (four) hours as needed for Pain.    Insulin Pen Needle (BD PEN NEEDLE AMBER 2ND GEN) 32G X 4 MM Does not apply Misc Use to inject insulin up to 5 times daily    insulin detemir (LEVEMIR FLEXPEN) 100 UNIT/ML Subcutaneous Solution Pen-injector Inject 17 Units into the skin nightly. 3 month supply    amLODIPine 10 MG Oral Tab Take 1 tablet (10 mg total) by mouth daily.       Review of Systems:  Please see HPI for pertinent positives. 10 point review of systems otherwise reviewed and negative.     Physical Exam:  /85   Pulse 91   Temp 98.5 °F (36.9 °C) (Temporal)   Resp 26   Ht 5' 8\" (1.727 m)   Wt 170 lb (77.1 kg)   SpO2 95%   BMI 25.85 kg/m²   Temp (24hrs), Av.5 °F (36.9 °C), Min:98.5 °F (36.9 °C), Max:98.5 °F (36.9 °C)     No intake or output data in the 24 hours ending 25 1257  Wt Readings from Last 3 Encounters:   25 170 lb (77.1 kg)   10/02/24 181 lb (82.1 kg)   24 192 lb 3.2 oz (87.2 kg)     General: awake, alert, no distress  HEENT: No scleral icterus, MMM  Neck: Supple, no SARITA or thyromegaly  Cardiac: Regular rate and rhythm, S1, S2 normal, no murmur  Lungs: Decreased breath sounds worse on left side  Abdomen: Soft, non-tender.   Extremities: Without clubbing, cyanosis; 1+ LE edema  Neurologic: Cranial nerves grossly intact, moving all  extremities  Skin: Warm and dry, no rashes      Laboratory Data:  Lab Results   Component Value Date    WBC 12.0 01/07/2025    HGB 10.9 01/07/2025    HCT 33.4 01/07/2025    .0 01/07/2025    CREATSERUM 9.45 01/07/2025     01/07/2025     01/07/2025    K 4.3 01/07/2025     01/07/2025    CO2 20.0 01/07/2025     01/07/2025    CA 8.0 01/07/2025    DDIMER 4.88 01/07/2025    MG 2.1 01/07/2025    PHOS 9.9 01/07/2025       Imaging:  All imaging studies reviewed.    Assessment / Plan:    1) ESRD: Due to long-standing DM2. Receives HD TThS, will continue per usual outpatient schedule. Plan for HD today.     2) HTN: Continue home amlodipine and lisinopril    3) Anemia: Due to CKD. Continue ESAs with HD for goal hemoglobin 10-11.    4) Weakness/SOB: due to missed HD and subsequent azotemia and fluid overload. Reiterated importance of not missing HD as well as compliance with fluid restriction.     Thank you for allowing me to participate in this patient's care. Please feel free to call me with any questions or concerns.     Marlene Ibrahim MD  01/07/25       [1] No Known Allergies

## 2025-01-07 NOTE — CONSULTS
[facility]  Pulmonary/Critical Care Consult note    Richy Goins Patient Status:  Observation    1960 MRN [id number]   Location [facility] 3NE-A Attending Dallas Drew MD   Hosp Day # 0 PCP Woody Dahl MD     Reason for Consultation:  Large left pleural effusion shortness of breath    History of Present Illness:  Richy Goins is a a(n) 64 year old male.  With history of GERD, hypertension, hyperlipidemia, type 2 diabetes mellitus, osteoporosis.  ESRD on hemodialysis.  The patient presented to the emergency department with complaints of generalized weakness and shortness of breath.  Patient reports that he missed one hemo-dialysis   The patient is noted to have large left pleural effusion so pulmonary consultation was obtained for further evaluation and management for possible thoracentesis    The patient reports shortness of breath with minimal exertion.  He denies any chest pains.  He admits to some mild dry cough.    He reports only intermittent snoring and denies any daytime sleepiness or fatigue.    History:  Past Medical History:    Belching    Dialysis patient (HCC)    Diarrhea, unspecified    Esophageal reflux    Essential hypertension    Fatigue    Flatulence/gas pain/belching    High blood pressure    High cholesterol    History of blood transfusion    Hyperlipidemia    Indigestion    Irregular bowel habits    Night sweats    Osteoporosis    Peripheral vascular disease (HCC)    Pure hypercholesterolemia    Type II or unspecified type diabetes mellitus without mention of complication, not stated as uncontrolled    Visual impairment    reading glasses    Vomiting     Past Surgical History:   Procedure Laterality Date    Amputation toe,i-p jt Right     Av fistula revision, open      Below knee leg amputation Right     Colonoscopy N/A 2023    Procedure: COLONOSCOPY;  Surgeon: Sarmad Gonzalez MD;  Location:  ENDOSCOPY    Colonoscopy      Upper gi endoscopy,exam       Family  History   Problem Relation Age of Onset    Heart Attack Father     Heart Disorder Father 57    Diabetes Maternal Grandfather     Diabetes Maternal Aunt       reports that he has never smoked. He has never used smokeless tobacco. He reports that he does not currently use alcohol. He reports that he does not use drugs.    Allergies:  Allergies[1]    Medications:    Current Facility-Administered Medications:     sodium chloride 0.9 % IV bolus 100 mL, 100 mL, Intravenous, Q30 Min PRN **AND** albumin human (Albumin) 25% injection 25 g, 25 g, Intravenous, PRN Dialysis    epoetin juan josé (Epogen, Procrit) 10291 UNIT/ML injection 10,000 Units, 10,000 Units, Intravenous, Once in dialysis    [START ON 1/8/2025] amLODIPine (Norvasc) tab 10 mg, 10 mg, Oral, Daily    [START ON 1/8/2025] aspirin DR tab 81 mg, 81 mg, Oral, Daily    insulin degludec (Tresiba) 100 units/mL flextouch 13 Units, 13 Units, Subcutaneous, Nightly    [START ON 1/8/2025] pantoprazole (Protonix) DR tab 40 mg, 40 mg, Oral, QAM AC    insulin aspart (NovoLOG) 100 Units/mL FlexPen 1-68 Units, 1-68 Units, Subcutaneous, TID CC    glucose (Dex4) 15 GM/59ML oral liquid 15 g, 15 g, Oral, Q15 Min PRN **OR** glucose (Glutose) 40% oral gel 15 g, 15 g, Oral, Q15 Min PRN **OR** glucose-vitamin C (Dex-4) chewable tab 4 tablet, 4 tablet, Oral, Q15 Min PRN **OR** dextrose 50% injection 50 mL, 50 mL, Intravenous, Q15 Min PRN **OR** glucose (Dex4) 15 GM/59ML oral liquid 30 g, 30 g, Oral, Q15 Min PRN **OR** glucose (Glutose) 40% oral gel 30 g, 30 g, Oral, Q15 Min PRN **OR** glucose-vitamin C (Dex-4) chewable tab 8 tablet, 8 tablet, Oral, Q15 Min PRN    acetaminophen (Tylenol Extra Strength) tab 1,000 mg, 1,000 mg, Oral, Q4H PRN    melatonin tab 3 mg, 3 mg, Oral, Nightly PRN    polyethylene glycol (PEG 3350) (Miralax) 17 g oral packet 17 g, 17 g, Oral, Daily PRN    sennosides (Senokot) tab 17.2 mg, 17.2 mg, Oral, Nightly PRN    bisacodyl (Dulcolax) 10 MG rectal suppository 10 mg,  10 mg, Rectal, Daily PRN    ondansetron (Zofran) 4 MG/2ML injection 4 mg, 4 mg, Intravenous, Q6H PRN    metoclopramide (Reglan) 5 mg/mL injection 10 mg, 10 mg, Intravenous, Q8H PRN    benzonatate (Tessalon) cap 200 mg, 200 mg, Oral, TID PRN    heparin (Porcine) 5000 UNIT/ML injection 5,000 Units, 5,000 Units, Subcutaneous, 2 times per day    insulin aspart (NovoLOG) 100 Units/mL FlexPen 1-10 Units, 1-10 Units, Subcutaneous, TID AC and HS    Intake/Output:  No intake or output data in the 24 hours ending 01/07/25 1706   Body mass index is 25.85 kg/m².    Review of Systems  Review of Systems:   A comprehensive 10 point review of systems was completed.  Pertinent positives and negatives noted in the the HPI.       Patient Vitals for the past 24 hrs:   BP Temp Temp src Pulse Resp SpO2 Height Weight   01/07/25 1554 -- 98.2 °F (36.8 °C) Oral -- -- 93 % -- --   01/07/25 1445 153/80 -- -- 97 (!) 27 96 % -- --   01/07/25 1330 149/84 -- -- 94 26 96 % -- --   01/07/25 1315 -- -- -- 94 (!) 30 97 % -- --   01/07/25 1215 -- -- -- 91 26 95 % -- --   01/07/25 1055 153/85 98.5 °F (36.9 °C) Temporal 97 16 97 % 5' 8\" (1.727 m) 170 lb (77.1 kg)     Vitals:    01/07/25 1315 01/07/25 1330 01/07/25 1445 01/07/25 1554   BP:  149/84 153/80    Pulse: 94 94 97    Resp: (!) 30 26 (!) 27    Temp:    98.2 °F (36.8 °C)   TempSrc:    Oral   SpO2: 97% 96% 96% 93%   Weight:       Height:             Physical Exam  Constitutional:       General: He is not in acute distress.     Appearance: Normal appearance. He is not ill-appearing or diaphoretic.   HENT:      Head: Normocephalic and atraumatic.      Nose: Nose normal. No congestion or rhinorrhea.      Mouth/Throat:      Mouth: Mucous membranes are moist.      Pharynx: Oropharynx is clear. No oropharyngeal exudate or posterior oropharyngeal erythema.      Comments: Mallampati class IV palate  Eyes:      Extraocular Movements: Extraocular movements intact.      Pupils: Pupils are equal, round, and  reactive to light.   Cardiovascular:      Rate and Rhythm: Normal rate.      Pulses: Normal pulses.      Heart sounds: Normal heart sounds. No murmur heard.  Pulmonary:      Effort: Pulmonary effort is normal. No respiratory distress.      Breath sounds: Normal breath sounds. No wheezing or rhonchi.      Comments: Absent breath sounds in left lower half of the chest  Chest:      Chest wall: No tenderness.   Abdominal:      General: Abdomen is flat. Bowel sounds are normal.      Palpations: Abdomen is soft.   Musculoskeletal:         General: Normal range of motion.      Comments: Right prosthetic limb   Skin:     General: Skin is warm.   Neurological:      General: No focal deficit present.      Mental Status: He is alert and oriented to person, place, and time.   Psychiatric:         Mood and Affect: Mood normal.         Behavior: Behavior normal.         Thought Content: Thought content normal.         Judgment: Judgment normal.            Lab Data Review:  Recent Labs   Lab 01/07/25  1127   WBC 12.0*   HGB 10.9*   HCT 33.4*   .0       Recent Labs   Lab 01/07/25  1127      K 4.3      CO2 20.0*   *   CREATSERUM 9.45*   CA 8.0*   *       Recent Labs   Lab 01/07/25  1127   MG 2.1       Lab Results   Component Value Date    PHOS 9.9 (HH) 01/07/2025        No results for input(s): \"PT\", \"INR\", \"PTT\" in the last 168 hours.    No results for input(s): \"ABGPHT\", \"USIWPJ5B\", \"RILTF0F\", \"ABGHCO3\", \"ABGBE\", \"TEMP\", \"NOEMÍ\", \"SITE\", \"DEV\", \"THGB\" in the last 168 hours.    No results for input(s): \"TROP\", \"CKMB\" in the last 168 hours.    Cultures: No results found for this visit on 01/07/25.        Radiology personally reviewed:  XR CHEST AP PORTABLE  (CPT=71045)    Result Date: 1/7/2025  CONCLUSION:  1. Stable mild cardiac enlargement. 2. Opacification of the left mid to lower lung zone representing combination of effusion and consolidation. 3. Probable trace right-sided pleural effusion with  right basilar atelectasis/infiltrates. 4. Interstitial opacities bilaterally likely representing edema.    LOCATION:  [facility]      Dictated by (CST): Shi Crystal MD on 1/07/2025 at 12:56 PM     Finalized by (CST): Shi Crystal MD on 1/07/2025 at 12:58 PM         Patient Active Problem List   Diagnosis    Diabetic nephropathy associated with type 2 diabetes mellitus (HCC)    Primary hypertension    Type 2 diabetes mellitus with hyperglycemia (HCC)    Stage 3 chronic kidney disease (HCC)    Retinopathy due to secondary diabetes mellitus (HCC)    Atherosclerosis of native artery of extremity (HCC)    Stage 4 chronic kidney disease (HCC)    Acidosis    ESRD (end stage renal disease) (HCC)    ESRD on hemodialysis (HCC)    Pure hypercholesterolemia    Hemoptysis    Hypertensive urgency    Hematemesis with nausea    Hematemesis, unspecified whether nausea present    Acute gastritis with hemorrhage, unspecified gastritis type    Uremia    Acquired absence of right leg below knee (HCC)    Chronic kidney disease, stage 4 (severe) (HCC)    End-stage renal disease on hemodialysis (HCC)    Osteomyelitis of right foot (HCC)    Dyslipidemia    Diabetes mellitus type 2 in nonobese (HCC)    Unilateral complete BKA, right, sequela (HCC)    Type 2 diabetes mellitus with diabetic peripheral angiopathy without gangrene (HCC)    Type 2 diabetes mellitus with diabetic chronic kidney disease (HCC)    Long term (current) use of aspirin    Hypertensive chronic kidney disease w stg 1-4/unsp chr kdny    Encounter for orthopedic aftercare following surgical amputation    Anemia in chronic kidney disease    Age-related osteoporosis without current pathological fracture    Acute kidney failure, unspecified (HCC)    CKD (chronic kidney disease), stage III (HCC)    Type 1 diabetes mellitus with ketoacidosis without coma (HCC)    Senile purpura (HCC)    Tinnitus, bilateral    Hypernatremia    Anemia    Acute kidney injury (HCC)    Acute  renal failure (ARF) (HCC)    Metabolic acidosis    Hyperglycemia    Nausea    Elevated lipase    Azotemia    Pleural effusion    Anemia in ESRD (end-stage renal disease) (HCC)    Hyperphosphatemia    Dyspnea on exertion       Assessment:  Shortness of breath but patient is currently on room air  Large left pleural effusion suspect due to renal failure although cannot rule out parapneumonic effusion or neoplastic process  Left great toe ulcer  Peripheral vascular disease  GERD  Hypertension  End-stage renal disease on hemodialysis  Anemia of chronic disease  Generalized weakness  Type 2 diabetes mellitus  Hyperlipidemia  Osteoporosis      Plan:  Will perform bedside left thoracentesis when radiology ultrasound technician is available  Hemodialysis per nephrology  DVT prophylaxis:  heparin 5000 units subcutaneous every 12 hours    GI prophylaxis: Protonix 40 mg daily   Will follow for further recommendations    Thank You for allowing me to participate in this patient's care     Alfonso Rendon MD    Note to the patient: The 21st Century Cures Act makes medical notes like these available to patients in the interest of transparency. However, be advised that this is a medical document. It is intended as peer to peer communication. It is written in medical language and may contain abbreviations or verbiage that are unfamiliar. It may appear blunt or direct. Medical documents are intended to carry relevant information, facts as evident, and clinical opinion of the practitioner.      Disclaimer: Components of this note were documented using voice recognition system and are subject to errors not corrected at proofreading. Contact the author of this note for any clarifications          [1] No Known Allergies

## 2025-01-07 NOTE — ED INITIAL ASSESSMENT (HPI)
PT PRESENTS TO ED WITH WEAKNESS, AZUCENA, POTENTIAL FOR AFIB, IS A DIALYSIS PATIENT AND MISSED TODAY.  SYMPTOMS STARTED 1 MONTH AGO AND IS GETTING PROGRESSIVELY WORSE.

## 2025-01-07 NOTE — PLAN OF CARE
A/Ox4. SPARKLE. NSR on tele   Dialysis scheduled for today  Pulm consulted  Accuchecks QID   Heparin for VTE  PIV SL  Safety precautions in place. Wife at bedside. All needs met at this time

## 2025-01-07 NOTE — H&P
[facility]     Richy Goins Patient Status:  Observation    1960 MRN [id number]   Location [facility] 3NE-A Attending Dallas Drew MD   Hosp Day # 0 PCP Woody Dahl MD     Chief Complaint: PAGE    Subjective:    History of Present Illness:     Richy Goins is a 64 year old male with past medical history ESRD on HD, GERD, hypertension, hyperlipidemia, PVD, DM2, who presents with dyspnea on exertion.  Patient gets dialysis on Tuesday, Thursday, Saturday.  Missed his dialysis on Saturday because of not feeling well and again today..  Has had a month of progressive exertional dyspnea and fatigue.  Denies any chest pain, fever, chills, cough, nausea/vomiting, diarrhea.    Of note, pt is s/p R BKA for PVD and DM foot ulcers about 1.5 years ago. This week, noted similar ulcers on his L foot, turning red and increased burning.     History/Other:    Past Medical History:  Past Medical History:    Belching    Dialysis patient (HCC)    Diarrhea, unspecified    Esophageal reflux    Essential hypertension    Fatigue    Flatulence/gas pain/belching    High blood pressure    High cholesterol    History of blood transfusion    Hyperlipidemia    Indigestion    Irregular bowel habits    Night sweats    Osteoporosis    Peripheral vascular disease (HCC)    Pure hypercholesterolemia    Type II or unspecified type diabetes mellitus without mention of complication, not stated as uncontrolled    Visual impairment    reading glasses    Vomiting     Past Surgical History:   Past Surgical History:   Procedure Laterality Date    Amputation toe,i-p jt Right     Av fistula revision, open      Below knee leg amputation Right     Colonoscopy N/A 2023    Procedure: COLONOSCOPY;  Surgeon: Sarmad Gonzalez MD;  Location:  ENDOSCOPY    Colonoscopy      Upper gi endoscopy,exam        Family History:   Family History   Problem Relation Age of Onset    Heart Attack Father     Heart Disorder  Father 57    Diabetes Maternal Grandfather     Diabetes Maternal Aunt      Social History:    reports that he has never smoked. He has never used smokeless tobacco. He reports that he does not currently use alcohol. He reports that he does not use drugs.     Allergies: Allergies[1]    Medications:  Medications Ordered Prior to Encounter[2]    Review of Systems:   A comprehensive review of systems was completed.    Pertinent positives and negatives noted in the HPI.    Objective:   Physical Exam:    /80   Pulse 97   Temp 98.2 °F (36.8 °C) (Oral)   Resp (!) 27   Ht 5' 8\" (1.727 m)   Wt 170 lb (77.1 kg)   SpO2 93%   BMI 25.85 kg/m²   General: No acute distress, Alert  Respiratory: No rhonchi, no wheezes. L side diminished  Cardiovascular: S1, S2. Regular rate and rhythm  Abdomen: Soft, Non-tender, non-distended, positive bowel sounds  Neuro: No new focal deficits  Extremities: No edema, L toe ulcer and erythema      Results:    Labs:      Labs Last 24 Hours:    Recent Labs   Lab 01/07/25  1127   RBC 3.93   HGB 10.9*   HCT 33.4*   MCV 85.0   MCH 27.7   MCHC 32.6   RDW 14.9   NEPRELIM 10.60*   WBC 12.0*   .0       Recent Labs   Lab 01/07/25  1127   *   *   CREATSERUM 9.45*   EGFRCR 6*   CA 8.0*      K 4.3      CO2 20.0*       Estimated Glomerular Filtration Rate: 5.6 mL/min/1.73m2 (A) (by CKD-EPI based on SCr of 9.45 mg/dL (H)).    Lab Results   Component Value Date    INR 1.08 03/26/2024    INR 0.94 03/08/2024    INR 1.07 11/14/2023       Recent Labs   Lab 01/07/25  1127   TROPHS 26       No results for input(s): \"TROP\", \"PBNP\" in the last 168 hours.    No results for input(s): \"PCT\" in the last 168 hours.    Imaging: Imaging data reviewed in Epic.    Assessment & Plan:      # Large left pleural effusion and fluid overload due to missed dialysis treatments  #ESRD on HD  -Consult nephrology  -HD today  -Consult pulmonology to consider thoracentesis    # Elevated D-dimer  -If  shortness of breath does not improve with above, consider CT angiogram    #L great toe ulcer  #PVD  #hx R BKA  -suspect DM foot ulcer. No signs of cellulitis  -US arterial  -consult podiatry and vascular    #new non specific TWI on EKG  -no current CP but has had occasional chest tightness at home and PAGE as above  -echo  -if sx's do not improve after resolution of pleural effusion, consider cards consult    #Hypertension    #Anemia of ESRD    #Generalized weakness secondary to missed HD    # DM2 secondary to ESRD  -basal bolus insulin    #Hyperlipidemia    Plan of care discussed with patient, ED physician    Dallas Drew MD    Supplementary Documentation:     The 21st Century Cures Act makes medical notes like these available to patients in the interest of transparency. Please be advised this is a medical document. Medical documents are intended to carry relevant information, facts as evident, and the clinical opinion of the practitioner. The medical note is intended as peer to peer communication and may appear blunt or direct. It is written in medical language and may contain abbreviations or verbiage that are unfamiliar.               **Certification      PHYSICIAN Certification of Need for Inpatient Hospitalization - Initial Certification    Patient will require inpatient services that will reasonably be expected to span two midnight's based on the clinical documentation in H+P.   Based on patients current state of illness, I anticipate that, after discharge, patient will require TBD.                      [1] No Known Allergies  [2]   No current facility-administered medications on file prior to encounter.     Current Outpatient Medications on File Prior to Encounter   Medication Sig Dispense Refill    insulin detemir (LEVEMIR FLEXPEN) 100 UNIT/ML Subcutaneous Solution Pen-injector Inject 15 Units into the skin nightly.      pantoprazole 40 MG Oral Tab EC Take one tablet (40 mg total) by mouth once daily, 30  minutes prior to breakfast. 90 tablet 3    aspirin 81 MG Oral Tab EC Take 1 tablet (81 mg total) by mouth daily.      amLODIPine 10 MG Oral Tab Take 1 tablet (10 mg total) by mouth daily. 30 tablet 1    Insulin Pen Needle (BD PEN NEEDLE AMBER 2ND GEN) 32G X 4 MM Does not apply Misc Use to inject insulin up to 5 times daily 450 each 1

## 2025-01-07 NOTE — ED PROVIDER NOTES
History     Chief Complaint   Patient presents with    Weakness    Shortness Of Breath       HPI    64 year old male with history of ESRD on hemodialysis Tuesday, Thursday, Saturday, hyperlipidemia, hypertension presents with about a month of progressively worsening exertional dyspnea and exertional fatigue.  No chest pain.  No fevers or cough or vomiting.  Patient missed his dialysis on Saturday because he was not feeling well, he was due today but because he was not feeling well he decided to come to the ER.  Denies any underlying cardiac or pulmonary disease.          Past Medical History:    Belching    Dialysis patient (HCC)    Diarrhea, unspecified    Esophageal reflux    Essential hypertension    Fatigue    Flatulence/gas pain/belching    High blood pressure    High cholesterol    History of blood transfusion    Hyperlipidemia    Indigestion    Irregular bowel habits    Night sweats    Osteoporosis    Peripheral vascular disease (HCC)    Pure hypercholesterolemia    Type II or unspecified type diabetes mellitus without mention of complication, not stated as uncontrolled    Visual impairment    reading glasses    Vomiting       Past Surgical History:   Procedure Laterality Date    Amputation toe,i-p jt Right     Colonoscopy N/A 02/20/2023    Procedure: COLONOSCOPY;  Surgeon: Sarmad Gonzalez MD;  Location:  ENDOSCOPY    Colonoscopy      Upper gi endoscopy,exam         Social History     Socioeconomic History    Marital status:    Tobacco Use    Smoking status: Never    Smokeless tobacco: Never    Tobacco comments:     none   Vaping Use    Vaping status: Never Used   Substance and Sexual Activity    Alcohol use: Not Currently     Comment: none    Drug use: Never    Sexual activity: Yes     Partners: Female     Social Drivers of Health     Financial Resource Strain: Low Risk  (10/16/2023)    Financial Resource Strain     Difficulty of Paying Living Expenses: Not very hard     Med Affordability: No    Food Insecurity: No Food Insecurity (10/1/2024)    Food Insecurity     Food Insecurity: Never true   Transportation Needs: No Transportation Needs (10/1/2024)    Transportation Needs     Lack of Transportation: No   Housing Stability: Low Risk  (10/1/2024)    Housing Stability     Housing Instability: No                   Physical Exam     ED Triage Vitals [01/07/25 1055]   /85   Pulse 97   Resp 16   Temp 98.5 °F (36.9 °C)   Temp src Temporal   SpO2 97 %   O2 Device None (Room air)       Physical Exam  Constitutional:       General: He is not in acute distress.  Eyes:      Extraocular Movements: Extraocular movements intact.   Cardiovascular:      Rate and Rhythm: Normal rate and regular rhythm.      Pulses: Normal pulses.   Pulmonary:      Effort: Pulmonary effort is normal. No respiratory distress.      Breath sounds: Normal breath sounds.   Abdominal:      General: Abdomen is flat. There is no distension.      Tenderness: There is no abdominal tenderness.   Musculoskeletal:      Cervical back: Normal range of motion.      Comments: No left lower extremity swelling. Rle prosethetic   Skin:     General: Skin is warm.   Neurological:      General: No focal deficit present.      Mental Status: He is alert.              ED Course     Labs Reviewed   CBC WITH DIFFERENTIAL WITH PLATELET - Abnormal; Notable for the following components:       Result Value    WBC 12.0 (*)     HGB 10.9 (*)     HCT 33.4 (*)     Neutrophil Absolute Prelim 10.60 (*)     Neutrophil Absolute 10.60 (*)     Lymphocyte Absolute 0.71 (*)     All other components within normal limits   BASIC METABOLIC PANEL (8) - Abnormal; Notable for the following components:    Glucose 167 (*)     CO2 20.0 (*)      (*)     Creatinine 9.45 (*)     Calcium, Total 8.0 (*)     Calculated Osmolality 333 (*)     eGFR-Cr 6 (*)     All other components within normal limits   PHOSPHORUS - Abnormal; Notable for the following components:    Phosphorus 9.9 (*)      All other components within normal limits   D-DIMER - Abnormal; Notable for the following components:    D-Dimer 4.88 (*)     All other components within normal limits   MAGNESIUM - Normal   TROPONIN I HIGH SENSITIVITY - Normal     XR CHEST AP PORTABLE  (CPT=71045)    Result Date: 1/7/2025  CONCLUSION:  1. Stable mild cardiac enlargement. 2. Opacification of the left mid to lower lung zone representing combination of effusion and consolidation. 3. Probable trace right-sided pleural effusion with right basilar atelectasis/infiltrates. 4. Interstitial opacities bilaterally likely representing edema.    LOCATION:  [facility]      Dictated by (CST): Shi Crystal MD on 1/07/2025 at 12:56 PM     Finalized by (CST): Shi Crystal MD on 1/07/2025 at 12:58 PM            MDM     Vitals:    01/07/25 1055 01/07/25 1215 01/07/25 1315 01/07/25 1330   BP: 153/85   149/84   Pulse: 97 91 94 94   Resp: 16 26 (!) 30 26   Temp: 98.5 °F (36.9 °C)      TempSrc: Temporal      SpO2: 97% 95% 97% 96%   Weight: 77.1 kg      Height: 172.7 cm (5' 8\")          Anemia, pleural effusions, pulmonary embolism, less likely ACS on differential.  Oxygen normal on RA.  Mentating perfusing appropriately.    ED Course as of 01/07/25 1435  ------------------------------------------------------------  Time: 01/07 1129  Comment: EKG interpretation by me: EKG sinus rhythm at a rate of 94, axis nomal, new anteroseptal T wave inversions  ------------------------------------------------------------  Time: 01/07 1207  Comment: Troponin negative, D-dimer is elevated, patient will need a VQ scan.  ------------------------------------------------------------  Time: 01/07 7628  Comment: My interpretation of chest x-ray with near whiteout of the left lung, probable large pleural effusion.  Labs with reassuring potassium, patient is acidotic and has high phosphorus.  Will discuss with nephrology, admit for dialysis and further  care.  ------------------------------------------------------------  Time: 01/07 1301  Comment: Discussed with hospitalist, patient will need dialysis and if persistent effusion may need pulmonary evaluation for thoracentesis/cardiology evaluation.     Patient will need to be admitted for emergent dialysis.  Critical Care Note  31 minutes of my time was spent engaged in work directly related to patient care, exclusive of other procedures, to prevent further deterioration of patient's condition.  Addressed impending deterioration including: airway, respiratory, cardiovascular, metabolic  Interpretation of CXR, cardiac output, EKG, BP  Response to treatments and reassessment of patient  Speaking with consultants  Performed by self      Disposition and Plan     Clinical Impression:  1. Pleural effusion    2. Hyperphosphatemia    3. ESRD on hemodialysis ([facility])    4. Dyspnea on exertion        Disposition:  Admit    Follow-up:  No follow-up provider specified.    Medications Prescribed:  Current Discharge Medication List          Hospital Problems       Present on Admission  Date Reviewed: 11/13/2024            ICD-10-CM Noted POA    * (Principal) Pleural effusion J90 1/7/2025 Unknown    Anemia in ESRD (end-stage renal disease) ([facility]) N18.6, D63.1 1/7/2025 Unknown

## 2025-01-07 NOTE — ED QUICK NOTES
Orders for admission, patient is aware of plan and ready to go upstairs. Any questions, please call ED RN Kenny at extension [phone].     Patient Covid vaccination status: Fully vaccinated     COVID Test Ordered in ED: None    COVID Suspicion at Admission: N/A    Running Infusions:  None    Mental Status/LOC at time of transport: AxOx4    Other pertinent information:   CIWA score: N/A   NIH score:  N/A

## 2025-01-08 ENCOUNTER — APPOINTMENT (OUTPATIENT)
Dept: ULTRASOUND IMAGING | Facility: HOSPITAL | Age: 65
End: 2025-01-08
Attending: SURGERY
Payer: COMMERCIAL

## 2025-01-08 ENCOUNTER — APPOINTMENT (OUTPATIENT)
Dept: CV DIAGNOSTICS | Facility: HOSPITAL | Age: 65
End: 2025-01-08
Attending: HOSPITALIST
Payer: COMMERCIAL

## 2025-01-08 ENCOUNTER — APPOINTMENT (OUTPATIENT)
Dept: ULTRASOUND IMAGING | Facility: HOSPITAL | Age: 65
End: 2025-01-08
Attending: INTERNAL MEDICINE
Payer: COMMERCIAL

## 2025-01-08 ENCOUNTER — APPOINTMENT (OUTPATIENT)
Dept: GENERAL RADIOLOGY | Facility: HOSPITAL | Age: 65
End: 2025-01-08
Attending: INTERNAL MEDICINE
Payer: COMMERCIAL

## 2025-01-08 ENCOUNTER — APPOINTMENT (OUTPATIENT)
Dept: ULTRASOUND IMAGING | Facility: HOSPITAL | Age: 65
End: 2025-01-08
Attending: HOSPITALIST
Payer: COMMERCIAL

## 2025-01-08 LAB
ALBUMIN SERPL-MCNC: 3.9 G/DL (ref 3.2–4.8)
ALBUMIN/GLOB SERPL: 1.3 {RATIO} (ref 1–2)
ALP LIVER SERPL-CCNC: 87 U/L
ALT SERPL-CCNC: <7 U/L
ANION GAP SERPL CALC-SCNC: 12 MMOL/L (ref 0–18)
AST SERPL-CCNC: 10 U/L (ref ?–34)
BASOPHILS # BLD AUTO: 0.06 X10(3) UL (ref 0–0.2)
BASOPHILS NFR BLD AUTO: 0.6 %
BASOPHILS NFR PLR: 0 %
BILIRUB SERPL-MCNC: 0.3 MG/DL (ref 0.2–1.1)
BUN BLD-MCNC: 58 MG/DL (ref 9–23)
CALCIUM BLD-MCNC: 8.8 MG/DL (ref 8.7–10.4)
CHLORIDE SERPL-SCNC: 103 MMOL/L (ref 98–112)
CHOLEST PLR-MCNC: 67 MG/DL
CHOLEST SERPL-MCNC: 133 MG/DL (ref ?–200)
CO2 SERPL-SCNC: 25 MMOL/L (ref 21–32)
COLOR FLD: YELLOW
CREAT BLD-MCNC: 6.23 MG/DL
EGFRCR SERPLBLD CKD-EPI 2021: 9 ML/MIN/1.73M2 (ref 60–?)
EOSINOPHIL # BLD AUTO: 0.1 X10(3) UL (ref 0–0.7)
EOSINOPHIL NFR BLD AUTO: 1 %
EOSINOPHIL NFR PLR: 0 %
ERYTHROCYTE [DISTWIDTH] IN BLOOD BY AUTOMATED COUNT: 14.9 %
GLOBULIN PLAS-MCNC: 2.9 G/DL (ref 2–3.5)
GLUCOSE BLD-MCNC: 100 MG/DL (ref 70–99)
GLUCOSE BLD-MCNC: 101 MG/DL (ref 70–99)
GLUCOSE BLD-MCNC: 161 MG/DL (ref 70–99)
GLUCOSE BLD-MCNC: 68 MG/DL (ref 70–99)
GLUCOSE BLD-MCNC: 70 MG/DL (ref 70–99)
GLUCOSE BLD-MCNC: 77 MG/DL (ref 70–99)
GLUCOSE BLD-MCNC: 94 MG/DL (ref 70–99)
GLUCOSE PLR-MCNC: 104 MG/DL
HCT VFR BLD AUTO: 33.9 %
HCT VFR PLR CALC: <5 %
HDLC SERPL-MCNC: 27 MG/DL (ref 40–59)
HGB BLD-MCNC: 10.8 G/DL
IMM GRANULOCYTES # BLD AUTO: 0.03 X10(3) UL (ref 0–1)
IMM GRANULOCYTES NFR BLD: 0.3 %
LDH FLD L TO P-CCNC: 206 U/L
LDLC SERPL CALC-MCNC: 84 MG/DL (ref ?–100)
LYMPHOCYTES # BLD AUTO: 0.92 X10(3) UL (ref 1–4)
LYMPHOCYTES NFR BLD AUTO: 8.8 %
LYMPHOCYTES NFR PLR: 32 %
MAGNESIUM SERPL-MCNC: 2 MG/DL (ref 1.6–2.6)
MCH RBC QN AUTO: 27.9 PG (ref 26–34)
MCHC RBC AUTO-ENTMCNC: 31.9 G/DL (ref 31–37)
MCV RBC AUTO: 87.6 FL
MESOTHL CELL NFR PLR: 7 %
MONOCYTES # BLD AUTO: 0.78 X10(3) UL (ref 0.1–1)
MONOCYTES NFR BLD AUTO: 7.5 %
MONOS+MACROS NFR PLR: 18 %
NEUTROPHILS # BLD AUTO: 8.57 X10 (3) UL (ref 1.5–7.7)
NEUTROPHILS # BLD AUTO: 8.57 X10(3) UL (ref 1.5–7.7)
NEUTROPHILS NFR BLD AUTO: 81.8 %
NEUTROPHILS NFR PLR: 43 %
NONHDLC SERPL-MCNC: 106 MG/DL (ref ?–130)
OSMOLALITY SERPL CALC.SUM OF ELEC: 306 MOSM/KG (ref 275–295)
PHOSPHATE SERPL-MCNC: 6.4 MG/DL (ref 2.4–5.1)
PLATELET # BLD AUTO: 239 10(3)UL (ref 150–450)
POTASSIUM SERPL-SCNC: 3.7 MMOL/L (ref 3.5–5.1)
PROCALCITONIN SERPL-MCNC: 0.53 NG/ML (ref ?–0.05)
PROT PLR-MCNC: 3.6 G/DL
PROT SERPL-MCNC: 6.8 G/DL (ref 5.7–8.2)
RBC # BLD AUTO: 3.87 X10(6)UL
RBC PLEURAL FLUID: NORMAL /MM3
SODIUM SERPL-SCNC: 140 MMOL/L (ref 136–145)
TOTAL CELLS COUNTED FLD: 100
TRIGL SERPL-MCNC: 123 MG/DL (ref 30–149)
VLDLC SERPL CALC-MCNC: 20 MG/DL (ref 0–30)
WBC # BLD AUTO: 10.5 X10(3) UL (ref 4–11)
WBC # PLR: 1199 /MM3

## 2025-01-08 PROCEDURE — 93970 EXTREMITY STUDY: CPT | Performed by: SURGERY

## 2025-01-08 PROCEDURE — 99232 SBSQ HOSP IP/OBS MODERATE 35: CPT | Performed by: INTERNAL MEDICINE

## 2025-01-08 PROCEDURE — 32555 ASPIRATE PLEURA W/ IMAGING: CPT | Performed by: INTERNAL MEDICINE

## 2025-01-08 PROCEDURE — 99233 SBSQ HOSP IP/OBS HIGH 50: CPT | Performed by: INTERNAL MEDICINE

## 2025-01-08 PROCEDURE — 93306 TTE W/DOPPLER COMPLETE: CPT | Performed by: HOSPITALIST

## 2025-01-08 PROCEDURE — 93926 LOWER EXTREMITY STUDY: CPT | Performed by: HOSPITALIST

## 2025-01-08 PROCEDURE — 0W9B3ZX DRAINAGE OF LEFT PLEURAL CAVITY, PERCUTANEOUS APPROACH, DIAGNOSTIC: ICD-10-PCS | Performed by: INTERNAL MEDICINE

## 2025-01-08 PROCEDURE — 0W9B3ZZ DRAINAGE OF LEFT PLEURAL CAVITY, PERCUTANEOUS APPROACH: ICD-10-PCS | Performed by: INTERNAL MEDICINE

## 2025-01-08 RX ORDER — ALBUMIN (HUMAN) 12.5 G/50ML
25 SOLUTION INTRAVENOUS
Status: ACTIVE | OUTPATIENT
Start: 2025-01-08 | End: 2025-01-10

## 2025-01-08 RX ORDER — ATORVASTATIN CALCIUM 20 MG/1
20 TABLET, FILM COATED ORAL NIGHTLY
Status: DISCONTINUED | OUTPATIENT
Start: 2025-01-08 | End: 2025-01-12

## 2025-01-08 RX ORDER — SODIUM CHLORIDE 9 MG/ML
INJECTION, SOLUTION INTRAVENOUS CONTINUOUS
Status: DISCONTINUED | OUTPATIENT
Start: 2025-01-08 | End: 2025-01-09

## 2025-01-08 RX ORDER — METOCLOPRAMIDE HYDROCHLORIDE 5 MG/ML
10 INJECTION INTRAMUSCULAR; INTRAVENOUS DAILY PRN
Status: DISCONTINUED | OUTPATIENT
Start: 2025-01-08 | End: 2025-01-13

## 2025-01-08 NOTE — PROGRESS NOTES
[facility]   part of [facility]     Nephrology Progress Note    Richy Goins Patient Status:  Observation    1960 MRN [id number]   Location [facility] 3NE-A Attending Jeanne Redman MD   Hosp Day # 0 PCP Woody Dahl MD       SUBJECTIVE:  Stable this AM, feels a bit better      Physical Exam:   /73 (BP Location: Right arm)   Pulse 107   Temp 98 °F (36.7 °C) (Oral)   Resp 24   Ht 5' 8\" (1.727 m)   Wt 170 lb (77.1 kg)   SpO2 93%   BMI 25.85 kg/m²   Temp (24hrs), Av.2 °F (36.8 °C), Min:98 °F (36.7 °C), Max:98.5 °F (36.9 °C)       Intake/Output Summary (Last 24 hours) at 2025 0810  Last data filed at 2025 2100  Gross per 24 hour   Intake --   Output 3000 ml   Net -3000 ml     Last 3 Weights   25 1055 170 lb (77.1 kg)   10/02/24 0458 181 lb (82.1 kg)   10/01/24 1056 175 lb 0.7 oz (79.4 kg)   10/01/24 0544 175 lb (79.4 kg)   24 1517 192 lb 3.2 oz (87.2 kg)   24 0936 196 lb (88.9 kg)   24 0917 176 lb (79.8 kg)     General: Alert and oriented in no apparent distress.  HEENT: No scleral icterus, MMM  Neck: Supple, no SARITA or thyromegaly  Cardiac: Regular rate and rhythm, S1, S2 normal, no murmur or rub  Lungs: no rales, decr BS L 1/2 lung field   Abdomen: Soft, non-tender. + bowel sounds, no palpable organomegaly  Extremities: Without clubbing, cyanosis or edema. L AVF with bruit/thrill, R BKA  Neurologic: Alert and oriented, cranial nerves grossly intact, moving all extremities  Skin: Warm and dry, no rash        Labs:     Recent Labs   Lab 25  1127 25  0704   WBC 12.0* 10.5   HGB 10.9* 10.8*   MCV 85.0 87.6   .0 239.0       Recent Labs   Lab 25  1127      K 4.3      CO2 20.0*   *   CREATSERUM 9.45*   CA 8.0*   MG 2.1   PHOS 9.9*   *       No results for input(s): \"ALT\", \"AST\", \"ALB\", \"AMYLASE\", \"LIPASE\", \"LDH\" in the last 168 hours.    Invalid input(s): \"ALPHOS\", \"TBIL\", \"DBIL\", \"TPROT\"    Recent Labs    Lab 01/07/25  1657 01/08/25  0131 01/08/25  0513   PGLU 123* 94 101*       Meds:   Current Facility-Administered Medications   Medication Dose Route Frequency    amLODIPine (Norvasc) 2.5 mg, atorvastatin (Lipitor) 20 mg for [place] 2.5/20 ([facility] only)   Oral Daily    sodium chloride 0.9 % IV bolus 100 mL  100 mL Intravenous Q30 Min PRN    And    albumin human (Albumin) 25% injection 25 g  25 g Intravenous PRN Dialysis    amLODIPine (Norvasc) tab 10 mg  10 mg Oral Daily    aspirin DR tab 81 mg  81 mg Oral Daily    insulin degludec (Tresiba) 100 units/mL flextouch 13 Units  13 Units Subcutaneous Nightly    pantoprazole (Protonix) DR tab 40 mg  40 mg Oral QAM AC    insulin aspart (NovoLOG) 100 Units/mL FlexPen 1-68 Units  1-68 Units Subcutaneous TID CC    glucose (Dex4) 15 GM/59ML oral liquid 15 g  15 g Oral Q15 Min PRN    Or    glucose (Glutose) 40% oral gel 15 g  15 g Oral Q15 Min PRN    Or    glucose-vitamin C (Dex-4) chewable tab 4 tablet  4 tablet Oral Q15 Min PRN    Or    dextrose 50% injection 50 mL  50 mL Intravenous Q15 Min PRN    Or    glucose (Dex4) 15 GM/59ML oral liquid 30 g  30 g Oral Q15 Min PRN    Or    glucose (Glutose) 40% oral gel 30 g  30 g Oral Q15 Min PRN    Or    glucose-vitamin C (Dex-4) chewable tab 8 tablet  8 tablet Oral Q15 Min PRN    acetaminophen (Tylenol Extra Strength) tab 1,000 mg  1,000 mg Oral Q4H PRN    melatonin tab 3 mg  3 mg Oral Nightly PRN    polyethylene glycol (PEG 3350) (Miralax) 17 g oral packet 17 g  17 g Oral Daily PRN    sennosides (Senokot) tab 17.2 mg  17.2 mg Oral Nightly PRN    bisacodyl (Dulcolax) 10 MG rectal suppository 10 mg  10 mg Rectal Daily PRN    ondansetron (Zofran) 4 MG/2ML injection 4 mg  4 mg Intravenous Q6H PRN    metoclopramide (Reglan) 5 mg/mL injection 10 mg  10 mg Intravenous Q8H PRN    benzonatate (Tessalon) cap 200 mg  200 mg Oral TID PRN    heparin (Porcine) 5000 UNIT/ML injection 5,000 Units  5,000 Units Subcutaneous 2 times per day    insulin  aspart (NovoLOG) 100 Units/mL FlexPen 1-10 Units  1-10 Units Subcutaneous TID AC and HS         Impression/Plan:      1) ESRD: Due to long-standing DM2. Receives HD TThS, will continue per usual outpatient schedule.      2) HTN: Continue home amlodipine and lisinopril     3) Anemia: Due to CKD. Continue ESAs with HD for goal hemoglobin 10-11.     4) Weakness/SOB: due to missed HD and subsequent azotemia and fluid overload. Reiterated importance of not missing HD as well as compliance with fluid restriction.     5) L pleural effusion- thora planned      Questions/concerns were discussed with patient and/or family by bedside.          Ciro Perez MD  1/8/2025  8:10 AM

## 2025-01-08 NOTE — CONSULTS
[facility]    PATIENT'S NAME: JULIUS FOY   ATTENDING PHYSICIAN: Jeanne Redman M.D.   CONSULTING PHYSICIAN: Fermin De La Fuente M.D.   PATIENT ACCOUNT#:   [id number]    LOCATION:  [address] [place] [address]  MEDICAL RECORD #:   [id number]       YOB: 1960  ADMISSION DATE:       2025      CONSULT DATE:  2025    REPORT OF CONSULTATION    HISTORY OF PRESENT ILLNESS:  A 64-year-old [place] male, consulted by Dr. Altamirano, with ischemic rest pain and ulceration of his left foot, with end-stage renal disease on dialysis Tuesday, Thursday, and Saturday, insulin-dependent diabetes for at least 20 to 25 years.  The patient is known to me for creation of a left arm fistula in the past.  He had gangrene of the right leg, had intervention by Dr. Coello multiple times when I was medically on leave back in  and eventually ended with a below-knee amputation.  The patient has been followed by Dr. Gilmar Dahl and currently is under the care of the hospitalist.  He has been seen by Dr. Blackwell for his dialysis.  The patient is right-handed.  No imaging studies done at this time.  His last studies were only of the fistula.  He had ultrasound of lower legs in the past showing triphasic waveforms above the knee on the left side, below the knee on the left side were biphasic, and this was from 2024.    PAST MEDICAL HISTORY:  He has a history of hypertension and dyslipidemia.     PAST SURGICAL HISTORY:  Includes a fistula in the left arm as well as the below-knee amputation and multiple revascularization attempts by Dr. Coello.     MEDICATIONS:  He is on aspirin, amlodipine, Protonix, insulin, but no statin medication.    ALLERGIES:  He has no known allergies.      SOCIAL HISTORY:  He denies any EtOH abuse, drug abuse, or tobacco abuse.  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      FAMILY HISTORY:  History of diabetes in the family.  Mother is alive.  Father  from myocardial infarction and a long  history of smoking.      REVIEW OF SYSTEMS:  The patient has no chest pain, shortness of breath, or previous MI.  He denies any CVA, TIA, visual field defect, or aphasia.  He denies any hematuria, dysuria, hemoptysis, cough, sputum production, weight loss, fever, chills, hematemesis, no bright blood per rectum.      PHYSICAL EXAMINATION:    GENERAL:  Awake, alert.  He is appropriate.  He is in no acute distress.  He is moving all 4 extremities.  Oriented x3.   VITAL SIGNS:  Currently afebrile.  His pulse was 90 to 100.  Blood pressure 116/73.  HEENT:  Pupils equal, round.  Sclerae clear.  Mouth without lesions.    LUNGS:  Clear to auscultation.  HEART:  Questionable systolic ejection murmur left sternal border.  ABDOMEN:  Normal.  No tenderness or masses.  EXTREMITIES:  Femoral pulses are both palpable.  Popliteal pulse is difficult to feel on the right side with a below-knee amputation, left was diminished.  Pedal pulses barely heard by Doppler in the left foot, dorsal pedal artery difficult to hear and the posterior tibial artery.  He has ischemic rest pain of the left foot.  There is an ulceration of the first toe with tissue damage on the dorsum of the foot and some dependent rubor.  Upper extremity pulses present.  The fistula was patent in the left arm.    IMPRESSION:  Patient with end-stage renal disease, hypertension, possible dyslipidemia, with gangrene and ulceration of left foot, with superficial femoral artery, popliteal, and tibial vessel occlusive disease who is an insulin-dependent diabetic.  I discussed risk of possible limb loss, amputation of 1 side, especially with diabetics, 50% could have amputations within 5 years.  He is aware of the risks and complications of no intervention.  Risks and complications of angiogram, angioplasty, which were described to him.  The possibility for bypass surgery also.  We will have cardiac evaluation also.  Vein mapping and liver profile.  Might benefit from being  on a statin medication.  The patient is aware of all these treatments.  All questions were answered.    Dictated By Fermin De La Fuente M.D.  d: 01/08/2025 07:29:08  t: 01/08/2025 09:49:01  [facility] [id number]  JJW/    cc: Fermin De La Fuente M.D.

## 2025-01-08 NOTE — CONSULTS
[facility]   part of [facility]    Report of Consultation    Richy Goins Patient Status:  Observation    1960 MRN [id number]   Location [facility] 3NE-A Attending [place] Drwe MD   Hosp Day # 0 PCP Woody Dahl MD     Date of Admission:  2025  Date of Consult:  2025    Reason for Consultation:  L great toe ulcer    History of Present Illness:  Richy Goins is a a(n) 64 year old male with DM, PAD, and ESRD was seen at bedside this evening for evaluation of left great toe ulcer. He is well known to me as he has been treated for similar wound on his right foot that unfortunately went on to BKA. Patient was admitted for left pleural effusion. Pt's wife relays that she has also noticed color changes to his foot over the past few days. No other complaints are mentioned.        History:  Past Medical History:    Belching    Dialysis patient (HCC)    Diarrhea, unspecified    Esophageal reflux    Essential hypertension    Fatigue    Flatulence/gas pain/belching    High blood pressure    High cholesterol    History of blood transfusion    Hyperlipidemia    Indigestion    Irregular bowel habits    Night sweats    Osteoporosis    Peripheral vascular disease (HCC)    Pure hypercholesterolemia    Type II or unspecified type diabetes mellitus without mention of complication, not stated as uncontrolled    Visual impairment    reading glasses    Vomiting     Past Surgical History:   Procedure Laterality Date    Amputation toe,i-p jt Right     Av fistula revision, open      Below knee leg amputation Right     Colonoscopy N/A 2023    Procedure: COLONOSCOPY;  Surgeon: Sarmad Gonzalez MD;  Location:  ENDOSCOPY    Colonoscopy      Upper gi endoscopy,exam       Family History   Problem Relation Age of Onset    Heart Attack Father     Heart Disorder Father 57    Diabetes Maternal Grandfather     Diabetes Maternal Aunt       reports that he has never smoked. He has never used smokeless tobacco.  He reports that he does not currently use alcohol. He reports that he does not use drugs.    Allergies:  Allergies[1]    Medications:    Current Facility-Administered Medications:     sodium chloride 0.9 % IV bolus 100 mL, 100 mL, Intravenous, Q30 Min PRN **AND** albumin human (Albumin) 25% injection 25 g, 25 g, Intravenous, PRN Dialysis    epoetin juan josé (Epogen, Procrit) 37739 UNIT/ML injection 10,000 Units, 10,000 Units, Intravenous, Once in dialysis    [START ON 1/8/2025] amLODIPine (Norvasc) tab 10 mg, 10 mg, Oral, Daily    [START ON 1/8/2025] aspirin DR tab 81 mg, 81 mg, Oral, Daily    insulin degludec (Tresiba) 100 units/mL flextouch 13 Units, 13 Units, Subcutaneous, Nightly    [START ON 1/8/2025] pantoprazole (Protonix) DR tab 40 mg, 40 mg, Oral, QAM AC    insulin aspart (NovoLOG) 100 Units/mL FlexPen 1-68 Units, 1-68 Units, Subcutaneous, TID CC    glucose (Dex4) 15 GM/59ML oral liquid 15 g, 15 g, Oral, Q15 Min PRN **OR** glucose (Glutose) 40% oral gel 15 g, 15 g, Oral, Q15 Min PRN **OR** glucose-vitamin C (Dex-4) chewable tab 4 tablet, 4 tablet, Oral, Q15 Min PRN **OR** dextrose 50% injection 50 mL, 50 mL, Intravenous, Q15 Min PRN **OR** glucose (Dex4) 15 GM/59ML oral liquid 30 g, 30 g, Oral, Q15 Min PRN **OR** glucose (Glutose) 40% oral gel 30 g, 30 g, Oral, Q15 Min PRN **OR** glucose-vitamin C (Dex-4) chewable tab 8 tablet, 8 tablet, Oral, Q15 Min PRN    acetaminophen (Tylenol Extra Strength) tab 1,000 mg, 1,000 mg, Oral, Q4H PRN    melatonin tab 3 mg, 3 mg, Oral, Nightly PRN    polyethylene glycol (PEG 3350) (Miralax) 17 g oral packet 17 g, 17 g, Oral, Daily PRN    sennosides (Senokot) tab 17.2 mg, 17.2 mg, Oral, Nightly PRN    bisacodyl (Dulcolax) 10 MG rectal suppository 10 mg, 10 mg, Rectal, Daily PRN    ondansetron (Zofran) 4 MG/2ML injection 4 mg, 4 mg, Intravenous, Q6H PRN    metoclopramide (Reglan) 5 mg/mL injection 10 mg, 10 mg, Intravenous, Q8H PRN    benzonatate (Tessalon) cap 200 mg, 200 mg,  Oral, TID PRN    heparin (Porcine) 5000 UNIT/ML injection 5,000 Units, 5,000 Units, Subcutaneous, 2 times per day    insulin aspart (NovoLOG) 100 Units/mL FlexPen 1-10 Units, 1-10 Units, Subcutaneous, TID AC and HS    Review of Systems: No n/v/f/c.      Physical Exam:        Derm: Skin is cool to touch. Ischemic changes noted to lesser digits and left great toe.     Vascular: Unable to palpate pedal pulses.    Neuro: Decreased protective sensation noted to lower extremities.     MSK: S/P BKA to RLE. Strength testing deferred to LLE.    Imaging:  XR CHEST AP PORTABLE  (CPT=71045)    Result Date: 1/7/2025  CONCLUSION:  1. Stable mild cardiac enlargement. 2. Opacification of the left mid to lower lung zone representing combination of effusion and consolidation. 3. Probable trace right-sided pleural effusion with right basilar atelectasis/infiltrates. 4. Interstitial opacities bilaterally likely representing edema.    LOCATION:  [facility]      Dictated by (CST): Shi Crystal MD on 1/07/2025 at 12:56 PM     Finalized by (CST): Shi Crystal MD on 1/07/2025 at 12:58 PM         Laboratory Data:  Recent Labs   Lab 01/07/25  1127   RBC 3.93   HGB 10.9*   HCT 33.4*   MCV 85.0   MCH 27.7   MCHC 32.6   RDW 14.9   NEPRELIM 10.60*   WBC 12.0*   .0       Impression and Plan:  Patient Active Problem List   Diagnosis    Diabetic nephropathy associated with type 2 diabetes mellitus (HCC)    Primary hypertension    Type 2 diabetes mellitus with hyperglycemia (HCC)    Stage 3 chronic kidney disease (HCC)    Retinopathy due to secondary diabetes mellitus (HCC)    Atherosclerosis of native artery of extremity (HCC)    Stage 4 chronic kidney disease (HCC)    Acidosis    ESRD (end stage renal disease) (HCC)    ESRD on hemodialysis (HCC)    Pure hypercholesterolemia    Hemoptysis    Hypertensive urgency    Hematemesis with nausea    Hematemesis, unspecified whether nausea present    Acute gastritis with hemorrhage, unspecified  gastritis type    Uremia    Acquired absence of right leg below knee (HCC)    Chronic kidney disease, stage 4 (severe) (HCC)    End-stage renal disease on hemodialysis (HCC)    Osteomyelitis of right foot (HCC)    Dyslipidemia    Diabetes mellitus type 2 in nonobese (HCC)    Unilateral complete BKA, right, sequela (HCC)    Type 2 diabetes mellitus with diabetic peripheral angiopathy without gangrene (HCC)    Type 2 diabetes mellitus with diabetic chronic kidney disease (HCC)    Long term (current) use of aspirin    Hypertensive chronic kidney disease w stg 1-4/unsp chr kdny    Encounter for orthopedic aftercare following surgical amputation    Anemia in chronic kidney disease    Age-related osteoporosis without current pathological fracture    Acute kidney failure, unspecified (HCC)    CKD (chronic kidney disease), stage III (HCC)    Type 1 diabetes mellitus with ketoacidosis without coma (HCC)    Senile purpura (HCC)    Tinnitus, bilateral    Hypernatremia    Anemia    Acute kidney injury (HCC)    Acute renal failure (ARF) (HCC)    Metabolic acidosis    Hyperglycemia    Nausea    Elevated lipase    Azotemia    Pleural effusion    Anemia in ESRD (end-stage renal disease) ([facility])    Hyperphosphatemia    Dyspnea on exertion     #Left great toe ulcer with skin breakdown only  #Ischemic changes to left foot    -Patient examined, chart history reviewed.  -Left foot inspected--concern for acute ischemic changes to left foot. Dusky appearance of hallux and changes to left forefoot.  -Arterial duplex pending, vascular consulted.  -Discussed with patient and wife that we will need to closely watch left foot and await vascular testing/input. He is at risk for developing gangrene/limb loss, which has happened to his RLE in the past.  -No current acute SOI or intervention planned to left foot from podiatry standpoint. Will await vascular work up and continue to closely monitor.  -All questions answered to satisfaction.    Malachi  Gavin Altamirano DPM   1/7/2025  6:04 PM          [1] No Known Allergies

## 2025-01-08 NOTE — PROGRESS NOTES
[facility]  Pulmonary/Critical Care progress note    Richy Goins Patient Status:  Observation    1960 MRN [id number]   Location [facility] 3NE-A Attending Dallas Drew MD   Hosp Day # 0 PCP Woody Dahl MD         History of Present Illness:     Breathing somewhat better after dialysis.    History:  Past Medical History:    Belching    Dialysis patient (HCC)    Diarrhea, unspecified    Esophageal reflux    Essential hypertension    Fatigue    Flatulence/gas pain/belching    High blood pressure    High cholesterol    History of blood transfusion    Hyperlipidemia    Indigestion    Irregular bowel habits    Night sweats    Osteoporosis    Peripheral vascular disease (HCC)    Pure hypercholesterolemia    Type II or unspecified type diabetes mellitus without mention of complication, not stated as uncontrolled    Visual impairment    reading glasses    Vomiting     Past Surgical History:   Procedure Laterality Date    Amputation toe,i-p jt Right     Av fistula revision, open      Below knee leg amputation Right     Colonoscopy N/A 2023    Procedure: COLONOSCOPY;  Surgeon: Sarmad Gonzalez MD;  Location:  ENDOSCOPY    Colonoscopy      Upper gi endoscopy,exam       Family History   Problem Relation Age of Onset    Heart Attack Father     Heart Disorder Father 57    Diabetes Maternal Grandfather     Diabetes Maternal Aunt       reports that he has never smoked. He has never used smokeless tobacco. He reports that he does not currently use alcohol. He reports that he does not use drugs.    Allergies:  Allergies[1]    Medications:    Current Facility-Administered Medications:     sodium chloride 0.9% infusion, , Intravenous, Continuous    amLODIPine (Norvasc) 2.5 mg, atorvastatin (Lipitor) 20 mg for Caduet 2.5/20 ([facility] only), , Oral, Daily    [START ON 2025] epoetin juan josé (Epogen, Procrit) 57799 UNIT/ML injection 10,000 Units, 10,000 Units, Intravenous, Once in dialysis    sodium chloride 0.9  % IV bolus 100 mL, 100 mL, Intravenous, Q30 Min PRN **AND** albumin human (Albumin) 25% injection 25 g, 25 g, Intravenous, PRN Dialysis    amLODIPine (Norvasc) tab 10 mg, 10 mg, Oral, Daily    aspirin DR tab 81 mg, 81 mg, Oral, Daily    insulin degludec (Tresiba) 100 units/mL flextouch 13 Units, 13 Units, Subcutaneous, Nightly    pantoprazole (Protonix) DR tab 40 mg, 40 mg, Oral, QAM AC    insulin aspart (NovoLOG) 100 Units/mL FlexPen 1-68 Units, 1-68 Units, Subcutaneous, TID CC    glucose (Dex4) 15 GM/59ML oral liquid 15 g, 15 g, Oral, Q15 Min PRN **OR** glucose (Glutose) 40% oral gel 15 g, 15 g, Oral, Q15 Min PRN **OR** glucose-vitamin C (Dex-4) chewable tab 4 tablet, 4 tablet, Oral, Q15 Min PRN **OR** dextrose 50% injection 50 mL, 50 mL, Intravenous, Q15 Min PRN **OR** glucose (Dex4) 15 GM/59ML oral liquid 30 g, 30 g, Oral, Q15 Min PRN **OR** glucose (Glutose) 40% oral gel 30 g, 30 g, Oral, Q15 Min PRN **OR** glucose-vitamin C (Dex-4) chewable tab 8 tablet, 8 tablet, Oral, Q15 Min PRN    acetaminophen (Tylenol Extra Strength) tab 1,000 mg, 1,000 mg, Oral, Q4H PRN    melatonin tab 3 mg, 3 mg, Oral, Nightly PRN    polyethylene glycol (PEG 3350) (Miralax) 17 g oral packet 17 g, 17 g, Oral, Daily PRN    sennosides (Senokot) tab 17.2 mg, 17.2 mg, Oral, Nightly PRN    bisacodyl (Dulcolax) 10 MG rectal suppository 10 mg, 10 mg, Rectal, Daily PRN    ondansetron (Zofran) 4 MG/2ML injection 4 mg, 4 mg, Intravenous, Q6H PRN    metoclopramide (Reglan) 5 mg/mL injection 10 mg, 10 mg, Intravenous, Q8H PRN    benzonatate (Tessalon) cap 200 mg, 200 mg, Oral, TID PRN    heparin (Porcine) 5000 UNIT/ML injection 5,000 Units, 5,000 Units, Subcutaneous, 2 times per day    insulin aspart (NovoLOG) 100 Units/mL FlexPen 1-10 Units, 1-10 Units, Subcutaneous, TID AC and HS    Intake/Output:    Intake/Output Summary (Last 24 hours) at 1/8/2025 0971  Last data filed at 1/8/2025 0830  Gross per 24 hour   Intake 480 ml   Output 3000 ml   Net  -2520 ml      Body mass index is 25.85 kg/m².    Review of Systems  Review of Systems:   A comprehensive 10 point review of systems was completed.  Pertinent positives and negatives noted in the the HPI.       Patient Vitals for the past 24 hrs:   BP Temp Temp src Pulse Resp SpO2 Height Weight   01/07/25 1554 -- 98.2 °F (36.8 °C) Oral -- -- 93 % -- --   01/07/25 1445 153/80 -- -- 97 (!) 27 96 % -- --   01/07/25 1330 149/84 -- -- 94 26 96 % -- --   01/07/25 1315 -- -- -- 94 (!) 30 97 % -- --   01/07/25 1215 -- -- -- 91 26 95 % -- --   01/07/25 1055 153/85 98.5 °F (36.9 °C) Temporal 97 16 97 % 5' 8\" (1.727 m) 170 lb (77.1 kg)     Vitals:    01/08/25 0430 01/08/25 0730 01/08/25 0800 01/08/25 0830   BP: 116/73 139/81     BP Location: Right arm Right arm     Pulse: 107 96 97 100   Resp: 24 26 21 22   Temp: 98 °F (36.7 °C) 98.3 °F (36.8 °C)     TempSrc: Oral Oral     SpO2: 93% 96% 90%    Weight:       Height:             Physical Exam  Constitutional:       General: He is not in acute distress.     Appearance: Normal appearance. He is not ill-appearing or diaphoretic.   HENT:      Head: Normocephalic and atraumatic.      Nose: Nose normal. No congestion or rhinorrhea.      Mouth/Throat:      Mouth: Mucous membranes are moist.      Pharynx: Oropharynx is clear. No oropharyngeal exudate or posterior oropharyngeal erythema.      Comments: Mallampati class IV palate  Eyes:      Extraocular Movements: Extraocular movements intact.      Pupils: Pupils are equal, round, and reactive to light.   Cardiovascular:      Rate and Rhythm: Normal rate.      Pulses: Normal pulses.      Heart sounds: Normal heart sounds. No murmur heard.  Pulmonary:      Effort: Pulmonary effort is normal. No respiratory distress.      Breath sounds: Normal breath sounds. No wheezing or rhonchi.      Comments: Absent breath sounds in left lower half of the chest  Chest:      Chest wall: No tenderness.   Abdominal:      General: Abdomen is flat. Bowel  sounds are normal.      Palpations: Abdomen is soft.   Musculoskeletal:         General: Normal range of motion.      Comments: Right prosthetic limb   Skin:     General: Skin is warm.   Neurological:      General: No focal deficit present.      Mental Status: He is alert and oriented to person, place, and time.   Psychiatric:         Mood and Affect: Mood normal.         Behavior: Behavior normal.         Thought Content: Thought content normal.         Judgment: Judgment normal.            Lab Data Review:  Recent Labs   Lab 01/07/25 1127 01/08/25  0704   WBC 12.0* 10.5   HGB 10.9* 10.8*   HCT 33.4* 33.9*   .0 239.0       Recent Labs   Lab 01/07/25 1127 01/08/25  0704    140   K 4.3 3.7    103   CO2 20.0* 25.0   * 58*   CREATSERUM 9.45* 6.23*   CA 8.0* 8.8   ALB  --  3.9   ALKPHO  --  87   ALT  --  <7*   AST  --  10   * 100*       Recent Labs   Lab 01/07/25 1127 01/08/25  0704   MG 2.1 2.0       Lab Results   Component Value Date    PHOS 6.4 (H) 01/08/2025        No results for input(s): \"PT\", \"INR\", \"PTT\" in the last 168 hours.    No results for input(s): \"ABGPHT\", \"CUWPGG3U\", \"FXSWN9K\", \"ABGHCO3\", \"ABGBE\", \"TEMP\", \"NOEMÍ\", \"SITE\", \"DEV\", \"THGB\" in the last 168 hours.    No results for input(s): \"TROP\", \"CKMB\" in the last 168 hours.    Cultures: No results found for this visit on 01/07/25.        Radiology personally reviewed:  XR CHEST AP PORTABLE  (CPT=71045)    Result Date: 1/7/2025  CONCLUSION:  1. Stable mild cardiac enlargement. 2. Opacification of the left mid to lower lung zone representing combination of effusion and consolidation. 3. Probable trace right-sided pleural effusion with right basilar atelectasis/infiltrates. 4. Interstitial opacities bilaterally likely representing edema.    LOCATION:  [facility]      Dictated by (CST): Shi Crystal MD on 1/07/2025 at 12:56 PM     Finalized by (CST): Shi Crystal MD on 1/07/2025 at 12:58 PM         Patient Active  Problem List   Diagnosis    Diabetic nephropathy associated with type 2 diabetes mellitus (HCC)    Primary hypertension    Type 2 diabetes mellitus with hyperglycemia (HCC)    Stage 3 chronic kidney disease (HCC)    Retinopathy due to secondary diabetes mellitus (HCC)    Atherosclerosis of native artery of extremity (HCC)    Stage 4 chronic kidney disease (HCC)    Acidosis    ESRD (end stage renal disease) (HCC)    ESRD on hemodialysis (HCC)    Pure hypercholesterolemia    Hemoptysis    Hypertensive urgency    Hematemesis with nausea    Hematemesis, unspecified whether nausea present    Acute gastritis with hemorrhage, unspecified gastritis type    Uremia    Acquired absence of right leg below knee (HCC)    Chronic kidney disease, stage 4 (severe) (HCC)    End-stage renal disease on hemodialysis (HCC)    Osteomyelitis of right foot (HCC)    Dyslipidemia    Diabetes mellitus type 2 in nonobese (HCC)    Unilateral complete BKA, right, sequela (HCC)    Type 2 diabetes mellitus with diabetic peripheral angiopathy without gangrene (HCC)    Type 2 diabetes mellitus with diabetic chronic kidney disease (HCC)    Long term (current) use of aspirin    Hypertensive chronic kidney disease w stg 1-4/unsp chr kdny    Encounter for orthopedic aftercare following surgical amputation    Anemia in chronic kidney disease    Age-related osteoporosis without current pathological fracture    Acute kidney failure, unspecified (HCC)    CKD (chronic kidney disease), stage III (HCC)    Type 1 diabetes mellitus with ketoacidosis without coma (HCC)    Senile purpura (HCC)    Tinnitus, bilateral    Hypernatremia    Anemia    Acute kidney injury (HCC)    Acute renal failure (ARF) (HCC)    Metabolic acidosis    Hyperglycemia    Nausea    Elevated lipase    Azotemia    Pleural effusion    Anemia in ESRD (end-stage renal disease) (HCC)    Hyperphosphatemia    Dyspnea on exertion    Ischemic foot     64 year old male.  With history of GERD,  hypertension, hyperlipidemia, type 2 diabetes mellitus, osteoporosis.  ESRD on hemodialysis.  The patient presented to the emergency department with complaints of generalized weakness and shortness of breath.  Patient reports that he missed one hemo-dialysis   The patient is noted to have large left pleural effusion so pulmonary consultation was obtained for further evaluation and management for possible thoracentesis    Assessment:  Shortness of breath but patient is currently on room air: Improving dyspnea postthoracentesis  Large left pleural effusion suspect due to renal failure although cannot rule out parapneumonic effusion or neoplastic process  Left great toe ulcer  Peripheral vascular disease  GERD  Hypertension  End-stage renal disease on hemodialysis  Anemia of chronic disease  Generalized weakness  Type 2 diabetes mellitus  Hyperlipidemia  Osteoporosis      Plan:   bedside left thoracentesis under ultrasound guidance today  Consent was obtained risk benefits and options were discussed with the patient for thoracentesis  Hemodialysis per nephrology  DVT prophylaxis:  heparin 5000 units subcutaneous every 12 hours    GI prophylaxis: Protonix 40 mg daily   Will follow for further recommendations    Thank You for allowing me to participate in this patient's care     Alfonso Rendon MD    Note to the patient: The 21st Century Cures Act makes medical notes like these available to patients in the interest of transparency. However, be advised that this is a medical document. It is intended as peer to peer communication. It is written in medical language and may contain abbreviations or verbiage that are unfamiliar. It may appear blunt or direct. Medical documents are intended to carry relevant information, facts as evident, and clinical opinion of the practitioner.      Disclaimer: Components of this note were documented using voice recognition system and are subject to errors not corrected at proofreading.  Contact the author of this note for any clarifications          [1] No Known Allergies

## 2025-01-08 NOTE — PHYSICAL THERAPY NOTE
Reviewed pt's chart and discuss with pt RN. Met with pt and observed pt manage his bathroom ADLs at RW level. Pt was observed to transfer himself from W/c to RW at Mod I level. Pt reports that he is at his baseline. PT will sign off.

## 2025-01-08 NOTE — PROCEDURES
PULMONARY THORACENTESIS WITH US GUIDANCE PROCEDURE NOTE    Patient Name: Richy Goins  YOB: 1960 male   Contact serial number: [id number]    Today's date: 1/8/2025    Person performing procedure: Alfonso Rendon MD    Preoperative diagnosis: Left large pleural effusion    Postoperative diagnosis: Same    Indications: Large left pleural Effusion diagnostic and therapeutic    Review of Systems  Respiratory: Shortness of breath  Cardiovascular: negative  Gastrointestinal: negative     Physcial Exam:       /80 (BP Location: Right arm)   Pulse 95   Temp 98 °F (36.7 °C) (Oral)   Resp (!) 43   Ht 5' 8\" (1.727 m)   Wt 170 lb (77.1 kg)   SpO2 96%   BMI 25.85 kg/m²     General:   NAD   Lungs:  Diminished breath sounds in left lower half of the chest   Heart:   regular rate and rhythm, S1, S2 normal, no murmur, click, rub or gallop   Abdomen:  soft, non-tender. Bowel sounds normal. No masses,  no organomegaly   Neurologic:   negative     Description of Proceure:   Consent was obtained. Risks including infection, bleeding, pain, pneumothorax , and benefits as well options were discussed with the patient and family prior to the procedure. US revealed pleural fluid in the left lower chest .  The area was marked using US.  Timeout was called.  The left lower chest and about 6 intercostal space and posterior axillary line  was prepped after cleaning with chlorhexidine solution and draped in a sterile manner. The intercostal space was anesthetized with 1% lidocaine. After incision with number 11 blade, a needle was inserted into the pleural space just above the rib, then catheter was deployed in the pleural space and needle withdrawn. Approximately 1500 ml of serosanguineous pleural fluid was freely aspirated. The Needle was withdrawn without difficulty. Patient tolerated procedure well. CXR ordered. Specimen sent for appropriate studies.    Complications:  None    A portable chest x-ray was  ordered    Signed:  Alfonso Rendon MD  1/8/2025  1:28 PM

## 2025-01-08 NOTE — PROGRESS NOTES
[facility]   part of [facility]     Hospitalist Progress Note     Richy Goins Patient Status:  Observation    1960 MRN [id number]   Location [facility] 3NE-A Attending Jeanne Redman MD   Hosp Day # 0 PCP Woody Dahl MD     Chief Complaint: sob and weakness    Subjective:     Patient with left lower chest discomfort after thoracentesis    Objective:    Review of Systems:   A comprehensive review of systems was completed; pertinent positive and negatives stated in subjective.    Vital signs:  Temp:  [98 °F (36.7 °C)-98.5 °F (36.9 °C)] 98 °F (36.7 °C)  Pulse:  [] 107  Resp:  [16-30] 24  BP: (116-155)/(73-87) 116/73  SpO2:  [93 %-97 %] 93 %    Physical Exam:    General: No acute distress  Respiratory: No wheezes, no rhonchi  Cardiovascular: S1, S2, regular rate and rhythm  Abdomen: Soft, Non-tender, non-distended, positive bowel sounds  Neuro: No new focal deficits.   Extremities: No edema      Diagnostic Data:    Labs:  Recent Labs   Lab 25  1127 25  0704   WBC 12.0* 10.5   HGB 10.9* 10.8*   MCV 85.0 87.6   .0 239.0       Recent Labs   Lab 25  1127 25  0704   * 100*   * 58*   CREATSERUM 9.45* 6.23*   CA 8.0* 8.8   ALB  --  3.9    140   K 4.3 3.7    103   CO2 20.0* 25.0   ALKPHO  --  87   AST  --  10   ALT  --  <7*   BILT  --  0.3   TP  --  6.8       Estimated Glomerular Filtration Rate: 9.2 mL/min/1.73m2 (A) (by CKD-EPI based on SCr of 6.23 mg/dL (H)).    Recent Labs   Lab 25  1127   TROPHS 26       No results for input(s): \"PTP\", \"INR\" in the last 168 hours.               Microbiology    No results found for this visit on 25.      Imaging: Reviewed in Epic.    Medications:    amLODIPine (Norvasc) 2.5 mg, atorvastatin (Lipitor) 20 mg for Caduet 2.5/20 (EEH only)   Oral Daily    [START ON 2025] epoetin juan josé  10,000 Units Intravenous Once in dialysis    amLODIPine  10 mg Oral Daily    aspirin  81 mg Oral Daily     insulin degludec  13 Units Subcutaneous Nightly    pantoprazole  40 mg Oral QAM AC    insulin aspart  1-68 Units Subcutaneous TID CC    heparin  5,000 Units Subcutaneous 2 times per day    insulin aspart  1-10 Units Subcutaneous TID AC and HS       Assessment & Plan:      # Large left pleural effusion and fluid overload   -due to missed dialysis treatments  #ESRD on HD  -Consult nephrology  -s/p thoracentesis     # Elevated D-dimer  -If shortness of breath does not improve with above, consider CT angiogram     #L great toe ulcer  #PVD  #hx R BKA  -suspect DM foot ulcer. No signs of cellulitis  -US arterial  -consult podiatry and vascular     #new non specific TWI on EKG  -no current CP but has had occasional chest tightness at home and PAGE as above  -echo  -if sx's do not improve after resolution of pleural effusion, consider cards consult     #Hypertension stable on Norvasc     #Anemia of ESRD     #Generalized weakness secondary to missed HD     # DM2 secondary to ESRD  -basal bolus insulin     #Hyperlipidemia         Jeanne Redman MD    Supplementary Documentation:     Quality:  DVT Mechanical Prophylaxis:   SCDs,    DVT Pharmacologic Prophylaxis   Medication    heparin (Porcine) 5000 UNIT/ML injection 5,000 Units         DVT Pharmacologic prophylaxis: Aspirin 81 mg      Code Status: Full Code  Soni: No urinary catheter in place  Soni Duration (in days):   Central line:    [place]:     Discharge is dependent on: progress  At this point Mr. Goins is expected to be discharge to: home    The 21st Century Cures Act makes medical notes like these available to patients in the interest of transparency. Please be advised this is a medical document. Medical documents are intended to carry relevant information, facts as evident, and the clinical opinion of the practitioner. The medical note is intended as peer to peer communication and may appear blunt or direct. It is written in medical language and may contain  abbreviations or verbiage that are unfamiliar.

## 2025-01-08 NOTE — CONSULTS
[facility]  Cardiology Consultation    Richy Goins Patient Status:  Observation    1960 MRN [id number]   Location [facility] 3NE-A Attending Jeanne Redman MD   Hosp Day # 0 PCP Woody Dahl MD     Reason for Consultation:  Preoperative cardiac risk assessment for possible vascular surgery    History of Present Illness:  Richy Goins is a a(n) 64 year old male who presents with dyspnea and had missed dialysis session.  He has dry gangrene of left leg with history of prior right BKA.  He reports more recent pleuritic chest pain and dyspnea but improved with dialysis since admission.   He denies currently chest pain, dyspnea, orthopnea, PND or left LE swelling while laying in bed this morning.  He denies palpitations, presyncope or syncope.  He denies fever, chills, nausea/vomiting or diarrhea.    History:  Past Medical History:    Belching    Dialysis patient (HCC)    Diarrhea, unspecified    Esophageal reflux    Essential hypertension    Fatigue    Flatulence/gas pain/belching    High blood pressure    High cholesterol    History of blood transfusion    Hyperlipidemia    Indigestion    Irregular bowel habits    Night sweats    Osteoporosis    Peripheral vascular disease (HCC)    Pure hypercholesterolemia    Type II or unspecified type diabetes mellitus without mention of complication, not stated as uncontrolled    Visual impairment    reading glasses    Vomiting     Past Surgical History:   Procedure Laterality Date    Amputation toe,i-p jt Right     Av fistula revision, open      Below knee leg amputation Right     Colonoscopy N/A 2023    Procedure: COLONOSCOPY;  Surgeon: Sarmad Gonzalez MD;  Location:  ENDOSCOPY    Colonoscopy      Upper gi endoscopy,exam       Family History   Problem Relation Age of Onset    Heart Attack Father     Heart Disorder Father 57    Diabetes Maternal Grandfather     Diabetes Maternal Aunt       reports that he has never smoked. He has never  used smokeless tobacco. He reports that he does not currently use alcohol. He reports that he does not use drugs.    Allergies:  Allergies[1]    Medications:    Current Facility-Administered Medications:     sodium chloride 0.9% infusion, , Intravenous, Continuous    amLODIPine (Norvasc) 2.5 mg, atorvastatin (Lipitor) 20 mg for Caduet 2.5/20 ([facility] only), , Oral, Daily    [START ON 1/9/2025] epoetin juan josé (Epogen, Procrit) 00323 UNIT/ML injection 10,000 Units, 10,000 Units, Intravenous, Once in dialysis    sodium chloride 0.9 % IV bolus 100 mL, 100 mL, Intravenous, Q30 Min PRN **AND** albumin human (Albumin) 25% injection 25 g, 25 g, Intravenous, PRN Dialysis    amLODIPine (Norvasc) tab 10 mg, 10 mg, Oral, Daily    aspirin DR tab 81 mg, 81 mg, Oral, Daily    insulin degludec (Tresiba) 100 units/mL flextouch 13 Units, 13 Units, Subcutaneous, Nightly    pantoprazole (Protonix) DR tab 40 mg, 40 mg, Oral, QAM AC    insulin aspart (NovoLOG) 100 Units/mL FlexPen 1-68 Units, 1-68 Units, Subcutaneous, TID CC    glucose (Dex4) 15 GM/59ML oral liquid 15 g, 15 g, Oral, Q15 Min PRN **OR** glucose (Glutose) 40% oral gel 15 g, 15 g, Oral, Q15 Min PRN **OR** glucose-vitamin C (Dex-4) chewable tab 4 tablet, 4 tablet, Oral, Q15 Min PRN **OR** dextrose 50% injection 50 mL, 50 mL, Intravenous, Q15 Min PRN **OR** glucose (Dex4) 15 GM/59ML oral liquid 30 g, 30 g, Oral, Q15 Min PRN **OR** glucose (Glutose) 40% oral gel 30 g, 30 g, Oral, Q15 Min PRN **OR** glucose-vitamin C (Dex-4) chewable tab 8 tablet, 8 tablet, Oral, Q15 Min PRN    acetaminophen (Tylenol Extra Strength) tab 1,000 mg, 1,000 mg, Oral, Q4H PRN    melatonin tab 3 mg, 3 mg, Oral, Nightly PRN    polyethylene glycol (PEG 3350) (Miralax) 17 g oral packet 17 g, 17 g, Oral, Daily PRN    sennosides (Senokot) tab 17.2 mg, 17.2 mg, Oral, Nightly PRN    bisacodyl (Dulcolax) 10 MG rectal suppository 10 mg, 10 mg, Rectal, Daily PRN    ondansetron (Zofran) 4 MG/2ML injection 4 mg, 4 mg,  Intravenous, Q6H PRN    metoclopramide (Reglan) 5 mg/mL injection 10 mg, 10 mg, Intravenous, Q8H PRN    benzonatate (Tessalon) cap 200 mg, 200 mg, Oral, TID PRN    heparin (Porcine) 5000 UNIT/ML injection 5,000 Units, 5,000 Units, Subcutaneous, 2 times per day    insulin aspart (NovoLOG) 100 Units/mL FlexPen 1-10 Units, 1-10 Units, Subcutaneous, TID AC and HS    Review of Systems:  A comprehensive review of systems was negative if not otherwise mention in above HPI.    /81 (BP Location: Right arm)   Pulse 100   Temp 98.3 °F (36.8 °C) (Oral)   Resp 22   Ht 5' 8\" (1.727 m)   Wt 170 lb (77.1 kg)   SpO2 90%   BMI 25.85 kg/m²   Temp (24hrs), Av.3 °F (36.8 °C), Min:98 °F (36.7 °C), Max:98.5 °F (36.9 °C)       Intake/Output Summary (Last 24 hours) at 2025 0936  Last data filed at 2025 0830  Gross per 24 hour   Intake 480 ml   Output 3000 ml   Net -2520 ml     Wt Readings from Last 3 Encounters:   25 170 lb (77.1 kg)   10/02/24 181 lb (82.1 kg)   24 192 lb 3.2 oz (87.2 kg)       Physical Exam:   General: Alert and oriented x 3. No apparent distress. No respiratory or constitutional distress.  HEENT: Normocephalic, anicteric sclera, neck supple.  Neck: No JVD, carotids 2+, no bruits.  Cardiac: Regular rate and rhythm. S1, S2 normal. No murmur, pericardial rub, S3.  Lungs: Clear without wheezes, rales, rhonchi or dullness.  Normal excursions and effort.  Abdomen: Soft, non-tender.   Extremities: Dry gangrene of left foot with no edema that is wrapped.  Right BKA  Neurologic: Alert and oriented, normal affect.  Skin: Warm and dry.     Laboratory Data:  Lab Results   Component Value Date    WBC 10.5 2025    HGB 10.8 2025    HCT 33.9 2025    .0 2025    CREATSERUM 6.23 2025    BUN 58 2025     2025    K 3.7 2025     2025    CO2 25.0 2025     2025    CA 8.8 2025    ALB 3.9 2025    ALKPHO 87  01/08/2025    BILT 0.3 01/08/2025    TP 6.8 01/08/2025    AST 10 01/08/2025    ALT <7 01/08/2025    DDIMER 4.88 01/07/2025    MG 2.0 01/08/2025    PHOS 6.4 01/08/2025    PGLU 101 01/08/2025     Recent Labs   Lab 01/07/25  1127   TROPHS 26      Latest Reference Range & Units 01/08/25 07:04   Cholesterol, Total <200 mg/dL 133   Triglycerides 30 - 149 mg/dL 123   HDL Cholesterol 40 - 59 mg/dL 27 (L)   VLDL 0 - 30 mg/dL 20   NON-HDL CHOLESTEROL <130 mg/dL 106   LDL Cholesterol Calc <100 mg/dL 84   (L): Data is abnormally low    Imaging:  EKG 1/7/2025: sinus RBBB with no major ST/T wave changes    CXR 1/7/2025 CONCLUSION:    1. Stable mild cardiac enlargement.   2. Opacification of the left mid to lower lung zone representing combination of effusion and consolidation.   3. Probable trace right-sided pleural effusion with right basilar atelectasis/infiltrates.   4. Interstitial opacities bilaterally likely representing edema.    Dictated by (CST): Shi Crystal MD on 1/07/2025 at 12:56 PM     Impression:  Dyspnea after missed dialysis session  ESRD on HD  Critical left leg ischemia  PAD hx right BKA  Pleural effusion  Anemia in ESRD (end-stage renal disease)  DM  HTN  HL    Recommendations:  -tele monitoring  -acceptable cardiac risk for planned PV angiogram by Dr. De La Fuente  -echocardiogram ordered and pending  -if echo unremarkable than even though at elevated cardiac risk but no immediate cardiac testing/imaging will help in lessening the risk for this semi-urgent vascular surgery for treatment of CLI (no signs of acute coronary syndrome, uncontrolled arrhythmia or decompensated heart failure).    -careful monitoring to avoid dramatic shifts of BP and fluid shifts intra-operatively and post-operatively will help minimize cardiac complication  -continue ASA and anti-hypertensive medications  -don't know why he is not on a statin and will start rosuvastatin 10 mg nightly; recommend follow-up LFTs with lipid profile in 6-8  weeks    Thank you for allowing me to participate in the care of your patient.    Alfredo Telles MD  1/8/2025  9:36 AM       [1] No Known Allergies

## 2025-01-08 NOTE — CM/SW NOTE
01/08/25 1000   CM/SW Referral Data   Referral Source Physician   Reason for Referral   (Gangrene)   Informant EMR;Clinical Staff Member   Medical Hx   Does patient have an established PCP? Yes   Patient Info   Patient's Home Environment House   Patient lives with Spouse/Significant other   Patient Status Prior to Admission   Services in place prior to admission Dialysis   Dialysis Clinic [facility]   Scheduled Dialysis days T-TH-SAT   Discharge Needs   Anticipated D/C needs To be determined     SW received order for gangrene. Pt is a 63 y/o male admitted with pleural effusion. Chart reviewed and spoke with RN. Per chart review, pt has HD TTS at [facility].   RN stated pt will have a bedside Thoracentesis and pt will have an Angiogram tomorrow 1/9.     Await clinical course and further recommendations from treatment team. SW will continue to follow for discharge needs.     JULIUS Boykin  Discharge Planner

## 2025-01-08 NOTE — OCCUPATIONAL THERAPY NOTE
OT orders received, pt chart reviewed. Per discussion with pt, pt managing in bathroom IND, reports no concerns with ADLs/mobility at this time. OT will sign off. Please re-order if POC changes.

## 2025-01-08 NOTE — PLAN OF CARE
Assumed patient care @1930  A&O x4  VSS, NSR on tele, on RA  R AC PIV, SL  Left limb precautions  L AV graft accessed for hemodialysis, 3L off  Renal, carbohydrate controlled diet  QID Accu checks  Continent x2  Up 2x assist with dinah steady  R below knee amputee, prosthetic @bedside  Fall, skin, and limb precautions in place  Bed alarm on, in lowest position, call light within reach and all needs met at this time    Problem: Diabetes/Glucose Control  Goal: Glucose maintained within prescribed range  Description: INTERVENTIONS:  - Monitor Blood Glucose as ordered  - Assess for signs and symptoms of hyperglycemia and hypoglycemia  - Administer ordered medications to maintain glucose within target range  - Assess barriers to adequate nutritional intake and initiate nutrition consult as needed  - Instruct patient on self management of diabetes  Outcome: Progressing     Problem: METABOLIC/FLUID AND ELECTROLYTES - ADULT  Goal: Electrolytes maintained within normal limits  Description: INTERVENTIONS:  - Monitor labs and rhythm and assess patient for signs and symptoms of electrolyte imbalances  - Administer electrolyte replacement as ordered  - Monitor response to electrolyte replacements, including rhythm and repeat lab results as appropriate  - Fluid restriction as ordered  - Instruct patient on fluid and nutrition restrictions as appropriate  Outcome: Progressing     Problem: SKIN/TISSUE INTEGRITY - ADULT  Goal: Skin integrity remains intact  Description: INTERVENTIONS  - Assess and document risk factors for pressure ulcer development  - Assess and document skin integrity  - Monitor for areas of redness and/or skin breakdown  - Initiate interventions, skin care algorithm/standards of care as needed  Outcome: Progressing     Problem: MUSCULOSKELETAL - ADULT  Goal: Return mobility to safest level of function  Description: INTERVENTIONS:  - Assess patient stability and activity tolerance for standing, transferring and  ambulating w/ or w/o assistive devices  - Assist with transfers and ambulation using safe patient handling equipment as needed  - Ensure adequate protection for wounds/incisions during mobilization  - Obtain PT/OT consults as needed  - Advance activity as appropriate  - Communicate ordered activity level and limitations with patient/family  Outcome: Progressing     Problem: SAFETY ADULT - FALL  Goal: Free from fall injury  Description: INTERVENTIONS:  - Assess pt frequently for physical needs  - Identify cognitive and physical deficits and behaviors that affect risk of falls.  - [place] fall precautions as indicated by assessment.  - Educate pt/family on patient safety including physical limitations  - Instruct pt to call for assistance with activity based on assessment  - Modify environment to reduce risk of injury  - Provide assistive devices as appropriate  - Consider OT/PT consult to assist with strengthening/mobility  - Encourage toileting schedule  Outcome: Progressing

## 2025-01-08 NOTE — PLAN OF CARE
Assumed care at 0730  A/Ox4. RA. NSR on tele  Denies pain  SBA with walker and wheelchair   Accuchecks QID   Plan for thoracentesis today  Angiogram tomorrow   Safety precautions in place. All needs met at this time     Problem: Diabetes/Glucose Control  Goal: Glucose maintained within prescribed range  Description: INTERVENTIONS:  - Monitor Blood Glucose as ordered  - Assess for signs and symptoms of hyperglycemia and hypoglycemia  - Administer ordered medications to maintain glucose within target range  - Assess barriers to adequate nutritional intake and initiate nutrition consult as needed  - Instruct patient on self management of diabetes  Outcome: Progressing       Problem: METABOLIC/FLUID AND ELECTROLYTES - ADULT  Goal: Electrolytes maintained within normal limits  Description: INTERVENTIONS:  - Monitor labs and rhythm and assess patient for signs and symptoms of electrolyte imbalances  - Administer electrolyte replacement as ordered  - Monitor response to electrolyte replacements, including rhythm and repeat lab results as appropriate  - Fluid restriction as ordered  - Instruct patient on fluid and nutrition restrictions as appropriate  Outcome: Progressing     Problem: SKIN/TISSUE INTEGRITY - ADULT  Goal: Skin integrity remains intact  Description: INTERVENTIONS  - Assess and document risk factors for pressure ulcer development  - Assess and document skin integrity  - Monitor for areas of redness and/or skin breakdown  - Initiate interventions, skin care algorithm/standards of care as needed  Outcome: Progressing     Problem: MUSCULOSKELETAL - ADULT  Goal: Return mobility to safest level of function  Description: INTERVENTIONS:  - Assess patient stability and activity tolerance for standing, transferring and ambulating w/ or w/o assistive devices  - Assist with transfers and ambulation using safe patient handling equipment as needed  - Ensure adequate protection for wounds/incisions during mobilization  -  Obtain PT/OT consults as needed  - Advance activity as appropriate  - Communicate ordered activity level and limitations with patient/family  Outcome: Progressing     Problem: SAFETY ADULT - FALL  Goal: Free from fall injury  Description: INTERVENTIONS:  - Assess pt frequently for physical needs  - Identify cognitive and physical deficits and behaviors that affect risk of falls.  - [place] fall precautions as indicated by assessment.  - Educate pt/family on patient safety including physical limitations  - Instruct pt to call for assistance with activity based on assessment  - Modify environment to reduce risk of injury  - Provide assistive devices as appropriate  - Consider OT/PT consult to assist with strengthening/mobility  - Encourage toileting schedule  Outcome: Progressing

## 2025-01-09 ENCOUNTER — APPOINTMENT (OUTPATIENT)
Dept: GENERAL RADIOLOGY | Facility: HOSPITAL | Age: 65
End: 2025-01-09
Attending: INTERNAL MEDICINE
Payer: COMMERCIAL

## 2025-01-09 ENCOUNTER — APPOINTMENT (OUTPATIENT)
Dept: INTERVENTIONAL RADIOLOGY/VASCULAR | Facility: HOSPITAL | Age: 65
End: 2025-01-09
Attending: SURGERY
Payer: COMMERCIAL

## 2025-01-09 LAB
ATRIAL RATE: 94 BPM
GLUCOSE BLD-MCNC: 104 MG/DL (ref 70–99)
GLUCOSE BLD-MCNC: 105 MG/DL (ref 70–99)
GLUCOSE BLD-MCNC: 149 MG/DL (ref 70–99)
GLUCOSE BLD-MCNC: 65 MG/DL (ref 70–99)
GLUCOSE BLD-MCNC: 76 MG/DL (ref 70–99)
GLUCOSE BLD-MCNC: 92 MG/DL (ref 70–99)
P AXIS: 51 DEGREES
P-R INTERVAL: 154 MS
Q-T INTERVAL: 392 MS
QRS DURATION: 134 MS
QTC CALCULATION (BEZET): 490 MS
R AXIS: -7 DEGREES
T AXIS: -67 DEGREES
VENTRICULAR RATE: 94 BPM

## 2025-01-09 PROCEDURE — 71045 X-RAY EXAM CHEST 1 VIEW: CPT | Performed by: INTERNAL MEDICINE

## 2025-01-09 PROCEDURE — B41GYZZ FLUOROSCOPY OF LEFT LOWER EXTREMITY ARTERIES USING OTHER CONTRAST: ICD-10-PCS | Performed by: SURGERY

## 2025-01-09 PROCEDURE — 99233 SBSQ HOSP IP/OBS HIGH 50: CPT | Performed by: INTERNAL MEDICINE

## 2025-01-09 PROCEDURE — 99232 SBSQ HOSP IP/OBS MODERATE 35: CPT | Performed by: HOSPITALIST

## 2025-01-09 RX ORDER — MIDAZOLAM HYDROCHLORIDE 1 MG/ML
INJECTION INTRAMUSCULAR; INTRAVENOUS
Status: COMPLETED
Start: 2025-01-09 | End: 2025-01-09

## 2025-01-09 RX ORDER — INSULIN DEGLUDEC 100 U/ML
8 INJECTION, SOLUTION SUBCUTANEOUS DAILY
Status: DISCONTINUED | OUTPATIENT
Start: 2025-01-10 | End: 2025-01-11

## 2025-01-09 RX ORDER — LIDOCAINE HYDROCHLORIDE 10 MG/ML
INJECTION, SOLUTION EPIDURAL; INFILTRATION; INTRACAUDAL; PERINEURAL
Status: COMPLETED
Start: 2025-01-09 | End: 2025-01-09

## 2025-01-09 RX ORDER — IODIXANOL 320 MG/ML
100 INJECTION, SOLUTION INTRAVASCULAR
Status: COMPLETED | OUTPATIENT
Start: 2025-01-09 | End: 2025-01-09

## 2025-01-09 RX ORDER — HEPARIN SODIUM 5000 [USP'U]/ML
INJECTION, SOLUTION INTRAVENOUS; SUBCUTANEOUS
Status: COMPLETED
Start: 2025-01-09 | End: 2025-01-09

## 2025-01-09 NOTE — PLAN OF CARE
Pt transferred to 7th floor, room 7602 in anticipation for procedure in am. All belongings w/ pt, wife at bedside. Report given to Paul CHIU.

## 2025-01-09 NOTE — PAYOR COMM NOTE
OBS TO INPT:    --------------  ADMISSION REVIEW     Payor: JD GARCIA  Subscriber #:  [id number]  Authorization Number: [id number]    Admit date: 1/8/25  Admit time:  3:52 PM       REVIEW DOCUMENTATION:     ED Provider Notes        HPI    64 year old male with history of ESRD on hemodialysis Tuesday, Thursday, Saturday, hyperlipidemia, hypertension presents with about a month of progressively worsening exertional dyspnea and exertional fatigue.  No chest pain.  No fevers or cough or vomiting.  Patient missed his dialysis on Saturday because he was not feeling well, he was due today but because he was not feeling well he decided to come to the ER.  Denies any underlying cardiac or pulmonary disease.      ED Triage Vitals [01/07/25 1055]   /85   Pulse 97   Resp 16   Temp 98.5 °F (36.9 °C)   Temp src Temporal   SpO2 97 %   O2 Device None (Room air)       Physical Exam  Constitutional:       General: He is not in acute distress.  Eyes:      Extraocular Movements: Extraocular movements intact.   Cardiovascular:      Rate and Rhythm: Normal rate and regular rhythm.      Pulses: Normal pulses.   Pulmonary:      Effort: Pulmonary effort is normal. No respiratory distress.      Breath sounds: Normal breath sounds.   Abdominal:      General: Abdomen is flat. There is no distension.      Tenderness: There is no abdominal tenderness.   Musculoskeletal:      Cervical back: Normal range of motion.      Comments: No left lower extremity swelling. Rle prosethetic   Skin:     General: Skin is warm.   Neurological:      General: No focal deficit present.      Mental Status: He is alert.              ED Course     Labs Reviewed   CBC WITH DIFFERENTIAL WITH PLATELET - Abnormal; Notable for the following components:       Result Value    WBC 12.0 (*)     HGB 10.9 (*)     HCT 33.4 (*)     Neutrophil Absolute Prelim 10.60 (*)     Neutrophil Absolute 10.60 (*)     Lymphocyte Absolute 0.71 (*)     All other components within normal  limits   BASIC METABOLIC PANEL (8) - Abnormal; Notable for the following components:    Glucose 167 (*)     CO2 20.0 (*)      (*)     Creatinine 9.45 (*)     Calcium, Total 8.0 (*)     Calculated Osmolality 333 (*)     eGFR-Cr 6 (*)     All other components within normal limits   PHOSPHORUS - Abnormal; Notable for the following components:    Phosphorus 9.9 (*)     All other components within normal limits   D-DIMER - Abnormal; Notable for the following components:    D-Dimer 4.88 (*)     All other components within normal limits   MAGNESIUM - Normal   TROPONIN I HIGH SENSITIVITY - Normal     XR CHEST AP PORTABLE  (CPT=71045)    Result Date: 1/7/2025  CONCLUSION:  1. Stable mild cardiac enlargement. 2. Opacification of the left mid to lower lung zone representing combination of effusion and consolidation. 3. Probable trace right-sided pleural effusion with right basilar atelectasis/infiltrates. 4. Interstitial opacities bilaterally likely representing edema.    LOCATION:  [facility]      Dictated by (CST): Shi Crystal MD on 1/07/2025 at 12:56 PM     Finalized by (CST): Shi Crystal MD on 1/07/2025 at 12:58 PM            MDM     Vitals:    01/07/25 1055 01/07/25 1215 01/07/25 1315 01/07/25 1330   BP: 153/85   149/84   Pulse: 97 91 94 94   Resp: 16 26 (!) 30 26   Temp: 98.5 °F (36.9 °C)      TempSrc: Temporal      SpO2: 97% 95% 97% 96%   Weight: 77.1 kg      Height: 172.7 cm (5' 8\")          Anemia, pleural effusions, pulmonary embolism, less likely ACS on differential.  Oxygen normal on RA.  Mentating perfusing appropriately.    ED Course as of 01/07/25 1435  ------------------------------------------------------------  Time: 01/07 1129  Comment: EKG interpretation by me: EKG sinus rhythm at a rate of 94, axis nomal, new anteroseptal T wave inversions  ------------------------------------------------------------  Time: 01/07 9924  Comment: Troponin negative, D-dimer is elevated, patient will need a VQ  scan.  ------------------------------------------------------------  Time:  1252  Comment: My interpretation of chest x-ray with near whiteout of the left lung, probable large pleural effusion.  Labs with reassuring potassium, patient is acidotic and has high phosphorus.  Will discuss with nephrology, admit for dialysis and further care.  ------------------------------------------------------------  Time:  1301  Comment: Discussed with hospitalist, patient will need dialysis and if persistent effusion may need pulmonary evaluation for thoracentesis/cardiology evaluation.     Patient will need to be admitted for emergent dialysis.  Critical Care Note  31 minutes of my time was spent engaged in work directly related to patient care, exclusive of other procedures, to prevent further deterioration of patient's condition.  Addressed impending deterioration including: airway, respiratory, cardiovascular, metabolic  Interpretation of CXR, cardiac output, EKG, BP  Response to treatments and reassessment of patient  Speaking with consultants  Performed by self      Disposition and Plan     Clinical Impression:  1. Pleural effusion    2. Hyperphosphatemia    3. ESRD on hemodialysis (HCC)    4. Dyspnea on exertion        Disposition:  Admit        [facility]  [facility]            Richy Goins Patient Status:  Observation    1960 MRN [id number]   Location [facility] 3NE-A Attending Dallas Drew MD   Hosp Day # 0 PCP Woody Dahl MD      Chief Complaint: PAGE        Subjective:  History of Present Illness:      Richy Goins is a 64 year old male with past medical history ESRD on HD, GERD, hypertension, hyperlipidemia, PVD, DM2, who presents with dyspnea on exertion.  Patient gets dialysis on Tuesday, Thursday, Saturday.  Missed his dialysis on Saturday because of not feeling well and again today..  Has had a month of progressive exertional dyspnea and fatigue.  Denies any chest  pain, fever, chills, cough, nausea/vomiting, diarrhea.     Of note, pt is s/p R BKA for PVD and DM foot ulcers about 1.5 years ago. This week, noted similar ulcers on his L foot, turning red and increased burning.       Assessment & Plan:  # Large left pleural effusion and fluid overload due to missed dialysis treatments  #ESRD on HD  -Consult nephrology  -HD today  -Consult pulmonology to consider thoracentesis     # Elevated D-dimer  -If shortness of breath does not improve with above, consider CT angiogram     #L great toe ulcer  #PVD  #hx R BKA  -suspect DM foot ulcer. No signs of cellulitis  -US arterial  -consult podiatry and vascular     #new non specific TWI on EKG  -no current CP but has had occasional chest tightness at home and PAGE as above  -echo  -if sx's do not improve after resolution of pleural effusion, consider cards consult     #Hypertension     #Anemia of ESRD     #Generalized weakness secondary to missed HD     # DM2 secondary to ESRD  -basal bolus insulin     #Hyperlipidemia     Plan of care discussed with patient, ED physician     Dallas Drew MD          Pulmonary/Critical Care Consult note           Richy Goins Patient Status:  Observation    1960 MRN [id number]   [facility] 3NE-A Attending Dallas Drew MD   Hosp Day # 0 PCP Woody Dahl MD      Reason for Consultation:  Large left pleural effusion shortness of breath     History of Present Illness:  Richy Goins is a a(n) 64 year old male.  With history of GERD, hypertension, hyperlipidemia, type 2 diabetes mellitus, osteoporosis.  ESRD on hemodialysis.  The patient presented to the emergency department with complaints of generalized weakness and shortness of breath.  Patient reports that he missed one hemo-dialysis   The patient is noted to have large left pleural effusion so pulmonary consultation was obtained for further evaluation and management for possible thoracentesis     The patient reports  shortness of breath with minimal exertion.  He denies any chest pains.  He admits to some mild dry cough.     He reports only intermittent snoring and denies any daytime sleepiness or fatigue.      Assessment:  Shortness of breath but patient is currently on room air  Large left pleural effusion suspect due to renal failure although cannot rule out parapneumonic effusion or neoplastic process  Left great toe ulcer  Peripheral vascular disease  GERD  Hypertension  End-stage renal disease on hemodialysis  Anemia of chronic disease  Generalized weakness  Type 2 diabetes mellitus  Hyperlipidemia  Osteoporosis        Plan:  Will perform bedside left thoracentesis when radiology ultrasound technician is available  Hemodialysis per nephrology  DVT prophylaxis:  heparin 5000 units subcutaneous every 12 hours    GI prophylaxis: Protonix 40 mg daily   Will follow for further recommendations     Thank You for allowing me to participate in this patient's care      Alfonso Rendon MD           1/7 PODIATRY:    Date of Admission:  1/7/2025  Date of Consult:  1/7/2025     Reason for Consultation:  L great toe ulcer     History of Present Illness:  Richy Goins is a a(n) 64 year old male with DM, PAD, and ESRD was seen at bedside this evening for evaluation of left great toe ulcer. He is well known to me as he has been treated for similar wound on his right foot that unfortunately went on to BKA. Patient was admitted for left pleural effusion. Pt's wife relays that she has also noticed color changes to his foot over the past few days. No other complaints are mentioned.    #Left great toe ulcer with skin breakdown only  #Ischemic changes to left foot     -Patient examined, chart history reviewed.  -Left foot inspected--concern for acute ischemic changes to left foot. Dusky appearance of hallux and changes to left forefoot.  -Arterial duplex pending, vascular consulted.  -Discussed with patient and wife that we will need to closely  watch left foot and await vascular testing/input. He is at risk for developing gangrene/limb loss, which has happened to his RLE in the past.  -No current acute SOI or intervention planned to left foot from podiatry standpoint. Will await vascular work up and continue to closely monitor.  -All questions answered to satisfaction.     Malachi Altamirano DPM   1/7/2025  6:04 PM     1/8 VASCULAR:    [facility]     PATIENT'S NAME: LAWJULIUS IRAM   ATTENDING PHYSICIAN: Jeanne Redman M.D.   CONSULTING PHYSICIAN: Fermin De La Fuente M.D.   PATIENT ACCOUNT#:   [id number]    LOCATION:  [address] [place] [address]  MEDICAL RECORD #:   [id number]       YOB: 1960  ADMISSION DATE:       01/07/2025      CONSULT DATE:  01/08/2025     REPORT OF CONSULTATION     HISTORY OF PRESENT ILLNESS:  A 64-year-old Irish male, consulted by Dr. Altamirano, with ischemic rest pain and ulceration of his left foot, with end-stage renal disease on dialysis Tuesday, Thursday, and Saturday, insulin-dependent diabetes for at least 20 to 25 years.  The patient is known to me for creation of a left arm fistula in the past.  He had gangrene of the right leg, had intervention by Dr. Coello multiple times when I was medically on leave back in 2023 and eventually ended with a below-knee amputation.  The patient has been followed by Dr. Gilmar Dahl and currently is under the care of the hospitalist.  He has been seen by Dr. Blackwell for his dialysis.  The patient is right-handed.  No imaging studies done at this time.  His last studies were only of the fistula.  He had ultrasound of lower legs in the past showing triphasic waveforms above the knee on the left side, below the knee on the left side were biphasic, and this was from March 2024.     PAST MEDICAL HISTORY:  He has a history of hypertension and dyslipidemia.      PAST SURGICAL HISTORY:  Includes a fistula in the left arm as well as the below-knee amputation and multiple revascularization attempts by   Mode.      MEDICATIONS:  He is on aspirin, amlodipine, Protonix, insulin, but no statin medication.     ALLERGIES:  He has no known allergies.       SOCIAL HISTORY:  He denies any EtOH abuse, drug abuse, or tobacco abuse.  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       FAMILY HISTORY:  History of diabetes in the family.  Mother is alive.  Father  from myocardial infarction and a long history of smoking.       REVIEW OF SYSTEMS:  The patient has no chest pain, shortness of breath, or previous MI.  He denies any CVA, TIA, visual field defect, or aphasia.  He denies any hematuria, dysuria, hemoptysis, cough, sputum production, weight loss, fever, chills, hematemesis, no bright blood per rectum.       PHYSICAL EXAMINATION:    GENERAL:  Awake, alert.  He is appropriate.  He is in no acute distress.  He is moving all 4 extremities.  Oriented x3.   VITAL SIGNS:  Currently afebrile.  His pulse was 90 to 100.  Blood pressure 116/73.  HEENT:  Pupils equal, round.  Sclerae clear.  Mouth without lesions.    LUNGS:  Clear to auscultation.  HEART:  Questionable systolic ejection murmur left sternal border.  ABDOMEN:  Normal.  No tenderness or masses.  EXTREMITIES:  Femoral pulses are both palpable.  Popliteal pulse is difficult to feel on the right side with a below-knee amputation, left was diminished.  Pedal pulses barely heard by Doppler in the left foot, dorsal pedal artery difficult to hear and the posterior tibial artery.  He has ischemic rest pain of the left foot.  There is an ulceration of the first toe with tissue damage on the dorsum of the foot and some dependent rubor.  Upper extremity pulses present.  The fistula was patent in the left arm.     IMPRESSION:  Patient with end-stage renal disease, hypertension, possible dyslipidemia, with gangrene and ulceration of left foot, with superficial femoral artery, popliteal, and tibial vessel occlusive disease who is an insulin-dependent diabetic.  I  discussed risk of possible limb loss, amputation of 1 side, especially with diabetics, 50% could have amputations within 5 years.  He is aware of the risks and complications of no intervention.  Risks and complications of angiogram, angioplasty, which were described to him.  The possibility for bypass surgery also.  We will have cardiac evaluation also.  Vein mapping and liver profile.  Might benefit from being on a statin medication.  The patient is aware of all these treatments.  All questions were answered.     Dictated By Fermin De La Fuente M.D.  d:01/08/2025 07:29:08  t:01/08/2025 09:49:01  [id number]  JJW/     cc:Fermin De La Fuente M.D.      1/8 PULMONARY:     PULMONARY THORACENTESIS WITH US GUIDANCE PROCEDURE NOTE     Patient Name: Richy Goins  YOB: 1960 male   Contact serial number: [id number]     Today's date: 1/8/2025     Person performing procedure: Alfonso Rendon MD     Preoperative diagnosis: Left large pleural effusion     Postoperative diagnosis: Same     Indications: Large left pleural Effusion diagnostic and therapeutic     Review of Systems  Respiratory: Shortness of breath  Cardiovascular: negative  Gastrointestinal: negative      Physcial Exam:        /80 (BP Location: Right arm)   Pulse 95   Temp 98 °F (36.7 °C) (Oral)   Resp (!) 43   Ht 5' 8\" (1.727 m)   Wt 170 lb (77.1 kg)   SpO2 96%   BMI 25.85 kg/m²      General:   NAD   Lungs:  Diminished breath sounds in left lower half of the chest   Heart:   regular rate and rhythm, S1, S2 normal, no murmur, click, rub or gallop   Abdomen:  soft, non-tender. Bowel sounds normal. No masses,  no organomegaly   Neurologic:   negative      Description of Proceure:   Consent was obtained. Risks including infection, bleeding, pain, pneumothorax , and benefits as well options were discussed with the patient and family prior to the procedure. US revealed pleural fluid in the left lower chest .  The area was marked using US.   Timeout was called.  The left lower chest and about 6 intercostal space and posterior axillary line  was prepped after cleaning with chlorhexidine solution and draped in a sterile manner. The intercostal space was anesthetized with 1% lidocaine. After incision with number 11 blade, a needle was inserted into the pleural space just above the rib, then catheter was deployed in the pleural space and needle withdrawn. Approximately 1500 ml of serosanguineous pleural fluid was freely aspirated. The Needle was withdrawn without difficulty. Patient tolerated procedure well. CXR ordered. Specimen sent for appropriate studies.     Complications:  None     A portable chest x-ray was ordered     Signed:  Alfonso Rendon MD    1/9 VASCULAR:    [facility]     PATIENT'S NAME: JULIUS FOY   ATTENDING PHYSICIAN: Jeanne Redman M.D.   OPERATING PHYSICIAN: Fermin De La Fuente M.D.   PATIENT ACCOUNT#:   [id number]    LOCATION:  [address]  MEDICAL RECORD #:   [id number]       YOB: 1960  ADMISSION DATE:       01/07/2025      OPERATION DATE:  01/09/2025     CARDIAC PROCEDURE TRANSCRIPTION     PERIPHERAL ANGIOGRAPHY     PREOPERATIVE DIAGNOSIS:    1.          Ischemic ulceration, left foot, with rest pain.  2.          End-stage renal disease.  3.          Right below-knee amputation.  POSTOPERATIVE DIAGNOSIS:    1.          Ischemic ulceration, left foot, with rest pain.  2.          End-stage renal disease.  3.          Right below-knee amputation.   4.          Infrapopliteal artery occlusive disease, stenosis of about 50% to 60% of the tibioperoneal trunk, occlusion of the anterior tibial artery which tapers off in the proximal to mid calf, occlusion of the peroneal artery which tapers off in the mid to distal calf, and a runoff of the posterior tibial artery with occlusion of the common plantar artery and no major vessel seen in the foot, just extensive collaterals.  PROCEDURE PERFORMED:    1.           Duplex ultrasound access, right common femoral artery.  2.          Micropuncture technique with eventual placement of a 4-Cymraes sheath and an 0.018 guidewire.   3.          Placement of a 5-Cymraes sheath and a Crossover catheter to the origin of the left common iliac artery with an end-hole catheter angiogram through the Crossover catheter of the common, external, internal iliac artery and femoral artery showing widely open.    4.          Placement of a Glidewire into the superficial femoral artery with placement of a Glide catheter at the origin of the common femoral artery with angiogram of the left iliofemoral and superficial femoral arterial segment showing normal flow.  5.          Placement of a Glidewire into the superficial femoral artery with advancement of a Glide catheter through the superficial femoral artery with angiograms done of the superficial femoral, popliteal, tibial vessels, and plantar arch showing  occlusion of all major plantar artery vessels. Occlusion of   the posterior tibial artery at the common plantar artery junction/ and occlusion of peroneal artery at the mid calf and stenosis of the tibioperoneal trunk and occlusion of anterior tibial artery proximal/ mid calf  6.          Closure of the right common femoral artery with 6-Cymraes Angio-Seal.     INDICATIONS:  This is a 64-year-old Albanian male with end-stage renal disease whom I have known for placement of a fistula in the past.  The patient had amputation of right leg done about a year and a half ago after endovascular procedure by another vascular surgeon.  He now comes in with ischemic ulceration of the left first toe with some dependent rubor.  He is recommended for diagnostic angiogram.  His duplex ultrasound seemed to show triphasic waveform going across the common femoral artery, superficial femoral artery, popliteal artery, and the proximal tibial vessels.  The waveforms dampened markedly as they go down to the ankle.  He  is recommended for an angiogram for possible endovascular procedure for at least diagnostic studies.  Risks and complications including death, myocardial infarction, bleeding, infection, limb loss, graft thrombosis, future limb loss, future graft thrombosis, renal failure, multisystem organ failure were explained.  Patient is already on dialysis.  Risks and complications of not doing the procedure were explained.  Possible injury to the artery was also explained.  The patient understood and agreed.  All questions were answered.  He is aware there is a high risk he could lose his leg based upon his history.     DESCRIPTION OF PROCEDURE:  Patient was placed supine on the procedure table in the catheterization lab.  Underwent IV conscious sedation with continuous monitoring of blood pressure, heart rate, pulse oximetry by an RN under my supervision.  The patient's procedure started at 0722, ended at 0805.  He had both groins prepped and draped in usual sterile technique.  After time-out was done, duplex ultrasound and fluoroscopy were used to image the common femoral artery puncture done on an area of the common femoral artery, which was normal.  The patient then had an 0.018 guidewire placed into the area of the iliac artery.  The incision was enlarged slightly with a #11 blade, and a 4-Mongolian sheath was placed over this, followed by a J-wire into the aorta with a 5-Mongolian sheath and then a Crossover catheter placed at the origin of the left common iliac artery.  Angiogram was done showing the common, external, and internal iliac arteries were widely open going to the femoral artery.     The patient then had the Glidewire placed through this into the area of the superficial femoral artery, and a Grandin catheter was placed at the origin of the common femoral artery with angiograms done, showing the common femoral artery was widely open as well as the bifurcation.     The patient then had the Glidewire advanced into the  superficial femoral artery with the Cascade catheter advanced into the superficial femoral artery, and angiograms were done showing the superficial femoral artery, popliteal artery were widely open.  Below the knee, the patient had stenosis of the tibioperoneal trunk.  The anterior tibial artery came off its origin but occluded shortly afterwards and tapered off in the proximal to mid calf.  The tibioperoneal trunk had about a 50% to 60% stenosis, and the posterior tibial artery was open going all the way down to the area of the ankle where it occluded at the common plantar artery.  Twigs of collaterals were seen going into the foot.  No major vessel seen.  The peroneal artery tapered off in the mid calf and did not see any branches going down the foot.  In the foot itself, there was an unnamed dorsal pedal artery, just small twigs of collaterals going onto the dorsum of the foot.     The patient, during the procedure, had difficulty maintaining control, even though he was sedated, of his leg.  He had some cramping, and sometimes it was difficult for him to tolerate the contrast agent causing him discomfort.  Therefore it was decided not to do anything further due to his agitation and difficulty controlling his leg.  His vital signs remained stable throughout the whole procedure, and he had no complaints otherwise.     It was decided at this time to terminate the procedure.  The patient then had the Glidecath brought back to the aorta with a J-wire placed into the aorta.  Catheter and sheaths were removed.  The area was re-prepped with ChloraPrep, and a 6-Guinean Angio-Seal was used for hemostasis with good hemostasis obtained.  Dermabond, gauze, and Tegaderm were placed on the area.  The patient tolerated the procedure with vital signs otherwise.  His total fluoro time was 6.4 minutes.  His DAP was 17,193.  The patient received 80 mL of contrast.  He received 4 mg of Versed, 100 mcg of fentanyl.      Dictated By Fermin  ABRIL De La Fuente M.D.  d:01/09/2025 08:18:16  t:01/09/2025 08:56:27      1/9 VASCULAR:    Reviewed angiographic findings with patient- high risk for limb loss- reviewing films with Dr. Gavin- probably hybrid room next week under anesthesia to attempt revascularization                        MEDICATIONS ADMINISTERED IN LAST 1 DAY:  acetaminophen (Tylenol Extra Strength) tab 1,000 mg       Date Action Dose Route User    1/8/2025 1715 Given 1,000 mg Oral Judy Murphy RN          atorvastatin (Lipitor) tab 20 mg       Date Action Dose Route User    1/8/2025 2015 Given 20 mg Oral Valerie Lara RN          heparin (Porcine) 5000 UNIT/ML injection 5,000 Units       Date Action Dose Route User    1/8/2025 2015 Given 5,000 Units Subcutaneous (Right Lower Abdomen) Valerie Lara RN          insulin degludec (Tresiba) 100 units/mL flextouch 13 Units       Date Action Dose Route User    1/8/2025 2113 Given 13 Units Subcutaneous (Left Lower Abdomen) Valerie Lara RN          iodixanol (VISIPAQUE) 320 MG/ML injection 100 mL       Date Action Dose Route User    1/9/2025 0806 Given 80 mL Injection Fermin De La Fuente MD            Vitals (last day)       Date/Time Temp Pulse Resp BP SpO2 Weight O2 Device O2 Flow Rate (L/min) Who    01/09/25 0843 97.6 °F (36.4 °C) 71 15 129/73 99 % -- None (Room air) -- CV    01/09/25 0540 -- 93 14 -- -- -- -- -- PM    01/09/25 0410 98.2 °F (36.8 °C) 83 21 125/80 93 % -- -- -- PM    01/09/25 0013 97.6 °F (36.4 °C) 88 29 107/68 95 % -- -- -- PM    01/08/25 2130 97.4 °F (36.3 °C) 89 18 121/72 94 % -- None (Room air) -- LR    01/08/25 1623 -- -- -- -- -- 170 lb (77.1 kg) -- -- KA    01/08/25 1230 98 °F (36.7 °C) 95 43 136/80 96 % -- None (Room air) 0 L/min ND    01/08/25 0830 -- 100 22 -- -- -- -- -- ER    01/08/25 0800 -- 97 21 -- 90 % -- -- -- ER    01/08/25 0730 98.3 °F (36.8 °C) 96 26 139/81 96 % -- None (Room air) 0 L/min ER    01/08/25 0430 98 °F (36.7 °C) 107 24 116/73 93 % -- None (Room air) -- SB

## 2025-01-09 NOTE — PROGRESS NOTES
[facility] Cardiology Progress Note    Richy Goins Patient Status:  Inpatient    1960 MRN [id number]   Location [facility] 7NE-A Attending Jeanne Redman MD   Hosp Day # 1 PCP Woody Dahl MD     Subjective:  No acute events overnight  Reported prior PAGE, but denies chest pain,pressure, heaviness   Feels much better post thoracentesis and dailysis     Objective:  /73 (BP Location: Right arm)   Pulse 71   Temp 97.6 °F (36.4 °C) (Oral)   Resp 15   Ht 5' 8\" (1.727 m)   Wt 170 lb (77.1 kg)   SpO2 99%   BMI 25.85 kg/m²     Telemetry: sr      Intake/Output:    Intake/Output Summary (Last 24 hours) at 2025 1215  Last data filed at 2025 0013  Gross per 24 hour   Intake 0 ml   Output 1500 ml   Net -1500 ml       Last 3 Weights   25 1623 170 lb (77.1 kg)   25 1055 170 lb (77.1 kg)   10/02/24 0458 181 lb (82.1 kg)   10/01/24 1056 175 lb 0.7 oz (79.4 kg)   10/01/24 0544 175 lb (79.4 kg)   24 1517 192 lb 3.2 oz (87.2 kg)       Labs:  Recent Labs   Lab 25  1127 25  0704   * 100*   * 58*   CREATSERUM 9.45* 6.23*   EGFRCR 6* 9*   CA 8.0* 8.8    140   K 4.3 3.7    103   CO2 20.0* 25.0     Recent Labs   Lab 25  1127 25  0704   RBC 3.93 3.87   HGB 10.9* 10.8*   HCT 33.4* 33.9*   MCV 85.0 87.6   MCH 27.7 27.9   MCHC 32.6 31.9   RDW 14.9 14.9   NEPRELIM 10.60* 8.57*   WBC 12.0* 10.5   .0 239.0         Recent Labs   Lab 25  1127   TROPHS 26       Diagnostics:             Physical Exam:    Physical Exam  Cardiovascular:      Rate and Rhythm: Normal rate and regular rhythm.      Comments: Dry eschar to left great toe,   Pulmonary:      Effort: Pulmonary effort is normal.      Breath sounds: No rales.   Abdominal:      Palpations: Abdomen is soft.   Musculoskeletal:      Right lower leg: Right lower leg edema: BKA.      Left lower leg: No edema.   Skin:     General: Skin is warm and dry.   Neurological:      Mental  Status: He is alert and oriented to person, place, and time.         Medications:   epoetin juan josé  10,000 Units Intravenous Once in dialysis    atorvastatin  20 mg Oral Nightly    amLODIPine  10 mg Oral Daily    aspirin  81 mg Oral Daily    insulin degludec  13 Units Subcutaneous Nightly    pantoprazole  40 mg Oral QAM AC    insulin aspart  1-68 Units Subcutaneous TID CC    heparin  5,000 Units Subcutaneous 2 times per day    insulin aspart  1-10 Units Subcutaneous TID AC and HS      sodium chloride         Assessment:   65yo who presented with dyspnea after missed dialysis.     Dyspnea  - much improved post thoracentesis and  HD  CLI with Dry gangrene and prior BKA, s/p pv angio with Dr. De La Fuente, possible hybrid revascularization with anesthesia next week  Lg Left Plueral Effusion- s/p 1500ml thora  Chronic RBBB, noted new TWI to anterior and lateral leads-   ESRD on HD - getting today   Chronic Anemia  DM2  HTN- fair control   HL    Plan:    Given some part of PAGE could be anginal equivalent, in order to risk stratigy for probable PV surgery next week, will move forward with Heather stress in am.     Sofia Singer, APRN  1/9/2025  12:15 PM  Ph [phone] ([facility])  Ph [phone] ([place])        =======================================================  Note reviewed and labs reviewed.  Agree with above assessment and plan.    Attempted to see patient this afternoon but not on tele or in bed.    I have personally performed the medical decision making in its entirety. My additions include:  Will proceed with cardiac risk stratification for probable PV hybrid surgery next week with heather SPECT tomorrow.    D/w MISSY Singer.    Alfredo Telles MD

## 2025-01-09 NOTE — PROGRESS NOTES
[facility] Pulmonary Progress Note    Richy Goins Patient Status:  Inpatient    1960 MRN [id number]   Location [facility] 7NE-A Attending Jeanne Redman MD   Hosp Day # 1 PCP Woody Dahl MD     Subjective:  Overnight: No acute events overnight. Afebrile. ON 3L NC. States he feels better after fluid removed. Has some pain on left side where he has his thoracentesis. No SOB , slight cough.      Objective:  /73 (BP Location: Right arm)   Pulse 71   Temp 97.6 °F (36.4 °C) (Oral)   Resp 15   Ht 5' 8\" (1.727 m)   Wt 170 lb (77.1 kg)   SpO2 99%   BMI 25.85 kg/m²     Temp (24hrs), Av.8 °F (36.6 °C), Min:97.4 °F (36.3 °C), Max:98.2 °F (36.8 °C)      Intake/Output:    Intake/Output Summary (Last 24 hours) at 2025 1104  Last data filed at 2025 0013  Gross per 24 hour   Intake 0 ml   Output 1500 ml   Net -1500 ml       Physical Exam:   General: alert, cooperative, oriented.  No respiratory distress.   Head: Normocephalic, without obvious abnormality, atraumatic.   Throat: Lips, mucosa, and tongue normal.  No thrush noted.   Neck: trachea midline, no adenopathy, no thyromegaly. No JVD.   Lungs: clear to auscultation bilaterally, diminished breath sounds L base   Chest wall: No tenderness or deformity.   Heart: regular rate and rhythm   Abdomen: soft, non-distended, no masses, no guarding, no     Rebound.   Extremity: No edema or cyanosis   Skin: No rashes or lesions.   Neurological: Alert, interactive, no focal deficits    Lab Data Review:  Recent Labs     25  1127 25  0704   WBC 12.0* 10.5   HGB 10.9* 10.8*   .0 239.0     Recent Labs     25  1127 25  0704    140   K 4.3 3.7    103   CO2 20.0* 25.0   * 58*   CREATSERUM 9.45* 6.23*     No results for input(s): \"PTP\", \"INR\", \"PTT\" in the last 168 hours.    Cultures:   Hospital Encounter on 25   1. Body Fluid Cult Aerobic and Anaerobic     Status: None (Preliminary result)    Collection Time:  25  1:42 PM    Specimen: Pleural Fluid, Left; Body fluid, unspecified   Result Value Ref Range    Body Fluid Smear 2+ WBCs seen N/A    Body Fluid Smear No organisms seen N/A    Body Fluid Smear This is a cytocentrifuged smear. N/A       Radiology:  CARD ECHO 2D DOPPLER (CPT=93306)  Transthoracic Echocardiogram    Name:Richy Goins    Date: 2025 :  1960 Ht:  (68in)  BP: 136 / 80  MRN:  [id number]    Age:  64years    Wt:  (170lb) HR: 95bpm  Loc:  [facility]       Gndr: M          BSA: 1.91m^2  Sonographer: Rob NUNEZ [facility]    Ordering:    Dallas Drew MD  Consulting:  Len Gavin    ----------------------------------------------------------------------------  History/Indications:  Dyspnea on exertion.    ----------------------------------------------------------------------------  Procedure information:  A transthoracic complete 2D study was performed.  Additional evaluation included M-mode, complete spectral Doppler, and color  Doppler.  Patient status:  Inpatient.  Location:  Bedside.    Comparison was  made to the study of 2023.    This was a routine study. Transthoracic  echocardiography for ventricular function evaluation and assessment of  valvular function. Image quality was adequate.    ECG rhythm:   Normal sinus    ----------------------------------------------------------------------------    Conclusions:    1. Left ventricle: The cavity size was normal. Wall thickness was normal.     Systolic function was normal. The estimated ejection fraction was 50-55%,     by visual assessment. No diagnostic evidence for regional wall motion     abnormalities. Left ventricular diastolic function parameters were normal     for the patient's age.  2. Left atrium: The left atrial volume was normal.  3. Aortic root: The aortic root was 3.8cm diameter.  4. Ascending aorta: The ascending aorta was 3.7cm diameter.  Impressions:  This study is compared with previous dated 2023: No  significant change.  Left ventricle less hyperdynamic than prior.  *    ----------------------------------------------------------------------------  *  Findings:  Left ventricle:  The cavity size was normal. Wall thickness was normal.  Systolic function was normal. The estimated ejection fraction was 50-55%, by  visual assessment. No diagnostic evidence for regional wall motion  abnormalities. Left ventricular diastolic function parameters were normal  for the patient's age.  Left atrium:  The left atrial volume was normal.  Right ventricle:  The cavity size was normal. Systolic function was normal.  Right atrium:  The atrium was normal in size.  Mitral valve:  The annulus was mildly calcified. Leaflet separation was  normal.  Doppler:  Transvalvular velocity was within the normal range. There  was no evidence for stenosis. There was no significant regurgitation.  Aortic valve:   The valve was trileaflet. The leaflets were mildly  calcified. Cusp separation was normal.  Doppler:  Transvalvular velocity was  within the normal range. There was no evidence for stenosis. There was no  significant regurgitation.  Tricuspid valve:  The valve is structurally normal. Leaflet separation was  normal.  Doppler:  Transvalvular velocity was within the normal range. There  was no evidence for stenosis. There was no significant regurgitation.  Pulmonic valve:    There was no significant valve disease.    Doppler:  Transvalvular velocity was within the normal range. There was no evidence  for stenosis. There was no significant regurgitation.  Pericardium:   There was no pericardial effusion.  Aorta:  Aortic root: The aortic root was 3.8cm diameter.  Ascending aorta: The ascending aorta was 3.7cm diameter.  Pulmonary arteries:  The main pulmonary artery was normal-sized.  Systolic pressure could not be  accurately estimated.  Systemic veins:  Inferior vena cava: The IVC was normally collapsible and  normal-sized.    ----------------------------------------------------------------------------  Measurements     Left ventricle         Value        Ref       06/01/2023   IVS thickness, ED,     1.0   cm     0.6 - 1.0 1.1   PLAX   LV ID, ED, PLAX        4.9   cm     4.2 - 5.8 5.1   LV ID, ES, PLAX        3.5   cm     2.5 - 4.0 3.2   LV PW thickness,       1.0   cm     0.6 - 1.0 1.0   ED, PLAX   IVS/LV PW ratio,       1.02         --------- 1.11   ED, PLAX   LV PW/LV ID ratio,     0.2          --------- 0.19   ED, PLAX   LV ejection            55    %      52 - 72   67   fraction   LV e', lateral     (L) 7.7   cm/sec >=10.0    8.2   LV E/e', lateral   (H) 14           <=13      12   LV e', medial          8.3   cm/sec >=7.0     6.1   LV E/e', medial        13           --------- 16   LV e', average         8.0   cm/sec --------- 7.1   LV E/e', average       14           <=14      14     Aortic root            Value        Ref       06/01/2023   Aortic root ID, ED     3.8   cm     2.6 - 4.1 3.4     Ascending aorta        Value        Ref       06/01/2023   Ascending aorta        3.7   cm     2.2 - 3.8 ----------   ID, A-P, ED     Left atrium            Value        Ref       06/01/2023   LA ID, A-P, ES     (H) 4.5   cm     3.0 - 4.0 4.3   LA volume, S           52    ml     18 - 58   43   LA volume/bsa, S       27    ml/m^2 16 - 34   23   LA volume, ES, 1-p     50    ml     18 - 58   45   A4C   LA volume, ES, 1-p     50    ml     18 - 58   41   A2C   LA volume, ES, A/L     55    ml     --------- ----------   LA volume/bsa, ES,     29    ml/m^2 16 - 34   ----------   A/L   LA/aortic root         1.18         --------- 1.26   ratio     Mitral valve           Value        Ref       06/01/2023   Mitral E-wave peak     1.08  m/sec  --------- 0.99   velocity   Mitral A-wave peak     0.86  m/sec  --------- 1.06   velocity   Mitral peak            5     mm Hg  --------- 4   gradient, D   Mitral E/A ratio,      1.3           --------- 0.9   peak  Legend:  (L)  and  (H)  jaya values outside specified reference range.    ----------------------------------------------------------------------------    Prepared and electronically signed by  Jaya Burks  01/09/2025 09:37  CATH PV  This exam has been completed. Please refer to Notes for the results to   this procedure.  XR CHEST AP PORTABLE  (CPT=71045)  Narrative: PROCEDURE:  XR CHEST AP PORTABLE  (CPT=71045)     TECHNIQUE:  AP chest radiograph was obtained.     COMPARISON:  [facility] , XR, XR CHEST AP PORTABLE  (CPT=71045), 1/08/2025, 1:50 PM.  [facility] , XR, XR CHEST AP PORTABLE  (CPT=71045), 1/07/2025, 12:40 PM.     INDICATIONS:  Follow-up     PATIENT STATED HISTORY: (As transcribed by Technologist)  post op          FINDINGS:  Cardiomegaly.  Interstitial opacities bilaterally.  I interval increase in opacities within the right lung base.  Left basilar opacity appears slightly improved.  Atheromatous calcifications of the aorta.  No pneumothorax.                   Impression: CONCLUSION:    1. Stable cardiomegaly with interstitial opacities likely representing edema.  2. Interval increase in opacities within the right lung base likely representing combination of consolidation and effusion.  3. Slight interval improvement in left basilar opacity with residual pneumonia remaining with probable small left-sided pleural effusion.        LOCATION:  [facility]                 Dictated by (CST): hSi Crystal MD on 1/09/2025 at 7:58 AM       Finalized by (CST): Shi Crystal MD on 1/09/2025 at 8:00 AM         Medications reviewed     Assessment and Plan:   Patient Active Problem List   Diagnosis    Diabetic nephropathy associated with type 2 diabetes mellitus (HCC)    Primary hypertension    Type 2 diabetes mellitus with hyperglycemia (HCC)    Stage 3 chronic kidney disease (HCC)    Retinopathy due to secondary diabetes mellitus (HCC)    Atherosclerosis of native artery of extremity (HCC)    Stage 4  chronic kidney disease (HCC)    Acidosis    ESRD (end stage renal disease) (HCC)    ESRD on hemodialysis (HCC)    Pure hypercholesterolemia    Hemoptysis    Hypertensive urgency    Hematemesis with nausea    Hematemesis, unspecified whether nausea present    Acute gastritis with hemorrhage, unspecified gastritis type    Uremia    Acquired absence of right leg below knee (HCC)    Chronic kidney disease, stage 4 (severe) (HCC)    End-stage renal disease on hemodialysis (HCC)    Osteomyelitis of right foot (HCC)    Dyslipidemia    Diabetes mellitus type 2 in nonobese (HCC)    Unilateral complete BKA, right, sequela (HCC)    Type 2 diabetes mellitus with diabetic peripheral angiopathy without gangrene (HCC)    Type 2 diabetes mellitus with diabetic chronic kidney disease (HCC)    Long term (current) use of aspirin    Hypertensive chronic kidney disease w stg 1-4/unsp chr kdny    Encounter for orthopedic aftercare following surgical amputation    Anemia in chronic kidney disease    Age-related osteoporosis without current pathological fracture    Acute kidney failure, unspecified (HCC)    CKD (chronic kidney disease), stage III (HCC)    Type 1 diabetes mellitus with ketoacidosis without coma (HCC)    Senile purpura (HCC)    Tinnitus, bilateral    Hypernatremia    Anemia    Acute kidney injury (HCC)    Acute renal failure (ARF) (HCC)    Metabolic acidosis    Hyperglycemia    Nausea    Elevated lipase    Azotemia    Pleural effusion    Anemia in ESRD (end-stage renal disease) (HCC)    Hyperphosphatemia    Dyspnea on exertion    Ischemic foot       Assessment:  Shortness of breath but patient is currently on room air: Improving dyspnea postthoracentesis  Large left pleural effusion suspect due to renal failure although cannot rule out parapneumonic effusion or neoplastic process  Left great toe ulcer  Peripheral vascular disease  GERD  Hypertension  End-stage renal disease on hemodialysis  Anemia of chronic  disease  Generalized weakness  Type 2 diabetes mellitus  Hyperlipidemia  Osteoporosis  1/8 s/p left thora 150ml out        Plan:  Continue to monitor oxygen, maintain oxygen >90%, wean as tolerated  Vascular surgery following- planning on angiogram   Hemodialysis per nephrology  Follow CXR in AM    Discussed with patient, Hoda RN, and Dr Rendon     Is this a shared or split note between Advanced Practice Provider and Physician? Yes   JAMEL Katz  [facility] Pulmonary Medicine  Office: [phone]

## 2025-01-09 NOTE — PROGRESS NOTES
[facility]   part of [facility]     Hospitalist Progress Note     Richy Goins Patient Status:  Observation    1960 MRN [id number]   Location [facility] 3NE-A Attending Jeanne Redman MD   Hosp Day # 1 PCP Woody Dahl MD     Chief Complaint: sob and weakness    Subjective:     Patient to cath lab- procedure not done, to be rescheduled with anesthesia    Objective:    Review of Systems:   A comprehensive review of systems was completed; pertinent positive and negatives stated in subjective.    Vital signs:  Temp:  [97.4 °F (36.3 °C)-98.2 °F (36.8 °C)] 97.6 °F (36.4 °C)  Pulse:  [71-95] 71  Resp:  [14-43] 15  BP: (107-136)/(68-80) 129/73  SpO2:  [93 %-99 %] 99 %    Physical Exam:    General: No acute distress  Respiratory: No wheezes, no rhonchi  Cardiovascular: S1, S2, regular rate and rhythm  Abdomen: Soft, Non-tender, non-distended, positive bowel sounds  Neuro: No new focal deficits.   Extremities: No edema      Diagnostic Data:    Labs:  Recent Labs   Lab 25  1127 25  0704   WBC 12.0* 10.5   HGB 10.9* 10.8*   MCV 85.0 87.6   .0 239.0       Recent Labs   Lab 25  1127 25  0704   * 100*   * 58*   CREATSERUM 9.45* 6.23*   CA 8.0* 8.8   ALB  --  3.9    140   K 4.3 3.7    103   CO2 20.0* 25.0   ALKPHO  --  87   AST  --  10   ALT  --  <7*   BILT  --  0.3   TP  --  6.8       Estimated Glomerular Filtration Rate: 9.2 mL/min/1.73m2 (A) (by CKD-EPI based on SCr of 6.23 mg/dL (H)).    Recent Labs   Lab 25  1127   TROPHS 26       No results for input(s): \"PTP\", \"INR\" in the last 168 hours.               Microbiology    Hospital Encounter on 25   1. Body Fluid Cult Aerobic and Anaerobic     Status: None (Preliminary result)    Collection Time: 25  1:42 PM    Specimen: Pleural Fluid, Left; Body fluid, unspecified   Result Value Ref Range    Body Fluid Smear 2+ WBCs seen N/A    Body Fluid Smear No organisms seen N/A    Body Fluid  Smear This is a cytocentrifuged smear. N/A         Imaging: Reviewed in Epic.    Medications:    epoetin juan josé  10,000 Units Intravenous Once in dialysis    atorvastatin  20 mg Oral Nightly    amLODIPine  10 mg Oral Daily    aspirin  81 mg Oral Daily    insulin degludec  13 Units Subcutaneous Nightly    pantoprazole  40 mg Oral QAM AC    insulin aspart  1-68 Units Subcutaneous TID CC    heparin  5,000 Units Subcutaneous 2 times per day    insulin aspart  1-10 Units Subcutaneous TID AC and HS       Assessment & Plan:      # Large left pleural effusion and fluid overload   -due to missed dialysis treatments  #ESRD on HD  -s/p thoracentesis 1/8-1500 ml removed     # Elevated D-dimer     #L great toe ulcer  #PVD  #hx R BKA  -suspect DM foot ulcer.   -No signs of cellulitis  -podiatry and vascular     #new non specific TWI on EKG  -no current CP but has had occasional chest tightness at home and PAGE as above  -echo pending       #Hypertension stable on Norvasc     #Anemia of ESRD     #Generalized weakness secondary to missed HD     # DM2   -basal bolus insulin     #Hyperlipidemia         Jeanne Redman MD    Supplementary Documentation:     Quality:  DVT Mechanical Prophylaxis:   SCDs,    DVT Pharmacologic Prophylaxis   Medication    heparin (Porcine) 5000 UNIT/ML injection 5,000 Units         DVT Pharmacologic prophylaxis: Aspirin 81 mg      Code Status: Full Code  Soni: No urinary catheter in place  Soni Duration (in days):   Central line:    [place]:     Discharge is dependent on: progress  At this point Mr. Goins is expected to be discharge to: home    The 21st Century Cures Act makes medical notes like these available to patients in the interest of transparency. Please be advised this is a medical document. Medical documents are intended to carry relevant information, facts as evident, and the clinical opinion of the practitioner. The medical note is intended as peer to peer communication and may appear blunt or  direct. It is written in medical language and may contain abbreviations or verbiage that are unfamiliar.

## 2025-01-09 NOTE — PROCEDURES
[facility]    PATIENT'S NAME: JULIUS FOY   ATTENDING PHYSICIAN: Jeanne Redman M.D.   OPERATING PHYSICIAN: Fermin De La Fuente M.D.   PATIENT ACCOUNT#:   [id number]    LOCATION:  [place] [address]  MEDICAL RECORD #:   [id number]       YOB: 1960  ADMISSION DATE:       01/07/2025      OPERATION DATE:  01/09/2025    CARDIAC PROCEDURE TRANSCRIPTION    PERIPHERAL ANGIOGRAPHY    PREOPERATIVE DIAGNOSIS:    1.   Ischemic ulceration, left foot, with rest pain.  2.   End-stage renal disease.  3.   Right below-knee amputation.  POSTOPERATIVE DIAGNOSIS:    1.   Ischemic ulceration, left foot, with rest pain.  2.   End-stage renal disease.  3.   Right below-knee amputation.   4.   Infrapopliteal artery occlusive disease, stenosis of about 50% to 60% of the tibioperoneal trunk, occlusion of the anterior tibial artery which tapers off in the proximal to mid calf, occlusion of the peroneal artery which tapers off in the mid to distal calf, and a runoff of the posterior tibial artery with occlusion of the common plantar artery and no major vessel seen in the foot, just extensive collaterals.  PROCEDURE PERFORMED:    1.   Duplex ultrasound access, right common femoral artery.  2.   Micropuncture technique with eventual placement of a 4-Cymraes sheath and an 0.018 guidewire.   3.   Placement of a 5-Cymraes sheath and a Crossover catheter to the origin of the left common iliac artery with an end-hole catheter angiogram through the Crossover catheter of the common, external, internal iliac artery and femoral artery showing widely open.    4.   Placement of a Glidewire into the superficial femoral artery with placement of a Glide catheter at the origin of the common femoral artery with angiogram of the left iliofemoral and superficial femoral arterial segment showing normal flow.  5.   Placement of a Glidewire into the superficial femoral artery with advancement of a Glide catheter through the superficial femoral  artery with angiograms done of the superficial femoral, popliteal, tibial vessels, and plantar arch showing  occlusion of all major plantar artery vessels. Occlusion of   the posterior tibial artery at the common plantar artery junction/ and occlusion of peroneal artery at the mid calf and stenosis of the tibioperoneal trunk and occlusion of anterior tibial artery proximal/ mid calf  6.   Closure of the right common femoral artery with 6-Slovenian Angio-Seal.    INDICATIONS:  This is a 64-year-old Ma[place] male with end-stage renal disease whom I have known for placement of a fistula in the past.  The patient had amputation of right leg done about a year and a half ago after endovascular procedure by another vascular surgeon.  He now comes in with ischemic ulceration of the left first toe with some dependent rubor.  He is recommended for diagnostic angiogram.  His duplex ultrasound seemed to show triphasic waveform going across the common femoral artery, superficial femoral artery, popliteal artery, and the proximal tibial vessels.  The waveforms dampened markedly as they go down to the ankle.  He is recommended for an angiogram for possible endovascular procedure for at least diagnostic studies.  Risks and complications including death, myocardial infarction, bleeding, infection, limb loss, graft thrombosis, future limb loss, future graft thrombosis, renal failure, multisystem organ failure were explained.  Patient is already on dialysis.  Risks and complications of not doing the procedure were explained.  Possible injury to the artery was also explained.  The patient understood and agreed.  All questions were answered.  He is aware there is a high risk he could lose his leg based upon his history.    DESCRIPTION OF PROCEDURE:  Patient was placed supine on the procedure table in the catheterization lab.  Underwent IV conscious sedation with continuous monitoring of blood pressure, heart rate, pulse oximetry by an RN  under my supervision.  The patient's procedure started at 0722, ended at 0805.  He had both groins prepped and draped in usual sterile technique.  After time-out was done, duplex ultrasound and fluoroscopy were used to image the common femoral artery puncture done on an area of the common femoral artery, which was normal.  The patient then had an 0.018 guidewire placed into the area of the iliac artery.  The incision was enlarged slightly with a #11 blade, and a 4-Syriac sheath was placed over this, followed by a J-wire into the aorta with a 5-Syriac sheath and then a Crossover catheter placed at the origin of the left common iliac artery.  Angiogram was done showing the common, external, and internal iliac arteries were widely open going to the femoral artery.    The patient then had the Glidewire placed through this into the area of the superficial femoral artery, and a Phoenix catheter was placed at the origin of the common femoral artery with angiograms done, showing the common femoral artery was widely open as well as the bifurcation.    The patient then had the Glidewire advanced into the superficial femoral artery with the Phoenix catheter advanced into the superficial femoral artery, and angiograms were done showing the superficial femoral artery, popliteal artery were widely open.  Below the knee, the patient had stenosis of the tibioperoneal trunk.  The anterior tibial artery came off its origin but occluded shortly afterwards and tapered off in the proximal to mid calf.  The tibioperoneal trunk had about a 50% to 60% stenosis, and the posterior tibial artery was open going all the way down to the area of the ankle where it occluded at the common plantar artery.  Twigs of collaterals were seen going into the foot.  No major vessel seen.  The peroneal artery tapered off in the mid calf and did not see any branches going down the foot.  In the foot itself, there was an unnamed dorsal pedal artery, just small  twigs of collaterals going onto the dorsum of the foot.    The patient, during the procedure, had difficulty maintaining control, even though he was sedated, of his leg.  He had some cramping, and sometimes it was difficult for him to tolerate the contrast agent causing him discomfort.  Therefore it was decided not to do anything further due to his agitation and difficulty controlling his leg.  His vital signs remained stable throughout the whole procedure, and he had no complaints otherwise.    It was decided at this time to terminate the procedure.  The patient then had the Glidecath brought back to the aorta with a J-wire placed into the aorta.  Catheter and sheaths were removed.  The area was re-prepped with ChloraPrep, and a 6-Irish Angio-Seal was used for hemostasis with good hemostasis obtained.  Dermabond, gauze, and Tegaderm were placed on the area.  The patient tolerated the procedure with vital signs otherwise.  His total fluoro time was 6.4 minutes.  His DAP was 17,193.  The patient received 80 mL of contrast.  He received 4 mg of Versed, 100 mcg of fentanyl.     Dictated By Fermin De La Fuente M.D.  d: 01/09/2025 08:18:16  t: 01/09/2025 08:56:27  Job [id number]  JJW/

## 2025-01-09 NOTE — PROGRESS NOTES
Reviewed angiographic findings with patient- high risk for limb loss- reviewing films with Dr. Gavin- probably hybrid room next week under anesthesia to attempt revascularization

## 2025-01-09 NOTE — PROGRESS NOTES
[facility]   part of [facility]     Nephrology Progress Note    Richy Goins Patient Status:  Observation    1960 MRN [id number]   Location [facility] 3NE-A Attending Jeanne Redman MD   Hosp Day # 1 PCP Woody Dahl MD       SUBJECTIVE:  No acute events overnight. SOB improved after HD and thoracentesis.     Physical Exam:   /73 (BP Location: Right arm)   Pulse 71   Temp 97.6 °F (36.4 °C) (Oral)   Resp 15   Ht 5' 8\" (1.727 m)   Wt 170 lb (77.1 kg)   SpO2 99%   BMI 25.85 kg/m²   Temp (24hrs), Av.8 °F (36.6 °C), Min:97.4 °F (36.3 °C), Max:98.2 °F (36.8 °C)       Intake/Output Summary (Last 24 hours) at 2025 1133  Last data filed at 2025 0013  Gross per 24 hour   Intake 0 ml   Output 1500 ml   Net -1500 ml     Last 3 Weights   25 1623 170 lb (77.1 kg)   25 1055 170 lb (77.1 kg)   10/02/24 0458 181 lb (82.1 kg)   10/01/24 1056 175 lb 0.7 oz (79.4 kg)   10/01/24 0544 175 lb (79.4 kg)   24 1517 192 lb 3.2 oz (87.2 kg)   24 0936 196 lb (88.9 kg)   24 0917 176 lb (79.8 kg)     General: Alert and oriented in no apparent distress.  HEENT: No scleral icterus, MMM  Neck: Supple, no SARITA or thyromegaly  Cardiac: Regular rate and rhythm, S1, S2 normal, no murmur or rub  Lungs: no rales, decr BS L 1/2 lung field   Abdomen: Soft, non-tender. + bowel sounds, no palpable organomegaly  Extremities: Without clubbing, cyanosis or edema. L AVF with bruit/thrill, R BKA  Neurologic: Alert and oriented, cranial nerves grossly intact, moving all extremities  Skin: Warm and dry, no rash        Labs:     Recent Labs   Lab 25  0704   WBC 12.0* 10.5   HGB 10.9* 10.8*   MCV 85.0 87.6   .0 239.0       Recent Labs   Lab 25  0704    140   K 4.3 3.7    103   CO2 20.0* 25.0   * 58*   CREATSERUM 9.45* 6.23*   CA 8.0* 8.8   MG 2.1 2.0   PHOS 9.9* 6.4*   * 100*       Recent Labs   Lab 25  0704    ALT <7*   AST 10   ALB 3.9       Recent Labs   Lab 01/08/25  1709 01/08/25  1728 01/08/25  2059 01/09/25  0510 01/09/25  1124   PGLU 68* 70 161* 104* 65*       Meds:   Current Facility-Administered Medications   Medication Dose Route Frequency    sodium chloride 0.9% infusion   Intravenous Continuous    epoetin juan josé (Epogen, Procrit) 73901 UNIT/ML injection 10,000 Units  10,000 Units Intravenous Once in dialysis    sodium chloride 0.9 % IV bolus 100 mL  100 mL Intravenous Q30 Min PRN    And    albumin human (Albumin) 25% injection 25 g  25 g Intravenous PRN Dialysis    atorvastatin (Lipitor) tab 20 mg  20 mg Oral Nightly    metoclopramide (Reglan) 5 mg/mL injection 10 mg  10 mg Intravenous Daily PRN    amLODIPine (Norvasc) tab 10 mg  10 mg Oral Daily    aspirin DR tab 81 mg  81 mg Oral Daily    insulin degludec (Tresiba) 100 units/mL flextouch 13 Units  13 Units Subcutaneous Nightly    pantoprazole (Protonix) DR tab 40 mg  40 mg Oral QAM AC    insulin aspart (NovoLOG) 100 Units/mL FlexPen 1-68 Units  1-68 Units Subcutaneous TID CC    glucose (Dex4) 15 GM/59ML oral liquid 15 g  15 g Oral Q15 Min PRN    Or    glucose (Glutose) 40% oral gel 15 g  15 g Oral Q15 Min PRN    Or    glucose-vitamin C (Dex-4) chewable tab 4 tablet  4 tablet Oral Q15 Min PRN    Or    dextrose 50% injection 50 mL  50 mL Intravenous Q15 Min PRN    Or    glucose (Dex4) 15 GM/59ML oral liquid 30 g  30 g Oral Q15 Min PRN    Or    glucose (Glutose) 40% oral gel 30 g  30 g Oral Q15 Min PRN    Or    glucose-vitamin C (Dex-4) chewable tab 8 tablet  8 tablet Oral Q15 Min PRN    acetaminophen (Tylenol Extra Strength) tab 1,000 mg  1,000 mg Oral Q4H PRN    melatonin tab 3 mg  3 mg Oral Nightly PRN    polyethylene glycol (PEG 3350) (Miralax) 17 g oral packet 17 g  17 g Oral Daily PRN    sennosides (Senokot) tab 17.2 mg  17.2 mg Oral Nightly PRN    bisacodyl (Dulcolax) 10 MG rectal suppository 10 mg  10 mg Rectal Daily PRN    ondansetron (Zofran) 4 MG/2ML  injection 4 mg  4 mg Intravenous Q6H PRN    benzonatate (Tessalon) cap 200 mg  200 mg Oral TID PRN    heparin (Porcine) 5000 UNIT/ML injection 5,000 Units  5,000 Units Subcutaneous 2 times per day    insulin aspart (NovoLOG) 100 Units/mL FlexPen 1-10 Units  1-10 Units Subcutaneous TID AC and HS         Impression/Plan:      1) ESRD: Due to long-standing DM2. Receives HD TThS, will continue per usual outpatient schedule.      2) HTN: Continue home amlodipine and lisinopril     3) Anemia: Due to CKD. Continue ESAs with HD for goal hemoglobin 10-11.     4) PVD: h/o right BKA and with left toe ulceration. Angiogram with Dr. De La Fuente 1/9, plan for attempt at revascularization possibly next week    5) L pleural effusion- s/p thora with 1.5L removed 1/8    Thank you for allowing me to participate in this patient's care. Please feel free to call me with any questions or concerns.     Marlene Ibrahim MD  01/09/25

## 2025-01-10 ENCOUNTER — APPOINTMENT (OUTPATIENT)
Dept: CV DIAGNOSTICS | Facility: HOSPITAL | Age: 65
End: 2025-01-10
Attending: NURSE PRACTITIONER
Payer: COMMERCIAL

## 2025-01-10 ENCOUNTER — APPOINTMENT (OUTPATIENT)
Dept: GENERAL RADIOLOGY | Facility: HOSPITAL | Age: 65
End: 2025-01-10
Payer: COMMERCIAL

## 2025-01-10 PROBLEM — I96 GANGRENE OF TOE OF LEFT FOOT (HCC): Status: ACTIVE | Noted: 2025-01-10

## 2025-01-10 LAB
GLUCOSE BLD-MCNC: 127 MG/DL (ref 70–99)
GLUCOSE BLD-MCNC: 166 MG/DL (ref 70–99)
GLUCOSE BLD-MCNC: 171 MG/DL (ref 70–99)
GLUCOSE BLD-MCNC: 231 MG/DL (ref 70–99)
NON GYNE INTERPRETATION: NEGATIVE

## 2025-01-10 PROCEDURE — 78452 HT MUSCLE IMAGE SPECT MULT: CPT | Performed by: NURSE PRACTITIONER

## 2025-01-10 PROCEDURE — 93018 CV STRESS TEST I&R ONLY: CPT | Performed by: NURSE PRACTITIONER

## 2025-01-10 PROCEDURE — 99232 SBSQ HOSP IP/OBS MODERATE 35: CPT | Performed by: INTERNAL MEDICINE

## 2025-01-10 PROCEDURE — 99233 SBSQ HOSP IP/OBS HIGH 50: CPT | Performed by: INTERNAL MEDICINE

## 2025-01-10 PROCEDURE — 71045 X-RAY EXAM CHEST 1 VIEW: CPT

## 2025-01-10 PROCEDURE — 93017 CV STRESS TEST TRACING ONLY: CPT | Performed by: NURSE PRACTITIONER

## 2025-01-10 PROCEDURE — 99232 SBSQ HOSP IP/OBS MODERATE 35: CPT | Performed by: STUDENT IN AN ORGANIZED HEALTH CARE EDUCATION/TRAINING PROGRAM

## 2025-01-10 PROCEDURE — 99233 SBSQ HOSP IP/OBS HIGH 50: CPT | Performed by: HOSPITALIST

## 2025-01-10 RX ORDER — AMINOPHYLLINE 25 MG/ML
INJECTION, SOLUTION INTRAVENOUS
Status: COMPLETED
Start: 2025-01-10 | End: 2025-01-10

## 2025-01-10 RX ORDER — REGADENOSON 0.08 MG/ML
INJECTION, SOLUTION INTRAVENOUS
Status: DISPENSED
Start: 2025-01-10 | End: 2025-01-10

## 2025-01-10 RX ORDER — ALBUMIN (HUMAN) 12.5 G/50ML
25 SOLUTION INTRAVENOUS
Status: ACTIVE | OUTPATIENT
Start: 2025-01-11 | End: 2025-01-12

## 2025-01-10 NOTE — PROGRESS NOTES
Here for Lexiscan. C/O nausea . Given Aminophylline per nausea.  Had loose stools.  Awaiting post images.

## 2025-01-10 NOTE — PROGRESS NOTES
[facility]   part of [facility]     Nephrology Progress Note    Richy Goins Patient Status:  Observation    1960 MRN [id number]   Location [facility] 3NE-A Attending Jeanne Redamn MD   Hosp Day # 2 PCP Woody Dahl MD       SUBJECTIVE:  HD yesterday with 2L removed. Breathing better. To have stress test    Physical Exam:   /81 (BP Location: Right arm)   Pulse 98   Temp 98.2 °F (36.8 °C) (Temporal)   Resp 21   Ht 5' 8\" (1.727 m)   Wt 170 lb (77.1 kg)   SpO2 98%   BMI 25.85 kg/m²   Temp (24hrs), Av °F (36.7 °C), Min:97.5 °F (36.4 °C), Max:98.2 °F (36.8 °C)     No intake or output data in the 24 hours ending 01/10/25 0928    Last 3 Weights   25 1623 170 lb (77.1 kg)   25 1055 170 lb (77.1 kg)   10/02/24 0458 181 lb (82.1 kg)   10/01/24 1056 175 lb 0.7 oz (79.4 kg)   10/01/24 0544 175 lb (79.4 kg)   24 1517 192 lb 3.2 oz (87.2 kg)   24 0936 196 lb (88.9 kg)   24 0917 176 lb (79.8 kg)     General: Alert and oriented in no apparent distress.  HEENT: No scleral icterus, MMM  Neck: Supple, no SARITA or thyromegaly  Cardiac: Regular rate and rhythm, S1, S2 normal, no murmur or rub  Lungs: no rales, decreased breath sounds at the bases  Abdomen: Soft, non-tender.   Extremities: Without clubbing, cyanosis or edema. L AVF with bruit/thrill, R BKA  Neurologic: Alert and oriented, cranial nerves grossly intact, moving all extremities  Skin: Warm and dry, no rash        Labs:     Recent Labs   Lab 25  1127 25  0704   WBC 12.0* 10.5   HGB 10.9* 10.8*   MCV 85.0 87.6   .0 239.0       Recent Labs   Lab 25  1127 25  0704    140   K 4.3 3.7    103   CO2 20.0* 25.0   * 58*   CREATSERUM 9.45* 6.23*   CA 8.0* 8.8   MG 2.1 2.0   PHOS 9.9* 6.4*   * 100*       Recent Labs   Lab 25  0704   ALT <7*   AST 10   ALB 3.9       Recent Labs   Lab 25  1153 25  1222 25  1731 25  01/10/25  0507   PGLU 76 105* 92 149* 171*       Meds:   Current Facility-Administered Medications   Medication Dose Route Frequency    insulin degludec (Tresiba) 100 units/mL flextouch 8 Units  8 Units Subcutaneous Daily    atorvastatin (Lipitor) tab 20 mg  20 mg Oral Nightly    metoclopramide (Reglan) 5 mg/mL injection 10 mg  10 mg Intravenous Daily PRN    amLODIPine (Norvasc) tab 10 mg  10 mg Oral Daily    aspirin DR tab 81 mg  81 mg Oral Daily    pantoprazole (Protonix) DR tab 40 mg  40 mg Oral QAM AC    insulin aspart (NovoLOG) 100 Units/mL FlexPen 1-68 Units  1-68 Units Subcutaneous TID CC    glucose (Dex4) 15 GM/59ML oral liquid 15 g  15 g Oral Q15 Min PRN    Or    glucose (Glutose) 40% oral gel 15 g  15 g Oral Q15 Min PRN    Or    glucose-vitamin C (Dex-4) chewable tab 4 tablet  4 tablet Oral Q15 Min PRN    Or    dextrose 50% injection 50 mL  50 mL Intravenous Q15 Min PRN    Or    glucose (Dex4) 15 GM/59ML oral liquid 30 g  30 g Oral Q15 Min PRN    Or    glucose (Glutose) 40% oral gel 30 g  30 g Oral Q15 Min PRN    Or    glucose-vitamin C (Dex-4) chewable tab 8 tablet  8 tablet Oral Q15 Min PRN    acetaminophen (Tylenol Extra Strength) tab 1,000 mg  1,000 mg Oral Q4H PRN    melatonin tab 3 mg  3 mg Oral Nightly PRN    polyethylene glycol (PEG 3350) (Miralax) 17 g oral packet 17 g  17 g Oral Daily PRN    sennosides (Senokot) tab 17.2 mg  17.2 mg Oral Nightly PRN    bisacodyl (Dulcolax) 10 MG rectal suppository 10 mg  10 mg Rectal Daily PRN    ondansetron (Zofran) 4 MG/2ML injection 4 mg  4 mg Intravenous Q6H PRN    benzonatate (Tessalon) cap 200 mg  200 mg Oral TID PRN    heparin (Porcine) 5000 UNIT/ML injection 5,000 Units  5,000 Units Subcutaneous 2 times per day    insulin aspart (NovoLOG) 100 Units/mL FlexPen 1-10 Units  1-10 Units Subcutaneous TID AC and HS         Impression/Plan:      1) ESRD: Due to long-standing DM2. Receives HD TThS, will continue per usual outpatient schedule.      2) HTN: Continue  home amlodipine and lisinopril     3) Anemia: Due to CKD. Continue ESAs with HD for goal hemoglobin 10-11.     4) PVD: h/o right BKA and with left toe ulceration. Angiogram with Dr. De La Fuente 1/9, plan for attempt at revascularization possibly next week    5) L pleural effusion- s/p thora with 1.5L removed 1/8    Thank you for allowing me to participate in this patient's care. Please feel free to call me with any questions or concerns.     Marlene Ibrahim MD  01/10/25

## 2025-01-10 NOTE — PLAN OF CARE
Assumed care at 0730  A/O x4   RA   Denies pain   Pedal pulse per doppler   Stress test complete   [facility] contacted for dialysis tomorrow   Up x1 stand/ pivot   Post op shoe  Call light within reach. Fall precautions in place   Pt updated on POC. All questions answered

## 2025-01-10 NOTE — PROGRESS NOTES
[facility]   part of [facility]     Hospitalist Progress Note     Richy Goins Patient Status:  Observation    1960 MRN [id number]   Location [facility] 3NE-A Attending Jeanne Redman MD   Hosp Day # 2 PCP Woody Dahl MD     Chief Complaint: sob and weakness    Subjective:     Patient to cath lab- procedure not done, to be rescheduled with anesthesia next  this am    Objective:    Review of Systems:   A comprehensive review of systems was completed; pertinent positive and negatives stated in subjective.    Vital signs:  Temp:  [98 °F (36.7 °C)-98.2 °F (36.8 °C)] 98.2 °F (36.8 °C)  Pulse:  [] 94  Resp:  [18-32] 32  BP: (121-151)/(79-83) 141/81  SpO2:  [91 %-98 %] 98 %    Physical Exam:    General: No acute distress  Respiratory: No wheezes, no rhonchi  Cardiovascular: S1, S2, regular rate and rhythm  Abdomen: Soft, Non-tender, non-distended, positive bowel sounds  Neuro: No new focal deficits.   Extremities: No edema      Diagnostic Data:    Labs:  Recent Labs   Lab 25  1127 25  0704   WBC 12.0* 10.5   HGB 10.9* 10.8*   MCV 85.0 87.6   .0 239.0       Recent Labs   Lab 25  1127 25  0704   * 100*   * 58*   CREATSERUM 9.45* 6.23*   CA 8.0* 8.8   ALB  --  3.9    140   K 4.3 3.7    103   CO2 20.0* 25.0   ALKPHO  --  87   AST  --  10   ALT  --  <7*   BILT  --  0.3   TP  --  6.8       Estimated Glomerular Filtration Rate: 9.2 mL/min/1.73m2 (A) (by CKD-EPI based on SCr of 6.23 mg/dL (H)).    Recent Labs   Lab 25  1127   TROPHS 26       No results for input(s): \"PTP\", \"INR\" in the last 168 hours.               Microbiology    Hospital Encounter on 25   1. Body Fluid Cult Aerobic and Anaerobic     Status: None (Preliminary result)    Collection Time: 25  1:42 PM    Specimen: Pleural Fluid, Left; Body fluid, unspecified   Result Value Ref Range    Body Fluid Culture Result No Growth 1 Day N/A    Body Fluid Smear 2+  WBCs seen N/A    Body Fluid Smear No organisms seen N/A    Body Fluid Smear This is a cytocentrifuged smear. N/A         Imaging: Reviewed in Epic.    Medications:    [START ON 1/11/2025] epoetin juan josé  10,000 Units Intravenous Once in dialysis    regadenoson        apixaban  2.5 mg Oral BID    insulin degludec  8 Units Subcutaneous Daily    atorvastatin  20 mg Oral Nightly    amLODIPine  10 mg Oral Daily    aspirin  81 mg Oral Daily    pantoprazole  40 mg Oral QAM AC    insulin aspart  1-68 Units Subcutaneous TID CC    insulin aspart  1-10 Units Subcutaneous TID AC and HS       Assessment & Plan:      # Large left pleural effusion and fluid overload   -s/p thoracentesis  #ESRD on HD  -s/p thoracentesis 1/8-1500 ml removed     #L great toe ulcer  #PVD  #hx R BKA  -suspect DM foot ulcer.   -No signs of cellulitis  -podiatry and vascular     #new non specific TWI on EKG  -no current CP but has had occasional chest tightness at home and PAGE as above  -echo and ST       #Hypertension stable on Norvasc     #Anemia of ESRD     #Generalized weakness secondary to missed HD     # DM2   -basal bolus insulin     #Hyperlipidemia         Jeanne Redman MD    Supplementary Documentation:     Quality:  DVT Mechanical Prophylaxis:   SCDs,    DVT Pharmacologic Prophylaxis   Medication    apixaban (Eliquis) tab 2.5 mg         DVT Pharmacologic prophylaxis: Aspirin 81 mg      Code Status: Full Code  Soni: No urinary catheter in place  Soni Duration (in days):   Central line:    [place]:     Discharge is dependent on: progress  At this point Mr. Goins is expected to be discharge to: home    The 21st Century Cures Act makes medical notes like these available to patients in the interest of transparency. Please be advised this is a medical document. Medical documents are intended to carry relevant information, facts as evident, and the clinical opinion of the practitioner. The medical note is intended as peer to peer communication and may  appear blunt or direct. It is written in medical language and may contain abbreviations or verbiage that are unfamiliar.

## 2025-01-10 NOTE — PROGRESS NOTES
Progress Note  Richy Goins Patient Status:  Inpatient    1960 MRN [id number]   Location [facility] 7NE-A Attending Jeanne Redman MD   Hosp Day # 2 PCP Woody Dahl MD     Subjective:  Feels much better than when admitted.  NM stress without perfusion defect today.  No chest pain or palpitations. Denies dyspnea. Plan for dialysis tomorrow then vascular intervention next week    Objective:  Physical Exam:   /81 (BP Location: Right arm)   Pulse 94   Temp 98.2 °F (36.8 °C) (Temporal)   Resp (!) 32   Ht 5' 8\" (1.727 m)   Wt 170 lb (77.1 kg)   SpO2 98%   BMI 25.85 kg/m²   Temp (24hrs), Av °F (36.7 °C), Min:97.5 °F (36.4 °C), Max:98.2 °F (36.8 °C)       Intake/Output Summary (Last 24 hours) at 1/10/2025 1056  Last data filed at 1/10/2025 1000  Gross per 24 hour   Intake 240 ml   Output --   Net 240 ml     Wt Readings from Last 3 Encounters:   25 170 lb (77.1 kg)   10/02/24 181 lb (82.1 kg)   24 192 lb 3.2 oz (87.2 kg)     Telemetry: NSR  General: Alert and oriented in no apparent distress watching television and eating lunch.  HEENT: No focal deficits.  Neck: No JVD, carotids 2+ no bruits.  Cardiac: Regular rate and rhythm, S1, S2 normal, no murmur, rub or gallop.  Lungs: Clear without wheezes, rales, rhonchi or dullness.  Normal excursions and effort.  Abdomen: Soft, non-tender.   Extremities: Without clubbing, cyanosis or edema.  Peripheral pulse L present with doppler and foot is warm. Necrotic ulcer distal greater toe. Foot is warm. R BKA without wounds/ulcers.  Neurologic: Alert and oriented, normal affect.  Skin: Warm and dry.        Intake/Output:    Intake/Output Summary (Last 24 hours) at 1/10/2025 1056  Last data filed at 1/10/2025 1000  Gross per 24 hour   Intake 240 ml   Output --   Net 240 ml       Last 3 Weights   25 1623 170 lb (77.1 kg)   25 1055 170 lb (77.1 kg)   10/02/24 0458 181 lb (82.1 kg)   10/01/24 1056 175 lb 0.7 oz (79.4 kg)   10/01/24  0544 175 lb (79.4 kg)   08/19/24 1517 192 lb 3.2 oz (87.2 kg)       Labs:  Recent Labs   Lab 01/07/25  1127 01/08/25  0704   * 100*   * 58*   CREATSERUM 9.45* 6.23*   EGFRCR 6* 9*   CA 8.0* 8.8    140   K 4.3 3.7    103   CO2 20.0* 25.0     Recent Labs   Lab 01/07/25  1127 01/08/25  0704   RBC 3.93 3.87   HGB 10.9* 10.8*   HCT 33.4* 33.9*   MCV 85.0 87.6   MCH 27.7 27.9   MCHC 32.6 31.9   RDW 14.9 14.9   NEPRELIM 10.60* 8.57*   WBC 12.0* 10.5   .0 239.0         Recent Labs   Lab 01/07/25  1127   TROPHS 26       Diagnostics:   ECHOCARDIOGRAM 1/8/2025:  1. Left ventricle: The cavity size was normal. Wall thickness was normal.      Systolic function was normal. The estimated ejection fraction was 50-55%,      by visual assessment. No diagnostic evidence for regional wall motion      abnormalities. Left ventricular diastolic function parameters were normal      for the patient's age.   2. Left atrium: The left atrial volume was normal.   3. Aortic root: The aortic root was 3.8cm diameter.   4. Ascending aorta: The ascending aorta was 3.7cm diameter.   Impressions:  This study is compared with previous dated 06/01/2023: No   significant change. Left ventricle less hyperdynamic than prior.     NM stress 1/10: per radiology  Moderate fixed defect inferior wall, no reversible ischemia, LVEF 39%        Medications:   [START ON 1/11/2025] epoetin juan josé  10,000 Units Intravenous Once in dialysis    insulin degludec  8 Units Subcutaneous Daily    atorvastatin  20 mg Oral Nightly    amLODIPine  10 mg Oral Daily    aspirin  81 mg Oral Daily    pantoprazole  40 mg Oral QAM AC    insulin aspart  1-68 Units Subcutaneous TID CC    heparin  5,000 Units Subcutaneous 2 times per day    insulin aspart  1-10 Units Subcutaneous TID AC and HS         Assessment/Plan:    Dyspnea at presentation improved following thoracentesis and H  Resolved  PAD w/ ulceration L greater toe  Dr. De La Fuente following plan for  revascularization with anesthesia next week  On Statin, Eliquis 2.5mg BID and ASA 81 mg daily  L pleural effusion s/p 1,500 ml thoracentesis  Chronic RBBB w/ new TWI anterior and lateral leads  Lexiscan stress 1/10 without reversible ischemia  LVEF preserved on echocardiogram without regional wall motion abnormalities reported  ESRD on HD  Planned for tomorrow  Chronic anemia - stable  DM II  HTN  BP at goal today  Amlodipine 10 mg daily  HLD  Statin    PLAN  No reversible ischemia on NM Stress testing today w/ echocardiogram indication preserved LVEF  The patient is at elevated but acceptable risk for surgical intervention given his multiple cardiac risk factors: taking his stress test findings above into consideration, no immediate cardiac testing/imaging would lesson his risk for semi-urgent vascular surgery for his PAD and ulcer at this time.  We will follow peripherally from a cardiology perspective. Please contact us next week when he is readmitted if we can help further.  Discussed with Dr. Elfego Goins PA-C  1/10/2025  10:56 AM     =======================================================  Patient seen and examined independently.  Note reviewed and labs reviewed.  Agree with above assessment and plan.    Physical exam:  GEN: Alert and orient, no acute distress  CV: rrr, S1S2, no m/g/r  LUNGS: CTA b/l with no obvious wheezing, rales or rhonchi  EXT: no LLE edema and nectrotic toe; right BKA  ABD: soft, NTND    I have personally performed the medical decision making in its entirety. My additions include: Heather SPECT suggests infarct but echo with no RWMA and likely subdiagraphmatic artifact.  Elevated but acceptable cardiac risk to proceed with hybrid PV surgery next week.  Will sign off but call with further questions or concerns.    D/w CORRINA Goins and patient.    Alfredo Telles MD

## 2025-01-10 NOTE — CONSULTS
[facility]    PATIENT'S NAME: JULIUS FOY   ATTENDING PHYSICIAN: Jeanne Redman M.D.   CONSULTING PHYSICIAN: Fermin De La Fuente M.D.   PATIENT ACCOUNT#:   [id number]    LOCATION:  [address] [place] [address]  MEDICAL RECORD #:   [id number]       YOB: 1960  ADMISSION DATE:       01/07/2025      CONSULT DATE:  01/10/2025    REPORT OF CONSULTATION    FOLLOWUP CONSULTATION    A 64-year-old white male, right now currently getting a stress test for possible future revascularization.  He is scheduled tentatively for next Wednesday.  Nurse talked to me after a discussion with hospitalist that he may have to go home and come back in.  Unable to get him in any earlier.  He will need an open cutdown of the superficial femoral artery, possible endovascular procedure, possible bypass.  He needed a stress test to evaluate for this.  I had discussed this briefly with the patient before.  I am thinking about placing him on Eliquis 2.5 mg b.i.d. until the surgery start time.    Dictated By Fermin De La Fuente M.D.  d: 01/10/2025 11:04:43  t: 01/10/2025 11:15:09  Job [id number]  [id number]/    cc: Fermin De La Fuente M.D.

## 2025-01-10 NOTE — PROGRESS NOTES
[facility]  Pulmonary/Critical Care progress note    Richy Goins Patient Status:  Observation    1960 MRN [id number]   Location [facility] 3NE-A Attending Dallas Drew MD   Hosp Day # 2 PCP Woody Dahl MD         History of Present Illness:     Shortness of breath improved.    History:  Past Medical History:    Belching    Dialysis patient (HCC)    Diarrhea, unspecified    Esophageal reflux    Essential hypertension    Fatigue    Flatulence/gas pain/belching    High blood pressure    High cholesterol    History of blood transfusion    Hyperlipidemia    Indigestion    Irregular bowel habits    Night sweats    Osteoporosis    Peripheral vascular disease (HCC)    Pure hypercholesterolemia    Type II or unspecified type diabetes mellitus without mention of complication, not stated as uncontrolled    Visual impairment    reading glasses    Vomiting     Past Surgical History:   Procedure Laterality Date    Amputation toe,i-p jt Right     Av fistula revision, open      Below knee leg amputation Right     Colonoscopy N/A 2023    Procedure: COLONOSCOPY;  Surgeon: Sarmad Gonzalez MD;  Location:  ENDOSCOPY    Colonoscopy      Upper gi endoscopy,exam       Family History   Problem Relation Age of Onset    Heart Attack Father     Heart Disorder Father 57    Diabetes Maternal Grandfather     Diabetes Maternal Aunt       reports that he has never smoked. He has never used smokeless tobacco. He reports that he does not currently use alcohol. He reports that he does not use drugs.    Allergies:  Allergies[1]    Medications:    Current Facility-Administered Medications:     [START ON 2025] sodium chloride 0.9 % IV bolus 100 mL, 100 mL, Intravenous, Q30 Min PRN **AND** [START ON 2025] albumin human (Albumin) 25% injection 25 g, 25 g, Intravenous, PRN Dialysis    [START ON 2025] epoetin juan josé (Epogen, Procrit) 34578 UNIT/ML injection 10,000 Units, 10,000 Units, Intravenous, Once in  dialysis    insulin degludec (Tresiba) 100 units/mL flextouch 8 Units, 8 Units, Subcutaneous, Daily    atorvastatin (Lipitor) tab 20 mg, 20 mg, Oral, Nightly    metoclopramide (Reglan) 5 mg/mL injection 10 mg, 10 mg, Intravenous, Daily PRN    amLODIPine (Norvasc) tab 10 mg, 10 mg, Oral, Daily    aspirin DR tab 81 mg, 81 mg, Oral, Daily    pantoprazole (Protonix) DR tab 40 mg, 40 mg, Oral, QAM AC    insulin aspart (NovoLOG) 100 Units/mL FlexPen 1-68 Units, 1-68 Units, Subcutaneous, TID CC    glucose (Dex4) 15 GM/59ML oral liquid 15 g, 15 g, Oral, Q15 Min PRN **OR** glucose (Glutose) 40% oral gel 15 g, 15 g, Oral, Q15 Min PRN **OR** glucose-vitamin C (Dex-4) chewable tab 4 tablet, 4 tablet, Oral, Q15 Min PRN **OR** dextrose 50% injection 50 mL, 50 mL, Intravenous, Q15 Min PRN **OR** glucose (Dex4) 15 GM/59ML oral liquid 30 g, 30 g, Oral, Q15 Min PRN **OR** glucose (Glutose) 40% oral gel 30 g, 30 g, Oral, Q15 Min PRN **OR** glucose-vitamin C (Dex-4) chewable tab 8 tablet, 8 tablet, Oral, Q15 Min PRN    acetaminophen (Tylenol Extra Strength) tab 1,000 mg, 1,000 mg, Oral, Q4H PRN    melatonin tab 3 mg, 3 mg, Oral, Nightly PRN    polyethylene glycol (PEG 3350) (Miralax) 17 g oral packet 17 g, 17 g, Oral, Daily PRN    sennosides (Senokot) tab 17.2 mg, 17.2 mg, Oral, Nightly PRN    bisacodyl (Dulcolax) 10 MG rectal suppository 10 mg, 10 mg, Rectal, Daily PRN    ondansetron (Zofran) 4 MG/2ML injection 4 mg, 4 mg, Intravenous, Q6H PRN    benzonatate (Tessalon) cap 200 mg, 200 mg, Oral, TID PRN    heparin (Porcine) 5000 UNIT/ML injection 5,000 Units, 5,000 Units, Subcutaneous, 2 times per day    insulin aspart (NovoLOG) 100 Units/mL FlexPen 1-10 Units, 1-10 Units, Subcutaneous, TID AC and HS    Intake/Output:  No intake or output data in the 24 hours ending 01/10/25 1018     Body mass index is 25.85 kg/m².    Review of Systems  Review of Systems:   A comprehensive 10 point review of systems was completed.  Pertinent positives  and negatives noted in the the HPI.       Patient Vitals for the past 24 hrs:   BP Temp Temp src Pulse Resp SpO2 Height Weight   01/07/25 1554 -- 98.2 °F (36.8 °C) Oral -- -- 93 % -- --   01/07/25 1445 153/80 -- -- 97 (!) 27 96 % -- --   01/07/25 1330 149/84 -- -- 94 26 96 % -- --   01/07/25 1315 -- -- -- 94 (!) 30 97 % -- --   01/07/25 1215 -- -- -- 91 26 95 % -- --   01/07/25 1055 153/85 98.5 °F (36.9 °C) Temporal 97 16 97 % 5' 8\" (1.727 m) 170 lb (77.1 kg)     Vitals:    01/10/25 0000 01/10/25 0400 01/10/25 0800 01/10/25 0900   BP: 121/80 137/83 141/81    BP Location: Right arm Right arm Right arm    Pulse: 100 88 98 96   Resp: 20 21 21 26   Temp: 98.1 °F (36.7 °C) 98 °F (36.7 °C) 98.2 °F (36.8 °C)    TempSrc: Temporal Temporal Temporal    SpO2: 94% 96% 98%    Weight:       Height:             Physical Exam  Constitutional:       General: He is not in acute distress.     Appearance: Normal appearance. He is not ill-appearing or diaphoretic.   HENT:      Head: Normocephalic and atraumatic.      Nose: Nose normal. No congestion or rhinorrhea.      Mouth/Throat:      Mouth: Mucous membranes are moist.      Pharynx: Oropharynx is clear. No oropharyngeal exudate or posterior oropharyngeal erythema.      Comments: Mallampati class IV palate  Eyes:      Extraocular Movements: Extraocular movements intact.      Pupils: Pupils are equal, round, and reactive to light.   Cardiovascular:      Rate and Rhythm: Normal rate.      Pulses: Normal pulses.      Heart sounds: Normal heart sounds. No murmur heard.  Pulmonary:      Effort: Pulmonary effort is normal. No respiratory distress.      Breath sounds: Normal breath sounds. No wheezing or rhonchi.   Chest:      Chest wall: No tenderness.   Abdominal:      General: Abdomen is flat. Bowel sounds are normal.      Palpations: Abdomen is soft.   Musculoskeletal:         General: Normal range of motion.      Comments: Right prosthetic limb   Skin:     General: Skin is warm.    Neurological:      General: No focal deficit present.      Mental Status: He is alert and oriented to person, place, and time.   Psychiatric:         Mood and Affect: Mood normal.         Behavior: Behavior normal.         Thought Content: Thought content normal.         Judgment: Judgment normal.            Lab Data Review:  Recent Labs   Lab 01/07/25  1127 01/08/25  0704   WBC 12.0* 10.5   HGB 10.9* 10.8*   HCT 33.4* 33.9*   .0 239.0       Recent Labs   Lab 01/07/25  1127 01/08/25  0704    140   K 4.3 3.7    103   CO2 20.0* 25.0   * 58*   CREATSERUM 9.45* 6.23*   CA 8.0* 8.8   ALB  --  3.9   ALKPHO  --  87   ALT  --  <7*   AST  --  10   * 100*       Recent Labs   Lab 01/07/25 1127 01/08/25  0704   MG 2.1 2.0       Lab Results   Component Value Date    PHOS 6.4 (H) 01/08/2025        No results for input(s): \"PT\", \"INR\", \"PTT\" in the last 168 hours.    No results for input(s): \"ABGPHT\", \"XRAEXS4E\", \"ZNENH5E\", \"ABGHCO3\", \"ABGBE\", \"TEMP\", \"NOEMÍ\", \"SITE\", \"DEV\", \"THGB\" in the last 168 hours.    No results for input(s): \"TROP\", \"CKMB\" in the last 168 hours.    Cultures:   Hospital Encounter on 01/07/25   1. Body Fluid Cult Aerobic and Anaerobic     Status: None (Preliminary result)    Collection Time: 01/08/25  1:42 PM    Specimen: Pleural Fluid, Left; Body fluid, unspecified   Result Value Ref Range    Body Fluid Culture Result No Growth 1 Day N/A    Body Fluid Smear 2+ WBCs seen N/A    Body Fluid Smear No organisms seen N/A    Body Fluid Smear This is a cytocentrifuged smear. N/A       Final Diagnosis:   Cytospin smears and cell block from left pleural fluid:  -Adequate for evaluation.  -No cytologic evidence of malignancy.  -Specimen composed of reactive mesothelial cells, histiocytes and mixed inflammatory cells.       Radiology personally reviewed:  XR CHEST AP PORTABLE  (CPT=71045)    Result Date: 1/10/2025  CONCLUSION:   1.  Overall slight decrease in the size of right lateral  basilar airspace disease may represent improving atelectasis or pneumonia.   2. Small bilateral pleural effusions.    LOCATION:  [facility]      Dictated by (CST): Yoan Duvall MD on 1/10/2025 at 8:34 AM     Finalized by (CST): Yoan Duvall MD on 1/10/2025 at 8:35 AM       XR CHEST AP PORTABLE  (CPT=71045)    Result Date: 1/9/2025  CONCLUSION:  1. Stable cardiomegaly with interstitial opacities likely representing edema. 2. Interval increase in opacities within the right lung base likely representing combination of consolidation and effusion. 3. Slight interval improvement in left basilar opacity with residual pneumonia remaining with probable small left-sided pleural effusion.   LOCATION:  [facility]      Dictated by (CST): Shi Crystal MD on 1/09/2025 at 7:58 AM     Finalized by (CST): Shi Crystal MD on 1/09/2025 at 8:00 AM       US THORACENTESIS GUIDED LEFT (CPT=32555)    Result Date: 1/8/2025  CONCLUSION: Sonographic imaging for thoracentesis.  Please refer to procedure note for further evaluation.   LOCATION:  [facility]    Dictated by (CST): Shi Crystal MD on 1/08/2025 at 3:34 PM     Finalized by (CST): Shi Crystal MD on 1/08/2025 at 3:34 PM       XR CHEST AP PORTABLE  (CPT=71045)    Result Date: 1/8/2025  CONCLUSION:  1. Left effusion has decreased in size. 2. Multifocal opacities appears stable. 3. Stable small right effusion   LOCATION:  [id number]      Dictated by (CST): Patrice Multani MD on 1/08/2025 at 2:08 PM     Finalized by (CST): Patrice Multani MD on 1/08/2025 at 2:12 PM       US VEIN MAP LOWER EXTREMITY BILAT (CPT=93970)    Result Date: 1/8/2025  CONCLUSION:  Venous mapping is described above.   LOCATION:  [facility]     Dictated by (CST): Camden Deleon MD on 1/08/2025 at 11:54 AM     Finalized by (CST): Camden Deleon MD on 1/08/2025 at 11:55 AM       US ARTERIAL DUPLEX LOWER EXTREMITY LEFT (CPT=93926)    Result Date: 1/8/2025  CONCLUSION:  There is multifocal stenosis described above with 75%  stenosis tibioperoneal trunk and proximal anterior tibial artery and critical stenosis in distal anterior tibial artery.   LOCATION:  [facility]    Dictated by (CST): Camden Deleon MD on 1/08/2025 at 11:51 AM     Finalized by (CST): Camden Deleon MD on 1/08/2025 at 11:54 AM         Patient Active Problem List   Diagnosis    Diabetic nephropathy associated with type 2 diabetes mellitus (HCC)    Primary hypertension    Type 2 diabetes mellitus with hyperglycemia (HCC)    Stage 3 chronic kidney disease (HCC)    Retinopathy due to secondary diabetes mellitus (HCC)    Atherosclerosis of native artery of extremity (HCC)    Stage 4 chronic kidney disease (HCC)    Acidosis    ESRD (end stage renal disease) (HCC)    ESRD on hemodialysis (HCC)    Pure hypercholesterolemia    Hemoptysis    Hypertensive urgency    Hematemesis with nausea    Hematemesis, unspecified whether nausea present    Acute gastritis with hemorrhage, unspecified gastritis type    Uremia    Acquired absence of right leg below knee (HCC)    Chronic kidney disease, stage 4 (severe) ([facility])    End-stage renal disease on hemodialysis (HCC)    Osteomyelitis of right foot (HCC)    Dyslipidemia    Diabetes mellitus type 2 in nonobese (HCC)    Unilateral complete BKA, right, sequela ([facility])    Type 2 diabetes mellitus with diabetic peripheral angiopathy without gangrene (HCC)    Type 2 diabetes mellitus with diabetic chronic kidney disease (HCC)    Long term (current) use of aspirin    Hypertensive chronic kidney disease w stg 1-4/unsp chr kdny    Encounter for orthopedic aftercare following surgical amputation    Anemia in chronic kidney disease    Age-related osteoporosis without current pathological fracture    Acute kidney failure, unspecified (HCC)    CKD (chronic kidney disease), stage III (HCC)    Type 1 diabetes mellitus with ketoacidosis without coma (HCC)    Senile purpura (HCC)    Tinnitus, bilateral    Hypernatremia    Anemia    Acute kidney injury ([facility])     Acute renal failure (ARF) (HCC)    Metabolic acidosis    Hyperglycemia    Nausea    Elevated lipase    Azotemia    Pleural effusion    Anemia in ESRD (end-stage renal disease) (HCC)    Hyperphosphatemia    Dyspnea on exertion    Ischemic foot     64 year old male.  With history of GERD, hypertension, hyperlipidemia, type 2 diabetes mellitus, osteoporosis.  ESRD on hemodialysis.  The patient presented to the emergency department with complaints of generalized weakness and shortness of breath.  Patient reports that he missed one hemo-dialysis   The patient is noted to have large left pleural effusion so pulmonary consultation was obtained for further evaluation and management for possible thoracentesis    Assessment:  Shortness of breath but patient is currently on room air: Improving dyspnea postthoracentesis: Clinically stable but still with mild dyspnea  Large left pleural effusion suspect due to renal failure: Pleural fluid cytology negative for malignant cells: Chest x-ray bilateral small pleural effusion  Left great toe ulcer  Peripheral vascular disease  GERD  Hypertension  End-stage renal disease on hemodialysis  Anemia of chronic disease  Generalized weakness  Type 2 diabetes mellitus  Hyperlipidemia  Osteoporosis      Plan:  Continue to monitor symptoms  Continue hemodialysis per nephrology  Add incentive spirometry every 4 hours as possible that will help resorb pleural effusion  DVT prophylaxis:  heparin 5000 units subcutaneous every 12 hours    GI prophylaxis: Protonix 40 mg daily   Will follow for further recommendations  Discharge planning    Thank You for allowing me to participate in this patient's care     Alfonso Rendon MD    Note to the patient: The 21st Century Cures Act makes medical notes like these available to patients in the interest of transparency. However, be advised that this is a medical document. It is intended as peer to peer communication. It is written in medical language and may  contain abbreviations or verbiage that are unfamiliar. It may appear blunt or direct. Medical documents are intended to carry relevant information, facts as evident, and clinical opinion of the practitioner.      Disclaimer: Components of this note were documented using voice recognition system and are subject to errors not corrected at proofreading. Contact the author of this note for any clarifications          [1] No Known Allergies

## 2025-01-10 NOTE — CM/SW NOTE
SW/CM to remain available for DC planning needs/recommendations. Pt discussed in rounds with RN, reports of surgery next week.     JULIUS Nur

## 2025-01-10 NOTE — PLAN OF CARE
Problem: Patient/Family Goals  Goal: Patient/Family Long Term Goal  Description: Patient's Long Term Goal: Have surgery    Interventions:  - Surgery pending  - See additional Care Plan goals for specific interventions  Outcome: Progressing  Goal: Patient/Family Short Term Goal  Description: Patient's Short Term Goal: Stress Test completed     Interventions:   - Follow POC no caffeine diet    - See additional Care Plan goals for specific interventions  Outcome: Progressing     Problem: Diabetes/Glucose Control  Goal: Glucose maintained within prescribed range  Description: INTERVENTIONS:  - Monitor Blood Glucose as ordered  - Assess for signs and symptoms of hyperglycemia and hypoglycemia  - Administer ordered medications to maintain glucose within target range  - Assess barriers to adequate nutritional intake and initiate nutrition consult as needed  - Instruct patient on self management of diabetes  Outcome: Progressing     Problem: METABOLIC/FLUID AND ELECTROLYTES - ADULT  Goal: Electrolytes maintained within normal limits  Description: INTERVENTIONS:  - Monitor labs and rhythm and assess patient for signs and symptoms of electrolyte imbalances  - Administer electrolyte replacement as ordered  - Monitor response to electrolyte replacements, including rhythm and repeat lab results as appropriate  - Fluid restriction as ordered  - Instruct patient on fluid and nutrition restrictions as appropriate  Outcome: Progressing     Problem: SKIN/TISSUE INTEGRITY - ADULT  Goal: Skin integrity remains intact  Description: INTERVENTIONS  - Assess and document risk factors for pressure ulcer development  - Assess and document skin integrity  - Monitor for areas of redness and/or skin breakdown  - Initiate interventions, skin care algorithm/standards of care as needed  Outcome: Progressing     Problem: MUSCULOSKELETAL - ADULT  Goal: Return mobility to safest level of function  Description: INTERVENTIONS:  - Assess patient  stability and activity tolerance for standing, transferring and ambulating w/ or w/o assistive devices  - Assist with transfers and ambulation using safe patient handling equipment as needed  - Ensure adequate protection for wounds/incisions during mobilization  - Obtain PT/OT consults as needed  - Advance activity as appropriate  - Communicate ordered activity level and limitations with patient/family  Outcome: Progressing     Problem: SAFETY ADULT - FALL  Goal: Free from fall injury  Description: INTERVENTIONS:  - Assess pt frequently for physical needs  - Identify cognitive and physical deficits and behaviors that affect risk of falls.  - [place] fall precautions as indicated by assessment.  - Educate pt/family on patient safety including physical limitations  - Instruct pt to call for assistance with activity based on assessment  - Modify environment to reduce risk of injury  - Provide assistive devices as appropriate  - Consider OT/PT consult to assist with strengthening/mobility  - Encourage toileting schedule  Outcome: Progressing  Flowsheets (Taken 1/9/2025 1400)  Additional Interventions for High Risk:   Call Light Return  Demonstration   Bed Alarm/I-bed awareness   Chair Alarm   AOx4  No c/o pain   Dialysis completed  VSS   POC reviewed with patient no question or concerns at this time.   Call light within reach , no needs at this time.

## 2025-01-10 NOTE — PROGRESS NOTES
[facility]   part of [facility]    Report of Consultation    Richy Goins Patient Status:  Observation    1960 MRN [id number]   Location [facility] 3NE-A Attending Dallas Drew MD   Hosp Day # 2 PCP Woody Dahl MD     Date of Admission:  2025    History of Present Illness:  Richy Goins is a a(n) 64 year old male with DM, PAD, and ESRD was seen at bedside this afternoon for follow up of left great toe ulcer/ischemic changes to left foot.  He is resting comfortably in bed and is without complaints at this time. No other complaints are mentioned.        History:  Past Medical History:    Belching    Dialysis patient (HCC)    Diarrhea, unspecified    Esophageal reflux    Essential hypertension    Fatigue    Flatulence/gas pain/belching    High blood pressure    High cholesterol    History of blood transfusion    Hyperlipidemia    Indigestion    Irregular bowel habits    Night sweats    Osteoporosis    Peripheral vascular disease (HCC)    Pure hypercholesterolemia    Type II or unspecified type diabetes mellitus without mention of complication, not stated as uncontrolled    Visual impairment    reading glasses    Vomiting     Past Surgical History:   Procedure Laterality Date    Amputation toe,i-p jt Right     Av fistula revision, open      Below knee leg amputation Right     Colonoscopy N/A 2023    Procedure: COLONOSCOPY;  Surgeon: Sarmad Gonzalez MD;  Location:  ENDOSCOPY    Colonoscopy      Upper gi endoscopy,exam       Family History   Problem Relation Age of Onset    Heart Attack Father     Heart Disorder Father 57    Diabetes Maternal Grandfather     Diabetes Maternal Aunt       reports that he has never smoked. He has never used smokeless tobacco. He reports that he does not currently use alcohol. He reports that he does not use drugs.    Allergies:  Allergies[1]    Medications:    Current Facility-Administered Medications:     [START ON 2025] sodium chloride 0.9 %  IV bolus 100 mL, 100 mL, Intravenous, Q30 Min PRN **AND** [START ON 1/11/2025] albumin human (Albumin) 25% injection 25 g, 25 g, Intravenous, PRN Dialysis    [START ON 1/11/2025] epoetin juan josé (Epogen, Procrit) 94898 UNIT/ML injection 10,000 Units, 10,000 Units, Intravenous, Once in dialysis    regadenoson (Lexiscan) 0.4 mg/5mL injection, , ,     apixaban (Eliquis) tab 2.5 mg, 2.5 mg, Oral, BID    insulin degludec (Tresiba) 100 units/mL flextouch 8 Units, 8 Units, Subcutaneous, Daily    atorvastatin (Lipitor) tab 20 mg, 20 mg, Oral, Nightly    metoclopramide (Reglan) 5 mg/mL injection 10 mg, 10 mg, Intravenous, Daily PRN    amLODIPine (Norvasc) tab 10 mg, 10 mg, Oral, Daily    aspirin DR tab 81 mg, 81 mg, Oral, Daily    pantoprazole (Protonix) DR tab 40 mg, 40 mg, Oral, QAM AC    insulin aspart (NovoLOG) 100 Units/mL FlexPen 1-68 Units, 1-68 Units, Subcutaneous, TID CC    glucose (Dex4) 15 GM/59ML oral liquid 15 g, 15 g, Oral, Q15 Min PRN **OR** glucose (Glutose) 40% oral gel 15 g, 15 g, Oral, Q15 Min PRN **OR** glucose-vitamin C (Dex-4) chewable tab 4 tablet, 4 tablet, Oral, Q15 Min PRN **OR** dextrose 50% injection 50 mL, 50 mL, Intravenous, Q15 Min PRN **OR** glucose (Dex4) 15 GM/59ML oral liquid 30 g, 30 g, Oral, Q15 Min PRN **OR** glucose (Glutose) 40% oral gel 30 g, 30 g, Oral, Q15 Min PRN **OR** glucose-vitamin C (Dex-4) chewable tab 8 tablet, 8 tablet, Oral, Q15 Min PRN    acetaminophen (Tylenol Extra Strength) tab 1,000 mg, 1,000 mg, Oral, Q4H PRN    melatonin tab 3 mg, 3 mg, Oral, Nightly PRN    polyethylene glycol (PEG 3350) (Miralax) 17 g oral packet 17 g, 17 g, Oral, Daily PRN    sennosides (Senokot) tab 17.2 mg, 17.2 mg, Oral, Nightly PRN    bisacodyl (Dulcolax) 10 MG rectal suppository 10 mg, 10 mg, Rectal, Daily PRN    ondansetron (Zofran) 4 MG/2ML injection 4 mg, 4 mg, Intravenous, Q6H PRN    benzonatate (Tessalon) cap 200 mg, 200 mg, Oral, TID PRN    insulin aspart (NovoLOG) 100 Units/mL FlexPen  1-10 Units, 1-10 Units, Subcutaneous, TID AC and HS    Review of Systems: No n/v/f/c.      Physical Exam:        Derm: Skin is cool to touch. Ischemic changes noted to lesser digits and left great toe with some gangrenous changes noted to the tip of the toe.    Vascular: Unable to palpate pedal pulses.    Neuro: Decreased protective sensation noted to lower extremities.     MSK: S/P BKA to RLE. Strength testing deferred to LLE.    Imaging:  XR CHEST AP PORTABLE  (CPT=71045)    Result Date: 1/10/2025  CONCLUSION:   1.  Overall slight decrease in the size of right lateral basilar airspace disease may represent improving atelectasis or pneumonia.   2. Small bilateral pleural effusions.    LOCATION:  [facility]      Dictated by (CST): Yoan Duvall MD on 1/10/2025 at 8:34 AM     Finalized by (CST): Yoan Duvall MD on 1/10/2025 at 8:35 AM       XR CHEST AP PORTABLE  (CPT=71045)    Result Date: 1/9/2025  CONCLUSION:  1. Stable cardiomegaly with interstitial opacities likely representing edema. 2. Interval increase in opacities within the right lung base likely representing combination of consolidation and effusion. 3. Slight interval improvement in left basilar opacity with residual pneumonia remaining with probable small left-sided pleural effusion.   LOCATION:  [facility]      Dictated by (CST): Shi Crystal MD on 1/09/2025 at 7:58 AM     Finalized by (CST): Shi Crystal MD on 1/09/2025 at 8:00 AM       US THORACENTESIS GUIDED LEFT (CPT=32555)    Result Date: 1/8/2025  CONCLUSION: Sonographic imaging for thoracentesis.  Please refer to procedure note for further evaluation.   LOCATION:  [facility]    Dictated by (CST): Shi Crystal MD on 1/08/2025 at 3:34 PM     Finalized by (CST): Shi Crystal MD on 1/08/2025 at 3:34 PM       XR CHEST AP PORTABLE  (CPT=71045)    Result Date: 1/8/2025  CONCLUSION:  1. Left effusion has decreased in size. 2. Multifocal opacities appears stable. 3. Stable small right effusion    LOCATION:  [id number]      Dictated by (CST): Patrice Multani MD on 1/08/2025 at 2:08 PM     Finalized by (CST): Patrice Multani MD on 1/08/2025 at 2:12 PM       US VEIN MAP LOWER EXTREMITY BILAT (CPT=93970)    Result Date: 1/8/2025  CONCLUSION:  Venous mapping is described above.   LOCATION:  [facility]     Dictated by (CST): Cmaden Deleon MD on 1/08/2025 at 11:54 AM     Finalized by (CST): Camden Deleon MD on 1/08/2025 at 11:55 AM       US ARTERIAL DUPLEX LOWER EXTREMITY LEFT (CPT=93926)    Result Date: 1/8/2025  CONCLUSION:  There is multifocal stenosis described above with 75% stenosis tibioperoneal trunk and proximal anterior tibial artery and critical stenosis in distal anterior tibial artery.   LOCATION:  [facility]    Dictated by (CST): Camden Deleon MD on 1/08/2025 at 11:51 AM     Finalized by (CST): Camden Deleon MD on 1/08/2025 at 11:54 AM         Laboratory Data:  Recent Labs   Lab 01/08/25  0704   RBC 3.87   HGB 10.8*   HCT 33.9*   MCV 87.6   MCH 27.9   MCHC 31.9   RDW 14.9   NEPRELIM 8.57*   WBC 10.5   .0       Impression and Plan:  Patient Active Problem List   Diagnosis    Diabetic nephropathy associated with type 2 diabetes mellitus (HCC)    Primary hypertension    Type 2 diabetes mellitus with hyperglycemia (HCC)    Stage 3 chronic kidney disease (HCC)    Retinopathy due to secondary diabetes mellitus (HCC)    Atherosclerosis of native artery of extremity (HCC)    Stage 4 chronic kidney disease (HCC)    Acidosis    ESRD (end stage renal disease) (HCC)    ESRD on hemodialysis (HCC)    Pure hypercholesterolemia    Hemoptysis    Hypertensive urgency    Hematemesis with nausea    Hematemesis, unspecified whether nausea present    Acute gastritis with hemorrhage, unspecified gastritis type    Uremia    Acquired absence of right leg below knee (HCC)    Chronic kidney disease, stage 4 (severe) (HCC)    End-stage renal disease on hemodialysis (HCC)    Osteomyelitis of right foot (HCC)    Dyslipidemia    Diabetes  mellitus type 2 in nonobese (HCC)    Unilateral complete BKA, right, sequela (HCC)    Type 2 diabetes mellitus with diabetic peripheral angiopathy without gangrene (HCC)    Type 2 diabetes mellitus with diabetic chronic kidney disease (HCC)    Long term (current) use of aspirin    Hypertensive chronic kidney disease w stg 1-4/unsp chr kdny    Encounter for orthopedic aftercare following surgical amputation    Anemia in chronic kidney disease    Age-related osteoporosis without current pathological fracture    Acute kidney failure, unspecified (HCC)    CKD (chronic kidney disease), stage III (HCC)    Type 1 diabetes mellitus with ketoacidosis without coma (HCC)    Senile purpura (HCC)    Tinnitus, bilateral    Hypernatremia    Anemia    Acute kidney injury (HCC)    Acute renal failure (ARF) (HCC)    Metabolic acidosis    Hyperglycemia    Nausea    Elevated lipase    Azotemia    Pleural effusion    Anemia in ESRD (end-stage renal disease) (HCC)    Hyperphosphatemia    Dyspnea on exertion    Ischemic foot     #Left great toe ulcer with skin breakdown only  #Ischemic changes to left foot    -Patient examined, chart history reviewed.  -Left foot inspected--concern for acute ischemic changes to left foot. Dusky appearance of hallux and changes to left forefoot.  -Arterial duplex reviewed--Dr. De La Fuente following. Plans for intervention next week. Appreciate vascular eval.  -Discussed with patient  that we will need to closely watch left foot and await vascular testing/input. He is at risk for developing gangrene/limb loss, which has happened to his RLE in the past.  -Today left great toe painted with betadine. Can receive similar dressing changes daily. Should keep foot dry.  -OK to ambulate with post op shoe.  -No current acute SOI or intervention planned to left foot from podiatry standpoint. Will await vascular work up and continue to closely monitor.  -All questions answered to satisfaction.    Malachi Altamirano DPM    1/10/2025         [1] No Known Allergies

## 2025-01-11 ENCOUNTER — APPOINTMENT (OUTPATIENT)
Dept: ULTRASOUND IMAGING | Facility: HOSPITAL | Age: 65
End: 2025-01-11
Attending: SURGERY
Payer: COMMERCIAL

## 2025-01-11 LAB
ALBUMIN SERPL-MCNC: 4.2 G/DL (ref 3.2–4.8)
ALBUMIN/GLOB SERPL: 1.3 {RATIO} (ref 1–2)
ALP LIVER SERPL-CCNC: 91 U/L
ALT SERPL-CCNC: <7 U/L
ANION GAP SERPL CALC-SCNC: 17 MMOL/L (ref 0–18)
ANTIBODY SCREEN: NEGATIVE
APTT PPP: 33.6 SECONDS (ref 23–36)
APTT PPP: 35.4 SECONDS (ref 23–36)
AST SERPL-CCNC: 11 U/L (ref ?–34)
BASOPHILS # BLD AUTO: 0.08 X10(3) UL (ref 0–0.2)
BASOPHILS NFR BLD AUTO: 0.5 %
BILIRUB SERPL-MCNC: 0.4 MG/DL (ref 0.2–1.1)
BUN BLD-MCNC: 24 MG/DL (ref 9–23)
CALCIUM BLD-MCNC: 9.6 MG/DL (ref 8.7–10.4)
CHLORIDE SERPL-SCNC: 101 MMOL/L (ref 98–112)
CO2 SERPL-SCNC: 19 MMOL/L (ref 21–32)
CREAT BLD-MCNC: 5.09 MG/DL
EGFRCR SERPLBLD CKD-EPI 2021: 12 ML/MIN/1.73M2 (ref 60–?)
EOSINOPHIL # BLD AUTO: 0.18 X10(3) UL (ref 0–0.7)
EOSINOPHIL NFR BLD AUTO: 1.1 %
ERYTHROCYTE [DISTWIDTH] IN BLOOD BY AUTOMATED COUNT: 15.3 %
ERYTHROCYTE [DISTWIDTH] IN BLOOD BY AUTOMATED COUNT: 15.3 %
GLOBULIN PLAS-MCNC: 3.2 G/DL (ref 2–3.5)
GLUCOSE BLD-MCNC: 104 MG/DL (ref 70–99)
GLUCOSE BLD-MCNC: 108 MG/DL (ref 70–99)
GLUCOSE BLD-MCNC: 151 MG/DL (ref 70–99)
GLUCOSE BLD-MCNC: 176 MG/DL (ref 70–99)
GLUCOSE BLD-MCNC: 179 MG/DL (ref 70–99)
HCT VFR BLD AUTO: 39.4 %
HCT VFR BLD AUTO: 41.6 %
HGB BLD-MCNC: 12.3 G/DL
HGB BLD-MCNC: 12.9 G/DL
IMM GRANULOCYTES # BLD AUTO: 0.09 X10(3) UL (ref 0–1)
IMM GRANULOCYTES NFR BLD: 0.5 %
LYMPHOCYTES # BLD AUTO: 0.76 X10(3) UL (ref 1–4)
LYMPHOCYTES NFR BLD AUTO: 4.4 %
MCH RBC QN AUTO: 27.2 PG (ref 26–34)
MCH RBC QN AUTO: 27.6 PG (ref 26–34)
MCHC RBC AUTO-ENTMCNC: 31 G/DL (ref 31–37)
MCHC RBC AUTO-ENTMCNC: 31.2 G/DL (ref 31–37)
MCV RBC AUTO: 87.2 FL
MCV RBC AUTO: 89.1 FL
MONOCYTES # BLD AUTO: 0.9 X10(3) UL (ref 0.1–1)
MONOCYTES NFR BLD AUTO: 5.3 %
NEUTROPHILS # BLD AUTO: 15.1 X10 (3) UL (ref 1.5–7.7)
NEUTROPHILS # BLD AUTO: 15.1 X10(3) UL (ref 1.5–7.7)
NEUTROPHILS NFR BLD AUTO: 88.2 %
OSMOLALITY SERPL CALC.SUM OF ELEC: 289 MOSM/KG (ref 275–295)
PLATELET # BLD AUTO: 221 10(3)UL (ref 150–450)
PLATELET # BLD AUTO: 224 10(3)UL (ref 150–450)
POTASSIUM SERPL-SCNC: 3.8 MMOL/L (ref 3.5–5.1)
PROT SERPL-MCNC: 7.4 G/DL (ref 5.7–8.2)
RBC # BLD AUTO: 4.52 X10(6)UL
RBC # BLD AUTO: 4.67 X10(6)UL
RH BLOOD TYPE: POSITIVE
SODIUM SERPL-SCNC: 137 MMOL/L (ref 136–145)
WBC # BLD AUTO: 17.1 X10(3) UL (ref 4–11)
WBC # BLD AUTO: 18.3 X10(3) UL (ref 4–11)

## 2025-01-11 PROCEDURE — 90935 HEMODIALYSIS ONE EVALUATION: CPT | Performed by: INTERNAL MEDICINE

## 2025-01-11 PROCEDURE — 93971 EXTREMITY STUDY: CPT | Performed by: SURGERY

## 2025-01-11 PROCEDURE — 99232 SBSQ HOSP IP/OBS MODERATE 35: CPT | Performed by: INTERNAL MEDICINE

## 2025-01-11 RX ORDER — DIPHENHYDRAMINE HYDROCHLORIDE 50 MG/ML
25 INJECTION INTRAMUSCULAR; INTRAVENOUS EVERY 6 HOURS PRN
Status: DISCONTINUED | OUTPATIENT
Start: 2025-01-11 | End: 2025-01-13

## 2025-01-11 RX ORDER — HEPARIN SODIUM 1000 [USP'U]/ML
60 INJECTION, SOLUTION INTRAVENOUS; SUBCUTANEOUS ONCE
Status: COMPLETED | OUTPATIENT
Start: 2025-01-11 | End: 2025-01-11

## 2025-01-11 RX ORDER — HEPARIN SODIUM AND DEXTROSE 10000; 5 [USP'U]/100ML; G/100ML
12 INJECTION INTRAVENOUS ONCE
Status: COMPLETED | OUTPATIENT
Start: 2025-01-11 | End: 2025-01-11

## 2025-01-11 RX ORDER — DIPHENHYDRAMINE HCL 25 MG
25 CAPSULE ORAL EVERY 6 HOURS PRN
Status: DISCONTINUED | OUTPATIENT
Start: 2025-01-11 | End: 2025-01-11

## 2025-01-11 RX ORDER — HEPARIN SODIUM 1000 [USP'U]/ML
30 INJECTION, SOLUTION INTRAVENOUS; SUBCUTANEOUS ONCE
Status: COMPLETED | OUTPATIENT
Start: 2025-01-11 | End: 2025-01-11

## 2025-01-11 RX ORDER — HEPARIN SODIUM AND DEXTROSE 10000; 5 [USP'U]/100ML; G/100ML
INJECTION INTRAVENOUS CONTINUOUS
Status: DISCONTINUED | OUTPATIENT
Start: 2025-01-11 | End: 2025-01-13

## 2025-01-11 RX ORDER — INSULIN DEGLUDEC 100 U/ML
8 INJECTION, SOLUTION SUBCUTANEOUS NIGHTLY
Status: DISCONTINUED | OUTPATIENT
Start: 2025-01-11 | End: 2025-01-12

## 2025-01-11 RX ORDER — DIPHENHYDRAMINE HCL 25 MG
25 CAPSULE ORAL EVERY 6 HOURS PRN
Status: DISCONTINUED | OUTPATIENT
Start: 2025-01-11 | End: 2025-01-13

## 2025-01-11 RX ORDER — ATORVASTATIN CALCIUM 20 MG/1
20 TABLET, FILM COATED ORAL NIGHTLY
Qty: 30 TABLET | Refills: 0 | Status: SHIPPED | OUTPATIENT
Start: 2025-01-11 | End: 2025-02-10

## 2025-01-11 NOTE — PROGRESS NOTES
[facility]  Pulmonary/Critical Care progress note    Richy Goins Patient Status:  Observation    1960 MRN [id number]   Location [facility] 3NE-A Attending Dallas Drew MD   Hosp Day # 3 PCP Woody Dahl MD         History of Present Illness:     Stable overnight    History:  Past Medical History:    Belching    Dialysis patient (HCC)    Diarrhea, unspecified    Esophageal reflux    Essential hypertension    Fatigue    Flatulence/gas pain/belching    High blood pressure    High cholesterol    History of blood transfusion    Hyperlipidemia    Indigestion    Irregular bowel habits    Night sweats    Osteoporosis    Peripheral vascular disease (HCC)    Pure hypercholesterolemia    Type II or unspecified type diabetes mellitus without mention of complication, not stated as uncontrolled    Visual impairment    reading glasses    Vomiting     Past Surgical History:   Procedure Laterality Date    Amputation toe,i-p jt Right     Av fistula revision, open      Below knee leg amputation Right     Colonoscopy N/A 2023    Procedure: COLONOSCOPY;  Surgeon: Sarmad Gonzalez MD;  Location:  ENDOSCOPY    Colonoscopy      Upper gi endoscopy,exam       Family History   Problem Relation Age of Onset    Heart Attack Father     Heart Disorder Father 57    Diabetes Maternal Grandfather     Diabetes Maternal Aunt       reports that he has never smoked. He has never used smokeless tobacco. He reports that he does not currently use alcohol. He reports that he does not use drugs.    Allergies:  Allergies[1]    Medications:    Current Facility-Administered Medications:     sodium chloride 0.9 % IV bolus 100 mL, 100 mL, Intravenous, Q30 Min PRN **AND** albumin human (Albumin) 25% injection 25 g, 25 g, Intravenous, PRN Dialysis    apixaban (Eliquis) tab 2.5 mg, 2.5 mg, Oral, BID    insulin degludec (Tresiba) 100 units/mL flextouch 8 Units, 8 Units, Subcutaneous, Daily    atorvastatin (Lipitor) tab 20 mg, 20 mg,  Oral, Nightly    metoclopramide (Reglan) 5 mg/mL injection 10 mg, 10 mg, Intravenous, Daily PRN    amLODIPine (Norvasc) tab 10 mg, 10 mg, Oral, Daily    aspirin DR tab 81 mg, 81 mg, Oral, Daily    pantoprazole (Protonix) DR tab 40 mg, 40 mg, Oral, QAM AC    insulin aspart (NovoLOG) 100 Units/mL FlexPen 1-68 Units, 1-68 Units, Subcutaneous, TID CC    glucose (Dex4) 15 GM/59ML oral liquid 15 g, 15 g, Oral, Q15 Min PRN **OR** glucose (Glutose) 40% oral gel 15 g, 15 g, Oral, Q15 Min PRN **OR** glucose-vitamin C (Dex-4) chewable tab 4 tablet, 4 tablet, Oral, Q15 Min PRN **OR** dextrose 50% injection 50 mL, 50 mL, Intravenous, Q15 Min PRN **OR** glucose (Dex4) 15 GM/59ML oral liquid 30 g, 30 g, Oral, Q15 Min PRN **OR** glucose (Glutose) 40% oral gel 30 g, 30 g, Oral, Q15 Min PRN **OR** glucose-vitamin C (Dex-4) chewable tab 8 tablet, 8 tablet, Oral, Q15 Min PRN    acetaminophen (Tylenol Extra Strength) tab 1,000 mg, 1,000 mg, Oral, Q4H PRN    melatonin tab 3 mg, 3 mg, Oral, Nightly PRN    polyethylene glycol (PEG 3350) (Miralax) 17 g oral packet 17 g, 17 g, Oral, Daily PRN    sennosides (Senokot) tab 17.2 mg, 17.2 mg, Oral, Nightly PRN    bisacodyl (Dulcolax) 10 MG rectal suppository 10 mg, 10 mg, Rectal, Daily PRN    ondansetron (Zofran) 4 MG/2ML injection 4 mg, 4 mg, Intravenous, Q6H PRN    benzonatate (Tessalon) cap 200 mg, 200 mg, Oral, TID PRN    insulin aspart (NovoLOG) 100 Units/mL FlexPen 1-10 Units, 1-10 Units, Subcutaneous, TID AC and HS    Intake/Output:    Intake/Output Summary (Last 24 hours) at 1/11/2025 1208  Last data filed at 1/11/2025 1100  Gross per 24 hour   Intake 120 ml   Output 2500 ml   Net -2380 ml        Body mass index is 25.85 kg/m².    Review of Systems  Review of Systems:   A comprehensive 10 point review of systems was completed.  Pertinent positives and negatives noted in the the HPI.       Patient Vitals for the past 24 hrs:   BP Temp Temp src Pulse Resp SpO2 Height Weight   01/07/25 1554  -- 98.2 °F (36.8 °C) Oral -- -- 93 % -- --   01/07/25 1445 153/80 -- -- 97 (!) 27 96 % -- --   01/07/25 1330 149/84 -- -- 94 26 96 % -- --   01/07/25 1315 -- -- -- 94 (!) 30 97 % -- --   01/07/25 1215 -- -- -- 91 26 95 % -- --   01/07/25 1055 153/85 98.5 °F (36.9 °C) Temporal 97 16 97 % 5' 8\" (1.727 m) 170 lb (77.1 kg)     Vitals:    01/10/25 1945 01/10/25 2300 01/11/25 0420 01/11/25 0800   BP: 120/72 126/78 126/74 122/69   BP Location: Right arm Right arm Right arm Right arm   Pulse: 96 97 96 96   Resp: 25 (!) 27 23 20   Temp: 98 °F (36.7 °C) 98.3 °F (36.8 °C) 98.8 °F (37.1 °C) 98.6 °F (37 °C)   TempSrc: Oral Oral Oral Oral   SpO2: 95% 93% 96% 96%   Weight:       Height:       As per hospital     Physical Exam  Constitutional:       General: He is not in acute distress.     Appearance: Normal appearance. He is not ill-appearing or diaphoretic.   HENT:      Head: Normocephalic and atraumatic.      Nose: Nose normal. No congestion or rhinorrhea.      Mouth/Throat:      Mouth: Mucous membranes are moist.      Pharynx: Oropharynx is clear. No oropharyngeal exudate or posterior oropharyngeal erythema.      Comments: Mallampati class IV palate  Eyes:      Extraocular Movements: Extraocular movements intact.      Pupils: Pupils are equal, round, and reactive to light.   Cardiovascular:      Rate and Rhythm: Normal rate.      Pulses: Normal pulses.      Heart sounds: Normal heart sounds. No murmur heard.  Pulmonary:      Effort: Pulmonary effort is normal. No respiratory distress.      Breath sounds: Normal breath sounds. No wheezing or rhonchi.   Chest:      Chest wall: No tenderness.   Abdominal:      General: Abdomen is flat. Bowel sounds are normal.      Palpations: Abdomen is soft.   Musculoskeletal:         General: Normal range of motion.      Comments: Right prosthetic limb   Skin:     General: Skin is warm.   Neurological:      General: No focal deficit present.      Mental Status: He is alert and oriented to  person, place, and time.   Psychiatric:         Mood and Affect: Mood normal.         Behavior: Behavior normal.         Thought Content: Thought content normal.         Judgment: Judgment normal.            Lab Data Review:  Recent Labs   Lab 01/07/25  1127 01/08/25  0704   WBC 12.0* 10.5   HGB 10.9* 10.8*   HCT 33.4* 33.9*   .0 239.0       Recent Labs   Lab 01/07/25  1127 01/08/25  0704    140   K 4.3 3.7    103   CO2 20.0* 25.0   * 58*   CREATSERUM 9.45* 6.23*   CA 8.0* 8.8   ALB  --  3.9   ALKPHO  --  87   ALT  --  <7*   AST  --  10   * 100*       Recent Labs   Lab 01/07/25 1127 01/08/25  0704   MG 2.1 2.0       Lab Results   Component Value Date    PHOS 6.4 (H) 01/08/2025        No results for input(s): \"PT\", \"INR\", \"PTT\" in the last 168 hours.    No results for input(s): \"ABGPHT\", \"TEPZFE2S\", \"FUWXX2U\", \"ABGHCO3\", \"ABGBE\", \"TEMP\", \"NOEMÍ\", \"SITE\", \"DEV\", \"THGB\" in the last 168 hours.    No results for input(s): \"TROP\", \"CKMB\" in the last 168 hours.    Cultures:   Hospital Encounter on 01/07/25   1. Body Fluid Cult Aerobic and Anaerobic     Status: None (Preliminary result)    Collection Time: 01/08/25  1:42 PM    Specimen: Pleural Fluid, Left; Body fluid, unspecified   Result Value Ref Range    Body Fluid Culture Result No Growth 2 Days N/A    Body Fluid Smear 2+ WBCs seen N/A    Body Fluid Smear No organisms seen N/A    Body Fluid Smear This is a cytocentrifuged smear. N/A       Final Diagnosis:   Cytospin smears and cell block from left pleural fluid:  -Adequate for evaluation.  -No cytologic evidence of malignancy.  -Specimen composed of reactive mesothelial cells, histiocytes and mixed inflammatory cells.       Radiology personally reviewed:  XR CHEST AP PORTABLE  (CPT=71045)    Result Date: 1/10/2025  CONCLUSION:   1.  Overall slight decrease in the size of right lateral basilar airspace disease may represent improving atelectasis or pneumonia.   2. Small bilateral  pleural effusions.    LOCATION:  [facility]      Dictated by (CST): Yoan Duvall MD on 1/10/2025 at 8:34 AM     Finalized by (CST): Yoan Duvall MD on 1/10/2025 at 8:35 AM         Patient Active Problem List   Diagnosis    Diabetic nephropathy associated with type 2 diabetes mellitus (HCC)    Primary hypertension    Type 2 diabetes mellitus with hyperglycemia (HCC)    Stage 3 chronic kidney disease (HCC)    Retinopathy due to secondary diabetes mellitus (HCC)    Atherosclerosis of native artery of extremity (HCC)    Stage 4 chronic kidney disease (HCC)    Acidosis    ESRD (end stage renal disease) (HCC)    ESRD on hemodialysis (HCC)    Pure hypercholesterolemia    Hemoptysis    Hypertensive urgency    Hematemesis with nausea    Hematemesis, unspecified whether nausea present    Acute gastritis with hemorrhage, unspecified gastritis type    Uremia    Acquired absence of right leg below knee (HCC)    Chronic kidney disease, stage 4 (severe) (HCC)    End-stage renal disease on hemodialysis (HCC)    Osteomyelitis of right foot (HCC)    Dyslipidemia    Diabetes mellitus type 2 in nonobese (HCC)    Unilateral complete BKA, right, sequela (HCC)    Type 2 diabetes mellitus with diabetic peripheral angiopathy without gangrene (HCC)    Type 2 diabetes mellitus with diabetic chronic kidney disease (HCC)    Long term (current) use of aspirin    Hypertensive chronic kidney disease w stg 1-4/unsp chr kdny    Encounter for orthopedic aftercare following surgical amputation    Anemia in chronic kidney disease    Age-related osteoporosis without current pathological fracture    Acute kidney failure, unspecified (HCC)    CKD (chronic kidney disease), stage III (HCC)    Type 1 diabetes mellitus with ketoacidosis without coma (HCC)    Senile purpura (HCC)    Tinnitus, bilateral    Hypernatremia    Anemia    Acute kidney injury (HCC)    Acute renal failure (ARF) (HCC)    Metabolic acidosis    Hyperglycemia    Nausea    Elevated lipase     Azotemia    Pleural effusion    Anemia in ESRD (end-stage renal disease) (HCC)    Hyperphosphatemia    Dyspnea on exertion    Ischemic foot    Gangrene of toe of left foot (HCC)     64 year old male.  With history of GERD, hypertension, hyperlipidemia, type 2 diabetes mellitus, osteoporosis.  ESRD on hemodialysis.  The patient presented to the emergency department with complaints of generalized weakness and shortness of breath.  Patient reports that he missed one hemo-dialysis   The patient is noted to have large left pleural effusion so pulmonary consultation was obtained for further evaluation and management for possible thoracentesis    Assessment:  Shortness of breath but patient is currently on room air: Improving dyspnea postthoracentesis: Stable  Large left pleural effusion suspect due to renal failure: Pleural fluid cytology negative for malignant cells: Chest x-ray bilateral small pleural effusion  Left great toe ulcer  Peripheral vascular disease  GERD  Hypertension  End-stage renal disease on hemodialysis  Anemia of chronic disease  Generalized weakness  Type 2 diabetes mellitus  Hyperlipidemia  Osteoporosis      Plan:  Continue to monitor symptoms  Continue hemodialysis per nephrology  Continue incentive spirometry every 4 hours as possible that will help resorb pleural effusion  DVT prophylaxis:  heparin 5000 units subcutaneous every 12 hours    GI prophylaxis: Protonix 40 mg daily   Will follow for further recommendations  May discharge home from pulm standpoint    Thank You for allowing me to participate in this patient's care     Alfonso Rendon MD    Note to the patient: The 21st Century Cures Act makes medical notes like these available to patients in the interest of transparency. However, be advised that this is a medical document. It is intended as peer to peer communication. It is written in medical language and may contain abbreviations or verbiage that are unfamiliar. It may appear blunt or  direct. Medical documents are intended to carry relevant information, facts as evident, and clinical opinion of the practitioner.      Disclaimer: Components of this note were documented using voice recognition system and are subject to errors not corrected at proofreading. Contact the author of this note for any clarifications          [1] No Known Allergies

## 2025-01-11 NOTE — PROGRESS NOTES
[facility]   part of [facility]     Nephrology Progress Note    Richy Goins Patient Status:  Observation    1960 MRN [id number]   [facility] 3NE-A Attending Jeanne Redman MD   Hosp Day # 3 PCP Woody Dahl MD       SUBJECTIVE:  No acute events; HD today       Current Facility-Administered Medications:     sodium chloride 0.9 % IV bolus 100 mL, 100 mL, Intravenous, Q30 Min PRN **AND** albumin human (Albumin) 25% injection 25 g, 25 g, Intravenous, PRN Dialysis    apixaban (Eliquis) tab 2.5 mg, 2.5 mg, Oral, BID    insulin degludec (Tresiba) 100 units/mL flextouch 8 Units, 8 Units, Subcutaneous, Daily    atorvastatin (Lipitor) tab 20 mg, 20 mg, Oral, Nightly    metoclopramide (Reglan) 5 mg/mL injection 10 mg, 10 mg, Intravenous, Daily PRN    amLODIPine (Norvasc) tab 10 mg, 10 mg, Oral, Daily    aspirin DR tab 81 mg, 81 mg, Oral, Daily    pantoprazole (Protonix) DR tab 40 mg, 40 mg, Oral, QAM AC    insulin aspart (NovoLOG) 100 Units/mL FlexPen 1-68 Units, 1-68 Units, Subcutaneous, TID CC    glucose (Dex4) 15 GM/59ML oral liquid 15 g, 15 g, Oral, Q15 Min PRN **OR** glucose (Glutose) 40% oral gel 15 g, 15 g, Oral, Q15 Min PRN **OR** glucose-vitamin C (Dex-4) chewable tab 4 tablet, 4 tablet, Oral, Q15 Min PRN **OR** dextrose 50% injection 50 mL, 50 mL, Intravenous, Q15 Min PRN **OR** glucose (Dex4) 15 GM/59ML oral liquid 30 g, 30 g, Oral, Q15 Min PRN **OR** glucose (Glutose) 40% oral gel 30 g, 30 g, Oral, Q15 Min PRN **OR** glucose-vitamin C (Dex-4) chewable tab 8 tablet, 8 tablet, Oral, Q15 Min PRN    acetaminophen (Tylenol Extra Strength) tab 1,000 mg, 1,000 mg, Oral, Q4H PRN    melatonin tab 3 mg, 3 mg, Oral, Nightly PRN    polyethylene glycol (PEG 3350) (Miralax) 17 g oral packet 17 g, 17 g, Oral, Daily PRN    sennosides (Senokot) tab 17.2 mg, 17.2 mg, Oral, Nightly PRN    bisacodyl (Dulcolax) 10 MG rectal suppository 10 mg, 10 mg, Rectal, Daily PRN    ondansetron (Zofran) 4 MG/2ML  injection 4 mg, 4 mg, Intravenous, Q6H PRN    benzonatate (Tessalon) cap 200 mg, 200 mg, Oral, TID PRN    insulin aspart (NovoLOG) 100 Units/mL FlexPen 1-10 Units, 1-10 Units, Subcutaneous, TID AC and HS      Physical Exam:   /66 (BP Location: Right arm)   Pulse 104   Temp 98.7 °F (37.1 °C) (Oral)   Resp 26   Ht 5' 8\" (1.727 m)   Wt 170 lb (77.1 kg)   SpO2 98%   BMI 25.85 kg/m²   Temp (24hrs), Av.4 °F (36.9 °C), Min:97.7 °F (36.5 °C), Max:98.8 °F (37.1 °C)       Intake/Output Summary (Last 24 hours) at 2025 1245  Last data filed at 2025 1100  Gross per 24 hour   Intake 120 ml   Output 2500 ml   Net -2380 ml       Last 3 Weights   25 1623 170 lb (77.1 kg)   25 1055 170 lb (77.1 kg)   10/02/24 0458 181 lb (82.1 kg)   10/01/24 1056 175 lb 0.7 oz (79.4 kg)   10/01/24 0544 175 lb (79.4 kg)   24 1517 192 lb 3.2 oz (87.2 kg)   24 0936 196 lb (88.9 kg)   24 0917 176 lb (79.8 kg)     General: Alert and oriented in no apparent distress.  HEENT: No scleral icterus, MMM  Neck: Supple, no SARITA or thyromegaly  Cardiac: Regular rate and rhythm, S1, S2 normal, no murmur or rub  Lungs: no rales, decreased breath sounds at the bases  Abdomen: Soft, non-tender.   Extremities: Without clubbing, cyanosis or edema. L AVF with bruit/thrill, R BKA  Neurologic: Alert and oriented, cranial nerves grossly intact, moving all extremities  Skin: Warm and dry, no rash    No results for input(s): \"WBC\", \"HGB\", \"MCV\", \"PLT\", \"BAND\", \"INR\" in the last 72 hours.    Invalid input(s): \"LYM#\", \"MONO#\", \"BASOS#\", \"EOSIN#\"    No results for input(s): \"NA\", \"K\", \"CL\", \"CO2\", \"BUN\", \"CREATSERUM\", \"GLUCOSE\", \"CA\", \"CAION\", \"MG\", \"PHOS\" in the last 72 hours.    No results for input(s): \"ALT\", \"AST\", \"ALB\", \"AMYLASE\", \"LIPASE\", \"LDH\" in the last 72 hours.    Invalid input(s): \"ALPHOS\", \"TBIL\", \"DBIL\", \"TPROT\"        Impression/Plan:      1.ESRD: Due to long-standing DM2. Receives HD TThS, will continue  per usual outpatient schedule. Tx today; 2.5 L UF     2. HTN: Continue home amlodipine and lisinopril     3. Anemia: Due to CKD. Continue ESAs with HD for goal hemoglobin 10-11.     4. PVD: h/o right BKA and with left toe ulceration. Angiogram with Dr. De La Fuente 1/9, plan for attempt at revascularization possibly next week (?wed)    5. L pleural effusion- s/p thora with 1.5L removed 1/8    Thank you for allowing me to participate in this patient's care. Please feel free to call me with any questions or concerns.     Paul Barnhart  1/11/2025

## 2025-01-11 NOTE — PROGRESS NOTES
Consult dictated- offered primary amputation- patient refuses- while on Apixaban- foot appears to have worsened- second opinion offered to dolores Best/ Mode- not sure if enough time to get to [facility]-  Patient refuses Dr. Coello. Reviewed films / patient with Dr. Gavin- Patient aware that he has no major plantar artery- very high risk for major limb amputation- Discussed DVA- but worried forefoot already compromised.

## 2025-01-11 NOTE — CONSULTS
[facility]    PATIENT'S NAME: JULIUS FOY   ATTENDING PHYSICIAN: Jeanne Redman M.D.   CONSULTING PHYSICIAN: Fermin De La Fuente M.D.   PATIENT ACCOUNT#:   [id number]    LOCATION:  [address] [place] [address]  MEDICAL RECORD #:   [id number]       YOB: 1960  ADMISSION DATE:       01/07/2025      CONSULT DATE:  01/11/2025    REPORT OF CONSULTA    FOLLOWUP CONSULTATION    The patient was to be discharged home today for possibility of revascularization on Wednesday with the use of a hybrid room; however, his foot looks worse today than it was yesterday.  He is demarcating in his left first toe with gangrene at the very tip, and he has some distal dependent rubor of the forefoot.  The patient has no Doppler flow into the area of the foot.  All major vessels are occluded.  I do not think he is a candidate for a distal venous arterialization procedure since the forefoot is already compromised and would recommend an endovascular procedure to attempt to open up the arterial flow going into the foot which will be a real challenge.  The patient and his wife who was present at bedside are aware that there is no dorsal pedal artery, crural artery, common plantar, medial plantar, lateral plantar artery.  Only __ischemic changes ______ seen at the top of the foot.  The case was discussed with Dr. Gavin, and we will have him involved.  Scheduled for tentatively on Monday afternoon.  We will place him on heparin for right now and hold off his Eliquis.  I offered him a second opinion if he wants with Dr. Best, but I am not sure when that would be done.  There is concern that he could have worsening foot problems since he has worsened since he has been in the hospital.  I offered a second opinion with Dr. Coello who did his right leg.  He is adamant that he does not want to see him.  I told him that he is as high risk as possible for limb loss based upon the anatomy of any vessel and seen in the plantar arch.  Will do a  cutdown on the superficial femoral artery, attempt to do an endovascular procedure, possible bypass.  Risks and complications were explained to the patient and the wife.  All questions were answered.    Dictated By Fermin De La Fuente M.D.  d: 01/11/2025 12:56:30  t: 01/11/2025 13:16:59  [facility] [id number]  JJW/    cc: Fermin De La Fuente M.D.

## 2025-01-11 NOTE — PLAN OF CARE
Assumed care at approx 1930.  A/Ox4, RA, NSR on tele; VSS.  Denies pain.  Plan for dialysis this AM, scheduled for 0630.  Leisa to see after dialysis then dc if cleared. Plan for out pt procedure with Dr. De La Fuente on Wednesday.  Post op shoe at bedside.  Safety and comfort measures in place.  Pt updated on POC.

## 2025-01-11 NOTE — PLAN OF CARE
Assumed care @0730  VSS,   A&Ox4,   RA    NSR , pedal pulses per doppler  Denies Pain,   Redness/soreness upper arms and groin area started in AM.  No itching.  PRN Benadryl given x1.  Still red.  Up stand pivot to commode as tolerated.    Tolerating carb controlled diet.    IVF infusing heparin, abx  HD complete. 2.5L removed.   Consent signed.    Updated POC with patient and family.        Problem: Patient/Family Goals  Goal: Patient/Family Long Term Goal  Description: Patient's Long Term Goal: Have surgery    Interventions:  - Surgery pending  - See additional Care Plan goals for specific interventions  Outcome: Progressing  Goal: Patient/Family Short Term Goal  Description: Patient's Short Term Goal: Stress Test completed     Interventions:   - Follow POC no caffeine diet    - See additional Care Plan goals for specific interventions  Outcome: Progressing     Problem: Diabetes/Glucose Control  Goal: Glucose maintained within prescribed range  Description: INTERVENTIONS:  - Monitor Blood Glucose as ordered  - Assess for signs and symptoms of hyperglycemia and hypoglycemia  - Administer ordered medications to maintain glucose within target range  - Assess barriers to adequate nutritional intake and initiate nutrition consult as needed  - Instruct patient on self management of diabetes  Outcome: Progressing     Problem: METABOLIC/FLUID AND ELECTROLYTES - ADULT  Goal: Electrolytes maintained within normal limits  Description: INTERVENTIONS:  - Monitor labs and rhythm and assess patient for signs and symptoms of electrolyte imbalances  - Administer electrolyte replacement as ordered  - Monitor response to electrolyte replacements, including rhythm and repeat lab results as appropriate  - Fluid restriction as ordered  - Instruct patient on fluid and nutrition restrictions as appropriate  Outcome: Progressing     Problem: SKIN/TISSUE INTEGRITY - ADULT  Goal: Skin integrity remains intact  Description: INTERVENTIONS  -  Assess and document risk factors for pressure ulcer development  - Assess and document skin integrity  - Monitor for areas of redness and/or skin breakdown  - Initiate interventions, skin care algorithm/standards of care as needed  Outcome: Progressing     Problem: MUSCULOSKELETAL - ADULT  Goal: Return mobility to safest level of function  Description: INTERVENTIONS:  - Assess patient stability and activity tolerance for standing, transferring and ambulating w/ or w/o assistive devices  - Assist with transfers and ambulation using safe patient handling equipment as needed  - Ensure adequate protection for wounds/incisions during mobilization  - Obtain PT/OT consults as needed  - Advance activity as appropriate  - Communicate ordered activity level and limitations with patient/family  Outcome: Progressing     Problem: SAFETY ADULT - FALL  Goal: Free from fall injury  Description: INTERVENTIONS:  - Assess pt frequently for physical needs  - Identify cognitive and physical deficits and behaviors that affect risk of falls.  - [place] fall precautions as indicated by assessment.  - Educate pt/family on patient safety including physical limitations  - Instruct pt to call for assistance with activity based on assessment  - Modify environment to reduce risk of injury  - Provide assistive devices as appropriate  - Consider OT/PT consult to assist with strengthening/mobility  - Encourage toileting schedule  Outcome: Progressing

## 2025-01-12 LAB
APTT PPP: 40.2 SECONDS (ref 23–36)
APTT PPP: 42.2 SECONDS (ref 23–36)
APTT PPP: 42.2 SECONDS (ref 23–36)
GLUCOSE BLD-MCNC: 195 MG/DL (ref 70–99)
GLUCOSE BLD-MCNC: 197 MG/DL (ref 70–99)
GLUCOSE BLD-MCNC: 318 MG/DL (ref 70–99)
GLUCOSE BLD-MCNC: 400 MG/DL (ref 70–99)
GLUCOSE BLD-MCNC: 450 MG/DL (ref 70–99)
PLATELET # BLD AUTO: 250 10(3)UL (ref 150–450)

## 2025-01-12 PROCEDURE — 99233 SBSQ HOSP IP/OBS HIGH 50: CPT | Performed by: HOSPITALIST

## 2025-01-12 PROCEDURE — 99233 SBSQ HOSP IP/OBS HIGH 50: CPT | Performed by: INTERNAL MEDICINE

## 2025-01-12 PROCEDURE — 99232 SBSQ HOSP IP/OBS MODERATE 35: CPT | Performed by: INTERNAL MEDICINE

## 2025-01-12 RX ORDER — TRIAMCINOLONE ACETONIDE 1 MG/G
OINTMENT TOPICAL 2 TIMES DAILY
Status: DISCONTINUED | OUTPATIENT
Start: 2025-01-12 | End: 2025-01-13

## 2025-01-12 RX ORDER — INSULIN DEGLUDEC 100 U/ML
12 INJECTION, SOLUTION SUBCUTANEOUS NIGHTLY
Status: DISCONTINUED | OUTPATIENT
Start: 2025-01-12 | End: 2025-01-13

## 2025-01-12 RX ORDER — METHYLPREDNISOLONE SODIUM SUCCINATE 40 MG/ML
40 INJECTION INTRAMUSCULAR; INTRAVENOUS ONCE
Status: COMPLETED | OUTPATIENT
Start: 2025-01-12 | End: 2025-01-12

## 2025-01-12 NOTE — PROGRESS NOTES
[facility]   part of [facility]     Nephrology Progress Note    Richy Goins Patient Status:  Observation    1960 MRN [id number]   Location [facility] 3NE-A Attending Jeanne Redman MD   Hosp Day # 4 PCP Woody Dahl MD       SUBJECTIVE:  No acute events       Current Facility-Administered Medications:     triamcinolone (Kenalog) 0.1 % ointment, , Topical, BID    insulin degludec (Tresiba) 100 units/mL flextouch 12 Units, 12 Units, Subcutaneous, Nightly    piperacillin-tazobactam (Zosyn) 3.375 g in dextrose 5% 100 mL IVPB-ADDV, 3.375 g, Intravenous, Q12H    heparin (Porcine) 28329 units/250mL infusion ACS/AFIB CONTINUOUS, 200-3,000 Units/hr, Intravenous, Continuous    diphenhydrAMINE (Benadryl) cap/tab 25 mg, 25 mg, Oral, Q6H PRN    diphenhydrAMINE (Benadryl) 50 mg/mL  injection 25 mg, 25 mg, Intravenous, Q6H PRN    sodium chloride 0.9 % IV bolus 100 mL, 100 mL, Intravenous, Q30 Min PRN **AND** albumin human (Albumin) 25% injection 25 g, 25 g, Intravenous, PRN Dialysis    metoclopramide (Reglan) 5 mg/mL injection 10 mg, 10 mg, Intravenous, Daily PRN    amLODIPine (Norvasc) tab 10 mg, 10 mg, Oral, Daily    aspirin DR tab 81 mg, 81 mg, Oral, Daily    pantoprazole (Protonix) DR tab 40 mg, 40 mg, Oral, QAM AC    insulin aspart (NovoLOG) 100 Units/mL FlexPen 1-68 Units, 1-68 Units, Subcutaneous, TID CC    glucose (Dex4) 15 GM/59ML oral liquid 15 g, 15 g, Oral, Q15 Min PRN **OR** glucose (Glutose) 40% oral gel 15 g, 15 g, Oral, Q15 Min PRN **OR** glucose-vitamin C (Dex-4) chewable tab 4 tablet, 4 tablet, Oral, Q15 Min PRN **OR** dextrose 50% injection 50 mL, 50 mL, Intravenous, Q15 Min PRN **OR** glucose (Dex4) 15 GM/59ML oral liquid 30 g, 30 g, Oral, Q15 Min PRN **OR** glucose (Glutose) 40% oral gel 30 g, 30 g, Oral, Q15 Min PRN **OR** glucose-vitamin C (Dex-4) chewable tab 8 tablet, 8 tablet, Oral, Q15 Min PRN    acetaminophen (Tylenol Extra Strength) tab 1,000 mg, 1,000 mg, Oral, Q4H PRN     melatonin tab 3 mg, 3 mg, Oral, Nightly PRN    polyethylene glycol (PEG 3350) (Miralax) 17 g oral packet 17 g, 17 g, Oral, Daily PRN    sennosides (Senokot) tab 17.2 mg, 17.2 mg, Oral, Nightly PRN    bisacodyl (Dulcolax) 10 MG rectal suppository 10 mg, 10 mg, Rectal, Daily PRN    ondansetron (Zofran) 4 MG/2ML injection 4 mg, 4 mg, Intravenous, Q6H PRN    insulin aspart (NovoLOG) 100 Units/mL FlexPen 1-10 Units, 1-10 Units, Subcutaneous, TID AC and HS      Physical Exam:   /64 (BP Location: Right arm)   Pulse 95   Temp 98.4 °F (36.9 °C) (Oral)   Resp 17   Ht 5' 8\" (1.727 m)   Wt 170 lb (77.1 kg)   SpO2 99%   BMI 25.85 kg/m²   Temp (24hrs), Av.4 °F (36.9 °C), Min:98.2 °F (36.8 °C), Max:98.7 °F (37.1 °C)       Intake/Output Summary (Last 24 hours) at 2025 1324  Last data filed at 2025 1000  Gross per 24 hour   Intake 300.25 ml   Output --   Net 300.25 ml       Last 3 Weights   25 1623 170 lb (77.1 kg)   25 1055 170 lb (77.1 kg)   10/02/24 0458 181 lb (82.1 kg)   10/01/24 1056 175 lb 0.7 oz (79.4 kg)   10/01/24 0544 175 lb (79.4 kg)   24 1517 192 lb 3.2 oz (87.2 kg)   24 0936 196 lb (88.9 kg)   24 0917 176 lb (79.8 kg)     General: Alert and oriented in no apparent distress.  HEENT: No scleral icterus, MMM  Neck: Supple, no SARITA or thyromegaly  Cardiac: Regular rate and rhythm, S1, S2 normal, no murmur or rub  Lungs: no rales, decreased breath sounds at the bases  Abdomen: Soft, non-tender.   Extremities: Without clubbing, cyanosis or edema. L AVF with bruit/thrill, R BKA  Neurologic: Alert and oriented, cranial nerves grossly intact, moving all extremities  Skin: Warm and dry, no rash    Recent Labs     25  1322 25  1508 25  0429   WBC 17.1* 18.3*  --    HGB 12.9* 12.3*  --    MCV 89.1 87.2  --    .0 221.0 250.0       Recent Labs     25  1321      K 3.8      CO2 19.0*   BUN 24*   CREATSERUM 5.09*   CA 9.6       Recent  Labs     01/11/25  1321   ALT <7*   AST 11   ALB 4.2           Impression/Plan:      1.ESRD: Due to long-standing DM2. Receives HD TThS, will continue per usual outpatient schedule.       2. HTN: Continue home amlodipine and lisinopril     3. Anemia: Due to CKD. Continue ESAs with HD for goal hemoglobin 10-11.     4. PVD: h/o right BKA and with left toe ulceration. Angiogram with Dr. De La Fuente 1/9, plan for attempt at revascularization possibly next week (?wed)    5. L pleural effusion- s/p thora with 1.5L removed 1/8    Thank you for allowing me to participate in this patient's care. Please feel free to call me with any questions or concerns.     Paul Barnhart  1/12/2025

## 2025-01-12 NOTE — PLAN OF CARE
Assumed care 0000  Patient alert, oriented x4  Denies pain  Unable to doppler pulse on L foot, noted in Dr. De La Fuente's note  Heparin running/titrated per protocol  IV antibiotics  Refusing tele  Fall precautions in place

## 2025-01-12 NOTE — CONSULTS
[facility]   part of [facility] ID CONSULT NOTE    Richy Goins Patient Status:  Inpatient    1960 MRN [id number]   Location [facility] 7NE-A Attending Jeanne Redman MD   Hosp Day # 4 PCP Woody Dahl MD       Reason for Consultation:  Concern for L foot cellulitis    ASSESSMENT:    Antibiotics:   (Zosyn)    #Severe PAD with concern for limb ischemia  #Erythema on dorsum of L foot - consistent with ischemic changes.   #L great toe with dry gangrene  #Diffuse rash prior to initiation of antibiotics   #L pleural effusion  #H/o R BKA  #ESRD on HD  #DM      PLAN:    -Comfortable stopping antibiotics  -Monitor off antibiotics  -Management of PVD per vascular surgery  -Follow fever curve, wbc  -Reviewed labs, micro, imaging reports, available old records  -Discussed with primary attending and RN    History of Present Illness:  Richy Goins is a a(n) 64 year old male DM, ESRD on HD, R BKA, PVD who presents with PAGE. Missed 2 dialysis sessions in a row and found to have L pleural effusion who underwent thoracentesis. This week he's noticed his L foot become red with burning.     ID consulted to evaluate foot.     History:  Past Medical History:    Belching    Dialysis patient (HCC)    Diarrhea, unspecified    Esophageal reflux    Essential hypertension    Fatigue    Flatulence/gas pain/belching    High blood pressure    High cholesterol    History of blood transfusion    Hyperlipidemia    Indigestion    Irregular bowel habits    Night sweats    Osteoporosis    Peripheral vascular disease (HCC)    Pure hypercholesterolemia    Type II or unspecified type diabetes mellitus without mention of complication, not stated as uncontrolled    Visual impairment    reading glasses    Vomiting     Past Surgical History:   Procedure Laterality Date    Amputation toe,i-p jt Right     Av fistula revision, open      Below knee leg amputation Right     Colonoscopy N/A 2023    Procedure:  COLONOSCOPY;  Surgeon: Sarmad Gonzalez MD;  Location:  ENDOSCOPY    Colonoscopy      Upper gi endoscopy,exam       Family History   Problem Relation Age of Onset    Heart Attack Father     Heart Disorder Father 57    Diabetes Maternal Grandfather     Diabetes Maternal Aunt       reports that he has never smoked. He has never used smokeless tobacco. He reports that he does not currently use alcohol. He reports that he does not use drugs.    Allergies:  Allergies[1]    Medications:    Current Facility-Administered Medications:     piperacillin-tazobactam (Zosyn) 3.375 g in dextrose 5% 100 mL IVPB-ADDV, 3.375 g, Intravenous, Q12H    heparin (Porcine) 06789 units/250mL infusion ACS/AFIB CONTINUOUS, 200-3,000 Units/hr, Intravenous, Continuous    insulin degludec (Tresiba) 100 units/mL flextouch 8 Units, 8 Units, Subcutaneous, Nightly    diphenhydrAMINE (Benadryl) cap/tab 25 mg, 25 mg, Oral, Q6H PRN    diphenhydrAMINE (Benadryl) 50 mg/mL  injection 25 mg, 25 mg, Intravenous, Q6H PRN    sodium chloride 0.9 % IV bolus 100 mL, 100 mL, Intravenous, Q30 Min PRN **AND** albumin human (Albumin) 25% injection 25 g, 25 g, Intravenous, PRN Dialysis    atorvastatin (Lipitor) tab 20 mg, 20 mg, Oral, Nightly    metoclopramide (Reglan) 5 mg/mL injection 10 mg, 10 mg, Intravenous, Daily PRN    amLODIPine (Norvasc) tab 10 mg, 10 mg, Oral, Daily    aspirin DR tab 81 mg, 81 mg, Oral, Daily    pantoprazole (Protonix) DR tab 40 mg, 40 mg, Oral, QAM AC    insulin aspart (NovoLOG) 100 Units/mL FlexPen 1-68 Units, 1-68 Units, Subcutaneous, TID CC    glucose (Dex4) 15 GM/59ML oral liquid 15 g, 15 g, Oral, Q15 Min PRN **OR** glucose (Glutose) 40% oral gel 15 g, 15 g, Oral, Q15 Min PRN **OR** glucose-vitamin C (Dex-4) chewable tab 4 tablet, 4 tablet, Oral, Q15 Min PRN **OR** dextrose 50% injection 50 mL, 50 mL, Intravenous, Q15 Min PRN **OR** glucose (Dex4) 15 GM/59ML oral liquid 30 g, 30 g, Oral, Q15 Min PRN **OR** glucose (Glutose) 40% oral gel  30 g, 30 g, Oral, Q15 Min PRN **OR** glucose-vitamin C (Dex-4) chewable tab 8 tablet, 8 tablet, Oral, Q15 Min PRN    acetaminophen (Tylenol Extra Strength) tab 1,000 mg, 1,000 mg, Oral, Q4H PRN    melatonin tab 3 mg, 3 mg, Oral, Nightly PRN    polyethylene glycol (PEG 3350) (Miralax) 17 g oral packet 17 g, 17 g, Oral, Daily PRN    sennosides (Senokot) tab 17.2 mg, 17.2 mg, Oral, Nightly PRN    bisacodyl (Dulcolax) 10 MG rectal suppository 10 mg, 10 mg, Rectal, Daily PRN    ondansetron (Zofran) 4 MG/2ML injection 4 mg, 4 mg, Intravenous, Q6H PRN    benzonatate (Tessalon) cap 200 mg, 200 mg, Oral, TID PRN    insulin aspart (NovoLOG) 100 Units/mL FlexPen 1-10 Units, 1-10 Units, Subcutaneous, TID AC and HS      Physical Exam:  Vital signs: Blood pressure 100/58, pulse 95, temperature 98.2 °F (36.8 °C), temperature source Oral, resp. rate 17, height 172.7 cm (5' 8\"), weight 170 lb (77.1 kg), SpO2 98%.    Physical Exam  Constitutional:       General: He is not in acute distress.  Cardiovascular:      Rate and Rhythm: Normal rate.   Pulmonary:      Effort: No respiratory distress.   Abdominal:      Palpations: Abdomen is soft.      Tenderness: There is no abdominal tenderness.   Musculoskeletal:         General: No swelling.      Comments: L great toe with ulcer and dry gangrene. Minimal erythema and mottling of L foot. Cold to touch. R BKA. Stump well appearing.    Neurological:      Mental Status: He is alert.         Laboratory Data: Reviewed in EMR    Microbiology: Reviewed in EMR    Radiology: Reviewed    Thank you for allowing us to participate in the care of this patient. Please do not hesitate to call if you have any questions.     We will continue to follow with you and will make further recommendations based on patient's progress.    Jalen Spencer MD   [facility]  [phone]  1/12/2025         [1] No Known Allergies

## 2025-01-12 NOTE — PLAN OF CARE
Assumed care for pt at 19:30 on 1/11    A&Ox4. C/o rash spreading on his abd. Dr. Redman notified, IV benadryl ordered and given. VSS on RA. NSR on tele. Pt asking to be disconnected from heparin d/t urgency for Bms. Rolling commode locked in place near bedside. Read Dr. De La Fuente's note to wife, pt now agreeable to continuous infusion after explaining PTT barely increased from previous draw. Around midnight, pt removed telemetry leads and pulse ox, saying they bothered him. Offered to place on back, pt said he didn't want them on at all. \"And would discharge if I had to.\" Left foot with gangrene to big toe, warm to touch. Offered to doppler extremely/leg but d/t it being at a later time pt declined, wanting sleep. Denies new symptoms in left foot. R prosthetic at bedside on couch.    Plan: Heparin gtt, angiogram Monday, vascular procedure Monday.     Refused bed alarm. Call light in reach. Able to make needs known.

## 2025-01-12 NOTE — PROGRESS NOTES
[facility]  Pulmonary/Critical Care progress note    Richy Goins Patient Status:  Observation    1960 MRN [id number]   Location [facility] 3NE-A Attending Dallas Drew MD   Hosp Day # 4 PCP Woody aDhl MD         History of Present Illness:     Stable overnight.  Feeling much better denies any shortness of breath, cough or chest pains.  To undergo left foot surgery tomorrow.    History:  Past Medical History:    Belching    Dialysis patient (HCC)    Diarrhea, unspecified    Esophageal reflux    Essential hypertension    Fatigue    Flatulence/gas pain/belching    High blood pressure    High cholesterol    History of blood transfusion    Hyperlipidemia    Indigestion    Irregular bowel habits    Night sweats    Osteoporosis    Peripheral vascular disease (HCC)    Pure hypercholesterolemia    Type II or unspecified type diabetes mellitus without mention of complication, not stated as uncontrolled    Visual impairment    reading glasses    Vomiting     Past Surgical History:   Procedure Laterality Date    Amputation toe,i-p jt Right     Av fistula revision, open      Below knee leg amputation Right     Colonoscopy N/A 2023    Procedure: COLONOSCOPY;  Surgeon: Sarmad Gonzalez MD;  Location:  ENDOSCOPY    Colonoscopy      Upper gi endoscopy,exam       Family History   Problem Relation Age of Onset    Heart Attack Father     Heart Disorder Father 57    Diabetes Maternal Grandfather     Diabetes Maternal Aunt       reports that he has never smoked. He has never used smokeless tobacco. He reports that he does not currently use alcohol. He reports that he does not use drugs.    Allergies:  Allergies[1]    Medications:    Current Facility-Administered Medications:     piperacillin-tazobactam (Zosyn) 3.375 g in dextrose 5% 100 mL IVPB-ADDV, 3.375 g, Intravenous, Q12H    heparin (Porcine) 82612 units/250mL infusion ACS/AFIB CONTINUOUS, 200-3,000 Units/hr, Intravenous, Continuous    insulin  degludec (Tresiba) 100 units/mL flextouch 8 Units, 8 Units, Subcutaneous, Nightly    diphenhydrAMINE (Benadryl) cap/tab 25 mg, 25 mg, Oral, Q6H PRN    diphenhydrAMINE (Benadryl) 50 mg/mL  injection 25 mg, 25 mg, Intravenous, Q6H PRN    sodium chloride 0.9 % IV bolus 100 mL, 100 mL, Intravenous, Q30 Min PRN **AND** albumin human (Albumin) 25% injection 25 g, 25 g, Intravenous, PRN Dialysis    atorvastatin (Lipitor) tab 20 mg, 20 mg, Oral, Nightly    metoclopramide (Reglan) 5 mg/mL injection 10 mg, 10 mg, Intravenous, Daily PRN    amLODIPine (Norvasc) tab 10 mg, 10 mg, Oral, Daily    aspirin DR tab 81 mg, 81 mg, Oral, Daily    pantoprazole (Protonix) DR tab 40 mg, 40 mg, Oral, QAM AC    insulin aspart (NovoLOG) 100 Units/mL FlexPen 1-68 Units, 1-68 Units, Subcutaneous, TID CC    glucose (Dex4) 15 GM/59ML oral liquid 15 g, 15 g, Oral, Q15 Min PRN **OR** glucose (Glutose) 40% oral gel 15 g, 15 g, Oral, Q15 Min PRN **OR** glucose-vitamin C (Dex-4) chewable tab 4 tablet, 4 tablet, Oral, Q15 Min PRN **OR** dextrose 50% injection 50 mL, 50 mL, Intravenous, Q15 Min PRN **OR** glucose (Dex4) 15 GM/59ML oral liquid 30 g, 30 g, Oral, Q15 Min PRN **OR** glucose (Glutose) 40% oral gel 30 g, 30 g, Oral, Q15 Min PRN **OR** glucose-vitamin C (Dex-4) chewable tab 8 tablet, 8 tablet, Oral, Q15 Min PRN    acetaminophen (Tylenol Extra Strength) tab 1,000 mg, 1,000 mg, Oral, Q4H PRN    melatonin tab 3 mg, 3 mg, Oral, Nightly PRN    polyethylene glycol (PEG 3350) (Miralax) 17 g oral packet 17 g, 17 g, Oral, Daily PRN    sennosides (Senokot) tab 17.2 mg, 17.2 mg, Oral, Nightly PRN    bisacodyl (Dulcolax) 10 MG rectal suppository 10 mg, 10 mg, Rectal, Daily PRN    ondansetron (Zofran) 4 MG/2ML injection 4 mg, 4 mg, Intravenous, Q6H PRN    benzonatate (Tessalon) cap 200 mg, 200 mg, Oral, TID PRN    insulin aspart (NovoLOG) 100 Units/mL FlexPen 1-10 Units, 1-10 Units, Subcutaneous, TID AC and HS    Intake/Output:    Intake/Output Summary  (Last 24 hours) at 1/12/2025 0415  Last data filed at 1/12/2025 0000  Gross per 24 hour   Intake 180.25 ml   Output 2500 ml   Net -2319.75 ml        Body mass index is 25.85 kg/m².    Review of Systems  Review of Systems:   A comprehensive 10 point review of systems was completed.  Pertinent positives and negatives noted in the the HPI.       Patient Vitals for the past 24 hrs:   BP Temp Temp src Pulse Resp SpO2 Height Weight   01/07/25 1554 -- 98.2 °F (36.8 °C) Oral -- -- 93 % -- --   01/07/25 1445 153/80 -- -- 97 (!) 27 96 % -- --   01/07/25 1330 149/84 -- -- 94 26 96 % -- --   01/07/25 1315 -- -- -- 94 (!) 30 97 % -- --   01/07/25 1215 -- -- -- 91 26 95 % -- --   01/07/25 1055 153/85 98.5 °F (36.9 °C) Temporal 97 16 97 % 5' 8\" (1.727 m) 170 lb (77.1 kg)     Vitals:    01/11/25 1200 01/11/25 1600 01/11/25 2050 01/12/25 0023   BP: 107/66 96/60 103/62 109/65   BP Location: Right arm Right arm Right arm Right arm   Pulse: 104 105 93 95   Resp: 26 20 23 14   Temp: 98.7 °F (37.1 °C) 98.6 °F (37 °C) 98.7 °F (37.1 °C) 98.5 °F (36.9 °C)   TempSrc: Oral Oral Oral Oral   SpO2: 98% 98% 96% 100%   Weight:       Height:       As per hospital     Physical Exam  Constitutional:       General: He is not in acute distress.     Appearance: Normal appearance. He is not ill-appearing or diaphoretic.   HENT:      Head: Normocephalic and atraumatic.      Nose: Nose normal. No congestion or rhinorrhea.      Mouth/Throat:      Mouth: Mucous membranes are moist.      Pharynx: Oropharynx is clear. No oropharyngeal exudate or posterior oropharyngeal erythema.      Comments: Mallampati class IV palate  Eyes:      Extraocular Movements: Extraocular movements intact.      Pupils: Pupils are equal, round, and reactive to light.   Cardiovascular:      Rate and Rhythm: Normal rate.      Pulses: Normal pulses.      Heart sounds: Normal heart sounds. No murmur heard.  Pulmonary:      Effort: Pulmonary effort is normal. No respiratory distress.       Breath sounds: Normal breath sounds. No wheezing or rhonchi.   Chest:      Chest wall: No tenderness.   Abdominal:      General: Abdomen is flat. Bowel sounds are normal.      Palpations: Abdomen is soft.   Musculoskeletal:         General: Normal range of motion.      Comments: Right prosthetic limb   Skin:     General: Skin is warm.   Neurological:      General: No focal deficit present.      Mental Status: He is alert and oriented to person, place, and time.   Psychiatric:         Mood and Affect: Mood normal.         Behavior: Behavior normal.         Thought Content: Thought content normal.         Judgment: Judgment normal.            Lab Data Review:  Recent Labs   Lab 01/08/25  0704 01/11/25  1322 01/11/25  1508   WBC 10.5 17.1* 18.3*   HGB 10.8* 12.9* 12.3*   HCT 33.9* 41.6 39.4   .0 224.0 221.0       Recent Labs   Lab 01/07/25  1127 01/08/25  0704 01/11/25  1321    140 137   K 4.3 3.7 3.8    103 101   CO2 20.0* 25.0 19.0*   * 58* 24*   CREATSERUM 9.45* 6.23* 5.09*   CA 8.0* 8.8 9.6   ALB  --  3.9 4.2   ALKPHO  --  87 91   ALT  --  <7* <7*   AST  --  10 11   * 100* 108*       Recent Labs   Lab 01/07/25  1127 01/08/25  0704   MG 2.1 2.0       Lab Results   Component Value Date    PHOS 6.4 (H) 01/08/2025        Recent Labs   Lab 01/11/25  1508 01/11/25  2133   PTT 33.6 35.4       No results for input(s): \"ABGPHT\", \"BDBISF6W\", \"KOUBA1D\", \"ABGHCO3\", \"ABGBE\", \"TEMP\", \"NOEMÍ\", \"SITE\", \"DEV\", \"THGB\" in the last 168 hours.    No results for input(s): \"TROP\", \"CKMB\" in the last 168 hours.    Cultures:   Hospital Encounter on 01/07/25   1. Body Fluid Cult Aerobic and Anaerobic     Status: None (Preliminary result)    Collection Time: 01/08/25  1:42 PM    Specimen: Pleural Fluid, Left; Body fluid, unspecified   Result Value Ref Range    Body Fluid Culture Result No Growth 3 Days N/A    Body Fluid Smear 2+ WBCs seen N/A    Body Fluid Smear No organisms seen N/A    Body Fluid Smear This  is a cytocentrifuged smear. N/A       Final Diagnosis:   Cytospin smears and cell block from left pleural fluid:  -Adequate for evaluation.  -No cytologic evidence of malignancy.  -Specimen composed of reactive mesothelial cells, histiocytes and mixed inflammatory cells.       Radiology personally reviewed:  US VENOUS DOPPLER LEG LEFT - DIAG IMG (CPT=93971)    Result Date: 1/11/2025  CONCLUSION:  No sign of DVT left leg.   LOCATION:  [facility]    Dictated by (CST): Juan Reaves MD on 1/11/2025 at 5:44 PM     Finalized by (CST): Juan Reaves MD on 1/11/2025 at 5:45 PM       XR CHEST AP PORTABLE  (CPT=71045)    Result Date: 1/10/2025  CONCLUSION:   1.  Overall slight decrease in the size of right lateral basilar airspace disease may represent improving atelectasis or pneumonia.   2. Small bilateral pleural effusions.    LOCATION:  [facility]      Dictated by (CST): Yoan Duvall MD on 1/10/2025 at 8:34 AM     Finalized by (CST): Yoan Duvall MD on 1/10/2025 at 8:35 AM         Patient Active Problem List   Diagnosis    Diabetic nephropathy associated with type 2 diabetes mellitus (HCC)    Primary hypertension    Type 2 diabetes mellitus with hyperglycemia (HCC)    Stage 3 chronic kidney disease (HCC)    Retinopathy due to secondary diabetes mellitus (HCC)    Atherosclerosis of native artery of extremity (HCC)    Stage 4 chronic kidney disease (HCC)    Acidosis    ESRD (end stage renal disease) (HCC)    ESRD on hemodialysis (HCC)    Pure hypercholesterolemia    Hemoptysis    Hypertensive urgency    Hematemesis with nausea    Hematemesis, unspecified whether nausea present    Acute gastritis with hemorrhage, unspecified gastritis type    Uremia    Acquired absence of right leg below knee (HCC)    Chronic kidney disease, stage 4 (severe) (HCC)    End-stage renal disease on hemodialysis (HCC)    Osteomyelitis of right foot (HCC)    Dyslipidemia    Diabetes mellitus type 2 in nonobese (HCC)    Unilateral complete BKA,  right, sequela (HCC)    Type 2 diabetes mellitus with diabetic peripheral angiopathy without gangrene (HCC)    Type 2 diabetes mellitus with diabetic chronic kidney disease (HCC)    Long term (current) use of aspirin    Hypertensive chronic kidney disease w stg 1-4/unsp chr kdny    Encounter for orthopedic aftercare following surgical amputation    Anemia in chronic kidney disease    Age-related osteoporosis without current pathological fracture    Acute kidney failure, unspecified (HCC)    CKD (chronic kidney disease), stage III (HCC)    Type 1 diabetes mellitus with ketoacidosis without coma (HCC)    Senile purpura (HCC)    Tinnitus, bilateral    Hypernatremia    Anemia    Acute kidney injury (HCC)    Acute renal failure (ARF) (HCC)    Metabolic acidosis    Hyperglycemia    Nausea    Elevated lipase    Azotemia    Pleural effusion    Anemia in ESRD (end-stage renal disease) (HCC)    Hyperphosphatemia    Dyspnea on exertion    Ischemic foot    Gangrene of toe of left foot (HCC)     64 year old male.  With history of GERD, hypertension, hyperlipidemia, type 2 diabetes mellitus, osteoporosis.  ESRD on hemodialysis.  The patient presented to the emergency department with complaints of generalized weakness and shortness of breath.  Patient reports that he missed one hemo-dialysis   The patient is noted to have large left pleural effusion so pulmonary consultation was obtained for further evaluation and management for possible thoracentesis    Assessment:  Shortness of breath resolved symptoms post thoracentesis: Stable  Large left pleural effusion status postthoracentesis with removal about 1500 mL of pleural fluid on 1/8/2025.  Suspect due to renal failure: Pleural fluid cytology negative for malignant cells:   Left great toe ulcer  Peripheral vascular disease  GERD  Hypertension  End-stage renal disease on hemodialysis  Anemia of chronic disease  Generalized weakness  Type 2 diabetes  mellitus  Hyperlipidemia  Osteoporosis      Plan:  Continue to monitor symptoms  Continue hemodialysis per nephrology  Continue incentive spirometry every 4 hours as possible that will help resorb pleural effusion  DVT prophylaxis:  heparin 5000 units subcutaneous every 12 hours    GI prophylaxis: Protonix 40 mg daily   Will follow for further recommendations      I  will now sign off. Please call as needed.     Thank you for consulting pulmonary/ Critical Care/ Sleep Medicine service      Thank You for allowing me to participate in this patient's care     Alfonso Rendon MD    Note to the patient: The 21st Century Cures Act makes medical notes like these available to patients in the interest of transparency. However, be advised that this is a medical document. It is intended as peer to peer communication. It is written in medical language and may contain abbreviations or verbiage that are unfamiliar. It may appear blunt or direct. Medical documents are intended to carry relevant information, facts as evident, and clinical opinion of the practitioner.      Disclaimer: Components of this note were documented using voice recognition system and are subject to errors not corrected at proofreading. Contact the author of this note for any clarifications          [1] No Known Allergies

## 2025-01-12 NOTE — PROGRESS NOTES
[facility]   part of [facility]     Hospitalist Progress Note     Richy Goins Patient Status:  Observation    1960 MRN [id number]   Location [facility] 3NE-A Attending Jeanne Redamn MD   Hosp Day # 4 PCP Woody Dahl MD     Chief Complaint: sob and weakness    Subjective:     Patient to cath lab- procedure not done, to be rescheduled with anesthesia next Wed  Developped macular rash arms groins chest back x 24 h    Objective:    Review of Systems:   A comprehensive review of systems was completed; pertinent positive and negatives stated in subjective.    Vital signs:  Temp:  [98.2 °F (36.8 °C)-98.7 °F (37.1 °C)] 98.4 °F (36.9 °C)  Pulse:  [] 95  Resp:  [14-23] 17  BP: ()/(58-68) 104/64  SpO2:  [96 %-100 %] 99 %    Physical Exam:    General: No acute distress  Respiratory: No wheezes, no rhonchi  Cardiovascular: S1, S2, regular rate and rhythm  Abdomen: Soft, Non-tender, non-distended, positive bowel sounds  Neuro: No new focal deficits.   Extremities: No edema      Diagnostic Data:    Labs:  Recent Labs   Lab 25  1127 25  0704 25  1322 25  1508 25  0429   WBC 12.0* 10.5 17.1* 18.3*  --    HGB 10.9* 10.8* 12.9* 12.3*  --    MCV 85.0 87.6 89.1 87.2  --    .0 239.0 224.0 221.0 250.0       Recent Labs   Lab 25  1127 25  0704 25  1321   * 100* 108*   * 58* 24*   CREATSERUM 9.45* 6.23* 5.09*   CA 8.0* 8.8 9.6   ALB  --  3.9 4.2    140 137   K 4.3 3.7 3.8    103 101   CO2 20.0* 25.0 19.0*   ALKPHO  --  87 91   AST  --  10 11   ALT  --  <7* <7*   BILT  --  0.3 0.4   TP  --  6.8 7.4       Estimated Glomerular Filtration Rate: 11.8 mL/min/1.73m2 (A) (by CKD-EPI based on SCr of 5.09 mg/dL (H)).    Recent Labs   Lab 25  1127   TROPHS 26       No results for input(s): \"PTP\", \"INR\" in the last 168 hours.               Microbiology    Hospital Encounter on 25   1. Body Fluid Cult Aerobic and Anaerobic      Status: None (Preliminary result)    Collection Time: 01/08/25  1:42 PM    Specimen: Pleural Fluid, Left; Body fluid, unspecified   Result Value Ref Range    Body Fluid Culture Result No Growth 3 Days N/A    Body Fluid Smear 2+ WBCs seen N/A    Body Fluid Smear No organisms seen N/A    Body Fluid Smear This is a cytocentrifuged smear. N/A         Imaging: Reviewed in Epic.    Medications:    triamcinolone   Topical BID    insulin degludec  12 Units Subcutaneous Nightly    piperacillin-tazobactam  3.375 g Intravenous Q12H    amLODIPine  10 mg Oral Daily    aspirin  81 mg Oral Daily    pantoprazole  40 mg Oral QAM AC    insulin aspart  1-68 Units Subcutaneous TID CC    insulin aspart  1-10 Units Subcutaneous TID AC and HS       Assessment & Plan:      # Large left pleural effusion and fluid overload   -s/p thoracentesis  #ESRD on HD  -s/p thoracentesis 1/8-1500 ml removed     #L great toe ulcer  #PVD  #hx R BKA  -suspect DM foot ulcer.   -No signs of cellulitis  -podiatry and vascular     #new non specific TWI on EKG  -no current CP but has had occasional chest tightness at home and PAGE as above  -echo and ST       #Hypertension stable on Norvasc     #Anemia of ESRD     #Generalized weakness secondary to missed HD     # DM2   -basal bolus insulin     #LDL 84  #macular rash-dc Eliquis and Lipitor the only new meds-benadryl steroids           Jeanne Redman MD    Supplementary Documentation:     Quality:  DVT Mechanical Prophylaxis:   SCDs,    DVT Pharmacologic Prophylaxis   Medication    heparin (Porcine) 82320 units/250mL infusion ACS/AFIB CONTINUOUS         DVT Pharmacologic prophylaxis: Aspirin 81 mg      Code Status: Full Code  Soni: No urinary catheter in place  Soni Duration (in days):   Central line:    [place]:     Discharge is dependent on: progress  At this point Mr. Goins is expected to be discharge to: home    The 21st Century Cures Act makes medical notes like these available to patients in the interest  of transparency. Please be advised this is a medical document. Medical documents are intended to carry relevant information, facts as evident, and the clinical opinion of the practitioner. The medical note is intended as peer to peer communication and may appear blunt or direct. It is written in medical language and may contain abbreviations or verbiage that are unfamiliar.

## 2025-01-12 NOTE — PLAN OF CARE
Assumed care at 0730  Patient alert, oriented x 4  VSS, RA throughout shift, no tele  Heparin gtt infusing per protocol, patient occasionally refusing PTT checks, educated on importance of monitoring this lab in relation to heparin gtt  L foot with no pedal pulse, L post tib by doppler  Denies pain  Up x 1 stand pivot to commode  Voiding in bathroom  (+) BM this shift  Tolerating diet well  IV abx  Redness noted to upper arms, groin, and stomach, Mds aware, ointment given  Call light within reach       Plan for angio tomorrow   Patient and family updated on plan of care

## 2025-01-12 NOTE — PROGRESS NOTES
[facility] Vascular Surgery Progress Note:    A/O x 3, Neuro intact. + Left post tibial/no pedal signal found with Doppler. Dr De La Fuente aware. Patient denies any pain.

## 2025-01-13 ENCOUNTER — APPOINTMENT (OUTPATIENT)
Dept: INTERVENTIONAL RADIOLOGY/VASCULAR | Facility: HOSPITAL | Age: 65
End: 2025-01-13
Attending: SURGERY
Payer: COMMERCIAL

## 2025-01-13 ENCOUNTER — TELEPHONE (OUTPATIENT)
Dept: VASCULAR SURGERY | Age: 65
End: 2025-01-13

## 2025-01-13 VITALS
DIASTOLIC BLOOD PRESSURE: 68 MMHG | TEMPERATURE: 98 F | RESPIRATION RATE: 17 BRPM | OXYGEN SATURATION: 94 % | HEART RATE: 95 BPM | BODY MASS INDEX: 25.76 KG/M2 | SYSTOLIC BLOOD PRESSURE: 122 MMHG | WEIGHT: 170 LBS | HEIGHT: 68 IN

## 2025-01-13 DIAGNOSIS — I73.9 PAD (PERIPHERAL ARTERY DISEASE) (CMD): Primary | ICD-10-CM

## 2025-01-13 LAB
ANION GAP SERPL CALC-SCNC: 17 MMOL/L (ref 0–18)
APTT PPP: 65.8 SECONDS (ref 23–36)
BUN BLD-MCNC: 68 MG/DL (ref 9–23)
CALCIUM BLD-MCNC: 9.1 MG/DL (ref 8.7–10.4)
CHLORIDE SERPL-SCNC: 100 MMOL/L (ref 98–112)
CO2 SERPL-SCNC: 21 MMOL/L (ref 21–32)
CREAT BLD-MCNC: 9.39 MG/DL
EGFRCR SERPLBLD CKD-EPI 2021: 6 ML/MIN/1.73M2 (ref 60–?)
GLUCOSE BLD-MCNC: 140 MG/DL (ref 70–99)
GLUCOSE BLD-MCNC: 144 MG/DL (ref 70–99)
GLUCOSE BLD-MCNC: 149 MG/DL (ref 70–99)
MAGNESIUM SERPL-MCNC: 2.3 MG/DL (ref 1.6–2.6)
OSMOLALITY SERPL CALC.SUM OF ELEC: 308 MOSM/KG (ref 275–295)
PLATELET # BLD AUTO: 245 10(3)UL (ref 150–450)
POTASSIUM SERPL-SCNC: 4.1 MMOL/L (ref 3.5–5.1)
SODIUM SERPL-SCNC: 138 MMOL/L (ref 136–145)

## 2025-01-13 PROCEDURE — 99232 SBSQ HOSP IP/OBS MODERATE 35: CPT | Performed by: INTERNAL MEDICINE

## 2025-01-13 PROCEDURE — 99238 HOSP IP/OBS DSCHRG MGMT 30/<: CPT | Performed by: HOSPITALIST

## 2025-01-13 RX ORDER — SODIUM CHLORIDE 9 MG/ML
INJECTION, SOLUTION INTRAVENOUS CONTINUOUS
Status: DISCONTINUED | OUTPATIENT
Start: 2025-01-13 | End: 2025-01-13

## 2025-01-13 RX ORDER — DIPHENHYDRAMINE HCL 25 MG
25 CAPSULE ORAL EVERY 6 HOURS PRN
Qty: 30 CAPSULE | Refills: 1 | Status: SHIPPED | OUTPATIENT
Start: 2025-01-13 | End: 2025-01-23

## 2025-01-13 RX ORDER — PREDNISONE 20 MG/1
20 TABLET ORAL 2 TIMES DAILY
Qty: 10 TABLET | Refills: 0 | Status: SHIPPED | OUTPATIENT
Start: 2025-01-13 | End: 2025-01-18

## 2025-01-13 RX ORDER — ALBUMIN (HUMAN) 12.5 G/50ML
25 SOLUTION INTRAVENOUS
Status: DISCONTINUED | OUTPATIENT
Start: 2025-01-14 | End: 2025-01-13

## 2025-01-13 RX ORDER — TRIAMCINOLONE ACETONIDE 1 MG/G
1 OINTMENT TOPICAL 2 TIMES DAILY
Qty: 1 EACH | Refills: 1 | Status: SHIPPED | OUTPATIENT
Start: 2025-01-13 | End: 2025-01-23

## 2025-01-13 RX ORDER — METHYLPREDNISOLONE SODIUM SUCCINATE 125 MG/2ML
60 INJECTION INTRAMUSCULAR; INTRAVENOUS ONCE
Status: COMPLETED | OUTPATIENT
Start: 2025-01-13 | End: 2025-01-13

## 2025-01-13 NOTE — PROGRESS NOTES
[facility]   part of [facility]     Hospitalist Progress Note     Richy Goins Patient Status:  Observation    1960 MRN [id number]   Location [facility] 3NE-A Attending Jeanne Redman MD   Hosp Day # 5 PCP Woody Dahl MD     Chief Complaint: sob and weakness    Subjective:     Patient to cath lab- procedure not done, to be rescheduled with anesthesia this am  Developped macular rash arms groins chest back x 48 h  Eliquis and Lipitor only new meds- on hold  Solumedrol iv again today    Objective:    Review of Systems:   A comprehensive review of systems was completed; pertinent positive and negatives stated in subjective.    Vital signs:  Temp:  [98 °F (36.7 °C)-99.4 °F (37.4 °C)] 98 °F (36.7 °C)  Resp:  [16-18] 17  BP: ()/(62-81) 125/67  SpO2:  [92 %-99 %] 93 %    Physical Exam:    General: No acute distress  Respiratory: No wheezes, no rhonchi  Cardiovascular: S1, S2, regular rate and rhythm  Abdomen: Soft, Non-tender, non-distended, positive bowel sounds  Neuro: No new focal deficits.   Extremities: No edema      Diagnostic Data:    Labs:  Recent Labs   Lab 25  1127 25  0704 25  1322 25  1508 25  0429 25  0455   WBC 12.0* 10.5 17.1* 18.3*  --   --    HGB 10.9* 10.8* 12.9* 12.3*  --   --    MCV 85.0 87.6 89.1 87.2  --   --    .0 239.0 224.0 221.0 250.0 245.0       Recent Labs   Lab 25  0704 25  1321 25  0456   * 108* 140*   BUN 58* 24* 68*   CREATSERUM 6.23* 5.09* 9.39*   CA 8.8 9.6 9.1   ALB 3.9 4.2  --     137 138   K 3.7 3.8 4.1    101 100   CO2 25.0 19.0* 21.0   ALKPHO 87 91  --    AST 10 11  --    ALT <7* <7*  --    BILT 0.3 0.4  --    TP 6.8 7.4  --        Estimated Glomerular Filtration Rate: 5.6 mL/min/1.73m2 (A) (by CKD-EPI based on SCr of 9.39 mg/dL (H)).    Recent Labs   Lab 25  1127   TROPHS 26       No results for input(s): \"PTP\", \"INR\" in the last 168 hours.                Microbiology    Hospital Encounter on 01/07/25   1. Body Fluid Cult Aerobic and Anaerobic     Status: None (Preliminary result)    Collection Time: 01/08/25  1:42 PM    Specimen: Pleural Fluid, Left; Body fluid, unspecified   Result Value Ref Range    Body Fluid Culture Result No Growth 4 Days N/A    Body Fluid Smear 2+ WBCs seen N/A    Body Fluid Smear No organisms seen N/A    Body Fluid Smear This is a cytocentrifuged smear. N/A         Imaging: Reviewed in Epic.    Medications:    triamcinolone   Topical BID    insulin degludec  12 Units Subcutaneous Nightly    piperacillin-tazobactam  3.375 g Intravenous Q12H    amLODIPine  10 mg Oral Daily    aspirin  81 mg Oral Daily    pantoprazole  40 mg Oral QAM AC    insulin aspart  1-68 Units Subcutaneous TID CC    insulin aspart  1-10 Units Subcutaneous TID AC and HS       Assessment & Plan:      # Large left pleural effusion and fluid overload   -s/p thoracentesis  #ESRD on HD  -s/p thoracentesis 1/8-1500 ml removed     #L great toe ulcer  #PVD  #hx R BKA  -suspect DM foot ulcer.   -No signs of cellulitis  -podiatry and vascular     #new non specific TWI on EKG  -no current CP but has had occasional chest tightness at home and PAGE as above  -echo and ST       #Hypertension stable on Norvasc     #Anemia of ESRD     #Generalized weakness secondary to missed HD     # DM2   -basal bolus insulin     #LDL 84  #macular rash-dc Eliquis and Lipitor the only new meds-benadryl steroids           Jeanne Redman MD    Supplementary Documentation:     Quality:  DVT Mechanical Prophylaxis:   SCDs,    DVT Pharmacologic Prophylaxis   Medication    heparin (Porcine) 31872 units/250mL infusion ACS/AFIB CONTINUOUS         DVT Pharmacologic prophylaxis: Aspirin 81 mg      Code Status: Full Code  Soni: No urinary catheter in place  Soni Duration (in days):   Central line:    [place]:     Discharge is dependent on: progress  At this point Mr. Goins is expected to be discharge to:  home    The 21st Century Cures Act makes medical notes like these available to patients in the interest of transparency. Please be advised this is a medical document. Medical documents are intended to carry relevant information, facts as evident, and the clinical opinion of the practitioner. The medical note is intended as peer to peer communication and may appear blunt or direct. It is written in medical language and may contain abbreviations or verbiage that are unfamiliar.

## 2025-01-13 NOTE — CONSULTS
[facility]    PATIENT'S NAME: JULIUS FOY   ATTENDING PHYSICIAN: Jeanne Redman M.D.   CONSULTING PHYSICIAN: Fermin De La Fuente M.D.   PATIENT ACCOUNT#:   [id number]    LOCATION:  [address] [place] [address]  MEDICAL RECORD #:   [id number]       YOB: 1960  ADMISSION DATE:       01/07/2025      CONSULT DATE:  01/13/2025    REPORT OF CONSULTATION    CONSULTATION FOLLOWUP     A 64-year-old F[place]ian male.  No new complaints.  He has gangrene at the tip of the left first toe.  He has also had some ischemic changes of the forefoot but basically is unchanged since he has been in the hospital.  I have reviewed his films with Dr. Hoang and Dr. Gavin.  Plan today for an angiogram, angioplasty, endovascular procedure if possible.  I have talked with Oskar Lundberg who works with [employer], part of [employer], and Dr. Len Hutchinson who works at [facility] with Dr. Deric Alfaro as the people who have done the most LimFlow procedures where there was a distal vein arterialization of the left foot.  My experience has been limited and I have had 2 amputations from this.  I told him this is a last ditch effort if we cannot do this endovascular.  Would refer to Dr. Hutchinson if he will accept the patient for the LimFlow procedure.  Described this to the patient with bypass going into the tibial vessel, with AV fistula between the posterior tibial artery and vein, and then with valvulotome used to lyse the valve going down the leg.  The patient had duplex ultrasound yesterday and it appears that there is no evidence for any deep vein thrombosis, that the veins are open.  He neurologically is still the same.  His vital signs are the same.  The patient aware that that would be a last ditch effort to salvage the foot if we are unsuccessful today.  I showed him the picture.  I then answered all his questions.  He is aware that primary amputation is still an option for him versus attempted revascularization procedure and that  they carry the risk of death, cardiac complications, bleeding, infection, graft thrombosis, limb loss, future graft thrombosis, future limb loss, worsening renal failure, multisystem organ failure, sepsis, cardiac arrest, wound healing.  All questions answered.      Dictated By Fermin De La Fuente M.D.  d: 01/13/2025 09:14:46  t: 01/13/2025 10:10:40  [facility] [id number]  JJW/    cc: Fermin De La Fuente M.D.

## 2025-01-13 NOTE — CONSULTS
[facility]    PATIENT'S NAME: JULIUS FOY   ATTENDING PHYSICIAN: Jeanne Redman M.D.   CONSULTING PHYSICIAN: Fermin De La Fuente M.D.   PATIENT ACCOUNT#:   [id number]    LOCATION:  [address] [place] [address]  MEDICAL RECORD #:   [id number]       YOB: 1960  ADMISSION DATE:       01/07/2025      CONSULT DATE:  01/13/2025    REPORT OF CONSULTATION    FOLLOWUP CONSULTATION    A 64-year-old white male with desert left foot with posterior tibial artery occluding at the ankle, no dorsal pedal artery, medial or lateral plantar artery or common plantar artery, with gangrene at the tip of the left first toe, had some erythema, ischemic changes at the dorsum of the foot, was placed on Zosyn empirically, developed a rash on his arms after this.  He was on Eliquis prior to this, then put onto IV heparin.  The patient was scheduled for an angiogram today to see if endovascular procedure could be attempted.  However, because it was delayed with a miscommunication from nursing supervisor on Saturday the 11th, the CV OR did not realize he was being scheduled and because of CV OR scheduling the hybrid room which was available would not be able to be used due to cases that were previously delayed, and the catheterization lab was not able to get me in until late in the afternoon.  The patient was somewhat upset, and I do not blame him, and he wants to leave now.  He wants to stop his heparin and will, therefore, switch to Eliquis 2.5 mg b.i.d.  The rash on his arm I think is related to his Zosyn.  We will give him another antibiotic and possible prednisone and let him go home.  I have already talked to Dr. Len Hutchinson, vascular surgeon, who is at [facility] with my partner Dr. Yoan Lopez, for transfer.  Dr. Hutchinson has done the most per [facility] LimFlow procedures in [place] and is well versed in this operation.  I told the patient that I have done the procedure before but have had amputations in both cases,  and I think that he needs to have somebody who actually knows exactly what is going on with this procedure, especially with the antegrade valvulotome in the deep posterior tibial vein.  Dr. Hutchinson will do special venous mapping while he is there.  Discussed with him that I sent pictures to him of the angiogram that was done, but because the patient was moving so much, it is very poor quality, but he has the best 3 images of the angiogram itself.  The patient's number was given to Dr. Hutchinson, and the patient also knows Dr. Hutchinson's phone number and will meet hopefully tomorrow and get things organized.  I told the patient if he worsens, let me know, but at this point right now I think his best bet to salvage this leg as a high risk operation would be the LimFlow procedure.  All questions answered.  The patient at this time does not want to be transferred directly to the hospital, wants to go home first, and hopefully everything stays stable.  Neurologically, he is still intact.  There is no evidence for any other gangrene or tissue loss other than the tip of the toe and there is no compartment syndrome.  He will go and show up at [facility] tomorrow to be arranged to have this procedure done.  All questions answered for the patient and his wife who was present as well as with Dr. Hutchinson.    Dictated By Fermin De La Fuente M.D.  d: 01/13/2025 15:04:03  t: 01/13/2025 15:12:24  [employer] [id number]  JJW/    cc: Fermin De La Fuente M.D.

## 2025-01-13 NOTE — PLAN OF CARE
Assumed care at 0730  A&Ox4, VSS, up with stand/pivot   No complaints of pain   Rash on body noted- all Mds aware, Kenalog cream applied   Pedal pulse per doppler   Heparin gtt infusing per order in AM- pt then refused infusion, Dr. De La Fuente notified   Patient refused first dose of PO eliquis- educated on importance of taking this medication   Medications, prescriptions and AVS reviewed with patient and wife at bedside- verbalized understanding   All questions answered     NURSING DISCHARGE NOTE    Discharged Home via Wheelchair.  Accompanied by Support staff  Belongings Taken by patient/family.

## 2025-01-13 NOTE — PROGRESS NOTES
[facility]   part of [facility]     Nephrology Progress Note    Richy Goins Patient Status:  Observation    1960 MRN [id number]   Location [facility] 3NE-A Attending Jeanne Redman MD   Hosp Day # 5 PCP Woody Dahl MD       SUBJECTIVE:  Feeling well, reports he is to have angio with possible revascularization today.       Current Facility-Administered Medications:     triamcinolone (Kenalog) 0.1 % ointment, , Topical, BID    insulin degludec (Tresiba) 100 units/mL flextouch 12 Units, 12 Units, Subcutaneous, Nightly    piperacillin-tazobactam (Zosyn) 3.375 g in dextrose 5% 100 mL IVPB-ADDV, 3.375 g, Intravenous, Q12H    heparin (Porcine) 31453 units/250mL infusion ACS/AFIB CONTINUOUS, 200-3,000 Units/hr, Intravenous, Continuous    diphenhydrAMINE (Benadryl) cap/tab 25 mg, 25 mg, Oral, Q6H PRN    diphenhydrAMINE (Benadryl) 50 mg/mL  injection 25 mg, 25 mg, Intravenous, Q6H PRN    metoclopramide (Reglan) 5 mg/mL injection 10 mg, 10 mg, Intravenous, Daily PRN    amLODIPine (Norvasc) tab 10 mg, 10 mg, Oral, Daily    aspirin DR tab 81 mg, 81 mg, Oral, Daily    pantoprazole (Protonix) DR tab 40 mg, 40 mg, Oral, QAM AC    insulin aspart (NovoLOG) 100 Units/mL FlexPen 1-68 Units, 1-68 Units, Subcutaneous, TID CC    glucose (Dex4) 15 GM/59ML oral liquid 15 g, 15 g, Oral, Q15 Min PRN **OR** glucose (Glutose) 40% oral gel 15 g, 15 g, Oral, Q15 Min PRN **OR** glucose-vitamin C (Dex-4) chewable tab 4 tablet, 4 tablet, Oral, Q15 Min PRN **OR** dextrose 50% injection 50 mL, 50 mL, Intravenous, Q15 Min PRN **OR** glucose (Dex4) 15 GM/59ML oral liquid 30 g, 30 g, Oral, Q15 Min PRN **OR** glucose (Glutose) 40% oral gel 30 g, 30 g, Oral, Q15 Min PRN **OR** glucose-vitamin C (Dex-4) chewable tab 8 tablet, 8 tablet, Oral, Q15 Min PRN    acetaminophen (Tylenol Extra Strength) tab 1,000 mg, 1,000 mg, Oral, Q4H PRN    melatonin tab 3 mg, 3 mg, Oral, Nightly PRN    polyethylene glycol (PEG 3350) (Miralax) 17 g oral  packet 17 g, 17 g, Oral, Daily PRN    sennosides (Senokot) tab 17.2 mg, 17.2 mg, Oral, Nightly PRN    bisacodyl (Dulcolax) 10 MG rectal suppository 10 mg, 10 mg, Rectal, Daily PRN    ondansetron (Zofran) 4 MG/2ML injection 4 mg, 4 mg, Intravenous, Q6H PRN    insulin aspart (NovoLOG) 100 Units/mL FlexPen 1-10 Units, 1-10 Units, Subcutaneous, TID AC and HS      Physical Exam:   /67 (BP Location: Right arm)   Pulse 95   Temp 98 °F (36.7 °C) (Oral)   Resp 17   Ht 5' 8\" (1.727 m)   Wt 170 lb (77.1 kg)   SpO2 93%   BMI 25.85 kg/m²   Temp (24hrs), Av.4 °F (36.9 °C), Min:98 °F (36.7 °C), Max:99.4 °F (37.4 °C)       Intake/Output Summary (Last 24 hours) at 2025 0931  Last data filed at 2025 0410  Gross per 24 hour   Intake 600 ml   Output 0 ml   Net 600 ml       Last 3 Weights   25 1623 170 lb (77.1 kg)   25 1055 170 lb (77.1 kg)   10/02/24 0458 181 lb (82.1 kg)   10/01/24 1056 175 lb 0.7 oz (79.4 kg)   10/01/24 0544 175 lb (79.4 kg)   24 1517 192 lb 3.2 oz (87.2 kg)   24 0936 196 lb (88.9 kg)   24 0917 176 lb (79.8 kg)     General: Alert and oriented in no apparent distress.  HEENT: No scleral icterus, MMM  Neck: Supple, no SARITA or thyromegaly  Cardiac: Regular rate and rhythm, S1, S2 normal, no murmur  Lungs: no rales, decreased breath sounds at the bases  Abdomen: Soft, non-tender.   Extremities: Without clubbing, cyanosis or edema. L AVF with bruit/thrill, R BKA  Neurologic: Alert and oriented, moving all extremities  Skin: Warm and dry, no rash    Recent Labs     25  1322 25  1508 25  0429 25  0455   WBC 17.1* 18.3*  --   --    HGB 12.9* 12.3*  --   --    MCV 89.1 87.2  --   --    .0 221.0 250.0 245.0       Recent Labs     25  1321 25  0456    138   K 3.8 4.1    100   CO2 19.0* 21.0   BUN 24* 68*   CREATSERUM 5.09* 9.39*   CA 9.6 9.1   MG  --  2.3       Recent Labs     25  132   ALT <7*   AST 11   ALB  4.2     Impression/Plan:      1.ESRD: Due to long-standing DM2. Receives HD TThS, will continue per usual outpatient schedule.       2. HTN: Continue home amlodipine and lisinopril     3. Anemia: Due to CKD. Continue ESAs with HD for goal hemoglobin 10-11.     4. PVD: h/o right BKA and with left toe ulceration. Angiogram with Dr. De La Fuente 1/9, plan for attempt at revascularization (?today)    5. L pleural effusion- s/p thora with 1.5L removed 1/8    Thank you for allowing me to participate in this patient's care. Please feel free to call me with any questions or concerns.     Marlene Ibrahim MD  01/13/25

## 2025-01-13 NOTE — PROGRESS NOTES
INFECTIOUS DISEASE  PROGRESS NOTE            [facility]    Richy Goins Patient Status:  Inpatient    1960 MRN [id number]   Former[facility] 7NE-A Attending Jeanne Redman MD   Hosp Day # 5 PCP Woody Dahl MD         Subjective:  : rash on the the extremities and trunk started on  and worsened  Zosyn was started on , last dose this morning    Objective:  Temp:  [97.8 °F (36.6 °C)-99.4 °F (37.4 °C)] 97.8 °F (36.6 °C)  Resp:  [16-18] 17  BP: ()/(62-81) 122/68  SpO2:  [92 %-95 %] 94 %      Vital signs:Temp:  [97.8 °F (36.6 °C)-99.4 °F (37.4 °C)] 97.8 °F (36.6 °C)  Resp:  [16-18] 17  BP: ()/(62-81) 122/68  SpO2:  [92 %-95 %] 94 %  HEENT: Moist mucous membranes. Extraocular muscles are intact.  Neck: supple no masses  Respiratory: Non labored, symmetric excursion, normal respirations  Cardiovascular: no irregularities in rhythm  Abdomen: Soft, nontender, nondistended.   Integument: left foot, toe dry gangrene  Labs:     COVID-19 Lab Results    COVID-19  Lab Results   Component Value Date    COVID19 Not Detected 10/01/2024    COVID19 Not Detected 2024    COVID19 Not Detected 2024       Pro-Calcitonin  Recent Labs   Lab 25  0704   PCT 0.53*       Cardiac  No results for input(s): \"TROP\", \"PBNP\" in the last 168 hours.    Creatinine Kinase  No results for input(s): \"CK\" in the last 168 hours.    Inflammatory Markers  Recent Labs   Lab 25  1127   DDIMER 4.88*       Recent Labs   Lab 25  1322 25  1508 25  0429 25  0455   RBC 4.67 4.52  --   --    HGB 12.9* 12.3*  --   --    HCT 41.6 39.4  --   --    MCV 89.1 87.2  --   --    MCH 27.6 27.2  --   --    MCHC 31.0 31.2  --   --    RDW 15.3 15.3  --   --    NEPRELIM 15.10*  --   --   --    WBC 17.1* 18.3*  --   --    .0 221.0   < > 245.0    < > = values in this interval not displayed.         Recent Labs   Lab 25  0704 25  1321 25  0456   * 108* 140*    BUN 58* 24* 68*   CREATSERUM 6.23* 5.09* 9.39*   CA 8.8 9.6 9.1   ALB 3.9 4.2  --     137 138   K 3.7 3.8 4.1    101 100   CO2 25.0 19.0* 21.0   ALKPHO 87 91  --    AST 10 11  --    ALT <7* <7*  --    BILT 0.3 0.4  --    TP 6.8 7.4  --        No results found for: \"VANCT\"  Microbiology    Hospital Encounter on 01/07/25   1. Body Fluid Cult Aerobic and Anaerobic     Status: None (Preliminary result)    Collection Time: 01/08/25  1:42 PM    Specimen: Pleural Fluid, Left; Body fluid, unspecified   Result Value Ref Range    Body Fluid Culture Result No Growth 4 Days N/A    Body Fluid Smear 2+ WBCs seen N/A    Body Fluid Smear No organisms seen N/A    Body Fluid Smear This is a cytocentrifuged smear. N/A       Problem list reviewed:  Patient Active Problem List   Diagnosis    Diabetic nephropathy associated with type 2 diabetes mellitus (HCC)    Hypertension    Type 2 diabetes mellitus with hyperglycemia (HCC)    Stage 3 chronic kidney disease (HCC)    Retinopathy due to secondary diabetes mellitus (HCC)    PAD (peripheral artery disease) (HCC)    Stage 4 chronic kidney disease (HCC)    Acidosis    ESRD (end stage renal disease) (HCC)    ESRD on hemodialysis (HCC)    Pure hypercholesterolemia    Hemoptysis    Hypertensive urgency    Hematemesis with nausea    Hematemesis, unspecified whether nausea present    Acute gastritis with hemorrhage, unspecified gastritis type    Uremia    Acquired absence of right leg below knee (HCC)    Chronic kidney disease, stage 4 (severe) (HCC)    End-stage renal disease on hemodialysis (HCC)    Osteomyelitis of right foot (HCC)    Dyslipidemia    Diabetes mellitus type 2 in nonobese (HCC)    Unilateral complete BKA, right, sequela (HCC)    Type 2 diabetes mellitus with diabetic peripheral angiopathy without gangrene (HCC)    Type 2 diabetes mellitus with diabetic chronic kidney disease (HCC)    Long term (current) use of aspirin    [facility]    Encounter for orthopedic aftercare following surgical amputation    Anemia in chronic kidney disease    Age-related osteoporosis without current pathological fracture    Acute kidney failure, unspecified (HCC)    CKD (chronic kidney disease), stage III (HCC)    Type 1 diabetes mellitus with ketoacidosis without coma (HCC)    Senile purpura (HCC)    Tinnitus, bilateral    Hypernatremia    Anemia    Acute kidney injury (HCC)    Acute renal failure (ARF) (HCC)    Metabolic acidosis    Hyperglycemia    Nausea    Elevated lipase    Azotemia    Pleural effusion    Anemia in ESRD (end-stage renal disease) (HCC)    Hyperphosphatemia    Dyspnea on exertion    Ischemic foot    Gangrene of toe of left foot (HCC)             ASSESSMENT/PLAN:  1. Rash cw drug eruption  Possible related to zosyn that was started 1 day piror to onset of rash  -STOP zosyn  Was started on steroids per hospitalist    2. Elevated WBC  WBC can go up from steroids that were started yesterday    3. PVD per vascular    4. Dry gangrene can follow off antibiotics    DW Dr. Redman she is also evaluating for other meds that could be causing rash  Add zosyn to allergy list      Ramon Zacarias MD, MD [facility]  [phone]

## 2025-01-13 NOTE — PAYOR COMM NOTE
1/10 thru 1/12  CONTINUED STAY REVIEW    Payor: [facility]  Subscriber #:  [id number]  Authorization Number: [id number]    Admit date: 1/8/25  Admit time:  3:52 PM           MEDICATIONS ADMINISTERED IN LAST 1 DAY:  amLODIPine (Norvasc) tab 10 mg       Date Action Dose Route User    1/13/2025 0907 Given 10 mg Oral Maria Del Carmen Renee RN          aspirin DR tab 81 mg       Date Action Dose Route User    1/13/2025 0907 Given 81 mg Oral Maria Del Carmen Renee RN          heparin (Porcine) 75823 units/250mL infusion ACS/AFIB CONTINUOUS       Date Action Dose Route User    1/13/2025 0950 New Bag 1,500 Units/hr Intravenous Maria Del Carmen Renee RN    1/12/2025 2252 Hi-Risk Rate/Dose Change 1,500 Units/hr Intravenous Karishma Campbell RN    1/12/2025 1535 Hi-Risk Rate/Dose Change 1,350 Units/hr Intravenous Manuela Mcconnell RN    1/12/2025 1532 New Bag 1,200 Units/hr Intravenous Manuela Mcconnell RN          diphenhydrAMINE (Benadryl) cap/tab 25 mg       Date Action Dose Route User    1/12/2025 2210 Given 25 mg Oral Karishma Campbell RN          insulin aspart (NovoLOG) 100 Units/mL FlexPen 1-68 Units       Date Action Dose Route User    1/12/2025 1851 Given 1 Units Subcutaneous (Left Lower Abdomen) Manuela Mcconnell RN    1/12/2025 1430 Given 1 Units Subcutaneous (Right Lower Abdomen) Manuela Mcconnell RN          insulin aspart (NovoLOG) 100 Units/mL FlexPen 1-10 Units       Date Action Dose Route User    1/12/2025 2209 Given 10 Units Subcutaneous (Right Lower Abdomen) Karishma Campbell RN    1/12/2025 1850 Given 9 Units Subcutaneous (Left Lower Abdomen) Manuela Mcconnell RN    1/12/2025 1430 Given 2 Units Subcutaneous (Right Lower Abdomen) Manuela Mcconnell RN          insulin degludec (Tresiba) 100 units/mL flextouch 12 Units       Date Action Dose Route User    1/12/2025 2045 Given 12 Units Subcutaneous (Right Lower Abdomen) Shannan, Jeeyoon, RN          melatonin tab 3 mg       Date Action Dose Route User    1/12/2025 2210 Given 3 mg Oral Shannan  ARAM Schwarz          methylPREDNISolone sodium succinate (Solu-MEDROL) injection 60 mg       Date Action Dose Route User    1/13/2025 1153 Given 60 mg Intravenous Maria Del Carmen Renee RN          pantoprazole (Protonix) DR tab 40 mg       Date Action Dose Route User    1/13/2025 0611 Given 40 mg Oral Karishma Campbell RN          piperacillin-tazobactam (Zosyn) 3.375 g in dextrose 5% 100 mL IVPB-ADDV       Date Action Dose Route User    1/13/2025 0411 New Bag 3.375 g Intravenous Karishma Campbell RN    1/12/2025 1747 New Bag 3.375 g Intravenous Manuela Mcconnell RN          triamcinolone (Kenalog) 0.1 % ointment       Date Action Dose Route User    1/13/2025 0907 Given (none) Topical Maria Del Carmen Renee RN    1/12/2025 2048 Given (none) Topical Karishma Campbell RN    1/12/2025 1436 Given (none) Topical Manuela Mcconnell, RN            Vitals (last day)       Date/Time Temp Pulse Resp BP SpO2 Weight O2 Device O2 Flow Rate (L/min) [facility]    01/13/25 1130 97.8 °F (36.6 °C) -- 17 122/68 94 % -- None (Room air) --     01/13/25 0800 98 °F (36.7 °C) -- 17 125/67 93 % -- None (Room air) 0 L/min     01/13/25 0410 98.1 °F (36.7 °C) -- 16 110/81 92 % -- None (Room air) 0 L/min KS    01/12/25 2330 98.2 °F (36.8 °C) -- 16 121/77 94 % -- None (Room air) 0 L/min KS    01/12/25 1920 98 °F (36.7 °C) -- 18 114/72 95 % -- None (Room air) 0 L/min KS    01/12/25 1630 99.4 °F (37.4 °C) -- 16 96/62 -- -- None (Room air) --     01/12/25 1130 98.4 °F (36.9 °C) -- 17 104/64 99 % -- None (Room air) -- BH    01/12/25 0800 98.2 °F (36.8 °C) 95 17 100/58 98 % -- None (Room air) -- BH    01/12/25 0500 98.2 °F (36.8 °C) 95 17 107/68 98 % -- None (Room air) -- SB    01/12/25 0023 98.5 °F (36.9 °C) 95 14 109/65 100 % -- None (Room air) -- SB       1/10 PULMONARY NOTE  Shortness of breath improved.     itals:     01/10/25 0000 01/10/25 0400 01/10/25 0800 01/10/25 0900   BP: 121/80 137/83 141/81     BP Location: Right arm Right arm Right arm     Pulse: 100 88 98 96    Resp:    Temp: 98.1 °F (36.7 °C) 98 °F (36.7 °C) 98.2 °F (36.8 °C)     TempSrc: Temporal Temporal Temporal     SpO2: 94% 96% 98%         Assessment:  Shortness of breath but patient is currently on room air: Improving dyspnea postthoracentesis: Clinically stable but still with mild dyspnea  Large left pleural effusion suspect due to renal failure: Pleural fluid cytology negative for malignant cells: Chest x-ray bilateral small pleural effusion  Left great toe ulcer  Peripheral vascular disease  GERD  Hypertension  End-stage renal disease on hemodialysis  Anemia of chronic disease  Generalized weakness  Type 2 diabetes mellitus  Hyperlipidemia  Osteoporosis        Plan:  Continue to monitor symptoms  Continue hemodialysis per nephrology  Add incentive spirometry every 4 hours as possible that will help resorb pleural effusion  DVT prophylaxis:  heparin 5000 units subcutaneous every 12 hours    GI prophylaxis: Protonix 40 mg daily   Will follow for further recommendations  Discharge planning     1/10 CARDIOLOGY NOTE    Subjective:  Feels much better than when admitted.  NM stress without perfusion defect today.  No chest pain or palpitations. Denies dyspnea. Plan for dialysis tomorrow then vascular intervention next week     Objective:  Physical Exam:   /81 (BP Location: Right arm)   Pulse 94   Temp 98.2 °F (36.8 °C) (Temporal)   Resp (!) 32   Ht 5' 8\" (1.727 m)   Wt 170 lb (77.1 kg)   SpO2 98%   BMI 25.85 kg/m²   Temp (24hrs), Av °F (36.7 °C), Min:97.5 °F (36.4 °C), Max:98.2 °F (36.8 °C)        Assessment/Plan:     Dyspnea at presentation improved following thoracentesis and H  Resolved  PAD w/ ulceration L greater toe  Dr. De La Fuente following plan for revascularization with anesthesia next week  On Statin, Eliquis 2.5mg BID and ASA 81 mg daily  L pleural effusion s/p 1,500 ml thoracentesis  Chronic RBBB w/ new TWI anterior and lateral leads  Lexiscan stress 1/10 without reversible  ischemia  LVEF preserved on echocardiogram without regional wall motion abnormalities reported  ESRD on HD  Planned for tomorrow  Chronic anemia - stable  DM II  HTN  BP at goal today  Amlodipine 10 mg daily  HLD  Statin     PLAN  No reversible ischemia on NM Stress testing today w/ echocardiogram indication preserved LVEF  The patient is at elevated but acceptable risk for surgical intervention given his multiple cardiac risk factors: taking his stress test findings above into consideration, no immediate cardiac testing/imaging would lesson his risk for semi-urgent vascular surgery for his PAD and ulcer at this time.  We will follow peripherally from a cardiology perspective. Please contact us next week when he is readmitted if we can help further.  Discussed with Dr. Telles    1/10 VASCUALR CONSULT NOTE    FOLLOWUP CONSULTATION     A 64-year-old white male, right now currently getting a stress test for possible future revascularization.  He is scheduled tentatively for next Wednesday.  Nurse talked to me after a discussion with hospitalist that he may have to go home and come back in.  Unable to get him in any earlier.  He will need an open cutdown of the superficial femoral artery, possible endovascular procedure, possible bypass.  He needed a stress test to evaluate for this.  I had discussed this briefly with the patient before.  I am thinking about placing him on Eliquis 2.5 mg b.i.d. until the surgery start time.    1/10 HOSPITALIST NOTE       Subjective:  Patient to cath lab- procedure not done, to be rescheduled with anesthesia next Wed St this am    Vital signs:  Temp:  [98 °F (36.7 °C)-98.2 °F (36.8 °C)] 98.2 °F (36.8 °C)  Pulse:  [] 94  Resp:  [18-32] 32  BP: (121-151)/(79-83) 141/81  SpO2:  [91 %-98 %] 98 %          Assessment & Plan:  # Large left pleural effusion and fluid overload   -s/p thoracentesis  #ESRD on HD  -s/p thoracentesis 1/8-1500 ml removed     #L great toe ulcer  #PVD  #hx R  BKA  -suspect DM foot ulcer.   -No signs of cellulitis  -podiatry and vascular     #new non specific TWI on EKG  -no current CP but has had occasional chest tightness at home and PAGE as above  -echo and ST        #Hypertension stable on Norvasc     #Anemia of ESRD     #Generalized weakness secondary to missed HD     # DM2   -basal bolus insulin     #Hyperlipidemia        1/10  PODIATRY NOTE    History of Present Illness:  Richy Goins is a a(n) 64 year old male with DM, PAD, and ESRD was seen at bedside this afternoon for follow up of left great toe ulcer/ischemic changes to left foot.  He is resting comfortably in bed and is without complaints at this time. No other complaints are mentioned.      Impression and Plan:   #Left great toe ulcer with skin breakdown only  #Ischemic changes to left foot     -Patient examined, chart history reviewed.  -Left foot inspected--concern for acute ischemic changes to left foot. Dusky appearance of hallux and changes to left forefoot.  -Arterial duplex reviewed--Dr. De La Fuente following. Plans for intervention next week. Appreciate vascular eval.  -Discussed with patient  that we will need to closely watch left foot and await vascular testing/input. He is at risk for developing gangrene/limb loss, which has happened to his RLE in the past.  -Today left great toe painted with betadine. Can receive similar dressing changes daily. Should keep foot dry.  -OK to ambulate with post op shoe.  -No current acute SOI or intervention planned to left foot from podiatry standpoint. Will await vascular work up and continue to closely monitor.  -All questions answered to satisfaction.    1/11 PULMONARY NOTE  Stable overnight     Vitals:     01/10/25 1945 01/10/25 2300 01/11/25 0420 01/11/25 0800   BP: 120/72 126/78 126/74 122/69   BP Location: Right arm Right arm Right arm Right arm   Pulse: 96 97 96 96   Resp: 25 (!) 27 23 20   Temp: 98 °F (36.7 °C) 98.3 °F (36.8 °C) 98.8 °F (37.1 °C) 98.6 °F  (37 °C)   TempSrc: Oral Oral Oral Oral   SpO2: 95% 93% 96% 96%       Assessment:  Shortness of breath but patient is currently on room air: Improving dyspnea postthoracentesis: Stable  Large left pleural effusion suspect due to renal failure: Pleural fluid cytology negative for malignant cells: Chest x-ray bilateral small pleural effusion  Left great toe ulcer  Peripheral vascular disease  GERD  Hypertension  End-stage renal disease on hemodialysis  Anemia of chronic disease  Generalized weakness  Type 2 diabetes mellitus  Hyperlipidemia  Osteoporosis        Plan:  Continue to monitor symptoms  Continue hemodialysis per nephrology  Continue incentive spirometry every 4 hours as possible that will help resorb pleural effusion  DVT prophylaxis:  heparin 5000 units subcutaneous every 12 hours    GI prophylaxis: Protonix 40 mg daily   Will follow for further recommendations    1/11 VASCULAR SURGERY NOTE    FOLLOWUP CONSULTATION     The patient was to be discharged home today for possibility of revascularization on Wednesday with the use of a hybrid room; however, his foot looks worse today than it was yesterday.  He is demarcating in his left first toe with gangrene at the very tip, and he has some distal dependent rubor of the forefoot.  The patient has no Doppler flow into the area of the foot.  All major vessels are occluded.  I do not think he is a candidate for a distal venous arterialization procedure since the forefoot is already compromised and would recommend an endovascular procedure to attempt to open up the arterial flow going into the foot which will be a real challenge.  The patient and his wife who was present at bedside are aware that there is no dorsal pedal artery, crural artery, common plantar, medial plantar, lateral plantar artery.  Only ________ seen at the top of the foot.  The case was discussed with Dr. Gavin, and we will have him involved.  Scheduled for tentatively on Monday afternoon.  We  will place him on heparin for right now and hold off his Eliquis.  I offered him a second opinion if he wants with Dr. Best, but I am not sure when that would be done.  There is concern that he could have worsening foot problems since he has worsened since he has been in the hospital.  I offered a second opinion with Dr. Coello who did his right leg.  He is adamant that he does not want to see him.  I told him that he is as high risk as possible for limb loss based upon the anatomy of any vessel and seen in the plantar arch.  Will do a cutdown on the superficial femoral artery, attempt to do an endovascular procedure, possible bypass.  Risks and complications were explained to the patient and the wife.  All questions were answered.    Consult dictated- offered primary amputation- patient refuses- while on Apixaban- foot appears to have worsened- second opinion offered to dolores Best/ [facility]- not sure if enough time to get to [facility]-  Patient refuses Dr. Coello. Reviewed films / patient with Dr. Gavin- Patient aware that he has no major plantar artery- very high risk for major limb amputation- Discussed DVA- but worried forefoot already compromised.     1/11 NEPHROLOGY NOTE    SUBJECTIVE:  No acute events; HD today           Impression/Plan:       1.ESRD: Due to long-standing DM2. Receives HD TThS, will continue per usual outpatient schedule. Tx today; 2.5 L UF     2. HTN: Continue home amlodipine and lisinopril     3. Anemia: Due to CKD. Continue ESAs with HD for goal hemoglobin 10-11.     4. PVD: h/o right BKA and with left toe ulceration. Angiogram with Dr. De La Fuente 1/9, plan for attempt at revascularization possibly next week (?wed)     5. L pleural effusion- s/p thora with 1.5L removed 1/8 1/12 PULMONARY NOTE       History of Present Illness:     Stable overnight.  Feeling much better denies any shortness of breath, cough or chest pains.  To undergo left foot surgery tomorrow.    Vitals:      01/11/25 1200 01/11/25 1600 01/11/25 2050 01/12/25 0023   BP: 107/66 96/60 103/62 109/65   BP Location: Right arm Right arm Right arm Right arm   Pulse: 104 105 93 95   Resp: 26 20 23 14   Temp: 98.7 °F (37.1 °C) 98.6 °F (37 °C) 98.7 °F (37.1 °C) 98.5 °F (36.9 °C)   TempSrc: Oral Oral Oral Oral   SpO2: 98% 98% 96% 100%   Weight:                  Lab Data Review:        Recent Labs   Lab 01/08/25  0704 01/11/25  1322 01/11/25  1508   WBC 10.5 17.1* 18.3*   HGB 10.8* 12.9* 12.3*   HCT 33.9* 41.6 39.4   .0 224.0 221.0               Recent Labs   Lab 01/07/25  1127 01/08/25  0704 01/11/25  1321    140 137   K 4.3 3.7 3.8    103 101   CO2 20.0* 25.0 19.0*   * 58* 24*   CREATSERUM 9.45* 6.23* 5.09*   CA 8.0* 8.8 9.6   ALB  --  3.9 4.2   ALKPHO  --  87 91   ALT  --  <7* <7*   AST  --  10 11   * 100* 108*       Assessment:  Shortness of breath resolved symptoms post thoracentesis: Stable  Large left pleural effusion status postthoracentesis with removal about 1500 mL of pleural fluid on 1/8/2025.  Suspect due to renal failure: Pleural fluid cytology negative for malignant cells:   Left great toe ulcer  Peripheral vascular disease  GERD  Hypertension  End-stage renal disease on hemodialysis  Anemia of chronic disease  Generalized weakness  Type 2 diabetes mellitus  Hyperlipidemia  Osteoporosis        Plan:  Continue to monitor symptoms  Continue hemodialysis per nephrology  Continue incentive spirometry every 4 hours as possible that will help resorb pleural effusion  DVT prophylaxis:  heparin 5000 units subcutaneous every 12 hours    GI prophylaxis: Protonix 40 mg daily   Will follow for further recommendations    1/12 ID CONSULT NOTE       Reason for Consultation:  Concern for L foot cellulitis     ASSESSMENT:     Antibiotics:   (Zosyn)     #Severe PAD with concern for limb ischemia  #Erythema on dorsum of L foot - consistent with ischemic changes.   #L great toe with dry gangrene  #Diffuse  rash prior to initiation of antibiotics   #L pleural effusion  #H/o R BKA  #ESRD on HD  #DM        PLAN:     -Comfortable stopping antibiotics  -Monitor off antibiotics  -Management of PVD per vascular surgery  -Follow fever curve, wbc  -Reviewed labs, micro, imaging reports, available old records  -Discussed with primary attending and RN     History of Present Illness:  Richy Goins is a a(n) 64 year old male DM, ESRD on HD, R BKA, PVD who presents with PAGE. Missed 2 dialysis sessions in a row and found to have L pleural effusion who underwent thoracentesis. This week he's noticed his L foot become red with burning.     1/12 NEPHROLOGY NOTE       Impression/Plan:       1.ESRD: Due to long-standing DM2. Receives HD TThS, will continue per usual outpatient schedule.       2. HTN: Continue home amlodipine and lisinopril     3. Anemia: Due to CKD. Continue ESAs with HD for goal hemoglobin 10-11.     4. PVD: h/o right BKA and with left toe ulceration. Angiogram with Dr. De La Fuente 1/9, plan for attempt at revascularization possibly next week (?wed)     5. L pleural effusion- s/p thora with 1.5L removed 1/8 1/12 HOSPITALIST NOTE    Subjective:  Patient to cath lab- procedure not done, to be rescheduled with anesthesia next Wed  Developped macular rash arms groins chest back x 24 h        Objective:  Review of Systems:   A comprehensive review of systems was completed; pertinent positive and negatives stated in subjective.     Vital signs:  Temp:  [98.2 °F (36.8 °C)-98.7 °F (37.1 °C)] 98.4 °F (36.9 °C)  Pulse:  [] 95  Resp:  [14-23] 17  BP: ()/(58-68) 104/64  SpO2:  [96 %-100 %] 99 %     Physical Exam:    General: No acute distress  Respiratory: No wheezes, no rhonchi  Cardiovascular: S1, S2, regular rate and rhythm  Abdomen: Soft, Non-tender, non-distended, positive bowel sounds  Neuro: No new focal deficits.   Extremities: No edema        Diagnostic Data:    Labs:          Recent Labs   Lab  01/07/25  1127 01/08/25  0704 01/11/25  1322 01/11/25  1508 01/12/25  0429   WBC 12.0* 10.5 17.1* 18.3*  --    HGB 10.9* 10.8* 12.9* 12.3*  --    MCV 85.0 87.6 89.1 87.2  --    .0 239.0 224.0 221.0 250.0       Medications:   Scheduled Medications    triamcinolone   Topical BID    insulin degludec  12 Units Subcutaneous Nightly    piperacillin-tazobactam  3.375 g Intravenous Q12H    amLODIPine  10 mg Oral Daily    aspirin  81 mg Oral Daily    pantoprazole  40 mg Oral QAM AC    insulin aspart  1-68 Units Subcutaneous TID CC    insulin aspart  1-10 Units Subcutaneous TID AC and HS               Assessment & Plan:  # Large left pleural effusion and fluid overload   -s/p thoracentesis  #ESRD on HD  -s/p thoracentesis 1/8-1500 ml removed     #L great toe ulcer  #PVD  #hx R BKA  -suspect DM foot ulcer.   -No signs of cellulitis  -podiatry and vascular     #new non specific TWI on EKG  -no current CP but has had occasional chest tightness at home and PAGE as above  -echo and ST        #Hypertension stable on Norvasc     #Anemia of ESRD     #Generalized weakness secondary to missed HD     # DM2   -basal bolus insulin     #LDL 84  #macular rash-dc Eliquis and Lipitor the only new meds-benadryl steroids

## 2025-01-13 NOTE — PROGRESS NOTES
Progress Note  Richy Goins Patient Status:  Inpatient    1960 MRN [id number]   Location [facility] 7NE-A Attending Jeanne Redman MD   Hosp Day # 5 PCP Woody Dahl MD     Subjective:  Worsening ischemic changes to foot over the weekend. Plan for angio with possible revascularization with Dr. De La Fuente- the patient reports today  Developed rash - statin and Eliquis were - new meds    Objective:  Physical Exam:   /81 (BP Location: Right arm)   Pulse 95   Temp 98.1 °F (36.7 °C) (Oral)   Resp 16   Ht 5' 8\" (1.727 m)   Wt 170 lb (77.1 kg)   SpO2 92%   BMI 25.85 kg/m²   Temp (24hrs), Av.4 °F (36.9 °C), Min:98 °F (36.7 °C), Max:99.4 °F (37.4 °C)       Intake/Output Summary (Last 24 hours) at 2025 0746  Last data filed at 2025 0410  Gross per 24 hour   Intake 600 ml   Output 0 ml   Net 600 ml     Wt Readings from Last 3 Encounters:   25 170 lb (77.1 kg)   10/02/24 181 lb (82.1 kg)   24 192 lb 3.2 oz (87.2 kg)     Telemetry: Off tele currently  General: Alert and oriented in no apparent distress lying flat comfortably on room air.  HEENT: No focal deficits.  Neck: No JVD, carotids 2+ no bruits.  Cardiac: Regular rate and rhythm, S1, S2 normal, rub or gallop.  Lungs: Clear without wheezes, rales, rhonchi or dullness.  Normal excursions and effort.  Abdomen: Soft, non-tender.   Extremities: Without clubbing, cyanosis or edema. Fistula LUE +thrill.  R BKA. L foot warm black dry ulceration greater toe with dusky discoloration of the medial plantar foot.  Neurologic: Alert and oriented, normal affect.  Skin: Warm and dry. Erythematous rash upper extremities and inferior trunk      Intake/Output:    Intake/Output Summary (Last 24 hours) at 2025 0746  Last data filed at 2025 0410  Gross per 24 hour   Intake 600 ml   Output 0 ml   Net 600 ml       Last 3 Weights   25 1623 170 lb (77.1 kg)   25 1055 170 lb (77.1 kg)   10/02/24 0458 181 lb (82.1 kg)   10/01/24  1056 175 lb 0.7 oz (79.4 kg)   10/01/24 0544 175 lb (79.4 kg)   08/19/24 1517 192 lb 3.2 oz (87.2 kg)       Labs:  Recent Labs   Lab 01/08/25  0704 01/11/25  1321 01/13/25  0456   * 108* 140*   BUN 58* 24* 68*   CREATSERUM 6.23* 5.09* 9.39*   EGFRCR 9* 12* 6*   CA 8.8 9.6 9.1    137 138   K 3.7 3.8 4.1    101 100   CO2 25.0 19.0* 21.0     Recent Labs   Lab 01/07/25  1127 01/08/25  0704 01/11/25  1322 01/11/25  1508 01/12/25  0429 01/13/25  0455   RBC 3.93 3.87 4.67 4.52  --   --    HGB 10.9* 10.8* 12.9* 12.3*  --   --    HCT 33.4* 33.9* 41.6 39.4  --   --    MCV 85.0 87.6 89.1 87.2  --   --    MCH 27.7 27.9 27.6 27.2  --   --    MCHC 32.6 31.9 31.0 31.2  --   --    RDW 14.9 14.9 15.3 15.3  --   --    NEPRELIM 10.60* 8.57* 15.10*  --   --   --    WBC 12.0* 10.5 17.1* 18.3*  --   --    .0 239.0 224.0 221.0 250.0 245.0         Recent Labs   Lab 01/07/25  1127   TROPHS 26       Diagnostics:   ECHOCARDIOGRAM 1/8/2025:  1. Left ventricle: The cavity size was normal. Wall thickness was normal.      Systolic function was normal. The estimated ejection fraction was 50-55%, by visual assessment. No diagnostic evidence for regional wall motion abnormalities. Left ventricular diastolic function parameters were normal for the patient's age.   2. Left atrium: The left atrial volume was normal.   3. Aortic root: The aortic root was 3.8cm diameter.   4. Ascending aorta: The ascending aorta was 3.7cm diameter.   Impressions:  This study is compared with previous dated 06/01/2023: No significant change. Left ventricle less hyperdynamic than prior.       Medications:   triamcinolone   Topical BID    insulin degludec  12 Units Subcutaneous Nightly    piperacillin-tazobactam  3.375 g Intravenous Q12H    amLODIPine  10 mg Oral Daily    aspirin  81 mg Oral Daily    pantoprazole  40 mg Oral QAM AC    insulin aspart  1-68 Units Subcutaneous TID CC    insulin aspart  1-10 Units Subcutaneous TID AC and HS       continuous dose heparin 1,500 Units/hr (01/12/25 4652)       Assessment/Plan:    PAD w/ ulceration and dry gangrene of L foot  Dr. De La Fuente following plan for revascularization with anesthesia this week  High risk for limb loss  Deemed elevated but acceptable cardiac risk for urgent vascular procedure without further cardiac testing advised prior.  Heparin (Eliquis held), and ASA  Dyspnea at presentation w/ L pleural effusion s/p 1,500 ml thoracentesis last week  Chronic RBBB w/ new TWI anterior and lateral leads  Lexiscan stress 1/10 without reversible ischemia  LVEF preserved on echocardiogram without regional wall motion abnormalities reported  Chest pain free  ESRD on HD  Nephrology following  Chronic anemia - stable  Rash  Reportedly improving  Eliquis and statin held  DM II  HTN  BP at goal today  Amlodipine 10 mg daily  HLD  Statin     PLAN  To have procedure peripheral intervention today  Heparin and ASA with AC plan per vascular team      Cardiology attending:  Pt seen and independently examined.  Note edited.  Plan of care performed by myself in its entirety.  PV procedure planned for today.    Ashkan Villa MD  L2    Yuval Goins PA-C  1/13/2025  7:46 AM

## 2025-01-13 NOTE — PLAN OF CARE
No acute events overnight.   Heparin gtt infusing per protocol, ptt still subtherapeutic.  Plan for angio this AM.      Problem: Diabetes/Glucose Control  Goal: Glucose maintained within prescribed range  Description: INTERVENTIONS:  - Monitor Blood Glucose as ordered  - Assess for signs and symptoms of hyperglycemia and hypoglycemia  - Administer ordered medications to maintain glucose within target range  - Assess barriers to adequate nutritional intake and initiate nutrition consult as needed  - Instruct patient on self management of diabetes  Outcome: Not Progressing     Problem: METABOLIC/FLUID AND ELECTROLYTES - ADULT  Goal: Electrolytes maintained within normal limits  Description: INTERVENTIONS:  - Monitor labs and rhythm and assess patient for signs and symptoms of electrolyte imbalances  - Administer electrolyte replacement as ordered  - Monitor response to electrolyte replacements, including rhythm and repeat lab results as appropriate  - Fluid restriction as ordered  - Instruct patient on fluid and nutrition restrictions as appropriate  Outcome: Progressing     Problem: SKIN/TISSUE INTEGRITY - ADULT  Goal: Skin integrity remains intact  Description: INTERVENTIONS  - Assess and document risk factors for pressure ulcer development  - Assess and document skin integrity  - Monitor for areas of redness and/or skin breakdown  - Initiate interventions, skin care algorithm/standards of care as needed  Outcome: Not Progressing     Problem: MUSCULOSKELETAL - ADULT  Goal: Return mobility to safest level of function  Description: INTERVENTIONS:  - Assess patient stability and activity tolerance for standing, transferring and ambulating w/ or w/o assistive devices  - Assist with transfers and ambulation using safe patient handling equipment as needed  - Ensure adequate protection for wounds/incisions during mobilization  - Obtain PT/OT consults as needed  - Advance activity as appropriate  - Communicate ordered  activity level and limitations with patient/family  Outcome: Progressing     Problem: SAFETY ADULT - FALL  Goal: Free from fall injury  Description: INTERVENTIONS:  - Assess pt frequently for physical needs  - Identify cognitive and physical deficits and behaviors that affect risk of falls.  - [place] fall precautions as indicated by assessment.  - Educate pt/family on patient safety including physical limitations  - Instruct pt to call for assistance with activity based on assessment  - Modify environment to reduce risk of injury  - Provide assistive devices as appropriate  - Consider OT/PT consult to assist with strengthening/mobility  - Encourage toileting schedule  Outcome: Progressing

## 2025-01-14 ENCOUNTER — TELEPHONE (OUTPATIENT)
Dept: VASCULAR SURGERY | Age: 65
End: 2025-01-14

## 2025-01-15 NOTE — PAYOR COMM NOTE
--------------  DISCHARGE REVIEW    Payor: [facility]  Subscriber #:  [id number]  Authorization Number: [id number]    Admit date: 1/8/25  Admit time:   3:52 PM  Discharge Date: 1/13/2025  4:07 PM     Admitting Physician: Dallas Drew MD  Attending Physician:  No att. providers found  Primary Care Physician: Woody Dahl MD       Discharge Summary Notes    No notes of this type exist for this encounter.         REVIEWER COMMENTS

## 2025-01-17 NOTE — DISCHARGE SUMMARY
[facility]     Richy Goins Patient Status:  Inpatient    1960 MRN [id number]   Location [facility] 7NE-A Attending No att. providers found   Hosp Day # 5 PCP Woody Dahl MD     Date of Admission: 2025  Date of Discharge:  2025     Discharge Disposition: Home or Self Care    Discharge Diagnosis:  # Large left pleural effusion s/p thoracentesis  #ESRD on HD       # Elevated D-dimer  -#L great toe ulcer  #PVD  #hx R BKA  -suspect DM foot ulcer. No signs of cellulitis       #new non specific TWI on EKG  -no current CP but has had occasional chest tightness at home and PAGE as above  -echo     #Hypertension     #Anemia of ESRD     #Generalized weakness secondary to missed HD     # DM2 secondary to ESRD  -basal bolus insulin     #Hyperlipidemia    History of Present Illness: 63 yo man with hx of pvd and rt bka, ESRD on HD, HTN, HPL, DM p/w sob.    Brief Synopsis: Patient gets dialysis on Tuesday, Thursday, Saturday.  Missed his dialysis on Saturday because of not feeling well. Has had a month of progressive exertional dyspnea and fatigue.  Denies any chest pain, fever, chills, cough, nausea/vomiting, diarrhea.Patient was seen by podiatry and vascular surgery with attempted angiogram.He devopped chest back thighs rash suspected to be due to Zosyn.Patient will f/u [place] [facility] for complex pvd.         Lace+ Score: 82  59-90 High Risk  29-58 Medium Risk  0-28   Low Risk       TCM Follow-Up Recommendation:  LACE > 58: High Risk of readmission after discharge from the hospital.      Procedures during hospitalization:   Attempted angogram    Incidental or significant findings and recommendations (brief descriptions):      Lab/Test results pending at Discharge:       Consultants:  Terry toribio  Podiatry  Pulm  nephrology    Discharge Medication List:     Discharge Medications        START taking these medications        Instructions Prescription details   apixaban 2.5 MG  Tabs  Commonly known as: Eliquis      Take 1 tablet (2.5 mg total) by mouth 2 (two) times daily.   Stop taking on: February 10, 2025  Quantity: 60 tablet  Refills: 0     atorvastatin 20 MG Tabs  Commonly known as: Lipitor      Take 1 tablet (20 mg total) by mouth nightly.   Stop taking on: February 10, 2025  Quantity: 30 tablet  Refills: 0     diphenhydrAMINE 25 MG Caps  Commonly known as: Benadryl      Take 1 capsule (25 mg total) by mouth every 6 (six) hours as needed (redness).   Stop taking on: January 23, 2025  Quantity: 30 capsule  Refills: 1     predniSONE 20 MG Tabs  Commonly known as: Deltasone      Take 1 tablet (20 mg total) by mouth 2 (two) times daily for 5 days.   Stop taking on: January 18, 2025  Quantity: 10 tablet  Refills: 0     triamcinolone 0.1 % Oint  Commonly known as: Kenalog      Apply 1 each topically 2 (two) times daily for 10 days.   Stop taking on: January 23, 2025  Quantity: 1 each  Refills: 1            CONTINUE taking these medications        Instructions Prescription details   amLODIPine 10 MG Tabs  Commonly known as: Norvasc      Take 1 tablet (10 mg total) by mouth daily.   Quantity: 30 tablet  Refills: 1     aspirin 81 MG Tbec      Take 1 tablet (81 mg total) by mouth daily.   Refills: 0     BD Pen Needle Lara 2nd Gen 32G X 4 MM Misc  Generic drug: Insulin Pen Needle      Use to inject insulin up to 5 times daily   Quantity: 450 each  Refills: 1     Levemir FlexPen 100 UNIT/ML Sopn  Generic drug: insulin detemir      Inject 15 Units into the skin nightly.   Refills: 0     pantoprazole 40 MG Tbec  Commonly known as: Protonix      Take one tablet (40 mg total) by mouth once daily, 30 minutes prior to breakfast.   Quantity: 90 tablet  Refills: 3               Where to Get Your Medications        These medications were sent to [id number] - [place], [place] - [address] [phone], [phone]  [address] [place] [address]      Phone: [phone]   diphenhydrAMINE 25 MG  Caps  predniSONE 20 MG Tabs  triamcinolone 0.1 % Oint       Please  your prescriptions at the location directed by your doctor or nurse    Bring a paper prescription for each of these medications  apixaban 2.5 MG Tabs  atorvastatin 20 MG Tabs         [place] reviewed[place]     Follow-up appointment:   Alfredo Telles MD  [address] [address]  [phone]    Follow up in 2 week(s)      Woody Dahl MD  [address] [facility] [address]  [place] [address]  [phone]    Call in 1 day(s)      Ramon Zacarias MD  [address] [facility] [address]  [facility] [address]  [phone]    Call in 1 week(s)      Malachi Altamirano, DPLORI  [address] [facility] [address]  [place] [address]  [phone]    Call in 1 week(s)      Fermin De La Fuente MD  [address] [address]  [phone]    Call in 1 week(s)      Paul Barnhart MD  [address] [facility] [address]  [place] [address]  [phone]    Call in 1 week(s)      Appointments for Next 30 Days 2025 - 2025      None            Vital signs:       Physical Exam:    General: No acute distress   Lungs: clear to auscultation  Cardiovascular: S1, S2  Abdomen: Soft      -----------------------------------------------------------------------------------------------  PATIENT DISCHARGE INSTRUCTIONS: See electronic chart    Jeanne Redman MD    Total time spent on discharge plannin minutes     The  Cures Act makes medical notes like these available to patients in the interest of transparency. Please be advised this is a medical document. Medical documents are intended to carry relevant information, facts as evident, and the clinical opinion of the practitioner. The medical note is intended as peer to peer communication and may appear blunt or direct. It is written in medical language and may contain abbreviations or verbiage that are unfamiliar.

## 2025-01-27 ENCOUNTER — TELEPHONE (OUTPATIENT)
Facility: CLINIC | Age: 65
End: 2025-01-27

## 2025-01-27 ENCOUNTER — PATIENT MESSAGE (OUTPATIENT)
Facility: CLINIC | Age: 65
End: 2025-01-27

## 2025-01-27 DIAGNOSIS — Z79.4 TYPE 2 DIABETES MELLITUS WITH HYPERGLYCEMIA, WITH LONG-TERM CURRENT USE OF INSULIN (HCC): Primary | ICD-10-CM

## 2025-01-27 DIAGNOSIS — E11.65 TYPE 2 DIABETES MELLITUS WITH HYPERGLYCEMIA, WITH LONG-TERM CURRENT USE OF INSULIN (HCC): Primary | ICD-10-CM

## 2025-01-27 RX ORDER — INSULIN GLARGINE-YFGN 100 [IU]/ML
INJECTION, SOLUTION SUBCUTANEOUS
Qty: 15 ML | Refills: 0 | Status: SHIPPED | OUTPATIENT
Start: 2025-01-27

## 2025-01-27 NOTE — TELEPHONE ENCOUNTER
Endocrine refill protocol for basal insulins     Protocol Criteria: FAILED Reason: No Visit in required time frame    If all below requirements are met, send a 90-day supply with 1 refill per provider protocol.       Verify Appointment with Endocrinology completed in the last 6 months or scheduled in the next 3 months.  Verify A1C has been completed within the last 6 months and is below 8.5%     Last completed office visit:1/12/2024 Praveena Aly APN   Next scheduled Follow up: No future appointments.   Last A1c result: Last A1c value was 7.7% done 1/7/2025.

## 2025-01-27 NOTE — TELEPHONE ENCOUNTER
Received refill request for patients Levemir FlexPen 100 unit/ml. Pharmacy comments state product discontinued. Routing for review.

## 2025-02-18 ENCOUNTER — TELEPHONE (OUTPATIENT)
Dept: INTERNAL MEDICINE CLINIC | Facility: CLINIC | Age: 65
End: 2025-02-18

## 2025-04-28 ENCOUNTER — TELEPHONE (OUTPATIENT)
Dept: INTERNAL MEDICINE CLINIC | Facility: CLINIC | Age: 65
End: 2025-04-28

## 2025-04-28 NOTE — TELEPHONE ENCOUNTER
Received Clinical Discharge from [facility] from 04/01/25-04/23/25, placed in A D bin for review.

## 2025-04-29 ENCOUNTER — APPOINTMENT (OUTPATIENT)
Dept: GENERAL RADIOLOGY | Facility: HOSPITAL | Age: 65
End: 2025-04-29
Attending: EMERGENCY MEDICINE
Payer: COMMERCIAL

## 2025-04-29 ENCOUNTER — HOSPITAL ENCOUNTER (INPATIENT)
Facility: HOSPITAL | Age: 65
LOS: 17 days | Discharge: LEFT BEFORE DISCHARGE | End: 2025-05-16
Attending: EMERGENCY MEDICINE | Admitting: HOSPITALIST
Payer: COMMERCIAL

## 2025-04-29 DIAGNOSIS — J96.01 ACUTE HYPOXIC RESPIRATORY FAILURE (HCC): ICD-10-CM

## 2025-04-29 DIAGNOSIS — D64.9 ANEMIA, UNSPECIFIED TYPE: ICD-10-CM

## 2025-04-29 DIAGNOSIS — J90 PLEURAL EFFUSION: ICD-10-CM

## 2025-04-29 DIAGNOSIS — E87.5 HYPERKALEMIA: Primary | ICD-10-CM

## 2025-04-29 LAB
ALBUMIN SERPL-MCNC: 4.3 G/DL (ref 3.2–4.8)
ALBUMIN/GLOB SERPL: 1.3 {RATIO} (ref 1–2)
ALP LIVER SERPL-CCNC: 271 U/L (ref 45–117)
ALT SERPL-CCNC: 9 U/L (ref 10–49)
ANION GAP SERPL CALC-SCNC: 20 MMOL/L (ref 0–18)
AST SERPL-CCNC: 9 U/L (ref ?–34)
BASOPHILS # BLD AUTO: 0.04 X10(3) UL (ref 0–0.2)
BASOPHILS NFR BLD AUTO: 0.4 %
BILIRUB SERPL-MCNC: <0.2 MG/DL (ref 0.2–1.1)
BUN BLD-MCNC: 141 MG/DL (ref 9–23)
CALCIUM BLD-MCNC: 9.8 MG/DL (ref 8.7–10.6)
CHLORIDE SERPL-SCNC: 104 MMOL/L (ref 98–112)
CO2 SERPL-SCNC: 16 MMOL/L (ref 21–32)
CREAT BLD-MCNC: 13.73 MG/DL (ref 0.7–1.3)
EGFRCR SERPLBLD CKD-EPI 2021: 4 ML/MIN/1.73M2 (ref 60–?)
EOSINOPHIL # BLD AUTO: 0.2 X10(3) UL (ref 0–0.7)
EOSINOPHIL NFR BLD AUTO: 2.1 %
ERYTHROCYTE [DISTWIDTH] IN BLOOD BY AUTOMATED COUNT: 16.8 %
FLUAV + FLUBV RNA SPEC NAA+PROBE: NEGATIVE
FLUAV + FLUBV RNA SPEC NAA+PROBE: NEGATIVE
GLOBULIN PLAS-MCNC: 3.2 G/DL (ref 2–3.5)
GLUCOSE BLD-MCNC: 182 MG/DL (ref 70–99)
GLUCOSE BLD-MCNC: 193 MG/DL (ref 70–99)
HCT VFR BLD AUTO: 28.6 % (ref 39–53)
HGB BLD-MCNC: 8.9 G/DL (ref 13–17.5)
IMM GRANULOCYTES # BLD AUTO: 0.03 X10(3) UL (ref 0–1)
IMM GRANULOCYTES NFR BLD: 0.3 %
LYMPHOCYTES # BLD AUTO: 0.9 X10(3) UL (ref 1–4)
LYMPHOCYTES NFR BLD AUTO: 9.4 %
MCH RBC QN AUTO: 25.7 PG (ref 26–34)
MCHC RBC AUTO-ENTMCNC: 31.1 G/DL (ref 31–37)
MCV RBC AUTO: 82.7 FL (ref 80–100)
MONOCYTES # BLD AUTO: 0.58 X10(3) UL (ref 0.1–1)
MONOCYTES NFR BLD AUTO: 6.1 %
NEUTROPHILS # BLD AUTO: 7.78 X10 (3) UL (ref 1.5–7.7)
NEUTROPHILS # BLD AUTO: 7.78 X10(3) UL (ref 1.5–7.7)
NEUTROPHILS NFR BLD AUTO: 81.7 %
OSMOLALITY SERPL CALC.SUM OF ELEC: 341 MOSM/KG (ref 275–295)
PLATELET # BLD AUTO: 325 10(3)UL (ref 150–450)
POTASSIUM SERPL-SCNC: 5.9 MMOL/L (ref 3.5–5.1)
PROCALCITONIN SERPL-MCNC: 0.6 NG/ML (ref ?–0.05)
PROT SERPL-MCNC: 7.5 G/DL (ref 5.7–8.2)
RBC # BLD AUTO: 3.46 X10(6)UL (ref 3.8–5.8)
RSV RNA SPEC NAA+PROBE: NEGATIVE
SARS-COV-2 RNA RESP QL NAA+PROBE: NOT DETECTED
SODIUM SERPL-SCNC: 140 MMOL/L (ref 136–145)
WBC # BLD AUTO: 9.5 X10(3) UL (ref 4–11)

## 2025-04-29 PROCEDURE — 71045 X-RAY EXAM CHEST 1 VIEW: CPT | Performed by: EMERGENCY MEDICINE

## 2025-04-29 PROCEDURE — 99223 1ST HOSP IP/OBS HIGH 75: CPT | Performed by: STUDENT IN AN ORGANIZED HEALTH CARE EDUCATION/TRAINING PROGRAM

## 2025-04-29 RX ORDER — NICOTINE POLACRILEX 4 MG
30 LOZENGE BUCCAL
Status: DISCONTINUED | OUTPATIENT
Start: 2025-04-29 | End: 2025-05-16

## 2025-04-29 RX ORDER — DEXTROSE MONOHYDRATE 25 G/50ML
50 INJECTION, SOLUTION INTRAVENOUS
Status: DISCONTINUED | OUTPATIENT
Start: 2025-04-29 | End: 2025-05-16

## 2025-04-29 RX ORDER — ACETAMINOPHEN 500 MG
500 TABLET ORAL EVERY 4 HOURS PRN
Status: DISCONTINUED | OUTPATIENT
Start: 2025-04-29 | End: 2025-05-02

## 2025-04-29 RX ORDER — TAMSULOSIN HYDROCHLORIDE 0.4 MG/1
0.4 CAPSULE ORAL NIGHTLY
COMMUNITY
Start: 2025-04-21

## 2025-04-29 RX ORDER — SENNOSIDES 8.6 MG
17.2 TABLET ORAL NIGHTLY PRN
Status: DISCONTINUED | OUTPATIENT
Start: 2025-04-29 | End: 2025-05-16

## 2025-04-29 RX ORDER — CEPHALEXIN 500 MG/1
500 CAPSULE ORAL DAILY
COMMUNITY
Start: 2025-04-21 | End: 2025-05-16

## 2025-04-29 RX ORDER — POLYETHYLENE GLYCOL 3350 17 G/17G
17 POWDER, FOR SOLUTION ORAL DAILY PRN
Status: DISCONTINUED | OUTPATIENT
Start: 2025-04-29 | End: 2025-05-02

## 2025-04-29 RX ORDER — HEPARIN SODIUM 5000 [USP'U]/ML
5000 INJECTION, SOLUTION INTRAVENOUS; SUBCUTANEOUS EVERY 12 HOURS SCHEDULED
Status: DISCONTINUED | OUTPATIENT
Start: 2025-04-29 | End: 2025-05-02

## 2025-04-29 RX ORDER — ALBUMIN (HUMAN) 12.5 G/50ML
25 SOLUTION INTRAVENOUS
Status: ACTIVE | OUTPATIENT
Start: 2025-04-29 | End: 2025-05-01

## 2025-04-29 RX ORDER — ECHINACEA PURPUREA EXTRACT 125 MG
1 TABLET ORAL
Status: DISCONTINUED | OUTPATIENT
Start: 2025-04-29 | End: 2025-05-02

## 2025-04-29 RX ORDER — GABAPENTIN 300 MG/1
300 CAPSULE ORAL NIGHTLY
COMMUNITY
Start: 2025-04-21

## 2025-04-29 RX ORDER — BISACODYL 10 MG
10 SUPPOSITORY, RECTAL RECTAL
Status: DISCONTINUED | OUTPATIENT
Start: 2025-04-29 | End: 2025-05-02

## 2025-04-29 RX ORDER — METOCLOPRAMIDE HYDROCHLORIDE 5 MG/ML
10 INJECTION INTRAMUSCULAR; INTRAVENOUS EVERY 8 HOURS PRN
Status: DISCONTINUED | OUTPATIENT
Start: 2025-04-29 | End: 2025-04-30

## 2025-04-29 RX ORDER — NICOTINE POLACRILEX 4 MG
15 LOZENGE BUCCAL
Status: DISCONTINUED | OUTPATIENT
Start: 2025-04-29 | End: 2025-05-16

## 2025-04-29 RX ORDER — ATORVASTATIN CALCIUM 40 MG/1
40 TABLET, FILM COATED ORAL NIGHTLY
COMMUNITY

## 2025-04-29 RX ORDER — ONDANSETRON 2 MG/ML
4 INJECTION INTRAMUSCULAR; INTRAVENOUS EVERY 6 HOURS PRN
Status: DISCONTINUED | OUTPATIENT
Start: 2025-04-29 | End: 2025-05-16

## 2025-04-29 RX ORDER — CALCITRIOL 0.5 UG/1
0.5 CAPSULE, LIQUID FILLED ORAL SEE ADMIN INSTRUCTIONS
COMMUNITY

## 2025-04-29 RX ORDER — BENZONATATE 200 MG/1
200 CAPSULE ORAL 3 TIMES DAILY PRN
Status: DISCONTINUED | OUTPATIENT
Start: 2025-04-29 | End: 2025-05-02

## 2025-04-29 RX ORDER — SEVELAMER CARBONATE 800 MG/1
1600 TABLET, FILM COATED ORAL
COMMUNITY
Start: 2025-04-21

## 2025-04-30 PROBLEM — J96.01 ACUTE RESPIRATORY FAILURE WITH HYPOXIA (HCC): Status: ACTIVE | Noted: 2025-04-29

## 2025-04-30 PROBLEM — R79.89 ELEVATED PROCALCITONIN: Status: ACTIVE | Noted: 2024-10-02

## 2025-04-30 LAB
ANION GAP SERPL CALC-SCNC: 21 MMOL/L (ref 0–18)
ATRIAL RATE: 89 BPM
BASOPHILS # BLD AUTO: 0.06 X10(3) UL (ref 0–0.2)
BASOPHILS NFR BLD AUTO: 0.6 %
BUN BLD-MCNC: 141 MG/DL (ref 9–23)
CALCIUM BLD-MCNC: 9.4 MG/DL (ref 8.7–10.6)
CHLORIDE SERPL-SCNC: 104 MMOL/L (ref 98–112)
CO2 SERPL-SCNC: 15 MMOL/L (ref 21–32)
CREAT BLD-MCNC: 13.89 MG/DL (ref 0.7–1.3)
EGFRCR SERPLBLD CKD-EPI 2021: 4 ML/MIN/1.73M2 (ref 60–?)
EOSINOPHIL # BLD AUTO: 0.24 X10(3) UL (ref 0–0.7)
EOSINOPHIL NFR BLD AUTO: 2.5 %
ERYTHROCYTE [DISTWIDTH] IN BLOOD BY AUTOMATED COUNT: 16.8 %
GLUCOSE BLD-MCNC: 109 MG/DL (ref 70–99)
GLUCOSE BLD-MCNC: 119 MG/DL (ref 70–99)
GLUCOSE BLD-MCNC: 120 MG/DL (ref 70–99)
GLUCOSE BLD-MCNC: 140 MG/DL (ref 70–99)
GLUCOSE BLD-MCNC: 162 MG/DL (ref 70–99)
HBV CORE AB SERPL QL IA: NONREACTIVE
HBV SURFACE AB SER QL: NONREACTIVE
HBV SURFACE AB SERPL IA-ACNC: <3.1 MIU/ML
HBV SURFACE AG SER-ACNC: <0.1 [IU]/L
HBV SURFACE AG SERPL QL IA: NONREACTIVE
HCT VFR BLD AUTO: 28 % (ref 39–53)
HGB BLD-MCNC: 8.5 G/DL (ref 13–17.5)
IMM GRANULOCYTES # BLD AUTO: 0.04 X10(3) UL (ref 0–1)
IMM GRANULOCYTES NFR BLD: 0.4 %
LACTATE SERPL-SCNC: 0.6 MMOL/L (ref 0.5–2)
LYMPHOCYTES # BLD AUTO: 1.03 X10(3) UL (ref 1–4)
LYMPHOCYTES NFR BLD AUTO: 10.9 %
MAGNESIUM SERPL-MCNC: 2.5 MG/DL (ref 1.6–2.6)
MCH RBC QN AUTO: 25.4 PG (ref 26–34)
MCHC RBC AUTO-ENTMCNC: 30.4 G/DL (ref 31–37)
MCV RBC AUTO: 83.8 FL (ref 80–100)
MONOCYTES # BLD AUTO: 0.69 X10(3) UL (ref 0.1–1)
MONOCYTES NFR BLD AUTO: 7.3 %
NEUTROPHILS # BLD AUTO: 7.41 X10 (3) UL (ref 1.5–7.7)
NEUTROPHILS # BLD AUTO: 7.41 X10(3) UL (ref 1.5–7.7)
NEUTROPHILS NFR BLD AUTO: 78.3 %
OSMOLALITY SERPL CALC.SUM OF ELEC: 337 MOSM/KG (ref 275–295)
P AXIS: 51 DEGREES
P-R INTERVAL: 150 MS
PHOSPHATE SERPL-MCNC: 8.2 MG/DL (ref 2.4–5.1)
PLATELET # BLD AUTO: 332 10(3)UL (ref 150–450)
POTASSIUM SERPL-SCNC: 5.9 MMOL/L (ref 3.5–5.1)
Q-T INTERVAL: 378 MS
QRS DURATION: 134 MS
QTC CALCULATION (BEZET): 459 MS
R AXIS: -3 DEGREES
RBC # BLD AUTO: 3.34 X10(6)UL (ref 3.8–5.8)
SODIUM SERPL-SCNC: 140 MMOL/L (ref 136–145)
T AXIS: -21 DEGREES
VENTRICULAR RATE: 89 BPM
WBC # BLD AUTO: 9.5 X10(3) UL (ref 4–11)

## 2025-04-30 PROCEDURE — 99223 1ST HOSP IP/OBS HIGH 75: CPT | Performed by: INTERNAL MEDICINE

## 2025-04-30 PROCEDURE — 5A1D70Z PERFORMANCE OF URINARY FILTRATION, INTERMITTENT, LESS THAN 6 HOURS PER DAY: ICD-10-PCS | Performed by: INTERNAL MEDICINE

## 2025-04-30 PROCEDURE — 99233 SBSQ HOSP IP/OBS HIGH 50: CPT | Performed by: HOSPITALIST

## 2025-04-30 RX ORDER — TAMSULOSIN HYDROCHLORIDE 0.4 MG/1
0.4 CAPSULE ORAL NIGHTLY
Status: DISCONTINUED | OUTPATIENT
Start: 2025-04-30 | End: 2025-05-02

## 2025-04-30 RX ORDER — PANTOPRAZOLE SODIUM 40 MG/1
40 TABLET, DELAYED RELEASE ORAL
Status: DISCONTINUED | OUTPATIENT
Start: 2025-04-30 | End: 2025-05-02

## 2025-04-30 RX ORDER — ATORVASTATIN CALCIUM 40 MG/1
40 TABLET, FILM COATED ORAL NIGHTLY
Status: DISCONTINUED | OUTPATIENT
Start: 2025-04-30 | End: 2025-05-02

## 2025-04-30 RX ORDER — METOCLOPRAMIDE HYDROCHLORIDE 5 MG/ML
10 INJECTION INTRAMUSCULAR; INTRAVENOUS DAILY PRN
Status: DISCONTINUED | OUTPATIENT
Start: 2025-04-30 | End: 2025-05-02

## 2025-04-30 RX ORDER — ASPIRIN 81 MG/1
81 TABLET ORAL DAILY
Status: DISCONTINUED | OUTPATIENT
Start: 2025-04-30 | End: 2025-05-02

## 2025-04-30 RX ORDER — INSULIN DEGLUDEC 100 U/ML
15 INJECTION, SOLUTION SUBCUTANEOUS NIGHTLY
Status: DISCONTINUED | OUTPATIENT
Start: 2025-04-30 | End: 2025-05-04

## 2025-04-30 RX ORDER — CEPHALEXIN 500 MG/1
500 CAPSULE ORAL DAILY
Status: DISCONTINUED | OUTPATIENT
Start: 2025-04-30 | End: 2025-05-03

## 2025-04-30 RX ORDER — GABAPENTIN 300 MG/1
300 CAPSULE ORAL NIGHTLY
Status: DISCONTINUED | OUTPATIENT
Start: 2025-04-30 | End: 2025-05-05

## 2025-04-30 RX ORDER — SEVELAMER CARBONATE 800 MG/1
1600 TABLET, FILM COATED ORAL
Status: DISCONTINUED | OUTPATIENT
Start: 2025-04-30 | End: 2025-05-16

## 2025-04-30 RX ORDER — AMLODIPINE BESYLATE 5 MG/1
10 TABLET ORAL DAILY
Status: DISCONTINUED | OUTPATIENT
Start: 2025-04-30 | End: 2025-05-02

## 2025-04-30 RX ORDER — CALCITRIOL 0.25 UG/1
0.5 CAPSULE, LIQUID FILLED ORAL
Status: DISCONTINUED | OUTPATIENT
Start: 2025-05-01 | End: 2025-05-02

## 2025-04-30 NOTE — ED QUICK NOTES
Orders for admission, patient is aware of plan and ready to go upstairs. Any questions, please call ED RN Beverley at extension [phone].     Patient Covid vaccination status: Fully vaccinated     COVID Test Ordered in ED: SARS-CoV-2/Flu A and B/RSV by PCR (GeneXpert)    COVID Suspicion at Admission: N/A    Running Infusions: Medication Infusions[1] None    Mental Status/LOC at time of transport: AOx4    Other pertinent information:   CIWA score: N/A   NIH score:  N/A             [1]

## 2025-04-30 NOTE — PLAN OF CARE
NURSING ADMISSION NOTE      Patient admitted via Cart  Oriented to room.  Safety precautions initiated.  Bed in low position.  Call light in reach.    Assumed patient at 0035. Alert and oriented x 4 on room air. Lung sounds diminished bilaterally. Normal sinus on tele. Oliguric due to HD, continent of bowel. Skin assessment done - left bilateral lower limb amputation with bandage.     Pt Oriented to room. Call light in reach. Menu in room. Tele monitor on. VS taken. Head to Toe complete. Admission navigators complete including PTA medications. Consults aware of proper medications and of new patient.    Patient updated on plan of care and verbalized understanding.     POC: dialysis 4/30      Problem: SAFETY ADULT - FALL  Goal: Free from fall injury  Description: INTERVENTIONS:- Assess pt frequently for physical needs- Identify cognitive and physical deficits and behaviors that affect risk of falls.- [place] fall precautions as indicated by assessment.- Educate pt/family on patient safety including physical limitations- Instruct pt to call for assistance with activity based on assessment- Modify environment to reduce risk of injury- Provide assistive devices as appropriate- Consider OT/PT consult to assist with strengthening/mobility- Encourage toileting schedule  Outcome: Progressing     Problem: RESPIRATORY - ADULT  Goal: Achieves optimal ventilation and oxygenation  Description: INTERVENTIONS:- Assess for changes in respiratory status- Assess for changes in mentation and behavior- Position to facilitate oxygenation and minimize respiratory effort- Oxygen supplementation based on oxygen saturation or ABGs- Provide Smoking Cessation handout, if applicable- Encourage broncho-pulmonary hygiene including cough, deep breathe, Incentive Spirometry- Assess the need for suctioning and perform as needed- Assess and instruct to report SOB or any respiratory difficulty- Respiratory Therapy support as indicated-  Manage/alleviate anxiety- Monitor for signs/symptoms of CO2 retention  Outcome: Progressing     Problem: HEMATOLOGIC - ADULT  Goal: Maintains hematologic stability  Description: INTERVENTIONS- Assess for signs and symptoms of bleeding or hemorrhage- Monitor labs and vital signs for trends- Administer supportive blood products/factors, fluids and medications as ordered and appropriate- Administer supportive blood products/factors as ordered and appropriate  Outcome: Progressing  Goal: Free from bleeding injury  Description: (Example usage: patient with low platelets)INTERVENTIONS:- Avoid intramuscular injections, enemas and rectal medication administration- Ensure safe mobilization of patient- Hold pressure on venipuncture sites to achieve adequate hemostasis- Assess for signs and symptoms of internal bleeding- Monitor lab trends- Patient is to report abnormal signs of bleeding to staff- Avoid use of toothpicks and dental floss- Use electric shaver for shaving- Use soft bristle tooth brush- Limit straining and forceful nose blowing  Outcome: Progressing

## 2025-04-30 NOTE — CONSULTS
[facility]  Report of Consultation    Richy Goins Patient Status:  Inpatient    1960 MRN [id number]   Location [facility] 8NE-A Attending Hussain Rivera MD   Hosp Day # 1 PCP Woody Dahl MD     Reason for Consultation:  Recurrent left-sided pleural effusion    History of Present Illness:  Richy Goins is a a(n) 64 year old male.  Reports never smoker with longstanding history of diabetes severe peripheral vascular disease status post bilateral amputations, end-stage renal disease on hemodialysis and reports a history of recurrent left pleural effusions.  He reports he has had them drained in the past though not recently.  He reports having been at [facility] for questionably 2 months after presenting with left lower extremity gangrene and underwent below the knee amputation.  He had been in rehab.  He missed dialysis after discharge and states that he then felt sick.  He was weak and had increasing shortness of breath Notes a mild cough denies any fevers denies any chest pain.  Evaluation has documented near whiteout of his left chest.  Last chest x-ray by report was 2025 stating large left pleural effusion that was stable.  Thoracentesis performed here left side 2025-borderline transudate/sterile/benign- not during recent admission.-He remains afebrile    History:  Past Medical History[1]  Family History[2]   reports that he has never smoked. He has never used smokeless tobacco. He reports that he does not currently use alcohol. He reports that he does not use drugs.  Works as the  for a small company    Allergies:  Allergies[3]    Medications:  Prescriptions Prior to Admission[4]    Review of Systems:    Constitutional: Thinks his weight has been stable  Eyes: Denies any specific vision changes  Ears, nose, mouth, throat, and face: Denies any sore throat denies any difficulty swallowing  Respiratory: Mild dyspnea as above  Cardiovascular: Denies  palpitations or specific chest pain  Gastrointestinal: Denies any nausea vomiting or diarrhea states he is eating well  Musculoskeletal: Recent remains dressed he denies any specific pain  Neurological: Denies any acute changes     All other review of systems are negative.  Denies any sleep disorder    Vital signs in last 24 hours:  Patient Vitals for the past 24 hrs:   BP Temp Temp src Pulse Resp SpO2 Weight   04/30/25 1546 140/74 98 °F (36.7 °C) Oral 103 18 93 % --   04/30/25 1201 141/81 97.4 °F (36.3 °C) Oral 97 18 98 % --   04/30/25 0917 -- -- -- 82 -- -- --   04/30/25 0757 131/76 97.9 °F (36.6 °C) Oral 85 19 95 % --   04/30/25 0500 127/69 98.7 °F (37.1 °C) Oral 83 20 95 % --   04/30/25 0111 -- -- -- -- -- 96 % --   04/30/25 0038 -- -- -- -- -- -- 180 lb (81.6 kg)   04/29/25 2310 132/74 98.2 °F (36.8 °C) Oral 85 22 94 % 180 lb (81.6 kg)   04/29/25 2245 -- -- -- 84 20 99 % --   04/29/25 2215 -- -- -- 83 15 100 % --   04/29/25 2145 136/61 -- -- 89 16 97 % --   04/29/25 2130 -- -- -- 87 19 100 % --   04/29/25 2115 -- -- -- 86 18 100 % --   04/29/25 2100 -- -- -- 89 21 100 % --   04/29/25 2032 149/84 98 °F (36.7 °C) Oral 90 20 97 % --         Physical Exam:   General: alert, cooperative, oriented.  No respiratory distress.  Comfortable on 3 L   Head: Normocephalic, without obvious abnormality, atraumatic.   Eyes/Nose: No obvious lesions   Throat: Lips, mucosa, and tongue normal.  No thrush noted.   Neck: trachea midline, no adenopathy, no thyromegaly. No JVD.   Lungs: Diminished tones with bronchial breath sounds right seems clear regular rate and rhythm   Chest wall: No tenderness or deformity.   Heart:    Abdomen: soft, non-distended, no masses, no guarding, no     Rebound.  No obvious ascites tender   Extremity: Fistula left upper extremity  bilateral BKA     Skin: No rashes or lesions.   Neurological: Alert, interactive, no focal deficits    Lab Data Review:  Lab Results   Component Value Date    WBC 9.5  04/30/2025    HGB 8.5 04/30/2025    HCT 28.0 04/30/2025    .0 04/30/2025    CREATSERUM 13.89 04/30/2025     04/30/2025     04/30/2025    K 5.9 04/30/2025     04/30/2025    CO2 15.0 04/30/2025     04/30/2025    CA 9.4 04/30/2025    ALB 4.3 04/29/2025    ALKPHO 271 04/29/2025    BILT <0.2 04/29/2025    TP 7.5 04/29/2025    AST 9 04/29/2025    ALT 9 04/29/2025    MG 2.5 04/30/2025    PHOS 8.2 04/30/2025    PGLU 109 04/30/2025       Cultures:   Negative viral panel negative strep    Radiology:  XR CHEST AP PORTABLE  (CPT=71045)  Result Date: 4/29/2025  CONCLUSION:   There is nearly complete opacification of the left hemithorax which partially obscures the cardiomediastinal silhouette.  This likely represents a combination of large pleural effusion and passive atelectasis, though superimposed infection or aspiration not excluded.  Mild interstitial edema noted throughout the right lung.  The right pleural space remains clear.   LOCATION:  [facility]      Dictated by (CST): Patricio Telles MD on 4/29/2025 at 9:14 PM     Finalized by (CST): Patricio Telles MD on 4/29/2025 at 9:15 PM       Chest x-ray was significant whiteout left chest-no significant mediastinal shift suggesting combined component of pleural effusion possible atelectasis    Assessment and Plan:  Problem List[5]    Assessment:  Recurrent left-sided pleural effusion present since January 2025-now increasing with near whiteout suspected component atelectasis as well.  Dyspnea mild hypoxia related to the above possible component pulmonary edema as well having missed dialysis  End-stage renal disease  Severe peripheral vascular disease bilateral BKA's most recently last month    Plan:  Discussed at length plan to proceed with thoracentesis therapeutic diagnostic-ongoing dialysis as per renal service  Will follow with you    Delilah Easley MD  4/30/2025  6:01 PM         [1]   Past Medical History:   Belching    Dialysis patient    Diarrhea,  unspecified    Esophageal reflux    Essential hypertension    Fatigue    Flatulence/gas pain/belching    High blood pressure    High cholesterol    History of blood transfusion    Hyperlipidemia    Indigestion    Irregular bowel habits    Night sweats    Osteoporosis    Peripheral vascular disease    Pure hypercholesterolemia    Type II or unspecified type diabetes mellitus without mention of complication, not stated as uncontrolled    Visual impairment    reading glasses    Vomiting   [2]   Family History  Problem Relation Age of Onset    Heart Attack Father     Heart Disorder Father 57    Diabetes Maternal Grandfather     Diabetes Maternal Aunt    [3]   Allergies  Allergen Reactions    Zosyn [Piperacillin-Tazobactam In D5w] RASH     Developed diffuse erythroderma 1 day after starting zosyn   [4]   Medications Prior to Admission   Medication Sig Dispense Refill Last Dose/Taking    tamsulosin 0.4 MG Oral Cap Take 1 capsule (0.4 mg total) by mouth nightly. TAKE AT BEDTIME   4/28/2025 at  9:00 PM    cephALEXin 500 MG Oral Cap Take 1 capsule (500 mg total) by mouth daily. Take 1 Capsule every evening for 14 days   4/28/2025 at  9:00 PM    sevelamer carbonate 800 MG Oral Tab Take 2 tablets (1,600 mg total) by mouth 3 (three) times daily with meals.   4/29/2025 Noon    calcitRIOL 0.5 MCG Oral Cap Take 1 capsule (0.5 mcg total) by mouth See Admin Instructions. Every Tuesday, Thursday, and Saturday 4/29/2025 at  7:00 AM    gabapentin 300 MG Oral Cap Take 1 capsule (300 mg total) by mouth nightly. TAKE AT BEDTIME   4/29/2025    atorvastatin 40 MG Oral Tab Take 1 tablet (40 mg total) by mouth nightly. TAKE AT BEDTIME   4/28/2025 at  9:00 PM    Insulin Glargine-yfgn 100 UNIT/ML Subcutaneous Solution Pen-injector Inject 15 Units into the skin nightly. 15 mL 0 4/28/2025 at  9:00 PM    insulin detemir (LEVEMIR FLEXPEN) 100 UNIT/ML Subcutaneous Solution Pen-injector Inject 15 Units into the skin nightly.   4/28/2025 at  9:00 PM     pantoprazole 40 MG Oral Tab EC Take one tablet (40 mg total) by mouth once daily, 30 minutes prior to breakfast. (Patient taking differently: 2 (two) times daily before meals. Take one tablet (40 mg total) by mouth twice daily, 30 minutes prior to breakfast.) 90 tablet 3 4/29/2025 at  7:00 AM    aspirin 81 MG Oral Tab EC Take 1 tablet (81 mg total) by mouth in the morning.   4/29/2025 at  7:00 AM    amLODIPine 10 MG Oral Tab Take 1 tablet (10 mg total) by mouth daily. 30 tablet 1 4/29/2025 at  7:00 AM    Insulin Pen Needle (BD PEN NEEDLE AMBER 2ND GEN) 32G X 4 MM Does not apply Misc Use to inject insulin up to 5 times daily 450 each 1    [5]   Patient Active Problem List  Diagnosis    Diabetic nephropathy associated with type 2 diabetes mellitus (HCC)    Hypertension    Type 2 diabetes mellitus with hyperglycemia (HCC)    Stage 3 chronic kidney disease (HCC)    Retinopathy due to secondary diabetes mellitus (HCC)    Azotemia    PAD (peripheral artery disease)    Stage 4 chronic kidney disease (HCC)    Acidosis    ESRD (end stage renal disease) (HCC)    ESRD on hemodialysis (HCC)    Pure hypercholesterolemia    Hemoptysis    Hypertensive urgency    Hematemesis with nausea    Hematemesis, unspecified whether nausea present    Acute gastritis with hemorrhage, unspecified gastritis type    Uremia    Acquired absence of right leg below knee (HCC)    Chronic kidney disease, stage 4 (severe) (HCC)    End-stage renal disease on hemodialysis (HCC)    Osteomyelitis of right foot (HCC)    Dyslipidemia    Diabetes mellitus type 2 in nonobese (HCC)    Unilateral complete BKA, right, sequela (HCC)    Type 2 diabetes mellitus with diabetic peripheral angiopathy without gangrene (HCC)    Type 2 diabetes mellitus with diabetic chronic kidney disease (HCC)    Long term (current) use of aspirin    Hypertensive chronic kidney disease w stg 1-4/unsp chr kdny    Encounter for orthopedic aftercare following surgical amputation    Anemia  in chronic kidney disease    Age-related osteoporosis without current pathological fracture    Acute kidney failure, unspecified    CKD (chronic kidney disease), stage III (HCC)    Type 1 diabetes mellitus with ketoacidosis without coma (HCC)    Senile purpura    Tinnitus, bilateral    Hypernatremia    Anemia    Acute kidney injury    Acute renal failure (ARF)    Metabolic acidosis    Hyperglycemia    Nausea    Elevated lipase    Elevated procalcitonin    Pleural effusion    Anemia in ESRD (end-stage renal disease) (HCC)    Hyperphosphatemia    Dyspnea on exertion    Ischemic foot    Gangrene of toe of left foot (HCC)    Hyperkalemia    Acute hypoxic respiratory failure ([facility])    Anemia, unspecified type

## 2025-04-30 NOTE — CM/SW NOTE
04/30/25 1500   CM/SW Referral Data   Referral Source Social Work (self-referral)   Reason for Referral Discharge planning   Informant Patient   Medical Hx   Does patient have an established PCP? Yes   Patient Info   Patient's Current Mental Status at Time of Assessment Alert;Oriented   Patient's Home Environment House   Patient lives with Spouse/Significant other   Patient Status Prior to Admission   Independent with ADLs and Mobility No   Pt. requires assistance with Driving;Ambulating   Services in place prior to admission Dialysis;DME/Supplies at home   Type of DME/Supplies Wheelchair  (Prosthetics)   Dialysis Clinic [facility]   Scheduled Dialysis days T-TH-SAT   Dialysis scheduled time   (morning shift)   Discharge Needs   Anticipated D/C needs To be determined     SW met with pt at bedside to assess for discharge planning needs. Pt is a 65 y/o male who was admitted for hyperkalemia. Pt is alert and oriented x4. Pt resides at home w/ wife. Pt states that he goes to [facility] on Tues/Thurs/Sat for morning HD shift. Pt states that his wife typically transports to/from HD. Pt recently spent 45 days at [facility] after a stay at [facility]. Pt stated that he recently had his left leg amputated and is waiting for a prosthetic to be made. Pt's right leg has a prosthetic. Pt states that he has a wheelchair at home. Pt confirms PCP is Dr Dahl and pt picks up medications at [facility]. SW discussed having PT evaluate pt and pt agreeable. Anticipate no discharge needs. Pt was appreciative of SW visit.      &  to remain available and supportive for discharge planning needs.    Lola PAT MSW, LCSW  Discharge Planner

## 2025-04-30 NOTE — ED INITIAL ASSESSMENT (HPI)
Pt arrived via ems from home after having shortness of breath and bilateral facial swelling today (eyes, cheeks, lips) w/ hoarse voice. Patient missed his last 2 dialysis appointments (Thurs&Sat) and has kidney failure. Pt was 86% on room air when medics arrived so he was placed on 2lpm o2. He does not appear to be in respiratory distress at this time, lungs are clear.

## 2025-04-30 NOTE — ED PROVIDER NOTES
Patient Seen in: [facility] Emergency Department      History     Chief Complaint   Patient presents with    Difficulty Breathing     Stated Complaint: missed dialysis, kidney failure    Subjective:   HPI    This is a 64-year-old male with history of end-stage renal disease on hemodialysis Tuesday, Thursday, Saturday, status post left BKA approximate 4 weeks ago at [facility], presents to the ER for evaluation of shortness of breath and facial swelling which started yesterday.  Patient states that he has missed dialysis twice this week, has not gone since last Tuesday.  Reports he has had some shortness of breath, EMS was called and he was hypoxic at 86%, placed on supplemental oxygen.  Patient does state he has slight sore throat but denies difficulty speaking or swallowing.  Denies any change in phonation.  Denies cough sore throat or runny nose.  History of Present Illness               Objective:     Past Medical History:    Belching    Dialysis patient    Diarrhea, unspecified    Esophageal reflux    Essential hypertension    Fatigue    Flatulence/gas pain/belching    High blood pressure    High cholesterol    History of blood transfusion    Hyperlipidemia    Indigestion    Irregular bowel habits    Night sweats    Osteoporosis    Peripheral vascular disease    Pure hypercholesterolemia    Type II or unspecified type diabetes mellitus without mention of complication, not stated as uncontrolled    Visual impairment    reading glasses    Vomiting              Past Surgical History:   Procedure Laterality Date    Amputation toe,i-p jt Right     Av fistula revision, open      Below knee leg amputation Right     Colonoscopy N/A 02/20/2023    Procedure: COLONOSCOPY;  Surgeon: Sarmad Gonzalez MD;  Location:  ENDOSCOPY    Colonoscopy      Upper gi endoscopy,exam                  Social History     Socioeconomic History    Marital status:    Tobacco Use    Smoking status: Never    Smokeless tobacco:  Never    Tobacco comments:     none   Vaping Use    Vaping status: Never Used   Substance and Sexual Activity    Alcohol use: Not Currently     Comment: none    Drug use: Never    Sexual activity: Yes     Partners: Female     Social Drivers of Health     Food Insecurity: No Food Insecurity (1/21/2025)    Received from [facility]    Food Insecurity     Currently or in the past 3 months, have you worried your food would run out before you had money to buy more?: No     In the past 12 months, have you run out of food or been unable to get more?: No   Transportation Needs: No Transportation Needs (1/21/2025)    Received from [facility]    Transportation Needs     Currently or in the past 3 months, has lack of transportation kept you from medical appointments, getting food or medicine, or providing care to a family member?: No     Medical Transportation Needs?: No   Housing Stability: Low Risk  (1/7/2025)    Housing Stability     Housing Instability: No                                Physical Exam     ED Triage Vitals [04/29/25 2032]   /84   Pulse 90   Resp 20   Temp 98 °F (36.7 °C)   Temp src Oral   SpO2 97 %   O2 Device Nasal cannula       Current Vitals:   Vital Signs  BP: 136/61  Pulse: 84  Resp: 20  Temp: 98 °F (36.7 °C)  Temp src: Oral  MAP (mmHg): 82    Oxygen Therapy  SpO2: 99 %  O2 Device: Nasal cannula  O2 Flow Rate (L/min): 2 L/min        Physical Exam  GENERAL: Patient is awake, alert  HEENT: no scleral icterus.  Mucous membranes are moist, mild posterior pharyngeal erythema, uvula midline.  Periorbital edema without erythema.  No tongue or lip swelling.    NECK: Neck is supple, there is no nuchal rigidity.  No carotid bruits.  No masses.  No pain with manipulation of the hyoid.  Trachea midline.  No cervical lymphadenopathy.  HEART: Regular rate and rhythm, no murmurs.  LUNGS: Markedly decreased breath sounds on the left, coarse breath sounds on the  right.  ABDOMEN: Soft, nondistended,non tender  EXTREMITIES: No peripheral edema  SKIN: Warm, dry, intact, no rashes.  NEUROLOGIC EXAM: Tongue midline, no facial drooping, no ptosis  Physical Exam                ED Course     Labs Reviewed   COMP METABOLIC PANEL (14) - Abnormal; Notable for the following components:       Result Value    Glucose 193 (*)     Potassium 5.9 (*)     CO2 16.0 (*)     Anion Gap 20 (*)      (*)     Creatinine 13.73 (*)     Calculated Osmolality 341 (*)     eGFR-Cr 4 (*)     ALT 9 (*)     Alkaline Phosphatase 271 (*)     Bilirubin, Total <0.2 (*)     All other components within normal limits   CBC WITH DIFFERENTIAL WITH PLATELET - Abnormal; Notable for the following components:    RBC 3.46 (*)     HGB 8.9 (*)     HCT 28.6 (*)     MCH 25.7 (*)     Neutrophil Absolute Prelim 7.78 (*)     Neutrophil Absolute 7.78 (*)     Lymphocyte Absolute 0.90 (*)     All other components within normal limits   SARS-COV-2/FLU A AND B/RSV BY PCR (GENEXPERT) - Normal    Narrative:     This test is intended for the qualitative detection and differentiation of SARS-CoV-2, influenza A, influenza B, and respiratory syncytial virus (RSV) viral RNA in nasopharyngeal or nares swabs from individuals suspected of respiratory viral infection consistent with COVID-19 by their healthcare provider. Signs and symptoms of respiratory viral infection due to SARS-CoV-2, influenza, and RSV can be similar.    Test performed using the Xpert Xpress SARS-CoV-2/FLU/RSV (real time RT-PCR)  assay on the GeneXpert instrument, [employer], [place] [address].   This test is being used under the Food and Drug Administration's Emergency Use Authorization.    The authorized Fact Sheet for Healthcare Providers for this assay is available upon request from the laboratory.   RAPID STREP A SCREEN (LC) - Normal    Narrative:     A confirmatory culture is recommended if clinically indicated.   BASIC METABOLIC PANEL (8)   CBC WITH  DIFFERENTIAL WITH PLATELET   MAGNESIUM   PHOSPHORUS   LACTIC ACID, PLASMA   PROCALCITONIN   RAINBOW DRAW LAVENDER   RAINBOW DRAW LIGHT GREEN   RAINBOW DRAW BLUE   RAINBOW DRAW GOLD     EKG    Rate, intervals and axes as noted on EKG Report.  Rate: 89  Rhythm: Sinus Rhythm  Reading: Normal sinus rhythm, right bundle branch block.  No change compared to previous EKG              Results            A total of 35 minutes of critical care time (exclusive of billable procedures) was administered to manage the patient's respiratory instability due to his acute hypoxemic respiratory failure.  This involved direct patient intervention, complex decision making, and/or extensive discussions with the patient, family, and clinical staff.                       MDM        Differential diagnosis before testing includes but not limited to pneumonia, pneumothorax, pleural effusion, pulm edema, electrolyte abnormality, which is a medical condition that poses a threat to life/function    Past Medical History/comorbidities-as noted in HPI    Radiographic images  I personally reviewed the radiographs and my individual interpretation shows complete opacification left chest  I also reviewed the official reports that showed There is nearly complete opacification of the left hemithorax which partially obscures the cardiomediastinal silhouette.  This likely represents a combination of large pleural effusion and passive atelectasis, though superimposed infection or aspiration   not excluded.      Mild interstitial edema noted throughout the right lung.  The right pleural space remains clear.       Discussion of management (consult/physicians, social work, pharmacy,ect) nephrology Dr. Yoder, pulmonary Dr. Luna, [place] hospitalist Dr. Vidales    Medications Provided: Lokelma    Course of Events during Emergency Room Visit include IV established, patient placed on cardiac monitor and pulse ox, patient initially hypoxic upon arrival,  supplemental oxygen was given.  Chest x-ray with complete opacification of the left hemithorax.  Chemistry sodium 140 potassium 5.9 bicarb 16  creatinine 13.73 glucose 193.  Viral panel negative, rapid strep testing negative.  CBC white count 9.5 hemoglobin 8.9 platelet 325.  Patient was discussed with nephrology, pulmonary, and hospitalist.  Nephrologist did request Lokelma.  Will be putting in orders for dialysis.  Patient be admitted for further evaluation and treatment.  Discussed results and plan the patient and wife were in agreement    Shared decision making was utilized           Disposition:    Admission  I have discussed with the patient the results of test, differential diagnosis, and treatment plan. They expressed clear understanding of these instructions and agrees to the plan provided.       Note to patient: The 21st Century Cures Act makes medical notes like these available to patients in the interest of transparency. However, this is a medical document intended as peer to peer communication. It is written in medical language and may contain abbreviations or verbiage that are unfamiliar. It may appear blunt or direct. Medical documents are intended to carry relevant information, facts as evident, and the clinical opinion of the practitioner.             Admission disposition: 4/29/2025 10:31 PM           Medical Decision Making      Disposition and Plan     Clinical Impression:  1. Hyperkalemia    2. Acute hypoxic respiratory failure (HCC)    3. Pleural effusion    4. Anemia, unspecified type         Disposition:  Admit  4/29/2025 10:31 pm    Follow-up:  No follow-up provider specified.        Medications Prescribed:  Current Discharge Medication List          Supplementary Documentation:         Hospital Problems       Present on Admission  Date Reviewed: 11/13/2024          ICD-10-CM Noted POA    * (Principal) Hyperkalemia E87.5 4/29/2025 Unknown    Acute hypoxic respiratory failure (HCC)  J96.01 4/29/2025 Unknown    Anemia, unspecified type D64.9 4/29/2025 Unknown    Pleural effusion J90 1/7/2025 Unknown

## 2025-04-30 NOTE — CONSULTS
[facility]  Report of Consultation    Richy Goins Patient Status:  Inpatient    1960 MRN [id number]   Location [facility] 8NE-A Attending Hussain Rivera MD   Hosp Day # 1 PCP Woody Dahl MD       Assessment / Plan:    1) ESRD- due to longstanding DM 2; last HD - now uremic / azotemic / hyperkalemic. HD today (off schedule)     2) Acute hypoxic resp failure due to pul edema + pleural effusions / opacification of L hemithorax -> HD / UF; pulm to eval for possible thoracentesis     3) Anemia- due to CKD / recent events; EPO with HD     4) Longstanding DM 2     5) HTN- continue usual amlodipine     6) PAD s/p remote R BKA; s/p recent L BKA on keflex / wound care      Reason for Consultation:  ESRD     History of Present Illness:  Richy Goins is a a(n) 64 year old male.  Very pleasant 64-year-old male recently admitted for a very long stay at [facility] before being transferred to [facility] after developing wet gangrene of his left lower extremity undergoing below-knee amputation; apparently there is still wound and he is still receiving oral antibiotics.  He was discharged last week and has missed dialysis last Thursday and Saturday and now presents with increasing shortness of breath and weakness and has multiple laboratory abnormalities.    History:  Past Medical History[1]  Past Surgical History[2]  Family History[3]  Denies family history of kidney disease.    reports that he has never smoked. He has never used smokeless tobacco. He reports that he does not currently use alcohol. He reports that he does not use drugs.    Allergies:  Allergies[4]    Medications:  Current Hospital Medications[5]  Prior to Admission Medications[6]    Review of Systems:  Please see HPI for pertinent positives. 10 point review of systems otherwise reviewed and negative.     Physical Exam:  /76 (BP Location: Right arm)   Pulse 82   Temp 97.9 °F (36.6 °C) (Oral)   Resp 19   Wt 180 lb  (81.6 kg)   SpO2 95%   BMI 27.37 kg/m²   Temp (24hrs), Av.2 °F (36.8 °C), Min:97.9 °F (36.6 °C), Max:98.7 °F (37.1 °C)       Intake/Output Summary (Last 24 hours) at 2025 1003  Last data filed at 2025 0917  Gross per 24 hour   Intake 360 ml   Output 0 ml   Net 360 ml     Wt Readings from Last 3 Encounters:   25 180 lb (81.6 kg)   25 170 lb (77.1 kg)   10/02/24 181 lb (82.1 kg)     General: awake alert  HEENT: No scleral icterus, MMM  Neck: Supple, no SARITA or thyromegaly  Cardiac: Regular rate and rhythm, S1, S2 normal, no murmur, rub, or gallop  Lungs: Decreased breath sounds at the bases bilaterally.   Abdomen: Soft, non-tender. + bowel sounds, no palpable organomegaly  Extremities: Without clubbing, cyanosis; no edema  Neurologic: Cranial nerves grossly intact, moving all extremities  Skin: Warm and dry, no rashes      Laboratory Data:  Lab Results   Component Value Date    WBC 9.5 2025    HGB 8.5 2025    HCT 28.0 2025    .0 2025    CREATSERUM 13.89 2025     2025     2025    K 5.9 2025     2025    CO2 15.0 2025     2025    CA 9.4 2025    ALB 4.3 2025    ALKPHO 271 2025    BILT <0.2 2025    TP 7.5 2025    AST 9 2025    ALT 9 2025    MG 2.5 2025    PHOS 8.2 2025    PGLU 120 2025       Imaging:  All imaging studies reviewed.      Thank you for allowing me to participate in the care of your patient.    Keeley Blackwell MD  2025  10:03 AM         [1]   Past Medical History:   Belching    Dialysis patient    Diarrhea, unspecified    Esophageal reflux    Essential hypertension    Fatigue    Flatulence/gas pain/belching    High blood pressure    High cholesterol    History of blood transfusion    Hyperlipidemia    Indigestion    Irregular bowel habits    Night sweats    Osteoporosis    Peripheral vascular disease    Pure  hypercholesterolemia    Type II or unspecified type diabetes mellitus without mention of complication, not stated as uncontrolled    Visual impairment    reading glasses    Vomiting   [2]   Past Surgical History:  Procedure Laterality Date    Amputation toe,i-p jt Right     Av fistula revision, open      Below knee leg amputation Right     Colonoscopy N/A 02/20/2023    Procedure: COLONOSCOPY;  Surgeon: Sarmad Gonzalez MD;  Location:  ENDOSCOPY    Colonoscopy      Upper gi endoscopy,exam     [3]   Family History  Problem Relation Age of Onset    Heart Attack Father     Heart Disorder Father 57    Diabetes Maternal Grandfather     Diabetes Maternal Aunt    [4]   Allergies  Allergen Reactions    Zosyn [Piperacillin-Tazobactam In D5w] RASH     Developed diffuse erythroderma 1 day after starting zosyn   [5]   Current Facility-Administered Medications:     amLODIPine (Norvasc) tab 10 mg, 10 mg, Oral, Daily    aspirin DR tab 81 mg, 81 mg, Oral, Daily    atorvastatin (Lipitor) tab 40 mg, 40 mg, Oral, Nightly    [START ON 5/1/2025] calcitriol (Rocaltrol) cap 0.5 mcg, 0.5 mcg, Oral, Once per day on Tuesday Thursday Saturday    cephALEXin (Keflex) cap 500 mg, 500 mg, Oral, Daily    gabapentin (Neurontin) cap 300 mg, 300 mg, Oral, Nightly    insulin degludec (Tresiba) 100 units/mL flextouch 15 Units, 15 Units, Subcutaneous, Nightly    pantoprazole (Protonix) DR tab 40 mg, 40 mg, Oral, BID AC    sevelamer carbonate (Renvela) tab 1,600 mg, 1,600 mg, Oral, TID CC    tamsulosin (Flomax) cap 0.4 mg, 0.4 mg, Oral, Nightly    sodium chloride 0.9 % IV bolus 100 mL, 100 mL, Intravenous, Q30 Min PRN **AND** albumin human (Albumin) 25% injection 25 g, 25 g, Intravenous, PRN Dialysis    glucose (Dex4) 15 GM/59ML oral liquid 15 g, 15 g, Oral, Q15 Min PRN **OR** glucose (Glutose) 40% oral gel 15 g, 15 g, Oral, Q15 Min PRN **OR** glucose-vitamin C (Dex-4) chewable tab 4 tablet, 4 tablet, Oral, Q15 Min PRN **OR** dextrose 50% injection 50 mL,  50 mL, Intravenous, Q15 Min PRN **OR** glucose (Dex4) 15 GM/59ML oral liquid 30 g, 30 g, Oral, Q15 Min PRN **OR** glucose (Glutose) 40% oral gel 30 g, 30 g, Oral, Q15 Min PRN **OR** glucose-vitamin C (Dex-4) chewable tab 8 tablet, 8 tablet, Oral, Q15 Min PRN    heparin (Porcine) 5000 UNIT/ML injection 5,000 Units, 5,000 Units, Subcutaneous, 2 times per day    acetaminophen (Tylenol Extra Strength) tab 500 mg, 500 mg, Oral, Q4H PRN    melatonin tab 3 mg, 3 mg, Oral, Nightly PRN    polyethylene glycol (PEG 3350) (Miralax) 17 g oral packet 17 g, 17 g, Oral, Daily PRN    sennosides (Senokot) tab 17.2 mg, 17.2 mg, Oral, Nightly PRN    bisacodyl (Dulcolax) 10 MG rectal suppository 10 mg, 10 mg, Rectal, Daily PRN    ondansetron (Zofran) 4 MG/2ML injection 4 mg, 4 mg, Intravenous, Q6H PRN    metoclopramide (Reglan) 5 mg/mL injection 10 mg, 10 mg, Intravenous, Q8H PRN    insulin aspart (NovoLOG) 100 Units/mL FlexPen 2-10 Units, 2-10 Units, Subcutaneous, TID AC and HS    benzonatate (Tessalon) cap 200 mg, 200 mg, Oral, TID PRN    glycerin-hypromellose- (Artificial Tears) 0.2-0.2-1 % ophthalmic solution 1 drop, 1 drop, Both Eyes, QID PRN    sodium chloride (Saline Mist) 0.65 % nasal solution 1 spray, 1 spray, Each Nare, Q3H PRN  [6]   No current outpatient medications on file.

## 2025-04-30 NOTE — PROGRESS NOTES
[facility]   part of [facility]     Hospitalist Progress Note     Richy Goins Patient Status:  Inpatient    1960 MRN [id number]   Location [facility] 8NE-A Attending Hussain Rivera MD   Hosp Day # 1 PCP Woody Dahl MD     Subjective:   Tired getting HD, better     Objective:    Review of Systems:   A comprehensive review of systems was completed; pertinent positive and negatives stated in subjective.  Vital signs:  Temp:  [98 °F (36.7 °C)-98.7 °F (37.1 °C)] 98.7 °F (37.1 °C)  Pulse:  [83-90] 83  Resp:  [15-22] 20  BP: (127-149)/(61-84) 127/69  SpO2:  [94 %-100 %] 95 %  Physical Exam:    General: No acute distress    Respiratory: no wheezes, no rhonchi  Cardiovascular: S1, S2, RRR  Abdomen: Soft, NT/ND, +BS  Extremities: no edema    Diagnostic Data:    Labs:  Recent Labs   Lab 25  0458   WBC 9.5 9.5   HGB 8.9* 8.5*   MCV 82.7 83.8   .0 332.0     Recent Labs   Lab 25  0458   * 119*   * 141*   CREATSERUM 13.73* 13.89*   CA 9.8 9.4   ALB 4.3  --     140   K 5.9* 5.9*    104   CO2 16.0* 15.0*   ALKPHO 271*  --    AST 9  --    ALT 9*  --    BILT <0.2*  --    TP 7.5  --      Estimated Creatinine Clearance: 5.1 mL/min (A) (based on SCr of 13.89 mg/dL (H)).  No results for input(s): \"PTP\", \"INR\" in the last 168 hours.     Microbiology  No results found for this visit on 25.  Imaging: Reviewed in Epic.  Medications: Scheduled Medications[1]    Assessment & Plan:    #Acute hypoxic respiratory failure 2/2 pulmonary edema and pleural effusion  #Dialysis non-compliance in ESRD  #HAGMA  #hyperkalemia  -SpO2 as low as 86% on room air per ED report  -chest xray showing near complete opacification of left hemothorax mild interstitial edema  -lactate normal   -wean oxygen as able with goal SpO2 >94%  -strep, viral [ane neg   -procalcitonin 0.6 elevated suspect 2/2 renal function   -nephrology c/s in ED: plan for lokelma and HD  session  -pulmonology c/s in ED for possible thoracentesis     #anemia of chronic renal ds  -consider EPO with HD     #DM with hyperglycemia- hyperglycemia protocol         Supplementary Documentation:   Quality:  DVT Mechanical Prophylaxis:   SCDs,    DVT Pharmacologic Prophylaxis   Medication    heparin (Porcine) 5000 UNIT/ML injection 5,000 Units                Code Status: Full Code  Soni: No urinary catheter in place  Soni Duration (in days):   Central line:    [place]:   At this point Mr. Goins is expected to be discharge to: tbd     The 21st Century Cures Act makes medical notes like these available to patients in the interest of transparency. Please be advised this is a medical document. Medical documents are intended to carry relevant information, facts as evident, and the clinical opinion of the practitioner. The medical note is intended as peer to peer communication and may appear blunt or direct. It is written in medical language and may contain abbreviations or verbiage that are unfamiliar.       **Certification      PHYSICIAN Certification of Need for Inpatient Hospitalization - Initial Certification    Patient will require inpatient services that will reasonably be expected to span two midnight's based on the clinical documentation in H+P.   Based on patients current state of illness, I anticipate that, after discharge, patient will require TBD.         [1]    heparin  5,000 Units Subcutaneous 2 times per day    insulin aspart  2-10 Units Subcutaneous TID AC and HS

## 2025-04-30 NOTE — H&P
[facility]     Richy Goins Patient Status:  Emergency    1960 MRN [id number]   Location [facility] EMERGENCY DEPARTMENT Attending Julio Osuna,    Hosp Day # 0 PCP Woody Dahl MD     Chief Complaint: dyspnea, facial swelling    Subjective:    History of Present Illness:     Richy Goins is a 64 year old male with PMHX ESRD (on HD //Sat)/ DM/ HTN/ PAD/ HLD who presented to the hospital for dyspnea and generally feeling unwell. He was hospitalized about 4 weeks ago at [facility] for wet gangrene with proteus mirabilis and Klebsiella oxytoca of his left lower leg with severe PAD and had a left BKA. He reports since being discharged to rehab about 1 week ago that they were not able to get him transport to dialysis so he missed HD since last Thursday. He felt like he may be dehydrated.    History/Other:    Past Medical History:  Past Medical History[1]  Past Surgical History:   Past Surgical History[2]   Family History:   Family History[3]  Social History:    reports that he has never smoked. He has never used smokeless tobacco. He reports that he does not currently use alcohol. He reports that he does not use drugs.     Allergies: Allergies[4]    Medications:  Medications Ordered Prior to Encounter[5]    Review of Systems:   A comprehensive review of systems was completed.    Pertinent positives and negatives noted in the HPI.    Objective:   Physical Exam:    /61   Pulse 83   Temp 98 °F (36.7 °C) (Oral)   Resp 15   SpO2 100%   General: No acute distress, Alert  Respiratory: No rhonchi, no wheezes, decreased breath sounds on left  Cardiovascular: S1, S2. Regular rate and rhythm  Abdomen: Soft, Non-tender, non-distended, positive bowel sounds  Neuro: No new focal deficits  Extremities: No edema, bilateral BKA's      Results:    Labs:      Labs Last 24 Hours:    Recent Labs   Lab 25   RBC 3.46*   HGB 8.9*   HCT 28.6*   MCV 82.7    MCH 25.7*   MCHC 31.1   RDW 16.8   NEPRELIM 7.78*   WBC 9.5   .0       Recent Labs   Lab 04/29/25 2057   *   *   CREATSERUM 13.73*   EGFRCR 4*   CA 9.8   ALB 4.3      K 5.9*      CO2 16.0*   ALKPHO 271*   AST 9   ALT 9*   BILT <0.2*   TP 7.5       Estimated Glomerular Filtration Rate: 4 mL/min/1.73m2 (A) (result from lab).    Lab Results   Component Value Date    INR 1.08 03/26/2024    INR 0.94 03/08/2024    INR 1.07 11/14/2023       No results for input(s): \"TROP\", \"TROPHS\", \"CK\" in the last 168 hours.    No results for input(s): \"TROP\", \"PBNP\" in the last 168 hours.    No results for input(s): \"PCT\" in the last 168 hours.    Imaging: Imaging data reviewed in Epic.    Assessment & Plan:      #Acute hypoxic respiratory failure 2/2 pulmonary edema and pleural effusion  #dialysis non-compliance in ESRD  #HAGMA  #hyperkalemia  -COVID, Flu, RSV negative  -K 5.9, , bicarb 16  with AG of 20  -SpO2 as low as 86% on room air per ED report  -chest xray showing near complete opacification of left hemothorax mild interstitial edema  -check lactate given HAGMA  -cont to trend BMP  -wean oxygen as able with goal SpO2 >94%  -check viral respiratory panel given sore throat to r/o superimposed viral PNA  -check procalcitonin  -cont home calcitriol, sevelamer  -nephrology c/s in ED: plan for lokelma and HD session  -pulmonology c/s in ED for possible thoracentesis    #anemia of chronic renal ds  -hgb 8.9: baseline around 12  -no signs active bleeding  -consider EPO with HD  -cont to monitor CBC and transfuse for hgb <7    #DM with hyperglycemia  -glucose 193  -SSI, QID checks, hyperglycemia protocol  -sub home detemir with tresiba    #HTN  -cont home amlodipine    #PAD  -cont home ASA, statin    #hx wet gangrene LLE  -cont home cephalexin    #chronic pain  -cont home gabapentin    #GERD  -cont home PPI    #BPH  -cont home tamsulosin        Plan of care discussed with ED physician,  patient    Cielo Vidales, DO    Supplementary Documentation:     The 21st Century Cures Act makes medical notes like these available to patients in the interest of transparency. Please be advised this is a medical document. Medical documents are intended to carry relevant information, facts as evident, and the clinical opinion of the practitioner. The medical note is intended as peer to peer communication and may appear blunt or direct. It is written in medical language and may contain abbreviations or verbiage that are unfamiliar.                                       [1]   Past Medical History:   Belching    Dialysis patient    Diarrhea, unspecified    Esophageal reflux    Essential hypertension    Fatigue    Flatulence/gas pain/belching    High blood pressure    High cholesterol    History of blood transfusion    Hyperlipidemia    Indigestion    Irregular bowel habits    Night sweats    Osteoporosis    Peripheral vascular disease    Pure hypercholesterolemia    Type II or unspecified type diabetes mellitus without mention of complication, not stated as uncontrolled    Visual impairment    reading glasses    Vomiting   [2]   Past Surgical History:  Procedure Laterality Date    Amputation toe,i-p jt Right     Av fistula revision, open      Below knee leg amputation Right     Colonoscopy N/A 02/20/2023    Procedure: COLONOSCOPY;  Surgeon: Sarmad Gonzalez MD;  Location:  ENDOSCOPY    Colonoscopy      Upper gi endoscopy,exam     [3]   Family History  Problem Relation Age of Onset    Heart Attack Father     Heart Disorder Father 57    Diabetes Maternal Grandfather     Diabetes Maternal Aunt    [4]   Allergies  Allergen Reactions    Zosyn [Piperacillin-Tazobactam In D5w] RASH     Developed diffuse erythroderma 1 day after starting zosyn   [5]   No current facility-administered medications on file prior to encounter.     Current Outpatient Medications on File Prior to Encounter   Medication Sig Dispense Refill     Insulin Glargine-yfgn 100 UNIT/ML Subcutaneous Solution Pen-injector Inject 15 Units into the skin nightly. 15 mL 0    [] apixaban 2.5 MG Oral Tab Take 1 tablet (2.5 mg total) by mouth 2 (two) times daily. 60 tablet 0    [] atorvastatin 20 MG Oral Tab Take 1 tablet (20 mg total) by mouth nightly. 30 tablet 0    insulin detemir (LEVEMIR FLEXPEN) 100 UNIT/ML Subcutaneous Solution Pen-injector Inject 15 Units into the skin nightly.      pantoprazole 40 MG Oral Tab EC Take one tablet (40 mg total) by mouth once daily, 30 minutes prior to breakfast. 90 tablet 3    aspirin 81 MG Oral Tab EC Take 1 tablet (81 mg total) by mouth daily.      Insulin Pen Needle (BD PEN NEEDLE AMBER 2ND GEN) 32G X 4 MM Does not apply Misc Use to inject insulin up to 5 times daily 450 each 1    amLODIPine 10 MG Oral Tab Take 1 tablet (10 mg total) by mouth daily. 30 tablet 1

## 2025-05-01 ENCOUNTER — APPOINTMENT (OUTPATIENT)
Dept: ULTRASOUND IMAGING | Facility: HOSPITAL | Age: 65
End: 2025-05-01
Attending: INTERNAL MEDICINE
Payer: COMMERCIAL

## 2025-05-01 LAB
ANION GAP SERPL CALC-SCNC: 14 MMOL/L (ref 0–18)
BASOPHILS # BLD AUTO: 0.07 X10(3) UL (ref 0–0.2)
BASOPHILS NFR BLD AUTO: 0.8 %
BUN BLD-MCNC: 68 MG/DL (ref 9–23)
CALCIUM BLD-MCNC: 9.1 MG/DL (ref 8.7–10.6)
CHLORIDE SERPL-SCNC: 101 MMOL/L (ref 98–112)
CO2 SERPL-SCNC: 24 MMOL/L (ref 21–32)
CREAT BLD-MCNC: 8.24 MG/DL (ref 0.7–1.3)
EGFRCR SERPLBLD CKD-EPI 2021: 7 ML/MIN/1.73M2 (ref 60–?)
EOSINOPHIL # BLD AUTO: 0.17 X10(3) UL (ref 0–0.7)
EOSINOPHIL NFR BLD AUTO: 1.9 %
ERYTHROCYTE [DISTWIDTH] IN BLOOD BY AUTOMATED COUNT: 16.7 %
GLUCOSE BLD-MCNC: 102 MG/DL (ref 70–99)
GLUCOSE BLD-MCNC: 102 MG/DL (ref 70–99)
GLUCOSE BLD-MCNC: 122 MG/DL (ref 70–99)
GLUCOSE BLD-MCNC: 147 MG/DL (ref 70–99)
GLUCOSE BLD-MCNC: 221 MG/DL (ref 70–99)
GLUCOSE BLD-MCNC: 67 MG/DL (ref 70–99)
GLUCOSE BLD-MCNC: 79 MG/DL (ref 70–99)
GLUCOSE BLD-MCNC: 84 MG/DL (ref 70–99)
HCT VFR BLD AUTO: 29.2 % (ref 39–53)
HGB BLD-MCNC: 9 G/DL (ref 13–17.5)
IMM GRANULOCYTES # BLD AUTO: 0.02 X10(3) UL (ref 0–1)
IMM GRANULOCYTES NFR BLD: 0.2 %
INR BLD: 1.26 (ref 0.8–1.2)
LYMPHOCYTES # BLD AUTO: 0.93 X10(3) UL (ref 1–4)
LYMPHOCYTES NFR BLD AUTO: 10.6 %
MCH RBC QN AUTO: 25.5 PG (ref 26–34)
MCHC RBC AUTO-ENTMCNC: 30.8 G/DL (ref 31–37)
MCV RBC AUTO: 82.7 FL (ref 80–100)
MONOCYTES # BLD AUTO: 0.81 X10(3) UL (ref 0.1–1)
MONOCYTES NFR BLD AUTO: 9.2 %
NEUTROPHILS # BLD AUTO: 6.78 X10 (3) UL (ref 1.5–7.7)
NEUTROPHILS # BLD AUTO: 6.78 X10(3) UL (ref 1.5–7.7)
NEUTROPHILS NFR BLD AUTO: 77.3 %
OSMOLALITY SERPL CALC.SUM OF ELEC: 308 MOSM/KG (ref 275–295)
PLATELET # BLD AUTO: 337 10(3)UL (ref 150–450)
POTASSIUM SERPL-SCNC: 3.8 MMOL/L (ref 3.5–5.1)
PROTHROMBIN TIME: 15.9 SECONDS (ref 11.6–14.8)
RBC # BLD AUTO: 3.53 X10(6)UL (ref 3.8–5.8)
SODIUM SERPL-SCNC: 139 MMOL/L (ref 136–145)
WBC # BLD AUTO: 8.8 X10(3) UL (ref 4–11)

## 2025-05-01 PROCEDURE — 99232 SBSQ HOSP IP/OBS MODERATE 35: CPT | Performed by: INTERNAL MEDICINE

## 2025-05-01 PROCEDURE — 99232 SBSQ HOSP IP/OBS MODERATE 35: CPT | Performed by: HOSPITALIST

## 2025-05-01 RX ORDER — ALBUMIN (HUMAN) 12.5 G/50ML
25 SOLUTION INTRAVENOUS
Status: DISCONTINUED | OUTPATIENT
Start: 2025-05-01 | End: 2025-05-02

## 2025-05-01 NOTE — IMAGING NOTE
9111- Spoke with RN Esther regarding ordered thoracentesis.  Instructed to keep pt NPO except for meds with small sips of water.  Per RN Esther, pt is consentable.  Radiology RN to call back and confirm time for procedure.  
General

## 2025-05-01 NOTE — PROGRESS NOTES
[facility]  Progress Note    Richy Goins Patient Status:  Inpatient    1960 MRN [id number]   Location [facility] 8NE-A Attending Hussain Rivera MD   Hosp Day # 2 PCP Woody Dahl MD     Subjective:  Richy Goins is a(n) 64 year old male remains afebrile  Thinks his breathing is easier today notes some coughing nonproductive denies any specific chest pain  Room air sats 88%    Objective:  /71 (BP Location: Right arm)   Pulse 101   Temp 98.1 °F (36.7 °C) (Oral)   Resp 18   Wt 180 lb (81.6 kg)   SpO2 92%   BMI 27.37 kg/m² 88% on room air oxygen was replaced      Temp (24hrs), Av.4 °F (36.9 °C), Min:97.8 °F (36.6 °C), Max:99.1 °F (37.3 °C)      Intake/Output:    Intake/Output Summary (Last 24 hours) at 2025 1219  Last data filed at 2025 0812  Gross per 24 hour   Intake 480 ml   Output 3000 ml   Net -2520 ml       Physical Exam:   General: alert, cooperative, oriented.  No respiratory distress.   Head: Normocephalic, without obvious abnormality, atraumatic.   Throat: Lips, mucosa, and tongue normal.  No thrush noted.   Neck: trachea midline, no adenopathy, no thyromegaly.  Difficult to assess for JVD   Lungs: Diminished breath sounds throughout the left right seems clear   Chest wall: no significant issues   Heart: Regular rate rhythm   Abdomen: soft, non-distended, no masses, no guarding, no     Rebound.  Nontender   Extremity: Bilateral amputations fistula left arm   Skin: No rashes or lesions.   Neurological: Alert, interactive, no focal deficits    Lab Data Review:  Recent Labs     25  0528   WBC 9.5 9.5 8.8   HGB 8.9* 8.5* 9.0*   .0 332.0 337.0     Recent Labs     25  0528    140 139   K 5.9* 5.9* 3.8    104 101   CO2 16.0* 15.0* 24.0   * 141* 68*   CREATSERUM 13.73* 13.89* 8.24*     Recent Labs   Lab 25  0810   PTP 15.9*   INR 1.26*       Cultures: Viral panel  negative    Radiology:  No results found.  Chest x-ray is reviewed      Medications reviewed     Assessment and Plan:   Problem List[1]    Assessment:  Recurrent left-sided pleural effusion present since January 2025-now increasing with near whiteout suspected component atelectasis as well.  Dyspnea mild hypoxia related to the above possible component pulmonary edema as well having missed dialysis  End-stage renal disease  Severe peripheral vascular disease bilateral BKA's most recently last month     Plan:  Discussed at length plan to proceed with thoracentesis therapeutic diagnostic-ongoing dialysis as per renal service  Will follow with you         CC     Delilah Easley MD  5/1/2025  12:19 PM       [1]   Patient Active Problem List  Diagnosis    Diabetic nephropathy associated with type 2 diabetes mellitus (HCC)    Hypertension    Type 2 diabetes mellitus with hyperglycemia (HCC)    Stage 3 chronic kidney disease (HCC)    Retinopathy due to secondary diabetes mellitus (HCC)    Azotemia    PAD (peripheral artery disease)    Stage 4 chronic kidney disease (HCC)    Acidosis    ESRD (end stage renal disease) (HCC)    ESRD on hemodialysis (HCC)    Pure hypercholesterolemia    Hemoptysis    Hypertensive urgency    Hematemesis with nausea    Hematemesis, unspecified whether nausea present    Acute gastritis with hemorrhage, unspecified gastritis type    Uremia    Acquired absence of right leg below knee (HCC)    Chronic kidney disease, stage 4 (severe) (HCC)    End-stage renal disease on hemodialysis (HCC)    Osteomyelitis of right foot (HCC)    Dyslipidemia    Diabetes mellitus type 2 in nonobese (HCC)    Unilateral complete BKA, right, sequela (HCC)    Type 2 diabetes mellitus with diabetic peripheral angiopathy without gangrene (HCC)    Type 2 diabetes mellitus with diabetic chronic kidney disease (HCC)    Long term (current) use of aspirin    Hypertensive chronic kidney disease w stg 1-4/unsp chr kdny    Encounter  for orthopedic aftercare following surgical amputation    Anemia in chronic kidney disease    Age-related osteoporosis without current pathological fracture    Acute kidney failure, unspecified    CKD (chronic kidney disease), stage III (HCC)    Type 1 diabetes mellitus with ketoacidosis without coma ([facility])    Senile purpura    Tinnitus, bilateral    Hypernatremia    Anemia    Acute kidney injury    Acute renal failure (ARF)    Metabolic acidosis    Hyperglycemia    Nausea    Elevated lipase    Elevated procalcitonin    Pleural effusion    Anemia in ESRD (end-stage renal disease) ([facility])    Hyperphosphatemia    Dyspnea on exertion    Ischemic foot    Gangrene of toe of left foot ([facility])    Hyperkalemia    Acute respiratory failure with hypoxia ([facility])    Anemia, unspecified type

## 2025-05-01 NOTE — PHYSICAL THERAPY NOTE
PHYSICAL THERAPY EVALUATION - INPATIENT     Room Number: 8610/8610-A  Evaluation Date: 5/1/2025  Type of Evaluation: Initial  Physician Order: PT Eval and Treat    Presenting Problem: Acute respiratory failure  Co-Morbidities : Recent L BKA, previous R BKA, ESRD on HD, PAD, DM, HTN  Reason for Therapy: Mobility Dysfunction and Discharge Planning    PHYSICAL THERAPY ASSESSMENT   Patient is a 64 year old male admitted 4/29/2025 for acute respiratory failure.  Prior to admission, patient's baseline is mod I for transfers w/ his R le prosthetic on.  Patient is currently functioning below baseline with bed mobility and transfers.  Patient is requiring minimal assist as a result of the following impairments: decreased functional strength, decreased endurance/aerobic capacity, impaired sitting balance, decreased muscular endurance, medical status, and fatigue .  Physical Therapy will continue to follow for duration of hospitalization.    Patient will benefit from continued skilled PT Services at discharge to promote prior level of function and safety with additional support and return home with home health PT.    PLAN DURING HOSPITALIZATION  Nursing Mobility Recommendation : 1 Assist  PT Device Recommendation: Wheelchair, Rolling walker  PT Treatment Plan: Bed mobility, Patient education, Family education, Transfer training, Strengthening, Range of motion, Balance training  Rehab Potential : Good  Frequency (Obs): 3-5x/week     CURRENT GOALS    Goal #1 Patient is able to demonstrate supine - sit EOB @ level: modified independent     Goal #2 Patient is able to demonstrate transfers EOB to/from Chair/Wheelchair at assistance level: supervision     Goal #3    Goal #4    Goal #5    Goal #6    Goal Comments: Goals established on 5/1/2025      PHYSICAL THERAPY MEDICAL/SOCIAL HISTORY  History related to current admission: Patient is a 64 year old male admitted on 4/29/2025 from home for sob and facial swelling after missing 2  sessions of dialysis this week.  Pt diagnosed with acute respiratory failure, anemia and hyperkalemia.Plan for dialysis this afternoon and potential L sided thoracentesis.    HOME SITUATION  Type of Home: House  Home Layout: One level, Ramped entrance  Stairs to Enter : 0        Stairs to Bedroom: 0         Lives With: Spouse    Drives: No          Prior Level of [place]: Pt lives w/ his wife in a one level home.  He is typically able to transfer to/from w/c, toilet and bed on his own.  Pt's wife assists him in/out of the car and takes him to dialysis which is 5 a.m.  This has become significantly more difficult following his recent L le amputation.  Current plan is to have pt take a medical bus/van to from dialysis.  He has an electric w/c on order and will be able to roll on/off the bus and staff at dialysis will be able to use a lift in/out of chair.  Pt also will have a new time at dialysis.  He is simply awaiting delivery of electric w/c.    Pt finished recent stay at [facility] and is awaiting stability in L limb so he can be fit for a prosthesis.  Has a R le prosthesis.    SUBJECTIVE  \"I'm working with the  at [facility] to make a better plan for dialysis.  I can't keep putting my wife through all of this work.\"  Per pt wife has been great at assisting him.    See above for details.    OBJECTIVE  Precautions: Orthotic/prosthesis (Has prosthesis for R le)  Fall Risk: High fall risk    WEIGHT BEARING RESTRICTION     PAIN ASSESSMENT  Ratin          COGNITION  Overall Cognitive Status:  WFL - within functional limits  Arousal/Alertness:  lethargic and at times falling asleep briefly w/ conversation.    RANGE OF MOTION AND STRENGTH ASSESSMENT  Upper extremity ROM and strength are within functional limits     Lower extremity ROM is within functional limits     Lower extremity strength did not formally test, but pt is able to complete full range.    BALANCE  Static Sitting: Fair -  Dynamic Sitting: Poor  +  Static Standing: Not tested  Dynamic Standing: Not tested      AM-PAC '6-Clicks' INPATIENT SHORT FORM - BASIC MOBILITY  How much difficulty does the patient currently have...  Patient Difficulty: Turning over in bed (including adjusting bedclothes, sheets and blankets)?: None   Patient Difficulty: Sitting down on and standing up from a chair with arms (e.g., wheelchair, bedside commode, etc.): A Lot   Patient Difficulty: Moving from lying on back to sitting on the side of the bed?: A Little   How much help from another person does the patient currently need...   Help from Another: Moving to and from a bed to a chair (including a wheelchair)?: A Little   Help from Another: Need to walk in hospital room?: Total   Help from Another: Climbing 3-5 steps with a railing?: Total     AM-PAC Score:  Raw Score: 14   Approx Degree of Impairment: 61.29%   Standardized Score (AM-PAC Scale): 38.1   CMS Modifier (G-Code): CL    FUNCTIONAL ABILITY STATUS  Gait Assessment   Functional Mobility/Gait Assessment  Gait Assistance: Not tested    Skilled Therapy Provided     Bed Mobility:  Rolling: Left w/ hob elevated - mod I  Supine to sit: Min a of 1, including w/ pt's hob significantly elevated.  Reports he can normally do this completely on his own from flat bed, but is too weak today to complete.  Once sitting eob, pt occasionally losing balance, requiring CGA and his own ue support to maintain balance.  Pt is clearly very fatigued and falling asleep at times when he is not actively talking.  Tolerated 5 min eob, before asking to return to supine secondary to fatigue.   Sit to supine: Mod I     Transfer Mobility:  Sit to stand: NT   Stand to sit: NT  Gait = NT    Therapist's Comments:  Session limited by high level of fatigue.  Creatinine is 8.24 today, awaiting dialysis.     Exercise/Education Provided:  Bed mobility  Functional activity tolerated    Patient End of Session: In bed, Needs met, Call light within reach, RN aware of  session/findings, All patient questions and concerns addressed      Patient Evaluation Complexity Level:  History Moderate - 1 or 2 personal factors and/or co-morbidities   Examination of body systems Moderate - addressing a total of 3 or more elements   Clinical Presentation  Moderate - Evolving   Clinical Decision Making Low Complexity       PT Session Time: 24 minutes  Gait Trainin minutes  Therapeutic Activity: 10 minutes  Neuromuscular Re-education: 0 minutes  Therapeutic Exercise: 0 minutes

## 2025-05-01 NOTE — PAYOR COMM NOTE
ADMISSION REVIEW     Payor: [facility]  Subscriber #:  [id number]  Authorization Number: [id number]    Admit date: 4/29/25  Admit time: 10:59 PM       REVIEW DOCUMENTATION:     ED Provider Notes        ED Provider Notes signed by Julio Osuna DO at 4/29/2025 11:07 PM        Chief Complaint   Patient presents with    Difficulty Breathing     Stated Complaint: missed dialysis, kidney failure    Subjective:   HPI    This is a 64-year-old male with history of end-stage renal disease on hemodialysis Tuesday, Thursday, Saturday, status post left BKA approximate 4 weeks ago at [facility], presents to the ER for evaluation of shortness of breath and facial swelling which started yesterday.  Patient states that he has missed dialysis twice this week, has not gone since last Tuesday.  Reports he has had some shortness of breath, EMS was called and he was hypoxic at 86%, placed on supplemental oxygen.  Patient does state he has slight sore throat but denies difficulty speaking or swallowing.  Denies any change in phonation.  Denies cough sore throat or runny nose.  History of Present Illness           Objective:     Past Medical History:    Belching    Dialysis patient    Diarrhea, unspecified    Esophageal reflux    Essential hypertension    Fatigue    Flatulence/gas pain/belching    High blood pressure    High cholesterol    History of blood transfusion    Hyperlipidemia    Indigestion    Irregular bowel habits    Night sweats    Osteoporosis    Peripheral vascular disease    Pure hypercholesterolemia    Type II or unspecified type diabetes mellitus without mention of complication, not stated as uncontrolled    Visual impairment    reading glasses    Vomiting       Past Surgical History:   Procedure Laterality Date    Amputation toe,i-p jt Right     Av fistula revision, open      Below knee leg amputation Right     Colonoscopy N/A 02/20/2023    Procedure: COLONOSCOPY;  Surgeon: Sarmad Gonzalez MD;  Location:  ENDOSCOPY     Colonoscopy      Upper gi endoscopy,exam             ED Triage Vitals [04/29/25 2032]   /84   Pulse 90   Resp 20   Temp 98 °F (36.7 °C)   Temp src Oral   SpO2 97 %   O2 Device Nasal cannula       Current Vitals:   Vital Signs  BP: 136/61  Pulse: 84  Resp: 20  Temp: 98 °F (36.7 °C)  Temp src: Oral  MAP (mmHg): 82    Oxygen Therapy  SpO2: 99 %  O2 Device: Nasal cannula  O2 Flow Rate (L/min): 2 L/min    Physical Exam  GENERAL: Patient is awake, alert  HEENT: no scleral icterus.  Mucous membranes are moist, mild posterior pharyngeal erythema, uvula midline.  Periorbital edema without erythema.  No tongue or lip swelling.    NECK: Neck is supple, there is no nuchal rigidity.  No carotid bruits.  No masses.  No pain with manipulation of the hyoid.  Trachea midline.  No cervical lymphadenopathy.  HEART: Regular rate and rhythm, no murmurs.  LUNGS: Markedly decreased breath sounds on the left, coarse breath sounds on the right.  ABDOMEN: Soft, nondistended,non tender  EXTREMITIES: No peripheral edema  SKIN: Warm, dry, intact, no rashes.  NEUROLOGIC EXAM: Tongue midline, no facial drooping, no ptosis  Physical Exam            Labs Reviewed   COMP METABOLIC PANEL (14) - Abnormal; Notable for the following components:       Result Value    Glucose 193 (*)     Potassium 5.9 (*)     CO2 16.0 (*)     Anion Gap 20 (*)      (*)     Creatinine 13.73 (*)     Calculated Osmolality 341 (*)     eGFR-Cr 4 (*)     ALT 9 (*)     Alkaline Phosphatase 271 (*)     Bilirubin, Total <0.2 (*)     All other components within normal limits   CBC WITH DIFFERENTIAL WITH PLATELET - Abnormal; Notable for the following components:    RBC 3.46 (*)     HGB 8.9 (*)     HCT 28.6 (*)     MCH 25.7 (*)     Neutrophil Absolute Prelim 7.78 (*)     Neutrophil Absolute 7.78 (*)     Lymphocyte Absolute 0.90 (*)     All other components within normal limits   SARS-COV-2/FLU A AND B/RSV BY PCR (GENEXPERT) - Normal    Narrative:     This test is  intended for the qualitative detection and differentiation of SARS-CoV-2, influenza A, influenza B, and respiratory syncytial virus (RSV) viral RNA in nasopharyngeal or nares swabs from individuals suspected of respiratory viral infection consistent with COVID-19 by their healthcare provider. Signs and symptoms of respiratory viral infection due to SARS-CoV-2, influenza, and RSV can be similar.    Test performed using the Xpert Xpress SARS-CoV-2/FLU/RSV (real time RT-PCR)  assay on the GeneXpert instrument, [employer], [place] [address].   This test is being used under the Food and Drug Administration's Emergency Use Authorization.    The authorized Fact Sheet for Healthcare Providers for this assay is available upon request from the laboratory.   RAPID STREP A SCREEN (LC) - Normal    Narrative:     A confirmatory culture is recommended if clinically indicated.   BASIC METABOLIC PANEL (8)   CBC WITH DIFFERENTIAL WITH PLATELET   MAGNESIUM   PHOSPHORUS   LACTIC ACID, PLASMA   PROCALCITONIN   RAINBOW DRAW LAVENDER   RAINBOW DRAW LIGHT GREEN   RAINBOW DRAW BLUE   RAINBOW DRAW GOLD     EKG    Rate, intervals and axes as noted on EKG Report.  Rate: 89  Rhythm: Sinus Rhythm  Reading: Normal sinus rhythm, right bundle branch block.  No change compared to previous EKG    A total of 35 minutes of critical care time (exclusive of billable procedures) was administered to manage the patient's respiratory instability due to his acute hypoxemic respiratory failure.  This involved direct patient intervention, complex decision making, and/or extensive discussions with the patient, family, and clinical staff.      Differential diagnosis before testing includes but not limited to pneumonia, pneumothorax, pleural effusion, pulm edema, electrolyte abnormality, which is a medical condition that poses a threat to life/function    Past Medical History/comorbidities-as noted in HPI    Radiographic images  I personally reviewed the  radiographs and my individual interpretation shows complete opacification left chest  I also reviewed the official reports that showed There is nearly complete opacification of the left hemithorax which partially obscures the cardiomediastinal silhouette.  This likely represents a combination of large pleural effusion and passive atelectasis, though superimposed infection or aspiration   not excluded.      Mild interstitial edema noted throughout the right lung.  The right pleural space remains clear.       Discussion of management (consult/physicians, social work, pharmacy,ect) nephrology Dr. Yoder, pulmonary Dr. Luna, [place] hospitalist Dr. Vidales    Medications Provided: Lokelma    Course of Events during Emergency Room Visit include IV established, patient placed on cardiac monitor and pulse ox, patient initially hypoxic upon arrival, supplemental oxygen was given.  Chest x-ray with complete opacification of the left hemithorax.  Chemistry sodium 140 potassium 5.9 bicarb 16  creatinine 13.73 glucose 193.  Viral panel negative, rapid strep testing negative.  CBC white count 9.5 hemoglobin 8.9 platelet 325.  Patient was discussed with nephrology, pulmonary, and hospitalist.  Nephrologist did request kelma.  Will be putting in orders for dialysis.  Patient be admitted for further evaluation and treatment.  Discussed results and plan the patient and wife were in agreement    Shared decision making was utilized     Disposition:    Admission  I have discussed with the patient the results of test, differential diagnosis, and treatment plan. They expressed clear understanding of these instructions and agrees to the plan provided.       Note to patient: The 21st Century Cures Act makes medical notes like these available to patients in the interest of transparency. However, this is a medical document intended as peer to peer communication. It is written in medical language and may contain abbreviations or  verbiage that are unfamiliar. It may appear blunt or direct. Medical documents are intended to carry relevant information, facts as evident, and the clinical opinion of the practitioner.         Clinical Impression:  1. Hyperkalemia    2. Acute hypoxic respiratory failure (HCC)    3. Pleural effusion    4. Anemia, unspecified type         Disposition:  Admit         Present on Admission  Date Reviewed: 11/13/2024          ICD-10-CM Noted POA    * (Principal) Hyperkalemia E87.5 4/29/2025 Unknown    Acute hypoxic respiratory failure (HCC) J96.01 4/29/2025 Unknown    Anemia, unspecified type D64.9 4/29/2025 Unknown    Pleural effusion J90 1/7/2025 Unknown                4/29    HOSPITALIST:     History and Physical     Chief Complaint: dyspnea, facial swelling     Subjective:  History of Present Illness:      Richy Goins is a 64 year old male with PMHX ESRD (on HD T/Th/Sat)/ DM/ HTN/ PAD/ HLD who presented to the hospital for dyspnea and generally feeling unwell. He was hospitalized about 4 weeks ago at [facility] for wet gangrene with proteus mirabilis and Klebsiella oxytoca of his left lower leg with severe PAD and had a left BKA. He reports since being discharged to rehab about 1 week ago that they were not able to get him transport to dialysis so he missed HD since last Thursday. He felt like he may be dehydrated.    Objective:  Physical Exam:    /61   Pulse 83   Temp 98 °F (36.7 °C) (Oral)   Resp 15   SpO2 100%   General: No acute distress, Alert  Respiratory: No rhonchi, no wheezes, decreased breath sounds on left  Cardiovascular: S1, S2. Regular rate and rhythm  Abdomen: Soft, Non-tender, non-distended, positive bowel sounds  Neuro: No new focal deficits  Extremities: No edema, bilateral BKA's           Recent Labs   Lab 04/29/25 2040   RBC 3.46*   HGB 8.9*   HCT 28.6*   MCV 82.7   MCH 25.7*   MCHC 31.1   RDW 16.8   NEPRELIM 7.78*   WBC 9.5   .0             Recent Labs   Lab  04/29/25 2057   *   *   CREATSERUM 13.73*   EGFRCR 4*   CA 9.8   ALB 4.3      K 5.9*      CO2 16.0*   ALKPHO 271*   AST 9   ALT 9*   BILT <0.2*     Assessment & Plan:  #Acute hypoxic respiratory failure 2/2 pulmonary edema and pleural effusion  #dialysis non-compliance in ESRD  #HAGMA  #hyperkalemia  -COVID, Flu, RSV negative  -K 5.9, , bicarb 16  with AG of 20  -SpO2 as low as 86% on room air per ED report  -chest xray showing near complete opacification of left hemothorax mild interstitial edema  -check lactate given HAGMA  -cont to trend BMP  -wean oxygen as able with goal SpO2 >94%  -check viral respiratory panel given sore throat to r/o superimposed viral PNA  -check procalcitonin  -cont home calcitriol, sevelamer  -nephrology c/s in ED: plan for lokelma and HD session  -pulmonology c/s in ED for possible thoracentesis     #anemia of chronic renal ds  -hgb 8.9: baseline around 12  -no signs active bleeding  -consider EPO with HD  -cont to monitor CBC and transfuse for hgb <7     #DM with hyperglycemia  -glucose 193  -SSI, QID checks, hyperglycemia protocol  -sub home detemir with tresiba     #HTN  -cont home amlodipine     #PAD  -cont home ASA, statin     #hx wet gangrene LLE  -cont home cephalexin     #chronic pain  -cont home gabapentin     #GERD  -cont home PPI     #BPH  -cont home tamsulosin         4/30      HOSPITALIST:     ubjective:  Tired getting HD, better      Objective:  Review of Systems:   A comprehensive review of systems was completed; pertinent positive and negatives stated in subjective.  Vital signs:  Temp:  [98 °F (36.7 °C)-98.7 °F (37.1 °C)] 98.7 °F (37.1 °C)  Pulse:  [83-90] 83  Resp:  [15-22] 20  BP: (127-149)/(61-84) 127/69  SpO2:  [94 %-100 %] 95 %  Physical Exam:    General: No acute distress    Respiratory: no wheezes, no rhonchi  Cardiovascular: S1, S2, RRR  Abdomen: Soft, NT/ND, +BS  Extremities: no edema     Diagnostic Data:    Labs:       Recent  Labs   Lab 04/29/25 2040 04/30/25  0458   WBC 9.5 9.5   HGB 8.9* 8.5*   MCV 82.7 83.8   .0 332.0           Recent Labs   Lab 04/29/25 2057 04/30/25  0458   * 119*   * 141*   CREATSERUM 13.73* 13.89*   CA 9.8 9.4   ALB 4.3  --     140   K 5.9* 5.9*    104   CO2 16.0* 15.0*   ALKPHO 271*  --    AST 9  --    ALT 9*  --    BILT <0.2*  --    TP 7.5  --      Assessment & Plan:  #Acute hypoxic respiratory failure 2/2 pulmonary edema and pleural effusion  #Dialysis non-compliance in ESRD  #HAGMA  #hyperkalemia  -SpO2 as low as 86% on room air per ED report  -chest xray showing near complete opacification of left hemothorax mild interstitial edema  -lactate normal   -wean oxygen as able with goal SpO2 >94%  -strep, viral [ane neg   -procalcitonin 0.6 elevated suspect 2/2 renal function   -nephrology c/s in ED: plan for lokelma and HD session  -pulmonology c/s in ED for possible thoracentesis     #anemia of chronic renal ds  -consider EPO with HD     #DM with hyperglycemia- hyperglycemia protocol       NEPHROLOGY:     Report of Consultation     Assessment / Plan:     1) ESRD- due to longstanding DM 2; last HD 4/22- now uremic / azotemic / hyperkalemic. HD today (off schedule)     2) Acute hypoxic resp failure due to pul edema + pleural effusions / opacification of L hemithorax -> HD / UF; pulm to eval for possible thoracentesis     3) Anemia- due to CKD / recent events; EPO with HD     4) Longstanding DM 2     5) HTN- continue usual amlodipine     6) PAD s/p remote R BKA; s/p recent L BKA on keflex / wound care        Reason for Consultation:  ESRD      History of Present Illness:  Richy Goins is a a(n) 64 year old male.  Very pleasant 64-year-old male recently admitted for a very long stay at [facility] before being [facility] after developing wet gangrene of his left lower extremity undergoing below-knee amputation; apparently there is still wound and he is  still receiving oral antibiotics.  He was discharged last week and has missed dialysis last Thursday and Saturday and now presents with increasing shortness of breath and weakness and has multiple laboratory abnormalities.  General: awake alert  HEENT: No scleral icterus, MMM  Neck: Supple, no SARITA or thyromegaly  Cardiac: Regular rate and rhythm, S1, S2 normal, no murmur, rub, or gallop  Lungs: Decreased breath sounds at the bases bilaterally.   Abdomen: Soft, non-tender. + bowel sounds, no palpable organomegaly  Extremities: Without clubbing, cyanosis; no edema  Neurologic: Cranial nerves grossly intact, moving all extremities  Skin: Warm and dry, no rashes        Laboratory Data:        Lab Results   Component Value Date     WBC 9.5 04/30/2025     HGB 8.5 04/30/2025     HCT 28.0 04/30/2025     .0 04/30/2025     CREATSERUM 13.89 04/30/2025      04/30/2025      04/30/2025     K 5.9 04/30/2025      04/30/2025     CO2 15.0 04/30/2025      04/30/2025     CA 9.4 04/30/2025       PULMONARY:     Report of Consultation     Reason for Consultation:  Recurrent left-sided pleural effusion     History of Present Illness:  Richy Goins is a a(n) 64 year old male.  Reports never smoker with longstanding history of diabetes severe peripheral vascular disease status post bilateral amputations, end-stage renal disease on hemodialysis and reports a history of recurrent left pleural effusions.  He reports he has had them drained in the past though not recently.  He reports having been at [facility] for questionably 2 months after presenting with left lower extremity gangrene and underwent below the knee amputation.  He had been in rehab.  He missed dialysis after discharge and states that he then felt sick.  He was weak and had increasing shortness of breath Notes a mild cough denies any fevers denies any chest pain.  Evaluation has documented near whiteout of his left chest.  Last chest x-ray  by report was 4/16/2025 stating large left pleural effusion that was stable.  Thoracentesis performed here left side 1/8/2025-borderline transudate/sterile/benign- not during recent admission.-He remains afebrile     Radiology:  XR CHEST AP PORTABLE  (CPT=71045)  Result Date: 4/29/2025  CONCLUSION:   There is nearly complete opacification of the left hemithorax which partially obscures the cardiomediastinal silhouette.  This likely represents a combination of large pleural effusion and passive atelectasis, though superimposed infection or aspiration not excluded.  Mild interstitial edema noted throughout the right lung.  The right pleural space remains clear.   LOCATION:  [facility]      Dictated by (CST): Patricio Telles MD on 4/29/2025 at 9:14 PM     Finalized by (CST): Patricio Telles MD on 4/29/2025 at 9:15 PM        Chest x-ray was significant whiteout left chest-no significant mediastinal shift suggesting combined component of pleural effusion possible atelectasis     Assessment and Plan:  [Problem List]    [Problem List]  Patient Active Problem List      Diagnosis    Diabetic nephropathy associated with type 2 diabetes mellitus (HCC)    Hypertension    Type 2 diabetes mellitus with hyperglycemia (HCC)    Stage 3 chronic kidney disease (HCC)    Retinopathy due to secondary diabetes mellitus (HCC)    Azotemia    PAD (peripheral artery disease)    Stage 4 chronic kidney disease (HCC)    Acidosis    ESRD (end stage renal disease) (HCC)    ESRD on hemodialysis (HCC)    Pure hypercholesterolemia    Hemoptysis    Hypertensive urgency    Hematemesis with nausea    Hematemesis, unspecified whether nausea present    Acute gastritis with hemorrhage, unspecified gastritis type    Uremia    Acquired absence of right leg below knee (HCC)    Chronic kidney disease, stage 4 (severe) (HCC)    End-stage renal disease on hemodialysis (HCC)    Osteomyelitis of right foot (HCC)    Dyslipidemia    Diabetes mellitus type 2 in nonobese (HCC)     Unilateral complete BKA, right, sequela (HCC)    Type 2 diabetes mellitus with diabetic peripheral angiopathy without gangrene (HCC)    Type 2 diabetes mellitus with diabetic chronic kidney disease (HCC)    Long term (current) use of aspirin    Hypertensive chronic kidney disease w stg 1-4/unsp chr kdny    Encounter for orthopedic aftercare following surgical amputation    Anemia in chronic kidney disease    Age-related osteoporosis without current pathological fracture    Acute kidney failure, unspecified    CKD (chronic kidney disease), stage III (HCC)    Type 1 diabetes mellitus with ketoacidosis without coma (HCC)    Senile purpura    Tinnitus, bilateral    Hypernatremia    Anemia    Acute kidney injury    Acute renal failure (ARF)    Metabolic acidosis    Hyperglycemia    Nausea    Elevated lipase    Elevated procalcitonin    Pleural effusion    Anemia in ESRD (end-stage renal disease) (HCC)    Hyperphosphatemia    Dyspnea on exertion    Ischemic foot    Gangrene of toe of left foot (HCC)    Hyperkalemia    Acute hypoxic respiratory failure (HCC)    Anemia, unspecified type        Assessment:  Recurrent left-sided pleural effusion present since January 2025-now increasing with near whiteout suspected component atelectasis as well.  Dyspnea mild hypoxia related to the above possible component pulmonary edema as well having missed dialysis  End-stage renal disease  Severe peripheral vascular disease bilateral BKA's most recently last month     Plan:  Discussed at length plan to proceed with thoracentesis therapeutic diagnostic-ongoing dialysis as per renal service  Will follow with you          5/1       HOSPITALIST:     Vital signs:  Temp:  [97.4 °F (36.3 °C)-99.1 °F (37.3 °C)] 98.5 °F (36.9 °C)  Pulse:  [] 101  Resp:  [18-19] 18  BP: (122-154)/(62-86) 122/62  SpO2:  [93 %-98 %] 96 %    Lab 04/29/25  2040 04/30/25  0458 05/01/25  0528   WBC 9.5 9.5 8.8   HGB 8.9* 8.5* 9.0*   MCV 82.7 83.8 82.7   .0  332.0 337.0            Recent Labs   Lab 04/29/25 2057 04/30/25  0458 05/01/25  0528   * 119* 102*   * 141* 68*   CREATSERUM 13.73* 13.89* 8.24*   CA 9.8 9.4 9.1   ALB 4.3  --   --     140 139   K 5.9* 5.9* 3.8    104 101   CO2 16.0* 15.0* 24.0   ALKPHO 271*  --   --    AST 9  --   --    ALT 9*  --   --    BILT <0.2*  --   --    TP 7.5  --   --      Assessment & Plan:  #Acute hypoxic respiratory failure 2/2 pulmonary edema and pleural effusion  #Dialysis non-compliance in ESRD  #HAGMA  #hyperkalemia  -chest xray showing near complete opacification of left hemothorax mild interstitial edema  -lactate normal   -procalcitonin 0.6 elevated suspect 2/2 renal function   -renal and pulm following  -NPO for thoracentesis today      #anemia of chronic renal ds  -consider EPO with HD     #DM with hyperglycemia- hyperglycemia protocol     #Severe PVD s/p b/l BKAs        NEPHROLOGY:       Impression/Plan:       1) ESRD- due to longstanding DM 2; typically on HD TTHS but had extra tx yest after having missed 2 treatments.  Will plan HD again today to maintain usual schedule     2) Acute hypoxic resp failure due to pul edema + pleural effusions / opacification of L hemithorax.  Needs thora, pulm following     3) Anemia- due to ESRD. JURGEN for goal hgb 10-11 gms     4) HTN- continue usual amlodipine     5)   PAD s/p remote R BKA; s/p recent L BKA on keflex / wound care         MEDICATIONS ADMINISTERED IN LAST 1 DAY:  acetaminophen (Tylenol Extra Strength) tab 500 mg       Date Action Dose Route User    5/1/2025 0917 Given 500 mg Oral Esther Carrizales RN          amLODIPine (Norvasc) tab 10 mg       Date Action Dose Route User    5/1/2025 0909 Given 10 mg Oral Esther Carrizales RN          aspirin DR tab 81 mg       Date Action Dose Route User    5/1/2025 0909 Given 81 mg Oral Esther Carrizales RN          atorvastatin (Lipitor) tab 40 mg       Date Action Dose Route User    4/30/2025 2042 Given 40 mg Oral Ivone Adams,  ARAM          calcitriol (Rocaltrol) cap 0.5 mcg       Date Action Dose Route User    5/1/2025 0909 Given 0.5 mcg Oral Esther Carrizales RN          cephALEXin (Keflex) cap 500 mg       Date Action Dose Route User    5/1/2025 0909 Given 500 mg Oral Esther Carrizales RN          epoetin juan josé (Epogen, Procrit) 00807 UNIT/ML injection 10,000 Units       Date Action Dose Route User    4/30/2025 1402 Given 10,000 Units Intravenous Manuela Sue RN          gabapentin (Neurontin) cap 300 mg       Date Action Dose Route User    4/30/2025 2042 Given 300 mg Oral Ivone Adams RN          heparin (Porcine) 5000 UNIT/ML injection 5,000 Units       Date Action Dose Route User    4/30/2025 1403 Given 5,000 Units Subcutaneous (Left Lower Abdomen) Manuela Sue RN          insulin degludec (Tresiba) 100 units/mL flextouch 15 Units       Date Action Dose Route User    5/1/2025 0029 Given 15 Units Subcutaneous (Right Lower Abdomen) Ivone Adams RN          pantoprazole (Protonix) DR tab 40 mg       Date Action Dose Route User    5/1/2025 0546 Given 40 mg Oral Ivone Adams RN    4/30/2025 1609 Given 40 mg Oral Manuela Sue RN          sevelamer carbonate (Renvela) tab 1,600 mg       Date Action Dose Route User    5/1/2025 0909 Given 1,600 mg Oral Esther Carrizales RN    4/30/2025 1609 Given 1,600 mg Oral Manuela Sue RN          tamsulosin (Flomax) cap 0.4 mg       Date Action Dose Route User    4/30/2025 2042 Given 0.4 mg Oral Ivone Adams RN            sodium zirconium cyclosilicate (Lokelma) oral packet 10 g  Dose: 10 g  Freq: Once Route: OR  Start: 04/29/25 2228 End: 04/29/25 2241   Admin Instructions:   Empty entire contents of the packet(s) into a glass with 3 tablespoons (45 mL) of water. Stir well and drink immediately; if powder remains in the glass, add water, stir and drink immediately; repeat until no powder remains. Administer other oral medications 2 hours before or 2 hours after dose.           2241 AS-Given              Vitals (last day)       Date/Time Temp Pulse Resp BP SpO2 Weight O2 Device O2 Flow Rate (L/min) [facility]    05/01/25 0812 97.8 °F (36.6 °C) -- 18 139/83 88 % -- None (Room air) 0 L/min KN    05/01/25 0000 98.5 °F (36.9 °C) -- 18 122/62 -- -- None (Room air) -- TN    04/30/25 2345 98.7 °F (37.1 °C) -- -- 128/62 -- -- Nasal cannula 2 L/min TN    04/30/25 1918 99.1 °F (37.3 °C) 101 18 154/86 96 % -- Nasal cannula 2 L/min VA    04/30/25 1546 98 °F (36.7 °C) -- -- -- -- -- -- -- TP    04/30/25 1546 -- 103 18 140/74 93 % -- Nasal cannula 2 L/min DJ    04/30/25 1201 97.4 °F (36.3 °C) 97 18 141/81 98 % -- Nasal cannula 2 L/min MB    04/30/25 0917 -- 82 -- -- -- -- -- -- MB    04/30/25 0757 97.9 °F (36.6 °C) 85 19 131/76 95 % -- Nasal cannula 2 L/min MB    04/30/25 0500 98.7 °F (37.1 °C) 83 20 127/69 95 % -- Nasal cannula 2 L/min AT    04/30/25 0111 -- -- -- -- 96 % -- Nasal cannula 2 L/min LP    04/30/25 0038 -- -- -- -- -- 180 lb (81.6 kg) -- -- AW            04/29/25 2310 98.2 °F (36.8 °C) 85 22 132/74 94 % 180 lb (81.6 kg) Nasal cannula 2 L/min PB   04/29/25 2245 -- 84 20 -- 99 % -- -- -- AS   04/29/25 2215 -- 83 15 -- 100 % -- Nasal cannula 2 L/min AS   04/29/25 2145 -- 89 16 136/61 97 % -- -- -- AS   04/29/25 2130 -- 87 19 -- 100 % -- None (Room air) -- AS   04/29/25 2115 -- 86 18 -- 100 % -- -- -- AS   04/29/25 2100 -- 89 21 -- 100 % -- -- -- AS   04/29/25 2033 -- -- -- -- -- -- Nasal cannula 2 L/min AS       04/29/25 2032 98 °F (36.7 °C) 90 20 149/84 97 % -- Nasal cannula 2 L/min AS      Latest Reference Range & Units 04/30/25 04:58   Glucose 70 - 99 mg/dL 119 (H)   Sodium 136 - 145 mmol/L 140   Potassium 3.5 - 5.1 mmol/L 5.9 (H)   Chloride 98 - 112 mmol/L 104   Carbon Dioxide, Total 21.0 - 32.0 mmol/L 15.0 (L)   BUN 9 - 23 mg/dL 141 (H)   CREATININE 0.70 - 1.30 mg/dL 13.89 (H)   CALCIUM 8.7 - 10.6 mg/dL 9.4   EGFR >=60 mL/min/1.73m2 4 (L)   ANION GAP 0 - 18 mmol/L 21 (H)   CALCULATED OSMOLALITY 275 - 295 mOsm/kg  337 (H)   (H): Data is abnormally high  (L): Data is abnormally low   Latest Reference Range & Units 05/01/25 05:28   Glucose 70 - 99 mg/dL 102 (H)   Sodium 136 - 145 mmol/L 139   Potassium 3.5 - 5.1 mmol/L 3.8   Chloride 98 - 112 mmol/L 101   Carbon Dioxide, Total 21.0 - 32.0 mmol/L 24.0   BUN 9 - 23 mg/dL 68 (H)   CREATININE 0.70 - 1.30 mg/dL 8.24 (H)   CALCIUM 8.7 - 10.6 mg/dL 9.1   EGFR >=60 mL/min/1.73m2 7 (L)   ANION GAP 0 - 18 mmol/L 14   CALCULATED OSMOLALITY 275 - 295 mOsm/kg 308 (H)   (H): Data is abnormally high  (L): Data is abnormally low

## 2025-05-01 NOTE — PLAN OF CARE
Patient is aox3, vss, ra 88j%, 2l applied. Refused tele. Lungs clear. Positive bowel sounds, R leg BKA POD 1. Dressing was off when RN arrived during change of shift \"It fell off at night.\". RN covered it with kerlix and applies knee sock to left stump. Incision has dry blood, staples intact. No bruising.   Hx: of right leg amputee.  NPO for thoracentesis this afternoon. Consent will be signed in IR.  Dialysis today. Notified [facility].  1845. Thoracentesis held until tomorrow. Dialysis starting now.    Problem: SAFETY ADULT - FALL  Goal: Free from fall injury  Description: INTERVENTIONS:- Assess pt frequently for physical needs- Identify cognitive and physical deficits and behaviors that affect risk of falls.- [place] fall precautions as indicated by assessment.- Educate pt/family on patient safety including physical limitations- Instruct pt to call for assistance with activity based on assessment- Modify environment to reduce risk of injury- Provide assistive devices as appropriate- Consider OT/PT consult to assist with strengthening/mobility- Encourage toileting schedule  Outcome: Progressing     Problem: RESPIRATORY - ADULT  Goal: Achieves optimal ventilation and oxygenation  Description: INTERVENTIONS:- Assess for changes in respiratory status- Assess for changes in mentation and behavior- Position to facilitate oxygenation and minimize respiratory effort- Oxygen supplementation based on oxygen saturation or ABGs- Provide Smoking Cessation handout, if applicable- Encourage broncho-pulmonary hygiene including cough, deep breathe, Incentive Spirometry- Assess the need for suctioning and perform as needed- Assess and instruct to report SOB or any respiratory difficulty- Respiratory Therapy support as indicated- Manage/alleviate anxiety- Monitor for signs/symptoms of CO2 retention  Outcome: Progressing     Problem: HEMATOLOGIC - ADULT  Goal: Maintains hematologic stability  Description: INTERVENTIONS- Assess for  signs and symptoms of bleeding or hemorrhage- Monitor labs and vital signs for trends- Administer supportive blood products/factors, fluids and medications as ordered and appropriate- Administer supportive blood products/factors as ordered and appropriate  Outcome: Progressing  Goal: Free from bleeding injury  Description: (Example usage: patient with low platelets)INTERVENTIONS:- Avoid intramuscular injections, enemas and rectal medication administration- Ensure safe mobilization of patient- Hold pressure on venipuncture sites to achieve adequate hemostasis- Assess for signs and symptoms of internal bleeding- Monitor lab trends- Patient is to report abnormal signs of bleeding to staff- Avoid use of toothpicks and dental floss- Use electric shaver for shaving- Use soft bristle tooth brush- Limit straining and forceful nose blowing  Outcome: Progressing

## 2025-05-01 NOTE — PROGRESS NOTES
[facility]   part of [facility]     Nephrology Progress Note    Richy Goins Patient Status:  Inpatient    1960 MRN [id number]   Location [facility] 8NE-A Attending Hussain Rivera MD   Hosp Day # 2 PCP Woody Dahl MD       SUBJECTIVE:  Stable this AM        Physical Exam:   /83 (BP Location: Right arm)   Pulse 101   Temp 97.8 °F (36.6 °C) (Oral)   Resp 18   Wt 180 lb (81.6 kg)   SpO2 (!) 88%   BMI 27.37 kg/m²   Temp (24hrs), Av.3 °F (36.8 °C), Min:97.4 °F (36.3 °C), Max:99.1 °F (37.3 °C)       Intake/Output Summary (Last 24 hours) at 2025 0816  Last data filed at 2025 2100  Gross per 24 hour   Intake 720 ml   Output 3000 ml   Net -2280 ml     Last 3 Weights   25 0038 180 lb (81.6 kg)   25 2310 180 lb (81.6 kg)   25 1623 170 lb (77.1 kg)   25 1055 170 lb (77.1 kg)   10/02/24 0458 181 lb (82.1 kg)   10/01/24 1056 175 lb 0.7 oz (79.4 kg)   10/01/24 0544 175 lb (79.4 kg)   24 1517 192 lb 3.2 oz (87.2 kg)   24 0936 196 lb (88.9 kg)     General: Alert and oriented in no apparent distress.  HEENT: No scleral icterus, MMM  Neck: Supple, no SARITA or thyromegaly  Cardiac: Regular rate and rhythm, S1, S2 normal, no murmur or rub  Lungs: clear on R, decr BS L chest    Abdomen: Soft, non-tender. + bowel sounds, no palpable organomegaly  Extremities: Without clubbing, cyanosis or edema. L AVF with bruit/thrill.  B BKA  Neurologic: Alert and oriented, cranial nerves grossly intact, moving all extremities  Skin: Warm and dry, no rash        Labs:     Recent Labs   Lab 25  2040 04/25   WBC 9.5 9.5 8.8   HGB 8.9* 8.5* 9.0*   MCV 82.7 83.8 82.7   .0 332.0 337.0       Recent Labs   Lab 25    140 139   K 5.9* 5.9* 3.8    104 101   CO2 16.0* 15.0* 24.0   * 141* 68*   CREATSERUM 13.73* 13.89* 8.24*   CA 9.8 9.4 9.1   MG  --  2.5  --    PHOS  --  8.2*  --     * 119* 102*       Recent Labs   Lab 04/29/25 2057   ALT 9*   AST 9   ALB 4.3       Recent Labs   Lab 04/30/25  0506 04/30/25  1151 04/30/25  1544 05/01/25  0004 05/01/25  0536   PGLU 120* 140* 109* 122* 102*       Meds:   Current Hospital Medications[1]      Impression/Plan:      1) ESRD- due to longstanding DM 2; typically on HD TTHS but had extra tx yest after having missed 2 treatments.  Will plan HD again today to maintain usual schedule     2) Acute hypoxic resp failure due to pul edema + pleural effusions / opacification of L hemithorax.  Needs thora, pulm following     3) Anemia- due to ESRD. JURGEN for goal hgb 10-11 gms     4) HTN- continue usual amlodipine     5)   PAD s/p remote R BKA; s/p recent L BKA on keflex / wound care       Questions/concerns were discussed with patient and/or family by bedside.          Ciro Perez MD  5/1/2025  8:16 AM         [1]   Current Facility-Administered Medications   Medication Dose Route Frequency    amLODIPine (Norvasc) tab 10 mg  10 mg Oral Daily    aspirin DR tab 81 mg  81 mg Oral Daily    atorvastatin (Lipitor) tab 40 mg  40 mg Oral Nightly    calcitriol (Rocaltrol) cap 0.5 mcg  0.5 mcg Oral Once per day on Tuesday Thursday Saturday    cephALEXin (Keflex) cap 500 mg  500 mg Oral Daily    gabapentin (Neurontin) cap 300 mg  300 mg Oral Nightly    insulin degludec (Tresiba) 100 units/mL flextouch 15 Units  15 Units Subcutaneous Nightly    pantoprazole (Protonix) DR tab 40 mg  40 mg Oral BID AC    sevelamer carbonate (Renvela) tab 1,600 mg  1,600 mg Oral TID CC    tamsulosin (Flomax) cap 0.4 mg  0.4 mg Oral Nightly    metoclopramide (Reglan) 5 mg/mL injection 10 mg  10 mg Intravenous Daily PRN    sodium chloride 0.9 % IV bolus 100 mL  100 mL Intravenous Q30 Min PRN    And    albumin human (Albumin) 25% injection 25 g  25 g Intravenous PRN Dialysis    glucose (Dex4) 15 GM/59ML oral liquid 15 g  15 g Oral Q15 Min PRN    Or    glucose (Glutose) 40% oral gel 15  g  15 g Oral Q15 Min PRN    Or    glucose-vitamin C (Dex-4) chewable tab 4 tablet  4 tablet Oral Q15 Min PRN    Or    dextrose 50% injection 50 mL  50 mL Intravenous Q15 Min PRN    Or    glucose (Dex4) 15 GM/59ML oral liquid 30 g  30 g Oral Q15 Min PRN    Or    glucose (Glutose) 40% oral gel 30 g  30 g Oral Q15 Min PRN    Or    glucose-vitamin C (Dex-4) chewable tab 8 tablet  8 tablet Oral Q15 Min PRN    heparin (Porcine) 5000 UNIT/ML injection 5,000 Units  5,000 Units Subcutaneous 2 times per day    acetaminophen (Tylenol Extra Strength) tab 500 mg  500 mg Oral Q4H PRN    melatonin tab 3 mg  3 mg Oral Nightly PRN    polyethylene glycol (PEG 3350) (Miralax) 17 g oral packet 17 g  17 g Oral Daily PRN    sennosides (Senokot) tab 17.2 mg  17.2 mg Oral Nightly PRN    bisacodyl (Dulcolax) 10 MG rectal suppository 10 mg  10 mg Rectal Daily PRN    ondansetron (Zofran) 4 MG/2ML injection 4 mg  4 mg Intravenous Q6H PRN    insulin aspart (NovoLOG) 100 Units/mL FlexPen 2-10 Units  2-10 Units Subcutaneous TID AC and HS    benzonatate (Tessalon) cap 200 mg  200 mg Oral TID PRN    glycerin-hypromellose- (Artificial Tears) 0.2-0.2-1 % ophthalmic solution 1 drop  1 drop Both Eyes QID PRN    sodium chloride (Saline Mist) 0.65 % nasal solution 1 spray  1 spray Each Nare Q3H PRN

## 2025-05-01 NOTE — PLAN OF CARE
PT A&OX4, on RA,   HD Tues/Thurs/Sat-->left AV fisular  NSR on tele. Denied CP. Denied SOB.  No BM overnight-->Cdiff ISO dc  Updated POC to pt, all needs meet at this time.   Continet x2, no urine overnight  Call light within reach, bed alarm on for pt safety.     2350: Patient removed the heart monitor. Patient alert what he did, and states I know myself so well, everything will bw ok\". This nurse notified Hospitalist    POC:   NPO-->US thoracentesis guided 5/1  PT to see    Problem: SAFETY ADULT - FALL  Goal: Free from fall injury  Description: INTERVENTIONS:- Assess pt frequently for physical needs- Identify cognitive and physical deficits and behaviors that affect risk of falls.- [place] fall precautions as indicated by assessment.- Educate pt/family on patient safety including physical limitations- Instruct pt to call for assistance with activity based on assessment- Modify environment to reduce risk of injury- Provide assistive devices as appropriate- Consider OT/PT consult to assist with strengthening/mobility- Encourage toileting schedule  Outcome: Progressing     Problem: RESPIRATORY - ADULT  Goal: Achieves optimal ventilation and oxygenation  Description: INTERVENTIONS:- Assess for changes in respiratory status- Assess for changes in mentation and behavior- Position to facilitate oxygenation and minimize respiratory effort- Oxygen supplementation based on oxygen saturation or ABGs- Provide Smoking Cessation handout, if applicable- Encourage broncho-pulmonary hygiene including cough, deep breathe, Incentive Spirometry- Assess the need for suctioning and perform as needed- Assess and instruct to report SOB or any respiratory difficulty- Respiratory Therapy support as indicated- Manage/alleviate anxiety- Monitor for signs/symptoms of CO2 retention  Outcome: Progressing     Problem: HEMATOLOGIC - ADULT  Goal: Maintains hematologic stability  Description: INTERVENTIONS- Assess for signs and symptoms of  bleeding or hemorrhage- Monitor labs and vital signs for trends- Administer supportive blood products/factors, fluids and medications as ordered and appropriate- Administer supportive blood products/factors as ordered and appropriate  Outcome: Progressing  Goal: Free from bleeding injury  Description: (Example usage: patient with low platelets)INTERVENTIONS:- Avoid intramuscular injections, enemas and rectal medication administration- Ensure safe mobilization of patient- Hold pressure on venipuncture sites to achieve adequate hemostasis- Assess for signs and symptoms of internal bleeding- Monitor lab trends- Patient is to report abnormal signs of bleeding to staff- Avoid use of toothpicks and dental floss- Use electric shaver for shaving- Use soft bristle tooth brush- Limit straining and forceful nose blowing  Outcome: Progressing

## 2025-05-02 ENCOUNTER — ANESTHESIA (OUTPATIENT)
Dept: CARDIAC SURGERY | Facility: HOSPITAL | Age: 65
End: 2025-05-02
Payer: COMMERCIAL

## 2025-05-02 ENCOUNTER — APPOINTMENT (OUTPATIENT)
Dept: ULTRASOUND IMAGING | Facility: HOSPITAL | Age: 65
End: 2025-05-02
Attending: INTERNAL MEDICINE
Payer: COMMERCIAL

## 2025-05-02 ENCOUNTER — APPOINTMENT (OUTPATIENT)
Dept: GENERAL RADIOLOGY | Facility: HOSPITAL | Age: 65
End: 2025-05-02
Payer: COMMERCIAL

## 2025-05-02 ENCOUNTER — ANESTHESIA EVENT (OUTPATIENT)
Dept: CARDIAC SURGERY | Facility: HOSPITAL | Age: 65
End: 2025-05-02
Payer: COMMERCIAL

## 2025-05-02 ENCOUNTER — APPOINTMENT (OUTPATIENT)
Dept: CV DIAGNOSTICS | Facility: HOSPITAL | Age: 65
End: 2025-05-02
Attending: HOSPITALIST
Payer: COMMERCIAL

## 2025-05-02 ENCOUNTER — APPOINTMENT (OUTPATIENT)
Dept: GENERAL RADIOLOGY | Facility: HOSPITAL | Age: 65
End: 2025-05-02
Attending: INTERNAL MEDICINE
Payer: COMMERCIAL

## 2025-05-02 ENCOUNTER — APPOINTMENT (OUTPATIENT)
Dept: GENERAL RADIOLOGY | Facility: HOSPITAL | Age: 65
End: 2025-05-02
Attending: RADIOLOGY
Payer: COMMERCIAL

## 2025-05-02 PROBLEM — R41.0 CONFUSION: Status: ACTIVE | Noted: 2025-05-02

## 2025-05-02 PROBLEM — D62 ABLA (ACUTE BLOOD LOSS ANEMIA): Status: ACTIVE | Noted: 2025-05-02

## 2025-05-02 PROBLEM — R57.9 SHOCK (HCC): Status: ACTIVE | Noted: 2025-05-02

## 2025-05-02 PROBLEM — J94.2 HEMOTHORAX ON LEFT: Status: ACTIVE | Noted: 2025-05-02

## 2025-05-02 LAB
ALBUMIN SERPL-MCNC: 2.8 G/DL (ref 3.2–4.8)
ALBUMIN SERPL-MCNC: 3.2 G/DL (ref 3.2–4.8)
ALBUMIN SERPL-MCNC: 3.3 G/DL (ref 3.2–4.8)
ALBUMIN SERPL-MCNC: 3.5 G/DL (ref 3.2–4.8)
ALBUMIN/GLOB SERPL: 1.4 {RATIO} (ref 1–2)
ALBUMIN/GLOB SERPL: 1.5 {RATIO} (ref 1–2)
ALBUMIN/GLOB SERPL: 1.5 {RATIO} (ref 1–2)
ALBUMIN/GLOB SERPL: 1.6 {RATIO} (ref 1–2)
ALP LIVER SERPL-CCNC: 144 U/L (ref 45–117)
ALP LIVER SERPL-CCNC: 148 U/L (ref 45–117)
ALP LIVER SERPL-CCNC: 172 U/L (ref 45–117)
ALP LIVER SERPL-CCNC: 94 U/L (ref 45–117)
ALT SERPL-CCNC: 13 U/L (ref 10–49)
ALT SERPL-CCNC: 17 U/L (ref 10–49)
ALT SERPL-CCNC: <7 U/L (ref 10–49)
ALT SERPL-CCNC: <7 U/L (ref 10–49)
AMYLASE PLR-CCNC: 39 U/L
ANION GAP SERPL CALC-SCNC: 14 MMOL/L (ref 0–18)
ANION GAP SERPL CALC-SCNC: 14 MMOL/L (ref 0–18)
ANION GAP SERPL CALC-SCNC: 15 MMOL/L (ref 0–18)
ANION GAP SERPL CALC-SCNC: 17 MMOL/L (ref 0–18)
ANION GAP SERPL CALC-SCNC: 18 MMOL/L (ref 0–18)
ANTIBODY SCREEN: NEGATIVE
APTT PPP: 28.4 SECONDS (ref 23–36)
ARTERIAL PATENCY WRIST A: POSITIVE
AST SERPL-CCNC: 10 U/L (ref ?–34)
AST SERPL-CCNC: 22 U/L (ref ?–34)
AST SERPL-CCNC: 50 U/L (ref ?–34)
AST SERPL-CCNC: 8 U/L (ref ?–34)
ATRIAL RATE: 101 BPM
BASE EXCESS BLDA CALC-SCNC: -4.8 MMOL/L (ref ?–2)
BASE EXCESS BLDA CALC-SCNC: 5.8 MMOL/L (ref ?–2)
BASOPHILS # BLD AUTO: 0.04 X10(3) UL (ref 0–0.2)
BASOPHILS # BLD AUTO: 0.06 X10(3) UL (ref 0–0.2)
BASOPHILS # BLD AUTO: 0.07 X10(3) UL (ref 0–0.2)
BASOPHILS NFR BLD AUTO: 0.3 %
BASOPHILS NFR BLD AUTO: 0.3 %
BASOPHILS NFR BLD AUTO: 0.5 %
BASOPHILS NFR PLR: 0 %
BILIRUB SERPL-MCNC: 0.2 MG/DL (ref 0.2–1.1)
BILIRUB SERPL-MCNC: 0.2 MG/DL (ref 0.2–1.1)
BILIRUB SERPL-MCNC: 0.8 MG/DL (ref 0.2–1.1)
BILIRUB SERPL-MCNC: 0.8 MG/DL (ref 0.2–1.1)
BODY TEMPERATURE: 98.6 F
BODY TEMPERATURE: 98.6 F
BUN BLD-MCNC: 35 MG/DL (ref 9–23)
BUN BLD-MCNC: 35 MG/DL (ref 9–23)
BUN BLD-MCNC: 38 MG/DL (ref 9–23)
BUN BLD-MCNC: 39 MG/DL (ref 9–23)
BUN BLD-MCNC: 40 MG/DL (ref 9–23)
CA-I BLD-SCNC: 1.14 MMOL/L (ref 0.95–1.32)
CA-I BLD-SCNC: 1.21 MMOL/L (ref 0.95–1.32)
CALCIUM BLD-MCNC: 7.6 MG/DL (ref 8.7–10.6)
CALCIUM BLD-MCNC: 7.9 MG/DL (ref 8.7–10.6)
CALCIUM BLD-MCNC: 7.9 MG/DL (ref 8.7–10.6)
CALCIUM BLD-MCNC: 8.8 MG/DL (ref 8.7–10.6)
CALCIUM BLD-MCNC: 9.2 MG/DL (ref 8.7–10.6)
CALCIUM BLD-MCNC: 9.5 MG/DL (ref 8.7–10.6)
CHLORIDE SERPL-SCNC: 105 MMOL/L (ref 98–112)
CHLORIDE SERPL-SCNC: 105 MMOL/L (ref 98–112)
CHLORIDE SERPL-SCNC: 106 MMOL/L (ref 98–112)
CHLORIDE SERPL-SCNC: 107 MMOL/L (ref 98–112)
CHLORIDE SERPL-SCNC: 109 MMOL/L (ref 98–112)
CHOLEST SERPL-MCNC: 54 MG/DL (ref ?–200)
CO2 SERPL-SCNC: 22 MMOL/L (ref 21–32)
CO2 SERPL-SCNC: 24 MMOL/L (ref 21–32)
CO2 SERPL-SCNC: 25 MMOL/L (ref 21–32)
CO2 SERPL-SCNC: 26 MMOL/L (ref 21–32)
CO2 SERPL-SCNC: 27 MMOL/L (ref 21–32)
COHGB MFR BLD: 0.7 % SAT (ref 0–3)
COHGB MFR BLD: 3.1 % SAT (ref 0–3)
CREAT BLD-MCNC: 4.24 MG/DL (ref 0.7–1.3)
CREAT BLD-MCNC: 4.63 MG/DL (ref 0.7–1.3)
CREAT BLD-MCNC: 4.69 MG/DL (ref 0.7–1.3)
CREAT BLD-MCNC: 5.57 MG/DL (ref 0.7–1.3)
CREAT BLD-MCNC: 5.78 MG/DL (ref 0.7–1.3)
EGFRCR SERPLBLD CKD-EPI 2021: 10 ML/MIN/1.73M2 (ref 60–?)
EGFRCR SERPLBLD CKD-EPI 2021: 11 ML/MIN/1.73M2 (ref 60–?)
EGFRCR SERPLBLD CKD-EPI 2021: 13 ML/MIN/1.73M2 (ref 60–?)
EGFRCR SERPLBLD CKD-EPI 2021: 13 ML/MIN/1.73M2 (ref 60–?)
EGFRCR SERPLBLD CKD-EPI 2021: 15 ML/MIN/1.73M2 (ref 60–?)
EOSINOPHIL # BLD AUTO: 0.01 X10(3) UL (ref 0–0.7)
EOSINOPHIL # BLD AUTO: 0.07 X10(3) UL (ref 0–0.7)
EOSINOPHIL # BLD AUTO: 0.3 X10(3) UL (ref 0–0.7)
EOSINOPHIL NFR BLD AUTO: 0.1 %
EOSINOPHIL NFR BLD AUTO: 0.4 %
EOSINOPHIL NFR BLD AUTO: 2.1 %
EOSINOPHIL NFR PLR: 0 %
ERYTHROCYTE [DISTWIDTH] IN BLOOD BY AUTOMATED COUNT: 14.6 %
ERYTHROCYTE [DISTWIDTH] IN BLOOD BY AUTOMATED COUNT: 14.8 %
ERYTHROCYTE [DISTWIDTH] IN BLOOD BY AUTOMATED COUNT: 14.8 %
ERYTHROCYTE [DISTWIDTH] IN BLOOD BY AUTOMATED COUNT: 16.4 %
FIBRINOGEN PPP-MCNC: 345 MG/DL (ref 200–480)
FIBRINOGEN PPP-MCNC: 401 MG/DL (ref 200–480)
FIO2: 100 %
GLOBULIN PLAS-MCNC: 1.9 G/DL (ref 2–3.5)
GLOBULIN PLAS-MCNC: 2.1 G/DL (ref 2–3.5)
GLOBULIN PLAS-MCNC: 2.1 G/DL (ref 2–3.5)
GLOBULIN PLAS-MCNC: 2.5 G/DL (ref 2–3.5)
GLUCOSE BLD-MCNC: 113 MG/DL (ref 70–99)
GLUCOSE BLD-MCNC: 148 MG/DL (ref 70–99)
GLUCOSE BLD-MCNC: 168 MG/DL (ref 70–99)
GLUCOSE BLD-MCNC: 176 MG/DL (ref 70–99)
GLUCOSE BLD-MCNC: 244 MG/DL (ref 70–99)
GLUCOSE BLD-MCNC: 279 MG/DL (ref 70–99)
GLUCOSE BLD-MCNC: 289 MG/DL (ref 70–99)
GLUCOSE BLD-MCNC: 306 MG/DL (ref 70–99)
GLUCOSE BLD-MCNC: 333 MG/DL (ref 70–99)
GLUCOSE PLR-MCNC: 133 MG/DL
HBV SURFACE AG SER-ACNC: 0.12 [IU]/L
HBV SURFACE AG SERPL QL IA: NONREACTIVE
HCO3 BLDA-SCNC: 21.2 MEQ/L (ref 21–27)
HCO3 BLDA-SCNC: 29.4 MEQ/L (ref 21–27)
HCT VFR BLD AUTO: 19.3 % (ref 39–53)
HCT VFR BLD AUTO: 38.2 % (ref 39–53)
HCT VFR BLD AUTO: 39.2 % (ref 39–53)
HCT VFR BLD AUTO: 39.8 % (ref 39–53)
HCV AB SERPL QL IA: NONREACTIVE
HDLC SERPL-MCNC: 18 MG/DL (ref 40–59)
HGB BLD-MCNC: 13 G/DL (ref 13–17.5)
HGB BLD-MCNC: 13.2 G/DL (ref 13–17.5)
HGB BLD-MCNC: 13.6 G/DL (ref 13–17.5)
HGB BLD-MCNC: 13.6 G/DL (ref 13–17.5)
HGB BLD-MCNC: 6 G/DL (ref 13–17.5)
HGB BLD-MCNC: 6.6 G/DL (ref 13–17.5)
HIV 1+2 AB+HIV1 P24 AG SERPL QL IA: NONREACTIVE
IMM GRANULOCYTES # BLD AUTO: 0.08 X10(3) UL (ref 0–1)
IMM GRANULOCYTES # BLD AUTO: 0.12 X10(3) UL (ref 0–1)
IMM GRANULOCYTES # BLD AUTO: 0.23 X10(3) UL (ref 0–1)
IMM GRANULOCYTES NFR BLD: 0.6 %
IMM GRANULOCYTES NFR BLD: 0.8 %
IMM GRANULOCYTES NFR BLD: 1.2 %
INR BLD: 1.38 (ref 0.8–1.2)
L/M: 15 L/MIN
LACTATE BLD-SCNC: 2.2 MMOL/L (ref 0.5–2)
LACTATE BLD-SCNC: 3.1 MMOL/L (ref 0.5–2)
LACTATE SERPL-SCNC: 1.5 MMOL/L (ref 0.5–2)
LACTATE SERPL-SCNC: 2.4 MMOL/L (ref 0.5–2)
LDH FLD L TO P-CCNC: 129 U/L
LDLC SERPL CALC-MCNC: 17 MG/DL (ref ?–100)
LYMPHOCYTES # BLD AUTO: 0.65 X10(3) UL (ref 1–4)
LYMPHOCYTES # BLD AUTO: 2.99 X10(3) UL (ref 1–4)
LYMPHOCYTES # BLD AUTO: 4.97 X10(3) UL (ref 1–4)
LYMPHOCYTES NFR BLD AUTO: 15.2 %
LYMPHOCYTES NFR BLD AUTO: 34.3 %
LYMPHOCYTES NFR BLD AUTO: 4.5 %
LYMPHOCYTES NFR PLR: 79 %
MAGNESIUM SERPL-MCNC: 1.5 MG/DL (ref 1.6–2.6)
MAGNESIUM SERPL-MCNC: 1.7 MG/DL (ref 1.6–2.6)
MCH RBC QN AUTO: 26 PG (ref 26–34)
MCH RBC QN AUTO: 28.6 PG (ref 26–34)
MCH RBC QN AUTO: 28.9 PG (ref 26–34)
MCH RBC QN AUTO: 29.3 PG (ref 26–34)
MCHC RBC AUTO-ENTMCNC: 31.1 G/DL (ref 31–37)
MCHC RBC AUTO-ENTMCNC: 34 G/DL (ref 31–37)
MCHC RBC AUTO-ENTMCNC: 34.2 G/DL (ref 31–37)
MCHC RBC AUTO-ENTMCNC: 34.7 G/DL (ref 31–37)
MCV RBC AUTO: 83.5 FL (ref 80–100)
MCV RBC AUTO: 84 FL (ref 80–100)
MCV RBC AUTO: 84.5 FL (ref 80–100)
MCV RBC AUTO: 84.7 FL (ref 80–100)
METHGB MFR BLD: 0.6 % SAT (ref 0.4–1.5)
METHGB MFR BLD: 0.9 % SAT (ref 0.4–1.5)
MONOCYTES # BLD AUTO: 0.81 X10(3) UL (ref 0.1–1)
MONOCYTES # BLD AUTO: 1.12 X10(3) UL (ref 0.1–1)
MONOCYTES # BLD AUTO: 1.13 X10(3) UL (ref 0.1–1)
MONOCYTES NFR BLD AUTO: 4.1 %
MONOCYTES NFR BLD AUTO: 7.8 %
MONOCYTES NFR BLD AUTO: 7.8 %
MONOS+MACROS NFR PLR: 13 %
NEUTROPHILS # BLD AUTO: 12.53 X10 (3) UL (ref 1.5–7.7)
NEUTROPHILS # BLD AUTO: 12.53 X10(3) UL (ref 1.5–7.7)
NEUTROPHILS # BLD AUTO: 15.51 X10 (3) UL (ref 1.5–7.7)
NEUTROPHILS # BLD AUTO: 15.51 X10(3) UL (ref 1.5–7.7)
NEUTROPHILS # BLD AUTO: 7.91 X10 (3) UL (ref 1.5–7.7)
NEUTROPHILS # BLD AUTO: 7.91 X10(3) UL (ref 1.5–7.7)
NEUTROPHILS NFR BLD AUTO: 54.5 %
NEUTROPHILS NFR BLD AUTO: 78.8 %
NEUTROPHILS NFR BLD AUTO: 86.7 %
NEUTROPHILS NFR PLR: 8 %
NONHDLC SERPL-MCNC: 36 MG/DL (ref ?–130)
NT-PROBNP SERPL-MCNC: ABNORMAL PG/ML (ref ?–125)
OSMOLALITY SERPL CALC.SUM OF ELEC: 307 MOSM/KG (ref 275–295)
OSMOLALITY SERPL CALC.SUM OF ELEC: 324 MOSM/KG (ref 275–295)
OSMOLALITY SERPL CALC.SUM OF ELEC: 325 MOSM/KG (ref 275–295)
OSMOLALITY SERPL CALC.SUM OF ELEC: 326 MOSM/KG (ref 275–295)
OSMOLALITY SERPL CALC.SUM OF ELEC: 328 MOSM/KG (ref 275–295)
OXYHGB MFR BLDA: 96.3 % (ref 92–100)
OXYHGB MFR BLDA: 97.2 % (ref 92–100)
P AXIS: 60 DEGREES
P-R INTERVAL: 170 MS
PCO2 BLDA: 40 MM HG (ref 35–45)
PCO2 BLDA: 43 MM HG (ref 35–45)
PEEP: 5 CM H2O
PH BLDA: 7.3 [PH] (ref 7.35–7.45)
PH BLDA: 7.48 [PH] (ref 7.35–7.45)
PH PLR: 7.42 [PH] (ref 7.2–7.5)
PHOSPHATE SERPL-MCNC: 4.2 MG/DL (ref 2.4–5.1)
PHOSPHATE SERPL-MCNC: 4.8 MG/DL (ref 2.4–5.1)
PLATELET # BLD AUTO: 166 10(3)UL (ref 150–450)
PLATELET # BLD AUTO: 182 10(3)UL (ref 150–450)
PLATELET # BLD AUTO: 217 10(3)UL (ref 150–450)
PLATELET # BLD AUTO: 330 10(3)UL (ref 150–450)
PLATELETS.RETICULATED NFR BLD AUTO: 2.8 % (ref 0–7)
PO2 BLDA: 129 MM HG (ref 80–100)
PO2 BLDA: 290 MM HG (ref 80–100)
POTASSIUM BLD-SCNC: 3.3 MMOL/L (ref 3.6–5.1)
POTASSIUM BLD-SCNC: 4 MMOL/L (ref 3.6–5.1)
POTASSIUM SERPL-SCNC: 3.2 MMOL/L (ref 3.5–5.1)
POTASSIUM SERPL-SCNC: 3.2 MMOL/L (ref 3.5–5.1)
POTASSIUM SERPL-SCNC: 3.6 MMOL/L (ref 3.5–5.1)
POTASSIUM SERPL-SCNC: 3.8 MMOL/L (ref 3.5–5.1)
POTASSIUM SERPL-SCNC: 4.3 MMOL/L (ref 3.5–5.1)
PROT PLR-MCNC: 4.3 G/DL
PROT SERPL-MCNC: 4.7 G/DL (ref 5.7–8.2)
PROT SERPL-MCNC: 5.3 G/DL (ref 5.7–8.2)
PROT SERPL-MCNC: 5.4 G/DL (ref 5.7–8.2)
PROT SERPL-MCNC: 6 G/DL (ref 5.7–8.2)
PROTHROMBIN TIME: 17.1 SECONDS (ref 11.6–14.8)
Q-T INTERVAL: 378 MS
QRS DURATION: 124 MS
QTC CALCULATION (BEZET): 490 MS
R AXIS: 99 DEGREES
RBC # BLD AUTO: 2.31 X10(6)UL (ref 3.8–5.8)
RBC # BLD AUTO: 4.55 X10(6)UL (ref 3.8–5.8)
RBC # BLD AUTO: 4.64 X10(6)UL (ref 3.8–5.8)
RBC # BLD AUTO: 4.7 X10(6)UL (ref 3.8–5.8)
RBC PLEURAL FLUID: NORMAL /MM3
RH BLOOD TYPE: POSITIVE
SODIUM BLD-SCNC: 136 MMOL/L (ref 135–145)
SODIUM BLD-SCNC: 144 MMOL/L (ref 135–145)
SODIUM SERPL-SCNC: 142 MMOL/L (ref 136–145)
SODIUM SERPL-SCNC: 147 MMOL/L (ref 136–145)
SODIUM SERPL-SCNC: 147 MMOL/L (ref 136–145)
SODIUM SERPL-SCNC: 149 MMOL/L (ref 136–145)
SODIUM SERPL-SCNC: 149 MMOL/L (ref 136–145)
T AXIS: 63 DEGREES
TIDAL VOLUME: 450 ML
TOTAL CELLS COUNTED FLD: 100
TRIGL PLR-MCNC: 42 MG/DL
TRIGL SERPL-MCNC: 108 MG/DL (ref 30–149)
TRIGL SERPL-MCNC: 97 MG/DL (ref 30–149)
TROPONIN I SERPL HS-MCNC: 213 NG/L (ref ?–53)
TROPONIN I SERPL HS-MCNC: 258 NG/L (ref ?–53)
VENT RATE: 26 /MIN
VENTRICULAR RATE: 101 BPM
VLDLC SERPL CALC-MCNC: 12 MG/DL (ref 0–30)
WBC # BLD AUTO: 14.4 X10(3) UL (ref 4–11)
WBC # BLD AUTO: 14.4 X10(3) UL (ref 4–11)
WBC # BLD AUTO: 14.5 X10(3) UL (ref 4–11)
WBC # BLD AUTO: 19.7 X10(3) UL (ref 4–11)
WBC # PLR: 421 /MM3

## 2025-05-02 PROCEDURE — 36430 TRANSFUSION BLD/BLD COMPNT: CPT | Performed by: ANESTHESIOLOGY

## 2025-05-02 PROCEDURE — 93306 TTE W/DOPPLER COMPLETE: CPT | Performed by: HOSPITALIST

## 2025-05-02 PROCEDURE — 99231 SBSQ HOSP IP/OBS SF/LOW 25: CPT | Performed by: INTERNAL MEDICINE

## 2025-05-02 PROCEDURE — 30233M1 TRANSFUSION OF NONAUTOLOGOUS PLASMA CRYOPRECIPITATE INTO PERIPHERAL VEIN, PERCUTANEOUS APPROACH: ICD-10-PCS | Performed by: THORACIC SURGERY (CARDIOTHORACIC VASCULAR SURGERY)

## 2025-05-02 PROCEDURE — 99292 CRITICAL CARE ADDL 30 MIN: CPT | Performed by: INTERNAL MEDICINE

## 2025-05-02 PROCEDURE — 32550 INSERT PLEURAL CATH: CPT | Performed by: INTERNAL MEDICINE

## 2025-05-02 PROCEDURE — 5A1945Z RESPIRATORY VENTILATION, 24-96 CONSECUTIVE HOURS: ICD-10-PCS | Performed by: INTERNAL MEDICINE

## 2025-05-02 PROCEDURE — 4A133B1 MONITORING OF ARTERIAL PRESSURE, PERIPHERAL, PERCUTANEOUS APPROACH: ICD-10-PCS

## 2025-05-02 PROCEDURE — 31500 INSERT EMERGENCY AIRWAY: CPT | Performed by: INTERNAL MEDICINE

## 2025-05-02 PROCEDURE — 0W9B30Z DRAINAGE OF LEFT PLEURAL CAVITY WITH DRAINAGE DEVICE, PERCUTANEOUS APPROACH: ICD-10-PCS | Performed by: INTERNAL MEDICINE

## 2025-05-02 PROCEDURE — 99291 CRITICAL CARE FIRST HOUR: CPT | Performed by: INTERNAL MEDICINE

## 2025-05-02 PROCEDURE — 30233N1 TRANSFUSION OF NONAUTOLOGOUS RED BLOOD CELLS INTO PERIPHERAL VEIN, PERCUTANEOUS APPROACH: ICD-10-PCS | Performed by: HOSPITALIST

## 2025-05-02 PROCEDURE — 30233K1 TRANSFUSION OF NONAUTOLOGOUS FROZEN PLASMA INTO PERIPHERAL VEIN, PERCUTANEOUS APPROACH: ICD-10-PCS | Performed by: THORACIC SURGERY (CARDIOTHORACIC VASCULAR SURGERY)

## 2025-05-02 PROCEDURE — 99291 CRITICAL CARE FIRST HOUR: CPT | Performed by: HOSPITALIST

## 2025-05-02 PROCEDURE — 0W9B3ZZ DRAINAGE OF LEFT PLEURAL CAVITY, PERCUTANEOUS APPROACH: ICD-10-PCS | Performed by: RADIOLOGY

## 2025-05-02 PROCEDURE — 0W3C4ZZ CONTROL BLEEDING IN MEDIASTINUM, PERCUTANEOUS ENDOSCOPIC APPROACH: ICD-10-PCS | Performed by: THORACIC SURGERY (CARDIOTHORACIC VASCULAR SURGERY)

## 2025-05-02 PROCEDURE — 0BCL4ZZ EXTIRPATION OF MATTER FROM LEFT LUNG, PERCUTANEOUS ENDOSCOPIC APPROACH: ICD-10-PCS | Performed by: THORACIC SURGERY (CARDIOTHORACIC VASCULAR SURGERY)

## 2025-05-02 PROCEDURE — 3E033XZ INTRODUCTION OF VASOPRESSOR INTO PERIPHERAL VEIN, PERCUTANEOUS APPROACH: ICD-10-PCS | Performed by: INTERNAL MEDICINE

## 2025-05-02 PROCEDURE — 30233N1 TRANSFUSION OF NONAUTOLOGOUS RED BLOOD CELLS INTO PERIPHERAL VEIN, PERCUTANEOUS APPROACH: ICD-10-PCS | Performed by: THORACIC SURGERY (CARDIOTHORACIC VASCULAR SURGERY)

## 2025-05-02 PROCEDURE — 0BCP4ZZ EXTIRPATION OF MATTER FROM LEFT PLEURA, PERCUTANEOUS ENDOSCOPIC APPROACH: ICD-10-PCS | Performed by: THORACIC SURGERY (CARDIOTHORACIC VASCULAR SURGERY)

## 2025-05-02 PROCEDURE — 0BJ08ZZ INSPECTION OF TRACHEOBRONCHIAL TREE, VIA NATURAL OR ARTIFICIAL OPENING ENDOSCOPIC: ICD-10-PCS | Performed by: THORACIC SURGERY (CARDIOTHORACIC VASCULAR SURGERY)

## 2025-05-02 PROCEDURE — 36620 INSERTION CATHETER ARTERY: CPT

## 2025-05-02 PROCEDURE — 99233 SBSQ HOSP IP/OBS HIGH 50: CPT | Performed by: INTERNAL MEDICINE

## 2025-05-02 PROCEDURE — 99233 SBSQ HOSP IP/OBS HIGH 50: CPT | Performed by: HOSPITALIST

## 2025-05-02 PROCEDURE — 5A12012 PERFORMANCE OF CARDIAC OUTPUT, SINGLE, MANUAL: ICD-10-PCS | Performed by: INTERNAL MEDICINE

## 2025-05-02 PROCEDURE — 30233N0 TRANSFUSION OF AUTOLOGOUS RED BLOOD CELLS INTO PERIPHERAL VEIN, PERCUTANEOUS APPROACH: ICD-10-PCS | Performed by: THORACIC SURGERY (CARDIOTHORACIC VASCULAR SURGERY)

## 2025-05-02 PROCEDURE — 4A133J1 MONITORING OF ARTERIAL PULSE, PERIPHERAL, PERCUTANEOUS APPROACH: ICD-10-PCS

## 2025-05-02 PROCEDURE — 0BH17EZ INSERTION OF ENDOTRACHEAL AIRWAY INTO TRACHEA, VIA NATURAL OR ARTIFICIAL OPENING: ICD-10-PCS | Performed by: INTERNAL MEDICINE

## 2025-05-02 PROCEDURE — 0W9B30Z DRAINAGE OF LEFT PLEURAL CAVITY WITH DRAINAGE DEVICE, PERCUTANEOUS APPROACH: ICD-10-PCS | Performed by: STUDENT IN AN ORGANIZED HEALTH CARE EDUCATION/TRAINING PROGRAM

## 2025-05-02 PROCEDURE — 76937 US GUIDE VASCULAR ACCESS: CPT

## 2025-05-02 PROCEDURE — 03HY32Z INSERTION OF MONITORING DEVICE INTO UPPER ARTERY, PERCUTANEOUS APPROACH: ICD-10-PCS

## 2025-05-02 PROCEDURE — 32555 ASPIRATE PLEURA W/ IMAGING: CPT | Performed by: INTERNAL MEDICINE

## 2025-05-02 PROCEDURE — 32551 INSERTION OF CHEST TUBE: CPT | Performed by: STUDENT IN AN ORGANIZED HEALTH CARE EDUCATION/TRAINING PROGRAM

## 2025-05-02 PROCEDURE — 02HV33Z INSERTION OF INFUSION DEVICE INTO SUPERIOR VENA CAVA, PERCUTANEOUS APPROACH: ICD-10-PCS

## 2025-05-02 PROCEDURE — 30233R1 TRANSFUSION OF NONAUTOLOGOUS PLATELETS INTO PERIPHERAL VEIN, PERCUTANEOUS APPROACH: ICD-10-PCS | Performed by: THORACIC SURGERY (CARDIOTHORACIC VASCULAR SURGERY)

## 2025-05-02 PROCEDURE — 99232 SBSQ HOSP IP/OBS MODERATE 35: CPT | Performed by: INTERNAL MEDICINE

## 2025-05-02 PROCEDURE — B548ZZA ULTRASONOGRAPHY OF SUPERIOR VENA CAVA, GUIDANCE: ICD-10-PCS

## 2025-05-02 RX ORDER — TRANEXAMIC ACID 10 MG/ML
1000 INJECTION, SOLUTION INTRAVENOUS ONCE
Status: COMPLETED | OUTPATIENT
Start: 2025-05-02 | End: 2025-05-02

## 2025-05-02 RX ORDER — LIDOCAINE HYDROCHLORIDE 10 MG/ML
INJECTION, SOLUTION INFILTRATION; PERINEURAL
Status: DISCONTINUED
Start: 2025-05-02 | End: 2025-05-02 | Stop reason: WASHOUT

## 2025-05-02 RX ORDER — ALBUMIN (HUMAN) 12.5 G/50ML
25 SOLUTION INTRAVENOUS
Status: DISPENSED | OUTPATIENT
Start: 2025-05-02 | End: 2025-05-04

## 2025-05-02 RX ORDER — CHLORHEXIDINE GLUCONATE ORAL RINSE 1.2 MG/ML
15 SOLUTION DENTAL
Status: DISCONTINUED | OUTPATIENT
Start: 2025-05-02 | End: 2025-05-04

## 2025-05-02 RX ORDER — POLYETHYLENE GLYCOL 3350 17 G/17G
17 POWDER, FOR SOLUTION ORAL DAILY PRN
Status: DISCONTINUED | OUTPATIENT
Start: 2025-05-02 | End: 2025-05-16

## 2025-05-02 RX ORDER — BISACODYL 10 MG
10 SUPPOSITORY, RECTAL RECTAL
Status: DISCONTINUED | OUTPATIENT
Start: 2025-05-02 | End: 2025-05-02

## 2025-05-02 RX ORDER — VANCOMYCIN 2 GRAM/500 ML IN 0.9 % SODIUM CHLORIDE INTRAVENOUS
25 ONCE
Status: COMPLETED | OUTPATIENT
Start: 2025-05-02 | End: 2025-05-02

## 2025-05-02 RX ORDER — INDOMETHACIN 25 MG/1
50 CAPSULE ORAL ONCE
Status: COMPLETED | OUTPATIENT
Start: 2025-05-02 | End: 2025-05-02

## 2025-05-02 RX ORDER — CALCIUM GLUCONATE 20 MG/ML
2 INJECTION, SOLUTION INTRAVENOUS ONCE
Status: COMPLETED | OUTPATIENT
Start: 2025-05-02 | End: 2025-05-02

## 2025-05-02 RX ORDER — SODIUM CHLORIDE 9 MG/ML
INJECTION, SOLUTION INTRAVENOUS ONCE
Status: COMPLETED | OUTPATIENT
Start: 2025-05-02 | End: 2025-05-02

## 2025-05-02 RX ORDER — HEPARIN SODIUM 5000 [USP'U]/ML
INJECTION, SOLUTION INTRAVENOUS; SUBCUTANEOUS
Status: COMPLETED
Start: 2025-05-02 | End: 2025-05-02

## 2025-05-02 RX ORDER — MINERAL OIL AND PETROLATUM 150; 830 MG/G; MG/G
1 OINTMENT OPHTHALMIC NIGHTLY
Status: DISCONTINUED | OUTPATIENT
Start: 2025-05-02 | End: 2025-05-04

## 2025-05-02 RX ORDER — KETAMINE HYDROCHLORIDE 50 MG/ML
INJECTION, SOLUTION INTRAMUSCULAR; INTRAVENOUS
Status: DISPENSED
Start: 2025-05-02 | End: 2025-05-03

## 2025-05-02 RX ORDER — ACETAMINOPHEN 650 MG/1
650 SUPPOSITORY RECTAL EVERY 4 HOURS PRN
Status: DISCONTINUED | OUTPATIENT
Start: 2025-05-02 | End: 2025-05-04

## 2025-05-02 RX ORDER — BUPIVACAINE HYDROCHLORIDE AND EPINEPHRINE 2.5; 5 MG/ML; UG/ML
INJECTION, SOLUTION EPIDURAL; INFILTRATION; INTRACAUDAL; PERINEURAL AS NEEDED
Status: DISCONTINUED | OUTPATIENT
Start: 2025-05-02 | End: 2025-05-02 | Stop reason: HOSPADM

## 2025-05-02 RX ORDER — INDOMETHACIN 25 MG/1
100 CAPSULE ORAL ONCE
Status: COMPLETED | OUTPATIENT
Start: 2025-05-02 | End: 2025-05-02

## 2025-05-02 RX ORDER — ACETAMINOPHEN 10 MG/ML
1000 INJECTION, SOLUTION INTRAVENOUS EVERY 6 HOURS PRN
Status: DISCONTINUED | OUTPATIENT
Start: 2025-05-02 | End: 2025-05-04

## 2025-05-02 RX ORDER — ETOMIDATE 2 MG/ML
0.3 INJECTION INTRAVENOUS ONCE
Status: COMPLETED | OUTPATIENT
Start: 2025-05-02 | End: 2025-05-02

## 2025-05-02 RX ORDER — INDOMETHACIN 25 MG/1
CAPSULE ORAL
Status: COMPLETED
Start: 2025-05-02 | End: 2025-05-02

## 2025-05-02 RX ORDER — ROCURONIUM BROMIDE 10 MG/ML
INJECTION, SOLUTION INTRAVENOUS
Status: COMPLETED
Start: 2025-05-02 | End: 2025-05-02

## 2025-05-02 RX ORDER — ROCURONIUM BROMIDE 10 MG/ML
INJECTION, SOLUTION INTRAVENOUS AS NEEDED
Status: DISCONTINUED | OUTPATIENT
Start: 2025-05-02 | End: 2025-05-02 | Stop reason: SURG

## 2025-05-02 RX ORDER — ACETAMINOPHEN 160 MG/5ML
650 SOLUTION ORAL EVERY 4 HOURS PRN
Status: DISCONTINUED | OUTPATIENT
Start: 2025-05-02 | End: 2025-05-04

## 2025-05-02 RX ORDER — SENNOSIDES 8.8 MG/5ML
10 LIQUID ORAL 2 TIMES DAILY
Status: DISCONTINUED | OUTPATIENT
Start: 2025-05-02 | End: 2025-05-04

## 2025-05-02 RX ADMIN — ROCURONIUM BROMIDE 50 MG: 10 INJECTION, SOLUTION INTRAVENOUS at 15:32:00

## 2025-05-02 NOTE — CONSULTS
.CRITICAL CARE CONSULT    Patient Name: Richy Goins  : 1960  MRN: [id number]  Admit Date: 2025  Length of Stay: 3 Days    HPI:  Richy Goins is a 64 year old male with a past medical history of end-stage renal disease on hemodialysis via left upper extremity fistula, chronic anemia, hypertension, PAD status post bilateral BKA with Proteus/Klebsiella wet gangrene.  He underwent dialysis on arrival due to missed session and the presence of pulmonary edema and pleural effusion.  His initial chest x-ray demonstrated complete opacification of the left hemithorax.  He has previously undergone thoracentesis in January for similar complaint.  Earlier today he underwent thoracentesis with approximately 1600 cc of serosanguineous fluid removed.  Postprocedure chest x-ray did not demonstrate pneumothorax however did demonstrate persistent opacification of the left hemithorax.  A rapid response was called due to the patient becoming hypotensive and minimally responsive upon arrival patient was agonal he breathing, appeared pale diaphoretic, was tachycardic and hypotensive.  He was given fluid resuscitation initiated on vasopressors and transferred to the intensive care unit for further management.  Bedside echo demonstrated reduced RV function however normal size, preserved LV function, hyperdynamic.  There was no pericardial effusion.  Thoracic ultrasound demonstrated persistent free-flowing fluid in the left hemithorax.  Initial laboratory evaluation demonstrated significant anemia of 6.0 g/dL down from approximately 9.  Due to ongoing hemodynamic instability, hypoxemia, and minimal responsiveness.  Patient was intubated and placed on mechanical ventilation.  MTP was initiated the patient received approximately 14 units of PRBCs.  A chest tube was placed in the left mid axillary line and approximately 2500 cc of blood was evacuated.  During the resuscitative effort, the patient did have a brief cardiac  arrest which was V-fib noted on cardiac monitor.  He did receive defibrillation x 1, and approximately 4 minutes of CPR.  Patient did require norepinephrine at varying doses, vasopressin.  We did consult with interventional radiology, and thoracic surgery.  Given ongoing hemodynamic instability, hemothorax, ongoing blood loss.  Patient was taken to the OR for thoracotomy.     Past Medical, Surgical, Family and Social History  Patient has a past medical history of Belching, Dialysis patient, Diarrhea, unspecified, Esophageal reflux, Essential hypertension, Fatigue, Flatulence/gas pain/belching, High blood pressure, High cholesterol, History of blood transfusion, Hyperlipidemia, Indigestion, Irregular bowel habits, Night sweats, Osteoporosis, Peripheral vascular disease, Pure hypercholesterolemia (03/15/2024), Type II or unspecified type diabetes mellitus without mention of complication, not stated as uncontrolled, Visual impairment, and Vomiting.    He has no past medical history of Anesthesia complication, Asthma (HCC), Cataract, COPD (chronic obstructive pulmonary disease) (HCC), Coronary atherosclerosis, Deep vein thrombosis (HCC), Difficult intubation, Family history of malignant hyperthermia, Family history of pseudocholinesterase deficiency, Fibromyalgia, Glaucoma, History of adverse reaction to anesthesia, motion sickness, Malignant hyperthermia, Migraines, Multiple sclerosis (HCC), Neuropathy, Pseudocholinesterase deficiency, Pulmonary embolism (HCC), Pulmonary emphysema (HCC), Seizure disorder (HCC), or Stroke (HCC).    Patient has a past surgical history that includes amputation toe,i-p jt (Right); colonoscopy (N/A, 02/20/2023); colonoscopy; upper gi endoscopy,exam; Below knee leg amputation (Right); and av fistula revision, open.    Patient family history includes Diabetes in his maternal aunt and maternal grandfather; Heart Attack in his father; Heart Disorder (age of onset: 57) in his father.    Patient  reports that he has never smoked. He has never used smokeless tobacco. He reports that he does not currently use alcohol. He reports that he does not use drugs.    Allergies  Patient is allergic to zosyn [piperacillin-tazobactam in d5w].    IMAGING    XR CHEST AP PORTABLE  (CPT=71045)  Result Date: 5/2/2025  CONCLUSION:  Complete opacification left hemithorax with shift of mediastinal structures from left to right.  Finding was discussed with the ICU RN caring for the patient on May 2, 2025 at 2:30 p.m..  By this time a chest tube have been placed.  Findings  would be consistent with a large hemo thorax.   LOCATION:  [facility]      Dictated by (CST): Camden Deleon MD on 5/02/2025 at 2:25 PM     Finalized by (CST): Camden Deleon MD on 5/02/2025 at 2:30 PM       US THORACENTESIS GUIDED LEFT (CPT=32555)  Result Date: 5/2/2025  CONCLUSION:  Ultrasound-guided left thoracentesis of 1600 cc was performed without complication.   LOCATION:  [facility]    Dictated by (CST): Oskar Lynn MD on 5/02/2025 at 1:33 PM     Finalized by (CST): Oskar Lynn MD on 5/02/2025 at 1:34 PM       XR CHEST AP PORTABLE  (CPT=71045)  Result Date: 5/2/2025  CONCLUSION:  Decreased size of left pleural effusion without pneumothorax.  There continues to be significant amount of opacity within the left hemithorax.   LOCATION:  [facility]      Dictated by (CST): Yoan Duvall MD on 5/02/2025 at 12:14 PM     Finalized by (CST): Yoan Duvall MD on 5/02/2025 at 12:15 PM         Physical Exam    Gen - intubated, sedated, minimally responsive.   HEENT - head normocephalic, atraumatic. Eyes-no scleral icterus or injection  Neck - supple, no palpable lymphadenopathy.   Lungs -absent breath sounds on left, lung ultrasound reveals free fluid in the left hemithorax, with consolidation/atelectasis of the lung parenchyma  CV -tachycardic, regular, frequent PVCs  Abdomen - soft, nontender to palpation. No organomegaly appreciated. Normal bowel sounds.  Extremities -  palor, cold extremities noted, no edema     Hemodynamics  24h Vitals:  VitalLast Value (24 Hour)24 Hour Range  Temp97.6 °F (36.4 °C)Temp  Min: 97.6 °F (36.4 °C)  Max: 98.3 °F (36.8 °C)  [id number]  Min: 92  Max: 163  BP (Non-Invasive)(!) 83/53 BP  Min: 83/53  Max: 140/73  BP (A-Line)166/89AO  Min: 135/67  Max: 217/79  CVP  could not be evaluated. This SmartLink does not work with rows of the type:   [id number]  Min: 12  Max: 26  [id number] %SpO2  Min: 86 %  Max: 100 %  O2 Therapy       Assessment and Plan:  Richy Goins is a 64 year old male admitted to ICU for hemorrhagic shock, acute respiratory failure.    Problems:    #hemorrhagic shock likely 2/2 hemothorax   #acute hypoxic respiratory failure  #lactic acidosis  #cardiac arrest   #toxic metabolic encephalopathy  #ESRD on IHD  #recurrent pleural effusion  #Hx of skin/soft tissue infection of the lower extremities     Plan:    Neuro/Psych:continue propofol and fentanyl for sedation. RASS goal 0 - -1.     Cardiovascular:Patient developed significant shock, likely 2/2 hemorrhagic shock. Continue to transfuse as needed to maintain hemoglobin greater than 8.  Cardiology was placed on consult.  Bedside echo revealed preserved LV function, however reduced RV function as evidenced by reduced TAPSE.  EKG demonstrated signs of inferior STEMI likely, type II in the setting of shock and ongoing blood loss.  Patient is not a candidate for further intervention, at this time in the setting of profound shock and ongoing bleeding.  Continue norepinephrine, vasopressin maintain MAP greater than 65.  Patient remains on amiodarone infusion, in the setting of recurrent nonsustained V. tach.  Keep K greater than 4, magnesium greater than 2    Pulmonary: Continue lung protective mechanical ventilation.  Status post chest tube placement, keep to suction.  Plan for thoracic surgery thoracotomy.  Follow-up postoperative orders.    GI: No acute concerns, keep NPO.    Renal/Metabolic:  History of end-stage renal disease, did undergo dialysis recently.  Continue to monitor electrolytes and acid-base status.  May require CRRT in the setting of ongoing instability    Heme: Acute blood loss anemia, likely in setting of hemothorax as evidenced by 2.5 L of blood loss status post chest tube placement.  Hold aspirin, DVT prophylaxis.  Status post TXA x 1.  Continue to trend coags, fibrinogen.    Infectious Disease: Has been on p.o. Keflex.  Likely initiate broad-spectrum antibiotics postoperatively.    Endocrine: Insulin sliding scale, stress dose steroids      ICU checklist  Nutrition: N.p.o.   Analgesia: Fentanyl  Sedation: Propofol  Thromboembolism PPX: SCDs  Stress Ulcer PPX: PPI  Glucose control: Insulin sliding scale  Bowel Regimen: As needed  Lines/drains/tubes: PIV, art line, trialysis, left upper extremity fistula, chest tube, ET tube    Therapies: PT/OT/ST when appropriate    Code Status: Full Code      Upon my evaluation, this patient had a high probability of imminent or life-threatening deterioration due to hemorrhagic shock, acute respiratory failure, STEMI, end-stage renal disease which required my direct attention, intervention, and personal management.     I have personally provided 110 minutes of critical care time, exclusive of time spent on separately billable procedures.  Time includes review of all pertinent laboratory/radiology results, discussion with consultants, and monitoring for potential decompensation.     DOS: May 02, 2025

## 2025-05-02 NOTE — PROGRESS NOTES
[facility]  Progress Note    Richy Goins Patient Status:  Inpatient    1960 MRN [id number]   Location [facility] 8NE-A Attending Hussain Rivera MD   Hosp Day # 3 PCP Woody Dahl MD     Subjective:  Richy Goins is a(n) 64 year old male states he feels about the same  Waiting for thoracentesis    Objective:  /66 (BP Location: Right arm)   Pulse 92   Temp 98 °F (36.7 °C) (Oral)   Resp 18   Wt 163 lb 9.3 oz (74.2 kg)   SpO2 98%   BMI 24.87 kg/m² remains on room air      Temp (24hrs), Av °F (36.7 °C), Min:97.7 °F (36.5 °C), Max:98.3 °F (36.8 °C)      Intake/Output:    Intake/Output Summary (Last 24 hours) at 2025 0838  Last data filed at 2025 2230  Gross per 24 hour   Intake --   Output 2000 ml   Net -2000 ml       Physical Exam:   General: alert, cooperative, oriented.  No respiratory distress.  Seems comfortable   Head: Normocephalic, without obvious abnormality, atraumatic.   Throat: Lips, mucosa, and tongue normal.  No thrush noted.   Neck: trachea midline, no adenopathy, no thyromegaly. No JVD.  90 degrees   Lungs: Left with significant decreased breath sounds right with minimal crackles regular rate and rhythm mildly protuberant   Chest wall: No tenderness or deformity.   Heart:    Abdomen: soft, non-distended, no masses, no guarding, no     Rebound.  Mildly protuberant   Extremity: Bilateral amputations   Skin: No rashes or lesions.   Neurological: Alert, interactive, no focal deficits    Lab Data Review:  Recent Labs     25  0528   WBC 9.5 9.5 8.8   HGB 8.9* 8.5* 9.0*   .0 332.0 337.0     Recent Labs     258 25  0528    140 139   K 5.9* 5.9* 3.8    104 101   CO2 16.0* 15.0* 24.0   * 141* 68*   CREATSERUM 13.73* 13.89* 8.24*     Recent Labs   Lab 25  0810   PTP 15.9*   INR 1.26*       Cultures:     Radiology:  No results found.  Pending      Medications reviewed      Assessment and Plan:   Problem List[1]    Assessment:  Recurrent left-sided pleural effusion present since January 2025-now increasing with near whiteout suspected component atelectasis as well.  Dyspnea mild hypoxia related to the above possible component pulmonary edema as well having missed dialysis  End-stage renal disease  Severe peripheral vascular disease bilateral BKA's most recently last month     Plan:  Discussed at length plan to proceed with thoracentesis therapeutic diagnostic-ongoing dialysis as per renal service  Will follow with you      CC     Delilah Easley MD  5/2/2025  8:38 AM         [1]   Patient Active Problem List  Diagnosis    Diabetic nephropathy associated with type 2 diabetes mellitus (HCC)    Hypertension    Type 2 diabetes mellitus with hyperglycemia (HCC)    Stage 3 chronic kidney disease (HCC)    Retinopathy due to secondary diabetes mellitus (HCC)    Azotemia    PAD (peripheral artery disease)    Stage 4 chronic kidney disease (HCC)    Acidosis    ESRD (end stage renal disease) (HCC)    ESRD on hemodialysis (HCC)    Pure hypercholesterolemia    Hemoptysis    Hypertensive urgency    Hematemesis with nausea    Hematemesis, unspecified whether nausea present    Acute gastritis with hemorrhage, unspecified gastritis type    Uremia    Acquired absence of right leg below knee (HCC)    Chronic kidney disease, stage 4 (severe) (HCC)    End-stage renal disease on hemodialysis (HCC)    Osteomyelitis of right foot (HCC)    Dyslipidemia    Diabetes mellitus type 2 in nonobese (HCC)    Unilateral complete BKA, right, sequela (HCC)    Type 2 diabetes mellitus with diabetic peripheral angiopathy without gangrene (HCC)    Type 2 diabetes mellitus with diabetic chronic kidney disease (HCC)    Long term (current) use of aspirin    Hypertensive chronic kidney disease w stg 1-4/unsp chr kdny    Encounter for orthopedic aftercare following surgical amputation    Anemia in chronic kidney disease     Age-related osteoporosis without current pathological fracture    Acute kidney failure, unspecified    CKD (chronic kidney disease), stage III (HCC)    Type 1 diabetes mellitus with ketoacidosis without coma (HCC)    Senile purpura    Tinnitus, bilateral    Hypernatremia    Anemia    Acute kidney injury    Acute renal failure (ARF)    Metabolic acidosis    Hyperglycemia    Nausea    Elevated lipase    Elevated procalcitonin    Pleural effusion    Anemia in ESRD (end-stage renal disease) (HCC)    Hyperphosphatemia    Dyspnea on exertion    Ischemic foot    Gangrene of toe of left foot (HCC)    Hyperkalemia    Acute respiratory failure with hypoxia ([facility])    Anemia, unspecified type

## 2025-05-02 NOTE — CODE DOCUMENTATION
[facility]   Code/Rapid Response Note   CC: SOB, unresponsive, low BP  SUBJECTIVE: minimally responsive c/o epigastric discomfort  Answering questions initially with fluid bolus then less responsive  Initially maintaining sats    I called code blue and asked intensivist to evaluate with US  Bedside US with fluid collection    OBJECTIVE:  BP (!) 83/53 (BP Location: Right arm)   Pulse 92   Temp 97.6 °F (36.4 °C) (Oral)   Resp 16   Wt 163 lb 9.3 oz (74.2 kg)   SpO2 98%   BMI 24.87 kg/m²   Gen: critically ill, minimally responsive   Chest: diminished b/l L>R  CVS: S1, S2, tachcyardic  Abdomen: Soft, mild epigastric tenderness, Nondistended, Bowel Sounds Present  Ext: No clubbing, No cyanosis  Skin: diaphoretic  Neuro: able to HORNE by command, would try to take NRB mask off  Diagnostic Data:    Labs: Reivewed  Imaging: Reviewed   Assessment/Plan:     RRT to Code Blue Called     # Shock- suspect hemorrhagic  # minimal consiousness  # s/p Thoracentesis with 1600 out prior to RRT- Bedside US per ICU per Intensivist suspect dense fluid collection loculated possible blood collection  ABG- hgb 6.6. on CBC was 9.0  -transfuse PRBCs  CBC  Chem  EKG- possible inferior STEMI vs 2/2 possible hemorrhagic shock  STAT ECHO   [facility] Consulted   Consider CT Chest once patient more stable   Pressors- Levo at 20 right now    Pt now in ICU   Intubated  Central line placement per Intensivist   Massive transfusion protocol   Hgb now 6  D/w ICU- bedside chest tube placement   Cardiology bedside     Critical Care Time 70 Minutes  D/w wife who will be driving here now.       Hussain Rivera MD

## 2025-05-02 NOTE — PROCEDURES
PROCEDURE NOTE    Richy Goins Location: ICU   [facility] [id number] MRN [id number]   Admission Date 4/29/2025 Operation Date 5/2/2025   Attending Physician Hussain Rivera MD Operating Physician Anamika Vieyra MD     PREOPERATIVE DIAGNOSIS  Hemothorax after thoracentesis    POSTOPERATIVE DIAGNOSIS  Same    PROCEDURE PERFORMED  Chest tube placement    SURGEON  Anamika Vieyar MD    ASSISTANTS  None    ANESTHESIA  General And local    INDICATIONS  This is a 64-year-old gentleman who has had multiple prior thoracentesis for pleural effusions.  Patient was taken to IR and underwent an ultrasound-guided left thoracentesis today.  Postoperatively, patient was found to be unresponsive and a CODE BLUE was called.  Patient did lose vitals but achieved ROSC after ACLS.  Patient was intubated and a pigtail catheter was placed into the left chest with an immediate return of over 2 L of bloody output.  CV surgery was consulted for continued bleeding.  Trauma surgery was consulted for chest tube placement.    FINDINGS   Successful placement of a 28 [place]ian chest tube to 18 cm    FINDINGS/DESCRIPTION OF PROCEDURE  Patient had undergone ACLS protocol for loss of vitals.  Patient was intubated and placed on pressors.  Due to the emergent nature of the procedure, emergent physician consent was obtained.  Patient previously had a 14 [place]ian pigtail placed.  The left chest at the mid axillary line was prepped and draped in usual sterile fashion.  Local was infiltrated into the tissues including the skin, subcutaneous tissues, and pleura surrounding the pigtail.  Using blade, incision at the pigtail site was extended laterally and medially.  The surrounding tissues were dissected until I was able to put my finger through the wound and palpate the pigtail catheter entering the pleural cavity.  The pigtail catheter was removed and the opening into the pleural cavity extended with blunt dissection.  A 28 [place]itanian chest tube catheter was then  placed with return of blood.  This was anchored in place with a 0 silk.  The chest tube was connected to the Pleur-evac system.  A dressing was placed.  Patient was immediately transferred to the operating room for further intervention.    SPECIMENS REMOVED  None    ESTIMATED BLOOD LOSS  5 ml    COMPLICATIONS  None     Anamika Vieyra MD

## 2025-05-02 NOTE — ANESTHESIA PROCEDURE NOTES
Airway  Date/Time: 5/2/2025 3:34 PM  Reason: elective    Airway not difficult    General Information and Staff   Patient location during procedure: OR  Anesthesiologist: Camilo Devine MD  Performed: anesthesiologist   Performed by: Camilo Devine MD  Authorized by: Camilo Devine MD        Indications and Patient Condition  Indications for airway management: anesthesia  Sedation level: deep      Preoxygenated: yesPatient position: sniffing    Mask difficulty assessment: 1 - vent by mask    Final Airway Details    Final airway type: endotracheal airway    Successful airway: ETT  Cuffed: yes   Successful intubation technique: exchange catheter  Endotracheal tube insertion site: oral  Blade size: #3    Placement verified by: capnometry   Measured from: teeth  ETT to teeth (cm): 29  Number of attempts at approach: 1  Ventilation between attempts: none  Number of other approaches attempted: 0

## 2025-05-02 NOTE — ANESTHESIA POSTPROCEDURE EVALUATION
[facility]    Richy WALDEN Buster Patient Status:  Inpatient   Age/Gender 64 year old male MRN [id number]   Location [facility] 4SW-A Attending Hsusain Rivera MD   Hosp Day # 3 PCP Woody Dahl MD       Anesthesia Post-op Note    FLEXIBLE BRONCHOSCOPY. VIDEO-ASSISTED THORACOSCOPY. HEMATOMA EVACUATION    Procedure Summary       Date: 05/02/25 Room / Location: [facility] 01 / [facility]; [facility]    Anesthesia Start: 1520 Anesthesia Stop:     Procedures:       IR ANGIOGRAM      FLEXIBLE BRONCHOSCOPY. VIDEO-ASSISTED THORACOSCOPY. HEMATOMA EVACUATION (Left) Diagnosis:       (L hemothorax)      (hemothorax)    Scheduled Providers: Fermin Caputo Jr., MD Anesthesiologist: Camilo Devine MD    Anesthesia Type: general ASA Status: 4 - Emergent            Anesthesia Type: No value filed.    Vitals Value Taken Time   /72 05/02/25 18:31   Temp 98 05/02/25 18:31   Pulse 94 05/02/25 18:30   Resp 26 05/02/25 18:30   SpO2 100 % 05/02/25 18:30   Vitals shown include unfiled device data.        Patient Location: ICU    Anesthesia Type: general    Airway Patency: intubated    Postop Pain Control: adequate    Mental Status: sedated until time of extubation    Nausea/Vomiting: none    Cardiopulmonary/Hydration status: stable euvolemic    Complications: no apparent anesthesia related complications    Postop vital signs: stable    Dental Exam: Unchanged from Preop    Sign out given to ICU staff.

## 2025-05-02 NOTE — PROGRESS NOTES
[facility]  Progress Note    Richy Goins Patient Status:  Inpatient    1960 MRN [id number]   Location [facility] 8NE-A Attending Hussain Rivera MD   Hosp Day # 3 PCP Woody Dahl MD     Subjective:  Richy Goins is a(n) 64 year old male remains afeb patient initially seen during thoracentesis at which time serosanguineous fluid was being easily removed.  Seen following tap felt improved less shortness of breath less chest pressure  Multiple visits subsequent hypotension marked anemia related to intrathoracic bleed requiring multiple units of blood massive transfusion protocol ultimately intubation mechanical ventilation and patient is now status post thoracotomy per op report found in bleeding intercostal artery that was cauterized.    Currently patient is sedated on the ventilator on weaning doses of Levophed and vaso-propofol and fentanyl    Objective:  /66 (BP Location: Right arm)   Pulse 92   Temp 98 °F (36.7 °C) (Oral)   Resp 18   Wt 163 lb 9.3 oz (74.2 kg)   SpO2 98%   BMI 24.87 kg/m² currently at 100% on the ventilator with plans to wean      Temp (24hrs), Av °F (36.7 °C), Min:97.7 °F (36.5 °C), Max:98.3 °F (36.8 °C)      Intake/Output:    Intake/Output Summary (Last 24 hours) at 2025 0917  Last data filed at 2025 2230  Gross per 24 hour   Intake --   Output 2000 ml   Net -2000 ml       Physical Exam:   General:  sedated seems comfortable   Head: Normocephalic, without obvious abnormality, atraumatic.   Throat: Lips, mucosa, and tongue normal.  No thrush noted.  ET tube   Neck: trachea midline, no adenopathy, no thyromegaly. No JVD.  Somewhat thick no evidence of subcu air   Lungs: Diminished tones on the left greater than right chest tube noted   Chest wall: No tenderness or deformity.   Heart:: Sinus regular rate and rhythm mildly protuberant seems nontender   Abdomen: soft, non-distended, no masses, no guarding, no     Rebound.   Extremity: Bilateral  amputations   Skin: No rashes or lesions.   Neurological:  sedated    Lab Data Review:  Recent Labs     04/29/25 2040 04/30/25 0458 05/01/25  0528   WBC 9.5 9.5 8.8   HGB 8.9* 8.5* 9.0*   .0 332.0 337.0     Recent Labs     04/29/25 2057 04/30/25 0458 05/01/25  0528    140 139   K 5.9* 5.9* 3.8    104 101   CO2 16.0* 15.0* 24.0   * 141* 68*   CREATSERUM 13.73* 13.89* 8.24*     Recent Labs   Lab 05/01/25  0810   PTP 15.9*   INR 1.26*       Cultures: reviewed     Radiology:  No results found.  Reviewed   All films reviewed  Suspected trapped lung      Medications reviewed     Assessment and Plan:   Problem List[1]    Assessment:  Hemorrhagic shock secondary to hemothorax-now off pressors  Intrathoracic bleed following thoracentesis now status post intercostal artery  collateralization  Recurrent left-sided pleural effusion present since January 2025-now increasing with near whiteout suspected component atelectasis prompting thoracentesis  Possible inferior wall STEMI cardiology note appreciated  Dyspnea mild hypoxia related to the above possible component pulmonary edema as well having missed dialysis  End-stage renal disease  Severe peripheral vascular disease bilateral BKA's most recently last month     Plan:  Continuing to wean FiO2/pressors  Serial hemoglobins  Plan to wean sedation early morning-follow-up for possible ability to begin spontaneous breathing trials  Dialysis most likely in a.m.  Discussed at length with the patient's wife at bedside x-rays reviewed    CC     Delilah Easley MD  5/2/2025  9:17 AM         [1]   Patient Active Problem List  Diagnosis    Diabetic nephropathy associated with type 2 diabetes mellitus (HCC)    Hypertension    Type 2 diabetes mellitus with hyperglycemia (HCC)    Stage 3 chronic kidney disease (HCC)    Retinopathy due to secondary diabetes mellitus (HCC)    Azotemia    PAD (peripheral artery disease)    Stage 4 chronic kidney disease (HCC)     Acidosis    ESRD (end stage renal disease) (HCC)    ESRD on hemodialysis (HCC)    Pure hypercholesterolemia    Hemoptysis    Hypertensive urgency    Hematemesis with nausea    Hematemesis, unspecified whether nausea present    Acute gastritis with hemorrhage, unspecified gastritis type    Uremia    Acquired absence of right leg below knee (HCC)    Chronic kidney disease, stage 4 (severe) (HCC)    End-stage renal disease on hemodialysis (HCC)    Osteomyelitis of right foot (HCC)    Dyslipidemia    Diabetes mellitus type 2 in nonobese (HCC)    Unilateral complete BKA, right, sequela (HCC)    Type 2 diabetes mellitus with diabetic peripheral angiopathy without gangrene (HCC)    Type 2 diabetes mellitus with diabetic chronic kidney disease (HCC)    Long term (current) use of aspirin    Hypertensive chronic kidney disease w stg 1-4/unsp chr kdny    Encounter for orthopedic aftercare following surgical amputation    Anemia in chronic kidney disease    Age-related osteoporosis without current pathological fracture    Acute kidney failure, unspecified    CKD (chronic kidney disease), stage III (HCC)    Type 1 diabetes mellitus with ketoacidosis without coma (HCC)    Senile purpura    Tinnitus, bilateral    Hypernatremia    Anemia    Acute kidney injury    Acute renal failure (ARF)    Metabolic acidosis    Hyperglycemia    Nausea    Elevated lipase    Elevated procalcitonin    Pleural effusion    Anemia in ESRD (end-stage renal disease) (HCC)    Hyperphosphatemia    Dyspnea on exertion    Ischemic foot    Gangrene of toe of left foot (HCC)    Hyperkalemia    Acute respiratory failure with hypoxia (HCC)    Anemia, unspecified type

## 2025-05-02 NOTE — CONSULTS
[facility]  Report of Consultation    Richy Goins Patient Status:  Inpatient    1960 MRN [id number]   Location [facility] CARDIOVASCULAR SURGERY Attending Hussain Rivera MD   Hosp Day # 3 PCP Woody Dahl MD     Requesting Physician:  Dr. Rivera     Reason for Consultation:  Emergent Chest tube placement    Chief Complaint:  SOB, unresponsive     History of Present Illness:  Richy Goins is a 64 year old male with a past medical history significant for ESRD, HTN, DM, PAD, HLD who was admitted to [facility] on 2025 for acute hypoxic respiratory failure secondary to pulmonary edema and pleural effusion. Please see daily progress notes for full review of hospital stay. Today, patient underwent US guided left thoracentesis. Postoperatively, patient noted to be minimally responsive. A Code blue was called, bedside ultrasound concerning for dense fluid collection. Patient noted to be anemia on ABG (6.6). Patient was started on pressor support, intubated and underwent MTP. A 14 Upper sorbian chest tube was placed by critical care team with proximately 2500 mL of bloody fluid output. Thoracic surgery was consulted but was not on-site at time of code blue. General surgery was consulted for further emergent evaluation.    History:  Past Medical History[1]  Past Surgical History[2]  Family History[3]   reports that he has never smoked. He has never used smokeless tobacco. He reports that he does not currently use alcohol. He reports that he does not use drugs.    Allergies:  Allergies[4]    Medications:  Prescriptions Prior to Admission[5]    Current Hospital Medications[6]    Review of Systems:  Unable to complete secondary to patient condition    Physical Exam:  BP (!) 83/53 (BP Location: Right arm)   Pulse 112   Temp 97.6 °F (36.4 °C) (Oral)   Resp 26   Wt 163 lb 9.3 oz (74.2 kg)   SpO2 100%   BMI 24.87 kg/m²     General: Intubated and sedated  HEENT: Normocephalic, atraumatic. .   Lungs:  On mechanical ventilator Left sided chest tube in place. This was upsized to 28 Greenlandic by Dr. Vieyra at bedside.   Abdomen:  Soft, non-distended, non-tender, with no rebound or guarding.  No peritoneal signs. No ascites.  Liver is within normal limits.  Spleen is not palpable.    Extremities:  B/L BKA  Skin: Normal texture and turgor.  Lymphatic:  No palpable cervical lymphadenopathy.    Laboratory Data:  Recent Labs   Lab 05/01/25  0528 05/02/25  1305 05/02/25  1448   RBC 3.53* 2.31* 4.55   HGB 9.0* 6.0* 13.0   HCT 29.2* 19.3* 38.2*   MCV 82.7 83.5 84.0   MCH 25.5* 26.0 28.6   MCHC 30.8* 31.1 34.0   RDW 16.7 16.4 14.8   NEPRELIM 6.78 7.91* 15.51*   WBC 8.8 14.5* 19.7*   .0 330.0 217.0       Recent Labs   Lab 05/02/25  1231 05/02/25  1305 05/02/25  1448   * 244* 289*   BUN 38* 40* 39*   CREATSERUM 5.78* 5.57* 4.69*   CA 8.8 9.2 9.5   ALB 3.5 3.3 3.2    149* 149*   K 4.3 3.2* 3.2*    105 105   CO2 22.0 26.0 27.0   ALKPHO 172* 148* 144*   AST 8 10 22   ALT <7* <7* 13   BILT 0.2 0.2 0.8   TP 6.0 5.4* 5.3*         Recent Labs   Lab 05/01/25  0810 05/02/25  1449   PTP 15.9* 17.1*   INR 1.26* 1.38*         XR CHEST AP PORTABLE  (CPT=71045)  Result Date: 5/2/2025  CONCLUSION:  Complete opacification left hemithorax with shift of mediastinal structures from left to right.  Finding was discussed with the ICU RN caring for the patient on May 2, 2025 at 2:30 p.m..  By this time a chest tube have been placed.  Findings  would be consistent with a large hemo thorax.   LOCATION:  [facility]      Dictated by (CST): Camden Deleon MD on 5/02/2025 at 2:25 PM     Finalized by (CST): Camden Deleon MD on 5/02/2025 at 2:30 PM       US THORACENTESIS GUIDED LEFT (CPT=32555)  Result Date: 5/2/2025  CONCLUSION:  Ultrasound-guided left thoracentesis of 1600 cc was performed without complication.   LOCATION:  [facility]    Dictated by (CST): Oskar Lynn MD on 5/02/2025 at 1:33 PM     Finalized by (CST): Oskar Lynn MD  on 5/02/2025 at 1:34 PM       XR CHEST AP PORTABLE  (CPT=71045)  Result Date: 5/2/2025  CONCLUSION:  Decreased size of left pleural effusion without pneumothorax.  There continues to be significant amount of opacity within the left hemithorax.   LOCATION:  [facility]      Dictated by (CST): Yoan Duvall MD on 5/02/2025 at 12:14 PM     Finalized by (CST): Yoan Duvall MD on 5/02/2025 at 12:15 PM               Medical Decision Making         Impression:  Problem List[7]    Hemothorax following US guided left thoracentesis     Plan:  The patient was seen and examined with Dr. Vieyra. Plan for patient to go to OR with Dr. Christie of thoracic surgery.  Chest tube up sized from 14 Liberian to 28 Liberian by Dr. Vieyra at bedside.   Continue critical care management as per ICU recommendations.    Annamarie Platt PA-C  5/2/2025  4:01 PM  Is this a shared or split note between Advanced Practice Provider and Physician? Yes     The patient was independently examined.  I agree with the PAs assessment and plan.      This is a 64-year-old gentleman with bleeding after thoracentesis    Patient with significant bloody output from pigtail placed after thoracentesis  Patient did lose pulses and achieved ROSC after ACLS  Patient prepping for emergent VATS and hematoma evacuation  Trauma surgery was consulted for chest tube placement, see separate procedure note for details  Continue care per ICU  Chest tube management per CV surgery and pulmonology    Surgery will sign off at this time  Please page with any questions or concerns    More than 50% of clinical time and 100% MDM was performed by me    Thank you for letting me participate in the care of this patient      Anamika Vieyra MD  [facility]  5/2/2025  10:26 PM         [1]   Past Medical History:   Belching    Dialysis patient    Diarrhea, unspecified    Esophageal reflux    Essential hypertension    Fatigue    Flatulence/gas pain/belching    High blood pressure     High cholesterol    History of blood transfusion    Hyperlipidemia    Indigestion    Irregular bowel habits    Night sweats    Osteoporosis    Peripheral vascular disease    Pure hypercholesterolemia    Type II or unspecified type diabetes mellitus without mention of complication, not stated as uncontrolled    Visual impairment    reading glasses    Vomiting   [2]   Past Surgical History:  Procedure Laterality Date    Amputation toe,i-p jt Right     Av fistula revision, open      Below knee leg amputation Right     Colonoscopy N/A 02/20/2023    Procedure: COLONOSCOPY;  Surgeon: Sarmad Gonzalez MD;  Location:  ENDOSCOPY    Colonoscopy      Upper gi endoscopy,exam     [3]   Family History  Problem Relation Age of Onset    Heart Attack Father     Heart Disorder Father 57    Diabetes Maternal Grandfather     Diabetes Maternal Aunt    [4]   Allergies  Allergen Reactions    Zosyn [Piperacillin-Tazobactam In D5w] RASH     Developed diffuse erythroderma 1 day after starting zosyn   [5]   Medications Prior to Admission   Medication Sig    tamsulosin 0.4 MG Oral Cap Take 1 capsule (0.4 mg total) by mouth nightly. TAKE AT BEDTIME    cephALEXin 500 MG Oral Cap Take 1 capsule (500 mg total) by mouth daily. Take 1 Capsule every evening for 14 days    sevelamer carbonate 800 MG Oral Tab Take 2 tablets (1,600 mg total) by mouth 3 (three) times daily with meals.    calcitRIOL 0.5 MCG Oral Cap Take 1 capsule (0.5 mcg total) by mouth See Admin Instructions. Every Tuesday, Thursday, and Saturday    gabapentin 300 MG Oral Cap Take 1 capsule (300 mg total) by mouth nightly. TAKE AT BEDTIME    atorvastatin 40 MG Oral Tab Take 1 tablet (40 mg total) by mouth nightly. TAKE AT BEDTIME    Insulin Glargine-yfgn 100 UNIT/ML Subcutaneous Solution Pen-injector Inject 15 Units into the skin nightly.    insulin detemir (LEVEMIR FLEXPEN) 100 UNIT/ML Subcutaneous Solution Pen-injector Inject 15 Units into the skin nightly.    pantoprazole 40 MG Oral  Tab EC Take one tablet (40 mg total) by mouth once daily, 30 minutes prior to breakfast. (Patient taking differently: 2 (two) times daily before meals. Take one tablet (40 mg total) by mouth twice daily, 30 minutes prior to breakfast.)    aspirin 81 MG Oral Tab EC Take 1 tablet (81 mg total) by mouth in the morning.    amLODIPine 10 MG Oral Tab Take 1 tablet (10 mg total) by mouth daily.    Insulin Pen Needle (BD PEN NEEDLE AMBER 2ND GEN) 32G X 4 MM Does not apply Misc Use to inject insulin up to 5 times daily   [6]   Current Facility-Administered Medications:     [Transfer Hold] epoetin juan josé (Epogen, Procrit) 49298 UNIT/ML injection 10,000 Units, 10,000 Units, Intravenous, Once in dialysis    [Transfer Hold] sodium chloride 0.9 % IV bolus 100 mL, 100 mL, Intravenous, Q30 Min PRN **AND** [Transfer Hold] albumin human (Albumin) 25% injection 25 g, 25 g, Intravenous, PRN Dialysis    [Transfer Hold] sodium chloride 0.9% infusion, , Intravenous, Once    ketamine (Ketalar) 50 MG/ML injection, , ,     vasopressin (Vasostrict) 20 Units in sodium chloride 0.9% 100 mL infusion for septic shock, 0.01-0.03 Units/min, Intravenous, Continuous    sodium bicarbonate 8.4 % injection, , ,     propofol (Diprivan) 10 MG/ML injection, , ,     [Transfer Hold] calcium gluconate 2g in 100mL iso-NaCl IVPB premix, 2 g, Intravenous, Once    [Transfer Hold] acetaminophen (Tylenol) 160 MG/5ML oral liquid 650 mg, 650 mg, Per NG Tube, Q4H PRN **OR** [Transfer Hold] acetaminophen (Tylenol) rectal suppository 650 mg, 650 mg, Rectal, Q4H PRN **OR** [Transfer Hold] acetaminophen (Ofirmev) 10 mg/mL infusion premix 1,000 mg, 1,000 mg, Intravenous, Q6H PRN    [Transfer Hold] fentaNYL (Sublimaze) 50 mcg BOLUS FROM BAG infusion, 50 mcg, Intravenous, Once PRN **AND** [Transfer Hold] fentaNYL in sodium chloride 0.9% (Sublimaze) 1000 mcg/100mL infusion premix,  mcg/hr, Intravenous, Continuous PRN    [Transfer Hold] fentaNYL (Sublimaze) 25 mcg BOLUS  FROM BAG infusion, 25 mcg, Intravenous, Q30 Min PRN    [Transfer Hold] senna (Senokot) 8.8 MG/5ML oral syrup 17.6 mg, 10 mL, Per NG Tube, BID    [Transfer Hold] docusate (Colace) 50 MG/5ML oral solution 100 mg, 100 mg, Per NG Tube, BID    [Transfer Hold] polyethylene glycol (PEG 3350) (Miralax) 17 g oral packet 17 g, 17 g, Per NG Tube, Daily PRN    [Transfer Hold] bisacodyl (Dulcolax) 10 MG rectal suppository 10 mg, 10 mg, Rectal, Daily PRN    [Transfer Hold] chlorhexidine gluconate (Peridex) 0.12 % oral solution 15 mL, 15 mL, Mouth/Throat, BID@0800,2000    [Transfer Hold] mineral oil-white petrolatum (Artificial Tears) 83-15 % ophthalmic ointment 1 Application, 1 Application, Both Eyes, Nightly    propofol (Diprivan) 10 mg/mL infusion premix, 5-50 mcg/kg/min (Dosing Weight), Intravenous, Continuous    fentaNYL (Sublimaze) 50 mcg/mL injection, , ,     amiodarone in dextrose 4.14% (Cordarone) 360 mg/200mL infusion premix, 1 mg/min, Intravenous, Continuous **FOLLOWED BY** amiodarone in dextrose 4.14% (Cordarone) 360 mg/200mL infusion premix, 0.5 mg/min, Intravenous, Continuous    norepinephrine (Levophed) 4 mg/250mL infusion premix, , ,     fentaNYL (Sublimaze) 50 mcg/mL injection, , ,     [Transfer Hold] pantoprazole (Protonix) 40 mg in sodium chloride 0.9% PF 10 mL IV push, 40 mg, Intravenous, BID    [Held by provider] amLODIPine (Norvasc) tab 10 mg, 10 mg, Oral, Daily    [Held by provider] aspirin DR tab 81 mg, 81 mg, Oral, Daily    [Transfer Hold] atorvastatin (Lipitor) tab 40 mg, 40 mg, Oral, Nightly    [Transfer Hold] calcitriol (Rocaltrol) cap 0.5 mcg, 0.5 mcg, Oral, Once per day on Tuesday Thursday Saturday    cephALEXin (Keflex) cap 500 mg, 500 mg, Oral, Daily    [Transfer Hold] gabapentin (Neurontin) cap 300 mg, 300 mg, Oral, Nightly    [Transfer Hold] insulin degludec (Tresiba) 100 units/mL flextouch 15 Units, 15 Units, Subcutaneous, Nightly    [Held by provider] pantoprazole (Protonix) DR tab 40 mg, 40 mg,  Oral, BID AC    [Transfer Hold] sevelamer carbonate (Renvela) tab 1,600 mg, 1,600 mg, Oral, TID CC    [Transfer Hold] tamsulosin (Flomax) cap 0.4 mg, 0.4 mg, Oral, Nightly    [Transfer Hold] metoclopramide (Reglan) 5 mg/mL injection 10 mg, 10 mg, Intravenous, Daily PRN    [Transfer Hold] glucose (Dex4) 15 GM/59ML oral liquid 15 g, 15 g, Oral, Q15 Min PRN **OR** [Transfer Hold] glucose (Glutose) 40% oral gel 15 g, 15 g, Oral, Q15 Min PRN **OR** [Transfer Hold] glucose-vitamin C (Dex-4) chewable tab 4 tablet, 4 tablet, Oral, Q15 Min PRN **OR** [Transfer Hold] dextrose 50% injection 50 mL, 50 mL, Intravenous, Q15 Min PRN **OR** [Transfer Hold] glucose (Dex4) 15 GM/59ML oral liquid 30 g, 30 g, Oral, Q15 Min PRN **OR** [Transfer Hold] glucose (Glutose) 40% oral gel 30 g, 30 g, Oral, Q15 Min PRN **OR** [Transfer Hold] glucose-vitamin C (Dex-4) chewable tab 8 tablet, 8 tablet, Oral, Q15 Min PRN    [Transfer Hold] melatonin tab 3 mg, 3 mg, Oral, Nightly PRN    [Transfer Hold] sennosides (Senokot) tab 17.2 mg, 17.2 mg, Oral, Nightly PRN    [Transfer Hold] ondansetron (Zofran) 4 MG/2ML injection 4 mg, 4 mg, Intravenous, Q6H PRN    [Transfer Hold] insulin aspart (NovoLOG) 100 Units/mL FlexPen 2-10 Units, 2-10 Units, Subcutaneous, TID AC and HS    [Transfer Hold] benzonatate (Tessalon) cap 200 mg, 200 mg, Oral, TID PRN    [Transfer Hold] glycerin-hypromellose- (Artificial Tears) 0.2-0.2-1 % ophthalmic solution 1 drop, 1 drop, Both Eyes, QID PRN    [Transfer Hold] sodium chloride (Saline Mist) 0.65 % nasal solution 1 spray, 1 spray, Each Nare, Q3H PRN  [7]   Patient Active Problem List  Diagnosis    Diabetic nephropathy associated with type 2 diabetes mellitus (HCC)    Essential hypertension    Type 2 diabetes mellitus with hyperglycemia (HCC)    Stage 3 chronic kidney disease (HCC)    Retinopathy due to secondary diabetes mellitus (HCC)    Azotemia    PAD (peripheral artery disease)    Stage 4 chronic kidney disease  (HCC)    Acidosis    ESRD (end stage renal disease) (HCC)    ESRD on hemodialysis (HCC)    Pure hypercholesterolemia    Hemoptysis    Hypertensive urgency    Hematemesis with nausea    Hematemesis, unspecified whether nausea present    Acute gastritis with hemorrhage, unspecified gastritis type    Uremia    Acquired absence of right leg below knee (HCC)    Chronic kidney disease, stage 4 (severe) (HCC)    End-stage renal disease on hemodialysis (HCC)    Osteomyelitis of right foot (HCC)    Dyslipidemia    Diabetes mellitus type 2 in nonobese (HCC)    Unilateral complete BKA, right, sequela (HCC)    Type 2 diabetes mellitus with diabetic peripheral angiopathy without gangrene (HCC)    Type 2 diabetes mellitus with diabetic chronic kidney disease (HCC)    Long term (current) use of aspirin    Hypertensive chronic kidney disease w stg 1-4/unsp chr kdny    Encounter for orthopedic aftercare following surgical amputation    Anemia in chronic kidney disease    Age-related osteoporosis without current pathological fracture    Acute kidney failure, unspecified    CKD (chronic kidney disease), stage III (HCC)    Type 1 diabetes mellitus with ketoacidosis without coma (HCC)    Senile purpura    Tinnitus, bilateral    Hypernatremia    Anemia    Acute kidney injury    Acute renal failure (ARF)    Metabolic acidosis    Hyperglycemia    Nausea    Elevated lipase    Elevated procalcitonin    Pleural effusion    Anemia in ESRD (end-stage renal disease) (HCC)    Hyperphosphatemia    Dyspnea on exertion    Ischemic foot    Gangrene of toe of left foot (HCC)    Hyperkalemia    Acute respiratory failure with hypoxia (HCC)    Anemia, unspecified type    Shock (HCC)    ABLA (acute blood loss anemia)    Confusion

## 2025-05-02 NOTE — PROCEDURES
Endotracheal Intubation Procedure Note    Indication for endotracheal intubation: impending respiratory failure.  Sedation: etomidate.  Paralytic: rocuronium.  Lidocaine: no.  Atropine: no.  Equipment: Glidescope 7.5 ETT   Cricoid Pressure: no.  Number of attempts: 1.  ETT location confirmed by by CXR.    DOS: 5/2/25

## 2025-05-02 NOTE — PROCEDURES
Arterial Line  Performed by: Dania [facility]     General Information and Staff     Procedure Start: 1300  Patient Location:  ICU  Indication: continuous blood pressure monitoring and blood sampling needed    Site Identification: real time ultrasound guided, sterile technique used     Procedure Details     Catheter Size:  20 G  Catheter Length:  1 and 3/4 inchCatheter Type:  Arrow  Seldinger Technique?: Yes    Laterality:  Right  Site: Radial    Site Prep: chlorhexidine  Line Secured:  Tape and Tegaderm     Assessment: Jordan's test negative, Good flash, guidewire and catheter advanced without difficulty, pulsatile blood flow noted.    Events: patient tolerated procedure well with no complications      Dania Parker [facility]  Critical Care APN  [id number]

## 2025-05-02 NOTE — PLAN OF CARE
Assumed care of pt at 0115. A&O x4. Denies pain & cardiac symptoms.  Maintaining O2 on RA. 2L w/ sleep. Refusing tele. Anuric. Incontinent of bowel.  Pt updated on plan of care. NPO for thoracentesis.   Bed in lowest position. Bed alarm on. Call light within reach.     0545: Blood glucose 176 this AM. Pt was hypoglycemic yesterday while NPO. Hospitalist paged to ask if we can hold novolog. Okay to hold if blood glucose under 180 per Dr Vidales.    Problem: HEMATOLOGIC - ADULT  Goal: Maintains hematologic stability  Description: INTERVENTIONS- Assess for signs and symptoms of bleeding or hemorrhage- Monitor labs and vital signs for trends- Administer supportive blood products/factors, fluids and medications as ordered and appropriate- Administer supportive blood products/factors as ordered and appropriate  Outcome: Progressing  Goal: Free from bleeding injury  Description: (Example usage: patient with low platelets)INTERVENTIONS:- Avoid intramuscular injections, enemas and rectal medication administration- Ensure safe mobilization of patient- Hold pressure on venipuncture sites to achieve adequate hemostasis- Assess for signs and symptoms of internal bleeding- Monitor lab trends- Patient is to report abnormal signs of bleeding to staff- Avoid use of toothpicks and dental floss- Use electric shaver for shaving- Use soft bristle tooth brush- Limit straining and forceful nose blowing  Outcome: Progressing     Problem: RESPIRATORY - ADULT  Goal: Achieves optimal ventilation and oxygenation  Description: INTERVENTIONS:- Assess for changes in respiratory status- Assess for changes in mentation and behavior- Position to facilitate oxygenation and minimize respiratory effort- Oxygen supplementation based on oxygen saturation or ABGs- Provide Smoking Cessation handout, if applicable- Encourage broncho-pulmonary hygiene including cough, deep breathe, Incentive Spirometry- Assess the need for suctioning and perform as needed-  Assess and instruct to report SOB or any respiratory difficulty- Respiratory Therapy support as indicated- Manage/alleviate anxiety- Monitor for signs/symptoms of CO2 retention  Outcome: Progressing     Problem: SAFETY ADULT - FALL  Goal: Free from fall injury  Description: INTERVENTIONS:- Assess pt frequently for physical needs- Identify cognitive and physical deficits and behaviors that affect risk of falls.- [place] fall precautions as indicated by assessment.- Educate pt/family on patient safety including physical limitations- Instruct pt to call for assistance with activity based on assessment- Modify environment to reduce risk of injury- Provide assistive devices as appropriate- Consider OT/PT consult to assist with strengthening/mobility- Encourage toileting schedule  Outcome: Progressing

## 2025-05-02 NOTE — PROGRESS NOTES
Assumed care of pt at 0730 A&O x4. Denies pain & cardiac symptoms.  Maintaining O2 on RA. 2L w/ sleep. Refusing tele. Anuric. Incontinent of bowel.  Pt updated on plan of care. Left Thoracentesis with 1600 ml out.CXR done.Specimen sent.  Bed in lowest position. Bed alarm on. Call light within reach.         Problem: HEMATOLOGIC - ADULT  Goal: Maintains hematologic stability  Description: INTERVENTIONS- Assess for signs and symptoms of bleeding or hemorrhage- Monitor labs and vital signs for trends- Administer supportive blood products/factors, fluids and medications as ordered and appropriate- Administer supportive blood products/factors as ordered and appropriate  Outcome: Progressing  Goal: Free from bleeding injury  Description: (Example usage: patient with low platelets)INTERVENTIONS:- Avoid intramuscular injections, enemas and rectal medication administration- Ensure safe mobilization of patient- Hold pressure on venipuncture sites to achieve adequate hemostasis- Assess for signs and symptoms of internal bleeding- Monitor lab trends- Patient is to report abnormal signs of bleeding to staff- Avoid use of toothpicks and dental floss- Use electric shaver for shaving- Use soft bristle tooth brush- Limit straining and forceful nose blowing  Outcome: Progressing     Problem: RESPIRATORY - ADULT  Goal: Achieves optimal ventilation and oxygenation  Description: INTERVENTIONS:- Assess for changes in respiratory status- Assess for changes in mentation and behavior- Position to facilitate oxygenation and minimize respiratory effort- Oxygen supplementation based on oxygen saturation or ABGs- Provide Smoking Cessation handout, if applicable- Encourage broncho-pulmonary hygiene including cough, deep breathe, Incentive Spirometry- Assess the need for suctioning and perform as needed- Assess and instruct to report SOB or any respiratory difficulty- Respiratory Therapy support as indicated- Manage/alleviate anxiety- Monitor for  signs/symptoms of CO2 retention  Outcome: Progressing     Problem: SAFETY ADULT - FALL  Goal: Free from fall injury  Description: INTERVENTIONS:- Assess pt frequently for physical needs- Identify cognitive and physical deficits and behaviors that affect risk of falls.- [place] fall precautions as indicated by assessment.- Educate pt/family on patient safety including physical limitations- Instruct pt to call for assistance with activity based on assessment- Modify environment to reduce risk of injury- Provide assistive devices as appropriate- Consider OT/PT consult to assist with strengthening/mobility- Encourage toileting schedule  Outcome: Progressing

## 2025-05-02 NOTE — PROCEDURES
[facility]   part of [facility]  Procedure Note    Richy Goins Patient Status:  Inpatient    1960 MRN [id number]   Location [facility] 8NE-A Attending Hussain Rivera MD   Hosp Day # 3 PCP Woody Dahl MD     Procedure: US guided left thoracentesis    Pre-Procedure Diagnosis:  left pleural effusion    Post-Procedure Diagnosis: same    Anesthesia:  Local    Findings:  serosanguineous fluid    Specimens: 1600 ml    Blood Loss:  <5 ml    Tourniquet Time: n/a  Complications:  None  Drains:  removed    Secondary Diagnosis:  none    Oskar Lynn MD  2025

## 2025-05-02 NOTE — PLAN OF CARE
Patient is alert & oriented x4. Pt is a max assist, can roll side-to-side. Breath sounds are clear;diminished bilateral. On 2L NC overnight. Pt refuses tele monitor. No pain reported. Skin dry and intact. Pt tolerated HD well at night. HD otput:2L. Bed in low position and call light within reach. Reviewed plan of care and patient verbalizes understanding.     POC: Left sided Thora-NPO after midnight.         Problem: SAFETY ADULT - FALL  Goal: Free from fall injury  Description: INTERVENTIONS:- Assess pt frequently for physical needs- Identify cognitive and physical deficits and behaviors that affect risk of falls.- [place] fall precautions as indicated by assessment.- Educate pt/family on patient safety including physical limitations- Instruct pt to call for assistance with activity based on assessment- Modify environment to reduce risk of injury- Provide assistive devices as appropriate- Consider OT/PT consult to assist with strengthening/mobility- Encourage toileting schedule  Outcome: Progressing     Problem: RESPIRATORY - ADULT  Goal: Achieves optimal ventilation and oxygenation  Description: INTERVENTIONS:- Assess for changes in respiratory status- Assess for changes in mentation and behavior- Position to facilitate oxygenation and minimize respiratory effort- Oxygen supplementation based on oxygen saturation or ABGs- Provide Smoking Cessation handout, if applicable- Encourage broncho-pulmonary hygiene including cough, deep breathe, Incentive Spirometry- Assess the need for suctioning and perform as needed- Assess and instruct to report SOB or any respiratory difficulty- Respiratory Therapy support as indicated- Manage/alleviate anxiety- Monitor for signs/symptoms of CO2 retention  Outcome: Progressing     Problem: HEMATOLOGIC - ADULT  Goal: Maintains hematologic stability  Description: INTERVENTIONS- Assess for signs and symptoms of bleeding or hemorrhage- Monitor labs and vital signs for trends- Administer  supportive blood products/factors, fluids and medications as ordered and appropriate- Administer supportive blood products/factors as ordered and appropriate  Outcome: Progressing  Goal: Free from bleeding injury  Description: (Example usage: patient with low platelets)INTERVENTIONS:- Avoid intramuscular injections, enemas and rectal medication administration- Ensure safe mobilization of patient- Hold pressure on venipuncture sites to achieve adequate hemostasis- Assess for signs and symptoms of internal bleeding- Monitor lab trends- Patient is to report abnormal signs of bleeding to staff- Avoid use of toothpicks and dental floss- Use electric shaver for shaving- Use soft bristle tooth brush- Limit straining and forceful nose blowing  Outcome: Progressing

## 2025-05-02 NOTE — PROCEDURES
Chest Tube Insertion Procedure Note    Indications:  Clinically significant Hemothorax    Pre-operative Diagnosis: Hemothorax    Post-operative Diagnosis: Hemothorax    Procedure Details   Informed consent was obtained for the procedure, including sedation.  Risks of lung perforation, hemorrhage, arrhythmia, and adverse drug reaction were discussed.     After sterile skin prep, using standard technique, a 14 Armenian tube was placed in the left lateral 4 rib space.    Findings:  2500 ml of blood fluid obtained    Complications:  None; patient tolerated the procedure well.           Disposition: ICU - intubated and critically ill.           Condition: unstable    Attending Attestation: I performed the procedure.    DOS: 5/2/25

## 2025-05-02 NOTE — PLAN OF CARE
Patient to ICU after RRT/code blue on floor. Patient intubated upon arrival. Mass transfusion protocol activated. 14u of PRBC, 4u of FFP, and 1u of Platelets given. Vfib arrest at 1422. See Code Blue Record for more information. Chest tube inserted. To OR for thoracotomy at 1326. Back to ICU at 1830. All medication given via ICU MD verbal orders and titrated per MD orders. See MAR and flowsheets for more information.

## 2025-05-02 NOTE — RESPIRATORY THERAPY NOTE
Current Mechanical Ventilator Settings:  - Mode: VC+  - Rate: 26  - Tidal Volume(mL):450  - FiO2: 100%  - PEEP +5  - inspiratory time 0.8 sec    Breath Sounds: Clear and Diminished    ETT Size: 8.0 @Lips     Measured Parameters:  Peak Inspiratory Pressure(cmH2O): 22  Plateu Pressure(cmH2O) : 21  Tidal Volume(mL): 412  Rate:26    Shift Events noted: Received patient on ventilator with above settings. Changed pt's vent rate from 22 to 26 and ETT from 7.5 to 8.0. Pt remained stable. Will continuous to monitor.

## 2025-05-02 NOTE — PROGRESS NOTES
[facility]   part of [facility]     Nephrology Progress Note    Richy Goins Patient Status:  Inpatient    1960 MRN [id number]   Location [facility] 8NE-A Attending Hussain Rivera MD   Hosp Day # 3 PCP Woody Dahl MD       SUBJECTIVE:  Stable this AM after thora (1600 ml fluid removed)        Physical Exam:   /66 (BP Location: Right arm)   Pulse 92   Temp 98 °F (36.7 °C) (Oral)   Resp 18   Wt 163 lb 9.3 oz (74.2 kg)   SpO2 98%   BMI 24.87 kg/m²   Temp (24hrs), Av °F (36.7 °C), Min:97.7 °F (36.5 °C), Max:98.3 °F (36.8 °C)       Intake/Output Summary (Last 24 hours) at 2025 1113  Last data filed at 2025 2230  Gross per 24 hour   Intake --   Output 2000 ml   Net -2000 ml     Last 3 Weights   25 0353 163 lb 9.3 oz (74.2 kg)   25 0038 180 lb (81.6 kg)   25 2310 180 lb (81.6 kg)   25 1623 170 lb (77.1 kg)   25 1055 170 lb (77.1 kg)   10/02/24 0458 181 lb (82.1 kg)   10/01/24 1056 175 lb 0.7 oz (79.4 kg)   10/01/24 0544 175 lb (79.4 kg)   24 1517 192 lb 3.2 oz (87.2 kg)   24 0936 196 lb (88.9 kg)     General: Alert and oriented in no apparent distress.  HEENT: No scleral icterus, MMM  Neck: Supple, no SRAITA or thyromegaly  Cardiac: Regular rate and rhythm, S1, S2 normal, no murmur or rub  Lungs: no sig rales   Abdomen: Soft, non-tender. + bowel sounds, no palpable organomegaly  Extremities: Without clubbing, cyanosis or edema. L AVF with bruit/thrill.  B BKA  Neurologic: Alert and oriented, cranial nerves grossly intact, moving all extremities  Skin: Warm and dry, no rash        Labs:     Recent Labs   Lab 25  0528 25  0810   WBC 9.5 9.5 8.8  --    HGB 8.9* 8.5* 9.0*  --    MCV 82.7 83.8 82.7  --    .0 332.0 337.0  --    INR  --   --   --  1.26*       Recent Labs   Lab 25  0528    140 139   K 5.9* 5.9* 3.8    104 101   CO2 16.0* 15.0*  24.0   * 141* 68*   CREATSERUM 13.73* 13.89* 8.24*   CA 9.8 9.4 9.1   MG  --  2.5  --    PHOS  --  8.2*  --    * 119* 102*       Recent Labs   Lab 04/29/25 2057   ALT 9*   AST 9   ALB 4.3       Recent Labs   Lab 05/01/25  1540 05/01/25  1747 05/01/25  1811 05/01/25  2313 05/02/25  0534   PGLU 79 67* 221* 147* 176*       Meds:   Current Hospital Medications[1]      Impression/Plan:      1) ESRD- due to longstanding DM 2.  Cont HD per usual TTHS routine     2) Acute hypoxic resp failure due to pul edema + pleural effusions / opacification of L hemithorax.  S/p thora.  Per pulm     3) Anemia- due to ESRD. JURGEN for goal hgb 10-11 gms     4) HTN- continue usual amlodipine     5)   PAD s/p remote R BKA; s/p recent L BKA on keflex / wound care       Questions/concerns were discussed with patient and/or family by bedside.    OK for home from nephrology standpoint    Ciro Perez MD  5/2/2025  11:13 AM           [1]   Current Facility-Administered Medications   Medication Dose Route Frequency    sodium chloride 0.9 % IV bolus 100 mL  100 mL Intravenous Q30 Min PRN    And    albumin human (Albumin) 25% injection 25 g  25 g Intravenous PRN Dialysis    amLODIPine (Norvasc) tab 10 mg  10 mg Oral Daily    aspirin DR tab 81 mg  81 mg Oral Daily    atorvastatin (Lipitor) tab 40 mg  40 mg Oral Nightly    calcitriol (Rocaltrol) cap 0.5 mcg  0.5 mcg Oral Once per day on Tuesday Thursday Saturday    cephALEXin (Keflex) cap 500 mg  500 mg Oral Daily    gabapentin (Neurontin) cap 300 mg  300 mg Oral Nightly    insulin degludec (Tresiba) 100 units/mL flextouch 15 Units  15 Units Subcutaneous Nightly    pantoprazole (Protonix) DR tab 40 mg  40 mg Oral BID AC    sevelamer carbonate (Renvela) tab 1,600 mg  1,600 mg Oral TID CC    tamsulosin (Flomax) cap 0.4 mg  0.4 mg Oral Nightly    metoclopramide (Reglan) 5 mg/mL injection 10 mg  10 mg Intravenous Daily PRN    glucose (Dex4) 15 GM/59ML oral liquid 15 g  15 g Oral Q15  Min PRN    Or    glucose (Glutose) 40% oral gel 15 g  15 g Oral Q15 Min PRN    Or    glucose-vitamin C (Dex-4) chewable tab 4 tablet  4 tablet Oral Q15 Min PRN    Or    dextrose 50% injection 50 mL  50 mL Intravenous Q15 Min PRN    Or    glucose (Dex4) 15 GM/59ML oral liquid 30 g  30 g Oral Q15 Min PRN    Or    glucose (Glutose) 40% oral gel 30 g  30 g Oral Q15 Min PRN    Or    glucose-vitamin C (Dex-4) chewable tab 8 tablet  8 tablet Oral Q15 Min PRN    heparin (Porcine) 5000 UNIT/ML injection 5,000 Units  5,000 Units Subcutaneous 2 times per day    acetaminophen (Tylenol Extra Strength) tab 500 mg  500 mg Oral Q4H PRN    melatonin tab 3 mg  3 mg Oral Nightly PRN    polyethylene glycol (PEG 3350) (Miralax) 17 g oral packet 17 g  17 g Oral Daily PRN    sennosides (Senokot) tab 17.2 mg  17.2 mg Oral Nightly PRN    bisacodyl (Dulcolax) 10 MG rectal suppository 10 mg  10 mg Rectal Daily PRN    ondansetron (Zofran) 4 MG/2ML injection 4 mg  4 mg Intravenous Q6H PRN    insulin aspart (NovoLOG) 100 Units/mL FlexPen 2-10 Units  2-10 Units Subcutaneous TID AC and HS    benzonatate (Tessalon) cap 200 mg  200 mg Oral TID PRN    glycerin-hypromellose- (Artificial Tears) 0.2-0.2-1 % ophthalmic solution 1 drop  1 drop Both Eyes QID PRN    sodium chloride (Saline Mist) 0.65 % nasal solution 1 spray  1 spray Each Nare Q3H PRN

## 2025-05-02 NOTE — PROGRESS NOTES
[facility]   part of [facility]     Hospitalist Progress Note     Richy Goins Patient Status:  Inpatient    1960 MRN [id number]   Location [facility] 8NE-A Attending Hussain Rivera MD   Hosp Day # 3 PCP Woody Dahl MD     Subjective:   Waiting for tap     Objective:    Review of Systems:   A comprehensive review of systems was completed; pertinent positive and negatives stated in subjective.  Vital signs:  Temp:  [97.7 °F (36.5 °C)-98.3 °F (36.8 °C)] 97.8 °F (36.6 °C)  Pulse:  [92] 92  Resp:  [18] 18  BP: (128-140)/(63-83) 128/64  SpO2:  [86 %-98 %] 98 %  Physical Exam:    General: No acute distress    Respiratory: no wheezes, no rhonchi  Cardiovascular: S1, S2, RRR  Abdomen: Soft, NT/ND, +BS  Extremities: no edema    Diagnostic Data:    Labs:  Recent Labs   Lab 25  0528 25  0810   WBC 9.5 9.5 8.8  --    HGB 8.9* 8.5* 9.0*  --    MCV 82.7 83.8 82.7  --    .0 332.0 337.0  --    INR  --   --   --  1.26*     Recent Labs   Lab 25  0528   * 119* 102*   * 141* 68*   CREATSERUM 13.73* 13.89* 8.24*   CA 9.8 9.4 9.1   ALB 4.3  --   --     140 139   K 5.9* 5.9* 3.8    104 101   CO2 16.0* 15.0* 24.0   ALKPHO 271*  --   --    AST 9  --   --    ALT 9*  --   --    BILT <0.2*  --   --    TP 7.5  --   --      Estimated Creatinine Clearance: 8.5 mL/min (A) (based on SCr of 8.24 mg/dL (H)).  Recent Labs   Lab 25  0810   PTP 15.9*   INR 1.26*        Microbiology  No results found for this visit on 25.  Imaging: Reviewed in Epic.  Medications: Scheduled Medications[1]    Assessment & Plan:    #Acute hypoxic respiratory failure 2/2 pulmonary edema and pleural effusion  #Dialysis non-compliance in ESRD  #HAGMA  #hyperkalemia  -chest xray showing near complete opacification of left hemothorax mild interstitial edema  -lactate normal   -procalcitonin 0.6 elevated suspect 2/2 renal function    -renal and pulm following  -NPO for thoracentesis today- still on O2      #anemia of chronic renal ds  -consider EPO with HD     #DM with hyperglycemia- hyperglycemia protocol    #Severe PVD s/p b/l BKAs     F/u tap and if can wean to room air  Appreciate consults  DC planning pending clinical course          Supplementary Documentation:   Quality:  DVT Mechanical Prophylaxis:   SCDs,    DVT Pharmacologic Prophylaxis   Medication    heparin (Porcine) 5000 UNIT/ML injection 5,000 Units         DVT Pharmacologic prophylaxis: Aspirin 81 mg      Code Status: Full Code  Soni: No urinary catheter in place  Soni Duration (in days):   Central line:    [place]:   At this point Mr. Goins is expected to be discharge to: tbd     The 21st Century Cures Act makes medical notes like these available to patients in the interest of transparency. Please be advised this is a medical document. Medical documents are intended to carry relevant information, facts as evident, and the clinical opinion of the practitioner. The medical note is intended as peer to peer communication and may appear blunt or direct. It is written in medical language and may contain abbreviations or verbiage that are unfamiliar.       **Certification      PHYSICIAN Certification of Need for Inpatient Hospitalization - Initial Certification    Patient will require inpatient services that will reasonably be expected to span two midnight's based on the clinical documentation in H+P.   Based on patients current state of illness, I anticipate that, after discharge, patient will require TBD.         [1]    amLODIPine  10 mg Oral Daily    aspirin  81 mg Oral Daily    atorvastatin  40 mg Oral Nightly    calcitRIOL  0.5 mcg Oral Once per day on Tuesday Thursday Saturday    cephALEXin  500 mg Oral Daily    gabapentin  300 mg Oral Nightly    insulin degludec  15 Units Subcutaneous Nightly    pantoprazole  40 mg Oral BID AC    sevelamer carbonate  1,600 mg Oral TID CC     tamsulosin  0.4 mg Oral Nightly    heparin  5,000 Units Subcutaneous 2 times per day    insulin aspart  2-10 Units Subcutaneous TID AC and HS

## 2025-05-02 NOTE — BRIEF OP NOTE
Pre-Operative Diagnosis: left hemothorax     Post-Operative Diagnosis: left hemothorax, bleeding intercostal artery     Procedure Performed:   FLEXIBLE BRONCHOSCOPY. VIDEO-ASSISTED THORACOSCOPY. HEMATOMA EVACUATION  Emergent evacuation of left hemothorax and cessation of bleeding vessel    Surgeons and Role:     * Fermin Caputo Jr., MD - Primary    Assistant(s):  PA: Beverley Brewster PA-C     Surgical Findings: an intercostal artery was seen bleeding and was cauterized     Specimen: non3     Estimated Blood Loss: Blood Output: 200 intra-op + over 1 liter of blood in the chest at the start of surgery      Fermin Caputo Jr., MD  5/2/2025  6:19 PM

## 2025-05-02 NOTE — PAYOR COMM NOTE
--------------  5/2 CONTINUED STAY REVIEW    Payor: JD GARCIA  Subscriber #:  [id number]  Authorization Number: [id number]    PULM:    Richy Goins is a(n) 64 year old male states he feels about the same  Waiting for thoracentesis     Objective:  /66 (BP Location: Right arm)   Pulse 92   Temp 98 °F (36.7 °C) (Oral)   Resp 18   Wt 163 lb 9.3 oz (74.2 kg)   SpO2 98%   BMI 24.87 kg/m² remains on room air      Physical Exam:               General: alert, cooperative, oriented.  No respiratory distress.  Seems comfortable               Head: Normocephalic, without obvious abnormality, atraumatic.               Throat: Lips, mucosa, and tongue normal.  No thrush noted.               Neck: trachea midline, no adenopathy, no thyromegaly. No JVD.  90 degrees               Lungs: Left with significant decreased breath sounds right with minimal crackles regular rate and rhythm mildly protuberant               Chest wall: No tenderness or deformity.               Heart:                Abdomen: soft, non-distended, no masses, no guarding, no                       Rebound.  Mildly protuberant               Extremity: Bilateral amputations               Skin: No rashes or lesions.               Neurological: Alert, interactive, no focal deficits      Assessment:  Recurrent left-sided pleural effusion present since January 2025-now increasing with near whiteout suspected component atelectasis as well.  Dyspnea mild hypoxia related to the above possible component pulmonary edema as well having missed dialysis  End-stage renal disease  Severe peripheral vascular disease bilateral BKA's most recently last month     Plan:  Discussed at length plan to proceed with thoracentesis therapeutic diagnostic-ongoing dialysis as per renal service  Will follow with you    IR:  Procedure: US guided left thoracentesis     Pre-Procedure Diagnosis:  left pleural effusion     Post-Procedure Diagnosis: same      Findings:   serosanguineous fluid     Specimens: 1600 ml    NURSING:    C/o of nausea and not feeling good.Alert but clammy and pale.BP-83/53,O2 sat 92% with 2l/nc.RRT called.Given IVF bolus.Dr. Rivera on bedside.Transfer to ICU.       HOSPITALIST:   Jax Hospitalist   Code/Rapid Response Note   CC: SOB, unresponsive, low BP  SUBJECTIVE: minimally responsive c/o epigastric discomfort  Answering questions initially with fluid bolus then less responsive  Initially maintaining sats     I called code blue and asked intensivist to evaluate with US  Bedside US with fluid collection     OBJECTIVE:  BP (!) 83/53 (BP Location: Right arm)   Pulse 92   Temp 97.6 °F (36.4 °C) (Oral)   Resp 16   Wt 163 lb 9.3 oz (74.2 kg)   SpO2 98%   BMI 24.87 kg/m²   Gen: critically ill, minimally responsive   Chest: diminished b/l L>R  CVS: S1, S2, tachcyardic  Abdomen: Soft, mild epigastric tenderness, Nondistended, Bowel Sounds Present  Ext: No clubbing, No cyanosis  Skin: diaphoretic  Neuro: able to HORNE by command, would try to take NRB mask off   Assessment/Plan:      RRT to Code Blue Called      # Shock- suspect hemorrhagic  # minimal consiousness  # s/p Thoracentesis with 1600 out prior to RRT- Bedside US per ICU per Intensivist suspect dense fluid collection loculated possible blood collection  ABG- hgb 6.6. on CBC was 9.0  -transfuse PRBCs  CBC  Chem  EKG- possible inferior STEMI vs 2/2 possible hemorrhagic shock  STAT ECHO   [facility] Consulted   Consider CT Chest once patient more stable   Pressors- Levo at 20 right now     Pt now in ICU   Intubated  Central line placement per Intensivist   Massive transfusion protocol      CARDS:      Reason for Consultation:  Shock     History of Present Illness:  Richy Goins is a(n) 64 year old male with chronic medical conditions including pad, htn, hld, esrd on hd, dm2, obesity who initially presented with volume overload via shortness of breath. Noted left sided pleural effusions which he has prior  hx of having. Underwent HD. Underwent thora today with 1600 ml removed from the left. Had subsequent hypotension and noted to be looking clammy and pale. S/p ivf bolus. EKG completed with st changes in the inferior leads. Patient intubated with massive transfusion protocol started. Cardiology asked to eval.    Current Facility-Administered Medications:     sodium bicarbonate 8.4 % injection, , ,     [START ON 5/3/2025] epoetin juan josé (Epogen, Procrit) 43007 UNIT/ML injection 10,000 Units, 10,000 Units, Intravenous, Once in dialysis    sodium chloride 0.9 % IV bolus 100 mL, 100 mL, Intravenous, Q30 Min PRN **AND** albumin human (Albumin) 25% injection 25 g, 25 g, Intravenous, PRN Dialysis    sodium chloride 0.9% infusion, , Intravenous, Once    ketamine (Ketalar) 50 MG/ML injection, , ,     vasopressin (Vasostrict) 20 Units in sodium chloride 0.9% 100 mL infusion for septic shock, 0.01-0.03 Units/min, Intravenous, Continuous    amLODIPine (Norvasc) tab 10 mg, 10 mg, Oral, Daily    aspirin DR tab 81 mg, 81 mg, Oral, Daily    atorvastatin (Lipitor) tab 40 mg, 40 mg, Oral, Nightly    calcitriol (Rocaltrol) cap 0.5 mcg, 0.5 mcg, Oral, Once per day on Tuesday Thursday Saturday    cephALEXin (Keflex) cap 500 mg, 500 mg, Oral, Daily    gabapentin (Neurontin) cap 300 mg, 300 mg, Oral, Nightly    insulin degludec (Tresiba) 100 units/mL flextouch 15 Units, 15 Units, Subcutaneous, Nightly    pantoprazole (Protonix) DR tab 40 mg, 40 mg, Oral, BID AC    sevelamer carbonate (Renvela) tab 1,600 mg, 1,600 mg, Oral, TID CC    tamsulosin (Flomax) cap 0.4 mg, 0.4 mg, Oral, Nightly     heparin (Porcine) 5000 UNIT/ML injection 5,000 Units, 5,000 Units, Subcutaneous, 2 times per day    insulin aspart (NovoLOG) 100 Units/mL FlexPen 2-10 Units, 2-10 Units, Subcutaneous, TID AC and HS       BP (!) 83/53 (BP Location: Right arm)   Pulse 92   Temp 97.6 °F (36.4 °C) (Oral)   Resp 16   Wt 163 lb 9.3 oz (74.2 kg)   SpO2 98%   BMI 24.87 kg/m²    Temp (24hrs), Av.9 °F (36.6 °C), Min:97.6 °F (36.4 °C), Max:98.3 °F (36.8 °C)      Physical Exam:   General: Intubated, sedated.   Cardiac: tachy, regular. No murmur.   Lungs: dim left fields  Abdomen: Soft.  Extremities: BKA b/l  Neurologic: sedated  Skin: Warm and dry.      Laboratory Data:        Lab Results   Component Value Date     CREATSERUM 5.78 2025     BUN 38 2025      2025     K 4.3 2025      2025     CO2 22.0 2025      2025     CA 8.8 2025     ALB 3.5 2025     ALKPHO 172 2025     BILT 0.2 2025     TP 6.0 2025     AST 8 2025     ALT <7 2025     PGLU 148 2025       Assessment:  Shock - suspected hemorrhagic - possible hemothorax post thora   Possible STEMI - inferior - suspect in part due to above - likely has underlying CAD given co-morbidities   Recurrent Left sided pleural effusions  ESRD   PAD w/ hx of b/l lower extremity amputations   DM2  HTN  HLD      Plan:  MTP - maintain hgb >8   Serial EKG's  Prelim stat echo at bedside - lvef 50-55%. RV size low normal with reduced rv systolic function. Left pleural base has heterogenous material noted. Given drop in hgb 9 -> 6 (on abg - cbc pending), not a candidate for lhc/angiography. Plan to stabilize the patient with transfusion and potential chest tube insertion. Unstable for CT at this time. Discussed with hospitalist and critical care service.   IV levo/vaso gtt - wean as tolerated        Vitals (last day)       Date/Time Temp Pulse Resp BP SpO2 Weight O2 Device O2 Flow Rate (L/min) Who    25 1127 97.6 °F (36.4 °C) -- 16 83/53 98 % -- Nasal cannula 2 L/min KN    25 0832 98 °F (36.7 °C) -- 18 132/66 98 % -- Nasal cannula 2 L/min     25 0353 97.8 °F (36.6 °C) -- 18 128/64 98 % 163 lb 9.3 oz (74.2 kg) Nasal cannula 2 L/min DC

## 2025-05-02 NOTE — PROGRESS NOTES
C/o of nausea and not feeling good.Alert but clammy and pale.BP-83/53,O2 sat 92% with 2l/nc.RRT called.Given IVF bolus.Dr. Rivera on bedside.Transfer to ICU.

## 2025-05-02 NOTE — CM/SW NOTE
SW spoke with pt's spouse over the phone for discharge planning. Spouse confirmed that pt is current w/ [facility] - updates/DIEGO sent in Aidin. PT anticipates returning home w/ [facility]. Spouse expressed wanting medicar transport home. Spouse also provided SW with pt's Medicare part A card information. Will give to registration to upload in pt's file.     Spouse expressed interest in transport resources for dialysis. Spouse informed Sw that they are working w/ the SW at [facility] for transportation. Resources on AVS. Emotional support provided to spouse.      &  to remain available and supportive for discharge planning needs.    Lola GREENE, LCSW  Discharge Planner

## 2025-05-02 NOTE — DISCHARGE INSTRUCTIONS
Resume home health with [facility]   Contact Info  Phone: [phone]  Fax: [phone]  Location  [address], [address]  [place], [place], [address]    -[employer]ide [employer] [place] - [phone] [email or web address]  -Ride Coy [phone] or [phone] [email or web address]  -First Transit [phone] [email or web address]    -[place] [facility] [phone]  -[facility] Transit System [phone]  -[facility] Transportation [phone] Ext. [phone]   -[facility] Pace Dial-a-Ride Service  Reservations: [phone]  -[facility] [email or web address] Bus Line at [phone]  -[facility] [employer]catucancha.[email or web address] Bus Service [phone]  -[facility] [phone].  -[facility] Transit [phone]  -[facility] [phone]   -[employer] Transportation [phone]  -Go [employer] Grandparent Transportation [phone] [email or web address]  -[employer] Transport [phone]  -Disabled on the Go [phone]  -[employer] Transfer [phone]  -[facility] Wheelchair Transport [phone]   -[employer]: [phone]  -Connections thro mobile [place] [phone]  -[employer] [phone]  -[employer] [facility] [place], [facility] [place], and [facility]. [phone] [email or web address]  -[employer] Transportation [facility] and surrounding communities [phone] [email or web address]         Thoracic Surgery Discharge Instructions     Pain Management  You will be sent home with a prescription for pain medicine.  This will likely be a narcotic pain medicine such as Oxycodone or Norco (hydrocodone/acetaminophen).  If no contraindications, start with extra strength acetaminophen (Tylenol) or ibuprofen (Advil/Motrin) scheduled, and use narcotics for breakthrough pain. Ice  packs can be helpful as well for surface level, skin incision pain.      - Take extra strength acetaminophen (Tylenol) 500 mg every 4 to 6 hours, as long as you have no known liver conditions.   - **Max dose for acetaminophen (Tylenol) is 4000 mg per day. If taking Norco, please note that each tab contains 325 mg of acetaminophen (Tylenol).  - Unless you have kidney problems, ibuprofen 600 mg every 6 hours can be used along with Tylenol for additional pain control.    Restrictions  Upon discharge home, do not get in an airplane or soak in a tub/pool until after your first follow up to see me in the office. You may shower, washing incisions gently with soap and water.    Other than that, no activity restrictions. You should attempt to be as active as possible. What ever you feel up to doing, you can do. Walking is strongly encouraged. You may drive (not within 4 hours of taking prescription pain medications).     No dietary restrictions. If taking prescription pain medications you may become constipated. Fluids, fiber and over the counter stool softeners are helpful for this.    Exercises  Do range of motion exercises twice a day with the arm on the side of your operation. The easiest is to \"walk\" your hand up the wall with your fingers. Eventually you should be able to get your arm straight up over your head.    You will be sent home with your breathing \"toys\" -- like your incentive spirometer. Use this frequently at home. 10 times per hour while awake, if possible. If watching TV, use it at every commercial break.    Follow-up Appointment  Follow up with us as needed.     If you are experiencing any of these symptoms, please call our office at [phone]. Office hours are Monday through Friday, 8 am to 4:30 pm.  If you are calling the office after hours, please call the Thoracic Surgery On-Call number [phone], and state that you are a patient from [place] or [facility].      - Persistent fevers of  101.5 or greater  - Worsening pain  - Worsening shortness of breath  - Signs of infection in your wound such as drainage, worsening of redness, swelling or     pain.  - Anything else that concerns you.

## 2025-05-02 NOTE — CONSULTS
Cardiology Consultation      Richy Goins Patient Status:  Inpatient    1960 MRN [id number]   Location [facility] 4SW-A Attending Hussain Rivera MD   Hosp Day # 3 PCP Woody Dahl MD     Reason for Consultation:  Shock    History of Present Illness:  Richy Goins is a(n) 64 year old male with chronic medical conditions including pad, htn, hld, esrd on hd, dm2, obesity who initially presented with volume overload via shortness of breath. Noted left sided pleural effusions which he has prior hx of having. Underwent HD. Underwent thora today with 1600 ml removed from the left. Had subsequent hypotension and noted to be looking clammy and pale. S/p ivf bolus. EKG completed with st changes in the inferior leads. Patient intubated with massive transfusion protocol started. Cardiology asked to eval.    History:  Past Medical History[1]  Past Surgical History[2]  Family History[3]   reports that he has never smoked. He has never used smokeless tobacco. He reports that he does not currently use alcohol. He reports that he does not use drugs.    Allergies:  Allergies[4]    Medications:  Current Hospital Medications[5]    Review of Systems:  A comprehensive review of systems was negative if not otherwise mention in above HPI.    BP (!) 83/53 (BP Location: Right arm)   Pulse 92   Temp 97.6 °F (36.4 °C) (Oral)   Resp 16   Wt 163 lb 9.3 oz (74.2 kg)   SpO2 98%   BMI 24.87 kg/m²   Temp (24hrs), Av.9 °F (36.6 °C), Min:97.6 °F (36.4 °C), Max:98.3 °F (36.8 °C)       Intake/Output Summary (Last 24 hours) at 2025 1333  Last data filed at 2025 2230  Gross per 24 hour   Intake --   Output 2000 ml   Net -2000 ml     Wt Readings from Last 3 Encounters:   25 163 lb 9.3 oz (74.2 kg)   25 170 lb (77.1 kg)   10/02/24 181 lb (82.1 kg)       Physical Exam:   General: Intubated, sedated.   Cardiac: tachy, regular. No murmur.   Lungs: dim left fields  Abdomen: Soft.  Extremities: BKA  b/l  Neurologic: sedated  Skin: Warm and dry.     Laboratory Data:  Lab Results   Component Value Date    CREATSERUM 5.78 05/02/2025    BUN 38 05/02/2025     05/02/2025    K 4.3 05/02/2025     05/02/2025    CO2 22.0 05/02/2025     05/02/2025    CA 8.8 05/02/2025    ALB 3.5 05/02/2025    ALKPHO 172 05/02/2025    BILT 0.2 05/02/2025    TP 6.0 05/02/2025    AST 8 05/02/2025    ALT <7 05/02/2025    PGLU 148 05/02/2025       Assessment:  Shock - suspected hemorrhagic - possible hemothorax post thora   Possible STEMI - inferior - suspect in part due to above - likely has underlying CAD given co-morbidities   Recurrent Left sided pleural effusions  ESRD   PAD w/ hx of b/l lower extremity amputations   DM2  HTN  HLD      Plan:  MTP - maintain hgb >8   Prelim stat echo at bedside - lvef 50-55%. RV size low normal with reduced rv systolic function. Left pleural base has heterogenous material noted. Given drop in hgb 9 -> 6 (on abg - cbc pending), not a candidate for lhc/angiography. Plan to stabilize the patient with transfusion and potential chest tube insertion. Unstable for CT at this time. Discussed with hospitalist and critical care service.   Serial EKG's  IV levo/vaso gtt - wean as tolerated  Further recs to follow         Thank you for allowing me to participate in the care of your patient.      Kuldip Ibrahim DO  Cardiologist  [facility]  5/2/2025 1:33 PM    CCT 90 min.     Note to the patient: The 21st Century Cures Act makes medical notes like these available to patients in the interest of transparency. However, be advised that this is a medical document. It is intended as peer to peer communication. It is written in medical language and may contain abbreviations or verbiage that are unfamiliar. It may appear blunt or direct. Medical documents are intended to carry relevant information, facts as evident, and clinical opinion of the practitioner.     Disclaimer: Components of  this note were documented using voice recognition system and are subject to errors not corrected at proofreading. Contact the author of this note for any clarifications.          [1]   Past Medical History:   Belching    Dialysis patient    Diarrhea, unspecified    Esophageal reflux    Essential hypertension    Fatigue    Flatulence/gas pain/belching    High blood pressure    High cholesterol    History of blood transfusion    Hyperlipidemia    Indigestion    Irregular bowel habits    Night sweats    Osteoporosis    Peripheral vascular disease    Pure hypercholesterolemia    Type II or unspecified type diabetes mellitus without mention of complication, not stated as uncontrolled    Visual impairment    reading glasses    Vomiting   [2]   Past Surgical History:  Procedure Laterality Date    Amputation toe,i-p jt Right     Av fistula revision, open      Below knee leg amputation Right     Colonoscopy N/A 02/20/2023    Procedure: COLONOSCOPY;  Surgeon: Sarmad Gonzalez MD;  Location:  ENDOSCOPY    Colonoscopy      Upper gi endoscopy,exam     [3]   Family History  Problem Relation Age of Onset    Heart Attack Father     Heart Disorder Father 57    Diabetes Maternal Grandfather     Diabetes Maternal Aunt    [4]   Allergies  Allergen Reactions    Zosyn [Piperacillin-Tazobactam In D5w] RASH     Developed diffuse erythroderma 1 day after starting zosyn   [5]   Current Facility-Administered Medications:     sodium bicarbonate 8.4 % injection, , ,     [START ON 5/3/2025] epoetin juan josé (Epogen, Procrit) 10984 UNIT/ML injection 10,000 Units, 10,000 Units, Intravenous, Once in dialysis    sodium chloride 0.9 % IV bolus 100 mL, 100 mL, Intravenous, Q30 Min PRN **AND** albumin human (Albumin) 25% injection 25 g, 25 g, Intravenous, PRN Dialysis    sodium chloride 0.9% infusion, , Intravenous, Once    ketamine (Ketalar) 50 MG/ML injection, , ,     vasopressin (Vasostrict) 20 Units in sodium chloride 0.9% 100 mL infusion for septic  shock, 0.01-0.03 Units/min, Intravenous, Continuous    amLODIPine (Norvasc) tab 10 mg, 10 mg, Oral, Daily    aspirin DR tab 81 mg, 81 mg, Oral, Daily    atorvastatin (Lipitor) tab 40 mg, 40 mg, Oral, Nightly    calcitriol (Rocaltrol) cap 0.5 mcg, 0.5 mcg, Oral, Once per day on Tuesday Thursday Saturday    cephALEXin (Keflex) cap 500 mg, 500 mg, Oral, Daily    gabapentin (Neurontin) cap 300 mg, 300 mg, Oral, Nightly    insulin degludec (Tresiba) 100 units/mL flextouch 15 Units, 15 Units, Subcutaneous, Nightly    pantoprazole (Protonix) DR tab 40 mg, 40 mg, Oral, BID AC    sevelamer carbonate (Renvela) tab 1,600 mg, 1,600 mg, Oral, TID CC    tamsulosin (Flomax) cap 0.4 mg, 0.4 mg, Oral, Nightly    metoclopramide (Reglan) 5 mg/mL injection 10 mg, 10 mg, Intravenous, Daily PRN    glucose (Dex4) 15 GM/59ML oral liquid 15 g, 15 g, Oral, Q15 Min PRN **OR** glucose (Glutose) 40% oral gel 15 g, 15 g, Oral, Q15 Min PRN **OR** glucose-vitamin C (Dex-4) chewable tab 4 tablet, 4 tablet, Oral, Q15 Min PRN **OR** dextrose 50% injection 50 mL, 50 mL, Intravenous, Q15 Min PRN **OR** glucose (Dex4) 15 GM/59ML oral liquid 30 g, 30 g, Oral, Q15 Min PRN **OR** glucose (Glutose) 40% oral gel 30 g, 30 g, Oral, Q15 Min PRN **OR** glucose-vitamin C (Dex-4) chewable tab 8 tablet, 8 tablet, Oral, Q15 Min PRN    heparin (Porcine) 5000 UNIT/ML injection 5,000 Units, 5,000 Units, Subcutaneous, 2 times per day    acetaminophen (Tylenol Extra Strength) tab 500 mg, 500 mg, Oral, Q4H PRN    melatonin tab 3 mg, 3 mg, Oral, Nightly PRN    polyethylene glycol (PEG 3350) (Miralax) 17 g oral packet 17 g, 17 g, Oral, Daily PRN    sennosides (Senokot) tab 17.2 mg, 17.2 mg, Oral, Nightly PRN    bisacodyl (Dulcolax) 10 MG rectal suppository 10 mg, 10 mg, Rectal, Daily PRN    ondansetron (Zofran) 4 MG/2ML injection 4 mg, 4 mg, Intravenous, Q6H PRN    insulin aspart (NovoLOG) 100 Units/mL FlexPen 2-10 Units, 2-10 Units, Subcutaneous, TID AC and HS     benzonatate (Tessalon) cap 200 mg, 200 mg, Oral, TID PRN    glycerin-hypromellose- (Artificial Tears) 0.2-0.2-1 % ophthalmic solution 1 drop, 1 drop, Both Eyes, QID PRN    sodium chloride (Saline Mist) 0.65 % nasal solution 1 spray, 1 spray, Each Nare, Q3H PRN

## 2025-05-02 NOTE — PROCEDURES
[facility]    Richy WALDEN Buster Patient Status:  Inpatient    1960 MRN [id number]   Location [facility] 4SW-A Attending Hussain Rivera MD   Hosp Day # 3 PCP Woody Dahl MD       CVC trialysis Procedure Note    Procedure        : Right internal jugular vein triple lumen catheter with ultrasound guidance  Indication          : Vasopressors, shock  Consent           : unable to be obtained, emergency  Timeout           : performed at bedside  (s)     :  JAMEL Ralph, proctored by / Per Jaffe  Complications : none  EBL                 : 0        Consent unable to be obtained due to emergent nature of procedure.    Timeout performed at bedside. Patient prepped and draped in sterile fashion, and pre-medicated as above. The right internal jugular vein was visualized by ultrasound, and distinguished from the adjacent artery as being easily compressible. The right internal jugular vein was seen to be entered by the 18g introducer needle under direct ultrasound guidance with 1 skin break(s). Venous location was verified by return of dark, non-pulsatile blood return. A 7fr triple lumen catheter was advanced by modified seldinger technique. All ports flush and draw easily.    No obvious complications noted.  Chest x-ray is pending.    JAMEL Ralph  Critical Care NP  [id number]

## 2025-05-02 NOTE — ANESTHESIA PREPROCEDURE EVALUATION
PRE-OP EVALUATION    Patient Name: Richy Goins    Admit Diagnosis: Hyperkalemia [E87.5]  Pleural effusion [J90]  Anemia, unspecified type [D64.9]  Acute hypoxic respiratory failure (HCC) [J96.01]    Pre-op Diagnosis: hemothorax    FLEXIBLE BRONCHOSCOPY. VIDEO-ASSISTED THORACOSCOPY. HEMATOMA EVACUATION    Anesthesia Procedure: IR ANGIOGRAM  FLEXIBLE BRONCHOSCOPY. VIDEO-ASSISTED THORACOSCOPY. HEMATOMA EVACUATION (Left)    Surgeons and Role:     * Patito Esquivel, Fermin CANO MD - Primary    Pre-op vitals reviewed.  Temp: 97.1 °F (36.2 °C)  Pulse: 111  Resp: 17  BP: 136/74  AO: 153/76  FiO2 (%): 100 %  SpO2: 100 %  Body mass index is 24.87 kg/m².    Current medications reviewed.  Hospital Medications:  Current Medications[1]    Outpatient Medications:   Prescriptions Prior to Admission[2]    Allergies: Zosyn [piperacillin-tazobactam in d5w]      Anesthesia Evaluation    Patient summary reviewed.    Anesthetic Complications  (-) history of anesthetic complications         GI/Hepatic/Renal      (+) GERD       (+) chronic renal disease and ESRD                   Cardiovascular        Exercise tolerance: good     MET: >4      (+) hypertension   (+) hyperlipidemia                                  Endo/Other      (+) diabetes and poorly controlled, type 2, using insulin      (+) anemia                   Pulmonary    Negative pulmonary ROS.                       Neuro/Psych    Negative neuro/psych ROS.                                  Past Surgical History[3]  Social Hx on file[4]  History   Drug Use Unknown     Available pre-op labs reviewed.  Lab Results   Component Value Date    WBC 19.7 (H) 05/02/2025    RBC 4.55 05/02/2025    HGB 13.0 05/02/2025    HCT 38.2 (L) 05/02/2025    MCV 84.0 05/02/2025    MCH 28.6 05/02/2025    MCHC 34.0 05/02/2025    RDW 14.8 05/02/2025    .0 05/02/2025     Lab Results   Component Value Date     (H) 05/02/2025    K 3.2 (L) 05/02/2025     05/02/2025    CO2 27.0 05/02/2025     BUN 39 (H) 05/02/2025    CREATSERUM 4.69 (H) 05/02/2025     (H) 05/02/2025    CA 9.5 05/02/2025     Lab Results   Component Value Date    INR 1.38 (H) 05/02/2025         Airway  Comment: intubated           Cardiovascular    Cardiovascular exam normal.         Dental    Dentition appears grossly intact         Pulmonary            (+) decreased breath sounds         Other findings              ASA: 4 and emergent  Plan: general  NPO status verified and patient meets guidelines.    Post-procedure pain management plan discussed with surgeon and patient.      Plan/risks discussed with: patient and Other  Use of blood product(s) discussed with: Other              Present on Admission:   Elevated procalcitonin   Azotemia   Essential hypertension             [1]    [Transfer Hold] epoetin juan josé (Epogen, Procrit) 45384 UNIT/ML injection 10,000 Units  10,000 Units Intravenous Once in dialysis    [Transfer Hold] sodium chloride 0.9 % IV bolus 100 mL  100 mL Intravenous Q30 Min PRN    And    [Transfer Hold] albumin human (Albumin) 25% injection 25 g  25 g Intravenous PRN Dialysis    [COMPLETED] sodium chloride 0.9% infusion   Intravenous Once    [COMPLETED] rocuronium (Zemuron) 50 mg/5mL injection        ketamine (Ketalar) 50 MG/ML injection        vasopressin (Vasostrict) 20 Units in sodium chloride 0.9% 100 mL infusion for septic shock  0.01-0.03 Units/min Intravenous Continuous    propofol (Diprivan) 10 MG/ML injection        [COMPLETED] calcium gluconate 2g in 100mL iso-NaCl IVPB premix  2 g Intravenous Once    [Transfer Hold] acetaminophen (Tylenol) 160 MG/5ML oral liquid 650 mg  650 mg Per NG Tube Q4H PRN    Or    [Transfer Hold] acetaminophen (Tylenol) rectal suppository 650 mg  650 mg Rectal Q4H PRN    Or    [Transfer Hold] acetaminophen (Ofirmev) 10 mg/mL infusion premix 1,000 mg  1,000 mg Intravenous Q6H PRN    [Transfer Hold] fentaNYL (Sublimaze) 50 mcg BOLUS FROM BAG infusion  50 mcg Intravenous Once PRN     And    [Transfer Hold] fentaNYL in sodium chloride 0.9% (Sublimaze) 1000 mcg/100mL infusion premix   mcg/hr Intravenous Continuous PRN    [Transfer Hold] fentaNYL (Sublimaze) 25 mcg BOLUS FROM BAG infusion  25 mcg Intravenous Q30 Min PRN    [Transfer Hold] senna (Senokot) 8.8 MG/5ML oral syrup 17.6 mg  10 mL Per NG Tube BID    [Transfer Hold] docusate (Colace) 50 MG/5ML oral solution 100 mg  100 mg Per NG Tube BID    [Transfer Hold] polyethylene glycol (PEG 3350) (Miralax) 17 g oral packet 17 g  17 g Per NG Tube Daily PRN    [Transfer Hold] bisacodyl (Dulcolax) 10 MG rectal suppository 10 mg  10 mg Rectal Daily PRN    [Transfer Hold] chlorhexidine gluconate (Peridex) 0.12 % oral solution 15 mL  15 mL Mouth/Throat BID@0800,2000    [Transfer Hold] mineral oil-white petrolatum (Artificial Tears) 83-15 % ophthalmic ointment 1 Application  1 Application Both Eyes Nightly    propofol (Diprivan) 10 mg/mL infusion premix  5-50 mcg/kg/min (Dosing Weight) Intravenous Continuous    [COMPLETED] fentaNYL (Sublimaze) 50 mcg/mL injection        amiodarone in dextrose 4.14% (Cordarone) 360 mg/200mL infusion premix  1 mg/min Intravenous Continuous    Followed by    amiodarone in dextrose 4.14% (Cordarone) 360 mg/200mL infusion premix  0.5 mg/min Intravenous Continuous    [COMPLETED] tranexamic acid in sodium chloride 0.7% (Cyklokapron) 1000 mg/100mL infusion premix 1,000 mg  1,000 mg Intravenous Once    norepinephrine (Levophed) 4 mg/250mL infusion premix        [COMPLETED] fentaNYL (Sublimaze) 50 mcg/mL injection        [COMPLETED] heparin (Porcine) 5000 UNIT/ML injection        [Transfer Hold] pantoprazole (Protonix) 40 mg in sodium chloride 0.9% PF 10 mL IV push  40 mg Intravenous BID    norepinephrine (Levophed) 4 mg/250mL infusion premix  0.5-50 mcg/min Intravenous Continuous    [COMPLETED] sodium bicarbonate injection 100 mEq  100 mEq Intravenous Once    [COMPLETED] sodium bicarbonate injection 100 mEq  100 mEq  Intravenous Once    [COMPLETED] sodium bicarbonate injection 50 mEq  50 mEq Intravenous Once    [COMPLETED] etomidate (Amidate) 2 mg/mL injection 22.2 mg  0.3 mg/kg Intravenous Once    [COMPLETED] calcium gluconate 1 g in sodium chloride 0.9% 100 mL IVPB  1 g Intravenous Once    [Held by provider] amLODIPine (Norvasc) tab 10 mg  10 mg Oral Daily    [Held by provider] aspirin DR tab 81 mg  81 mg Oral Daily    [Transfer Hold] atorvastatin (Lipitor) tab 40 mg  40 mg Oral Nightly    [Transfer Hold] calcitriol (Rocaltrol) cap 0.5 mcg  0.5 mcg Oral Once per day on     cephALEXin (Keflex) cap 500 mg  500 mg Oral Daily    [Transfer Hold] gabapentin (Neurontin) cap 300 mg  300 mg Oral Nightly    [Transfer Hold] insulin degludec (Tresiba) 100 units/mL flextouch 15 Units  15 Units Subcutaneous Nightly    [Held by provider] pantoprazole (Protonix) DR tab 40 mg  40 mg Oral BID AC    [Transfer Hold] sevelamer carbonate (Renvela) tab 1,600 mg  1,600 mg Oral TID CC    [Transfer Hold] tamsulosin (Flomax) cap 0.4 mg  0.4 mg Oral Nightly    [COMPLETED] epoetin juan josé (Epogen, Procrit) 69968 UNIT/ML injection 10,000 Units  10,000 Units Intravenous Once    [Transfer Hold] metoclopramide (Reglan) 5 mg/mL injection 10 mg  10 mg Intravenous Daily PRN    [] sodium chloride 0.9 % IV bolus 100 mL  100 mL Intravenous Q30 Min PRN    And    [] albumin human (Albumin) 25% injection 25 g  25 g Intravenous PRN Dialysis    [COMPLETED] sodium zirconium cyclosilicate (Lokelma) oral packet 10 g  10 g Oral Once    [Transfer Hold] glucose (Dex4) 15 GM/59ML oral liquid 15 g  15 g Oral Q15 Min PRN    Or    [Transfer Hold] glucose (Glutose) 40% oral gel 15 g  15 g Oral Q15 Min PRN    Or    [Transfer Hold] glucose-vitamin C (Dex-4) chewable tab 4 tablet  4 tablet Oral Q15 Min PRN    Or    [Transfer Hold] dextrose 50% injection 50 mL  50 mL Intravenous Q15 Min PRN    Or    [Transfer Hold] glucose (Dex4) 15 GM/59ML oral  liquid 30 g  30 g Oral Q15 Min PRN    Or    [Transfer Hold] glucose (Glutose) 40% oral gel 30 g  30 g Oral Q15 Min PRN    Or    [Transfer Hold] glucose-vitamin C (Dex-4) chewable tab 8 tablet  8 tablet Oral Q15 Min PRN    [Transfer Hold] melatonin tab 3 mg  3 mg Oral Nightly PRN    [Transfer Hold] sennosides (Senokot) tab 17.2 mg  17.2 mg Oral Nightly PRN    [Transfer Hold] ondansetron (Zofran) 4 MG/2ML injection 4 mg  4 mg Intravenous Q6H PRN    [Transfer Hold] insulin aspart (NovoLOG) 100 Units/mL FlexPen 2-10 Units  2-10 Units Subcutaneous TID AC and HS    [Transfer Hold] benzonatate (Tessalon) cap 200 mg  200 mg Oral TID PRN    [Transfer Hold] glycerin-hypromellose- (Artificial Tears) 0.2-0.2-1 % ophthalmic solution 1 drop  1 drop Both Eyes QID PRN    [Transfer Hold] sodium chloride (Saline Mist) 0.65 % nasal solution 1 spray  1 spray Each Nare Q3H PRN   [2]   Medications Prior to Admission   Medication Sig Dispense Refill Last Dose/Taking    tamsulosin 0.4 MG Oral Cap Take 1 capsule (0.4 mg total) by mouth nightly. TAKE AT BEDTIME   4/28/2025 at  9:00 PM    cephALEXin 500 MG Oral Cap Take 1 capsule (500 mg total) by mouth daily. Take 1 Capsule every evening for 14 days   4/28/2025 at  9:00 PM    sevelamer carbonate 800 MG Oral Tab Take 2 tablets (1,600 mg total) by mouth 3 (three) times daily with meals.   4/29/2025 Noon    calcitRIOL 0.5 MCG Oral Cap Take 1 capsule (0.5 mcg total) by mouth See Admin Instructions. Every Tuesday, Thursday, and Saturday 4/29/2025 at  7:00 AM    gabapentin 300 MG Oral Cap Take 1 capsule (300 mg total) by mouth nightly. TAKE AT BEDTIME   4/29/2025    atorvastatin 40 MG Oral Tab Take 1 tablet (40 mg total) by mouth nightly. TAKE AT BEDTIME   4/28/2025 at  9:00 PM    Insulin Glargine-yfgn 100 UNIT/ML Subcutaneous Solution Pen-injector Inject 15 Units into the skin nightly. 15 mL 0 4/28/2025 at  9:00 PM    insulin detemir (LEVEMIR FLEXPEN) 100 UNIT/ML Subcutaneous Solution  Pen-injector Inject 15 Units into the skin nightly.   4/28/2025 at  9:00 PM    pantoprazole 40 MG Oral Tab EC Take one tablet (40 mg total) by mouth once daily, 30 minutes prior to breakfast. (Patient taking differently: 2 (two) times daily before meals. Take one tablet (40 mg total) by mouth twice daily, 30 minutes prior to breakfast.) 90 tablet 3 4/29/2025 at  7:00 AM    aspirin 81 MG Oral Tab EC Take 1 tablet (81 mg total) by mouth in the morning.   4/29/2025 at  7:00 AM    amLODIPine 10 MG Oral Tab Take 1 tablet (10 mg total) by mouth daily. 30 tablet 1 4/29/2025 at  7:00 AM    Insulin Pen Needle (BD PEN NEEDLE AMBER 2ND GEN) 32G X 4 MM Does not apply Misc Use to inject insulin up to 5 times daily 450 each 1    [3]   Past Surgical History:  Procedure Laterality Date    Amputation toe,i-p jt Right     Av fistula revision, open      Below knee leg amputation Right     Colonoscopy N/A 02/20/2023    Procedure: COLONOSCOPY;  Surgeon: Sarmad Gonzalez MD;  Location:  ENDOSCOPY    Colonoscopy      Upper gi endoscopy,exam     [4]   Social History  Socioeconomic History    Marital status:    Tobacco Use    Smoking status: Never    Smokeless tobacco: Never    Tobacco comments:     none   Vaping Use    Vaping status: Never Used   Substance and Sexual Activity    Alcohol use: Not Currently     Comment: none    Drug use: Never    Sexual activity: Yes     Partners: Female

## 2025-05-03 ENCOUNTER — APPOINTMENT (OUTPATIENT)
Dept: GENERAL RADIOLOGY | Facility: HOSPITAL | Age: 65
End: 2025-05-03
Attending: INTERNAL MEDICINE
Payer: COMMERCIAL

## 2025-05-03 ENCOUNTER — APPOINTMENT (OUTPATIENT)
Dept: GENERAL RADIOLOGY | Facility: HOSPITAL | Age: 65
End: 2025-05-03
Payer: COMMERCIAL

## 2025-05-03 PROBLEM — J96.01 ACUTE HYPOXIC RESPIRATORY FAILURE (HCC): Status: ACTIVE | Noted: 2025-05-03

## 2025-05-03 LAB
ALBUMIN SERPL-MCNC: 3 G/DL (ref 3.2–4.8)
ALBUMIN/GLOB SERPL: 2 {RATIO} (ref 1–2)
ALP LIVER SERPL-CCNC: 94 U/L (ref 45–117)
ALT SERPL-CCNC: 12 U/L (ref 10–49)
ANION GAP SERPL CALC-SCNC: 12 MMOL/L (ref 0–18)
ANION GAP SERPL CALC-SCNC: 15 MMOL/L (ref 0–18)
AST SERPL-CCNC: 55 U/L (ref ?–34)
ATRIAL RATE: 64 BPM
ATRIAL RATE: 84 BPM
BASE EXCESS BLDA CALC-SCNC: 3.6 MMOL/L (ref ?–2)
BASOPHILS # BLD AUTO: 0.04 X10(3) UL (ref 0–0.2)
BASOPHILS NFR BLD AUTO: 0.4 %
BILIRUB SERPL-MCNC: 0.3 MG/DL (ref 0.2–1.1)
BLOOD TYPE BARCODE: 1700
BLOOD TYPE BARCODE: 5100
BLOOD TYPE BARCODE: 7300
BODY TEMPERATURE: 98.6 F
BUN BLD-MCNC: 20 MG/DL (ref 9–23)
BUN BLD-MCNC: 41 MG/DL (ref 9–23)
CA-I BLD-SCNC: 1.1 MMOL/L (ref 0.95–1.32)
CALCIUM BLD-MCNC: 8 MG/DL (ref 8.7–10.6)
CALCIUM BLD-MCNC: 8.3 MG/DL (ref 8.7–10.6)
CHLORIDE SERPL-SCNC: 102 MMOL/L (ref 98–112)
CHLORIDE SERPL-SCNC: 108 MMOL/L (ref 98–112)
CO2 SERPL-SCNC: 23 MMOL/L (ref 21–32)
CO2 SERPL-SCNC: 26 MMOL/L (ref 21–32)
COHGB MFR BLD: 2 % SAT (ref 0–3)
CREAT BLD-MCNC: 3.12 MG/DL (ref 0.7–1.3)
CREAT BLD-MCNC: 5.13 MG/DL (ref 0.7–1.3)
CREAT BLD-MCNC: 5.13 MG/DL (ref 0.7–1.3)
EGFRCR SERPLBLD CKD-EPI 2021: 12 ML/MIN/1.73M2 (ref 60–?)
EGFRCR SERPLBLD CKD-EPI 2021: 12 ML/MIN/1.73M2 (ref 60–?)
EGFRCR SERPLBLD CKD-EPI 2021: 21 ML/MIN/1.73M2 (ref 60–?)
EOSINOPHIL # BLD AUTO: 0.03 X10(3) UL (ref 0–0.7)
EOSINOPHIL NFR BLD AUTO: 0.3 %
ERYTHROCYTE [DISTWIDTH] IN BLOOD BY AUTOMATED COUNT: 14.9 %
FIO2: 40 %
GLOBULIN PLAS-MCNC: 1.5 G/DL (ref 2–3.5)
GLUCOSE BLD-MCNC: 122 MG/DL (ref 70–99)
GLUCOSE BLD-MCNC: 150 MG/DL (ref 70–99)
GLUCOSE BLD-MCNC: 184 MG/DL (ref 70–99)
GLUCOSE BLD-MCNC: 232 MG/DL (ref 70–99)
GLUCOSE BLD-MCNC: 88 MG/DL (ref 70–99)
GLUCOSE BLD-MCNC: 91 MG/DL (ref 70–99)
HCO3 BLDA-SCNC: 27.7 MEQ/L (ref 21–27)
HCT VFR BLD AUTO: 38 % (ref 39–53)
HGB BLD-MCNC: 13.5 G/DL (ref 13–17.5)
HGB BLD-MCNC: 13.9 G/DL (ref 13–17.5)
IMM GRANULOCYTES # BLD AUTO: 0.05 X10(3) UL (ref 0–1)
IMM GRANULOCYTES NFR BLD: 0.5 %
INR BLD: 1.33 (ref 0.8–1.2)
LACTATE BLD-SCNC: 1.4 MMOL/L (ref 0.5–2)
LDH SERPL L TO P-CCNC: 313 U/L (ref 120–246)
LYMPHOCYTES # BLD AUTO: 1.09 X10(3) UL (ref 1–4)
LYMPHOCYTES NFR BLD AUTO: 10.6 %
MAGNESIUM SERPL-MCNC: 2.1 MG/DL (ref 1.6–2.6)
MCH RBC QN AUTO: 29.2 PG (ref 26–34)
MCHC RBC AUTO-ENTMCNC: 35.5 G/DL (ref 31–37)
MCV RBC AUTO: 82.3 FL (ref 80–100)
METHGB MFR BLD: 0.4 % SAT (ref 0.4–1.5)
MONOCYTES # BLD AUTO: 0.73 X10(3) UL (ref 0.1–1)
MONOCYTES NFR BLD AUTO: 7.1 %
NEUTROPHILS # BLD AUTO: 8.31 X10 (3) UL (ref 1.5–7.7)
NEUTROPHILS # BLD AUTO: 8.31 X10(3) UL (ref 1.5–7.7)
NEUTROPHILS NFR BLD AUTO: 81.1 %
OSMOLALITY SERPL CALC.SUM OF ELEC: 295 MOSM/KG (ref 275–295)
OSMOLALITY SERPL CALC.SUM OF ELEC: 320 MOSM/KG (ref 275–295)
OXYHGB MFR BLDA: 97.6 % (ref 92–100)
P AXIS: 66 DEGREES
P AXIS: 75 DEGREES
P-R INTERVAL: 160 MS
P-R INTERVAL: 162 MS
PCO2 BLDA: 25 MM HG (ref 35–45)
PEEP: 5 CM H2O
PH BLDA: 7.59 [PH] (ref 7.35–7.45)
PLATELET # BLD AUTO: 174 10(3)UL (ref 150–450)
PO2 BLDA: 117 MM HG (ref 80–100)
POTASSIUM BLD-SCNC: 3.9 MMOL/L (ref 3.6–5.1)
POTASSIUM SERPL-SCNC: 3.4 MMOL/L (ref 3.5–5.1)
POTASSIUM SERPL-SCNC: 3.8 MMOL/L (ref 3.5–5.1)
PROT SERPL-MCNC: 4.5 G/DL (ref 5.7–8.2)
PROTHROMBIN TIME: 16.6 SECONDS (ref 11.6–14.8)
Q-T INTERVAL: 452 MS
Q-T INTERVAL: 550 MS
QRS DURATION: 122 MS
QRS DURATION: 128 MS
QTC CALCULATION (BEZET): 534 MS
QTC CALCULATION (BEZET): 567 MS
R AXIS: -51 DEGREES
R AXIS: -88 DEGREES
RBC # BLD AUTO: 4.62 X10(6)UL (ref 3.8–5.8)
SODIUM BLD-SCNC: 138 MMOL/L (ref 135–145)
SODIUM SERPL-SCNC: 140 MMOL/L (ref 136–145)
SODIUM SERPL-SCNC: 146 MMOL/L (ref 136–145)
T AXIS: -71 DEGREES
T AXIS: 92 DEGREES
TIDAL VOLUME: 450 ML
UNIT VOLUME: 122 ML
UNIT VOLUME: 198 ML
UNIT VOLUME: 201 ML
UNIT VOLUME: 205 ML
UNIT VOLUME: 301 ML
UNIT VOLUME: 350 ML
VENT RATE: 26 /MIN
VENTRICULAR RATE: 64 BPM
VENTRICULAR RATE: 84 BPM
WBC # BLD AUTO: 10.3 X10(3) UL (ref 4–11)

## 2025-05-03 PROCEDURE — 99232 SBSQ HOSP IP/OBS MODERATE 35: CPT | Performed by: INTERNAL MEDICINE

## 2025-05-03 PROCEDURE — 99291 CRITICAL CARE FIRST HOUR: CPT | Performed by: EMERGENCY MEDICINE

## 2025-05-03 PROCEDURE — 71045 X-RAY EXAM CHEST 1 VIEW: CPT

## 2025-05-03 PROCEDURE — 30233J1 TRANSFUSION OF NONAUTOLOGOUS SERUM ALBUMIN INTO PERIPHERAL VEIN, PERCUTANEOUS APPROACH: ICD-10-PCS | Performed by: INTERNAL MEDICINE

## 2025-05-03 PROCEDURE — 99291 CRITICAL CARE FIRST HOUR: CPT | Performed by: INTERNAL MEDICINE

## 2025-05-03 PROCEDURE — 71045 X-RAY EXAM CHEST 1 VIEW: CPT | Performed by: INTERNAL MEDICINE

## 2025-05-03 PROCEDURE — 99233 SBSQ HOSP IP/OBS HIGH 50: CPT | Performed by: HOSPITALIST

## 2025-05-03 RX ORDER — ALBUMIN (HUMAN) 12.5 G/50ML
25 SOLUTION INTRAVENOUS
Status: DISPENSED | OUTPATIENT
Start: 2025-05-03 | End: 2025-05-05

## 2025-05-03 RX ORDER — HEPARIN SODIUM 1000 [USP'U]/ML
1.5 INJECTION, SOLUTION INTRAVENOUS; SUBCUTANEOUS
Status: COMPLETED | OUTPATIENT
Start: 2025-05-03 | End: 2025-05-03

## 2025-05-03 RX ORDER — SODIUM BICARBONATE 650 MG/1
650 TABLET ORAL AS NEEDED
Status: DISCONTINUED | OUTPATIENT
Start: 2025-05-03 | End: 2025-05-07 | Stop reason: ALTCHOICE

## 2025-05-03 RX ORDER — DEXMEDETOMIDINE HYDROCHLORIDE 4 UG/ML
INJECTION, SOLUTION INTRAVENOUS CONTINUOUS
Status: DISCONTINUED | OUTPATIENT
Start: 2025-05-03 | End: 2025-05-05

## 2025-05-03 NOTE — PROGRESS NOTES
Care assumed at change of shift, patient intubated and sedated to maintain RASS 0 to -1. Propofol transitioned to precedex, noted with increasing agitation/restlessness requiring PRN fentanyl boluses per order. Levo for SBP > 90 or MAP > 65. Patient with non-purposeful movement, not following commands, poorly redirectable. Plan for SBT tomorrow if appropriate. Wife at bedside and updated on plan of care. See MAR and flowsheets for additional documentation details.

## 2025-05-03 NOTE — PROGRESS NOTES
[facility] Pharmacy Note:    Epoetin Hold Per Policy    Richy Goins is a 64 year old year old patient who is being prescribed epoetin x 1 dose today for anemia due to renal failure.      Lab Results   Component Value Date/Time    HGB 13.5 05/03/2025 04:27 AM       Hold Epoetin due to hemoglobin >/= 12 gm/dL per approved P&T protocol.  Pharmacy will discontinue the Epoetin x 1 dose order given hemoglobin is below this threshold.    Marybel Bran, PharmD  5/3/2025  7:45 AM  [facility]: [phone]

## 2025-05-03 NOTE — PROGRESS NOTES
Thoracic Surgery Progress Note     Richy Goins is a 64 year old male. MRN [id number]. Admitted 2025    SUBJECTIVE/24H EVENTS:     No acute events overnight. On dialysis via RIJ catheter this morning, minimal vent settings, remains on levo.       Objective:     VITALS:     Temp (24hrs), Av.5 °F (36.4 °C), Min:97 °F (36.1 °C), Max:98.1 °F (36.7 °C)   BP 98/54   Pulse 56   Temp 98.1 °F (36.7 °C) (Temporal)   Resp 16   Wt 168 lb 6.9 oz (76.4 kg)   SpO2 100%   BMI 25.61 kg/m²     EXAM:   General: NAD  Heart: sinus bradycardia  Lungs: ventialted, Equal chest rise bilaterally  Incisions: c/d/i   Chest tube:   Air Leak: No   Output: serosanguineous   24 hour: 600 (370cc since midnight)  Suction: yes  Abdomen: Soft, Non-tender, non-distended.  Extremities: No clubbing or cyanosis. No lateralizing weakness  Neuro: no focal defects  Psych:  oriented to person place and time, normal mood and affect      Intake/Output Summary (Last 24 hours) at 5/3/2025 0909  Last data filed at 5/3/2025 0630  Gross per 24 hour   Intake 89634.85 ml   Output 1530 ml   Net 9080.85 ml         MEDS:  Scheduled Medications[1]    LABS:  Lab Results   Component Value Date    WBC 10.3 2025    HGB 13.5 2025    HCT 38.0 2025    .0 2025    CREATSERUM 5.13 2025    CREATSERUM 5.13 2025    BUN 41 2025     2025    K 3.8 2025     2025    CO2 23.0 2025     2025    CA 8.0 2025    ALB 3.0 2025    ALKPHO 94 2025    BILT 0.3 2025    TP 4.5 2025    AST 55 2025    ALT 12 2025    PTT 28.4 2025    INR 1.33 2025    MG 1.5 2025    PHOS 4.8 2025    TROPHS 258 2025         Assessment/Plan:     64 year old male PMH ESRD, HTN, DM, PAD, HLD who was admitted for acute hypoxic respiratory failure secondary to volume overload and left pleural effusion s/p IR thoracentesis (25) complicated  by acute hemorrhage due to intercostal artery injury now POD 1 from left VATS washout and control of intercostal bleeding    -Chest tube to remain to -20 until 5/5  -Daily CXR  -wean sedation and ventilator  -rest per ICU    Petra Lemon MD  Thoracic Surgery               [1]    heparin  1.5 mL Intracatheter Once    insulin aspart  2-10 Units Subcutaneous q6h    senna  10 mL Per NG Tube BID    docusate  100 mg Per NG Tube BID    chlorhexidine gluconate  15 mL Mouth/Throat BID@0800,2000    mineral oil-white petrolatum  1 Application Both Eyes Nightly    pantoprazole  40 mg Intravenous BID    Vancomycin IV  1 each Intravenous See Admin Instructions (RX holding)    cephALEXin  500 mg Oral Daily    gabapentin  300 mg Oral Nightly    insulin degludec  15 Units Subcutaneous Nightly    sevelamer carbonate  1,600 mg Oral TID CC

## 2025-05-03 NOTE — DIETARY NOTE
[facility]   part of [facility]    NUTRITION ASSESSMENT    Pt does not meet malnutrition criteria at this time.    NUTRITION INTERVENTION:    Meal and Snacks - Monitor and encourage adequate PO intake when diet is advanced.   Medical Food Supplements - Will evaluate need when diet is advanced. Rationale/use for oral supplements discussed.  Enteral Nutrition - Via NG Recommend starting Vital AF 1.2 at 10 mL/hr advancing 10 mL/hr q 4 hours to goal rate of 50 mL/hr.   This will provide 1440 kcal, 90 grams protein, 972 mL total free water, and 96% of RDI's.   Recommend 100 mL water flush q 4 hours, TF+FWF provides 1572 mL total fluids.   Coordination of Nutrition Care - SLP consult prior to diet advancement.    PATIENT STATUS: 05/03/25 RD consulted for Tubefeeding recommendations. Pt admitted with respiratory failure hemothorax.  Pt is intubated and is on Levo. Propofol was discontinued. Pt receiving HD on T/TH/Sat.  Pt with left BKA.     PMH: ESRD on HD, DM, HTN, PAD, HLD.     ANTHROPOMETRICS:  Ht:  172.7 cm (5'8\")  Wt: 76.4 kg (168 lb 6.9 oz).   BMI: Body mass index is 25.61 kg/m².  IBW: 66 kg(including -5.9% for BKA)      WEIGHT HISTORY:   Weight loss: No- stable since amputation.    Wt Readings from Last 10 Encounters:   05/03/25 76.4 kg (168 lb 6.9 oz)   01/08/25 77.1 kg (170 lb)   10/02/24 82.1 kg (181 lb)   08/19/24 87.2 kg (192 lb 3.2 oz)   07/24/24 88.9 kg (196 lb)   05/29/24 79.8 kg (176 lb)   05/03/24 80.7 kg (177 lb 14.4 oz)   04/17/24 77.1 kg (170 lb)   03/29/24 76.2 kg (168 lb 1.6 oz)   03/15/24 83 kg (183 lb)        NUTRITION:  Diet:       Procedures    NPO      Food Allergies: No  Cultural/Ethnic/[place] Preferences Addressed: Yes    Percent Meals Eaten (last 3 days)       Date/Time Percent Meals Eaten (%)    04/30/25 0917 100 %    04/30/25 2100 60 %            GI system review: WNL Last BM Date: 05/01/25  Skin and wounds: Yes, Left knee and left upper flank    NUTRITION RELATED PHYSICAL  FINDINGS:     1. Body Fat/Muscle Mass: no wasting noted  and well nourished     2. Fluid Accumulation: none    NUTRITION PRESCRIPTION:  76 kg Actual Body Weight  Calories: 1437 calories/day ([place]; MV:2 L/min, Temp:36.7 C)  Protein:  grams protein/day (1.0-1.5 grams protein per kg)  Fluid: ~1 ml/kcal or per MD discretion    NUTRITION DIAGNOSIS/PROBLEM:  Inadequate oral intake related to respiratory process or complication of therapy which results in mechanical ventilation as evidenced by need for nutrition support to meet estimated needs      MONITOR AND EVALUATE/NUTRITION GOALS:  Weight stable within 1 to 2 lbs during admission - New  Start alternative nutrition in 24-48 hrs if diet is not able to advance- New  Tolerate alternative nutrition at 100% of goal - New      MEDICATIONS:  Reviewed    LABS:  Reviewed    Pt is at Moderate nutrition risk      Deisi Betancur MS RD LDN  [phone]

## 2025-05-03 NOTE — PROGRESS NOTES
CRITICAL CARE PROGRESS NOTE    Patient Name: Richy Goins  : 1960  MRN: [id number]  Admit Date: 2025  Length of Stay: 4 Days    Subjective/24h events: Patient received 15 units of blood 5 of FFP 2 platelets yesterday 3 A of calcium chloride  Went to the operating room for intercostal bleed iatrogenic  Now on minimal vent settings on low-dose norepinephrine 3 mcg scheduled for HD this morning    Assessment and Plan: 64-year-old male multiple comorbidities admitted with respiratory failure hemothorax    Neuro/Psych: No previous encephalopathy or delirium currently on fentanyl 25 propofol 15 after dialysis would like to start mitigating sedation for spontaneous breathing trial      Cardiovascular: Mild shock yesterday secondary to hypovolemic from massive hemothorax right now on low-dose norepinephrine bedside echo low normal EF although right side appears to be hypofunctional with IVC that still plethoric official echo reviewed yesterday  Received massive volume yesterday will take off volume with HD 1 to 2 L hopefully    A-fib with RVR presumptively reactive now with bradycardia.  Amiodarone no known history of atrial fibrillation either way not a candidate for anticoagulation    Pulmonary: Respiratory failure intubated yesterday due to both respiratory failure type I as well as hemorrhagic shock SBT today after dialysis  Chest tube to low intermittent suction appreciate thoracic surgery recommendations    GI: Surprisingly no ischemic hepatopathy continue to monitor  GI prophylaxis  Start tube feeds especially if not ready to extubate    Renal/Metabolic: End-stage renal disease  will get dialysis this morning nephrology following mild hyponatremia compensate with free water    Heme: Anemia of chronic disease then significant blood loss with antonino in the 6 range now status post massive transfusion 15 units of blood 5 of FFP 2 platelets 3 of calcium chloride  Hold DVT  prophylaxis for now    ID: No fever no leukocytosis can hold off antibiotics for now    Endocrine: History of diabetes mellitus continue sliding scale    Peripheral vascular occlusive disease status post bilateral amputations      ICU checklist  Feeding: Tube feeds today  Analgesia: Fentanyl drip  Sedation: Propofol  Thromboembolism PPX: Hold  Stress Ulcer PPX: PPI  Glucose control: Sliding scale  Bowel Regimen:   Lines/drains/tubes: Right IJ dialysis catheter radial arterial line  Deescalation of antibiotics:   Therapies:PT/OT/ST when appropriate    Code Status: Full Code          Physical Exam      Hemodynamics  24h Vitals:  VitalLast Value (24 Hour)24 Hour Range  Temp98.1 °F (36.7 °C)Temp  Min: 97 °F (36.1 °C)  Max: 98.1 °F (36.7 °C)  [id number]  Min: 56  Max: 163  BP (Non-Invasive)98/54 BP  Min: 83/53  Max: 139/82  BP (A-Line)102/50AO  Min: 88/56  Max: 250/106  CVP  could not be evaluated. This SmartLink does not work with rows of the type:   [id number]  Min: 12  Max: 29  [id number] %SpO2  Min: 74 %  Max: 100 %  O2 Therapy       Upon my evaluation, this patient had a high probability of imminent or life-threatening deterioration due to shock, renal failure, respiratory failure, and unexpected postoperative complications, which required my direct attention, intervention, and personal management.    I have personally provided 44 minutes of critical care time, exclusive of time spent on separately billable procedures.  Time includes review of all pertinent laboratory/radiology results, discussion with consultants, and monitoring for potential decompensation.  Performed interventions included pressor drugs and managing mechanical ventilation.

## 2025-05-03 NOTE — PROGRESS NOTES
[facility]   part of [facility]     Hospitalist Progress Note     Richy Goins Patient Status:  Inpatient    1960 MRN [id number]   Location [facility] 4SW-A Attending Jeremi Norwood MD   Hosp Day # 4 PCP Woody Dahl MD     Chief Complaint: resp failure     Subjective:     Patient is on vent. Easily awakens    Objective:    Review of Systems:   Limited as on vent    Vital signs:  Temp:  [97 °F (36.1 °C)-98.1 °F (36.7 °C)] 97.4 °F (36.3 °C)  Pulse:  [] 74  Resp:  [12-29] 18  BP: ()/(52-82) 93/52  SpO2:  [74 %-100 %] 98 %  AO: ()/() 100/49  FiO2 (%):  [30 %-100 %] 30 %    Physical Exam:    General: No acute distress, mild agitaiton  Respiratory: dec AE  Cardiovascular: S1, S2.  Abdomen: Soft  Neuro: No new focal deficits      Diagnostic Data:    Labs:  Recent Labs   Lab 25  0810 25  1305 25  1448 25  1449 25  0427   WBC  --  14.5* 19.7*  --  14.4* 14.4* 10.3   HGB  --  6.0* 13.0  --  13.6 13.6 13.5   MCV  --  83.5 84.0  --  84.5 84.7 82.3   PLT  --  330.0 217.0  --  182.0 166.0 174.0   INR 1.26*  --   --  1.38*  --   --  1.33*       Recent Labs   Lab 25  1448 25  18025  0427   * 306* 333* 232*   BUN 39* 35* 35* 41*   CREATSERUM 4.69* 4.24* 4.63* 5.13*  5.13*   CA 9.5 7.6* 7.9*  7.9* 8.0*   ALB 3.2 2.8*  --  3.0*   * 147* 147* 146*   K 3.2* 3.6 3.8 3.8    109 107 108   CO2 27.0 24.0 25.0 23.0   ALKPHO 144* 94  --  94   AST 22 50*  --  55*   ALT 13 17  --  12   BILT 0.8 0.8  --  0.3   TP 5.3* 4.7*  --  4.5*       Estimated Glomerular Filtration Rate: 12 mL/min/1.73m2 (A) (result from lab).     Recent Labs   Lab 25  1231 25  1305   TROPHS 213* 258*       Recent Labs   Lab 25  0810 25  1449 25  0427   PTP 15.9* 17.1* 16.6*   INR 1.26* 1.38* 1.33*              Imaging: Imaging data reviewed in Epic.    Medications: Scheduled  Medications[1]    Assessment & Plan:     #Acute Left Intrathoracic hemorrhage 2/2 intercostal artery injury  S/p L VATS washout and control of intercostal bleeding  CT in place    #Acute hypoxic resp failure  On vent  Possible extubation soon    #Hemorrhagic shock  On pressors, wean as able    #Acute blood loss anemia  S/p massive transfusion protocol  Monitor Hgb  No s/s of further bleeding    #Cardiac arrest  Due to above  ROSC successful  EKG abnormal  Echo noted  Ischemic w/u deferred as likely reactive to above    #Leukocytosis  Likely reactive  No s/s of active infection  On PPX ancef/vanco    #Left Hemithorax  Likely due to ESRD  Has been recurrent  S/p thora 5/1    #ESRD  Cont. HD    #PAD w/ hx of b/l LE amputations    #DM  Elevated as stress reaction  Cont. Insulin and adjust as needed    #HTN  BP meds on hold    #HLD      Dispo: as above.  Discussed with crit care.  Possible extubation soon.  Weaning pressors      Jeremi Norwood MD    Supplementary Documentation:     Quality:    DVT Mechanical Prophylaxis:   SCDs,    DVT Pharmacologic Prophylaxis   Medication   None                Code Status: Full Code  Soni: No urinary catheter in place  Soni Duration (in days):   Central line:    [place]:       Discharge is dependent on: clinical stability  At this point Mr. Goins is expected to be discharge to: home    The 21st Century Cures Act makes medical notes like these available to patients in the interest of transparency. Please be advised this is a medical document. Medical documents are intended to carry relevant information, facts as evident, and the clinical opinion of the practitioner. The medical note is intended as peer to peer communication and may appear blunt or direct. It is written in medical language and may contain abbreviations or verbiage that are unfamiliar.                       [1]    insulin aspart  2-10 Units Subcutaneous q6h    ceFAZolin  1 g Intravenous Q24H    senna  10 mL Per NG Tube  BID    docusate  100 mg Per NG Tube BID    chlorhexidine gluconate  15 mL Mouth/Throat BID@0800,2000    mineral oil-white petrolatum  1 Application Both Eyes Nightly    pantoprazole  40 mg Intravenous BID    Vancomycin IV  1 each Intravenous See Admin Instructions (RX holding)    gabapentin  300 mg Oral Nightly    insulin degludec  15 Units Subcutaneous Nightly    sevelamer carbonate  1,600 mg Oral TID CC

## 2025-05-03 NOTE — PHYSICAL THERAPY NOTE
Due to pt's significant change in medical status and currently on ventilator, pt not appropriate for PT at this time. Will discharge PT. Please place new PT order if PT needs arise in the future. RN aware.

## 2025-05-03 NOTE — PROGRESS NOTES
[facility] Pharmacy Dosing Service      Initial Pharmacokinetic Consult for Vancomycin Dosing     Richy Goins is a 64 year old male who is being initiated on vancomycin therapy for cellulitis.  Pharmacy has been asked to dose vancomycin by Dr. Jaffe.  The initial treatment and monitoring approach will be non-AUC strategy.        Weight and Temperature:    Wt Readings from Last 1 Encounters:   25 74.2 kg (163 lb 9.3 oz)        Temp Readings from Last 1 Encounters:   25 97.5 °F (36.4 °C) (Temporal)      Labs:   Recent Labs   Lab 25  1231 25  1305 25  1448   CREATSERUM 5.78* 5.57* 4.69*      Estimated Creatinine Clearance: 15 mL/min (A) (based on SCr of 4.69 mg/dL (H)).     Recent Labs   Lab 25  1305 25  1448 25  1837   WBC 14.5* 19.7* 14.4*          The Pharmacokinetic Target is:    Trough/random 15-20 mg/L (applies to IV dosing in HD and IP dosing in APD)    Renal Dosing Considerations:    HD: Home regimen: Tues/Thurs/Sat     Assessment/Plan:   Initial/Loading dose: Will receive 2000 mg IV (25 mg/kg, capped at 2250 mg) x 1 loading dose.      Maintenance dose: Pharmacy will dose vancomycin at 750 mg IV after each dialysis    Monitorin) Plan for vancomycin trough to be obtained prior to the 3rd HD session    2) Pharmacy will order SCr as clinically indicated to assess renal function.    3) Pharmacy will monitor for toxicity and efficacy, adjust vancomycin dose and/or frequency, and order vancomycin levels as appropriate per the Pharmacy and Therapeutics Committee approved protocol until discontinuation of the medication.       We appreciate the opportunity to assist in the care of this patient.     Jessy Kaur, PharmD  2025  7:00 PM  [facility] Extension: [phone]

## 2025-05-03 NOTE — PROGRESS NOTES
[facility]  Progress Note    Richy Goins Patient Status:  Inpatient    1960 MRN [id number]   Location [facility] 4SW-A Attending Jeremi Norwood MD   Hosp Day # 4 PCP Woody Dahl MD     Subjective: Intubated and sedated, awakening his eyes when called by name.  More stable after emergent hemostasis of intercostal artery last p.m.    Vasopressin and Levophed at 7 mcg/min this morning.    Left-sided chest tube in situ clearing and was more bloody yesterday    Getting dialysis.    Wife at the bedside.    Tele: Sinus, no A-fib, occasional PVCs    Objective:  BP 98/54   Pulse 56   Temp 98.1 °F (36.7 °C) (Temporal)   Resp 16   Wt 168 lb 6.9 oz (76.4 kg)   SpO2 100%   BMI 25.61 kg/m²     Temp (24hrs), Av.5 °F (36.4 °C), Min:97 °F (36.1 °C), Max:98.1 °F (36.7 °C)      Intake/Output:      Intake/Output Summary (Last 24 hours) at 5/3/2025 1039  Last data filed at 5/3/2025 0630  Gross per 24 hour   Intake 71839.85 ml   Output 1530 ml   Net 9080.85 ml       Wt Readings from Last 3 Encounters:   25 168 lb 6.9 oz (76.4 kg)   25 170 lb (77.1 kg)   10/02/24 181 lb (82.1 kg)       Physical Exam:    Constitutional: Intubated and sedated  Eyes: Moist conjunctivae, PERRLA.  Ears, Nose, Mouth:  Moist mucosa. Anicteric sclera.  Head and Neck: No JVD, carotids 2+ no bruits. Neck supple. No thyromegaly or adenopathy. Normocephalic head.  Cardiovascular: Regular rate and rhythm, S1, S2 normal, no pathological murmur, rub or gallop.  Respiratory: Decreased air entry with few basal crackles.  Gastrointestinal: Soft, non-tender abdomen.   Extremities: No edema and status post bilateral leg amputation below the knee level.  Neurologic: Intubated and sedated  Musculoskeletal: PAD bilateral below knee amputations   Integumentary: Warm and dry skin. No rashes.    Left-sided chest tube in situ.    Laboratory/Data:    Labs:       Recent Labs   Lab 25  0810 25  1305 25  1443  05/02/25 1449 05/02/25 1804 05/02/25 1837 05/03/25 0427   WBC  --  14.5* 19.7*  --  14.4* 14.4* 10.3   HGB  --  6.0* 13.0  --  13.6 13.6 13.5   MCV  --  83.5 84.0  --  84.5 84.7 82.3   PLT  --  330.0 217.0  --  182.0 166.0 174.0   INR 1.26*  --   --  1.38*  --   --  1.33*       Recent Labs   Lab 04/30/25  0458 05/01/25  0528 05/02/25  1305 05/02/25 1448 05/02/25 1804 05/02/25 1837 05/03/25 0427      < > 149* 149* 147* 147* 146*   K 5.9*   < > 3.2* 3.2* 3.6 3.8 3.8      < > 105 105 109 107 108   CO2 15.0*   < > 26.0 27.0 24.0 25.0 23.0   *   < > 40* 39* 35* 35* 41*   CREATSERUM 13.89*   < > 5.57* 4.69* 4.24* 4.63* 5.13*  5.13*   CA 9.4   < > 9.2 9.5 7.6* 7.9*  7.9* 8.0*   MG 2.5  --   --  1.7 1.5*  --   --    PHOS 8.2*  --   --  4.2 4.8  --   --    *   < > 244* 289* 306* 333* 232*    < > = values in this interval not displayed.       Recent Labs   Lab 05/02/25  1231 05/02/25  1305 05/02/25 1448 05/02/25 1804 05/03/25 0427   ALT <7* <7* 13 17 12   AST 8 10 22 50* 55*   ALB 3.5 3.3 3.2 2.8* 3.0*   LDH  --   --   --   --  313*         Medications:  Current Facility-Administered Medications   Medication Dose Route Frequency    sodium chloride 0.9 % IV bolus 100 mL  100 mL Intravenous Q30 Min PRN    And    albumin human (Albumin) 25% injection 25 g  25 g Intravenous PRN Dialysis    heparin (Porcine) 1000 UNIT/ML injection 1,500 Units  1.5 mL Intracatheter Once    insulin aspart (NovoLOG) 100 Units/mL FlexPen 2-10 Units  2-10 Units Subcutaneous q6h    sodium bicarbonate tab 650 mg  650 mg Per G Tube PRN    And    pancrelipase (Lip-Prot-Amyl) (Zenpep) DR particles cap 10,000 Units  10,000 Units Per G Tube PRN    sodium chloride 0.9 % IV bolus 100 mL  100 mL Intravenous Q30 Min PRN    And    albumin human (Albumin) 25% injection 25 g  25 g Intravenous PRN Dialysis    [Transfer Hold] acetaminophen (Tylenol) 160 MG/5ML oral liquid 650 mg  650 mg Per NG Tube Q4H PRN    Or    [Transfer  Hold] acetaminophen (Tylenol) rectal suppository 650 mg  650 mg Rectal Q4H PRN    Or    [Transfer Hold] acetaminophen (Ofirmev) 10 mg/mL infusion premix 1,000 mg  1,000 mg Intravenous Q6H PRN    fentaNYL in sodium chloride 0.9% (Sublimaze) 1000 mcg/100mL infusion premix   mcg/hr Intravenous Continuous PRN    fentaNYL (Sublimaze) 25 mcg BOLUS FROM BAG infusion  25 mcg Intravenous Q30 Min PRN    senna (Senokot) 8.8 MG/5ML oral syrup 17.6 mg  10 mL Per NG Tube BID    docusate (Colace) 50 MG/5ML oral solution 100 mg  100 mg Per NG Tube BID    polyethylene glycol (PEG 3350) (Miralax) 17 g oral packet 17 g  17 g Per NG Tube Daily PRN    chlorhexidine gluconate (Peridex) 0.12 % oral solution 15 mL  15 mL Mouth/Throat BID@0800,2000    mineral oil-white petrolatum (Artificial Tears) 83-15 % ophthalmic ointment 1 Application  1 Application Both Eyes Nightly    propofol (Diprivan) 10 mg/mL infusion premix  5-50 mcg/kg/min (Dosing Weight) Intravenous Continuous    pantoprazole (Protonix) 40 mg in sodium chloride 0.9% PF 10 mL IV push  40 mg Intravenous BID    norepinephrine (Levophed) 4 mg/250mL infusion premix  0.5-50 mcg/min Intravenous Continuous    Vancomycin: PHARMACY DOSING  1 each Intravenous See Admin Instructions (RX holding)    cephALEXin (Keflex) cap 500 mg  500 mg Oral Daily    gabapentin (Neurontin) cap 300 mg  300 mg Oral Nightly    insulin degludec (Tresiba) 100 units/mL flextouch 15 Units  15 Units Subcutaneous Nightly    sevelamer carbonate (Renvela) tab 1,600 mg  1,600 mg Oral TID CC    glucose (Dex4) 15 GM/59ML oral liquid 15 g  15 g Oral Q15 Min PRN    Or    glucose (Glutose) 40% oral gel 15 g  15 g Oral Q15 Min PRN    Or    glucose-vitamin C (Dex-4) chewable tab 4 tablet  4 tablet Oral Q15 Min PRN    Or    dextrose 50% injection 50 mL  50 mL Intravenous Q15 Min PRN    Or    glucose (Dex4) 15 GM/59ML oral liquid 30 g  30 g Oral Q15 Min PRN    Or    glucose (Glutose) 40% oral gel 30 g  30 g Oral Q15 Min  PRN    Or    glucose-vitamin C (Dex-4) chewable tab 8 tablet  8 tablet Oral Q15 Min PRN    sennosides (Senokot) tab 17.2 mg  17.2 mg Oral Nightly PRN    ondansetron (Zofran) 4 MG/2ML injection 4 mg  4 mg Intravenous Q6H PRN    [Transfer Hold] glycerin-hypromellose- (Artificial Tears) 0.2-0.2-1 % ophthalmic solution 1 drop  1 drop Both Eyes QID PRN       Allergies:  Allergies   Allergen Reactions    Zosyn [Piperacillin-Tazobactam In D5w] RASH     Developed diffuse erythroderma 1 day after starting zosyn         Assessment:  S/p emergent video-assisted thoracoscopy with hematoma evacuation and hemostasis of bleeding intercostal artery causing left hemothorax -day #1 -hemodynamically more stable.  Shock - suspected hemorrhagic -due to hemithorax post thoracentesis -1.6 L of pleural effusion was drained on admission.  On levo and vasopressin besides blood products yesterday.  Weaned down to Levophed low rate 3 mics  Profound anemia with multiple red blood cell blood transfusions prior to going to the OR on 5/2.  Got 15 units of red blood cell ands FFP's with platelets.  Acute respiratory insufficiency with hypoxia -intubated and sedated  Abnormal EKG with nonspecific ST elevation due to hemorrhagic shock rather than primarily coronary event- suspect in part due to above -may have coronary disease with underlying comorbidities  Stable sinus and no A-fib.  Occasional PVCs.  Amio was initiated prophylactically but was causing sinus bradycardia was discontinued.  Initially presented with fluid overload and recurrent Left sided pleural effusions -admitted with shortness of breath and hypoxia -dialysis schedule per renal.  ESRD -on dialysis  PAD w/ hx of b/l lower extremity amputations -stable issue  DM2 - on insulin sliding scale  Hypertension blood was hypotensive from bleeding in home BP meds on hold, currently on levo low-dose  HLD - on statin.    Last EKG this morning -sinus 84 bpm with right bundle branch block  and nonspecific changes but no acute ischemia.  PVCs.    No A-fib in Telemetry, stable sinus    Echo Doppler-LV systolic function is preserved with EF 50-55%. RV size low normal with reduced rv systolic function and no significant valvular abnormalities.    Lexiscan nuclear stress test -January 2025 -fixed defect in inferior wall but no ischemia.     Important labs: Creatinine 5.1 BUN 41 potassium 3.8 sodium 146 glucose 232 AST 55 ALT 12 troponin 258 proBNP 10,000 INR 1.3 hemoglobin 13.5 and platelet count 174     Plan:  Close CCU monitoring  Transfuse blood products based on threshold numbers -much more stable today  No need for ischemic workup now and focus on chest issues recovery and medical stabilization with the patient -Lexiscan nuclear stress was negative for ischemia January 2025  Stable sinus and no A-fib during this admission.  Patient was put prophylactically on amiodarone but developed sinus bradycardia.  It was discontinued.  Wean off Levophed as able.  Off vasopressin.  Chest tube management per thoracic surgery  Follow chest x-ray  Vent management per critical care-possible extubation later today  Follow hemoglobin, platelets and metabolic panel  Dialysis today    Discussed with patient's wife and ICU nursing staff at the bedside    Gino Ordoñez M.D., F.A.C.C.  [facility] [place]    5/3/2025  10:39 AM  F/u3

## 2025-05-03 NOTE — PROGRESS NOTES
[facility]  Progress Note    Richy Goins Patient Status:  Inpatient    1960 MRN [id number]   Location [facility] 4SW-A Attending Jeremi Norwood MD   Hosp Day # 4 PCP Woody Dahl MD     Subjective:  Richy Goins is a(n) 64 year old male remains afebrile  Currently agitated following some commands  On and off Levophed overnight currently slightly increased as he was now on dialysis  Minimal chest tube output    Objective:  BP 98/54   Pulse 56   Temp 98.1 °F (36.7 °C) (Temporal)   Resp 16   Wt 168 lb 6.9 oz (76.4 kg)   SpO2 100%   BMI 25.61 kg/m² weaned to 40%      Temp (24hrs), Av.5 °F (36.4 °C), Min:97 °F (36.1 °C), Max:98.1 °F (36.7 °C)      Intake/Output:    Intake/Output Summary (Last 24 hours) at 5/3/2025 1021  Last data filed at 5/3/2025 0630  Gross per 24 hour   Intake 44761.85 ml   Output 1530 ml   Net 9080.85 ml       Physical Exam:   General: Sedated agitated purposeful movement occasionally follows commands   Head: Normocephalic, without obvious abnormality, atraumatic.   Throat: Lips, mucosa, and tongue normal.  No thrush noted.  ET tube   Neck: trachea midline, no adenopathy, no thyromegaly.  No subcu air   Lungs: Diminished tones left greater than right no subcu air no wheezes noted   Chest wall: No tenderness or deformity.   Heart: Regular rate and rhythm   Abdomen: soft, non-distended, no masses, no guarding, no     Rebound.  Mildly protuberant nontender bilateral   Extremity: Bilateral amputation  wound on the left with eschar   Skin: No rashes or lesions.   Neurological: Sedated but awakening    Lab Data Review:  Recent Labs     25  1305 25  1448 25  1804 25  1837 25  0427   WBC 14.5* 19.7* 14.4* 14.4* 10.3   HGB 6.0* 13.0 13.6 13.6 13.5   .0 217.0 182.0 166.0 174.0     Recent Labs     25  1305 25  1448 25  1804 25  1837 25  0427   * 149* 147* 147* 146*   K 3.2* 3.2* 3.6 3.8 3.8    105  109 107 108   CO2 26.0 27.0 24.0 25.0 23.0   BUN 40* 39* 35* 35* 41*   CREATSERUM 5.57* 4.69* 4.24* 4.63* 5.13*  5.13*     Recent Labs   Lab 05/01/25  0810 05/02/25  1449 05/02/25  1837 05/03/25  0427   PTP 15.9* 17.1*  --  16.6*   INR 1.26* 1.38*  --  1.33*   PTT  --   --  28.4  --        Cultures: Cultures pending    Radiology:  XR CHEST AP PORTABLE  (CPT=71045)  Result Date: 5/3/2025  CONCLUSION:  1. Overall no significant change in the appearance of the portable chest radiograph in the short interval since the prior study. 2. Lucency left lateral lung base is most likely a pneumothorax related incomplete expansion of lung (trapped lung).   LOCATION:  [facility]      Dictated by (CST): Camden Deleon MD on 5/03/2025 at 6:32 AM     Finalized by (CST): Camden Deleon MD on 5/03/2025 at 6:34 AM       XR CHEST AP PORTABLE  (CPT=71045)  Result Date: 5/2/2025  CONCLUSION:  Placement of a large bore left chest tube.  Evacuation of the left pleural fluid/hemo thorax.  Incomplete re-expansion of the left lung with a mild to moderate basilar pneumothorax.   LOCATION:  [facility]      Dictated by (CST): Rob Reyes MD on 5/02/2025 at 6:55 PM     Finalized by (CST): Rob Reyes MD on 5/02/2025 at 6:58 PM       XR CHEST AP PORTABLE  (CPT=71045)  Result Date: 5/2/2025  CONCLUSION:  A portion of left mid lung is now aerated.  Other findings appear stable.   LOCATION:  [facility]      Dictated by (CST): Rob Reyes MD on 5/02/2025 at 5:34 PM     Finalized by (CST): Rob Reyes MD on 5/02/2025 at 5:36 PM       XR CHEST AP PORTABLE  (CPT=71045)  Result Date: 5/2/2025  CONCLUSION:  Complete opacification left hemithorax with shift of mediastinal structures from left to right.  Finding was discussed with the ICU RN caring for the patient on May 2, 2025 at 2:30 p.m..  By this time a chest tube have been placed.  Findings  would be consistent with a large hemo thorax.   LOCATION:  [facility]      Dictated by (CST): Camden Deleon MD on 5/02/2025 at  2:25 PM     Finalized by (CST): Camden Deleon MD on 5/02/2025 at 2:30 PM       US THORACENTESIS GUIDED LEFT (CPT=32555)  Result Date: 5/2/2025  CONCLUSION:  Ultrasound-guided left thoracentesis of 1600 cc was performed without complication.   LOCATION:  [facility]    Dictated by (CST): Oskar Lynn MD on 5/02/2025 at 1:33 PM     Finalized by (CST): Oskar Lynn MD on 5/02/2025 at 1:34 PM       XR CHEST AP PORTABLE  (CPT=71045)  Result Date: 5/2/2025  CONCLUSION:  Decreased size of left pleural effusion without pneumothorax.  There continues to be significant amount of opacity within the left hemithorax.   LOCATION:  [facility]      Dictated by (CST): Yoan Duvall MD on 5/02/2025 at 12:14 PM     Finalized by (CST): Yoan Duavll MD on 5/02/2025 at 12:15 PM       Chest x-ray reviewed  Remains with suspected trapped lung pleural separation on the left at the base  Slight increase in diffuse markings on the right  Slight increase on left upper lobe markings      Medications reviewed     Assessment and Plan:   Problem List[1]    Assessment:  Hemorrhagic shock secondary to hemothorax-minimal pressor needs questionalby related to sedation  Intrathoracic bleed following thoracentesis now status post intercostal artery  collateralization-no further bleeding  Recurrent left-sided pleural effusion present since January 2025-now increasing with near whiteout suspected component atelectasis prompting thoracentesis-now with evidence of trapped lung  Possible inferior wall STEMI cardiology note appreciated-with changes thought related to severity of shock-last nuclear 1/25 with fixed defect in the inferior wall no reversibility  Dyspnea mild hypoxia on presentation related to large left pleural effusion  End-stage renal disease  Severe peripheral vascular disease bilateral BKA's most recently last month     Plan:  Continuing to wean FiO2/pressors  Plan to wean sedation when off [id number] for possible ability to begin  spontaneous breathing trials  Dialysis today  Discussed at length with the patient's wife at bedside      CC     Delilah Easley MD  5/3/2025  10:21 AM         [1]   Patient Active Problem List  Diagnosis    Diabetic nephropathy associated with type 2 diabetes mellitus (HCC)    Essential hypertension    Type 2 diabetes mellitus with hyperglycemia (HCC)    Stage 3 chronic kidney disease (HCC)    Retinopathy due to secondary diabetes mellitus (HCC)    Azotemia    PAD (peripheral artery disease)    Stage 4 chronic kidney disease (HCC)    Acidosis    ESRD (end stage renal disease) (HCC)    ESRD on hemodialysis (HCC)    Pure hypercholesterolemia    Hemoptysis    Hypertensive urgency    Hematemesis with nausea    Hematemesis, unspecified whether nausea present    Acute gastritis with hemorrhage, unspecified gastritis type    Uremia    Acquired absence of right leg below knee (HCC)    Chronic kidney disease, stage 4 (severe) (HCC)    End-stage renal disease on hemodialysis (HCC)    Osteomyelitis of right foot (HCC)    Dyslipidemia    Diabetes mellitus type 2 in nonobese (HCC)    Unilateral complete BKA, right, sequela (HCC)    Type 2 diabetes mellitus with diabetic peripheral angiopathy without gangrene (HCC)    Type 2 diabetes mellitus with diabetic chronic kidney disease (HCC)    Long term (current) use of aspirin    Hypertensive chronic kidney disease w stg 1-4/unsp chr kdny    Encounter for orthopedic aftercare following surgical amputation    Anemia in chronic kidney disease    Age-related osteoporosis without current pathological fracture    Acute kidney failure, unspecified    CKD (chronic kidney disease), stage III (HCC)    Type 1 diabetes mellitus with ketoacidosis without coma (HCC)    Senile purpura    Tinnitus, bilateral    Hypernatremia    Anemia    Acute kidney injury    Acute renal failure (ARF)    Metabolic acidosis    Hyperglycemia    Nausea    Elevated lipase    Elevated procalcitonin    Pleural effusion     Anemia in ESRD (end-stage renal disease) (HCC)    Hyperphosphatemia    Dyspnea on exertion    Ischemic foot    Gangrene of toe of left foot (HCC)    Hyperkalemia    Acute respiratory failure with hypoxia (HCC)    Anemia, unspecified type    Shock (HCC)    ABLA (acute blood loss anemia)    Confusion    Hemothorax on left

## 2025-05-03 NOTE — PLAN OF CARE
Received pt at change of shift intubated and sedated on propofol and fentanyl gtts.  Able to wean FiO2 to 30%.  Minimal secretions.  Lungs coarse.  Weaning sedation as pt not following commands at this time.  Chest tube to suction with cherry colored drainage. Weaned off vaso, remains on low dose levo and amio gtt.  Afeb.  VSS.  Unable to find bruit and thrill to L AVF.  Has R internal jugular hemodialysis cath.  Incontinent of urine x1.  Will have HD this morning.  See flowsheet for details.

## 2025-05-03 NOTE — RESPIRATORY THERAPY NOTE
Current Mechanical Ventilator Settings:  - Mode: VC+  - Rate: 16  - Tidal Volume(mL):450  - FiO2: 30%  - PEEP 5  - inspiratory time 0.8    Breath Sounds: Clear    ETT Size: 8 @ 25    Measured Parameters:  Peak Inspiratory Pressure(cmH2O): 21  Plateu Pressure(cmH2O) : 8.6  Tidal Volume(mL): 464  Rate:16    Shift Events noted: Pt remain stable on ventilator with above setting. Unable to do SBT due to pt getting dialysis.

## 2025-05-04 ENCOUNTER — APPOINTMENT (OUTPATIENT)
Dept: GENERAL RADIOLOGY | Facility: HOSPITAL | Age: 65
End: 2025-05-04
Attending: INTERNAL MEDICINE
Payer: COMMERCIAL

## 2025-05-04 PROBLEM — T82.898A ARTERIOVENOUS FISTULA OCCLUSION: Status: ACTIVE | Noted: 2025-05-04

## 2025-05-04 LAB
ALBUMIN SERPL-MCNC: 3.3 G/DL (ref 3.2–4.8)
ALBUMIN/GLOB SERPL: 1.6 {RATIO} (ref 1–2)
ALP LIVER SERPL-CCNC: 91 U/L (ref 45–117)
ALT SERPL-CCNC: <7 U/L (ref 10–49)
ANION GAP SERPL CALC-SCNC: 11 MMOL/L (ref 0–18)
ARTERIAL PATENCY WRIST A: POSITIVE
AST SERPL-CCNC: 26 U/L (ref ?–34)
BASE EXCESS BLDA CALC-SCNC: 2.2 MMOL/L (ref ?–2)
BASOPHILS # BLD AUTO: 0.04 X10(3) UL (ref 0–0.2)
BASOPHILS NFR BLD AUTO: 0.4 %
BILIRUB SERPL-MCNC: 0.4 MG/DL (ref 0.2–1.1)
BODY TEMPERATURE: 98.6 F
BUN BLD-MCNC: 26 MG/DL (ref 9–23)
CA-I BLD-SCNC: 1.12 MMOL/L (ref 0.95–1.32)
CALCIUM BLD-MCNC: 8.1 MG/DL (ref 8.7–10.6)
CHLORIDE SERPL-SCNC: 104 MMOL/L (ref 98–112)
CO2 SERPL-SCNC: 26 MMOL/L (ref 21–32)
COHGB MFR BLD: 2.2 % SAT (ref 0–3)
CREAT BLD-MCNC: 4.38 MG/DL (ref 0.7–1.3)
CREAT BLD-MCNC: 4.38 MG/DL (ref 0.7–1.3)
EGFRCR SERPLBLD CKD-EPI 2021: 14 ML/MIN/1.73M2 (ref 60–?)
EGFRCR SERPLBLD CKD-EPI 2021: 14 ML/MIN/1.73M2 (ref 60–?)
EOSINOPHIL # BLD AUTO: 0.21 X10(3) UL (ref 0–0.7)
EOSINOPHIL NFR BLD AUTO: 1.9 %
ERYTHROCYTE [DISTWIDTH] IN BLOOD BY AUTOMATED COUNT: 16.1 %
GLOBULIN PLAS-MCNC: 2.1 G/DL (ref 2–3.5)
GLUCOSE BLD-MCNC: 103 MG/DL (ref 70–99)
GLUCOSE BLD-MCNC: 105 MG/DL (ref 70–99)
GLUCOSE BLD-MCNC: 106 MG/DL (ref 70–99)
GLUCOSE BLD-MCNC: 171 MG/DL (ref 70–99)
GLUCOSE BLD-MCNC: 69 MG/DL (ref 70–99)
GLUCOSE BLD-MCNC: 73 MG/DL (ref 70–99)
GLUCOSE BLD-MCNC: 81 MG/DL (ref 70–99)
HCO3 BLDA-SCNC: 26.6 MEQ/L (ref 21–27)
HCT VFR BLD AUTO: 35.7 % (ref 39–53)
HGB BLD-MCNC: 11.8 G/DL (ref 13–17.5)
HGB BLD-MCNC: 12.3 G/DL (ref 13–17.5)
IMM GRANULOCYTES # BLD AUTO: 0.03 X10(3) UL (ref 0–1)
IMM GRANULOCYTES NFR BLD: 0.3 %
L/M: 5 L/MIN
LACTATE BLD-SCNC: 0.6 MMOL/L (ref 0.5–2)
LYMPHOCYTES # BLD AUTO: 1.23 X10(3) UL (ref 1–4)
LYMPHOCYTES NFR BLD AUTO: 10.9 %
MAGNESIUM SERPL-MCNC: 2 MG/DL (ref 1.6–2.6)
MCH RBC QN AUTO: 29.4 PG (ref 26–34)
MCHC RBC AUTO-ENTMCNC: 34.5 G/DL (ref 31–37)
MCV RBC AUTO: 85.2 FL (ref 80–100)
METHGB MFR BLD: 0.8 % SAT (ref 0.4–1.5)
MONOCYTES # BLD AUTO: 0.64 X10(3) UL (ref 0.1–1)
MONOCYTES NFR BLD AUTO: 5.7 %
NEUTROPHILS # BLD AUTO: 9.09 X10 (3) UL (ref 1.5–7.7)
NEUTROPHILS # BLD AUTO: 9.09 X10(3) UL (ref 1.5–7.7)
NEUTROPHILS NFR BLD AUTO: 80.8 %
OSMOLALITY SERPL CALC.SUM OF ELEC: 297 MOSM/KG (ref 275–295)
OXYHGB MFR BLDA: 96.3 % (ref 92–100)
PCO2 BLDA: 39 MM HG (ref 35–45)
PH BLDA: 7.44 [PH] (ref 7.35–7.45)
PLATELET # BLD AUTO: 141 10(3)UL (ref 150–450)
PO2 BLDA: 97 MM HG (ref 80–100)
POTASSIUM BLD-SCNC: 4.1 MMOL/L (ref 3.6–5.1)
POTASSIUM SERPL-SCNC: 4 MMOL/L (ref 3.5–5.1)
PROT SERPL-MCNC: 5.4 G/DL (ref 5.7–8.2)
RBC # BLD AUTO: 4.19 X10(6)UL (ref 3.8–5.8)
SODIUM BLD-SCNC: 135 MMOL/L (ref 135–145)
SODIUM SERPL-SCNC: 141 MMOL/L (ref 136–145)
WBC # BLD AUTO: 11.2 X10(3) UL (ref 4–11)

## 2025-05-04 PROCEDURE — 99232 SBSQ HOSP IP/OBS MODERATE 35: CPT | Performed by: INTERNAL MEDICINE

## 2025-05-04 PROCEDURE — 99291 CRITICAL CARE FIRST HOUR: CPT | Performed by: EMERGENCY MEDICINE

## 2025-05-04 PROCEDURE — 99233 SBSQ HOSP IP/OBS HIGH 50: CPT | Performed by: HOSPITALIST

## 2025-05-04 PROCEDURE — 71045 X-RAY EXAM CHEST 1 VIEW: CPT | Performed by: INTERNAL MEDICINE

## 2025-05-04 PROCEDURE — 99291 CRITICAL CARE FIRST HOUR: CPT | Performed by: INTERNAL MEDICINE

## 2025-05-04 RX ORDER — INSULIN DEGLUDEC 100 U/ML
13 INJECTION, SOLUTION SUBCUTANEOUS NIGHTLY
Status: DISCONTINUED | OUTPATIENT
Start: 2025-05-04 | End: 2025-05-04

## 2025-05-04 RX ORDER — SENNOSIDES 8.6 MG
8.6 TABLET ORAL 2 TIMES DAILY
Status: DISCONTINUED | OUTPATIENT
Start: 2025-05-04 | End: 2025-05-16

## 2025-05-04 RX ORDER — INSULIN DEGLUDEC 100 U/ML
11 INJECTION, SOLUTION SUBCUTANEOUS NIGHTLY
Status: DISCONTINUED | OUTPATIENT
Start: 2025-05-04 | End: 2025-05-06

## 2025-05-04 RX ORDER — ACETAMINOPHEN 10 MG/ML
1000 INJECTION, SOLUTION INTRAVENOUS EVERY 6 HOURS PRN
Status: DISCONTINUED | OUTPATIENT
Start: 2025-05-04 | End: 2025-05-05

## 2025-05-04 RX ORDER — ACETAMINOPHEN 325 MG/1
650 TABLET ORAL EVERY 6 HOURS PRN
Status: DISCONTINUED | OUTPATIENT
Start: 2025-05-04 | End: 2025-05-05

## 2025-05-04 RX ORDER — DOCUSATE SODIUM 100 MG/1
100 CAPSULE, LIQUID FILLED ORAL 2 TIMES DAILY
Status: DISCONTINUED | OUTPATIENT
Start: 2025-05-04 | End: 2025-05-16

## 2025-05-04 NOTE — PROGRESS NOTES
Patient placed on PS 5/+5/30% at 0855. Patient was agitated and intermittently following commands. Patient extubated to 5L NC after 5 minutes of PS trial per Dr. Ibrahim. Patient tolerating well. ABG at 1000. Will continue to monitor.    05/04/25 0900   Vent Information   $ RT Standby Charge (per 15 min) 1   Vent Initiation Date 05/02/25   Ventilation Day(s) 3   Interface Invasive   Vent Type    Vent plugged into main power? Yes   Vent ID [id number]   Vent Mode PS   Settings   FiO2 (%) 30 %   Waveform Decelerating ramp   PEEP/CPAP (cm H2O) 5 cm H20   Press Support CWP 5   Insp Rise Time (%) 70 %   Trigger Sensitivity Flow (L/min) 3 L/min   Humidification Heater   H2O Bag Level 1/4 Full   Heater Temperature 98.2 °F (36.8 °C)   Readings   Total RR 30   Minute Ventilation (L/min) 8.8 L/min   Vt Spontaneous (mL) 408 mL   PIP Observed (cm H2O) 10 cm H2O   MAP (cm H2O) 6.8   I/E Ratio 1:2.7   Alarms   High RR 40   Insp Pressure High (cm H2O) 40 cm H2O   Insp Pressure Low (cm H2O) 9 cm H2O   MV High (L/min) 20 L/min   MV Low (L/min) 3 L/min   Apnea Interval (sec) 20 seconds   Apnea Rate 16   Apnea Volume (mL) 450 mL   Spontaneous Breathing Trial   Spontaneous Breathing Trial Complete Y   Is the FiO2 <= 0.5? (titrated for sats 92-94%) Y   Is the PEEP <= 5? Y   Is the RSBI <=104 Y   Is the patient free of ventricular arrhythmias in the past 24 hours? Y   Is the patient's cough adequate? Y   Is the patient alert (neuro), able to follow commands?   (intermittently)   Daily Screen Meets All Criteria Yes   Spontaneous Parameters   Spontaneous RR Rate 30   Spontaneous Minute Volume 8.8   Average Spontaneous Tidal Volume 408   $ Spontaneous Vital Capacity 454   Negative Inspiratory Force   (Unable)   Total RSBI 74   Weaning Trials   Patient self-extubated? No   Compassionate wean? No   Spontaneous Breathing Trial Time Initiated 0855   Spontaneous Breathing Trial Duration 5 minutes  (Pulled ET per Dr. Ibrahim)   Spontaneous Breathing Trial  Method PS   Spontaneous Breathing Trial Settings 5/+5/30%   Pre Trial HR 57   Pre Trial RR 16   Pre Trial SpO2 98 %   Pre Trial Vt 451   Post Trial HR 81   Post Trial RR 19   Post Trial SpO2 98 %   Post Trial /75   Post Trial Vt 352

## 2025-05-04 NOTE — RESPIRATORY THERAPY NOTE
Current Mechanical Ventilator Settings:  - Mode: VC+  - Rate: 16  - Tidal Volume(mL):450  - FiO2: 30%  - PEEP 5  - inspiratory time 0.8    Breath Sounds: Rhonchi    ETT Size: 8 @ 25    Measured Parameters:  Peak Inspiratory Pressure(cmH2O): 21  Plateu Pressure(cmH2O) : 19  Tidal Volume(mL): 430  Rate:16    VS Range last 24 hrs:  Temp:  [97 °F (36.1 °C)-100.7 °F (38.2 °C)] 100.7 °F (38.2 °C)  Pulse:  [56-81] 67  Resp:  [16-26] 20  BP: ()/(52-73) 125/65  SpO2:  [95 %-100 %] 97 %  AO: ()/(42-65) 135/58  FiO2 (%):  [30 %-60 %] 30 %    Shift Events noted: Patient ETT rotated throughout shift. Secretions are thick and tan when suctioned. No adverse events during shift    Will continue to follow RT protocol.    [place], [place], RRT

## 2025-05-04 NOTE — PLAN OF CARE
Assumed care of patient at change of shift. Patient intubated and on fentanyl gtt until 0900. Extubated to 5L NC by Respiratory therapy at 0900. Family at bedside throughout shift. Patient alert but confused at situation. Chest tube to water suction with serosanguinous drainage. Clear liquids, advance as tolerated.  See MAR and Flowsheets.

## 2025-05-04 NOTE — PROGRESS NOTES
CRITICAL CARE PROGRESS NOTE    Patient Name: Richy Goins  : 1960  MRN: [id number]  Admit Date: 2025  Length of Stay: 5 Days    Subjective/24h events: Patient off norepinephrine last night on low-dose of fentanyl Precedex intermittently following commands which is better than yesterday will try SBT this morning  Chest tube output reassuring    Assessment and Plan: 64-year-old male multiple comorbidities admitted with respiratory failure hemothorax    Neuro/Psych: Mild encephalopathy noted yesterday slightly better this morning may be difficult with respect to SBT compliance  Wean Precedex and fentanyl      Cardiovascular: Mild shock yesterday now off norepinephrine.  Bedside ultrasound low normal EF RV looks slightly better than previous official echo    A-fib with RVR presumptively reactive amiodarone drip was shut off yesterday remains in sinus    Pulmonary: Respiratory failure secondary to iatrogenic hemothorax on minimal vent settings SBT this morning hopefully will do okay based on mental status would favor doing a formal SBT but may attempt extubation if felt that agitation is secondary to endotracheal tube versus actually respiratory physiology    GI: Surprisingly no ischemic hepatopathy continue to monitor  GI prophylaxis  Tube feeds started yesterday approaching goal    Renal/Metabolic: End-stage renal disease  received dialysis yesterday still with IVC that is plethoric may benefit from another session today especially if fails SBT will speak to nephrology    Previous long-term fistula versus graft??  No bruit or thrill will make nephrology aware currently with Dialysis catheter    Heme: Anemia of chronic disease then significant blood loss with antonino in the 6 range now status post massive transfusion hemoglobin stable  Hold DVT prophylaxis for now can potentially restart next 24 hours    ID: No fever mild leukocytosis patient on IV Ancef was getting treated for  lower extremity stump cellulitis will reattempt to look today patient still has staples wound care consult    Endocrine: History of diabetes mellitus continue sliding scale    Peripheral vascular occlusive disease status post bilateral amputations still with staples      ICU checklist  Feeding: Tube feeds today  Analgesia: Fentanyl drip  Sedation: Precedex  Thromboembolism PPX: Hold  Stress Ulcer PPX: PPI  Glucose control: Sliding scale  Bowel Regimen:   Lines/drains/tubes: Right IJ dialysis catheter radial arterial line  Deescalation of antibiotics:   Therapies:PT/OT/ST when appropriate    Code Status: Full Code          Physical Exam      Hemodynamics  24h Vitals:  VitalLast Value (24 Hour)24 Hour Range  Temp98.1 °F (36.7 °C)Temp  Min: 97 °F (36.1 °C)  Max: 98.1 °F (36.7 °C)  [id number]  Min: 56  Max: 163  BP (Non-Invasive)98/54 BP  Min: 83/53  Max: 139/82  BP (A-Line)102/50AO  Min: 88/56  Max: 250/106  CVP  could not be evaluated. This SmartLink does not work with rows of the type:   [id number]  Min: 12  Max: 29  [id number] %SpO2  Min: 74 %  Max: 100 %  O2 Therapy       Upon my evaluation, this patient had a high probability of imminent or life-threatening deterioration due to shock, renal failure, respiratory failure, and unexpected postoperative complications, which required my direct attention, intervention, and personal management.    I have personally provided 35 minutes of critical care time, exclusive of time spent on separately billable procedures.  Time includes review of all pertinent laboratory/radiology results, discussion with consultants, and monitoring for potential decompensation.  Performed interventions included pressor drugs and managing mechanical ventilation.

## 2025-05-04 NOTE — PROGRESS NOTES
[facility]   part of [facility]     Nephrology Progress Note    Richy Goins Patient Status:  Inpatient    1960 MRN [id number]   Location [facility] 8NE-A Attending Hussain Rivera MD   Hosp Day # 5 PCP Woody Dahl MD       SUBJECTIVE:     No acute events  Off pressors  HD yesterday w/ 2 L UF  Extubated this am     Current Hospital Medications[1]        Physical Exam:   /58   Pulse 75   Temp 98.8 °F (37.1 °C) (Temporal)   Resp 16   Wt 172 lb 13.5 oz (78.4 kg)   SpO2 98%   BMI 26.28 kg/m²   Temp (24hrs), Av.6 °F (37.6 °C), Min:98.1 °F (36.7 °C), Max:100.7 °F (38.2 °C)       Intake/Output Summary (Last 24 hours) at 2025 1215  Last data filed at 2025 0800  Gross per 24 hour   Intake 1728.25 ml   Output 2230 ml   Net -501.75 ml     Last 3 Weights   25 0600 172 lb 13.5 oz (78.4 kg)   25 0200 168 lb 6.9 oz (76.4 kg)   25 0353 163 lb 9.3 oz (74.2 kg)   25 0038 180 lb (81.6 kg)   25 2310 180 lb (81.6 kg)   25 1623 170 lb (77.1 kg)   25 1055 170 lb (77.1 kg)   10/02/24 0458 181 lb (82.1 kg)   10/01/24 1056 175 lb 0.7 oz (79.4 kg)   10/01/24 0544 175 lb (79.4 kg)   24 1517 192 lb 3.2 oz (87.2 kg)   24 0936 196 lb (88.9 kg)     General: NAD; awake  HEENT: No scleral icterus, MMM  Neck: Supple, no SARITA or thyromegaly  Cardiac: Regular rate and rhythm, S1, S2 normal, no murmur or rub  Lungs: clear; chest tube in place    LUE AVG w/o thrill/bruit  R internal jugular HD cath in place   Abdomen: Soft, non-tender. + bowel sounds, no palpable organomegaly  Extremities: Without clubbing, cyanosis or edema. L AVF with bruit/thrill.  B BKA  Neurologic: Alert and oriented, cranial nerves grossly intact, moving all extremities  Skin: Warm and dry, no rash  Recent Labs     25  1448 25  1449 25  1804 25  1837 25  0427 25  0445   WBC 19.7*  --  14.4* 14.4* 10.3 11.2*   HGB 13.0  --  13.6 13.6 13.5 12.3*    MCV 84.0  --  84.5 84.7 82.3 85.2   .0  --  182.0 166.0 174.0 141.0*   INR  --  1.38*  --   --  1.33*  --        Recent Labs     05/02/25  1448 05/02/25  1804 05/02/25  1837 05/03/25  0427 05/03/25  1334 05/04/25  0445   * 147* 147* 146* 140 141   K 3.2* 3.6 3.8 3.8 3.4* 4.0    109 107 108 102 104   CO2 27.0 24.0 25.0 23.0 26.0 26.0   BUN 39* 35* 35* 41* 20 26*   CREATSERUM 4.69* 4.24* 4.63* 5.13*  5.13* 3.12* 4.38*  4.38*   CA 9.5 7.6* 7.9*  7.9* 8.0* 8.3* 8.1*   MG 1.7 1.5*  --   --  2.1 2.0   PHOS 4.2 4.8  --   --   --   --        Recent Labs     05/02/25  1305 05/02/25  1448 05/02/25  1804 05/03/25  0427 05/04/25  0445   ALT <7* 13 17 12 <7*   AST 10 22 50* 55* 26   ALB 3.3 3.2 2.8* 3.0* 3.3   LDH  --   --   --  313*  --          Impression/Plan:      1. ESRD- due to longstanding DM 2.  Cont HD per usual TTHS routine; tx today; suspect thrombosed AVG-  for now we can use temp cath for HD needs  - HD tomorrow; given recent bleed thrombectomy of the AVG may need to be delayed       2. Acute hypoxic resp failure due to pul edema + pleural effusions / opacification of L hemithorax.  S/p thora complicated by hemothorax- s/p emergent OR for evacuation 5/2  Extubated 5/4    3. Shock - related to # 2/hemorrhagic; off pressors now;  monitor      4. Anemia- due to ESRD/bleed. JURGEN for goal hgb 10-11 gms; stable      5.HTN- continue usual amlodipine     6.   PAD s/p remote R BKA; s/p recent L BKA on keflex / wound care    This patient had a high probability of imminent or life-threatening deterioration due to Critical findings of laboratory tests, shock, renal failure and sepsis.which required my direct attention, intervention, and management.    CC30m    Paul Barnhart  5/4/2025           [1]   Current Facility-Administered Medications   Medication Dose Route Frequency    sodium chloride 0.9 % IV bolus 100 mL  100 mL Intravenous Q30 Min PRN    And    albumin human (Albumin) 25% injection 25 g  25 g  Intravenous PRN Dialysis    sodium bicarbonate tab 650 mg  650 mg Per G Tube PRN    And    pancrelipase (Lip-Prot-Amyl) (Zenpep) DR particles cap 10,000 Units  10,000 Units Per G Tube PRN    dexmedeTOMIDine in sodium chloride 0.9% (Precedex) 400 mcg/100mL infusion premix  0.2-1.5 mcg/kg/hr (Dosing Weight) Intravenous Continuous    ceFAZolin (Ancef) 1 g in dextrose 5% 100mL IVPB-ADD  1 g Intravenous Q24H    insulin aspart (NovoLOG) 100 Units/mL FlexPen 1-68 Units  1-68 Units Subcutaneous q6h    [Transfer Hold] acetaminophen (Tylenol) 160 MG/5ML oral liquid 650 mg  650 mg Per NG Tube Q4H PRN    Or    [Transfer Hold] acetaminophen (Tylenol) rectal suppository 650 mg  650 mg Rectal Q4H PRN    Or    [Transfer Hold] acetaminophen (Ofirmev) 10 mg/mL infusion premix 1,000 mg  1,000 mg Intravenous Q6H PRN    senna (Senokot) 8.8 MG/5ML oral syrup 17.6 mg  10 mL Per NG Tube BID    docusate (Colace) 50 MG/5ML oral solution 100 mg  100 mg Per NG Tube BID    polyethylene glycol (PEG 3350) (Miralax) 17 g oral packet 17 g  17 g Per NG Tube Daily PRN    pantoprazole (Protonix) 40 mg in sodium chloride 0.9% PF 10 mL IV push  40 mg Intravenous BID    Vancomycin: PHARMACY DOSING  1 each Intravenous See Admin Instructions (RX holding)    gabapentin (Neurontin) cap 300 mg  300 mg Oral Nightly    insulin degludec (Tresiba) 100 units/mL flextouch 15 Units  15 Units Subcutaneous Nightly    sevelamer carbonate (Renvela) tab 1,600 mg  1,600 mg Oral TID CC    glucose (Dex4) 15 GM/59ML oral liquid 15 g  15 g Oral Q15 Min PRN    Or    glucose (Glutose) 40% oral gel 15 g  15 g Oral Q15 Min PRN    Or    glucose-vitamin C (Dex-4) chewable tab 4 tablet  4 tablet Oral Q15 Min PRN    Or    dextrose 50% injection 50 mL  50 mL Intravenous Q15 Min PRN    Or    glucose (Dex4) 15 GM/59ML oral liquid 30 g  30 g Oral Q15 Min PRN    Or    glucose (Glutose) 40% oral gel 30 g  30 g Oral Q15 Min PRN    Or    glucose-vitamin C (Dex-4) chewable tab 8 tablet  8  tablet Oral Q15 Min PRN    sennosides (Senokot) tab 17.2 mg  17.2 mg Oral Nightly PRN    ondansetron (Zofran) 4 MG/2ML injection 4 mg  4 mg Intravenous Q6H PRN

## 2025-05-04 NOTE — PROGRESS NOTES
[facility]   part of [facility]     Hospitalist Progress Note     Richy Goins Patient Status:  Inpatient    1960 MRN [id number]   Location [facility] 4SW-A Attending Jeremi Norwood MD   Hosp Day # 5 PCP Woody Dahl MD     Chief Complaint: resp failure     Subjective:     Patient has been extubated.  Now off pressors    Objective:    Review of Systems:   Limited as on vent    Vital signs:  Temp:  [98.1 °F (36.7 °C)-100.7 °F (38.2 °C)] 98.8 °F (37.1 °C)  Pulse:  [52-80] 75  Resp:  [16-25] 16  BP: ()/(49-75) 113/58  SpO2:  [92 %-100 %] 98 %  AO: ()/(42-80) 119/50  FiO2 (%):  [30 %] 30 %    Physical Exam:    General: No acute distress, mild agitaiton  Respiratory: dec AE  Cardiovascular: S1, S2.  Abdomen: Soft  Neuro: No new focal deficits  MSK: B/L AKA, LLE incision with staples and escahr      Diagnostic Data:    Labs:  Recent Labs   Lab 25  0810 25  1305 25  1448 25  1449 25  18325  0427 25  0445   WBC  --    < > 19.7*  --  14.4* 14.4* 10.3 11.2*   HGB  --    < > 13.0  --  13.6 13.6 13.5 12.3*   MCV  --    < > 84.0  --  84.5 84.7 82.3 85.2   PLT  --    < > 217.0  --  182.0 166.0 174.0 141.0*   INR 1.26*  --   --  1.38*  --   --  1.33*  --     < > = values in this interval not displayed.       Recent Labs   Lab 25  1804 25  1837 25  0427 25  1334 25  0445   *   < > 232* 150* 106*   BUN 35*   < > 41* 20 26*   CREATSERUM 4.24*   < > 5.13*  5.13* 3.12* 4.38*  4.38*   CA 7.6*   < > 8.0* 8.3* 8.1*   ALB 2.8*  --  3.0*  --  3.3   *   < > 146* 140 141   K 3.6   < > 3.8 3.4* 4.0      < > 108 102 104   CO2 24.0   < > 23.0 26.0 26.0   ALKPHO 94  --  94  --  91   AST 50*  --  55*  --  26   ALT 17  --  12  --  <7*   BILT 0.8  --  0.3  --  0.4   TP 4.7*  --  4.5*  --  5.4*    < > = values in this interval not displayed.       Estimated Glomerular Filtration Rate: 14 mL/min/1.73m2  (A) (result from lab).     Recent Labs   Lab 05/02/25  1231 05/02/25  1305   TROPHS 213* 258*       Recent Labs   Lab 05/01/25  0810 05/02/25  1449 05/03/25  0427   PTP 15.9* 17.1* 16.6*   INR 1.26* 1.38* 1.33*              Imaging: Imaging data reviewed in Epic.    Medications: Scheduled Medications[1]    Assessment & Plan:     #Acute Left Intrathoracic hemorrhage 2/2 intercostal artery injury  S/p L VATS washout and control of intercostal bleeding  CT in place    #Acute hypoxic resp failure  Resolved  Extubated     #Acute Metabolic Encephalopathy  Monitor MS, seems to be impoving    #Hemorrhagic shock  Resolved, off pressors    #PAD s/p b/l AKA  Left incision with stapels and eschar; does not appear infected  Wound care  On PPX ABX    #Acute blood loss anemia  S/p massive transfusion protocol  Monitor Hgb  No s/s of further bleeding    #Cardiac arrest  Due to above  ROSC successful  EKG abnormal  Echo noted  Ischemic w/u deferred as likely reactive to above    #Leukocytosis  Likely reactive  No s/s of active infection  On PPX ancef/vanco    #Left Hemithorax  Likely due to ESRD  Has been recurrent  S/p thora 5/1    #ESRD  Cont. HD    #DM  Elevated as stress reaction  Cont. Insulin and adjust as needed  A1c would be unreliable at this point    #HTN  BP meds on hold    #HLD      Dispo: as above.  Discussed with crit care.  MS seems to be improving, followed commands for me this AM though he is confused and a bit agitated.        Jeremi Norwood MD    Supplementary Documentation:     Quality:    DVT Mechanical Prophylaxis:   SCDs,    DVT Pharmacologic Prophylaxis   Medication   None                Code Status: Full Code  Soni: No urinary catheter in place  Soni Duration (in days):   Central line:    DENN[place]:       Discharge is dependent on: clinical stability  At this point Mr. Goins is expected to be discharge to: home    The 21st Century Cures Act makes medical notes like these available to patients in the interest  of transparency. Please be advised this is a medical document. Medical documents are intended to carry relevant information, facts as evident, and the clinical opinion of the practitioner. The medical note is intended as peer to peer communication and may appear blunt or direct. It is written in medical language and may contain abbreviations or verbiage that are unfamiliar.                       [1]    ceFAZolin  1 g Intravenous Q24H    insulin aspart  1-68 Units Subcutaneous q6h    senna  10 mL Per NG Tube BID    docusate  100 mg Per NG Tube BID    pantoprazole  40 mg Intravenous BID    Vancomycin IV  1 each Intravenous See Admin Instructions (RX holding)    gabapentin  300 mg Oral Nightly    insulin degludec  15 Units Subcutaneous Nightly    sevelamer carbonate  1,600 mg Oral TID CC

## 2025-05-04 NOTE — PROGRESS NOTES
05/04/25 0428   Vent Information   $ RT Standby Charge (per 15 min) 1   Is this patient on Chronic Ventilation? No   Vent Initiation Date 05/02/25   Ventilation Day(s) 3   Interface Invasive   Vent Type    Vent plugged into main power? Yes   Vent ID 5   Vent Mode VC+   Settings   FiO2 (%) 30 %   Resp Rate (Set) 16   Vt (Set, mL) 450 mL   Waveform Decelerating ramp   PEEP/CPAP (cm H2O) 5 cm H20   Insp Time (sec) 0.8 sec   Insp Rise Time (%) 70 %   Trigger Sensitivity Flow (L/min) 3 L/min   Humidification Heater   H2O Bag Level 3/4 Full   Heater Temperature 98.6 °F (37 °C)   Readings   ETCO2 (mmHg) 31 mmHg   Total RR 16   Minute Ventilation (L/min) 7.3 L/min   Inspiratory Tidal Volume 450 mL   Expiratory Tidal Volume 412 mL   PIP Observed (cm H2O) 22 cm H2O   MAP (cm H2O) 9.3   I/E Ratio 1:3.7   Plateau Pressure (cm H2O) 21 cm H2O   Static Compliance (L/cm H2O) 30   Alarms   High RR 40   Insp Pressure High (cm H2O) 40 cm H2O   Insp Pressure Low (cm H2O) 9 cm H2O   MV High (L/min) 20 L/min   MV Low (L/min) 3 L/min   Apnea Interval (sec) 20 seconds   Apnea Rate 16   Apnea Volume (mL) 450 mL   ETT   Placement Date/Time: 05/02/25 (c) 1535   Cuffed: Cuffed  Insertion attempts: 1  Technique: Exchange catheter  Placement Verification: Capnometry  Placed By: Anesthesiologist   Secured at (cm) 25 cm   Cuff Pressure (cm H2O) 31 cm H2O   Suctioned? Y   Measured From Lips   Secured Location Left   Secured by Commercial tube elliott   Site Condition Dry   Req'd equipment at bedside Bag mask

## 2025-05-04 NOTE — PLAN OF CARE
Received pt at change of shift intubated and sedated on precedex and fentanyl.  Nods head yes to pain, otherwise does not follow commands although banging side rails with hands.  Fentanyl bolus given x2 for pain.  Tolerating vent settings.  Lungs are rhonchorous.  Minimal secretions.  T-100.7 max.  BP stable off pressors.  HR-55-67 SR/SB.  CT to WS with serosang drainage. Tolerating tube feedings increasing every 4 hours toward goal rate.  See flowsheet for further details.

## 2025-05-04 NOTE — PROGRESS NOTES
[facility]  Progress Note    Richy Goins Patient Status:  Inpatient    1960 MRN [id number]   Location [facility] 4SW-A Attending Jeremi Norwood MD   Hosp Day # 5 PCP Woody Dahl MD     Subjective:  Richy Goins is a(n) 64 year old male low-grade fever now resolved  Now extubated on nasal cannula  Notes pain in the left chest otherwise seems connected  Slight sense of shortness of breath-remains on Precedex    Objective:  /54 (BP Location: Right arm)   Pulse 65   Temp 98.2 °F (36.8 °C) (Temporal)   Resp 11   Wt 172 lb 13.5 oz (78.4 kg)   SpO2 99%   BMI 26.28 kg/m² nasal cannula      Temp (24hrs), Av.3 °F (37.4 °C), Min:98.1 °F (36.7 °C), Max:100.7 °F (38.2 °C)      Intake/Output:    Intake/Output Summary (Last 24 hours) at 2025 1312  Last data filed at 2025 1222  Gross per 24 hour   Intake 1828.25 ml   Output 230 ml   Net 1598.25 ml       Physical Exam:General: Extubated awake alert comfortable on Precedex               Head: Normocephalic, without obvious abnormality, atraumatic.               Throat: Lips, mucosa, and tongue normal.  No thrush noted.  ET tube               Neck: trachea midline, no adenopathy, no thyromegaly.  No subcu air               Lungs: Diminished tones left greater than right no subcu air no wheezes noted               Chest wall: No tenderness or deformity.               Heart: Regular rate and rhythm               Abdomen: soft, non-distended, no masses, no guarding, no                       Rebound.  Mildly protuberant nontender bilateral               Extremity: Bilateral amputation  wound on the left with eschar               Skin: No rashes or lesions.               Neurological: Sedated but awakening       Lab Data Review:  Recent Labs     25  1448 25  1804 25  1837 25  0427 25  0445   WBC 19.7* 14.4* 14.4* 10.3 11.2*   HGB 13.0 13.6 13.6 13.5 12.3*   .0 182.0 166.0 174.0 141.0*     Recent Labs      05/02/25  1804 05/02/25  1837 05/03/25  0427 05/03/25  1334 05/04/25  0445   * 147* 146* 140 141   K 3.6 3.8 3.8 3.4* 4.0    107 108 102 104   CO2 24.0 25.0 23.0 26.0 26.0   BUN 35* 35* 41* 20 26*   CREATSERUM 4.24* 4.63* 5.13*  5.13* 3.12* 4.38*  4.38*     Recent Labs   Lab 05/01/25  0810 05/02/25  1449 05/02/25  1837 05/03/25  0427   PTP 15.9* 17.1*  --  16.6*   INR 1.26* 1.38*  --  1.33*   PTT  --   --  28.4  --      Recent Labs   Lab 05/04/25  1030   ABGPHT 7.44   VWMYEB5U 39   YEBEO3I 97   ABGHCO3 26.6   ABGBE 2.2*   TEMP 98.6   NOEMÍ Positive   SITE Left Radial   DEV Nasal cannula   THGB 11.8*         Cultures: IntraOp cultures are pending fluid remains without growth    Radiology:  XR CHEST AP PORTABLE  (CPT=71045)  Result Date: 5/4/2025  CONCLUSION:  Improving airspace disease left lung.   LOCATION:  [facility]      Dictated by (CST): Rob Reyes MD on 5/04/2025 at 7:09 AM     Finalized by (CST): Rob Reyes MD on 5/04/2025 at 7:11 AM       XR CHEST AP/PA (1 VIEW) (CPT=71045)  Result Date: 5/3/2025  CONCLUSION:   1.  Dobbhoff tube in the stomach.  The remainder of the lines and tubes are stable since previous study.   2. Stable appearance of patchy airspace disease throughout the left lung with residual small amount of fluid in gas within the left lateral pleural space.   LOCATION:  [facility]      Dictated by (CST): Yoan Duvall MD on 5/03/2025 at 6:17 PM     Finalized by (CST): Yoan Duvall MD on 5/03/2025 at 6:18 PM       Chest x-ray reviewed  Remains with left diffuse infiltrate  Trapped lung with basilar pleural separation  Remains with slight increased markings throughout the right following for focal infiltrate    Medications reviewed     Assessment and Plan:   Problem List[1]    Assessment:  Hemorrhagic shock secondary to hemothorax-off pressors  Intrathoracic bleed following thoracentesis now status post intercostal artery cautery with thoracotomy-no further bleeding  Recurrent  left-sided pleural effusion present since January 2025-now increasing with near whiteout suspected component atelectasis prompting thoracentesis-now with evidence of trapped lung-to waterseal in a.m.  Possible inferior wall STEMI cardiology note appreciated-with changes thought related to severity of shock-last nuclear 1/25 with fixed defect in the inferior wall no reversibility  Dyspnea mild hypoxia on presentation related to large left pleural effusion weaning FiO2  End-stage renal disease  Severe peripheral vascular disease bilateral BKA's most recently last month     Plan:  Continuing to wean FiO2  Ongoing trapped lung with plans to waterseal in a.m. as per thoracic surgery  Discussed at length with the patient's wife at bedside         CC     Delilah Easley MD  5/4/2025  1:12 PM         [1]   Patient Active Problem List  Diagnosis    Diabetic nephropathy associated with type 2 diabetes mellitus (HCC)    Essential hypertension    Type 2 diabetes mellitus with hyperglycemia (HCC)    Stage 3 chronic kidney disease (HCC)    Retinopathy due to secondary diabetes mellitus (HCC)    Azotemia    PAD (peripheral artery disease)    Stage 4 chronic kidney disease (HCC)    Acidosis    ESRD (end stage renal disease) (HCC)    ESRD on hemodialysis (HCC)    Pure hypercholesterolemia    Hemoptysis    Hypertensive urgency    Hematemesis with nausea    Hematemesis, unspecified whether nausea present    Acute gastritis with hemorrhage, unspecified gastritis type    Uremia    Acquired absence of right leg below knee (HCC)    Chronic kidney disease, stage 4 (severe) (HCC)    End-stage renal disease on hemodialysis (HCC)    Osteomyelitis of right foot (HCC)    Dyslipidemia    Diabetes mellitus type 2 in nonobese (HCC)    Unilateral complete BKA, right, sequela (HCC)    Type 2 diabetes mellitus with diabetic peripheral angiopathy without gangrene (HCC)    Type 2 diabetes mellitus with diabetic chronic kidney disease (HCC)    Long term  (current) use of aspirin    Hypertensive chronic kidney disease w stg 1-4/unsp chr kdny    Encounter for orthopedic aftercare following surgical amputation    Anemia in chronic kidney disease    Age-related osteoporosis without current pathological fracture    Acute kidney failure, unspecified    CKD (chronic kidney disease), stage III (HCC)    Type 1 diabetes mellitus with ketoacidosis without coma (HCC)    Senile purpura    Tinnitus, bilateral    Hypernatremia    Anemia    Acute kidney injury    Acute renal failure (ARF)    Metabolic acidosis    Hyperglycemia    Nausea    Elevated lipase    Elevated procalcitonin    Pleural effusion    Anemia in ESRD (end-stage renal disease) (HCC)    Hyperphosphatemia    Dyspnea on exertion    Ischemic foot    Gangrene of toe of left foot (HCC)    Hyperkalemia    Acute respiratory failure with hypoxia (HCC)    Anemia, unspecified type    Shock ([facility])    ABLA (acute blood loss anemia)    Confusion    Hemothorax on left    Acute hypoxic respiratory failure ([facility])

## 2025-05-04 NOTE — PROGRESS NOTES
[facility]  Progress Note    Richy Goins Patient Status:  Inpatient    1960 MRN [id number]   Location [facility] 4SW-A Attending Jeremi Norwood MD   Hosp Day # 5 PCP Woody Dahl MD     Subjective: Extubated with residual encephalopathy after all what happened.  Denies angina.  No leg edema.  Got dialysis yesterday.  Status post successful emergent hemostasis of the bleeding right intercostal artery postthoracentesis.  Stable with no need for blood transfusion since procedure time.    Still encephalopathic but looks better.    Vasopressin and Levophed were both weaned off after procedure.    Left-sided chest tube in situ clearing and was more bloody yesterday    - patient was dialyzed yesterday.    Tele: Sinus, no A-fib, occasional PVCs    Objective:  /58   Pulse 75   Temp 98.8 °F (37.1 °C) (Temporal)   Resp 16   Wt 172 lb 13.5 oz (78.4 kg)   SpO2 98%   BMI 26.28 kg/m²     Temp (24hrs), Av.3 °F (37.4 °C), Min:97.7 °F (36.5 °C), Max:100.7 °F (38.2 °C)      Intake/Output:      Intake/Output Summary (Last 24 hours) at 2025 1104  Last data filed at 2025 0800  Gross per 24 hour   Intake 1928.25 ml   Output 2250 ml   Net -321.75 ml       Wt Readings from Last 3 Encounters:   25 172 lb 13.5 oz (78.4 kg)   25 170 lb (77.1 kg)   10/02/24 181 lb (82.1 kg)       Physical Exam:    Constitutional: Intubated and sedated  Eyes: Moist conjunctivae, PERRLA.  Ears, Nose, Mouth:  Moist mucosa. Anicteric sclera.  Head and Neck: Upper normal JVD, carotids 2+ no bruits. Neck supple. No thyromegaly or adenopathy. Normocephalic head.  Cardiovascular: Regular rate and rhythm, S1, S2 normal, no pathological murmur, rub or gallop.  Respiratory: Decreased air entry with few basal crackles.  Gastrointestinal: Soft, non-tender abdomen.   Extremities: No edema and status post bilateral leg amputation below the knee level.  Neurologic: Intubated and sedated  Musculoskeletal:  PAD bilateral below knee amputations   Integumentary: Warm and dry skin. No rashes.    Left-sided chest tube in situ.    Laboratory/Data:    Labs:       Recent Labs   Lab 05/01/25  0810 05/02/25  1305 05/02/25  1448 05/02/25  1449 05/02/25  1804 05/02/25  1837 05/03/25  0427 05/04/25  0445   WBC  --    < > 19.7*  --  14.4* 14.4* 10.3 11.2*   HGB  --    < > 13.0  --  13.6 13.6 13.5 12.3*   MCV  --    < > 84.0  --  84.5 84.7 82.3 85.2   PLT  --    < > 217.0  --  182.0 166.0 174.0 141.0*   INR 1.26*  --   --  1.38*  --   --  1.33*  --     < > = values in this interval not displayed.       Recent Labs   Lab 04/30/25  0458 05/01/25  0528 05/02/25  1448 05/02/25  1804 05/02/25  1837 05/03/25  0427 05/03/25  1334 05/04/25  0445      < > 149* 147* 147* 146* 140 141   K 5.9*   < > 3.2* 3.6 3.8 3.8 3.4* 4.0      < > 105 109 107 108 102 104   CO2 15.0*   < > 27.0 24.0 25.0 23.0 26.0 26.0   *   < > 39* 35* 35* 41* 20 26*   CREATSERUM 13.89*   < > 4.69* 4.24* 4.63* 5.13*  5.13* 3.12* 4.38*  4.38*   CA 9.4   < > 9.5 7.6* 7.9*  7.9* 8.0* 8.3* 8.1*   MG 2.5  --  1.7 1.5*  --   --  2.1 2.0   PHOS 8.2*  --  4.2 4.8  --   --   --   --    *   < > 289* 306* 333* 232* 150* 106*    < > = values in this interval not displayed.       Recent Labs   Lab 05/02/25  1305 05/02/25  1448 05/02/25  1804 05/03/25  0427 05/04/25  0445   ALT <7* 13 17 12 <7*   AST 10 22 50* 55* 26   ALB 3.3 3.2 2.8* 3.0* 3.3   LDH  --   --   --  313*  --          Medications:  Current Facility-Administered Medications   Medication Dose Route Frequency    sodium chloride 0.9 % IV bolus 100 mL  100 mL Intravenous Q30 Min PRN    And    albumin human (Albumin) 25% injection 25 g  25 g Intravenous PRN Dialysis    sodium bicarbonate tab 650 mg  650 mg Per G Tube PRN    And    pancrelipase (Lip-Prot-Amyl) (Zenpep) DR particles cap 10,000 Units  10,000 Units Per G Tube PRN    dexmedeTOMIDine in sodium chloride 0.9% (Precedex) 400 mcg/100mL  infusion premix  0.2-1.5 mcg/kg/hr (Dosing Weight) Intravenous Continuous    ceFAZolin (Ancef) 1 g in dextrose 5% 100mL IVPB-ADD  1 g Intravenous Q24H    insulin aspart (NovoLOG) 100 Units/mL FlexPen 1-68 Units  1-68 Units Subcutaneous q6h    sodium chloride 0.9 % IV bolus 100 mL  100 mL Intravenous Q30 Min PRN    And    albumin human (Albumin) 25% injection 25 g  25 g Intravenous PRN Dialysis    [Transfer Hold] acetaminophen (Tylenol) 160 MG/5ML oral liquid 650 mg  650 mg Per NG Tube Q4H PRN    Or    [Transfer Hold] acetaminophen (Tylenol) rectal suppository 650 mg  650 mg Rectal Q4H PRN    Or    [Transfer Hold] acetaminophen (Ofirmev) 10 mg/mL infusion premix 1,000 mg  1,000 mg Intravenous Q6H PRN    senna (Senokot) 8.8 MG/5ML oral syrup 17.6 mg  10 mL Per NG Tube BID    docusate (Colace) 50 MG/5ML oral solution 100 mg  100 mg Per NG Tube BID    polyethylene glycol (PEG 3350) (Miralax) 17 g oral packet 17 g  17 g Per NG Tube Daily PRN    pantoprazole (Protonix) 40 mg in sodium chloride 0.9% PF 10 mL IV push  40 mg Intravenous BID    Vancomycin: PHARMACY DOSING  1 each Intravenous See Admin Instructions (RX holding)    gabapentin (Neurontin) cap 300 mg  300 mg Oral Nightly    insulin degludec (Tresiba) 100 units/mL flextouch 15 Units  15 Units Subcutaneous Nightly    sevelamer carbonate (Renvela) tab 1,600 mg  1,600 mg Oral TID CC    glucose (Dex4) 15 GM/59ML oral liquid 15 g  15 g Oral Q15 Min PRN    Or    glucose (Glutose) 40% oral gel 15 g  15 g Oral Q15 Min PRN    Or    glucose-vitamin C (Dex-4) chewable tab 4 tablet  4 tablet Oral Q15 Min PRN    Or    dextrose 50% injection 50 mL  50 mL Intravenous Q15 Min PRN    Or    glucose (Dex4) 15 GM/59ML oral liquid 30 g  30 g Oral Q15 Min PRN    Or    glucose (Glutose) 40% oral gel 30 g  30 g Oral Q15 Min PRN    Or    glucose-vitamin C (Dex-4) chewable tab 8 tablet  8 tablet Oral Q15 Min PRN    sennosides (Senokot) tab 17.2 mg  17.2 mg Oral Nightly PRN    ondansetron  (Zofran) 4 MG/2ML injection 4 mg  4 mg Intravenous Q6H PRN       Allergies:  Allergies   Allergen Reactions    Zosyn [Piperacillin-Tazobactam In D5w] RASH     Developed diffuse erythroderma 1 day after starting zosyn         Assessment:  S/p emergent video-assisted thoracoscopy with hematoma evacuation and hemostasis of bleeding intercostal artery causing left hemothorax -day #2 -hemodynamically more stable.  Shock - suspected hemorrhagic -due to hemithorax post thoracentesis -1.6 L of pleural effusion was drained on admission.  Off Levophed and off vasopressin.  Maintaining blood pressure.  Profound anemia with multiple red blood cell blood transfusions prior to going to the OR on 5/2.  Got 15 units of red blood cell ands FFP's with platelets.  Hemoglobin almost back to normal now.  Acute respiratory insufficiency with hypoxia -intubated and sedated  Abnormal EKG with nonspecific ST elevation due to hemorrhagic shock rather than primarily coronary event- suspect in part due to above -may have coronary disease with underlying comorbidities  Stable sinus and no A-fib.  Occasional PVCs.  Amio was initiated prophylactically but was causing sinus bradycardia was discontinued.  Initially presented with fluid overload and recurrent Left sided pleural effusions -admitted with shortness of breath and hypoxia -dialysis schedule per renal.  ESRD -on dialysis  PAD w/ hx of b/l lower extremity amputations -stable issue  DM2 - on insulin sliding scale  Hypertension blood was hypotensive from bleeding in home BP meds on hold, currently on levo low-dose  HLD - on statin.    Last EKG yesterday-sinus 64 bpm with right bundle branch block and nonspecific changes but no acute ischemia.     No A-fib in Telemetry, stable sinus.    Echo Doppler - LV systolic function is preserved with EF 50-55%. RV size low normal with reduced right ventricle systolic function and no significant valvular abnormalities.    Lexiscan nuclear stress test  -January 2025 -fixed defect in inferior wall but no ischemia.     Important labs:   Potassium 4.0 sodium 141 creatinine 4.3 BUN 26 normal liver enzymes albumin 3.3 hemoglobin 12.3 platelet count 141    Initially troponin was 258 and proBNP 10,000 2 days ago when the hemorrhagic shock happened.      Plan:  ICU monitoring -off pressors  No need for more blood products with recovery of CBC numbers  No need for ischemic workup now and focus on chest issues recovery and medical stabilization with the patient -Lexiscan nuclear stress was negative for ischemia January 2025  Stable sinus and no A-fib during this admission.  Patient was put prophylactically on amiodarone but developed sinus bradycardia.  It was discontinued.  Chest tube management per thoracic surgery  Follow chest x-ray  Resume statin in 1 to 2 days  Holding amlodipine with optimal blood pressure and no need to resume it-just recovered from hypotension and off pressors  Follow hemoglobin, platelets and metabolic panel  Dialysis schedule per renal, got dialysis yesterday  Ambulate as able    Discussed with patient's wife and ICU nursing staff at the bedside    Gino Ordoñez M.D., F.A.C.C.  [facility]    54/2025  10:39 AM  F/u3

## 2025-05-05 PROBLEM — R57.8 HEMORRHAGIC SHOCK (HCC): Status: ACTIVE | Noted: 2025-05-05

## 2025-05-05 LAB
ANION GAP SERPL CALC-SCNC: 13 MMOL/L (ref 0–18)
BLOOD TYPE BARCODE: 1700
BUN BLD-MCNC: 39 MG/DL (ref 9–23)
CALCIUM BLD-MCNC: 8.2 MG/DL (ref 8.7–10.6)
CHLORIDE SERPL-SCNC: 101 MMOL/L (ref 98–112)
CO2 SERPL-SCNC: 25 MMOL/L (ref 21–32)
CREAT BLD-MCNC: 5.75 MG/DL (ref 0.7–1.3)
CREAT BLD-MCNC: 5.75 MG/DL (ref 0.7–1.3)
EGFRCR SERPLBLD CKD-EPI 2021: 10 ML/MIN/1.73M2 (ref 60–?)
EGFRCR SERPLBLD CKD-EPI 2021: 10 ML/MIN/1.73M2 (ref 60–?)
ERYTHROCYTE [DISTWIDTH] IN BLOOD BY AUTOMATED COUNT: 16.1 %
EST. AVERAGE GLUCOSE BLD GHB EST-MCNC: 123 MG/DL (ref 68–126)
GLUCOSE BLD-MCNC: 101 MG/DL (ref 70–99)
GLUCOSE BLD-MCNC: 103 MG/DL (ref 70–99)
GLUCOSE BLD-MCNC: 111 MG/DL (ref 70–99)
GLUCOSE BLD-MCNC: 125 MG/DL (ref 70–99)
GLUCOSE BLD-MCNC: 149 MG/DL (ref 70–99)
GLUCOSE BLD-MCNC: 172 MG/DL (ref 70–99)
GLUCOSE BLD-MCNC: 63 MG/DL (ref 70–99)
GLUCOSE BLD-MCNC: 64 MG/DL (ref 70–99)
GLUCOSE BLD-MCNC: 68 MG/DL (ref 70–99)
GLUCOSE BLD-MCNC: 91 MG/DL (ref 70–99)
GLUCOSE BLD-MCNC: 94 MG/DL (ref 70–99)
HBA1C MFR BLD: 5.9 % (ref ?–5.7)
HCT VFR BLD AUTO: 34 % (ref 39–53)
HGB BLD-MCNC: 11.1 G/DL (ref 13–17.5)
MAGNESIUM SERPL-MCNC: 2.2 MG/DL (ref 1.6–2.6)
MCH RBC QN AUTO: 28.9 PG (ref 26–34)
MCHC RBC AUTO-ENTMCNC: 32.6 G/DL (ref 31–37)
MCV RBC AUTO: 88.5 FL (ref 80–100)
OSMOLALITY SERPL CALC.SUM OF ELEC: 297 MOSM/KG (ref 275–295)
PLATELET # BLD AUTO: 142 10(3)UL (ref 150–450)
POTASSIUM SERPL-SCNC: 4.3 MMOL/L (ref 3.5–5.1)
RBC # BLD AUTO: 3.84 X10(6)UL (ref 3.8–5.8)
SODIUM SERPL-SCNC: 139 MMOL/L (ref 136–145)
UNIT VOLUME: 350 ML
WBC # BLD AUTO: 10.2 X10(3) UL (ref 4–11)

## 2025-05-05 PROCEDURE — 99232 SBSQ HOSP IP/OBS MODERATE 35: CPT | Performed by: HOSPITALIST

## 2025-05-05 PROCEDURE — 99233 SBSQ HOSP IP/OBS HIGH 50: CPT | Performed by: EMERGENCY MEDICINE

## 2025-05-05 PROCEDURE — 99232 SBSQ HOSP IP/OBS MODERATE 35: CPT | Performed by: INTERNAL MEDICINE

## 2025-05-05 PROCEDURE — 99233 SBSQ HOSP IP/OBS HIGH 50: CPT | Performed by: INTERNAL MEDICINE

## 2025-05-05 RX ORDER — HEPARIN SODIUM 5000 [USP'U]/ML
5000 INJECTION, SOLUTION INTRAVENOUS; SUBCUTANEOUS EVERY 8 HOURS SCHEDULED
Status: DISCONTINUED | OUTPATIENT
Start: 2025-05-05 | End: 2025-05-13

## 2025-05-05 RX ORDER — GABAPENTIN 400 MG/1
400 CAPSULE ORAL NIGHTLY
Status: DISCONTINUED | OUTPATIENT
Start: 2025-05-05 | End: 2025-05-16

## 2025-05-05 RX ORDER — GABAPENTIN 300 MG/1
300 CAPSULE ORAL 2 TIMES DAILY
Status: DISCONTINUED | OUTPATIENT
Start: 2025-05-05 | End: 2025-05-05

## 2025-05-05 RX ORDER — ACETAMINOPHEN 325 MG/1
650 TABLET ORAL EVERY 6 HOURS SCHEDULED
Status: DISCONTINUED | OUTPATIENT
Start: 2025-05-05 | End: 2025-05-16

## 2025-05-05 RX ORDER — HEPARIN SODIUM 5000 [USP'U]/ML
5000 INJECTION, SOLUTION INTRAVENOUS; SUBCUTANEOUS EVERY 8 HOURS SCHEDULED
Status: DISCONTINUED | OUTPATIENT
Start: 2025-05-05 | End: 2025-05-05

## 2025-05-05 NOTE — PAYOR COMM NOTE
--------------  CONTINUED STAY REVIEW    Payor: JD GARCIA  Subscriber #:  [id number]  Authorization Number: [id number]    Admit date: 4/29/25  Admit time: 10:59 PM    5/3    ICU  PULMONARY    64 year old male remains afebrile  Currently agitated following some commands  On and off Levophed overnight currently slightly increased as he was now on dialysis  Minimal chest tube output      Hemorrhagic shock secondary to hemothorax-minimal pressor needs questionalby related to sedation  Intrathoracic bleed following thoracentesis now status post intercostal artery  collateralization-no further bleeding  Recurrent left-sided pleural effusion present since January 2025-now increasing with near whiteout suspected component atelectasis prompting thoracentesis-now with evidence of trapped lung  Possible inferior wall STEMI cardiology note appreciated-with changes thought related to severity of shock-last nuclear 1/25 with fixed defect in the inferior wall no reversibility  Dyspnea mild hypoxia on presentation related to large left pleural effusion  End-stage renal disease  Severe peripheral vascular disease bilateral BKA's most recently last month     Plan:  Continuing to wean FiO2/pressors  Plan to wean sedation when off [id number] for possible ability to begin spontaneous breathing trials  Dialysis today    CXR  ONCLUSION: 1. Overall no significant change in the appearance of the portable chest radiograph in the short interval since the prior study. 2. Lucency left lateral lung base is most likely a pneumothorax related incomplete expansion of lung (trapped lung).     ATTENDING  Chief Complaint: resp failure        #Acute Left Intrathoracic hemorrhage 2/2 intercostal artery injury  S/p L VATS washout and control of intercostal bleeding  CT in place     #Acute hypoxic resp failure  On vent  Possible extubation soon     #Hemorrhagic shock  On pressors, wean as able     #Acute blood loss anemia  S/p massive transfusion  protocol  Monitor Hgb  No s/s of further bleeding     #Cardiac arrest  Due to above  ROSC successful  EKG abnormal  Echo noted  Ischemic w/u deferred as likely reactive to above     #Leukocytosis  Likely reactive  No s/s of active infection  On PPX ancef/vanco     #Left Hemithorax  Likely due to ESRD  Has been recurrent  S/p thora 5/1     #ESRD  Cont. HD     #PAD w/ hx of b/l LE amputations     #DM  Elevated as stress reaction    5/4  ICU     Subjective/24h events: Patient off norepinephrine last night on low-dose of fentanyl Precedex intermittently following commands which is better than yesterday will try SBT this morning  Chest tube output reassuring     Assessment and Plan: 64-year-old male multiple comorbidities admitted with respiratory failure hemothorax     #Acute Left Intrathoracic hemorrhage 2/2 intercostal artery injury  S/p L VATS washout and control of intercostal bleeding  CT in place     #Acute hypoxic resp failure  Resolved  Extubated      #Acute Metabolic Encephalopathy  Monitor MS, seems to be impoving     #Hemorrhagic shock  Resolved, off pressors     #PAD s/p b/l AKA  Left incision with stapels and eschar; does not appear infected  Wound care  On PPX ABX     #Acute blood loss anemia  S/p massive transfusion protocol  Monitor Hgb  No s/s of further bleeding     #Cardiac arrest  Due to above  ROSC successful  EKG abnormal  Echo noted  Ischemic w/u deferred as likely reactive to above     #Leukocytosis  Likely reactive  No s/s of active infection  On PPX ancef/vanco         MEDICATIONS ADMINISTERED IN LAST 1 DAY:  acetaminophen (Ofirmev) 10 mg/mL infusion premix 1,000 mg       Date Action Dose Route User    5/4/2025 1451 New Bag 1,000 mg Intravenous Latrell Sloan RN          acetaminophen (Tylenol) tab 650 mg       Date Action Dose Route User    5/5/2025 0551 Given 650 mg Oral Ami Starr RN          ceFAZolin (Ancef) 1 g in dextrose 5% 100mL IVPB-ADD       Date Action Dose Route User     5/4/2025 1222 New Bag 1 g Intravenous Latrell Sloan RN          dexmedeTOMIDine in sodium chloride 0.9% (Precedex) 400 mcg/100mL infusion premix       Date Action Dose Route User    5/5/2025 0600 Rate/Dose Change 0.2 mcg/kg/hr × 81.6 kg (Dosing Weight) Intravenous Ami Starr RN    5/5/2025 0449 Rate/Dose Change 0.3 mcg/kg/hr × 81.6 kg (Dosing Weight) Intravenous Ami Starr RN    5/5/2025 0300 Rate/Dose Change 0.4 mcg/kg/hr × 81.6 kg (Dosing Weight) Intravenous Ami Starr RN    5/5/2025 0229 Rate/Dose Change 0.5 mcg/kg/hr × 81.6 kg (Dosing Weight) Intravenous Ami Starr RN    5/5/2025 0200 Rate/Dose Change 0.4 mcg/kg/hr × 81.6 kg (Dosing Weight) Intravenous Ami Starr RN    5/5/2025 0049 Rate/Dose Change 0.3 mcg/kg/hr × 81.6 kg (Dosing Weight) Intravenous Ami Starr RN    5/4/2025 2300 Rate/Dose Change 0.5 mcg/kg/hr × 81.6 kg (Dosing Weight) Intravenous Ami Starr RN    5/4/2025 2235 Rate/Dose Change 0.4 mcg/kg/hr × 81.6 kg (Dosing Weight) Intravenous Ami Starr RN    5/4/2025 2220 Rate/Dose Change 0.3 mcg/kg/hr × 81.6 kg (Dosing Weight) Intravenous Ami Starr RN    5/4/2025 2205 New Bag 0.2 mcg/kg/hr × 81.6 kg (Dosing Weight) Ami Aceves RN    5/4/2025 2137 Restarted 0.2 mcg/kg/hr × 81.6 kg (Dosing Weight) Intravenous Ami Starr RN    5/4/2025 1400 Rate/Dose Change 0.2 mcg/kg/hr × 81.6 kg (Dosing Weight) Intravenous Latrell Sloan RN    5/4/2025 1222 Rate/Dose Change 0.3 mcg/kg/hr × 81.6 kg (Dosing Weight) Intravenous Latrell Sloan RN    5/4/2025 1011 Rate/Dose Change 0.4 mcg/kg/hr × 81.6 kg (Dosing Weight) Intravenous Latrell Sloan RN    5/4/2025 1000 Rate/Dose Change 0.5 mcg/kg/hr × 81.6 kg (Dosing Weight) Intravenous Latrell Sloan RN          glucose (Dex4) 15 GM/59ML oral liquid 15 g       Date Action Dose Route User    5/5/2025 0813 Given 15 g Oral Latrell Sloan RN           glucose (Dex4) 15 GM/59ML oral liquid 30 g       Date Action Dose Route User    5/4/2025 2145 Given 30 g Oral Ami Starr RN          dextrose 50% injection 50 mL       Date Action Dose Route User    5/4/2025 2205 Given 50 mL Intravenous Ami Starr RN          docusate (Colace) 50 MG/5ML oral solution 100 mg       Date Action Dose Route User    5/4/2025 0830 Given 100 mg Per NG Tube Latrell Sloan RN          gabapentin (Neurontin) cap 300 mg       Date Action Dose Route User    5/4/2025 2125 Given 300 mg Oral Ami Starr RN          insulin degludec (Tresiba) 100 units/mL flextouch 11 Units       Date Action Dose Route User    5/4/2025 2235 Given 11 Units Subcutaneous (Left Upper Arm) Ami Starr RN          pantoprazole (Protonix) 40 mg in sodium chloride 0.9% PF 10 mL IV push       Date Action Dose Route User    5/5/2025 0559 Given 40 mg Intravenous Ami Starr RN    5/4/2025 1732 Given 40 mg Intravenous Latrell Sloan RN            Vitals (last day)       Date/Time Temp Pulse Resp BP SpO2 Weight O2 Device O2 Flow Rate (L/min) [facility]    05/05/25 0800 98.3 °F (36.8 °C) 72 25 112/52 95 % -- Nasal cannula 4 L/min AV    05/05/25 0744 -- -- -- -- -- -- Nasal cannula 4 L/min MA    05/05/25 0700 -- 70 26 110/59 92 % -- -- -- EG    05/05/25 0600 -- 59 22 103/53 94 % -- -- -- EG    05/05/25 0500 -- 55 14 90/51 96 % -- -- -- EG    05/05/25 0400 98.2 °F (36.8 °C) 55 15 93/50 97 % -- Nasal cannula 4 L/min EG    05/05/25 0300 -- 60 14 98/52 97 % -- -- -- EG    05/05/25 0200 -- 64 13 107/59 97 % -- -- -- EG    05/05/25 0106 -- -- -- -- 97 % -- Nasal cannula 4 L/min LP    05/05/25 0100 -- 57 13 101/55 97 % -- -- -- EG    05/05/25 0049 -- 58 13 101/55 97 % -- -- -- EG    05/05/25 0000 98.4 °F (36.9 °C) 68 13 108/61 96 % 173 lb 4.5 oz (78.6 kg) Nasal cannula 4 L/min EG    05/04/25 2300 -- 83 20 117/68 92 % -- -- -- EG    05/04/25 2200 -- 83 16 110/57 96 % -- -- -- EG    05/04/25  2100 97.7 °F (36.5 °C) 83 20 128/65 90 % -- Nasal cannula 4 L/min EG    05/04/25 2000 -- 80 18 122/59 92 % -- -- -- EG    05/04/25 1900 -- 81 19 114/75 93 % -- -- -- AV    05/04/25 1800 -- 77 19 108/81 92 % -- -- -- AV    05/04/25 1700 -- 74 25 114/61 93 % -- Nasal cannula 2 L/min AV    05/04/25 1600 98.2 °F (36.8 °C) 70 14 100/65 96 % -- Nasal cannula 3 L/min AV    05/04/25 1500 -- 75 17 110/55 95 % -- -- -- AV    05/04/25 1400 -- 70 11 108/54 95 % -- -- -- AV    05/04/25 1300 -- 69 14 118/67 96 % -- -- -- AV    05/04/25 1200 98.2 °F (36.8 °C) 65 11 108/54 99 % -- Nasal cannula 3 L/min AV    05/04/25 1100 -- 76 20 117/61 97 % -- -- -- AV    05/04/25 1000 -- 75 16 113/58 98 % -- Nasal cannula 5 L/min AV    05/04/25 0901 -- -- -- -- 98 % -- Nasal cannula 5 L/min AF    05/04/25 0900 -- 80 21 132/75 97 % -- -- -- AV    05/04/25 0800 98.8 °F (37.1 °C) 52 16 104/53 98 % -- Ventilator -- AV    05/04/25 0710 -- 58 25 -- 98 % -- -- -- AF    05/04/25 0700 -- 52 16 97/49 98 % -- -- -- MP    05/04/25 0600 -- 59 16 107/57 97 % 172 lb 13.5 oz (78.4 kg) -- -- MP    05/04/25 0500 -- 59 16 110/59 98 % -- -- -- MP    05/04/25 0400 99.5 °F (37.5 °C) 56 16 110/60 97 % -- Ventilator -- MP    05/04/25 0300 -- 59 16 112/60 98 % -- -- -- MP    05/04/25 0200 -- 61 16 115/62 98 % -- -- -- MP    05/04/25 0100 -- 62 16 118/62 98 % -- -- -- MP    05/04/25 0000 100.7 °F (38.2 °C) 60 17 117/60 98 % -- Ventilator -- MP          CIWA Scores (since admission)       None          Blood Transfusion Record       Product Unit Status Volume Start End            Transfuse RBC      25  [id number]  [id number] Completed 05/02/25 1832 350 mL 05/02/25 1625 05/02/25 1635                Transfuse cryoprecipitate      25  [id number]  [id number] Completed 05/02/25 1832 350 mL 05/02/25 1655 05/02/25 1705                Transfuse fresh frozen plasma      25  [id number]  [id number] Completed 05/02/25 1832 350 mL 05/02/25 1613 05/02/25 1623                 Transfuse platelets       25  [id number]  [id number] Completed 05/02/25 1832 350 mL 05/02/25 1647 05/02/25 1658

## 2025-05-05 NOTE — OPERATIVE REPORT
[facility]    PATIENT'S NAME: JULIUS FOY   ATTENDING PHYSICIAN: Jeremi Norwood M.D.   OPERATING PHYSICIAN: Fermin Caputo Jr, MD   PATIENT ACCOUNT#:   [id number]    LOCATION:  [address] [place] [address]  MEDICAL RECORD #:   [id number]       YOB: 1960  ADMISSION DATE:       04/29/2025      OPERATION DATE:  05/02/2025    OPERATIVE REPORT    PREOPERATIVE DIAGNOSIS:    1.   Left hemothorax   2.   Hypovolemic shock with ongoing bleeding, status post massive transfusion.  3.  ESRD  POSTOPERATIVE DIAGNOSIS:    1.   Left hemothorax from iatrogenic bleed of a left intercostal artery.  2.   Hypovolemic shock with ongoing bleeding, status post massive transfusion.  3. ESRD  PROCEDURE:    1.   Flexible bronchoscopy.  2.   Emergent left video-assisted thoracoscopic exploration.  3.   Evacuation of hemothorax.  4.   Cessation of bleeding intercostal vessel.    ASSISTANT:  Beverley Brewster PA-C    ANESTHESIA:  General dual-lumen endotracheal anesthesia.    ANESTHESIOLOGIST:  Camilo Devine MD.    SPECIMENS:  None.    DRAINS:  A 24-Austrian chest tube x1.    IMPLANTS:  None.    ESTIMATED BLOOD LOSS:  250 mL intra-op plus 1 to 2 L of hemothorax blood.    CONDITION:  The patient remains critically ill and intubated returning to the ICU.    INDICATIONS:  The patient is a 64-year-old man with end-stage renal disease.  He had a left pleural effusion, presumably associated with his renal disease.  He underwent a thoracentesis.  Afterwards, he became unstable.  His hemoglobin was low and a chest tube was placed, and he was found to have a large volume of blood in his left chest.  Massive transfusion protocol was begun.  He did have 1 episode of cardiac arrest from which he recovered quickly with ACLS protocol.  He stabilized after this as well as with additional blood product transfusion.  There was discussion about whether to go to the operating room or IR.  I was able to mobilize the OR team and get started faster than IR  predicted they would be able to, and thus, we felt that surgery would be the best option to quickly stop the bleeding with the additional benefit of evacuating this hemothorax at that time.  Consent was obtained from his wife over the phone.    FINDINGS:  There was a large volume of blood in the left chest.  This was likely over somewhere between 1 and 2 L.  Intercostal vessel was found to be actively bleeding.  This was stopped by dissecting out the vessel and coagulating with the EnSeal device.  No other sites of bleeding were identified.    OPERATIVE TECHNIQUE:  Patient was brought emergently to the OR.  Anesthesiology changed his single-lumen tube over double-lumen, and I quickly confirmed correct placement.  Once this was done, he was placed in right lateral decubitus position.  All pressure points were padded.  He was prepped and draped in a sterile fashion.  His previous chest tube was prepped into the field.  A time-out was performed to confirm patient, side, and site of surgery.  I removed his old chest tube and entered the chest through that incision.  A camera through this incision was immediately obscured by blood, and a finger through this incision revealed that there was large volume of clot in the chest despite the chest tube having drained over 2.5 L of blood.  Suction was inserted into this incision and gently moved about to break up blood clot and suction it out.  Once this was done, I was able to create enough space to make 2 additional incisions.  One was inferior for a camera.  This was 5 mm, and a trocar was placed at this site.  Another 2 cm incision was made in the posterior axillary line.  Brett wound retractors were placed at the superior-most incisions to aid in removal of clot.  I then began to physically remove clot with ring forceps as well as suction out blood and clot.  I got a large volume out.  This is likely somewhere between 1 and 2 L of blood.  In doing this, I began having a  better view of the posterior chest wall.  Eventually, I was able to identify bleeding coming from the intercostal artery posteriorly and inferiorly.  Pressure was put on this using a long ring forceps, and I began to dissect out the pleura above and below this bleeding site.  Once this was done, I used an Ethicon EnSeal device to coagulate the vessel above and below the site of the injury.  This stopped the bleeding.  I continued to remove clot from the chest until all the blood and clot was removed.  I then irrigated with copious amounts of warm water irrigation.  Once this was done, I continued to look for additional sites of bleeding.  None were seen.  I then placed a 28-Qatari chest tube posteriorly and secured using 0 silk sutures.  Finally, I asked anesthesiologist to reinflate the lungs.  It reinflated but failed to fill the chest space likely due to chronicity of his left chest pleural effusion.  I closed incisions in 3 layers with 2 layers of 2-0 Vicryl and 1 of 4-0 Monocryl.  Patient tolerated the procedure well.  I was present for the entirety of the procedure.    Dictated By Fermin Caputo Jr, MD  d: 05/05/2025 10:23:13  t: 05/05/2025 12:05:32  Job [id number]  JLL/

## 2025-05-05 NOTE — PROGRESS NOTES
Thoracic Surgery Progress Note     Richy Goins is a 64 year old male. MRN [id number]. Admitted 2025    SUBJECTIVE/24H EVENTS:     Extubated 25 and currently on 4L per NC. Complains of chest tube discomfort, particularly with cough. Getting dialysis. Mentation improved.     Objective:     VITALS:     Temp (24hrs), Av.2 °F (36.8 °C), Min:97.7 °F (36.5 °C), Max:98.4 °F (36.9 °C)   /56   Pulse 73   Temp 98.3 °F (36.8 °C) (Temporal)   Resp 22   Wt 173 lb 4.5 oz   SpO2 95%   BMI 26.35 kg/m²     EXAM:   General: NAD  Heart: RRR  Lungs: normal respiratory effort   Incisions: c/d/i   Chest tube:   Air Leak: No   Output: serosanguineous   24 hour: 170 (370cc since midnight)  Suction: yes      Intake/Output Summary (Last 24 hours) at 2025 0923  Last data filed at 2025 0800  Gross per 24 hour   Intake 593.25 ml   Output 170 ml   Net 423.25 ml         MEDS:  Scheduled Medications[1]    LABS:  Lab Results   Component Value Date    WBC 10.2 2025    HGB 11.1 2025    HCT 34.0 2025    .0 2025    CREATSERUM 5.75 2025    CREATSERUM 5.75 2025    BUN 39 2025     2025    K 4.3 2025     2025    CO2 25.0 2025    GLU 94 2025    CA 8.2 2025    MG 2.2 2025         Assessment/Plan:     64 year old male PMH ESRD, HTN, DM, PAD, HLD who was admitted for acute hypoxic respiratory failure secondary to volume overload and left pleural effusion s/p IR thoracentesis (25) complicated by acute hemorrhage due to intercostal artery injury now POD 3 from left VATS washout and control of intercostal bleeding. Improving.     -Continue chest tube to suction.   -Pain control per ICU team.   -Okay for DVT prophylaxis   -Encourage cough and IS use.     Patient seen and discussed with Dr. Caputo.     MAHSA LozanoC  Thoracic Surgery  Pager: [phone]               [1]    gabapentin  400 mg Oral Nightly     melatonin  1 mg Oral Nightly    [Held by provider] insulin aspart  1-68 Units Subcutaneous TID CC and HS    docusate sodium  100 mg Oral BID    sennosides  8.6 mg Oral BID    insulin degludec  11 Units Subcutaneous Nightly    ceFAZolin  1 g Intravenous Q24H    Vancomycin IV  1 each Intravenous See Admin Instructions (RX holding)    sevelamer carbonate  1,600 mg Oral TID CC

## 2025-05-05 NOTE — CONSULTS
[facility]  Report of Inpatient Wound Care Consultation    Richy Goins Patient Status:  Inpatient    1960 MRN [id number]   Location [facility] 4SW-A Attending Jeremi Norwood MD   Hosp Day # 6 PCP Woody Dahl MD     Reason for Consultation:    Left BKA surgicial incision from 6 weeks ago, staples present    History of Present Illness:  Richy Goins is a a(n) 64 year old male. Patient seen at bedside with a fellow wound care nurse.Patient presents with a surgical wound below the knee amputation site.Denies pain at this time Patient with multiple comorbidities.        History:  Past Medical History[1]  Past Surgical History[2]   reports that he has never smoked. He has never used smokeless tobacco. He reports that he does not currently use alcohol. He reports that he does not use drugs.      Allergies:  @ALLERGY    Laboratory Data:    Recent Labs   Lab 25  0810 25  1207 25  1449 25  1804 25  1837 25  2332 25  0427 25  0619 25  1334 25  1734 25  0445 25  0607 25  0443 25  0805 25  0855 25  1104 25  1303   WBC  --    < >  --  14.4* 14.4*  --  10.3  --   --   --  11.2*  --  10.2  --   --   --   --    HGB  --    < >  --  13.6 13.6  --  13.5  --   --   --  12.3*  --  11.1*  --   --   --   --    HCT  --    < >  --  39.2 39.8  --  38.0*  --   --   --  35.7*  --  34.0*  --   --   --   --    PLT  --    < >  --  182.0 166.0  --  174.0  --   --   --  141.0*  --  142.0*  --   --   --   --    CREATSERUM  --    < >  --  4.24* 4.63*  --  5.13*  5.13*  --  3.12*  --  4.38*  4.38*  --  5.75*  5.75*  --   --   --   --    BUN  --    < >  --  35* 35*  --  41*  --  20  --  26*  --  39*  --   --   --   --    GLU  --    < >  --  306* 333*  --  232*  --  150*  --  106*  --  94  --   --   --   --    CA  --    < >  --  7.6* 7.9*  7.9*  --  8.0*  --  8.3*  --  8.1*  --  8.2*  --   --   --   --    ALB  --    < >   --  2.8*  --   --  3.0*  --   --   --  3.3  --   --   --   --   --   --    TP  --    < >  --  4.7*  --   --  4.5*  --   --   --  5.4*  --   --   --   --   --   --    PTT  --   --   --   --  28.4  --   --   --   --   --   --   --   --   --   --   --   --    INR 1.26*  --  1.38*  --   --   --  1.33*  --   --   --   --   --   --   --   --   --   --    PGLU  --    < >  --   --   --    < >  --    < >  --    < >  --    < >  --    < > 172* 103* 91    < > = values in this interval not displayed.         ASSESSMENT:  Wound 04/29/25 Leg Anterior;Left (Active)   Date First Assessed/Time First Assessed: 04/29/25 7642   Present on Original Admission: Yes  Primary Wound Type: Amputation  Location: (c) Leg  Wound Location Orientation: Anterior;Left      Assessments 5/5/2025  1:22 PM   Wound Image       Drainage Amount None   Wound Length (cm) 2.5 cm   Wound Width (cm) 14 cm   Wound Surface Area (cm^2) 27.49 cm^2   Wound Depth (cm) 0 cm   Wound Volume (cm^3) 0 cm^3   Margins Other (Comment) (approximated  intact staples)   Non-staged Wound Description Full thickness   Nancy-wound Assessment Dry;Edema;Non-blanchable erythema   Wound Odor None   Shape Noted about 75% eschar on the incision site            Wound Cleaning and Dressings:    Wound cleansing:  Cleanse with saline  Wound product: Xeroform gauze.Cover with ABD pad and wrap with kerlix.  Dressing change frequency:  Change dressing daily and/or PRN      Miscellaneous/Additional Orders:  Offloading: Turn Q 2 hours and as needed- as tolerated to prevent further skin breakdown  To prevent further skin breakdown    Miscellaneous orders: Increase dietary protein intake.Dietitian or Attending physician may need to evaluate amount of protein intake/supplements depending on the kidney functions and other medical conditions     Care Summary:  Care Summary: Discussed Plan of Care at beside with patient. Patient verbally acknowledges understanding of all instructions and all questions  were answered.Treatment plan was also discussed with the spouse over the phone.    Recommendations:  Will need to follow up with surgery if discharged to home for the removal of the sutures.This was discussed with the nurse.Will need to obtain an order from the attending physician if removal of the staple needs to done here in the hospital.        Thank you for this consultation and for allowing me to participate in the care of your patient.      Time Spent 30 Minutes.    Thank you,  Bella Begum RN BSN [facility] Wound Care Team  5/5/2025  2:23 PM         [1]   Past Medical History:   Belching    Dialysis patient    Diarrhea, unspecified    Esophageal reflux    Essential hypertension    Fatigue    Flatulence/gas pain/belching    High blood pressure    High cholesterol    History of blood transfusion    Hyperlipidemia    Indigestion    Irregular bowel habits    Night sweats    Osteoporosis    Peripheral vascular disease    Pure hypercholesterolemia    Type II or unspecified type diabetes mellitus without mention of complication, not stated as uncontrolled    Visual impairment    reading glasses    Vomiting   [2]   Past Surgical History:  Procedure Laterality Date    Amputation toe,i-p jt Right     Av fistula revision, open      Below knee leg amputation Right     Colonoscopy N/A 02/20/2023    Procedure: COLONOSCOPY;  Surgeon: Sarmad Gonzalez MD;  Location:  ENDOSCOPY    Colonoscopy      Upper gi endoscopy,exam

## 2025-05-05 NOTE — CONSULTS
Thoracic Surgery Consult    Reason for Consultation: left hemothorax and hypovolemic shock    Consulting Physician: Nicole    Chief Complaint: left hemothorax and hypovolemic shock    History of Present Illness: Patient is a 64 year old, male with ESRD, on HD who was found to have a left pleural effusion.  He underwent a left thoracentesis.  Afterwards he was feeing dizzy and his vital signs began to deteriorate.  Was was transferred to the ICU. A chest tube was placed and approx 2.5 liters of bloods came out.  Hgb 6. Massive transfusion was begun and Mr. Goins got 15 units of prbc.  He also experienced cardiac arrest.  This was witnessed and vitals quickly restored with ACLS.  I was called and spoke with several members of the team caring for him.  I I requested that the pigtail chest tube be upsized to a full chest tube.  I told the team that I was approx 30min away and suggested IR for intercostal artery embolization if more immediate care was needed.  IR was in a procedure and would be about an hour before they could begin.  Given this, after the wife was consented, Mr. Goins was taken emergently to the OR.      Prior to Admission medications:    Medications - Current[1]      Allergies:    Allergies[2]      Past Medical History:    Past Medical History[3]    Past Surgical History:    Past Surgical History[4]    Social History:    Social History     Tobacco Use    Smoking status: Never    Smokeless tobacco: Never    Tobacco comments:     none   Substance Use Topics    Alcohol use: Not Currently     Comment: none       Family History:    Family History[5]      Review of Systems:    A 10 point review of systems was conducted and was negative except that which was  listed in the above HPI as well as:  Pertinent negatives include:    - No unusual headaches, weakness or numbness  - No chest pain  - No shortness of breath  - No dysphagia  - no leg swelling  - no unusual bone pains  - No unusual weight loss over  the last 3 months, fatigue, fevers or night sweats    Objective:    /56   Pulse 73   Temp 98.3 °F (36.8 °C) (Temporal)   Resp 22   Wt 78.6 kg (173 lb 4.5 oz)   SpO2 95%   BMI 26.35 kg/m²     General: well appearing male in no acute distress  HEENT: Normocephalic, PERRL, EOMI  Neck: Supple, trachea midline, no JVD, no masses. Thyroid not grossly enlarged  Nodes: no cervical or supraclavicular lymphadenopathy appreciated  Heart: regular rate and rhythm. No murmurs, rubs or gallops. No lower extremity edema.  Lungs: Normal respiratory effort. Clear to ascultation bilaterally.  Abdomen: Soft, Non-tender, non-distended. No hepatosplenomegaly noted.  Extremities: No clubbing or cyanosis. No lateralizing weakness  Neuro: No gross cranial nerve defects, no loss of sensation  Psych: oriented to person place and time, normal mood and affect    Review of Data:     Latest Reference Range & Units 05/02/25 13:05   WBC 4.0 - 11.0 x10(3) uL 14.5 (H)   Hemoglobin 13.0 - 17.5 g/dL 6.0 (LL)   Hematocrit 39.0 - 53.0 % 19.3 (L)   Platelet Count 150.0 - 450.0 10(3)uL 330.0   RBC 3.80 - 5.80 x10(6)uL 2.31 (L)   MCH 26.0 - 34.0 pg 26.0   MCHC 31.0 - 37.0 g/dL 31.1   MCV 80.0 - 100.0 fL 83.5   RDW % 16.4   (LL): Data is critically low  (H): Data is abnormally high  (L): Data is abnormally low     Latest Reference Range & Units 05/02/25 13:05   Glucose 70 - 99 mg/dL 244 (H)   Sodium 136 - 145 mmol/L 149 (H)   Potassium 3.5 - 5.1 mmol/L 3.2 (L)   Chloride 98 - 112 mmol/L 105   Carbon Dioxide, Total 21.0 - 32.0 mmol/L 26.0   BUN 9 - 23 mg/dL 40 (H)   CREATININE 0.70 - 1.30 mg/dL 5.57 (H)   CALCIUM 8.7 - 10.6 mg/dL 9.2   (H): Data is abnormally high  (L): Data is abnormally low  I independently reviewed images from CXRs scan performed on today    Left white out    Assessment:    Patient is a 64 year old, male with a left hemothorax and hypovolemic shock, presumably from iatrogenic left  intercostal artery injury.  He was stabilized  with MTP.  Timing of surgery vs. IR favors surgery.  This has the advantage of clearing out his hemothorax and stopping the bleeding. Consent for a left VATS, possible open, evac of hemothorax with cessation of bleeding vessel was obtained over the phone from his wife.  I spoke to the OR and asked fro them to set up emergently for the surgery, with surgery planned to start as I arrived.    Plan:    Trauma to place full size chest tube  Critical care to continue MTP resuscitation  To OR for Emergent surgical exploration    Fermin Caputo MD  Thoracic Surgery  Pager: [phone]        [1] No current outpatient medications on file.  [2]   Allergies  Allergen Reactions    Zosyn [Piperacillin-Tazobactam In D5w] RASH     Developed diffuse erythroderma 1 day after starting zosyn   [3]   Past Medical History:   Belching    Dialysis patient    Diarrhea, unspecified    Esophageal reflux    Essential hypertension    Fatigue    Flatulence/gas pain/belching    High blood pressure    High cholesterol    History of blood transfusion    Hyperlipidemia    Indigestion    Irregular bowel habits    Night sweats    Osteoporosis    Peripheral vascular disease    Pure hypercholesterolemia    Type II or unspecified type diabetes mellitus without mention of complication, not stated as uncontrolled    Visual impairment    reading glasses    Vomiting   [4]   Past Surgical History:  Procedure Laterality Date    Amputation toe,i-p jt Right     Av fistula revision, open      Below knee leg amputation Right     Colonoscopy N/A 02/20/2023    Procedure: COLONOSCOPY;  Surgeon: Sarmad Gonzalez MD;  Location:  ENDOSCOPY    Colonoscopy      Upper gi endoscopy,exam     [5]   Family History  Problem Relation Age of Onset    Heart Attack Father     Heart Disorder Father 57    Diabetes Maternal Grandfather     Diabetes Maternal Aunt

## 2025-05-05 NOTE — PROGRESS NOTES
[facility]   part of [facility]     Hospitalist Progress Note     Rcihy Goins Patient Status:  Inpatient    1960 MRN [id number]   Location [facility] 4SW-A Attending Jeremi Norwood MD   Hosp Day # 6 PCP Woody Dahl MD     Chief Complaint: resp failure     Subjective:     Patient is doing well overall.  MS improving    Objective:    Review of Systems:   Limited as on vent    Vital signs:  Temp:  [97.7 °F (36.5 °C)-98.4 °F (36.9 °C)] 97.9 °F (36.6 °C)  Pulse:  [55-90] 90  Resp:  [13-26] 20  BP: ()/(50-87) 118/55  SpO2:  [90 %-97 %] 93 %    Physical Exam:    General: No acute distress, mild agitaiton  Respiratory: dec AE, CT in place  Cardiovascular: S1, S2.  Abdomen: Soft  Neuro: No new focal deficits  MSK: B/L AKA, LLE incision with staples and eschar      Diagnostic Data:    Labs:  Recent Labs   Lab 25  0810 25  1305 25  1449 25  1837 25  0427 25  0445 25  0443   WBC  --    < >  --  14.4* 14.4* 10.3 11.2* 10.2   HGB  --    < >  --  13.6 13.6 13.5 12.3* 11.1*   MCV  --    < >  --  84.5 84.7 82.3 85.2 88.5   PLT  --    < >  --  182.0 166.0 174.0 141.0* 142.0*   INR 1.26*  --  1.38*  --   --  1.33*  --   --     < > = values in this interval not displayed.       Recent Labs   Lab 25  18025  18325  0427 25  1334 25  0445 25  0443   *   < > 232* 150* 106* 94   BUN 35*   < > 41* 20 26* 39*   CREATSERUM 4.24*   < > 5.13*  5.13* 3.12* 4.38*  4.38* 5.75*  5.75*   CA 7.6*   < > 8.0* 8.3* 8.1* 8.2*   ALB 2.8*  --  3.0*  --  3.3  --    *   < > 146* 140 141 139   K 3.6   < > 3.8 3.4* 4.0 4.3      < > 108 102 104 101   CO2 24.0   < > 23.0 26.0 26.0 25.0   ALKPHO 94  --  94  --  91  --    AST 50*  --  55*  --  26  --    ALT 17  --  12  --  <7*  --    BILT 0.8  --  0.3  --  0.4  --    TP 4.7*  --  4.5*  --  5.4*  --     < > = values in this interval not displayed.       Estimated  Glomerular Filtration Rate: 10 mL/min/1.73m2 (A) (result from lab).     Recent Labs   Lab 05/02/25  1231 05/02/25  1305   TROPHS 213* 258*       Recent Labs   Lab 05/01/25  0810 05/02/25  1449 05/03/25  0427   PTP 15.9* 17.1* 16.6*   INR 1.26* 1.38* 1.33*              Imaging: Imaging data reviewed in Epic.    Medications: Scheduled Medications[1]    Assessment & Plan:     #Acute Left Intrathoracic hemorrhage 2/2 intercostal artery injury  S/p L VATS washout and control of intercostal bleeding  CT in place    #Acute hypoxic resp failure  Resolved  Extubated     #Acute Metabolic Encephalopathy  Monitor MS, seems to be impoving    #Hemorrhagic shock  Resolved, off pressors    #PAD s/p b/l AKA  Left incision with stapels and eschar; does not appear infected  Wound care  On PPX ABX    #Acute blood loss anemia  S/p massive transfusion protocol  Monitor Hgb  No s/s of further bleeding    #Cardiac arrest  Due to above  ROSC successful  EKG abnormal  Echo noted  Ischemic w/u deferred as likely reactive to above    #Leukocytosis  Likely reactive  No s/s of active infection  On PPX ancef/vanco    #Left Hemithorax  Likely due to ESRD  Has been recurrent  S/p thora 5/1    #ESRD  Cont. HD    #DM  Elevated as stress reaction  Cont. Insulin and adjust as needed  A1c would be unreliable at this point    #HTN  BP meds on hold    #HLD      Dispo: as above.  Discussed with renal/CV surgery.  MS donna Norwood MD    Supplementary Documentation:     Quality:    DVT Mechanical Prophylaxis:   SCDs,    DVT Pharmacologic Prophylaxis   Medication    heparin (Porcine) 5000 UNIT/ML injection 5,000 Units                Code Status: Full Code  Soni: No urinary catheter in place  Soni Duration (in days):   Central line:    [place]:       Discharge is dependent on: clinical stability  At this point Mr. Goins is expected to be discharge to: home    The 21st Century Cures Act makes medical notes like these available to patients in  the interest of transparency. Please be advised this is a medical document. Medical documents are intended to carry relevant information, facts as evident, and the clinical opinion of the practitioner. The medical note is intended as peer to peer communication and may appear blunt or direct. It is written in medical language and may contain abbreviations or verbiage that are unfamiliar.                       [1]    gabapentin  400 mg Oral Nightly    melatonin  1 mg Oral Nightly    heparin  5,000 Units Subcutaneous Q8H SUJATA    gabapentin  300 mg Oral BID    acetaminophen  650 mg Oral 4 times per day    cefepime  1 g Intravenous Q24H    [Held by provider] insulin aspart  1-68 Units Subcutaneous TID CC and HS    docusate sodium  100 mg Oral BID    sennosides  8.6 mg Oral BID    insulin degludec  11 Units Subcutaneous Nightly    sevelamer carbonate  1,600 mg Oral TID CC

## 2025-05-05 NOTE — PROGRESS NOTES
Progress Note  Richy Goins Patient Status:  Inpatient    1960 MRN [id number]   Location [facility] 4SW-A Attending Jeremi Norwood MD   Hosp Day # 6 PCP Woody Dahl MD     Subjective:  No cardiac events over night. 02 at 4 liters.  Ingoing HD this am. In good spirits.     Objective:  /56   Pulse 73   Temp 98.3 °F (36.8 °C) (Temporal)   Resp 22   Wt 173 lb 4.5 oz (78.6 kg)   SpO2 95%   BMI 26.35 kg/m²     Telemetry: SR 72 BPM.      Intake/Output:    Intake/Output Summary (Last 24 hours) at 2025 0927  Last data filed at 2025 0800  Gross per 24 hour   Intake 593.25 ml   Output 170 ml   Net 423.25 ml       Last 3 Weights   25 0000 173 lb 4.5 oz (78.6 kg)   25 0600 172 lb 13.5 oz (78.4 kg)   25 0200 168 lb 6.9 oz (76.4 kg)   25 0353 163 lb 9.3 oz (74.2 kg)   25 0038 180 lb (81.6 kg)   25 2310 180 lb (81.6 kg)   25 1623 170 lb (77.1 kg)   25 1055 170 lb (77.1 kg)   10/02/24 0458 181 lb (82.1 kg)   10/01/24 1056 175 lb 0.7 oz (79.4 kg)   10/01/24 0544 175 lb (79.4 kg)       Labs:  Recent Labs   Lab 25  1334 25  0445 25  0443   * 106* 94   BUN 20 26* 39*   CREATSERUM 3.12* 4.38*  4.38* 5.75*  5.75*   EGFRCR 21* 14*  14* 10*  10*   CA 8.3* 8.1* 8.2*    141 139   K 3.4* 4.0 4.3    104 101   CO2 26.0 26.0 25.0     Recent Labs   Lab 25  1804 25  1837 25  0427 25  0445 25  0443   RBC 4.64   < > 4.62 4.19 3.84   HGB 13.6   < > 13.5 12.3* 11.1*   HCT 39.2   < > 38.0* 35.7* 34.0*   MCV 84.5   < > 82.3 85.2 88.5   MCH 29.3   < > 29.2 29.4 28.9   MCHC 34.7   < > 35.5 34.5 32.6   RDW 14.8   < > 14.9 16.1 16.1   NEPRELIM 12.53*  --  8.31* 9.09*  --    WBC 14.4*   < > 10.3 11.2* 10.2   .0   < > 174.0 141.0* 142.0*    < > = values in this interval not displayed.         Recent Labs   Lab 25  1231 25  1305   TROPHS 213* 258*     5/3/25: EKG:  SR RBBB HR: 64 BPM,  QRS:  128 ms, QTc: 567 ms, ID: 162 ms.   5/2/25: Echo:   1. Left ventricle: The cavity size was normal. Wall thickness was normal.      Systolic function was normal. The estimated ejection fraction was 55-60%,      by visual assessment. Technical limitations of the study preclude      regional wall motion analysis.   2. Right ventricle: The cavity size was normal. Systolic function was      severely reduced. The tricuspid annular plane systolic excursion (TAPSE)      is 0.97cm.   3. Left atrium: The left atrial volume was normal.   4. Pericardium, extracardiac: There was a large left pleural effusion with      complex heterogenous material.   5. Systemic veins: Central venous respirophasic diameter changes are blunted      (< 50%).   6. Inferior vena cava: The IVC was dilated.     1/10/25:  Nuclear Stress Test:   There is a fixed defect which involves the entire inferior wall from base to apex without evidence of reversibility.   There is hypokinesis of the inferior wall with an ejection fraction of 39%.   EKG response to regadenoson is normal.      Review of Systems:   Constitutional: No fevers, chills, fatigue or night sweats.  ENT: No mouth pain, neck pain, running nose, headaches or swollen glands.  Skin: No rashes, pruritus or skin changes,  Respiratory: Denies cough, wheezing or shortness of breath.  CV: Denies chest pain, palpitations, orthopnea, PND or dizziness.  Musculoskeletal: No joint pain, stiffness or swelling.  GI: No nausea, vomiting or diarrhea. No blood in stools.  Neurologic: No seizures, tremors, weakness or numbness.     Physical Exam:  Gen: alert, oriented x 3, NAD  Heent: pupils equal, reactive. Mucous membranes moist.   Neck: no jvd  Cardiac: regular rate and rhythm, normal S1,S2, no  murmru, gallop or rub.   Lungs: CTA  Abd: soft, NT/ND +bs  Ext: Bilateral  BKA.   Skin: Warm, dry  Neuro: No focal deficits    Medications:    Scheduled Medications[1]  Medication  Infusions[2]    Assessment:  POD # 3:  Emergent thoracoscopy- VATS-Hematoma evaluation for L hemothorax and cessation of intercostal artery, per cardiothoracic surgery.   Massive bleed with multiple transfusions.   - Left thora- 1600 ml. CT -with 170 ml out in  the  past 24 hours.   Hx multiple thoracentesis.   Acute hemorrhagic shock:  Secondary to the above. Hg initially 6.0  Code Blue on . Initially was on Amio- which was stopped D/t bradycardia.   Received at least 15 Units of PRBCs and FFP.   Hgb: 15.1 plates: 142- this am.   Initially acute respiratory failure- requiring intubation. Extubated 5/3.  Sputum + Klebseilla.   Abnormal EK/2- EKG with Inferior ST elevation and Anterior ST depression with RBBB.   Hx IWMI.  Trop neg. Secondary to acute hypovolemic shock.   LVEF:  55-60 %.  1/10/25: Nuc ST:  Fixed defect entire inferior wall EF: 39 %.   Resolved.   Type 2 DM: Insulin.   Dyslipidemia  ESRD: HD for fluid management.   Hx Severe PAD:    Previous L BKA for gangrene in April.   Hx R BKA.   Wound care following.   Chronic pain: Fernanda.     Plan:  POD # 3 Emergent thoracoscopy- VATS-Hematoma evaluation for L hemothorax and cessation of intercostal artery.  On 4 liters.   Feeling quite good.   No anginal symptoms.   HD for volume management.         Cardiology attending:  Pt seen and independently examined.  Note edited.  Plan of care performed by myself in its entirety.  NSTEMI - unimpressive troponin elevation considering hemorrhagic shock.  EF is preserved.  No indication for ischemic w/u.  Plan per critical care team.  Cardiac status is stable.          Ashkan Villa MD  L3  Plan of care discussed with patient, RN.    June Carlos, APRN  2025  9:27 AM            [1]    gabapentin  400 mg Oral Nightly    melatonin  1 mg Oral Nightly    [Held by provider] insulin aspart  1-68 Units Subcutaneous TID CC and HS    docusate sodium  100 mg Oral BID    sennosides  8.6 mg Oral BID    insulin  degludec  11 Units Subcutaneous Nightly    ceFAZolin  1 g Intravenous Q24H    Vancomycin IV  1 each Intravenous See Admin Instructions (RX holding)    sevelamer carbonate  1,600 mg Oral TID CC   [2]

## 2025-05-05 NOTE — PLAN OF CARE
Received pt alert oriented to place and self, noted short term memory issues, wife at bedside. Pt remains on NC, spontaneous coughing up secretions and self suctioning.  Left CT intact to -20 of suction. SR, bp stable, tolerating po, decrease appetite. HS accucheck 69, oral glucose given, repeat 73, pt refused oral coverage d/t taste, D50 given one amp repeat 171. Notified PAULN Maribel Hale adjusted, Left av fistula without bruit/ thrill. Pt anxious about being able to sleep, wife concerned pt has been up all day. Precedex started during HS care.

## 2025-05-05 NOTE — PROGRESS NOTES
[facility]  Nephrology Progress Note    Richy Goins Attending:  Jeremi Norwood MD       Assessment and Plan:    1) ESRD- due to longstanding DM 2; last HD . Lytes OK / volume up overall -> HD / UF today (off schedule)     2) Acute hypoxic resp failure due to pul edema + pleural effusion / hemothorax / arrest- extubated     3) L pleural effusion s/p thoracentesis -> massive hemothorax s/p chest tube    4) Occluded L UE AVF -> IR to evaluate and hopefully declot this week      5) Anemia- due to CKD / above / chronic disease s/p transfusions + EPO     6) Longstanding DM 2     7) PAD s/p remote R BKA; s/p recent L BKA       Subjective:  Awake pretty alert doesn't quite recall events of last 48 hrs    Physical Exam:   /56   Pulse 73   Temp 98.3 °F (36.8 °C) (Temporal)   Resp 22   Wt 173 lb 4.5 oz (78.6 kg)   SpO2 95%   BMI 26.35 kg/m²   Temp (24hrs), Av.2 °F (36.8 °C), Min:97.7 °F (36.5 °C), Max:98.4 °F (36.9 °C)       Intake/Output Summary (Last 24 hours) at 2025 0951  Last data filed at 2025 0800  Gross per 24 hour   Intake 593.25 ml   Output 170 ml   Net 423.25 ml     Wt Readings from Last 3 Encounters:   25 173 lb 4.5 oz (78.6 kg)   25 170 lb (77.1 kg)   10/02/24 181 lb (82.1 kg)     General: awake alert  HEENT: No scleral icterus, MMM  Neck: Supple, no SARIAT or thyromegaly  Cardiac: Regular rate and rhythm, S1, S2 normal, no murmur or tub  Lungs: Decreased BS at bases bilaterally   Abdomen: Soft, non-tender. + bowel sounds, no palpable organomegaly  Extremities: Without clubbing, cyanosis; no edema  Neurologic: Cranial nerves grossly intact, moving all extremities  Skin: Warm and dry, no rashes       Labs:   Lab Results   Component Value Date    WBC 10.2 2025    HGB 11.1 2025    HCT 34.0 2025    .0 2025    CREATSERUM 5.75 2025    CREATSERUM 5.75 2025    BUN 39 2025     2025    K 4.3 2025      05/05/2025    CO2 25.0 05/05/2025    GLU 94 05/05/2025    CA 8.2 05/05/2025    MG 2.2 05/05/2025    PGLU 172 05/05/2025       Imaging:  All imaging studies reviewed.    Meds:   Current Hospital Medications[1]      Questions/concerns were discussed with patient and/or family by bedside.          Keeley Blackwell MD  5/5/2025  9:51 AM         [1]   Current Facility-Administered Medications   Medication Dose Route Frequency    gabapentin (Neurontin) cap 400 mg  400 mg Oral Nightly    melatonin tab 1 mg  1 mg Oral Nightly    heparin (Porcine) 5000 UNIT/ML injection 5,000 Units  5,000 Units Subcutaneous 2 times per day    gabapentin (Neurontin) cap 300 mg  300 mg Oral BID    acetaminophen (Tylenol) tab 650 mg  650 mg Oral 4 times per day    [Held by provider] insulin aspart (NovoLOG) 100 Units/mL FlexPen 1-68 Units  1-68 Units Subcutaneous TID CC and HS    docusate sodium (Colace) cap 100 mg  100 mg Oral BID    sennosides (Senokot) tab 8.6 mg  8.6 mg Oral BID    insulin degludec (Tresiba) 100 units/mL flextouch 11 Units  11 Units Subcutaneous Nightly    sodium bicarbonate tab 650 mg  650 mg Per G Tube PRN    And    pancrelipase (Lip-Prot-Amyl) (Zenpep) DR particles cap 10,000 Units  10,000 Units Per G Tube PRN    ceFAZolin (Ancef) 1 g in dextrose 5% 100mL IVPB-ADD  1 g Intravenous Q24H    polyethylene glycol (PEG 3350) (Miralax) 17 g oral packet 17 g  17 g Per NG Tube Daily PRN    sevelamer carbonate (Renvela) tab 1,600 mg  1,600 mg Oral TID CC    glucose (Dex4) 15 GM/59ML oral liquid 15 g  15 g Oral Q15 Min PRN    Or    glucose (Glutose) 40% oral gel 15 g  15 g Oral Q15 Min PRN    Or    glucose-vitamin C (Dex-4) chewable tab 4 tablet  4 tablet Oral Q15 Min PRN    Or    dextrose 50% injection 50 mL  50 mL Intravenous Q15 Min PRN    Or    glucose (Dex4) 15 GM/59ML oral liquid 30 g  30 g Oral Q15 Min PRN    Or    glucose (Glutose) 40% oral gel 30 g  30 g Oral Q15 Min PRN    Or    glucose-vitamin C (Dex-4) chewable tab 8  tablet  8 tablet Oral Q15 Min PRN    sennosides (Senokot) tab 17.2 mg  17.2 mg Oral Nightly PRN    ondansetron (Zofran) 4 MG/2ML injection 4 mg  4 mg Intravenous Q6H PRN

## 2025-05-05 NOTE — PROGRESS NOTES
CRITICAL CARE PROGRESS NOTE    Patient Name: Richy Goins  : 1960  MRN: [id number]  Admit Date: 2025  Length of Stay: 6 Days    Subjective/24h events: Patient required Precedex overnight due to mild agitation memory still impaired oriented x 1-2 still does not recall events of hospital course    Assessment and Plan: 64-year-old male multiple comorbidities admitted with respiratory failure hemothorax    Neuro/Psych: Mild encephalopathy although improving  Received Precedex overnight  Melatonin will be added tonight  Increase gabapentin to 400 nightly caution with end-stage renal disease dosing      Cardiovascular: Mild shock now resolved norepinephrine weaned off  Bedside echo low normal EF RV appears adequate as opposed to periarrest    A-fib with RVR presumptively reactive now in sinus    Pulmonary: Respiratory failure extubated yesterday  On 4 L nasal cannula still with productive sputum would like to dialyze today awaiting sputum culture    Iatrogenic hemothorax approximately 170 cc out of chest tube last 24 hours appreciate thoracic surgery recommendations currently on suction    GI: Surprisingly no ischemic hepatopathy continue to monitor  GI prophylaxis not needed we will discontinue  Renal diet    Renal/Metabolic: End-stage renal disease  may benefit from dialysis off schedule today appreciate nephrology recommendations may help oxygenation    Previous long-term fistula versus graft??  No bruit or thrill will make nephrology aware currently with Dialysis catheter  PermCath??    Heme: Anemia of chronic disease then significant blood loss with antonino in the 6 range now status post massive transfusion hemoglobin stable  Restart DVT prophylaxis if okay with thoracic    ID: No fever leukocytosis resolved await sputum culture no signs of aspiration but could be volume overload will treat based on culture data  Patient finishing up course of Ancef for lower extremity  amputation      Endocrine: History of diabetes mellitus continue sliding scale hypoglycemia noted overnight will monitor advance diet    Peripheral vascular occlusive disease status post bilateral amputations still with staples appreciate wound care recommendations reportedly was supposed to have staples removed but was hospitalized needs to be addressed today      ICU checklist  Feeding: Renal diet  Analgesia:   Sedation: Precedex  Thromboembolism PPX: Heparin subcu  Stress Ulcer PPX: PPI  Glucose control: Sliding scale  Bowel Regimen:   Lines/drains/tubes: Right IJ dialysis catheter radial arterial line  Deescalation of antibiotics: Ancef discontinued today??  Therapies:PT/OT/ST when appropriate    Code Status: Full Code          Physical Exam      Hemodynamics  24h Vitals:  VitalLast Value (24 Hour)24 Hour Range  Temp98.1 °F (36.7 °C)Temp  Min: 97 °F (36.1 °C)  Max: 98.1 °F (36.7 °C)  [id number]  Min: 56  Max: 163  BP (Non-Invasive)98/54 BP  Min: 83/53  Max: 139/82  BP (A-Line)102/50AO  Min: 88/56  Max: 250/106  CVP  could not be evaluated. This SmartLink does not work with rows of the type:   [id number]  Min: 12  Max: 29  [id number] %SpO2  Min: 74 %  Max: 100 %  O2 Therapy

## 2025-05-05 NOTE — SLP NOTE
ADULT SWALLOWING EVALUATION    ASSESSMENT    ASSESSMENT/OVERALL IMPRESSION:  Patient is a 63 y/o male admitted initially with dyspnea and PMHx significant for ESRD, HTN, PAD, and HLD. SLP order received to evaluate oropharyngeal swallow. Patient received alert in bed. He was extubated yesterday and started on a clear liquid diet. RN noted coughing while consuming jello this morning. Patient denied history of dysphagia symptoms and reported consuming a regular diet and thin liquids at baseline. Today, patient reported pain in his sternum when coughing.    Patient presented with a largely intact oropharyngeal swallow. Bolus acceptance was adequate without evidence of anterior bolus loss. Mastication and AP bolus transit were thorough and efficient without evidence of oral residue. Pharyngeal swallow initiation appeared timely and hyolaryngeal excursion was adequate per palpation.  No overt s/s of aspiration observed and patient denied odynophagia and globus sensation across consistencies. However, patient exhibited dry heave and cough after clinician left the room. Suspect possible reflux/regurgitation.    Patient appeared safe to initiate a regular diet and thin liquids from an oropharyngeal standpoint. SLP will continue to follow to monitor diet tolerance and adjust as appropriate. Education provided re: results and recommendations.         RECOMMENDATIONS   Diet Recommendations - Solids: Regular  Diet Recommendations - Liquids: Thin Liquids                              Medication Administration Recommendations: No restrictions  Treatment Plan/Recommendations: Aspiration precautions    HISTORY   MEDICAL HISTORY  Reason for Referral: R/O aspiration    Problem List  Principal Problem:    Hyperkalemia  Active Problems:    Essential hypertension    Azotemia    Elevated procalcitonin    Pleural effusion    Acute respiratory failure with hypoxia (HCC)    Anemia, unspecified type    Shock (HCC)    ABLA (acute blood loss  anemia)    Confusion    Hemothorax on left    Acute hypoxic respiratory failure (HCC)    Arteriovenous fistula occlusion    Hemorrhagic shock (HCC)      Past Medical History  Past Medical History[1]       Diet Prior to Admission: Regular, Thin liquids       Patient/Family Goals: none stated    SWALLOWING HISTORY  Current Diet Consistency:  (clear liquid)  Dysphagia History: as above  Imaging Results:   CXR 5/4/25  CONCLUSION:  Improving airspace disease left lung.         LOCATION:  [facility]                  Dictated by (CST): Rob Reyes MD on 5/04/2025 at 7:09 AM       Finalized by (CST): Rob Reyes MD on 5/04/2025 at 7:11 AM     SUBJECTIVE       OBJECTIVE   ORAL MOTOR EXAMINATION  Dentition: Functional  Symmetry: Within Functional Limits  Strength: Within Functional Limits     Range of Motion: Within Functional Limits       Voice Quality: Clear  Respiratory Status: Nasal cannula  Consistencies Trialed: Thin liquids, Puree, Hard solid  Method of Presentation: Self presentation  Patient Positioned: Upright, Midline    Oral Phase of Swallow: Within Functional Limits                      Pharyngeal Phase of Swallow: Within Functional Limits           (Please note: Silent aspiration cannot be evaluated clinically. Videofluoroscopic Swallow Study is required to rule-out silent aspiration.)    Esophageal Phase of Swallow: Complaints consistent with possible esophageal involvement  Comments: d/w RN              GOALS  Goal #1 The patient will tolerate regular consistency and thin liquids without overt signs or symptoms of aspiration with 90 % accuracy over 1-2 session(s).  In Progress   Goal #2 The patient/family/caregiver will demonstrate understanding and implementation of aspiration precautions and swallow strategies independently over 1-2 session(s).    In Progress   Goal #3     Goal #4     Goal #5     Goal #6     Goal #7     Goal #8       FOLLOW UP  Treatment Plan/Recommendations: Aspiration precautions  Duration: 1  week  Follow Up Needed (Documentation Required): Yes  SLP Follow-up Date: 05/06/25    Thank you for your referral.   If you have any questions, please contact JENNIFER Ascencio       [1]   Past Medical History:   Belching    Dialysis patient    Diarrhea, unspecified    Esophageal reflux    Essential hypertension    Fatigue    Flatulence/gas pain/belching    High blood pressure    High cholesterol    History of blood transfusion    Hyperlipidemia    Indigestion    Irregular bowel habits    Night sweats    Osteoporosis    Peripheral vascular disease    Pure hypercholesterolemia    Type II or unspecified type diabetes mellitus without mention of complication, not stated as uncontrolled    Visual impairment    reading glasses    Vomiting

## 2025-05-05 NOTE — PROGRESS NOTES
[facility] Progress Note     SUBJECTIVE/Interval history:  No acute events overnight, he notes ongoing pain in left chest. No fevers    Review of Systems:   A comprehensive 14 point review of systems was completed.   Pertinent positives and negatives noted in the HPI.    Medications  Reviewed personally  Scheduled Medications[1]  PRN Medications[2]    OBJECTIVE:  Vitals:    05/05/25 0600 05/05/25 0700 05/05/25 0800 05/05/25 0900   BP: 103/53 110/59 112/52 120/56   BP Location:   Right arm    Pulse: 59 70 72 73   Resp: 22 26 25 22   Temp:   98.3 °F (36.8 °C)    TempSrc:   Temporal    SpO2: 94% 92% 95% 95%   Weight:           Vital signs in last 24 hours:  Blood pressure 120/56, pulse 73, temperature 98.3 °F (36.8 °C), temperature source Temporal, resp. rate 22, weight 173 lb 4.5 oz (78.6 kg), SpO2 95%.     Intake/Output:  I/O last 3 completed shifts:  In: 1510.3 [P.O.:145; I.V.:545.3; NG/GT:620; IV PIGGYBACK:200]  Out: 270 [Chest Tube:270]       Physical Exam:  General: Appears alert, oriented x3. No acute distress  Neurologic:No focal deficits noted.  Alert.  Oriented.  Lungs:rhonchi bilaterally. No wheeze  chest wall:chest tube to left with no leak seen  Heart:RRR no m  Abdomen: soft, nondistended, no rigidity, no reaction with palpation. No hernias noted  Extremities:No overt deformities, edema    Lab Data Review:   Recent Labs   Lab 05/03/25  1334 05/04/25  0445 05/05/25  0443   * 106* 94   BUN 20 26* 39*   CREATSERUM 3.12* 4.38*  4.38* 5.75*  5.75*   CA 8.3* 8.1* 8.2*    141 139   K 3.4* 4.0 4.3    104 101   CO2 26.0 26.0 25.0     Recent Labs   Lab 05/02/25  1804 05/02/25  1837 05/03/25  0427 05/04/25  0445 05/05/25  0443   RBC 4.64   < > 4.62 4.19 3.84   HGB 13.6   < > 13.5 12.3* 11.1*   HCT 39.2   < > 38.0* 35.7* 34.0*   MCV 84.5   < > 82.3 85.2 88.5   MCH 29.3   < > 29.2 29.4 28.9   MCHC 34.7   < > 35.5 34.5 32.6   RDW 14.8   < > 14.9 16.1 16.1   NEPRELIM  12.53*  --  8.31* 9.09*  --    WBC 14.4*   < > 10.3 11.2* 10.2   .0   < > 174.0 141.0* 142.0*    < > = values in this interval not displayed.     No results for input(s): \"BNP\" in the last 168 hours.  No results for input(s): \"TROP\", \"CK\" in the last 168 hours.  Recent Labs   Lab 05/01/25  0810 05/02/25  1449 05/02/25  1837 05/03/25  0427   INR 1.26* 1.38*  --  1.33*   PTT  --   --  28.4  --      Recent Labs   Lab 05/04/25  1030   ABGPHT 7.44   WDLELS0B 39   LMHYK9A 97   ABGHCO3 26.6   ABGBE 2.2*   TEMP 98.6   NOEMÍ Positive   SITE Left Radial   DEV Nasal cannula   THGB 11.8*       Other Labs:  Interval Culture Data:   Hospital Encounter on 04/29/25   1. Body Fluid Cult Aerobic and Anaerobic     Status: None (Preliminary result)    Collection Time: 05/02/25 10:00 AM    Specimen: Pleural Fluid, Left; Body fluid, unspecified   Result Value Ref Range    Body Fluid Culture Result No Growth 2 Days N/A    Body Fluid Smear 1+ WBCs seen N/A    Body Fluid Smear No organisms seen N/A    Body Fluid Smear This is a cytocentrifuged smear. N/A     No results for input(s): \"COLORUR\", \"CLARITY\", \"SPECGRAVITY\", \"GLUUR\", \"BILUR\", \"KETUR\", \"BLOODURINE\", \"PHURINE\", \"PROUR\", \"UROBILINOGEN\", \"NITRITE\", \"LEUUR\", \"WBCUR\", \"RBCUR\", \"BACUR\", \"EPIUR\" in the last 168 hours.    Interval Radiology:   Reviewed personally  XR CHEST AP PORTABLE  (CPT=71045)  Result Date: 5/4/2025  CONCLUSION:  Improving airspace disease left lung.   LOCATION:  [facility]      Dictated by (CST): Rob Reyes MD on 5/04/2025 at 7:09 AM     Finalized by (CST): Rob Reyes MD on 5/04/2025 at 7:11 AM       XR CHEST AP/PA (1 VIEW) (CPT=71045)  Result Date: 5/3/2025  CONCLUSION:   1.  Dobbhoff tube in the stomach.  The remainder of the lines and tubes are stable since previous study.   2. Stable appearance of patchy airspace disease throughout the left lung with residual small amount of fluid in gas within the left lateral pleural space.   LOCATION:  [facility]       Dictated by (CST): Yoan Duvall MD on 5/03/2025 at 6:17 PM     Finalized by (CST): Yoan Duvall MD on 5/03/2025 at 6:18 PM       XR CHEST AP PORTABLE  (CPT=71045)  Result Date: 5/3/2025  CONCLUSION:  1. Overall no significant change in the appearance of the portable chest radiograph in the short interval since the prior study. 2. Lucency left lateral lung base is most likely a pneumothorax related incomplete expansion of lung (trapped lung).   LOCATION:  [facility]      Dictated by (CST): Camden Deleon MD on 5/03/2025 at 6:32 AM     Finalized by (CST): Camden Deleon MD on 5/03/2025 at 6:34 AM       XR CHEST AP PORTABLE  (CPT=71045)  Result Date: 5/2/2025  CONCLUSION:  Placement of a large bore left chest tube.  Evacuation of the left pleural fluid/hemo thorax.  Incomplete re-expansion of the left lung with a mild to moderate basilar pneumothorax.   LOCATION:  [facility]      Dictated by (CST): Rob Reyes MD on 5/02/2025 at 6:55 PM     Finalized by (CST): Rob Reyes MD on 5/02/2025 at 6:58 PM       XR CHEST AP PORTABLE  (CPT=71045)  Result Date: 5/2/2025  CONCLUSION:  A portion of left mid lung is now aerated.  Other findings appear stable.   LOCATION:  [facility]      Dictated by (CST): Rob Reyes MD on 5/02/2025 at 5:34 PM     Finalized by (CST): Rob Reyes MD on 5/02/2025 at 5:36 PM       XR CHEST AP PORTABLE  (CPT=71045)  Result Date: 5/2/2025  CONCLUSION:  Complete opacification left hemithorax with shift of mediastinal structures from left to right.  Finding was discussed with the ICU RN caring for the patient on May 2, 2025 at 2:30 p.m..  By this time a chest tube have been placed.  Findings  would be consistent with a large hemo thorax.   LOCATION:  [facility]      Dictated by (CST): Camden Deleon MD on 5/02/2025 at 2:25 PM     Finalized by (CST): Camden Deleon MD on 5/02/2025 at 2:30 PM       US THORACENTESIS GUIDED LEFT (CPT=32555)  Result Date: 5/2/2025  CONCLUSION:  Ultrasound-guided left thoracentesis of 1600 cc  was performed without complication.   LOCATION:  [facility]    Dictated by (CST): Oskar Lynn MD on 5/02/2025 at 1:33 PM     Finalized by (CST): Oskar Lynn MD on 5/02/2025 at 1:34 PM       XR CHEST AP PORTABLE  (CPT=71045)  Result Date: 5/2/2025  CONCLUSION:  Decreased size of left pleural effusion without pneumothorax.  There continues to be significant amount of opacity within the left hemithorax.   LOCATION:  [facility]      Dictated by (CST): Yoan Duvall MD on 5/02/2025 at 12:14 PM     Finalized by (CST): Yoan Duvall MD on 5/02/2025 at 12:15 PM       XR CHEST AP PORTABLE  (CPT=71045)  Result Date: 4/29/2025  CONCLUSION:   There is nearly complete opacification of the left hemithorax which partially obscures the cardiomediastinal silhouette.  This likely represents a combination of large pleural effusion and passive atelectasis, though superimposed infection or aspiration not excluded.  Mild interstitial edema noted throughout the right lung.  The right pleural space remains clear.   LOCATION:  [facility]      Dictated by (CST): Patricio Telles MD on 4/29/2025 at 9:14 PM     Finalized by (CST): Patricio Telles MD on 4/29/2025 at 9:15 PM           Assessment:  Hemorrhagic shock secondary to hemothorax, improved  Intrathoracic bleed following thoracentesis now status post intercostal artery cautery with thoracotomy-no further bleeding  Recurrent left-sided pleural effusion present since January 2025, s/p thoracentesis c/b hemothorax; fluid analysis with lymphocytic exudate  Possible inferior wall STEMI cardiology note appreciated-with changes thought related to severity of shock  Dyspnea mild hypoxia on presentation related to large left pleural effusion, now improved  End-stage renal disease on HD  Severe peripheral vascular disease bilateral BKA's most recently last month    Plan:  Monitor respiratory status, wean o2 for sats >89%  Chest tube per thoracic surgery  Control pain  Await cytology results from pleural effusion  although low suspicion for malignancy         [1]    gabapentin  400 mg Oral Nightly    melatonin  1 mg Oral Nightly    heparin  5,000 Units Subcutaneous Q8H SUJATA    gabapentin  300 mg Oral BID    acetaminophen  650 mg Oral 4 times per day    cefepime  1 g Intravenous Q24H    [Held by provider] insulin aspart  1-68 Units Subcutaneous TID CC and HS    docusate sodium  100 mg Oral BID    sennosides  8.6 mg Oral BID    insulin degludec  11 Units Subcutaneous Nightly    sevelamer carbonate  1,600 mg Oral TID CC   [2]   sodium bicarbonate **AND** lipase-protease-amylase (Lip-Prot-Amyl)    polyethylene glycol (PEG 3350)    glucose **OR** glucose **OR** glucose-vitamin C **OR** dextrose **OR** glucose **OR** glucose **OR** glucose-vitamin C    sennosides    ondansetron

## 2025-05-06 LAB
ANION GAP SERPL CALC-SCNC: 11 MMOL/L (ref 0–18)
BUN BLD-MCNC: 26 MG/DL (ref 9–23)
CALCIUM BLD-MCNC: 8.1 MG/DL (ref 8.7–10.6)
CHLORIDE SERPL-SCNC: 104 MMOL/L (ref 98–112)
CO2 SERPL-SCNC: 26 MMOL/L (ref 21–32)
CREAT BLD-MCNC: 4.17 MG/DL (ref 0.7–1.3)
EGFRCR SERPLBLD CKD-EPI 2021: 15 ML/MIN/1.73M2 (ref 60–?)
ERYTHROCYTE [DISTWIDTH] IN BLOOD BY AUTOMATED COUNT: 15.8 %
GLUCOSE BLD-MCNC: 66 MG/DL (ref 70–99)
GLUCOSE BLD-MCNC: 72 MG/DL (ref 70–99)
GLUCOSE BLD-MCNC: 79 MG/DL (ref 70–99)
GLUCOSE BLD-MCNC: 84 MG/DL (ref 70–99)
GLUCOSE BLD-MCNC: 89 MG/DL (ref 70–99)
GLUCOSE BLD-MCNC: 99 MG/DL (ref 70–99)
HCT VFR BLD AUTO: 32.1 % (ref 39–53)
HGB BLD-MCNC: 10.4 G/DL (ref 13–17.5)
MCH RBC QN AUTO: 29.1 PG (ref 26–34)
MCHC RBC AUTO-ENTMCNC: 32.4 G/DL (ref 31–37)
MCV RBC AUTO: 89.7 FL (ref 80–100)
OSMOLALITY SERPL CALC.SUM OF ELEC: 295 MOSM/KG (ref 275–295)
PLATELET # BLD AUTO: 147 10(3)UL (ref 150–450)
POTASSIUM SERPL-SCNC: 3.4 MMOL/L (ref 3.5–5.1)
RBC # BLD AUTO: 3.58 X10(6)UL (ref 3.8–5.8)
SODIUM SERPL-SCNC: 141 MMOL/L (ref 136–145)
WBC # BLD AUTO: 8.9 X10(3) UL (ref 4–11)

## 2025-05-06 PROCEDURE — 99233 SBSQ HOSP IP/OBS HIGH 50: CPT | Performed by: INTERNAL MEDICINE

## 2025-05-06 PROCEDURE — 99232 SBSQ HOSP IP/OBS MODERATE 35: CPT | Performed by: HOSPITALIST

## 2025-05-06 PROCEDURE — 99232 SBSQ HOSP IP/OBS MODERATE 35: CPT

## 2025-05-06 NOTE — PLAN OF CARE
Received pt alert and oriented x4, recall improving, HORNE, remains on NC, lungs diminished rhonchi, pt able to cough up secretions and self suction. Left CT to -20 suction, serosang drainage,  SR, bp stable, tolerating po, abd soft, bs present, refused colace and senna last night, left av fistula without bruit/ thrill, Right hd catheter intact. Clarified Neurontin dose for HS with Crescencio LOUIS, HS care provided Wife at bedside updated on POC. ATC tylenol for chest pain from compressions.     AM labs called with critical results, Sputum culture results reported to Crescencio LOUIS, placed on contact isolation. Am glucose 66 accucheck done 72 pt asymptomatic, apple juice x2

## 2025-05-06 NOTE — PROGRESS NOTES
Progress Note  Richy Goins Patient Status:  Inpatient    1960 MRN [id number]   Location [facility] 4SW-A Attending Jeremi Norwood MD   Hosp Day # 7 PCP Woody Dahl MD     Subjective:  Cardiac wise, no reported issues.  Has transfer orders.    Objective:  /61   Pulse 70   Temp 97.1 °F (36.2 °C) (Temporal)   Resp 17   Wt 170 lb 13.7 oz (77.5 kg)   SpO2 97%   BMI 25.98 kg/m²     Telemetry: SR 72 BPM.      Intake/Output:    Intake/Output Summary (Last 24 hours) at 2025 0927  Last data filed at 2025 0400  Gross per 24 hour   Intake 120 ml   Output 2150 ml   Net -2030 ml       Last 3 Weights   25 0000 170 lb 13.7 oz (77.5 kg)   25 0000 173 lb 4.5 oz (78.6 kg)   25 0600 172 lb 13.5 oz (78.4 kg)   25 0200 168 lb 6.9 oz (76.4 kg)   25 0353 163 lb 9.3 oz (74.2 kg)   25 0038 180 lb (81.6 kg)   25 2310 180 lb (81.6 kg)   25 1623 170 lb (77.1 kg)   25 1055 170 lb (77.1 kg)   10/02/24 0458 181 lb (82.1 kg)   10/01/24 1056 175 lb 0.7 oz (79.4 kg)   10/01/24 0544 175 lb (79.4 kg)       Labs:  Recent Labs   Lab 25  0445 25  0443 25  0456   * 94 66*   BUN 26* 39* 26*   CREATSERUM 4.38*  4.38* 5.75*  5.75* 4.17*   EGFRCR 14*  14* 10*  10* 15*   CA 8.1* 8.2* 8.1*    139 141   K 4.0 4.3 3.4*    101 104   CO2 26.0 25.0 26.0     Recent Labs   Lab 25  1804 25  1837 25  0427 25  0445 25  0443 25  0456   RBC 4.64   < > 4.62 4.19 3.84 3.58*   HGB 13.6   < > 13.5 12.3* 11.1* 10.4*   HCT 39.2   < > 38.0* 35.7* 34.0* 32.1*   MCV 84.5   < > 82.3 85.2 88.5 89.7   MCH 29.3   < > 29.2 29.4 28.9 29.1   MCHC 34.7   < > 35.5 34.5 32.6 32.4   RDW 14.8   < > 14.9 16.1 16.1 15.8   NEPRELIM 12.53*  --  8.31* 9.09*  --   --    WBC 14.4*   < > 10.3 11.2* 10.2 8.9   .0   < > 174.0 141.0* 142.0* 147.0*    < > = values in this interval not displayed.         Recent Labs   Lab  05/02/25  1231 05/02/25  1305   TROPHS 213* 258*     5/3/25: EKG:  SR RBBB HR: 64 BPM, QRS:  128 ms, QTc: 567 ms, WV: 162 ms.   5/2/25: Echo:   1. Left ventricle: The cavity size was normal. Wall thickness was normal.      Systolic function was normal. The estimated ejection fraction was 55-60%,      by visual assessment. Technical limitations of the study preclude      regional wall motion analysis.   2. Right ventricle: The cavity size was normal. Systolic function was      severely reduced. The tricuspid annular plane systolic excursion (TAPSE)      is 0.97cm.   3. Left atrium: The left atrial volume was normal.   4. Pericardium, extracardiac: There was a large left pleural effusion with      complex heterogenous material.   5. Systemic veins: Central venous respirophasic diameter changes are blunted      (< 50%).   6. Inferior vena cava: The IVC was dilated.     1/10/25:  Nuclear Stress Test:   There is a fixed defect which involves the entire inferior wall from base to apex without evidence of reversibility.   There is hypokinesis of the inferior wall with an ejection fraction of 39%.   EKG response to regadenoson is normal.      Review of Systems:   Constitutional: No fevers, chills, fatigue or night sweats.  ENT: No mouth pain, neck pain, running nose, headaches or swollen glands.  Skin: No rashes, pruritus or skin changes,  Respiratory: Denies cough, wheezing or shortness of breath.  CV: Denies chest pain, palpitations, orthopnea, PND or dizziness.  Musculoskeletal: No joint pain, stiffness or swelling.  GI: No nausea, vomiting or diarrhea. No blood in stools.  Neurologic: No seizures, tremors, weakness or numbness.     Physical Exam:  Gen: alert, oriented x 3, NAD  Heent: pupils equal, reactive. Mucous membranes moist.   Neck: no jvd  Cardiac: regular rate and rhythm, normal S1,S2, no  murmru, gallop or rub.   Lungs: CTA  Abd: soft, NT/ND +bs  Ext: Bilateral  BKA.   Skin: Warm, dry  Neuro: No focal  deficits    Medications:    Scheduled Medications[1]  Medication Infusions[2]    Assessment:  POD # 4:  Emergent thoracoscopy- VATS-Hematoma evaluation for L hemothorax and cessation of intercostal artery, per cardiothoracic surgery.   Massive bleed with multiple transfusions.   - Left thora- 1600 ml. CT -with 170 ml out in  the  past 24 hours.   Hx multiple thoracentesis.   Acute hemorrhagic shock:  Secondary to the above. Hg initially 6.0  Code Blue on . Initially was on Amio- which was stopped D/t bradycardia.   Received at least 15 Units of PRBCs and FFP.   Initially acute respiratory failure- requiring intubation. Extubated 5/3.  Sputum + Klebseilla.   Abnormal EK/2- EKG with Inferior ST elevation and Anterior ST depression with RBBB.   Hx IWMI.  Peak troponin 258  LVEF:  55-60 %.  1/10/25: Nuc ST:  Fixed defect entire inferior wall EF: 39 %.   Type 2 DM: Insulin.   Dyslipidemia  ESRD: HD for fluid management.   Hx Severe PAD:    Previous L BKA for gangrene in April.   Hx R BKA.   Wound care following.   Chronic pain: Fernanda.     Plan:  He survived massive hemorrhagic shock with minimal troponin elevation and preserved EF.  His coronary status is stable.  We will follow peripherally.    Daisy  L3       [1]    meropenem  500 mg Intravenous Q24H    gabapentin  400 mg Oral Nightly    heparin  5,000 Units Subcutaneous Q8H SUJATA    acetaminophen  650 mg Oral 4 times per day    melatonin  5 mg Oral Nightly    insulin aspart  1-68 Units Subcutaneous TID CC and HS    docusate sodium  100 mg Oral BID    sennosides  8.6 mg Oral BID    sevelamer carbonate  1,600 mg Oral TID CC   [2]

## 2025-05-06 NOTE — PROGRESS NOTES
[facility] Progress Note     SUBJECTIVE/Interval history:  No acute events overnight, feels better today less pain. No dyspnea. No fevers  Remains on RA    Review of Systems:   A comprehensive 14 point review of systems was completed.   Pertinent positives and negatives noted in the HPI.    Medications  Reviewed personally  Scheduled Medications[1]  PRN Medications[2]    OBJECTIVE:  Vitals:    05/06/25 0600 05/06/25 0800 05/06/25 0900 05/06/25 1000   BP: 118/64 118/61 136/77 119/61   BP Location:       Pulse: 71 70 75 73   Resp: 16 17 14 15   Temp:  97.1 °F (36.2 °C)     TempSrc:  Temporal     SpO2: 97%  99% 99%   Weight:           Vital signs in last 24 hours:  Blood pressure 119/61, pulse 73, temperature 97.1 °F (36.2 °C), temperature source Temporal, resp. rate 15, weight 170 lb 13.7 oz (77.5 kg), SpO2 99%.     Intake/Output:  I/O last 3 completed shifts:  In: 373.6 [P.O.:240; I.V.:133.6]  Out: 2250 [Other:2000; Chest Tube:250]       Physical Exam:  General: Appears alert, oriented x3. No acute distress  Neurologic:No focal deficits noted.  Alert.  Oriented.  Lungs:CTAB. No wheeze  chest wall:chest tube to left with no leak seen  Heart:RRR no m  Abdomen: soft, nondistended, no rigidity, no reaction with palpation. No hernias noted  Extremities:No overt deformities, edema    Lab Data Review:   Recent Labs   Lab 05/04/25 0445 05/05/25 0443 05/06/25  0456   * 94 66*   BUN 26* 39* 26*   CREATSERUM 4.38*  4.38* 5.75*  5.75* 4.17*   CA 8.1* 8.2* 8.1*    139 141   K 4.0 4.3 3.4*    101 104   CO2 26.0 25.0 26.0     Recent Labs   Lab 05/02/25  1804 05/02/25  1837 05/03/25  0427 05/04/25  0445 05/05/25  0443 05/06/25  0456   RBC 4.64   < > 4.62 4.19 3.84 3.58*   HGB 13.6   < > 13.5 12.3* 11.1* 10.4*   HCT 39.2   < > 38.0* 35.7* 34.0* 32.1*   MCV 84.5   < > 82.3 85.2 88.5 89.7   MCH 29.3   < > 29.2 29.4 28.9 29.1   MCHC 34.7   < > 35.5 34.5 32.6 32.4   RDW 14.8   < > 14.9  16.1 16.1 15.8   NEPRELIM 12.53*  --  8.31* 9.09*  --   --    WBC 14.4*   < > 10.3 11.2* 10.2 8.9   .0   < > 174.0 141.0* 142.0* 147.0*    < > = values in this interval not displayed.     No results for input(s): \"BNP\" in the last 168 hours.  No results for input(s): \"TROP\", \"CK\" in the last 168 hours.  Recent Labs   Lab 05/01/25  0810 05/02/25  1449 05/02/25  1837 05/03/25  0427   INR 1.26* 1.38*  --  1.33*   PTT  --   --  28.4  --      Recent Labs   Lab 05/04/25  1030   ABGPHT 7.44   QGGHQQ6L 39   MEEIG6I 97   ABGHCO3 26.6   ABGBE 2.2*   TEMP 98.6   NOEMÍ Positive   SITE Left Radial   DEV Nasal cannula   THGB 11.8*       Other Labs:  Interval Culture Data:   Hospital Encounter on 04/29/25   1. Body Fluid Cult Aerobic and Anaerobic     Status: None (Preliminary result)    Collection Time: 05/02/25 10:00 AM    Specimen: Pleural Fluid, Left; Body fluid, unspecified   Result Value Ref Range    Body Fluid Culture Result No Growth 3 Days N/A    Body Fluid Smear 1+ WBCs seen N/A    Body Fluid Smear No organisms seen N/A    Body Fluid Smear This is a cytocentrifuged smear. N/A     No results for input(s): \"COLORUR\", \"CLARITY\", \"SPECGRAVITY\", \"GLUUR\", \"BILUR\", \"KETUR\", \"BLOODURINE\", \"PHURINE\", \"PROUR\", \"UROBILINOGEN\", \"NITRITE\", \"LEUUR\", \"WBCUR\", \"RBCUR\", \"BACUR\", \"EPIUR\" in the last 168 hours.    Interval Radiology:   Reviewed personally  XR CHEST AP PORTABLE  (CPT=71045)  Result Date: 5/4/2025  CONCLUSION:  Improving airspace disease left lung.   LOCATION:  [facility]      Dictated by (CST): Rob Reyes MD on 5/04/2025 at 7:09 AM     Finalized by (CST): Rob Reyes MD on 5/04/2025 at 7:11 AM       XR CHEST AP/PA (1 VIEW) (CPT=71045)  Result Date: 5/3/2025  CONCLUSION:   1.  Dobbhoff tube in the stomach.  The remainder of the lines and tubes are stable since previous study.   2. Stable appearance of patchy airspace disease throughout the left lung with residual small amount of fluid in gas within the left lateral  pleural space.   LOCATION:  [facility]      Dictated by (CST): Yoan Duvall MD on 5/03/2025 at 6:17 PM     Finalized by (CST): Yoan Duvall MD on 5/03/2025 at 6:18 PM       XR CHEST AP PORTABLE  (CPT=71045)  Result Date: 5/3/2025  CONCLUSION:  1. Overall no significant change in the appearance of the portable chest radiograph in the short interval since the prior study. 2. Lucency left lateral lung base is most likely a pneumothorax related incomplete expansion of lung (trapped lung).   LOCATION:  [facility]      Dictated by (CST): Camden Deleon MD on 5/03/2025 at 6:32 AM     Finalized by (CST): Camden Deleon MD on 5/03/2025 at 6:34 AM       XR CHEST AP PORTABLE  (CPT=71045)  Result Date: 5/2/2025  CONCLUSION:  Placement of a large bore left chest tube.  Evacuation of the left pleural fluid/hemo thorax.  Incomplete re-expansion of the left lung with a mild to moderate basilar pneumothorax.   LOCATION:  [facility]      Dictated by (CST): Rob Reyes MD on 5/02/2025 at 6:55 PM     Finalized by (CST): Rob Reyes MD on 5/02/2025 at 6:58 PM       XR CHEST AP PORTABLE  (CPT=71045)  Result Date: 5/2/2025  CONCLUSION:  A portion of left mid lung is now aerated.  Other findings appear stable.   LOCATION:  [facility]      Dictated by (CST): Rob Reyes MD on 5/02/2025 at 5:34 PM     Finalized by (CST): Rob Reyes MD on 5/02/2025 at 5:36 PM       XR CHEST AP PORTABLE  (CPT=71045)  Result Date: 5/2/2025  CONCLUSION:  Complete opacification left hemithorax with shift of mediastinal structures from left to right.  Finding was discussed with the ICU RN caring for the patient on May 2, 2025 at 2:30 p.m..  By this time a chest tube have been placed.  Findings  would be consistent with a large hemo thorax.   LOCATION:  [facility]      Dictated by (CST): Camden Deleon MD on 5/02/2025 at 2:25 PM     Finalized by (CST): Camden Deleon MD on 5/02/2025 at 2:30 PM       US THORACENTESIS GUIDED LEFT (CPT=32555)  Result Date: 5/2/2025  CONCLUSION:   Ultrasound-guided left thoracentesis of 1600 cc was performed without complication.   LOCATION:  [facility]    Dictated by (CST): Oskar Lynn MD on 5/02/2025 at 1:33 PM     Finalized by (CST): Oskar Lynn MD on 5/02/2025 at 1:34 PM       XR CHEST AP PORTABLE  (CPT=71045)  Result Date: 5/2/2025  CONCLUSION:  Decreased size of left pleural effusion without pneumothorax.  There continues to be significant amount of opacity within the left hemithorax.   LOCATION:  [facility]      Dictated by (CST): Yoan Duvall MD on 5/02/2025 at 12:14 PM     Finalized by (CST): Yoan Duvall MD on 5/02/2025 at 12:15 PM       XR CHEST AP PORTABLE  (CPT=71045)  Result Date: 4/29/2025  CONCLUSION:   There is nearly complete opacification of the left hemithorax which partially obscures the cardiomediastinal silhouette.  This likely represents a combination of large pleural effusion and passive atelectasis, though superimposed infection or aspiration not excluded.  Mild interstitial edema noted throughout the right lung.  The right pleural space remains clear.   LOCATION:  [facility]      Dictated by (CST): Patricio Telles MD on 4/29/2025 at 9:14 PM     Finalized by (CST): Patricio Telles MD on 4/29/2025 at 9:15 PM       Assessment:  Hemorrhagic shock secondary to hemothorax, improved  Intrathoracic bleed following thoracentesis now status post intercostal artery cautery with thoracotomy-no further bleeding  Recurrent left-sided pleural effusion present since January 2025, s/p thoracentesis c/b hemothorax; fluid analysis with lymphocytic exudate  Possible inferior wall STEMI cardiology note appreciated-with changes thought related to severity of shock  Dyspnea mild hypoxia on presentation related to large left pleural effusion, now improved  End-stage renal disease on HD  Severe peripheral vascular disease bilateral BKA's most recently last month    Plan:  Monitor respiratory status, wean o2 for sats >89%  Chest tube per thoracic surgery  Control  pain  Await cytology results from pleural effusion although low suspicion for malignancy         [1]    meropenem  500 mg Intravenous Q24H    [START ON 5/7/2025] epoetin juan josé  3,000 Units Intravenous Once per day on Monday Wednesday Friday    lidocaine-menthol  1 patch Transdermal Daily    gabapentin  400 mg Oral Nightly    heparin  5,000 Units Subcutaneous Q8H SUJATA    acetaminophen  650 mg Oral 4 times per day    melatonin  5 mg Oral Nightly    insulin aspart  1-68 Units Subcutaneous TID CC and HS    docusate sodium  100 mg Oral BID    sennosides  8.6 mg Oral BID    sevelamer carbonate  1,600 mg Oral TID CC   [2]   sodium bicarbonate **AND** lipase-protease-amylase (Lip-Prot-Amyl)    polyethylene glycol (PEG 3350)    glucose **OR** glucose **OR** glucose-vitamin C **OR** dextrose **OR** glucose **OR** glucose **OR** glucose-vitamin C    sennosides    ondansetron

## 2025-05-06 NOTE — PROGRESS NOTES
[facility]  Nephrology Progress Note    Richy Goins Attending:  Jeremi Norwood MD       Assessment and Plan:    1) ESRD- due to longstanding DM 2; last HD . Lytes / volume OK. Next HD Wed (off schedule)     2) Acute hypoxic resp failure due to pul edema + pleural effusion / hemothorax / arrest- extubated     3) L pleural effusion s/p thoracentesis -> massive hemothorax s/p ligation of intercostal artery + chest tube    4) Occluded L UE AVG -> IR to evaluate for declot later this week      5) Anemia- due to CKD / above / chronic disease s/p transfusions + EPO     6) Longstanding DM 2     7) PAD s/p remote R BKA; s/p recent L BKA       Subjective:  Awake alert eating breakfast    Physical Exam:   /61   Pulse 70   Temp 97.1 °F (36.2 °C) (Temporal)   Resp 17   Wt 170 lb 13.7 oz (77.5 kg)   SpO2 97%   BMI 25.98 kg/m²   Temp (24hrs), Av.9 °F (36.6 °C), Min:97.1 °F (36.2 °C), Max:98.5 °F (36.9 °C)       Intake/Output Summary (Last 24 hours) at 2025 0929  Last data filed at 2025 0400  Gross per 24 hour   Intake 120 ml   Output 2150 ml   Net -2030 ml     Wt Readings from Last 3 Encounters:   25 170 lb 13.7 oz (77.5 kg)   25 170 lb (77.1 kg)   10/02/24 181 lb (82.1 kg)     General: awake alert  HEENT: No scleral icterus, MMM  Neck: Supple, no SARITA or thyromegaly  Cardiac: Regular rate and rhythm, S1, S2 normal, no murmur or tub  Lungs: Decreased BS at bases bilaterally   Abdomen: Soft, non-tender. + bowel sounds, no palpable organomegaly  Extremities: Without clubbing, cyanosis; no edema  Neurologic: Cranial nerves grossly intact, moving all extremities  Skin: Warm and dry, no rashes       Labs:   Lab Results   Component Value Date    WBC 8.9 2025    HGB 10.4 2025    HCT 32.1 2025    .0 2025    CREATSERUM 4.17 2025    BUN 26 2025     2025    K 3.4 2025     2025    CO2 26.0 2025    GLU 66  05/06/2025    CA 8.1 05/06/2025    PGLU 89 05/06/2025       Imaging:  All imaging studies reviewed.    Meds:   Current Hospital Medications[1]      Questions/concerns were discussed with patient and/or family by bedside.          Keeley Blackwell MD  5/5/2025  9:51 AM         [1]   Current Facility-Administered Medications   Medication Dose Route Frequency    meropenem (Merrem) 500 mg in sodium chloride 0.9% 100mL IVPB-ELENA  500 mg Intravenous Q24H    gabapentin (Neurontin) cap 400 mg  400 mg Oral Nightly    heparin (Porcine) 5000 UNIT/ML injection 5,000 Units  5,000 Units Subcutaneous Q8H SUJATA    acetaminophen (Tylenol) tab 650 mg  650 mg Oral 4 times per day    melatonin cap/tab 5 mg  5 mg Oral Nightly    insulin aspart (NovoLOG) 100 Units/mL FlexPen 1-68 Units  1-68 Units Subcutaneous TID CC and HS    docusate sodium (Colace) cap 100 mg  100 mg Oral BID    sennosides (Senokot) tab 8.6 mg  8.6 mg Oral BID    sodium bicarbonate tab 650 mg  650 mg Per G Tube PRN    And    pancrelipase (Lip-Prot-Amyl) (Zenpep) DR particles cap 10,000 Units  10,000 Units Per G Tube PRN    polyethylene glycol (PEG 3350) (Miralax) 17 g oral packet 17 g  17 g Per NG Tube Daily PRN    sevelamer carbonate (Renvela) tab 1,600 mg  1,600 mg Oral TID CC    glucose (Dex4) 15 GM/59ML oral liquid 15 g  15 g Oral Q15 Min PRN    Or    glucose (Glutose) 40% oral gel 15 g  15 g Oral Q15 Min PRN    Or    glucose-vitamin C (Dex-4) chewable tab 4 tablet  4 tablet Oral Q15 Min PRN    Or    dextrose 50% injection 50 mL  50 mL Intravenous Q15 Min PRN    Or    glucose (Dex4) 15 GM/59ML oral liquid 30 g  30 g Oral Q15 Min PRN    Or    glucose (Glutose) 40% oral gel 30 g  30 g Oral Q15 Min PRN    Or    glucose-vitamin C (Dex-4) chewable tab 8 tablet  8 tablet Oral Q15 Min PRN    sennosides (Senokot) tab 17.2 mg  17.2 mg Oral Nightly PRN    ondansetron (Zofran) 4 MG/2ML injection 4 mg  4 mg Intravenous Q6H PRN

## 2025-05-06 NOTE — SLP NOTE
SPEECH DAILY NOTE - INPATIENT    ASSESSMENT & PLAN   ASSESSMENT  Pt seen for dysphagia tx to assess tolerance with recommended diet, ensure proper utilization of aspiration precautions and provide pt/family education.  Patient received alert in bed eating breakfast at time of my visit. Patient reported good tolerance of PO intake. He reported ongoing persistent productive cough throughout the day, but improving. Slp observed PO intake of thin liquids and soft solids. Bolus acceptance was adequate without evidence of anterior bolus loss. Mastication and AP bolus transit were thorough and efficient without evidence of oral residue. No overt s/s of aspiration observed and patient denied odynophagia and globus sensation across consistencies. Recommend patient continue a regular diet and thin liquids. SLP will continue to follow per POC.    Diet Recommendations - Solids: Regular  Diet Recommendations - Liquids: Thin Liquids          Medication Administration Recommendations: No restrictions    Patient Experiencing Pain: No                Treatment Plan  Treatment Plan/Recommendations: Aspiration precautions    Interdisciplinary Communication: Discussed with RN            GOALS  Goal #1 The patient will tolerate regular consistency and thin liquids without overt signs or symptoms of aspiration with 90 % accuracy over 1-2 session(s).  In Progress   Goal #2 The patient/family/caregiver will demonstrate understanding and implementation of aspiration precautions and swallow strategies independently over 1-2 session(s).     In Progress   Goal #3       Goal #4       Goal #5       Goal #6       Goal #7       Goal #8            FOLLOW UP  Follow Up Needed (Documentation Required): Yes  SLP Follow-up Date: 05/07/25  Duration: 1 week    Session: 1    If you have any questions, please contact JENNIFER Ascencio

## 2025-05-06 NOTE — PLAN OF CARE
Assumed care of pt after RN report. Alert and oriented, still slightly forgetful. Chest pain due to compressions, per pt improved with lidocaine patch. RA. VSS. Productive cough, self suctioning. Tolerating diet, appetite improving. Large BM. See flowsheets for full assessments. POC discussed with pt. Stable for tx out of ICU.

## 2025-05-06 NOTE — PROGRESS NOTES
Thoracic Surgery Progress Note     Richy Goins is a 64 year old male. MRN [id number]. Admitted 2025    SUBJECTIVE/24H EVENTS:     No acute events overnight. Put on isolation for ESBL. Productive cough. Feeling better overall.     Objective:     VITALS:     Temp (24hrs), Av °F (36.7 °C), Min:97.8 °F (36.6 °C), Max:98.5 °F (36.9 °C)   /64   Pulse 71   Temp 98 °F (36.7 °C) (Temporal)   Resp 16   Wt 170 lb 13.7 oz   SpO2 97%   BMI 25.98 kg/m²     EXAM:   General: NAD  Heart: RRR  Lungs: normal respiratory effort   Incisions: c/d/i   Chest tube:   Air Leak: No   Output: serosanguineous   24 hour: 150  Suction: yes      Intake/Output Summary (Last 24 hours) at 2025 0845  Last data filed at 2025 0400  Gross per 24 hour   Intake 120 ml   Output 2150 ml   Net -2030 ml         MEDS:  Scheduled Medications[1]    LABS:  Lab Results   Component Value Date    WBC 8.9 2025    HGB 10.4 2025    HCT 32.1 2025    .0 2025    CREATSERUM 4.17 2025    BUN 26 2025     2025    K 3.4 2025     2025    CO2 26.0 2025    GLU 66 2025    CA 8.1 2025         Assessment/Plan:     64 year old male PMH ESRD, HTN, DM, PAD, HLD who was admitted for acute hypoxic respiratory failure secondary to volume overload and left pleural effusion s/p IR thoracentesis (25) complicated by acute hemorrhage due to intercostal artery injury now POD 4 from left VATS washout and control of intercostal bleeding. Doing well.     -Continue chest tube to suction.   -Okay for DVT prophylaxis   -Encourage cough and IS use.     Patient discussed with Dr. Caputo.     Beverley Brewster PA-C  Thoracic Surgery  Pager: [phone]               [1]    meropenem  500 mg Intravenous Q24H    gabapentin  400 mg Oral Nightly    heparin  5,000 Units Subcutaneous Q8H SUJATA    acetaminophen  650 mg Oral 4 times per day    melatonin  5 mg Oral Nightly    insulin  aspart  1-68 Units Subcutaneous TID CC and HS    docusate sodium  100 mg Oral BID    sennosides  8.6 mg Oral BID    sevelamer carbonate  1,600 mg Oral TID CC

## 2025-05-06 NOTE — PROGRESS NOTES
Richy Goins Patient Status:  Inpatient    1960 MRN [id number]   Location [facility] 4SW-A Attending Jeremi Norwood MD   Hosp Day # 7 PCP Woody Dahl MD     Critical Care Progress Note          Subjective:  No acute events overnight. Feels great, sitting up in bed eating breakfast.     Objective:    Medications:  Scheduled Medications[1]           Intake/Output Summary (Last 24 hours) at 2025 0732  Last data filed at 2025 0400  Gross per 24 hour   Intake 122.6 ml   Output 2150 ml   Net -2027.4 ml       /64   Pulse 71   Temp 98 °F (36.7 °C) (Temporal)   Resp 16   Wt 170 lb 13.7 oz (77.5 kg)   SpO2 97%   BMI 25.98 kg/m²     Physical Exam:    Neuro: AO x3, non focal   Lungs: Coarse  Cardio:  RRR  Abdomen: Soft, non tender, non distended  Extremities: BLE AKA- RLE with dressing in place, staples present.    Recent Labs   Lab 25  0456   RBC 4.19   < > 3.58*   HGB 12.3*   < > 10.4*   HCT 35.7*   < > 32.1*   MCV 85.2   < > 89.7   MCH 29.4   < > 29.1   MCHC 34.5   < > 32.4   RDW 16.1   < > 15.8   NEPRELIM 9.09*  --   --    WBC 11.2*   < > 8.9   .0*   < > 147.0*    < > = values in this interval not displayed.     Recent Labs   Lab 25  1804 25  1837 25  0427 25  1334 25  0445 25  0443 25  0456   *   < > 232*   < > 106* 94 66*   BUN 35*   < > 41*   < > 26* 39* 26*   CREATSERUM 4.24*   < > 5.13*  5.13*   < > 4.38*  4.38* 5.75*  5.75* 4.17*   CA 7.6*   < > 8.0*   < > 8.1* 8.2* 8.1*   ALB 2.8*  --  3.0*  --  3.3  --   --    *   < > 146*   < > 141 139 141   K 3.6   < > 3.8   < > 4.0 4.3 3.4*      < > 108   < > 104 101 104   CO2 24.0   < > 23.0   < > 26.0 25.0 26.0   ALKPHO 94  --  94  --  91  --   --    AST 50*  --  55*  --  26  --   --    ALT 17  --  12  --  <7*  --   --    BILT 0.8  --  0.3  --  0.4  --   --    TP 4.7*  --  4.5*  --  5.4*  --   --     < > = values in this interval not  displayed.     Recent Labs   Lab 05/04/25  1030   ABGPHT 7.44   UKVENG3U 39   PLOZD0U 97   ABGHCO3 26.6   ABGBE 2.2*   TEMP 98.6   NOEMÍ Positive   SITE Left Radial   DEV Nasal cannula   THGB 11.8*     No results for input(s): \"BNP\" in the last 168 hours.  No results for input(s): \"TROP\", \"CK\" in the last 168 hours.    No results found.       Assessment/Plan:  Acute hypoxic respiratory failure initially 2/2 pulmonary edema, pleural effusion s/p thoracentesis c/b hemothorax  Left side hemothorax 2/2 intercoastal bleeding s/p chest tube placement, VATS, evacuation of hematoma with CT surgery 5/2  Vtach cardiac arrest on 5/2 s/p shock x1 with 1 round CPR  Hemorrhagic shock 2/2 left hemothorax as above  Acute blood loss anemia  Thrombocytopenia  STEMI  Leukocytosis  Hypoglycemia  ESRD on HD T,TH,SA  L BKA wound  Acute encephalopathy  Hx recurrent L sided pleural effusion  Hx PAD s/p BKA  Hx DM2  Hx HTN  Hx HLD      Neuro: No acute issues. Alert and oriented x4.     Cardiovascular:   #Cardiac arrest  #STEMI  - Vtach 5/2 s/p CPR/shock x1  - TTE LVEF 55-60%, severely reduced RV systolic function, TAPSE 0.97cm  - Cards following    Hx HTN  - PTA meds on hold    Pulmonary:   #Hemothorax s/p emergent thoracoscopy, VATS, hematoma evacuation for L hemothorax and cessation of intercostal artery  - Chest tube placed 5/2 with 2500cc output initially. Has had 150cc output x 24 hours.  - s/p VATS, hematoma evacuation with Dr. Caputo 5/2  - CT surgery following  - [place] [facility] following    #Acute hypoxic respiratory failure  - s/p thora (1600cc)  - s/p chest tube placement as above  - Sputum culture + klebsiella oxytoca ESBL  - Antibiotics as below  - [place] pul[facility]    GI: Renal diet. Bowel regimen PRN    Renal:   #ESRD on HD TTHS.  - AVG thombosed, temp cath in place  - Plan for IR intervention once ok with CT surgery  - Renal following    Heme:  #hemorrhagic shock 2/2 left sided hemothorax- resolved  #acute blood loss anemia-  resolved  #thombocytopenia  - code blue on 5/2  - s/p MTP x2 5/2  - Hgb stable today 10.4, plts 147    ID: Merrem (5/6- ). No blood cultures drawn this admission. Pleural fluid cultures NGTD. No leukocytosis, afebrile.     Endocrine  #Hypoglycemia  - PTA insulin on hold  - SSI  - A1c 5.9    MSK:   #Severe PAD  - Bilateral ESTEFANI, L BKA for gangrene in April  - Wound care following      ICU checklist  Feeding: ADAT  Analgesia: na  Sedation: na  Thromboembolism PPX: subcutaneous heparin  Stress Ulcer PPX: na  Glucose control: <180  Bowel Regimen: PRN  Lines/drains/tubes: PIV  Deescalation of antibiotics: Merrem  Therapies:PT/OT/ST when appropriate  Dispo: Stable to transfer to [facility].    Code Status: Full Code    Plan of care discussed with intensivist, Dr. Ibrahim.       Dania Parker [facility]  Critical Care  [id number]         [1]    meropenem  500 mg Intravenous Q24H    gabapentin  400 mg Oral Nightly    heparin  5,000 Units Subcutaneous Q8H SUJATA    acetaminophen  650 mg Oral 4 times per day    melatonin  5 mg Oral Nightly    insulin aspart  1-68 Units Subcutaneous TID CC and HS    docusate sodium  100 mg Oral BID    sennosides  8.6 mg Oral BID    insulin degludec  11 Units Subcutaneous Nightly    sevelamer carbonate  1,600 mg Oral TID CC

## 2025-05-06 NOTE — PAYOR COMM NOTE
--------------   CONTINUED STAY REVIEW    Payor: [employer] P[employer]  Subscriber #:  [id number]  Authorization Number: [id number]    REMAINS IN ICU    :    OPERATIVE REPORT     PREOPERATIVE DIAGNOSIS:    1.       Left hemothorax   2.       Hypovolemic shock with ongoing bleeding, status post massive transfusion.  3.       ESRD  POSTOPERATIVE DIAGNOSIS:    1.       Left hemothorax from iatrogenic bleed of a left intercostal artery.  2.       Hypovolemic shock with ongoing bleeding, status post massive transfusion.  3.ESRD  PROCEDURE:    1.       Flexible bronchoscopy.  2.       Emergent left video-assisted thoracoscopic exploration.  3.       Evacuation of hemothorax.  4.       Cessation of bleeding intercostal vessel.        DRAINS:  A 24-[place] chest tube x1.     IMPLANTS:  None.     ESTIMATED BLOOD LOSS:  250 mL intra-op plus 1 to 2 L of hemothorax blood.     CONDITION:  The patient remains critically ill and intubated returning to the ICU.    FINDINGS:  There was a large volume of blood in the left chest.  This was likely over somewhere between 1 and 2 L.  Intercostal vessel was found to be actively bleeding.  This was stopped by dissecting out the vessel and coagulating with the EnSeal device.  No other sites of bleeding were identified.     :       Richy Goins is a 64 year old male. MRN [id number]. Admitted 2025     SUBJECTIVE/24H EVENTS:     No acute events overnight. Put on isolation for ESBL. Productive cough. Feeling better overall.      Objective:      VITALS:     Temp (24hrs), Av °F (36.7 °C), Min:97.8 °F (36.6 °C), Max:98.5 °F (36.9 °C)   /64   Pulse 71   Temp 98 °F (36.7 °C) (Temporal)   Resp 16   Wt 170 lb 13.7 oz   SpO2 97%   BMI 25.98 kg/m²      EXAM:   General: NAD  Heart: RRR  Lungs: normal respiratory effort   Incisions: c/d/i   Chest tube:   Air Leak: No   Output: serosanguineous   24 hour: 150  Suction: yes        Intake/Output Summary (Last 24 hours) at 2025  0845  Last data filed at 5/6/2025 0400      Gross per 24 hour   Intake 120 ml   Output 2150 ml   Net -2030 ml            MEDS:  [Scheduled Medications]    [Scheduled Medications]   meropenem  500 mg Intravenous Q24H    gabapentin  400 mg Oral Nightly    heparin  5,000 Units Subcutaneous Q8H SUJATA    acetaminophen  650 mg Oral 4 times per day    melatonin  5 mg Oral Nightly    insulin aspart  1-68 Units Subcutaneous TID CC and HS    docusate sodium  100 mg Oral BID    sennosides  8.6 mg Oral BID    sevelamer carbonate  1,600 mg Oral TID CC        LABS:        Lab Results   Component Value Date     WBC 8.9 05/06/2025     HGB 10.4 05/06/2025     HCT 32.1 05/06/2025     .0 05/06/2025     CREATSERUM 4.17 05/06/2025     BUN 26 05/06/2025      05/06/2025     K 3.4 05/06/2025      05/06/2025     CO2 26.0 05/06/2025     GLU 66 05/06/2025     CA 8.1 05/06/2025            Assessment/Plan:      64 year old male PMH ESRD, HTN, DM, PAD, HLD who was admitted for acute hypoxic respiratory failure secondary to volume overload and left pleural effusion s/p IR thoracentesis (5/2/25) complicated by acute hemorrhage due to intercostal artery injury now POD 4 from left VATS washout and control of intercostal bleeding. Doing well.      -Continue chest tube to suction.   -Okay for DVT prophylaxis   -Encourage cough and IS use.      MEDICATIONS ADMINISTERED IN LAST 1 DAY:  acetaminophen (Tylenol) tab 650 mg       Date Action Dose Route User    5/6/2025 1203 Given 650 mg Oral Annamarie Moreno, RN    5/6/2025 0600 Given 650 mg Oral Ami Starr, RN    5/5/2025 2313 Given 650 mg Oral Ami Starr RN    5/5/2025 1704 Given 650 mg Oral Mirza Salcido RN       gabapentin (Neurontin) cap 400 mg       Date Action Dose Route User    5/5/2025 2207 Given 400 mg Oral Ami Starr, RN          heparin (Porcine) 5000 UNIT/ML injection 5,000 Units       Date Action Dose Route User    5/6/2025 0600 Given 5,000 Units  Subcutaneous (Right Lower Abdomen) Ami Starr RN    5/5/2025 2208 Given 5,000 Units Subcutaneous (Left Upper Abdomen) Ami Starr RN          insulin degludec (Tresiba) 100 units/mL flextouch 11 Units       Date Action Dose Route User    5/5/2025 2208 Given 11 Units Subcutaneous (Right Lower Abdomen) Ami Starr RN          lidocaine-menthol 4-1 % patch 1 patch       Date Action Dose Route User    5/6/2025 1200 Patch Applied 1 patch Transdermal (Left Anterior Chest) Annamarie Moreno RN     meropenem (Merrem) 500 mg in sodium chloride 0.9% 100mL IVPB-ELENA       Date Action Dose Route User    5/6/2025 0648 New Bag 500 mg Intravenous Ami Starr RN          sennosides (Senokot) tab 8.6 mg       Date Action Dose Route User    5/6/2025 0844 Given 8.6 mg Oral Love Gamez RN          sevelamer carbonate (Renvela) tab 1,600 mg       Date Action Dose Route User    5/6/2025 1211 Given 1,600 mg Oral Annamarie Moreno RN    5/6/2025 0844 Given 1,600 mg Oral Love Gamez RN    5/5/2025 1704 Given 1,600 mg Oral Mirza Salcido RN            Vitals (last day)       Date/Time Temp Pulse Resp BP SpO2 Weight O2 Device O2 Flow Rate (L/min) [facility]    05/06/25 1600 97.5 °F (36.4 °C) -- -- -- -- -- -- -- MATILDE    05/06/25 1200 97.5 °F (36.4 °C) 80 21 117/68 97 % -- None (Room air) -- MATILDE    05/06/25 1100 -- 75 15 121/64 97 % -- -- -- MATILDE    05/06/25 1000 -- 73 15 119/61 99 % -- -- -- MATILDE    05/06/25 0900 -- 75 14 136/77 99 % -- -- -- MATILDE    05/06/25 0800 97.1 °F (36.2 °C) 70 17 118/61 -- -- -- -- MS    05/06/25 0800 -- -- -- -- 99 % -- Nasal cannula 2 L/min MATILDE    05/06/25 0600 -- 71 16 118/64 97 % -- -- -- EG    05/06/25 0500 -- 71 17 119/61 98 % -- -- -- EG    05/06/25 0400 98 °F (36.7 °C) 71 15 111/61 98 % -- Nasal cannula 2 L/min EG    05/06/25 0300 -- 74 18 132/72 99 % -- -- -- EG    05/06/25 0200 -- 70 12 112/56 96 % -- -- -- EG    05/06/25 0100 -- 68 14 109/54 97 % -- -- -- EG    05/06/25 0000 97.8 °F  (36.6 °C) 72 20 117/54 99 % 170 lb 13.7 oz (77.5 kg) Nasal cannula 4 L/min EG    05/05/25 2317 -- 72 17 -- 99 % -- -- -- EG    05/05/25 2300 -- 74 18 128/66 97 % -- -- -- EG    05/05/25 2200 -- 76 18 114/61 95 % -- -- -- EG    05/05/25 2100 98.5 °F (36.9 °C) 77 19 113/87 96 % -- Nasal cannula 4 L/min EG    05/05/25 2000 -- 81 17 116/61 96 % -- -- -- EG    05/05/25 1900 -- 84 15 124/61 97 % -- -- -- EG    05/05/25 1700 -- 86 19 127/58 94 % -- -- -- MSA    05/05/25 1600 -- 85 22 118/55 93 % -- -- -- MSA    05/05/25 1500 -- 90 20 118/55 93 % -- -- -- AV    05/05/25 1400 -- 89 20 150/65 95 % -- -- -- AV    05/05/25 1300 -- 86 21 122/87 94 % -- -- -- AV    05/05/25 1200 97.9 °F (36.6 °C) 86 22 127/63 96 % -- Nasal cannula 4 L/min AV    05/05/25 1100 -- 80 18 124/64 94 % -- -- -- AV    05/05/25 1000 -- 75 13 118/58 93 % -- -- -- AV    05/05/25 0900 -- 73 22 120/56 95 % -- -- -- AV    05/05/25 0800 98.3 °F (36.8 °C) 72 25 112/52 95 % -- Nasal cannula 4 L/min AV    05/05/25 0744 -- -- -- -- -- -- Nasal cannula 4 L/min MA    05/05/25 0700 -- 70 26 110/59 92 % -- -- -- EG    05/05/25 0600 -- 59 22 103/53 94 % -- -- -- EG    05/05/25 0500 -- 55 14 90/51 96 % -- -- -- EG    05/05/25 0400 98.2 °F (36.8 °C) 55 15 93/50 97 % -- Nasal cannula 4 L/min EG    05/05/25 0300 -- 60 14 98/52 97 % -- -- -- EG    05/05/25 0200 -- 64 13 107/59 97 % -- -- -- EG    05/05/25 0106 -- -- -- -- 97 % -- Nasal cannula 4 L/min LP    05/05/25 0100 -- 57 13 101/55 97 % -- -- -- EG    05/05/25 0049 -- 58 13 101/55 97 % -- -- -- EG    05/05/25 0000 98.4 °F (36.9 °C) 68 13 108/61 96 % 173 lb 4.5 oz (78.6 kg) Nasal cannula 4 L/min EG

## 2025-05-06 NOTE — PROGRESS NOTES
[facility]   [facility]     Richy Goins Patient Status:  Inpatient    1960 MRN [id number]   Location [facility] 4SW-A Attending Jeremi Norwood MD   Hosp Day # 7 PCP Woody Dahl MD     Chief Complaint: resp failure     Subjective:     Patient has no complaints.     Objective:    Review of Systems:   Limited as on vent    Vital signs:  Temp:  [97.1 °F (36.2 °C)-98.5 °F (36.9 °C)] 97.5 °F (36.4 °C)  Pulse:  [68-86] 80  Resp:  [12-22] 21  BP: (109-136)/(54-87) 117/68  SpO2:  [93 %-99 %] 97 %    Physical Exam:    General: No acute distress  Respiratory: dec AE, CT in place  Cardiovascular: S1, S2.  Abdomen: Soft  Neuro: No new focal deficits  MSK: B/L AKA, LLE incision with staples and eschar      Diagnostic Data:    Labs:  Recent Labs   Lab 25  0810 25  1305 25  1449 25  18025  1837 25  0427 25  0445 25  0443 25  0456   WBC  --    < >  --    < > 14.4* 10.3 11.2* 10.2 8.9   HGB  --    < >  --    < > 13.6 13.5 12.3* 11.1* 10.4*   MCV  --    < >  --    < > 84.7 82.3 85.2 88.5 89.7   PLT  --    < >  --    < > 166.0 174.0 141.0* 142.0* 147.0*   INR 1.26*  --  1.38*  --   --  1.33*  --   --   --     < > = values in this interval not displayed.       Recent Labs   Lab 25  1804 25  1837 25  0427 25  1334 25  0445 25  0443 25  0456   *   < > 232*   < > 106* 94 66*   BUN 35*   < > 41*   < > 26* 39* 26*   CREATSERUM 4.24*   < > 5.13*  5.13*   < > 4.38*  4.38* 5.75*  5.75* 4.17*   CA 7.6*   < > 8.0*   < > 8.1* 8.2* 8.1*   ALB 2.8*  --  3.0*  --  3.3  --   --    *   < > 146*   < > 141 139 141   K 3.6   < > 3.8   < > 4.0 4.3 3.4*      < > 108   < > 104 101 104   CO2 24.0   < > 23.0   < > 26.0 25.0 26.0   ALKPHO 94  --  94  --  91  --   --    AST 50*  --  55*  --  26  --   --    ALT 17  --  12  --  <7*  --   --    BILT 0.8  --  0.3  --  0.4  --   --     TP 4.7*  --  4.5*  --  5.4*  --   --     < > = values in this interval not displayed.       Estimated Glomerular Filtration Rate: 15 mL/min/1.73m2 (A) (result from lab).     Recent Labs   Lab 05/02/25  1231 05/02/25  1305   TROPHS 213* 258*       Recent Labs   Lab 05/01/25  0810 05/02/25  1449 05/03/25  0427   PTP 15.9* 17.1* 16.6*   INR 1.26* 1.38* 1.33*              Imaging: Imaging data reviewed in Epic.    Medications: Scheduled Medications[1]    Assessment & Plan:     #Acute Left Intrathoracic hemorrhage 2/2 intercostal artery injury  S/p L VATS washout and control of intercostal bleeding  CT in place    #Acute hypoxic resp failure  Resolved  Extubated     #ESBL PNA  merrem    #Acute Metabolic Encephalopathy  Monitor MS, seems to be impoving    #Hemorrhagic shock  Resolved, off pressors    #PAD s/p b/l AKA  Left incision with stapels and eschar; does not appear infected  Wound care  On PPX ABX    #Acute blood loss anemia  S/p massive transfusion protocol  Monitor Hgb  No s/s of further bleeding    #Cardiac arrest  Due to above  ROSC successful  EKG abnormal  Echo noted  Ischemic w/u deferred as likely reactive to above    #Left Hemithorax  Likely due to ESRD  Has been recurrent  S/p thora 5/1, cytology pending    #Occluded LUE AVG  IR to eval for declot later this week    #ESRD  Cont. HD    #DM  Elevated as stress reaction  Cont. Insulin and adjust as needed  A1c 5.9 though unreliable at this point    #HTN  BP meds on hold    #HLD      Dispo: as above. Merrem started for ESBL in SCX.  BCX not drawn prior to Merrem being started.          Jeremi Norwood MD    Supplementary Documentation:     Quality:    DVT Mechanical Prophylaxis:   SCDs,    DVT Pharmacologic Prophylaxis   Medication    heparin (Porcine) 5000 UNIT/ML injection 5,000 Units                Code Status: Full Code  Soni: No urinary catheter in place  Soni Duration (in days):   Central line:    DENNYS:       Discharge is dependent on: clinical  stability  At this point Mr. Goins is expected to be discharge to: home    The 21st Century Cures Act makes medical notes like these available to patients in the interest of transparency. Please be advised this is a medical document. Medical documents are intended to carry relevant information, facts as evident, and the clinical opinion of the practitioner. The medical note is intended as peer to peer communication and may appear blunt or direct. It is written in medical language and may contain abbreviations or verbiage that are unfamiliar.                       [1]    meropenem  500 mg Intravenous Q24H    [START ON 5/7/2025] epoetin juan josé  3,000 Units Intravenous Once per day on Monday Wednesday Friday    lidocaine-menthol  1 patch Transdermal Daily    gabapentin  400 mg Oral Nightly    heparin  5,000 Units Subcutaneous Q8H SUJATA    acetaminophen  650 mg Oral 4 times per day    melatonin  5 mg Oral Nightly    insulin aspart  1-68 Units Subcutaneous TID CC and HS    docusate sodium  100 mg Oral BID    sennosides  8.6 mg Oral BID    sevelamer carbonate  1,600 mg Oral TID CC

## 2025-05-07 LAB
ANION GAP SERPL CALC-SCNC: 14 MMOL/L (ref 0–18)
BUN BLD-MCNC: 42 MG/DL (ref 9–23)
CALCIUM BLD-MCNC: 8.5 MG/DL (ref 8.7–10.6)
CD10 CELLS NFR SPEC: <1 %
CD10/CD19: <1 %
CD19 CELLS NFR SPEC: 6 %
CD19+/CD200+: 2 %
CD2 CELLS NFR SPEC: 88 %
CD20 CELLS NFR SPEC: 5 %
CD200 CELLS: 9 %
CD3 CELLS NFR SPEC: 75 %
CD3+/TCRGD+: 1 %
CD3+CD4+ CELLS NFR SPEC: 57 %
CD3+CD4+ CELLS/CD3+CD8+ CLL SPEC: 3.2
CD3+CD8+ CELLS NFR SPEC: 18 %
CD3-/CD56+: 12 %
CD34 CELLS NFR SPEC: <1 %
CD38 CELLS NFR SPEC: 2 %
CD38+/CD19+: <1 %
CD45 CELLS NFR SPEC: 100 %
CD5 CELLS NFR SPEC: 74 %
CD5/CD19 CELLS: <1 %
CD7 CELLS NFR SPEC: 80 %
CELL SURF KAPPA/LAMBDA RATIO: 1
CELL SURF LAMBDA LIGHT CHAIN: 2 %
CELL SURFACE KAPPA LIGHT CHAIN: 2 %
CHLORIDE SERPL-SCNC: 102 MMOL/L (ref 98–112)
CO2 SERPL-SCNC: 24 MMOL/L (ref 21–32)
CREAT BLD-MCNC: 5.22 MG/DL (ref 0.7–1.3)
EGFRCR SERPLBLD CKD-EPI 2021: 11 ML/MIN/1.73M2 (ref 60–?)
ERYTHROCYTE [DISTWIDTH] IN BLOOD BY AUTOMATED COUNT: 15.9 %
GLUCOSE BLD-MCNC: 142 MG/DL (ref 70–99)
GLUCOSE BLD-MCNC: 145 MG/DL (ref 70–99)
GLUCOSE BLD-MCNC: 178 MG/DL (ref 70–99)
GLUCOSE BLD-MCNC: 59 MG/DL (ref 70–99)
GLUCOSE BLD-MCNC: 62 MG/DL (ref 70–99)
GLUCOSE BLD-MCNC: 63 MG/DL (ref 70–99)
GLUCOSE BLD-MCNC: 66 MG/DL (ref 70–99)
GLUCOSE BLD-MCNC: 75 MG/DL (ref 70–99)
GLUCOSE BLD-MCNC: 78 MG/DL (ref 70–99)
GLUCOSE BLD-MCNC: 91 MG/DL (ref 70–99)
HCT VFR BLD AUTO: 34.4 % (ref 39–53)
HGB BLD-MCNC: 11.6 G/DL (ref 13–17.5)
MCH RBC QN AUTO: 29.7 PG (ref 26–34)
MCHC RBC AUTO-ENTMCNC: 33.7 G/DL (ref 31–37)
MCV RBC AUTO: 88.2 FL (ref 80–100)
NON GYNE INTERPRETATION: NEGATIVE
OSMOLALITY SERPL CALC.SUM OF ELEC: 299 MOSM/KG (ref 275–295)
PLATELET # BLD AUTO: 184 10(3)UL (ref 150–450)
POTASSIUM SERPL-SCNC: 3.8 MMOL/L (ref 3.5–5.1)
RBC # BLD AUTO: 3.9 X10(6)UL (ref 3.8–5.8)
SODIUM SERPL-SCNC: 140 MMOL/L (ref 136–145)
TCR G-D CELLS NFR SPEC: 1 %
WBC # BLD AUTO: 9.1 X10(3) UL (ref 4–11)

## 2025-05-07 PROCEDURE — 99233 SBSQ HOSP IP/OBS HIGH 50: CPT | Performed by: INTERNAL MEDICINE

## 2025-05-07 PROCEDURE — 99232 SBSQ HOSP IP/OBS MODERATE 35: CPT | Performed by: INTERNAL MEDICINE

## 2025-05-07 NOTE — SLP NOTE
SPEECH DAILY NOTE - INPATIENT    ASSESSMENT & PLAN   ASSESSMENT  Pt seen for dysphagia tx to assess tolerance with recommended diet, ensure proper utilization of aspiration precautions and provide pt/family education.  Patient received alert in bed. He reported good tolerance of PO intake and decreased cough since chest tube removal this morning. No overt s/s of aspiration observed during SLP visit. Education provided re: safe swallowing precautions. Recommend patient continue a regular diet and thin liquids. No further SLP services recommended at this time.    Diet Recommendations - Solids: Regular  Diet Recommendations - Liquids: Thin Liquids          Medication Administration Recommendations: No restrictions    Patient Experiencing Pain: No                Treatment Plan  Treatment Plan/Recommendations: No further inpatient SLP service warranted    Interdisciplinary Communication: NA            GOALS  Goal #1 The patient will tolerate regular consistency and thin liquids without overt signs or symptoms of aspiration with 90 % accuracy over 1-2 session(s).  Met   Goal #2 The patient/family/caregiver will demonstrate understanding and implementation of aspiration precautions and swallow strategies independently over 1-2 session(s).     Met   Goal #3       Goal #4       Goal #5       Goal #6       Goal #7       Goal #8            FOLLOW UP  Follow Up Needed (Documentation Required): No  SLP Follow-up Date: 05/07/25  Duration: 1 week    Session: 2    If you have any questions, please contact JENNIFER Ascencio

## 2025-05-07 NOTE — DIETARY NOTE
[facility]   part of [facility]    NUTRITION ASSESSMENT    Pt does not meet malnutrition criteria at this time.    NUTRITION INTERVENTION:    Meal and Snacks - Monitor and encourage adequate PO intake. Liberalize as able.       PATIENT STATUS:  Diet advanced, transferred from ICU. Now POD 5 from left VATS washout and control of intercostal bleeding. Chest tube out. +BM. On RA. Getting HD.   05/03/25 RD consulted for Tubefeeding recommendations. Pt admitted with respiratory failure hemothorax.  Pt is intubated and is on Levo. Propofol was discontinued. Pt receiving HD on T/TH/Sat.  Pt with left BKA.     PMH: ESRD on HD,DM, HTN, PAD, HLD.     ANTHROPOMETRICS:  Ht:  172.7 cm (5'8\")  Wt: 77.5 kg (170 lb 12.8 oz).   BMI: Body mass index is 25.97 kg/m².  IBW: 61.7 kg(including -11.8% for  Richard BKA)      WEIGHT HISTORY:   Weight loss: No- stable since amputation.    Wt Readings from Last 10 Encounters:   05/07/25 77.5 kg (170 lb 12.8 oz)   01/08/25 77.1 kg (170 lb)   10/02/24 82.1 kg (181 lb)   08/19/24 87.2 kg (192 lb 3.2 oz)   07/24/24 88.9 kg (196 lb)   05/29/24 79.8 kg (176 lb)   05/03/24 80.7 kg (177 lb 14.4 oz)   04/17/24 77.1 kg (170 lb)   03/29/24 76.2 kg (168 lb 1.6 oz)   03/15/24 83 kg (183 lb)        NUTRITION:  Diet:       Procedures    Renal diet Renal; Calorie Restriction/Carb Controlled: 1800 kcal/60 grams; Is Patient on Accuchecks? Yes      Food Allergies: No  Cultural/Ethnic/Rastafari Preferences Addressed: Yes    Percent Meals Eaten (last 3 days)       Date/Time Percent Meals Eaten (%)    05/06/25 1400 75 %    05/06/25 2130 0 %    05/06/25 2300 60 %    05/07/25 0513 360 %    05/07/25 0910 --     Percent Meals Eaten (%): did not order breakfast at 05/07/25 0910            GI system review: WNL Last BM Date: 05/06/25  Skin and wounds: Yes, Left knee and left upper flank    NUTRITION RELATED PHYSICAL FINDINGS:     1. Body Fat/Muscle Mass: no wasting noted  and well nourished     2. Fluid  Accumulation: none    NUTRITION PRESCRIPTION:  76 kg Actual Body Weight  Calories:  8877-0731 calories/day (25-30 kcal/kg)  Protein:  grams protein/day (1.0-1.5 grams protein per kg)  Fluid: ~1 ml/kcal or per MD discretion    NUTRITION DIAGNOSIS/PROBLEM:  Inadequate oral intake related to respiratory process or complication of therapy which results in mechanical ventilation as evidenced by need for nutrition support to meet estimated needs- resolved       MONITOR AND EVALUATE/NUTRITION GOALS:  Weight stable within 1 to 2 lbs during admission - Ongoing  Start alternative nutrition in 24-48 hrs if diet is not able to advance- Resolved  Tolerate alternative nutrition at 100% of goal - Resolved      MEDICATIONS:  Reviewed    LABS:  Reviewed    Pt is at Low nutrition risk    Alejandrina Bruce RD, LDN  Clinical Nutrition  [id number]

## 2025-05-07 NOTE — PROGRESS NOTES
Cardiology Progress Note    Richy Goins Patient Status:  Inpatient    1960 MRN [id number]   Location [facility] 8NE-A Attending Dina Canada MD   Hosp Day # 8 PCP Woody Dahl MD       Subjective:     Resting comfortably.  Receiving HD.  Denies any complaints today.    Objective:   Temp: 97.4 °F (36.3 °C)  Pulse: 80  Resp: 16  BP: 139/78    Intake/Output:     Intake/Output Summary (Last 24 hours) at 2025 1315  Last data filed at 2025 1200  Gross per 24 hour   Intake 820 ml   Output 2080 ml   Net -1260 ml       Last 3 Weights   25 0513 170 lb 12.8 oz (77.5 kg)   25 0000 170 lb 13.7 oz (77.5 kg)   25 0000 173 lb 4.5 oz (78.6 kg)   25 0600 172 lb 13.5 oz (78.4 kg)   25 0200 168 lb 6.9 oz (76.4 kg)   25 0353 163 lb 9.3 oz (74.2 kg)   25 0038 180 lb (81.6 kg)   25 2310 180 lb (81.6 kg)   25 1623 170 lb (77.1 kg)   25 1055 170 lb (77.1 kg)   10/02/24 0458 181 lb (82.1 kg)   10/01/24 1056 175 lb 0.7 oz (79.4 kg)   10/01/24 0544 175 lb (79.4 kg)         Physical Exam:   General: Alert and oriented x 3. No apparent distress. No respiratory or constitutional distress.  HEENT: Normocephalic, anicteric sclera, neck supple.  Cardiac: Regular rate and rhythm. S1, S2 normal. No murmur, pericardial rub, S3.  Lungs: Clear bilaterally  Abdomen: Soft, non-tender.   Extremities: Bilateral lower extremity BKA.  Neurologic: Alert, normal affect.  Skin: Warm and dry.     Laboratory/Data:    Labs:         Recent Labs   Lab 25  0810 25  1305 25  1449 25  1804 25  0427 25  0445 25  0443 25  0456 25  0533   WBC  --    < >  --    < > 10.3 11.2* 10.2 8.9 9.1   HGB  --    < >  --    < > 13.5 12.3* 11.1* 10.4* 11.6*   MCV  --    < >  --    < > 82.3 85.2 88.5 89.7 88.2   PLT  --    < >  --    < > 174.0 141.0* 142.0* 147.0* 184.0   INR 1.26*  --  1.38*  --  1.33*  --   --   --   --     < > = values in this  interval not displayed.       Recent Labs   Lab 05/02/25  1448 05/02/25  1804 05/02/25  1837 05/03/25  1334 05/04/25  0445 05/05/25  0443 05/06/25  0456 05/07/25  0552   * 147*   < > 140 141 139 141 140   K 3.2* 3.6   < > 3.4* 4.0 4.3 3.4* 3.8    109   < > 102 104 101 104 102   CO2 27.0 24.0   < > 26.0 26.0 25.0 26.0 24.0   BUN 39* 35*   < > 20 26* 39* 26* 42*   CREATSERUM 4.69* 4.24*   < > 3.12* 4.38*  4.38* 5.75*  5.75* 4.17* 5.22*   CA 9.5 7.6*   < > 8.3* 8.1* 8.2* 8.1* 8.5*   MG 1.7 1.5*  --  2.1 2.0 2.2  --   --    PHOS 4.2 4.8  --   --   --   --   --   --    * 306*   < > 150* 106* 94 66* 75    < > = values in this interval not displayed.       Recent Labs   Lab 05/02/25  1305 05/02/25  1448 05/02/25  1804 05/03/25  0427 05/04/25  0445   ALT <7* 13 17 12 <7*   AST 10 22 50* 55* 26   ALB 3.3 3.2 2.8* 3.0* 3.3   LDH  --   --   --  313*  --        No results for input(s): \"TROP\" in the last 168 hours.    Allergies:   Allergies[1]    Medications:  Current Hospital Medications[2]      Assessment:  Hemorrhagic shock 2/2 intrathoracic bleed after thoracentesis requiring VATS-hematoma evacuation for left hemothorax and cessation of intercostal artery by CT surgery-improving  Abnormal EKG with inferior ST elevation and anterior ST depression with right bundle branch block at baseline.  Preserved LV systolic function.  DM2  HLD  ESRD on HD  Hx severe PAD with history of bilateral BKA      Plan:  Patient is clinically improving.  Receiving HD today.  Given underlying PAD and comorbidities, he will benefit from stress evaluation as an outpatient.  Cardiology will follow peripherally.  Will plan for outpatient follow-up.  Please contact if any questions or concerns arise in the meantime.      Kuldip Ibrahim DO  Cardiologist  [facility] [place]  5/7/2025 1:15 PM        Note to the patient: The 21st Century Cures Act makes medical notes like these available to patients in the interest of  transparency. However, be advised that this is a medical document. It is intended as peer to peer communication. It is written in medical language and may contain abbreviations or verbiage that are unfamiliar. It may appear blunt or direct. Medical documents are intended to carry relevant information, facts as evident, and clinical opinion of the practitioner.     Disclaimer: Components of this note were documented using voice recognition system and are subject to errors not corrected at proofreading. Contact the author of this note for any clarifications.          [1]   Allergies  Allergen Reactions    Zosyn [Piperacillin-Tazobactam In D5w] RASH     Developed diffuse erythroderma 1 day after starting zosyn   [2]   Current Facility-Administered Medications   Medication Dose Route Frequency    meropenem (Merrem) 500 mg in sodium chloride 0.9% 100mL IVPB-ELENA  500 mg Intravenous Q24H    epoetin juan josé (Epogen, Procrit) 3000 UNIT/ML injection 3,000 Units  3,000 Units Intravenous Once per day on Monday Wednesday Friday    lidocaine-menthol 4-1 % patch 1 patch  1 patch Transdermal Daily    gabapentin (Neurontin) cap 400 mg  400 mg Oral Nightly    heparin (Porcine) 5000 UNIT/ML injection 5,000 Units  5,000 Units Subcutaneous Q8H SUJATA    acetaminophen (Tylenol) tab 650 mg  650 mg Oral 4 times per day    melatonin cap/tab 5 mg  5 mg Oral Nightly    insulin aspart (NovoLOG) 100 Units/mL FlexPen 1-68 Units  1-68 Units Subcutaneous TID CC and HS    docusate sodium (Colace) cap 100 mg  100 mg Oral BID    sennosides (Senokot) tab 8.6 mg  8.6 mg Oral BID    polyethylene glycol (PEG 3350) (Miralax) 17 g oral packet 17 g  17 g Per NG Tube Daily PRN    sevelamer carbonate (Renvela) tab 1,600 mg  1,600 mg Oral TID CC    glucose (Dex4) 15 GM/59ML oral liquid 15 g  15 g Oral Q15 Min PRN    Or    glucose (Glutose) 40% oral gel 15 g  15 g Oral Q15 Min PRN    Or    glucose-vitamin C (Dex-4) chewable tab 4 tablet  4 tablet Oral Q15 Min PRN     Or    dextrose 50% injection 50 mL  50 mL Intravenous Q15 Min PRN    Or    glucose (Dex4) 15 GM/59ML oral liquid 30 g  30 g Oral Q15 Min PRN    Or    glucose (Glutose) 40% oral gel 30 g  30 g Oral Q15 Min PRN    Or    glucose-vitamin C (Dex-4) chewable tab 8 tablet  8 tablet Oral Q15 Min PRN    sennosides (Senokot) tab 17.2 mg  17.2 mg Oral Nightly PRN    ondansetron (Zofran) 4 MG/2ML injection 4 mg  4 mg Intravenous Q6H PRN

## 2025-05-07 NOTE — PROGRESS NOTES
[facility]   part of [facility] Hospitalist Progress Note     Richy Goins Patient Status:  Inpatient    1960 MRN [id number]   Location [facility] 4SW-A Attending Jeremi Norwood MD   Hosp Day # 8 PCP Woody Dahl MD     Chief Complaint: resp failure     Subjective:     Patient has no new complaints.  Patient states she is feeling better.  Chest tube removed today.  Still has some chest wall discomfort from prior chest compressions according to patient.  And has some discomfort at the chest tube site.  Improving shortness of breath.  No nausea vomiting.  Denies abdominal pain.  Patient on hemodialysis at this time.  On room air today    Objective:    Review of Systems:   Limited as on vent    Vital signs:  Temp:  [97.3 °F (36.3 °C)-98.2 °F (36.8 °C)] 97.4 °F (36.3 °C)  Pulse:  [66-80] 80  Resp:  [16-19] 16  BP: (108-139)/(61-78) 139/78  SpO2:  [92 %-96 %] 96 %    Physical Exam:    /78 (BP Location: Right arm)   Pulse 80   Temp 97.4 °F (36.3 °C) (Oral)   Resp 16   Wt 170 lb 12.8 oz (77.5 kg)   SpO2 96%   BMI 25.97 kg/m²   On room air  General: No acute distress  Respiratory: Clear to auscultation bilateral.  Cardiovascular: S1, S2.  Abdomen: Soft, bowel sounds present  Neuro: Awake alert, no new focal deficits  MSK: B/L AKA, LLE incision with staples and eschar and in dressing      Diagnostic Data:    Labs:  Recent Labs   Lab 25  0810 25  1305 25  1449 25  1804 25  0427 25  0445 25  0443 25  0456 25  0533   WBC  --    < >  --    < > 10.3 11.2* 10.2 8.9 9.1   HGB  --    < >  --    < > 13.5 12.3* 11.1* 10.4* 11.6*   MCV  --    < >  --    < > 82.3 85.2 88.5 89.7 88.2   PLT  --    < >  --    < > 174.0 141.0* 142.0* 147.0* 184.0   INR 1.26*  --  1.38*  --  1.33*  --   --   --   --     < > = values in this interval not displayed.       Recent Labs   Lab 25  1804 25  1837 25  0427 25  1334 25  0445  05/05/25  0443 05/06/25  0456 05/07/25  0552   *   < > 232*   < > 106* 94 66* 75   BUN 35*   < > 41*   < > 26* 39* 26* 42*   CREATSERUM 4.24*   < > 5.13*  5.13*   < > 4.38*  4.38* 5.75*  5.75* 4.17* 5.22*   CA 7.6*   < > 8.0*   < > 8.1* 8.2* 8.1* 8.5*   ALB 2.8*  --  3.0*  --  3.3  --   --   --    *   < > 146*   < > 141 139 141 140   K 3.6   < > 3.8   < > 4.0 4.3 3.4* 3.8      < > 108   < > 104 101 104 102   CO2 24.0   < > 23.0   < > 26.0 25.0 26.0 24.0   ALKPHO 94  --  94  --  91  --   --   --    AST 50*  --  55*  --  26  --   --   --    ALT 17  --  12  --  <7*  --   --   --    BILT 0.8  --  0.3  --  0.4  --   --   --    TP 4.7*  --  4.5*  --  5.4*  --   --   --     < > = values in this interval not displayed.       Estimated Glomerular Filtration Rate: 11 mL/min/1.73m2 (A) (result from lab).     Recent Labs   Lab 05/02/25  1231 05/02/25  1305   TROPHS 213* 258*       Recent Labs   Lab 05/01/25  0810 05/02/25  1449 05/03/25  0427   PTP 15.9* 17.1* 16.6*   INR 1.26* 1.38* 1.33*              Imaging: Imaging data reviewed in Epic.    Medications: Scheduled Medications[1]    Assessment & Plan:     #Acute Left Intrathoracic hemorrhage 2/2 intercostal artery injury with left hemothorax  S/p L VATS washout and control of intercostal bleeding  Chest tube removed by CV surgeon on 5/7/2025.    #Acute hypoxic resp failure  Resolved  oxygen weaned off and on room air on 5/7/2025.    #ESBL PNA  On IV antibiotics with IV merrem    #Acute Metabolic Encephalopathy  Monitor MS, seems to be impoving    #Hemorrhagic shock on admission  Resolved, off pressors  Blood pressure remain stable at this time    #PAD s/p b/l AKA  Left incision with stapels and eschar; does not appear infected  Wound care  On PPX ABX    #Acute blood loss anemia on admission  S/p massive transfusion protocol on admission  Monitor Hgb  No s/s of further bleeding    #Cardiac arrest  Due to above  ROSC successful  EKG abnormal  Echo  noted  Cardiology following, ischemic w/u deferred as likely reactive to above    #Left Hemithorax  Likely due to ESRD  Has been recurrent  S/p thora 5/1, cytology pending    #Occluded LUE AVG  IR plans to declot left upper extremity AV fistula tomorrow    #ESRD  Cont. HD    #DM type II  Elevated as stress reaction  Cont. Insulin and adjust as needed  A1c 5.9 though unreliable at this point    #HTN  Home BP meds on hold    Discussed with patient, patient's son at bedside.  Discussed with RN.    Dina Canada MD      Supplementary Documentation:     Quality:    DVT Mechanical Prophylaxis:   SCDs,    DVT Pharmacologic Prophylaxis   Medication    heparin (Porcine) 5000 UNIT/ML injection 5,000 Units                Code Status: Full Code  Soni: No urinary catheter in place  Soni Duration (in days):   Central line:    [place]:       Discharge is dependent on: clinical stability  At this point Mr. Goins is expected to be discharge to: home    The 21st Century Cures Act makes medical notes like these available to patients in the interest of transparency. Please be advised this is a medical document. Medical documents are intended to carry relevant information, facts as evident, and the clinical opinion of the practitioner. The medical note is intended as peer to peer communication and may appear blunt or direct. It is written in medical language and may contain abbreviations or verbiage that are unfamiliar.                       [1]    meropenem  500 mg Intravenous Q24H    epoetin juan josé  3,000 Units Intravenous Once per day on Monday Wednesday Friday    lidocaine-menthol  1 patch Transdermal Daily    gabapentin  400 mg Oral Nightly    heparin  5,000 Units Subcutaneous Q8H SUJATA    acetaminophen  650 mg Oral 4 times per day    melatonin  5 mg Oral Nightly    insulin aspart  1-68 Units Subcutaneous TID CC and HS    docusate sodium  100 mg Oral BID    sennosides  8.6 mg Oral BID    sevelamer carbonate  1,600 mg Oral TID CC

## 2025-05-07 NOTE — PLAN OF CARE
PT A&OX4, reported lethargy, and would like to take rest.  Patient is on RA  NSR on tele.   No cough noted.  Left CT to -20 continue suction, serosanguineous drain, no air leak.   Receive patient from ICU Chest tube noted @1300   Denied CP. Denied SOB. Complaint of Chest tube incision side discomforted, requested Tylenol.   Pt reported x2 BM, denied stool soft schedule  Updated POC to pt, all needs meet at this time.   Call light within reach, bed alarm on for pt safety.     70cc chest tube out put over night noted @ 1370      Problem: SAFETY ADULT - FALL  Goal: Free from fall injury  Description: INTERVENTIONS:- Assess pt frequently for physical needs- Identify cognitive and physical deficits and behaviors that affect risk of falls.- [place] fall precautions as indicated by assessment.- Educate pt/family on patient safety including physical limitations- Instruct pt to call for assistance with activity based on assessment- Modify environment to reduce risk of injury- Provide assistive devices as appropriate- Consider OT/PT consult to assist with strengthening/mobility- Encourage toileting schedule  Outcome: Progressing     Problem: RESPIRATORY - ADULT  Goal: Achieves optimal ventilation and oxygenation  Description: INTERVENTIONS:- Assess for changes in respiratory status- Assess for changes in mentation and behavior- Position to facilitate oxygenation and minimize respiratory effort- Oxygen supplementation based on oxygen saturation or ABGs- Provide Smoking Cessation handout, if applicable- Encourage broncho-pulmonary hygiene including cough, deep breathe, Incentive Spirometry- Assess the need for suctioning and perform as needed- Assess and instruct to report SOB or any respiratory difficulty- Respiratory Therapy support as indicated- Manage/alleviate anxiety- Monitor for signs/symptoms of CO2 retention  Outcome: Progressing     Problem: HEMATOLOGIC - ADULT  Goal: Maintains hematologic stability  Description:  INTERVENTIONS- Assess for signs and symptoms of bleeding or hemorrhage- Monitor labs and vital signs for trends- Administer supportive blood products/factors, fluids and medications as ordered and appropriate- Administer supportive blood products/factors as ordered and appropriate  Outcome: Progressing  Goal: Free from bleeding injury  Description: (Example usage: patient with low platelets)INTERVENTIONS:- Avoid intramuscular injections, enemas and rectal medication administration- Ensure safe mobilization of patient- Hold pressure on venipuncture sites to achieve adequate hemostasis- Assess for signs and symptoms of internal bleeding- Monitor lab trends- Patient is to report abnormal signs of bleeding to staff- Avoid use of toothpicks and dental floss- Use electric shaver for shaving- Use soft bristle tooth brush- Limit straining and forceful nose blowing  Outcome: Progressing     Problem: Safety Risk - Non-Violent Restraints  Goal: Patient will remain free from self-harm  Description: INTERVENTIONS:- Apply the least restrictive restraint to prevent harm- Notify patient and family of reasons restraints applied- Assess for any contributing factors to confusion (electrolyte disturbances, delirium, medications)- Discontinue any unnecessary medical devices as soon as possible- Assess the patient's physical comfort, circulation, skin condition, hydration, nutrition and elimination needs - Reorient and redirection as needed- Assess for the need to continue restraints  Outcome: Progressing     Problem: Delirium  Goal: Minimize duration of delirium  Description: Interventions:- Encourage use of hearing aids, eye glasses- Promote highest level of mobility daily- Provide frequent reorientation- Promote wakefulness i.e. lights on, blinds open- Promote sleep, encourage patient's normal rest cycle i.e. lights off, TV off, minimize noise and interruptions- Encourage family to assist in orientation and promotion of home  routines  Outcome: Progressing     Problem: NEUROLOGICAL - ADULT  Goal: Achieves stable or improved neurological status  Description: INTERVENTIONS- Assess for and report changes in neurological status- Initiate measures to prevent increased intracranial pressure- Maintain blood pressure and fluid volume within ordered parameters to optimize cerebral perfusion and minimize risk of hemorrhage- Monitor temperature, glucose, and sodium. Initiate appropriate interventions as ordered  Outcome: Progressing

## 2025-05-07 NOTE — PROGRESS NOTES
[facility]  Nephrology Progress Note    Richy Goins Attending:  Jeremi Norwood MD       Assessment and Plan:    1) ESRD- due to longstanding DM 2; last HD . Lytes / volume OK. HD today via temp HD cath      2) Acute hypoxic resp failure due to pul edema + pleural effusion / hemothorax / arrest- extubated     3) L pleural effusion s/p thoracentesis -> massive hemothorax s/p ligation of intercostal artery + chest tube    4) Occluded L UE AVG -> IR to declot L UE AVF Thurs     5) Anemia- due to CKD / above / chronic disease s/p transfusions + EPO     6) Longstanding DM 2     7) PAD s/p remote R BKA; s/p recent L BKA       Subjective:  Awake alert in good spirits doesn't like the eggs    Physical Exam:   /67 (BP Location: Right arm)   Pulse 68   Temp 97.3 °F (36.3 °C) (Oral)   Resp 16   Wt 170 lb 12.8 oz (77.5 kg)   SpO2 92%   BMI 25.97 kg/m²   Temp (24hrs), Av.8 °F (36.6 °C), Min:97.3 °F (36.3 °C), Max:98.2 °F (36.8 °C)       Intake/Output Summary (Last 24 hours) at 2025 0920  Last data filed at 2025 0700  Gross per 24 hour   Intake 720 ml   Output 130 ml   Net 590 ml     Wt Readings from Last 3 Encounters:   25 170 lb 12.8 oz (77.5 kg)   25 170 lb (77.1 kg)   10/02/24 181 lb (82.1 kg)     General: awake alert  HEENT: No scleral icterus, MMM  Neck: Supple, no SARITA or thyromegaly  Cardiac: Regular rate and rhythm, S1, S2 normal, no murmur or tub  Lungs: Decreased BS at bases bilaterally   Abdomen: Soft, non-tender. + bowel sounds, no palpable organomegaly  Extremities: Without clubbing, cyanosis; no edema  Neurologic: Cranial nerves grossly intact, moving all extremities  Skin: Warm and dry, no rashes       Labs:   Lab Results   Component Value Date    WBC 9.1 2025    HGB 11.6 2025    HCT 34.4 2025    .0 2025    CREATSERUM 5.22 2025    BUN 42 2025     2025    K 3.8 2025     2025    CO2 24.0  05/07/2025    GLU 75 05/07/2025    CA 8.5 05/07/2025    PGLU 78 05/07/2025       Imaging:  All imaging studies reviewed.    Meds:   Current Hospital Medications[1]      Questions/concerns were discussed with patient and/or family by bedside.          Keeley Blackwell MD  5/7/2025  920 AM         [1]   Current Facility-Administered Medications   Medication Dose Route Frequency    meropenem (Merrem) 500 mg in sodium chloride 0.9% 100mL IVPB-ELENA  500 mg Intravenous Q24H    epoetin juan josé (Epogen, Procrit) 3000 UNIT/ML injection 3,000 Units  3,000 Units Intravenous Once per day on Monday Wednesday Friday    lidocaine-menthol 4-1 % patch 1 patch  1 patch Transdermal Daily    gabapentin (Neurontin) cap 400 mg  400 mg Oral Nightly    heparin (Porcine) 5000 UNIT/ML injection 5,000 Units  5,000 Units Subcutaneous Q8H SUJATA    acetaminophen (Tylenol) tab 650 mg  650 mg Oral 4 times per day    melatonin cap/tab 5 mg  5 mg Oral Nightly    insulin aspart (NovoLOG) 100 Units/mL FlexPen 1-68 Units  1-68 Units Subcutaneous TID CC and HS    docusate sodium (Colace) cap 100 mg  100 mg Oral BID    sennosides (Senokot) tab 8.6 mg  8.6 mg Oral BID    sodium bicarbonate tab 650 mg  650 mg Per G Tube PRN    And    pancrelipase (Lip-Prot-Amyl) (Zenpep) DR particles cap 10,000 Units  10,000 Units Per G Tube PRN    polyethylene glycol (PEG 3350) (Miralax) 17 g oral packet 17 g  17 g Per NG Tube Daily PRN    sevelamer carbonate (Renvela) tab 1,600 mg  1,600 mg Oral TID CC    glucose (Dex4) 15 GM/59ML oral liquid 15 g  15 g Oral Q15 Min PRN    Or    glucose (Glutose) 40% oral gel 15 g  15 g Oral Q15 Min PRN    Or    glucose-vitamin C (Dex-4) chewable tab 4 tablet  4 tablet Oral Q15 Min PRN    Or    dextrose 50% injection 50 mL  50 mL Intravenous Q15 Min PRN    Or    glucose (Dex4) 15 GM/59ML oral liquid 30 g  30 g Oral Q15 Min PRN    Or    glucose (Glutose) 40% oral gel 30 g  30 g Oral Q15 Min PRN    Or    glucose-vitamin C (Dex-4) chewable tab 8  tablet  8 tablet Oral Q15 Min PRN    sennosides (Senokot) tab 17.2 mg  17.2 mg Oral Nightly PRN    ondansetron (Zofran) 4 MG/2ML injection 4 mg  4 mg Intravenous Q6H PRN

## 2025-05-07 NOTE — PROGRESS NOTES
Thoracic Surgery Progress Note     Richy Goins is a 64 year old male. MRN [id number]. Admitted 2025    SUBJECTIVE/24H EVENTS:     No acute events overnight. Doing well. Weaned to room air.     Objective:     VITALS:     Temp (24hrs), Av.8 °F (36.6 °C), Min:97.3 °F (36.3 °C), Max:98.2 °F (36.8 °C)   /67 (BP Location: Right arm)   Pulse 68   Temp 97.3 °F (36.3 °C) (Oral)   Resp 16   Wt 170 lb 12.8 oz   SpO2 92%   BMI 25.97 kg/m²     EXAM:   General: NAD  Heart: RRR  Lungs: normal respiratory effort   Incisions: c/d/i   Chest tube:   Air Leak: No   Output: serous  24 hour: 110  Suction: yes      Intake/Output Summary (Last 24 hours) at 2025 0930  Last data filed at 2025 0700  Gross per 24 hour   Intake 720 ml   Output 130 ml   Net 590 ml         MEDS:  Scheduled Medications[1]    LABS:  Lab Results   Component Value Date    WBC 9.1 2025    HGB 11.6 2025    HCT 34.4 2025    .0 2025    CREATSERUM 5.22 2025    BUN 42 2025     2025    K 3.8 2025     2025    CO2 24.0 2025    GLU 75 2025    CA 8.5 2025         Assessment/Plan:     64 year old male PMH ESRD, HTN, DM, PAD, HLD who was admitted for acute hypoxic respiratory failure secondary to volume overload and left pleural effusion s/p IR thoracentesis (25) complicated by acute hemorrhage due to intercostal artery injury now POD 5 from left VATS washout and control of intercostal bleeding. Doing well.     -Chest tube removed  -Okay for DVT prophylaxis   -Encourage cough and IS use.   -Can remove chest tube stitch in 5 days if still inpatient. Otherwise follow up in one week for stitch removal.     Patient seen and discussed with Dr. Caputo.     Beverley Oliden, PA-C  Thoracic Surgery  Pager: [phone]               [1]    meropenem  500 mg Intravenous Q24H    epoetin juan josé  3,000 Units Intravenous Once per day on      lidocaine-menthol  1 patch Transdermal Daily    gabapentin  400 mg Oral Nightly    heparin  5,000 Units Subcutaneous Q8H SUJATA    acetaminophen  650 mg Oral 4 times per day    melatonin  5 mg Oral Nightly    insulin aspart  1-68 Units Subcutaneous TID CC and HS    docusate sodium  100 mg Oral BID    sennosides  8.6 mg Oral BID    sevelamer carbonate  1,600 mg Oral TID CC

## 2025-05-07 NOTE — PLAN OF CARE
Received patient from NOC RN at 0730. Patient A&O x4 and on room air, bilat lung sounds diminished. Patient voiding without complications and is reporting no pain at this time, resting in bed. Fall precautions in place, call light and belongings within reach. Plan of care evolving.     POC:   - IR procedure tomorrow to declot fistula    Problem: SAFETY ADULT - FALL  Goal: Free from fall injury  Description: INTERVENTIONS:- Assess pt frequently for physical needs- Identify cognitive and physical deficits and behaviors that affect risk of falls.- [place] fall precautions as indicated by assessment.- Educate pt/family on patient safety including physical limitations- Instruct pt to call for assistance with activity based on assessment- Modify environment to reduce risk of injury- Provide assistive devices as appropriate- Consider OT/PT consult to assist with strengthening/mobility- Encourage toileting schedule  Outcome: Progressing     Problem: RESPIRATORY - ADULT  Goal: Achieves optimal ventilation and oxygenation  Description: INTERVENTIONS:- Assess for changes in respiratory status- Assess for changes in mentation and behavior- Position to facilitate oxygenation and minimize respiratory effort- Oxygen supplementation based on oxygen saturation or ABGs- Provide Smoking Cessation handout, if applicable- Encourage broncho-pulmonary hygiene including cough, deep breathe, Incentive Spirometry- Assess the need for suctioning and perform as needed- Assess and instruct to report SOB or any respiratory difficulty- Respiratory Therapy support as indicated- Manage/alleviate anxiety- Monitor for signs/symptoms of CO2 retention  Outcome: Progressing     Problem: HEMATOLOGIC - ADULT  Goal: Maintains hematologic stability  Description: INTERVENTIONS- Assess for signs and symptoms of bleeding or hemorrhage- Monitor labs and vital signs for trends- Administer supportive blood products/factors, fluids and medications as ordered and  appropriate- Administer supportive blood products/factors as ordered and appropriate  Outcome: Progressing  Goal: Free from bleeding injury  Description: (Example usage: patient with low platelets)INTERVENTIONS:- Avoid intramuscular injections, enemas and rectal medication administration- Ensure safe mobilization of patient- Hold pressure on venipuncture sites to achieve adequate hemostasis- Assess for signs and symptoms of internal bleeding- Monitor lab trends- Patient is to report abnormal signs of bleeding to staff- Avoid use of toothpicks and dental floss- Use electric shaver for shaving- Use soft bristle tooth brush- Limit straining and forceful nose blowing  Outcome: Progressing     Problem: Safety Risk - Non-Violent Restraints  Goal: Patient will remain free from self-harm  Description: INTERVENTIONS:- Apply the least restrictive restraint to prevent harm- Notify patient and family of reasons restraints applied- Assess for any contributing factors to confusion (electrolyte disturbances, delirium, medications)- Discontinue any unnecessary medical devices as soon as possible- Assess the patient's physical comfort, circulation, skin condition, hydration, nutrition and elimination needs - Reorient and redirection as needed- Assess for the need to continue restraints  Outcome: Progressing     Problem: Delirium  Goal: Minimize duration of delirium  Description: Interventions:- Encourage use of hearing aids, eye glasses- Promote highest level of mobility daily- Provide frequent reorientation- Promote wakefulness i.e. lights on, blinds open- Promote sleep, encourage patient's normal rest cycle i.e. lights off, TV off, minimize noise and interruptions- Encourage family to assist in orientation and promotion of home routines  Outcome: Progressing     Problem: NEUROLOGICAL - ADULT  Goal: Achieves stable or improved neurological status  Description: INTERVENTIONS- Assess for and report changes in neurological status-  Initiate measures to prevent increased intracranial pressure- Maintain blood pressure and fluid volume within ordered parameters to optimize cerebral perfusion and minimize risk of hemorrhage- Monitor temperature, glucose, and sodium. Initiate appropriate interventions as ordered  Outcome: Progressing

## 2025-05-07 NOTE — CM/SW NOTE
SW checked in with pt, as pt back up to CTU from ICU stay. Pt in good spirits. Confirmed plans are to return home when medically ready for discharge. Pt was appreciative of SW visit.     Lola GERENE, LCSW  Discharge Planner

## 2025-05-07 NOTE — PHYSICAL THERAPY NOTE
PHYSICAL THERAPY EVALUATION - INPATIENT     Room Number: 8611/8611-A  Evaluation Date: 5/7/2025  Type of Evaluation: Re-evaluation  Physician Order: PT Eval and Treat    Presenting Problem: Acute respiratory failure, L hemothorax s/p throacoscopy with evacuation of hematoma  Co-Morbidities : Recent L BKA, previous R BKA, ESRD on HD, PAD, DM, HTN  Reason for Therapy: Mobility Dysfunction and Discharge Planning    PHYSICAL THERAPY ASSESSMENT   Patient is a 64 year old male admitted 4/29/2025 for acute respiratory failure.  Pt requiring re-evaluation following change in medical status with transfer to ICU with L hemothorax and hypovolemic shock.  Prior to admission, patient's baseline is mod I for transfers w/ his R le prosthetic on.  Patient is currently functioning below baseline with bed mobility and transfers.  Patient is requiring minimal assist as a result of the following impairments: decreased functional strength, decreased endurance/aerobic capacity, impaired sitting balance, decreased muscular endurance, medical status, and fatigue .  Physical therapy will continue to follow for duration of hospitalization.    Patient will benefit from continued skilled PT Services to promote return to prior level of function and safety with continuous assistance and gradual rehabilitative therapy .    PLAN DURING HOSPITALIZATION  Nursing Mobility Recommendation : Lift Equipment  PT Device Recommendation: Wheelchair, Rolling walker  PT Treatment Plan: Bed mobility, Patient education, Family education, Transfer training, Strengthening, Range of motion, Balance training  Rehab Potential : Good  Frequency (Obs): 3-5x/week     CURRENT GOALS    Goal #1 Patient is able to demonstrate supine - sit EOB @ level: supervision     Goal #2 Patient is able to demonstrate transfers EOB to/from Chair/Wheelchair at assistance level: maximum assistance     Goal #3    Goal #4    Goal #5    Goal #6    Goal Comments: Goals established on  5/1/2025      PHYSICAL THERAPY MEDICAL/SOCIAL HISTORY  History related to current admission: Patient is a 64 year old male admitted on 4/29/2025 from home for sob and facial swelling after missing 2 sessions of dialysis within one week.  Pt diagnosed with acute respiratory failure, anemia, hyperkalemia, and L pleural effusion. Pt s/p L sided thoracentesis on 5/2 with AMS and hypotension following procedure.  RRT called and pt transferred to ICU, chest tube placed, 15 units of PRBC transfused.  Pt found to have L hemothorax requiring emergent flexible bronchoscopy, video assisted thoracoscopy and hematoma evacuation.  Pt intubated 5/2-5/4.  Chest tube removed 5/7.    HOME SITUATION  Type of Home: House  Home Layout: One level, Ramped entrance  Stairs to Enter : 0        Stairs to Bedroom: 0         Lives With: Spouse    Drives: No          Prior Level of [place]: Pt lives w/ his wife in a one level home.  He is typically able to transfer to/from w/c, toilet and bed on his own.  Pt's wife assists him in/out of the car and takes him to dialysis which is 5 a.m.  This has become significantly more difficult following his recent LLE amputation.  Current plan is to have pt take a medical bus/van to from dialysis.  He has an electric w/c on order and will be able to roll on/off the bus and staff at dialysis will be able to use a lift in/out of chair.  Pt also will have a new time at dialysis.  He is simply awaiting delivery of electric w/c.    Pt finished recent stay at [facility] and is awaiting stability in L limb so he can be fit for a prosthesis.  Has a RLE prosthesis, but has had difficulty donning since LLE amputation. Pt has been primarily transferring via slide board.    SUBJECTIVE  In regards to transferring bed>chair via slide board, \"I don't even want to think about it! I know I can't\"    OBJECTIVE  Precautions: Orthotic/prosthesis (Has prosthesis for R le)  Fall Risk: High fall risk    WEIGHT BEARING  RESTRICTION     PAIN ASSESSMENT  Ratin (discomfort)  Location: left chest       COGNITION  Overall Cognitive Status:  WFL - within functional limits  Orientation Level:  oriented x4  Memory:  appears intact  Following Commands:  follows all commands and directions without difficulty    RANGE OF MOTION AND STRENGTH ASSESSMENT  Upper extremity ROM and strength are within functional limits     Lower extremity ROM is within functional limits     Lower extremity strength is grossly 5/5    BALANCE  Static Sitting: Good  Dynamic Sitting: Fair -  Static Standing: Not tested  Dynamic Standing: Not tested      AM-PAC '6-Clicks' INPATIENT SHORT FORM - BASIC MOBILITY  How much difficulty does the patient currently have...  Patient Difficulty: Turning over in bed (including adjusting bedclothes, sheets and blankets)?: None   Patient Difficulty: Sitting down on and standing up from a chair with arms (e.g., wheelchair, bedside commode, etc.): Unable   Patient Difficulty: Moving from lying on back to sitting on the side of the bed?: A Lot   How much help from another person does the patient currently need...   Help from Another: Moving to and from a bed to a chair (including a wheelchair)?: Total   Help from Another: Need to walk in hospital room?: Total   Help from Another: Climbing 3-5 steps with a railing?: Total     AM-PAC Score:  Raw Score: 10   Approx Degree of Impairment: 76.75%   Standardized Score (AM-PAC Scale): 32.29   CMS Modifier (G-Code): CL    FUNCTIONAL ABILITY STATUS  Gait Assessment   Functional Mobility/Gait Assessment  Gait Assistance: Not tested    Skilled Therapy Provided     Bed Mobility:  Rolling: right w/ HOB elevated, increased time and verbal cues for use of bedrail and sequencing  Supine to sit: Min A,with HOB elevated.  Reports he can normally do this completely on his own from flat bed, but is too weak today to complete.  Once sitting eob, pt able to sit upright unsupported x8 minutes.  Pt able to  initiate lateral scooting toward HOB with BUE, yet minimal distance achieved despite increased time and maximal effort.  Pt with increased chest/trunk pain with BUE pushing  Sit to supine: CGA    Transfer Mobility:  Sit to stand: NT   Stand to sit: NT  Gait = NT    Therapist's Comments:  Session limited by high level of fatigue and pain.  Bed transitioned to chair position at end of session.      Exercise/Education Provided:  Bed mobility  Functional activity tolerated    Patient End of Session: In bed, Needs met, Call light within reach, RN aware of session/findings, All patient questions and concerns addressed, Alarm set      Patient Evaluation Complexity Level:  History Moderate - 1 or 2 personal factors and/or co-morbidities   Examination of body systems Moderate - addressing a total of 3 or more elements   Clinical Presentation  Moderate - Evolving   Clinical Decision Making Low Complexity       PT Session Time: 25 minutes  Gait Trainin minutes  Therapeutic Activity: 10 minutes  Neuromuscular Re-education: 0 minutes  Therapeutic Exercise: 0 minutes

## 2025-05-07 NOTE — PROGRESS NOTES
[facility] Progress Note     SUBJECTIVE/Interval history:  No acute events overnight, he denies new concerns. Pain controlled. No dyspnea. No fevers. Remains on RA    Review of Systems:   A comprehensive 14 point review of systems was completed.   Pertinent positives and negatives noted in the HPI.    Medications  Reviewed personally  Scheduled Medications[1]  PRN Medications[2]    OBJECTIVE:  Vitals:    05/07/25 0000 05/07/25 0500 05/07/25 0513 05/07/25 0813   BP: 113/61 122/70  124/67   BP Location: Right arm Right arm  Right arm   Pulse: 71 71 66 68   Resp: 18 18  16   Temp: 98 °F (36.7 °C) 98 °F (36.7 °C)  97.3 °F (36.3 °C)   TempSrc: Oral Oral  Oral   SpO2: 92% 94% 96% 92%   Weight:   170 lb 12.8 oz (77.5 kg)        Vital signs in last 24 hours:  Blood pressure 124/67, pulse 68, temperature 97.3 °F (36.3 °C), temperature source Oral, resp. rate 16, weight 170 lb 12.8 oz (77.5 kg), SpO2 92%.     Intake/Output:  I/O last 3 completed shifts:  In: 840 [P.O.:840]  Out: 160 [Chest Tube:160]       Physical Exam:  General: Appears alert, oriented x3. No acute distress  Neurologic:No focal deficits noted.  Alert.  Oriented.  Lungs:CTAB. No wheeze  chest wall:chest tube to left with no leak seen  Heart:RRR no m  Abdomen: soft, nondistended, no rigidity, no reaction with palpation. No hernias noted  Extremities:No overt deformities, edema    Lab Data Review:   Recent Labs   Lab 05/05/25  0443 05/06/25  0456 05/07/25  0552   GLU 94 66* 75   BUN 39* 26* 42*   CREATSERUM 5.75*  5.75* 4.17* 5.22*   CA 8.2* 8.1* 8.5*    141 140   K 4.3 3.4* 3.8    104 102   CO2 25.0 26.0 24.0     Recent Labs   Lab 05/02/25  1804 05/02/25  1837 05/03/25  0427 05/04/25  0445 05/05/25  0443 05/06/25  0456 05/07/25  0533   RBC 4.64   < > 4.62 4.19 3.84 3.58* 3.90   HGB 13.6   < > 13.5 12.3* 11.1* 10.4* 11.6*   HCT 39.2   < > 38.0* 35.7* 34.0* 32.1* 34.4*   MCV 84.5   < > 82.3 85.2 88.5 89.7 88.2   MCH  29.3   < > 29.2 29.4 28.9 29.1 29.7   MCHC 34.7   < > 35.5 34.5 32.6 32.4 33.7   RDW 14.8   < > 14.9 16.1 16.1 15.8 15.9   NEPRELIM 12.53*  --  8.31* 9.09*  --   --   --    WBC 14.4*   < > 10.3 11.2* 10.2 8.9 9.1   .0   < > 174.0 141.0* 142.0* 147.0* 184.0    < > = values in this interval not displayed.     No results for input(s): \"BNP\" in the last 168 hours.  No results for input(s): \"TROP\", \"CK\" in the last 168 hours.  Recent Labs   Lab 05/01/25  0810 05/02/25  1449 05/02/25  1837 05/03/25  0427   INR 1.26* 1.38*  --  1.33*   PTT  --   --  28.4  --      Recent Labs   Lab 05/04/25  1030   ABGPHT 7.44   TZHHWZ3J 39   EFEPK7H 97   ABGHCO3 26.6   ABGBE 2.2*   TEMP 98.6   NOEMÍ Positive   SITE Left Radial   DEV Nasal cannula   THGB 11.8*       Other Labs:  Interval Culture Data:   Hospital Encounter on 04/29/25   1. Body Fluid Cult Aerobic and Anaerobic     Status: None (Preliminary result)    Collection Time: 05/02/25 10:00 AM    Specimen: Pleural Fluid, Left; Body fluid, unspecified   Result Value Ref Range    Body Fluid Culture Result No Growth 4 Days N/A    Body Fluid Smear 1+ WBCs seen N/A    Body Fluid Smear No organisms seen N/A    Body Fluid Smear This is a cytocentrifuged smear. N/A     No results for input(s): \"COLORUR\", \"CLARITY\", \"SPECGRAVITY\", \"GLUUR\", \"BILUR\", \"KETUR\", \"BLOODURINE\", \"PHURINE\", \"PROUR\", \"UROBILINOGEN\", \"NITRITE\", \"LEUUR\", \"WBCUR\", \"RBCUR\", \"BACUR\", \"EPIUR\" in the last 168 hours.    Interval Radiology:   Reviewed personally  XR CHEST AP PORTABLE  (CPT=71045)  Result Date: 5/4/2025  CONCLUSION:  Improving airspace disease left lung.   LOCATION:  [facility]      Dictated by (CST): Rob Reyes MD on 5/04/2025 at 7:09 AM     Finalized by (CST): Rob Reyes MD on 5/04/2025 at 7:11 AM       XR CHEST AP/PA (1 VIEW) (CPT=71045)  Result Date: 5/3/2025  CONCLUSION:   1.  Dobbhoff tube in the stomach.  The remainder of the lines and tubes are stable since previous study.   2. Stable appearance of  patchy airspace disease throughout the left lung with residual small amount of fluid in gas within the left lateral pleural space.   LOCATION:  [facility]      Dictated by (CST): Yoan Duvall MD on 5/03/2025 at 6:17 PM     Finalized by (CST): Yoan Duvall MD on 5/03/2025 at 6:18 PM       XR CHEST AP PORTABLE  (CPT=71045)  Result Date: 5/3/2025  CONCLUSION:  1. Overall no significant change in the appearance of the portable chest radiograph in the short interval since the prior study. 2. Lucency left lateral lung base is most likely a pneumothorax related incomplete expansion of lung (trapped lung).   LOCATION:  [facility]      Dictated by (CST): Camden Deleon MD on 5/03/2025 at 6:32 AM     Finalized by (CST): Camden Deleon MD on 5/03/2025 at 6:34 AM       XR CHEST AP PORTABLE  (CPT=71045)  Result Date: 5/2/2025  CONCLUSION:  Placement of a large bore left chest tube.  Evacuation of the left pleural fluid/hemo thorax.  Incomplete re-expansion of the left lung with a mild to moderate basilar pneumothorax.   LOCATION:  [facility]      Dictated by (CST): Rob Reyes MD on 5/02/2025 at 6:55 PM     Finalized by (CST): Rob Reyes MD on 5/02/2025 at 6:58 PM       XR CHEST AP PORTABLE  (CPT=71045)  Result Date: 5/2/2025  CONCLUSION:  A portion of left mid lung is now aerated.  Other findings appear stable.   LOCATION:  [facility]      Dictated by (CST): Rob Reyes MD on 5/02/2025 at 5:34 PM     Finalized by (CST): Rob Reyes MD on 5/02/2025 at 5:36 PM       XR CHEST AP PORTABLE  (CPT=71045)  Result Date: 5/2/2025  CONCLUSION:  Complete opacification left hemithorax with shift of mediastinal structures from left to right.  Finding was discussed with the ICU RN caring for the patient on May 2, 2025 at 2:30 p.m..  By this time a chest tube have been placed.  Findings  would be consistent with a large hemo thorax.   LOCATION:  [facility]      Dictated by (CST): Camden Deleon MD on 5/02/2025 at 2:25 PM     Finalized by (CST): Camden Deleon MD  on 5/02/2025 at 2:30 PM       US THORACENTESIS GUIDED LEFT (CPT=32555)  Result Date: 5/2/2025  CONCLUSION:  Ultrasound-guided left thoracentesis of 1600 cc was performed without complication.   LOCATION:  [facility]    Dictated by (CST): Oskar Lynn MD on 5/02/2025 at 1:33 PM     Finalized by (CST): Oskar Lynn MD on 5/02/2025 at 1:34 PM       XR CHEST AP PORTABLE  (CPT=71045)  Result Date: 5/2/2025  CONCLUSION:  Decreased size of left pleural effusion without pneumothorax.  There continues to be significant amount of opacity within the left hemithorax.   LOCATION:  [facility]      Dictated by (CST): Yoan Duvall MD on 5/02/2025 at 12:14 PM     Finalized by (CST): Yoan Duvall MD on 5/02/2025 at 12:15 PM       XR CHEST AP PORTABLE  (CPT=71045)  Result Date: 4/29/2025  CONCLUSION:   There is nearly complete opacification of the left hemithorax which partially obscures the cardiomediastinal silhouette.  This likely represents a combination of large pleural effusion and passive atelectasis, though superimposed infection or aspiration not excluded.  Mild interstitial edema noted throughout the right lung.  The right pleural space remains clear.   LOCATION:  [facility]      Dictated by (CST): Patricio Telles MD on 4/29/2025 at 9:14 PM     Finalized by (CST): Patricio Telles MD on 4/29/2025 at 9:15 PM       Assessment:  Hemorrhagic shock secondary to hemothorax, improved  Intrathoracic bleed following thoracentesis now status post intercostal artery cautery with thoracotomy-no further bleeding  Recurrent left-sided pleural effusion present since January 2025, s/p thoracentesis c/b hemothorax; fluid analysis with lymphocytic exudate  Possible inferior wall STEMI cardiology note appreciated-with changes thought related to severity of shock  Dyspnea mild hypoxia on presentation related to large left pleural effusion, now improved  End-stage renal disease on HD  Severe peripheral vascular disease bilateral BKA's most recently last  month    Plan:  Monitor respiratory status, wean o2 for sats >89%  Chest tube per thoracic surgery  Complete treatment for klebsiella ESBL - meropenem day 2  Control pain  Await cytology results from pleural effusion although low suspicion for malignancy         [1]    meropenem  500 mg Intravenous Q24H    epoetin juan josé  3,000 Units Intravenous Once per day on Monday Wednesday Friday    lidocaine-menthol  1 patch Transdermal Daily    gabapentin  400 mg Oral Nightly    heparin  5,000 Units Subcutaneous Q8H SUJATA    acetaminophen  650 mg Oral 4 times per day    melatonin  5 mg Oral Nightly    insulin aspart  1-68 Units Subcutaneous TID CC and HS    docusate sodium  100 mg Oral BID    sennosides  8.6 mg Oral BID    sevelamer carbonate  1,600 mg Oral TID CC   [2]   sodium bicarbonate **AND** lipase-protease-amylase (Lip-Prot-Amyl)    polyethylene glycol (PEG 3350)    glucose **OR** glucose **OR** glucose-vitamin C **OR** dextrose **OR** glucose **OR** glucose **OR** glucose-vitamin C    sennosides    ondansetron

## 2025-05-08 ENCOUNTER — TELEPHONE (OUTPATIENT)
Dept: INTERNAL MEDICINE CLINIC | Facility: CLINIC | Age: 65
End: 2025-05-08

## 2025-05-08 PROBLEM — Z99.2 ESRD (END STAGE RENAL DISEASE) ON DIALYSIS (HCC): Status: ACTIVE | Noted: 2025-05-08

## 2025-05-08 PROBLEM — N18.6 ESRD (END STAGE RENAL DISEASE) ON DIALYSIS (HCC): Status: ACTIVE | Noted: 2025-05-08

## 2025-05-08 LAB
ANION GAP SERPL CALC-SCNC: 11 MMOL/L (ref 0–18)
BUN BLD-MCNC: 31 MG/DL (ref 9–23)
CALCIUM BLD-MCNC: 8.4 MG/DL (ref 8.7–10.6)
CHLORIDE SERPL-SCNC: 106 MMOL/L (ref 98–112)
CO2 SERPL-SCNC: 24 MMOL/L (ref 21–32)
CREAT BLD-MCNC: 4.31 MG/DL (ref 0.7–1.3)
EGFRCR SERPLBLD CKD-EPI 2021: 14 ML/MIN/1.73M2 (ref 60–?)
ERYTHROCYTE [DISTWIDTH] IN BLOOD BY AUTOMATED COUNT: 15.5 %
GLUCOSE BLD-MCNC: 112 MG/DL (ref 70–99)
GLUCOSE BLD-MCNC: 118 MG/DL (ref 70–99)
GLUCOSE BLD-MCNC: 124 MG/DL (ref 70–99)
GLUCOSE BLD-MCNC: 133 MG/DL (ref 70–99)
GLUCOSE BLD-MCNC: 154 MG/DL (ref 70–99)
HCT VFR BLD AUTO: 36.2 % (ref 39–53)
HGB BLD-MCNC: 11.8 G/DL (ref 13–17.5)
MCH RBC QN AUTO: 28.8 PG (ref 26–34)
MCHC RBC AUTO-ENTMCNC: 32.6 G/DL (ref 31–37)
MCV RBC AUTO: 88.3 FL (ref 80–100)
OSMOLALITY SERPL CALC.SUM OF ELEC: 302 MOSM/KG (ref 275–295)
PLATELET # BLD AUTO: 201 10(3)UL (ref 150–450)
POTASSIUM SERPL-SCNC: 4 MMOL/L (ref 3.5–5.1)
RBC # BLD AUTO: 4.1 X10(6)UL (ref 3.8–5.8)
SODIUM SERPL-SCNC: 141 MMOL/L (ref 136–145)
WBC # BLD AUTO: 9.1 X10(3) UL (ref 4–11)

## 2025-05-08 PROCEDURE — 99232 SBSQ HOSP IP/OBS MODERATE 35: CPT | Performed by: INTERNAL MEDICINE

## 2025-05-08 PROCEDURE — 99233 SBSQ HOSP IP/OBS HIGH 50: CPT | Performed by: INTERNAL MEDICINE

## 2025-05-08 NOTE — PROGRESS NOTES
[facility] Progress Note     SUBJECTIVE/Interval history:  No acute events overnight, he denies new concerns. Pain controlled. No dyspnea. No fevers. Remains on RA.  Chest tube removed yesterday    Review of Systems:   A comprehensive 14 point review of systems was completed.   Pertinent positives and negatives noted in the HPI.    Medications  Reviewed personally  Scheduled Medications[1]  PRN Medications[2]    OBJECTIVE:  Vitals:    05/07/25 1442 05/07/25 1605 05/07/25 1940 05/07/25 2330   BP:  116/80 116/63 97/55   BP Location:  Right arm Right arm Right arm   Pulse: 88 79 79 73   Resp:  18 18 20   Temp:   98 °F (36.7 °C) 98 °F (36.7 °C)   TempSrc:   Oral Oral   SpO2:   96% 95%   Weight:           Vital signs in last 24 hours:  Blood pressure 97/55, pulse 73, temperature 98 °F (36.7 °C), temperature source Oral, resp. rate 20, weight 170 lb 12.8 oz (77.5 kg), SpO2 95%.     Intake/Output:  I/O last 3 completed shifts:  In: 1360 [P.O.:1160; IV PIGGYBACK:200]  Out: 2070 [Other:2000; Chest Tube:70]       Physical Exam:  General: Appears alert, oriented x3. No acute distress  Neurologic:No focal deficits noted.  Alert.  Oriented.  Lungs:CTAB. No wheeze  Heart:RRR no m  Abdomen: soft, nondistended, no rigidity, no reaction with palpation. No hernias noted  Extremities:No overt deformities, edema    Lab Data Review:   Recent Labs   Lab 05/06/25  0456 05/07/25  0552 05/08/25  0449   GLU 66* 75 154*   BUN 26* 42* 31*   CREATSERUM 4.17* 5.22* 4.31*   CA 8.1* 8.5* 8.4*    140 141   K 3.4* 3.8 4.0    102 106   CO2 26.0 24.0 24.0     Recent Labs   Lab 05/02/25  1804 05/02/25  1837 05/03/25  0427 05/04/25  0445 05/05/25  0443 05/06/25  0456 05/07/25  0533 05/08/25  0449   RBC 4.64   < > 4.62 4.19   < > 3.58* 3.90 4.10   HGB 13.6   < > 13.5 12.3*   < > 10.4* 11.6* 11.8*   HCT 39.2   < > 38.0* 35.7*   < > 32.1* 34.4* 36.2*   MCV 84.5   < > 82.3 85.2   < > 89.7 88.2 88.3   MCH 29.3   < >  29.2 29.4   < > 29.1 29.7 28.8   MCHC 34.7   < > 35.5 34.5   < > 32.4 33.7 32.6   RDW 14.8   < > 14.9 16.1   < > 15.8 15.9 15.5   NEPRELIM 12.53*  --  8.31* 9.09*  --   --   --   --    WBC 14.4*   < > 10.3 11.2*   < > 8.9 9.1 9.1   .0   < > 174.0 141.0*   < > 147.0* 184.0 201.0    < > = values in this interval not displayed.     No results for input(s): \"BNP\" in the last 168 hours.  No results for input(s): \"TROP\", \"CK\" in the last 168 hours.  Recent Labs   Lab 05/01/25  0810 05/02/25  1449 05/02/25  1837 05/03/25  0427   INR 1.26* 1.38*  --  1.33*   PTT  --   --  28.4  --      Recent Labs   Lab 05/04/25  1030   ABGPHT 7.44   OGXWTL0F 39   ILDZB8M 97   ABGHCO3 26.6   ABGBE 2.2*   TEMP 98.6   NOEMÍ Positive   SITE Left Radial   DEV Nasal cannula   THGB 11.8*       Other Labs:  Interval Culture Data:   Hospital Encounter on 04/29/25   1. Body Fluid Cult Aerobic and Anaerobic     Status: None    Collection Time: 05/02/25 10:00 AM    Specimen: Pleural Fluid, Left; Body fluid, unspecified   Result Value Ref Range    Body Fluid Culture Result No Growth 5 Days N/A    Body Fluid Smear 1+ WBCs seen N/A    Body Fluid Smear No organisms seen N/A    Body Fluid Smear This is a cytocentrifuged smear. N/A     No results for input(s): \"COLORUR\", \"CLARITY\", \"SPECGRAVITY\", \"GLUUR\", \"BILUR\", \"KETUR\", \"BLOODURINE\", \"PHURINE\", \"PROUR\", \"UROBILINOGEN\", \"NITRITE\", \"LEUUR\", \"WBCUR\", \"RBCUR\", \"BACUR\", \"EPIUR\" in the last 168 hours.    Interval Radiology:   Reviewed personally  XR CHEST AP PORTABLE  (CPT=71045)  Result Date: 5/4/2025  CONCLUSION:  Improving airspace disease left lung.   LOCATION:  [facility]      Dictated by (CST): Rob Reyes MD on 5/04/2025 at 7:09 AM     Finalized by (CST): Rob Reyes MD on 5/04/2025 at 7:11 AM       XR CHEST AP/PA (1 VIEW) (CPT=71045)  Result Date: 5/3/2025  CONCLUSION:   1.  Dobbhoff tube in the stomach.  The remainder of the lines and tubes are stable since previous study.   2. Stable appearance of  patchy airspace disease throughout the left lung with residual small amount of fluid in gas within the left lateral pleural space.   LOCATION:  [facility]      Dictated by (CST): Yoan Duvall MD on 5/03/2025 at 6:17 PM     Finalized by (CST): Yoan Duvall MD on 5/03/2025 at 6:18 PM       XR CHEST AP PORTABLE  (CPT=71045)  Result Date: 5/3/2025  CONCLUSION:  1. Overall no significant change in the appearance of the portable chest radiograph in the short interval since the prior study. 2. Lucency left lateral lung base is most likely a pneumothorax related incomplete expansion of lung (trapped lung).   LOCATION:  [facility]      Dictated by (CST): Camden Deleon MD on 5/03/2025 at 6:32 AM     Finalized by (CST): Camden Deleon MD on 5/03/2025 at 6:34 AM       XR CHEST AP PORTABLE  (CPT=71045)  Result Date: 5/2/2025  CONCLUSION:  Placement of a large bore left chest tube.  Evacuation of the left pleural fluid/hemo thorax.  Incomplete re-expansion of the left lung with a mild to moderate basilar pneumothorax.   LOCATION:  [facility]      Dictated by (CST): Rob Reyes MD on 5/02/2025 at 6:55 PM     Finalized by (CST): Rob Reyes MD on 5/02/2025 at 6:58 PM       XR CHEST AP PORTABLE  (CPT=71045)  Result Date: 5/2/2025  CONCLUSION:  A portion of left mid lung is now aerated.  Other findings appear stable.   LOCATION:  [facility]      Dictated by (CST): Rob Reyes MD on 5/02/2025 at 5:34 PM     Finalized by (CST): Rob Reyes MD on 5/02/2025 at 5:36 PM       XR CHEST AP PORTABLE  (CPT=71045)  Result Date: 5/2/2025  CONCLUSION:  Complete opacification left hemithorax with shift of mediastinal structures from left to right.  Finding was discussed with the ICU RN caring for the patient on May 2, 2025 at 2:30 p.m..  By this time a chest tube have been placed.  Findings  would be consistent with a large hemo thorax.   LOCATION:  [facility]      Dictated by (CST): Camden Deleon MD on 5/02/2025 at 2:25 PM     Finalized by (CST): Camden Deleon MD  on 5/02/2025 at 2:30 PM       US THORACENTESIS GUIDED LEFT (CPT=32555)  Result Date: 5/2/2025  CONCLUSION:  Ultrasound-guided left thoracentesis of 1600 cc was performed without complication.   LOCATION:  [facility]    Dictated by (CST): Oskar Lynn MD on 5/02/2025 at 1:33 PM     Finalized by (CST): Oskar Lynn MD on 5/02/2025 at 1:34 PM       XR CHEST AP PORTABLE  (CPT=71045)  Result Date: 5/2/2025  CONCLUSION:  Decreased size of left pleural effusion without pneumothorax.  There continues to be significant amount of opacity within the left hemithorax.   LOCATION:  [facility]      Dictated by (CST): Yoan Duvall MD on 5/02/2025 at 12:14 PM     Finalized by (CST): Yoan Duvall MD on 5/02/2025 at 12:15 PM       XR CHEST AP PORTABLE  (CPT=71045)  Result Date: 4/29/2025  CONCLUSION:   There is nearly complete opacification of the left hemithorax which partially obscures the cardiomediastinal silhouette.  This likely represents a combination of large pleural effusion and passive atelectasis, though superimposed infection or aspiration not excluded.  Mild interstitial edema noted throughout the right lung.  The right pleural space remains clear.   LOCATION:  [facility]      Dictated by (CST): Patricio Telles MD on 4/29/2025 at 9:14 PM     Finalized by (CST): Patricio Telles MD on 4/29/2025 at 9:15 PM       Assessment:  Hemorrhagic shock secondary to hemothorax, improved  Intrathoracic bleed following thoracentesis now status post intercostal artery cautery with thoracotomy-no further bleeding  Recurrent left-sided pleural effusion present since January 2025, s/p thoracentesis c/b hemothorax; fluid analysis with lymphocytic exudate  Possible inferior wall STEMI cardiology note appreciated-with changes thought related to severity of shock  Dyspnea mild hypoxia on presentation related to large left pleural effusion, now improved  End-stage renal disease on HD  Severe peripheral vascular disease bilateral BKA's most recently last  month    Plan:  Monitor respiratory status, wean o2 for sats >89%  Chest tube per thoracic surgery  Complete treatment for klebsiella ESBL - meropenem day 3  Pleural fulid cyto negative       [1]    meropenem  500 mg Intravenous Q24H    epoetin juan josé  3,000 Units Intravenous Once per day on Monday Wednesday Friday    lidocaine-menthol  1 patch Transdermal Daily    gabapentin  400 mg Oral Nightly    heparin  5,000 Units Subcutaneous Q8H SUJATA    acetaminophen  650 mg Oral 4 times per day    melatonin  5 mg Oral Nightly    insulin aspart  1-68 Units Subcutaneous TID CC and HS    docusate sodium  100 mg Oral BID    sennosides  8.6 mg Oral BID    sevelamer carbonate  1,600 mg Oral TID CC   [2]   polyethylene glycol (PEG 3350)    glucose **OR** glucose **OR** glucose-vitamin C **OR** dextrose **OR** glucose **OR** glucose **OR** glucose-vitamin C    sennosides    ondansetron

## 2025-05-08 NOTE — PLAN OF CARE
Received patient at 1930.  Alert and oriented x 4. Tele Rhythm NSR-BBB, oxygen maintained on room air. Breath sounds -are diminished. Skin -post chest tube incision-stitched, no redness, open to air. Last bowel movement 5/7. Patient is anuric. Patient reports no cardiac symptoms, No chest pain or shortness of breath. Bed is locked and in low position. Call light and personal items within reach. Reviewed plan of care and patient verbalizes understanding.        Problem: SAFETY ADULT - FALL  Goal: Free from fall injury  Description: INTERVENTIONS:- Assess pt frequently for physical needs- Identify cognitive and physical deficits and behaviors that affect risk of falls.- Norco fall precautions as indicated by assessment.- Educate pt/family on patient safety including physical limitations- Instruct pt to call for assistance with activity based on assessment- Modify environment to reduce risk of injury- Provide assistive devices as appropriate- Consider OT/PT consult to assist with strengthening/mobility- Encourage toileting schedule  Outcome: Progressing     Problem: RESPIRATORY - ADULT  Goal: Achieves optimal ventilation and oxygenation  Description: INTERVENTIONS:- Assess for changes in respiratory status- Assess for changes in mentation and behavior- Position to facilitate oxygenation and minimize respiratory effort- Oxygen supplementation based on oxygen saturation or ABGs- Provide Smoking Cessation handout, if applicable- Encourage broncho-pulmonary hygiene including cough, deep breathe, Incentive Spirometry- Assess the need for suctioning and perform as needed- Assess and instruct to report SOB or any respiratory difficulty- Respiratory Therapy support as indicated- Manage/alleviate anxiety- Monitor for signs/symptoms of CO2 retention  Outcome: Progressing     Problem: HEMATOLOGIC - ADULT  Goal: Maintains hematologic stability  Description: INTERVENTIONS- Assess for signs and symptoms of bleeding or  hemorrhage- Monitor labs and vital signs for trends- Administer supportive blood products/factors, fluids and medications as ordered and appropriate- Administer supportive blood products/factors as ordered and appropriate  Outcome: Progressing  Goal: Free from bleeding injury  Description: (Example usage: patient with low platelets)INTERVENTIONS:- Avoid intramuscular injections, enemas and rectal medication administration- Ensure safe mobilization of patient- Hold pressure on venipuncture sites to achieve adequate hemostasis- Assess for signs and symptoms of internal bleeding- Monitor lab trends- Patient is to report abnormal signs of bleeding to staff- Avoid use of toothpicks and dental floss- Use electric shaver for shaving- Use soft bristle tooth brush- Limit straining and forceful nose blowing  Outcome: Progressing     Problem: Safety Risk - Non-Violent Restraints  Goal: Patient will remain free from self-harm  Description: INTERVENTIONS:- Apply the least restrictive restraint to prevent harm- Notify patient and family of reasons restraints applied- Assess for any contributing factors to confusion (electrolyte disturbances, delirium, medications)- Discontinue any unnecessary medical devices as soon as possible- Assess the patient's physical comfort, circulation, skin condition, hydration, nutrition and elimination needs - Reorient and redirection as needed- Assess for the need to continue restraints  Outcome: Progressing     Problem: Delirium  Goal: Minimize duration of delirium  Description: Interventions:- Encourage use of hearing aids, eye glasses- Promote highest level of mobility daily- Provide frequent reorientation- Promote wakefulness i.e. lights on, blinds open- Promote sleep, encourage patient's normal rest cycle i.e. lights off, TV off, minimize noise and interruptions- Encourage family to assist in orientation and promotion of home routines  Outcome: Progressing     Problem: NEUROLOGICAL -  ADULT  Goal: Achieves stable or improved neurological status  Description: INTERVENTIONS- Assess for and report changes in neurological status- Initiate measures to prevent increased intracranial pressure- Maintain blood pressure and fluid volume within ordered parameters to optimize cerebral perfusion and minimize risk of hemorrhage- Monitor temperature, glucose, and sodium. Initiate appropriate interventions as ordered  Outcome: Progressing

## 2025-05-08 NOTE — OCCUPATIONAL THERAPY NOTE
OCCUPATIONAL THERAPY EVALUATION - INPATIENT     Room Number: 8611/8611-A  Evaluation Date: 5/7/2025  Type of Evaluation: Initial  Presenting Problem: SOB, respiratory failure, pleural effusion, s/p hemothorax evac    Physician Order: IP Consult to Occupational Therapy  Reason for Therapy: ADL/IADL Dysfunction and Discharge Planning    OCCUPATIONAL THERAPY ASSESSMENT   Patient is currently functioning below baseline with toileting, bathing, upper body dressing, lower body dressing, bed mobility, and transfers. Prior to admission, patient's baseline is slide board T/F level to W/C and has some assist with BADL.  Patient is requiring maximum assistance as a result of the following impairments: decreased functional strength, decreased functional reach, decreased endurance, impaired sitting balance, decreased muscular endurance, and medical status. Occupational Therapy will continue to follow for duration of hospitalization.    Patient will benefit from continued skilled OT Services to promote return to prior level of function and safety with continuous assistance and gradual rehabilitative therapy    History Related to Current Admission: Patient is a 64 year old male admitted on 4/29/2025 with Presenting Problem: SOB, respiratory failure, pleural effusion, s/p hemothorax evac. Pt admitted with sob and facial swelling after missing 2 sessions of dialysis within one week.  Pt diagnosed with acute respiratory failure, anemia, hyperkalemia, and L pleural effusion. Pt s/p L sided thoracentesis on 5/2 with AMS and hypotension following procedure.  RRT called and pt transferred to ICU, chest tube placed, 15 units of PRBC transfused.  Pt found to have L hemothorax requiring emergent flexible bronchoscopy, video assisted thoracoscopy and hematoma evacuation.  Pt intubated 5/2-5/4.  Chest tube removed 5/7.     Recommendations for nursing staff:   Transfers: stefany lift   Toileting location: bed     EVALUATION SESSION    Patient  Start of Session: Pt was found in his bed.     FUNCTIONAL TRANSFER ASSESSMENT     BED MOBILITY  Supine to Sit : Minimal Assist  Sit to Supine (OT): Supervision    BALANCE ASSESSMENT     FUNCTIONAL ADL ASSESSMENT     ACTIVITY TOLERANCE:                          O2 SATURATIONS       Cognition  WFL    Upper extremity  WFL    EDUCATION PROVIDED  Patient Education : Plan of Care; Discharge Recommendations; Functional Transfer Techniques; Fall Prevention  Patient's Response to Education: Verbalized Understanding    Equipment used: none   Demonstrates functional use, Would benefit from additional trial     Therapist comments: supine>sit EOB>supine>bed into chair position>pt felt he was not strong enough for slide board T/F this date     Patient End of Session: In bed, Call light within reach, Needs met, RN aware of session/findings, All patient questions and concerns addressed    OCCUPATIONAL PROFILE    HOME SITUATION  Type of Home: House  Home Layout: One level, Ramped entrance  Lives With: Spouse          Other Equipment: Other (Comment) (pt getting electric W/C)    Occupation/Status: N/A     Drives: No       Prior Level of Function: Pt lives w/ his wife in a one level home.  He is typically able to transfer to/from w/c, toilet and bed on his own.  Pt's wife assists him in/out of the car and takes him to dialysis which is 5 a.m.  This has become significantly more difficult following his recent LLE amputation.  Current plan is to have pt take a medical bus/van to from dialysis.  He has an electric w/c on order and will be able to roll on/off the bus and staff at dialysis will be able to use a lift in/out of chair.  Pt also will have a new time at dialysis.  He is simply awaiting delivery of electric w/c.     Pt finished recent stay at [facility] and is awaiting stability in L limb so he can be fit for a prosthesis.  Has a RLE prosthesis, but has had difficulty donning since LLE amputation. Pt has been primarily transferring  via slide board.    SUBJECTIVE   \"It feels good to sit up!\"     PAIN ASSESSMENT  Ratin  Location: N/A       OBJECTIVE  Precautions: Orthotic/prosthesis (Has prosthesis for R le), L UE, aspiration   Fall Risk: High fall risk    WEIGHT BEARING RESTRICTION       ASSESSMENTS    AM-PAC '6-Clicks' Inpatient Daily Activity Short Form  -   Putting on and taking off regular lower body clothing?: A Lot  -   Bathing (including washing, rinsing, drying)?: A Lot  -   Toileting, which includes using toilet, bedpan or urinal? : A Lot  -   Putting on and taking off regular upper body clothing?: A Lot  -   Taking care of personal grooming such as brushing teeth?: A Little  -   Eating meals?: A Little    AM-PAC Score:  Score: 14  Approx Degree of Impairment: 59.67%  Standardized Score (AM-PAC Scale): 33.39    PLAN  OT Device Recommendations: TBD  OT Treatment Plan: Energy conservation/work simplification techniques, Balance activities, ADL training, Functional transfer training, Endurance training, Patient/Family education, Patient/Family training, Equipment eval/education, Compensatory technique education, Continued evaluation  Rehab Potential : Fair  Frequency: 3-5x/week  Number of Visits to Meet Established Goals: 5    ADL Goals  Patient will perform toileting with mod A and AE PRN  Patient will perform LB dressing with mod A and AE PRN    Functional Transfer Goals  Patient will perform bed mobility supine to sit with supervision  Patient will perform bed mobility sit to supine with supervision  Patient will perform toilet transfer to drop arm commode with mod via sliding board         Patient Evaluation Complexity Level:   Occupational Profile/Medical History MODERATE - Expanded review of history including review of medical or therapy record   Specific performance deficits impacting engagement in ADL/IADL MODERATE  3 - 5 performance deficits   Client Assessment/Performance Deficits MODERATE - Comorbidities and min to mod  modifications of tasks    Clinical Decision Making MODERATE - Analysis of occupational profile, detailed assessments, several treatment options    Overall Complexity MODERATE     OT Session Time: 30 minutes  Therapeutic Activity: 15 minutes

## 2025-05-08 NOTE — TELEPHONE ENCOUNTER
Called Felisha at [facility], advised ok for  orders per Dr Dahl. She will fax orders over to office. Fax number provided.

## 2025-05-08 NOTE — PAYOR COMM NOTE
CONTINUED STAY REVIEW    Payor: JD GARCIA  Subscriber #:  [id number]  Authorization Number: [id number]    Admit date: 4/29/25  Admit time: 10:59 PM    REVIEW DOCUMENTATION:      5/7      HOSPITALIST:       Chief Complaint: resp failure      Subjective:  Patient has no new complaints.  Patient states she is feeling better.  Chest tube removed today.  Still has some chest wall discomfort from prior chest compressions according to patient.  And has some discomfort at the chest tube site.  Improving shortness of breath.  No nausea vomiting.  Denies abdominal pain.  Patient on hemodialysis at this time.  On room air today       Vital signs:  Temp:  [97.3 °F (36.3 °C)-98.2 °F (36.8 °C)] 97.4 °F (36.3 °C)  Pulse:  [66-80] 80  Resp:  [16-19] 16  BP: (108-139)/(61-78) 139/78  SpO2:  [92 %-96 %] 96 %     Physical Exam:    /78 (BP Location: Right arm)   Pulse 80   Temp 97.4 °F (36.3 °C) (Oral)   Resp 16   Wt 170 lb 12.8 oz (77.5 kg)   SpO2 96%   BMI 25.97 kg/m²   On room air  General: No acute distress  Respiratory: Clear to auscultation bilateral.  Cardiovascular: S1, S2.  Abdomen: Soft, bowel sounds present  Neuro: Awake alert, no new focal deficits  MSK: B/L AKA, LLE incision with staples and eschar and in dressing          Lab 05/01/25  0810 05/02/25  1305 05/02/25  1449 05/02/25  1804 05/03/25  0427 05/04/25  0445 05/05/25  0443 05/06/25  0456 05/07/25  0533   WBC  --    < >  --    < > 10.3 11.2* 10.2 8.9 9.1   HGB  --    < >  --    < > 13.5 12.3* 11.1* 10.4* 11.6*   MCV  --    < >  --    < > 82.3 85.2 88.5 89.7 88.2   PLT  --    < >  --    < > 174.0 141.0* 142.0* 147.0* 184.0   INR 1.26*  --  1.38*  --  1.33*  --   --   --   --     < > = values in this interval not displayed.                    Recent Labs   Lab 05/02/25  1804 05/02/25  1837 05/03/25  0427 05/03/25  1334 05/04/25  0445 05/05/25  0443 05/06/25  0456 05/07/25  0552   *   < > 232*   < > 106* 94 66* 75   BUN 35*   < > 41*   < > 26* 39*  26* 42*   CREATSERUM 4.24*   < > 5.13*  5.13*   < > 4.38*  4.38* 5.75*  5.75* 4.17* 5.22*   CA 7.6*   < > 8.0*   < > 8.1* 8.2* 8.1* 8.5*   ALB 2.8*  --  3.0*  --  3.3  --   --   --    *   < > 146*   < > 141 139 141 140   K 3.6   < > 3.8   < > 4.0 4.3 3.4* 3.8      < > 108   < > 104 101 104 102   CO2 24.0   < > 23.0   < > 26.0 25.0 26.0 24.0   ALKPHO 94  --  94  --  91  --   --   --    AST 50*  --  55*  --  26  --   --   --    ALT 17  --  12  --  <7*  --   --   --    BILT 0.8  --  0.3  --  0.4  --   --   --    TP 4.7*  --  4.5*  --  5.4*  --   --   --     < > = values in this interval not displayed.       Assessment & Plan:  #Acute Left Intrathoracic hemorrhage 2/2 intercostal artery injury with left hemothorax  S/p L VATS washout and control of intercostal bleeding  Chest tube removed by CV surgeon on 5/7/2025.     #Acute hypoxic resp failure  Resolved  oxygen weaned off and on room air on 5/7/2025.     #ESBL PNA  On IV antibiotics with IV merrem     #Acute Metabolic Encephalopathy  Monitor MS, seems to be impoving     #Hemorrhagic shock on admission  Resolved, off pressors  Blood pressure remain stable at this time     #PAD s/p b/l AKA  Left incision with stapels and eschar; does not appear infected  Wound care  On PPX ABX     #Acute blood loss anemia on admission  S/p massive transfusion protocol on admission  Monitor Hgb  No s/s of further bleeding     #Cardiac arrest  Due to above  ROSC successful  EKG abnormal  Echo noted  Cardiology following, ischemic w/u deferred as likely reactive to above     #Left Hemithorax  Likely due to ESRD  Has been recurrent  S/p thora 5/1, cytology pending     #Occluded LUE AVG  IR plans to declot left upper extremity AV fistula tomorrow     #ESRD  Cont. HD     #DM type II  Elevated as stress reaction  Cont. Insulin and adjust as needed  A1c 5.9 though unreliable at this point     #HTN  Home BP meds on hold         CARDIOTHORACIC SURGERY:          Assessment/Plan:       64 year old male PMH ESRD, HTN, DM, PAD, HLD who was admitted for acute hypoxic respiratory failure secondary to volume overload and left pleural effusion s/p IR thoracentesis (5/2/25) complicated by acute hemorrhage due to intercostal artery injury now POD 5 from left VATS washout and control of intercostal bleeding. Doing well.      -Chest tube removed  -Okay for DVT prophylaxis   -Encourage cough and IS use.   -Can remove chest tube stitch in 5 days if still inpatient. Otherwise follow up in one week for stitch removal.           PULMONARY:         Vital signs in last 24 hours:  Blood pressure 124/67, pulse 68, temperature 97.3 °F (36.3 °C), temperature source Oral, resp. rate 16, weight 170 lb 12.8 oz (77.5 kg), SpO2 92%.      Intake/Output:  I/O last 3 completed shifts:  In: 840 [P.O.:840]  Out: 160 [Chest Tube:160]     Physical Exam:  General: Appears alert, oriented x3. No acute distress  Neurologic:No focal deficits noted.  Alert.  Oriented.  Lungs:CTAB. No wheeze  chest wall:chest tube to left with no leak seen  Heart:RRR no m  Abdomen: soft, nondistended, no rigidity, no reaction with palpation. No hernias noted  Extremities:No overt deformities, edema    Assessment:  Hemorrhagic shock secondary to hemothorax, improved  Intrathoracic bleed following thoracentesis now status post intercostal artery cautery with thoracotomy-no further bleeding  Recurrent left-sided pleural effusion present since January 2025, s/p thoracentesis c/b hemothorax; fluid analysis with lymphocytic exudate  Possible inferior wall STEMI cardiology note appreciated-with changes thought related to severity of shock  Dyspnea mild hypoxia on presentation related to large left pleural effusion, now improved  End-stage renal disease on HD  Severe peripheral vascular disease bilateral BKA's most recently last month     Plan:  Monitor respiratory status, wean o2 for sats >89%  Chest tube per thoracic surgery  Complete treatment for  klebsiella ESBL - meropenem day 2  Control pain  Await cytology results from pleural effusion although low suspicion for malignancy       MEDICATIONS ADMINISTERED IN LAST 1 DAY:  acetaminophen (Tylenol) tab 650 mg       Date Action Dose Route User    5/8/2025 0647 Given 650 mg Oral Swati Viera RN    5/8/2025 0133 Given 650 mg Oral Swati Viera RN    5/7/2025 2015 Given 650 mg Oral Swati Viera RN          glucose (Dex4) 15 GM/59ML oral liquid 15 g       Date Action Dose Route User    5/7/2025 1321 Given 15 g Oral Shruti Negro RN    5/7/2025 1255 Given 15 g Oral Shruti Negro RN    5/7/2025 1233 Given 15 g Oral Shruti Negro RN          gabapentin (Neurontin) cap 400 mg       Date Action Dose Route User    5/7/2025 2014 Given 400 mg Oral Swati Viera RN          heparin (Porcine) 5000 UNIT/ML injection 5,000 Units       Date Action Dose Route User    5/8/2025 0646 Given 5,000 Units Subcutaneous (Left Lower Abdomen) Swati Viera RN    5/7/2025 2205 Given 5,000 Units Subcutaneous (Bilateral Upper Abdomen) Swati Viera RN    5/7/2025 1234 Given 5,000 Units Subcutaneous (Right Lower Abdomen) Shruti Negro RN          lidocaine-menthol 4-1 % patch 1 patch       Date Action Dose Route User    5/8/2025 0905 Patch Applied 1 patch Transdermal (Left Anterior Chest) Jasmin Sethi RN          melatonin cap/tab 5 mg       Date Action Dose Route User    5/7/2025 2014 Given 5 mg Oral Swati Viera RN          meropenem (Merrem) 500 mg in sodium chloride 0.9% 100mL IVPB-ELENA       Date Action Dose Route User    5/7/2025 1234 New Bag 500 mg Intravenous Shruti Negro RN          sevelamer carbonate (Renvela) tab 1,600 mg       Date Action Dose Route User    5/8/2025 0905 Given 1,600 mg Oral Jasmin Sethi RN    5/7/2025 1745 Given 1,600 mg Oral Shruti Negro, RN    5/7/2025 1234 Given 1,600 mg Oral Shruti Negro, RN             Vitals (last day)       Date/Time Temp Pulse Resp BP SpO2 Weight O2 Device O2 Flow Rate (L/min) Who    05/08/25 0826 97.9 °F (36.6 °C) 74 17 124/69 96 % -- None (Room air) -- GR    05/08/25 0410 97.8 °F (36.6 °C) 70 17 133/69 93 % -- None (Room air) -- AT    05/07/25 2330 98 °F (36.7 °C) -- 20 -- 95 % -- None (Room air) -- JN    05/07/25 2330 -- 73 -- 97/55 -- -- -- -- AP    05/07/25 1940 98 °F (36.7 °C) -- 18 -- 96 % -- None (Room air) -- JN    05/07/25 1940 -- 79 -- 116/63 -- -- -- -- AP    05/07/25 1605 -- 79 18 116/80 -- -- -- -- RG    05/07/25 1442 -- 88 -- -- -- -- -- -- NL    05/07/25 1231 97.4 °F (36.3 °C) 80 16 139/78 96 % -- None (Room air) 0 L/min KN    05/07/25 0813 97.3 °F (36.3 °C) 68 16 124/67 92 % -- None (Room air) 0 L/min KN    05/07/25 0513 -- 66 -- -- 96 % 170 lb 12.8 oz (77.5 kg) -- -- TN    05/07/25 0500 98 °F (36.7 °C) 71 18 122/70 94 % -- None (Room air) -- MIO    05/07/25 0000 98 °F (36.7 °C) 71 18 113/61 92 % -- None (Room air) -- MIO          CIWA Scores (since admission)       None          Blood Transfusion Record       Product Unit Status Volume Start End            Transfuse RBC       25  [id number]  [id number] Completed 05/02/25 1832 350 mL 05/02/25 1625 05/02/25 1635                Transfuse cryoprecipitate      25  [id number]  [id number] Completed 05/02/25 1832 350 mL 05/02/25 1655 05/02/25 1705                Transfuse fresh frozen plasma      25  [id number]  [id number] Completed 05/02/25 1832 350 mL 05/02/25 1613 05/02/25 1623                Transfuse platelets      25  [id number]  [id number] Completed 05/02/25 1832 350 mL 05/02/25 1647 05/02/25 1657               [facility] Reference Range & Units 05/06/25 04:56 05/07/25 05:52 05/08/25 04:49   Glucose 70 - 99 mg/dL 66 (L) 75 154 (H)   Sodium 136 - 145 mmol/L 141 140 141   Potassium 3.5 - 5.1 mmol/L 3.4 (L) 3.8 4.0   Chloride 98 - 112 mmol/L 104 102 106   Carbon Dioxide, Total 21.0 - 32.0 mmol/L 26.0 24.0 24.0   BUN 9 -  23 mg/dL 26 (H) 42 (H) 31 (H)   CREATININE 0.70 - 1.30 mg/dL 4.17 (H) 5.22 (H) 4.31 (H)   CALCIUM 8.7 - 10.6 mg/dL 8.1 (L) 8.5 (L) 8.4 (L)   EGFR >=60 mL/min/1.73m2 15 (L) 11 (L) 14 (L)   ANION GAP 0 - 18 mmol/L 11 14 11   CALCULATED OSMOLALITY 275 - 295 mOsm/kg 295 299 (H) 302 (H)   (L): Data is abnormally low  (H): Data is abnormally high

## 2025-05-08 NOTE — PLAN OF CARE
Patient alert and oriented x4. RA. Normal sinus rhythm on tele. Complains of discomfort to chest from compressions, improved with scheduled pain medications. Pt updated on plan of care, verbalized understanding. Call light within reach.    Plan: declot fistula in IR tomorrow    Problem: SAFETY ADULT - FALL  Goal: Free from fall injury  Description: INTERVENTIONS:- Assess pt frequently for physical needs- Identify cognitive and physical deficits and behaviors that affect risk of falls.- Deal fall precautions as indicated by assessment.- Educate pt/family on patient safety including physical limitations- Instruct pt to call for assistance with activity based on assessment- Modify environment to reduce risk of injury- Provide assistive devices as appropriate- Consider OT/PT consult to assist with strengthening/mobility- Encourage toileting schedule  Outcome: Progressing     Problem: RESPIRATORY - ADULT  Goal: Achieves optimal ventilation and oxygenation  Description: INTERVENTIONS:- Assess for changes in respiratory status- Assess for changes in mentation and behavior- Position to facilitate oxygenation and minimize respiratory effort- Oxygen supplementation based on oxygen saturation or ABGs- Provide Smoking Cessation handout, if applicable- Encourage broncho-pulmonary hygiene including cough, deep breathe, Incentive Spirometry- Assess the need for suctioning and perform as needed- Assess and instruct to report SOB or any respiratory difficulty- Respiratory Therapy support as indicated- Manage/alleviate anxiety- Monitor for signs/symptoms of CO2 retention  Outcome: Progressing     Problem: HEMATOLOGIC - ADULT  Goal: Maintains hematologic stability  Description: INTERVENTIONS- Assess for signs and symptoms of bleeding or hemorrhage- Monitor labs and vital signs for trends- Administer supportive blood products/factors, fluids and medications as ordered and appropriate- Administer supportive blood products/factors  as ordered and appropriate  Outcome: Progressing  Goal: Free from bleeding injury  Description: (Example usage: patient with low platelets)INTERVENTIONS:- Avoid intramuscular injections, enemas and rectal medication administration- Ensure safe mobilization of patient- Hold pressure on venipuncture sites to achieve adequate hemostasis- Assess for signs and symptoms of internal bleeding- Monitor lab trends- Patient is to report abnormal signs of bleeding to staff- Avoid use of toothpicks and dental floss- Use electric shaver for shaving- Use soft bristle tooth brush- Limit straining and forceful nose blowing  Outcome: Progressing     Problem: NEUROLOGICAL - ADULT  Goal: Achieves stable or improved neurological status  Description: INTERVENTIONS- Assess for and report changes in neurological status- Initiate measures to prevent increased intracranial pressure- Maintain blood pressure and fluid volume within ordered parameters to optimize cerebral perfusion and minimize risk of hemorrhage- Monitor temperature, glucose, and sodium. Initiate appropriate interventions as ordered  Outcome: Progressing     Problem: Safety Risk - Non-Violent Restraints  Goal: Patient will remain free from self-harm  Description: INTERVENTIONS:- Apply the least restrictive restraint to prevent harm- Notify patient and family of reasons restraints applied- Assess for any contributing factors to confusion (electrolyte disturbances, delirium, medications)- Discontinue any unnecessary medical devices as soon as possible- Assess the patient's physical comfort, circulation, skin condition, hydration, nutrition and elimination needs - Reorient and redirection as needed- Assess for the need to continue restraints  Outcome: Progressing     Problem: Delirium  Goal: Minimize duration of delirium  Description: Interventions:- Encourage use of hearing aids, eye glasses- Promote highest level of mobility daily- Provide frequent reorientation- Promote  wakefulness i.e. lights on, blinds open- Promote sleep, encourage patient's normal rest cycle i.e. lights off, TV off, minimize noise and interruptions- Encourage family to assist in orientation and promotion of home routines  Outcome: Progressing

## 2025-05-08 NOTE — CM/SW NOTE
ALEXA met with pt at bedside to discuss PMR order. Pt very motivated and wants to participate in therapy. Pt was at [facility] in the past and is familiar and knows what to expect in therapy at the acute rehab  level. SW advised that it also determined by insurance and that insurance has to approve pt to go to [facility]. SW also explained that pt could potentially benefit from [facility].    Will have to explore rehabs with onsite HD.    Addendum: [facility] referrals sent in [facility] as back up. [facility]. Will have to complete PAS[facility] pending [facility] eval.     Addendum: SW discussed plan with spouse, Radha, over the phone. Radha agreeable to the plan for rehab. SW will update daughter of the outcome w/ [facility] eval.       Lola GREENE, LCSW  Discharge Planner

## 2025-05-08 NOTE — PROGRESS NOTES
[facility]  Nephrology Progress Note    Richy Goins Attending:  Jeremi Norwood MD       Assessment and Plan:    1) ESRD- due to longstanding DM 2; last HD . Lytes / volume OK. Next HD Fri via temp HD cath      2) Acute hypoxic resp failure due to pul edema + pleural effusion / hemothorax / arrest- extubated     3) L pleural effusion s/p thoracentesis -> massive hemothorax s/p ligation of intercostal artery + chest tube (dc'ed )    4) Occluded L UE AVG -> IR to declot L UE AVF today      5) Anemia- due to CKD / above / chronic disease. Hold EPO with hgb > 11 g/dl      6) Longstanding DM 2     7) PAD s/p remote R BKA; s/p recent L BKA     DC planning to rehab.       Subjective:  Awake alert in good spirits    Physical Exam:   /69 (BP Location: Right arm)   Pulse 74   Temp 97.9 °F (36.6 °C) (Oral)   Resp 17   Wt 170 lb 12.8 oz (77.5 kg)   SpO2 96%   BMI 25.97 kg/m²   Temp (24hrs), Av.8 °F (36.6 °C), Min:97.4 °F (36.3 °C), Max:98 °F (36.7 °C)       Intake/Output Summary (Last 24 hours) at 2025 1039  Last data filed at 2025 0826  Gross per 24 hour   Intake 1060 ml   Output 2000 ml   Net -940 ml     Wt Readings from Last 3 Encounters:   25 170 lb 12.8 oz (77.5 kg)   25 170 lb (77.1 kg)   10/02/24 181 lb (82.1 kg)     General: awake alert  HEENT: No scleral icterus, MMM  Neck: Supple, no SARITA or thyromegaly  Cardiac: Regular rate and rhythm, S1, S2 normal, no murmur or tub  Lungs: Decreased BS at bases bilaterally   Abdomen: Soft, non-tender. + bowel sounds, no palpable organomegaly  Extremities: Without clubbing, cyanosis; no edema  Neurologic: Cranial nerves grossly intact, moving all extremities  Skin: Warm and dry, no rashes       Labs:   Lab Results   Component Value Date    WBC 9.1 2025    HGB 11.8 2025    HCT 36.2 2025    .0 2025    CREATSERUM 4.31 2025    BUN 31 2025     2025    K 4.0 2025      05/08/2025    CO2 24.0 05/08/2025     05/08/2025    CA 8.4 05/08/2025    PGLU 118 05/08/2025       Imaging:  All imaging studies reviewed.    Meds:   Current Hospital Medications[1]      Questions/concerns were discussed with patient and/or family by bedside.          Keeley Blackwell MD  5/8/2025  1039 AM         [1]   Current Facility-Administered Medications   Medication Dose Route Frequency    meropenem (Merrem) 500 mg in sodium chloride 0.9% 100mL IVPB-ELENA  500 mg Intravenous Q24H    epoetin juan josé (Epogen, Procrit) 3000 UNIT/ML injection 3,000 Units  3,000 Units Intravenous Once per day on Monday Wednesday Friday    lidocaine-menthol 4-1 % patch 1 patch  1 patch Transdermal Daily    gabapentin (Neurontin) cap 400 mg  400 mg Oral Nightly    heparin (Porcine) 5000 UNIT/ML injection 5,000 Units  5,000 Units Subcutaneous Q8H SUJATA    acetaminophen (Tylenol) tab 650 mg  650 mg Oral 4 times per day    melatonin cap/tab 5 mg  5 mg Oral Nightly    insulin aspart (NovoLOG) 100 Units/mL FlexPen 1-68 Units  1-68 Units Subcutaneous TID CC and HS    docusate sodium (Colace) cap 100 mg  100 mg Oral BID    sennosides (Senokot) tab 8.6 mg  8.6 mg Oral BID    polyethylene glycol (PEG 3350) (Miralax) 17 g oral packet 17 g  17 g Per NG Tube Daily PRN    sevelamer carbonate (Renvela) tab 1,600 mg  1,600 mg Oral TID CC    glucose (Dex4) 15 GM/59ML oral liquid 15 g  15 g Oral Q15 Min PRN    Or    glucose (Glutose) 40% oral gel 15 g  15 g Oral Q15 Min PRN    Or    glucose-vitamin C (Dex-4) chewable tab 4 tablet  4 tablet Oral Q15 Min PRN    Or    dextrose 50% injection 50 mL  50 mL Intravenous Q15 Min PRN    Or    glucose (Dex4) 15 GM/59ML oral liquid 30 g  30 g Oral Q15 Min PRN    Or    glucose (Glutose) 40% oral gel 30 g  30 g Oral Q15 Min PRN    Or    glucose-vitamin C (Dex-4) chewable tab 8 tablet  8 tablet Oral Q15 Min PRN    sennosides (Senokot) tab 17.2 mg  17.2 mg Oral Nightly PRN    ondansetron (Zofran) 4 MG/2ML injection  4 mg  4 mg Intravenous Q6H PRN

## 2025-05-08 NOTE — TELEPHONE ENCOUNTER
Unsigned paperwork was faxed to [facility] order#[id number]. Pt has not been seen since 8/19/24. He would need and OV.This may have been ordered by his specialist.

## 2025-05-08 NOTE — PROGRESS NOTES
[facility]   part of [facility]     Hospitalist Progress Note     Richy Goins Patient Status:  Inpatient    1960 MRN [id number]   Location [facility] 4SW-A Attending Jeremi Norwood MD   Hosp Day # 9 PCP Woody Dahl MD     Chief Complaint: resp failure     Subjective:     Patient has no new complaints.  Patient states he is feeling better.  Improving shortness of breath.  No nausea vomiting.  Denies abdominal pain.  Patient on hemodialysis at this time.  On room air today    Objective:    Review of Systems:   Limited as on vent    Vital signs:  Temp:  [97.8 °F (36.6 °C)-98.6 °F (37 °C)] 98.2 °F (36.8 °C)  Pulse:  [68-79] 75  Resp:  [17-20] 17  BP: ()/(55-73) 137/73  SpO2:  [92 %-96 %] 94 %    Physical Exam:    /73 (BP Location: Right arm)   Pulse 75   Temp 98.2 °F (36.8 °C) (Oral)   Resp 17   Wt 170 lb 12.8 oz (77.5 kg)   SpO2 94%   BMI 25.97 kg/m²   On room air  General: No acute distress  Respiratory: Clear to auscultation bilateral.  Cardiovascular: S1, S2.  Abdomen: Soft, bowel sounds present  Neuro: Awake alert, no new focal deficits  MSK: B/L AKA, LLE incision with staples and eschar and in dressing      Diagnostic Data:    Labs:  Recent Labs   Lab 25  1449 25  1804 25  0427 25  0445 25  0443 25  0456 25  0533 25  0449   WBC  --    < > 10.3 11.2* 10.2 8.9 9.1 9.1   HGB  --    < > 13.5 12.3* 11.1* 10.4* 11.6* 11.8*   MCV  --    < > 82.3 85.2 88.5 89.7 88.2 88.3   PLT  --    < > 174.0 141.0* 142.0* 147.0* 184.0 201.0   INR 1.38*  --  1.33*  --   --   --   --   --     < > = values in this interval not displayed.       Recent Labs   Lab 25  1804 25  1837 25  0427 25  1334 25  0445 25  0443 25  0456 25  0552 25  0449   *   < > 232*   < > 106*   < > 66* 75 154*   BUN 35*   < > 41*   < > 26*   < > 26* 42* 31*   CREATSERUM 4.24*   < > 5.13*  5.13*   < > 4.38*   4.38*   < > 4.17* 5.22* 4.31*   CA 7.6*   < > 8.0*   < > 8.1*   < > 8.1* 8.5* 8.4*   ALB 2.8*  --  3.0*  --  3.3  --   --   --   --    *   < > 146*   < > 141   < > 141 140 141   K 3.6   < > 3.8   < > 4.0   < > 3.4* 3.8 4.0      < > 108   < > 104   < > 104 102 106   CO2 24.0   < > 23.0   < > 26.0   < > 26.0 24.0 24.0   ALKPHO 94  --  94  --  91  --   --   --   --    AST 50*  --  55*  --  26  --   --   --   --    ALT 17  --  12  --  <7*  --   --   --   --    BILT 0.8  --  0.3  --  0.4  --   --   --   --    TP 4.7*  --  4.5*  --  5.4*  --   --   --   --     < > = values in this interval not displayed.       Estimated Glomerular Filtration Rate: 14 mL/min/1.73m2 (A) (result from lab).     Recent Labs   Lab 05/02/25  1231 05/02/25  1305   TROPHS 213* 258*       Recent Labs   Lab 05/02/25  1449 05/03/25  0427   PTP 17.1* 16.6*   INR 1.38* 1.33*              Imaging: Imaging data reviewed in Epic.    Medications: Scheduled Medications[1]    Assessment & Plan:     #Acute Left Intrathoracic hemorrhage 2/2 intercostal artery injury with left hemothorax  S/p L VATS washout and control of intercostal bleeding  Chest tube removed by CV surgeon on 5/7/2025.    #Acute hypoxic resp failure  Resolved  oxygen weaned off and on room air on 5/7/2025.    #ESBL PNA  On IV antibiotics with IV merrem    #Acute Metabolic Encephalopathy  Monitor MS, seems to be impoving    #Hemorrhagic shock on admission  Resolved, off pressors  Blood pressure remain stable at this time    #PAD s/p b/l AKA  Left incision with stapels and eschar; does not appear infected  Wound care  On PPX ABX    #Acute blood loss anemia on admission  S/p massive transfusion protocol on admission  Monitor Hgb  No s/s of further bleeding    #Cardiac arrest  Due to above  ROSC successful  EKG abnormal  Echo noted  Cardiology following, ischemic w/u deferred as likely reactive to above    #Left Hemithorax  Likely due to ESRD  Has been recurrent  S/p thora 5/1,  cytology pending    #Occluded LUE AVG  IR plans to declot left upper extremity AV fistula tomorrow    #ESRD on HD  Cont. HD    #DM type II  Elevated as stress reaction  Cont. Insulin and adjust as needed  A1c 5.9 though unreliable at this point    #HTN  Home BP meds on hold    Discussed with patient,   Discussed with RN-IR plans to declot left upper extremity AV fistula tomorrow and could not be done today as reported by RN-plan discharge to rehab after this    Dina Canada MD      Supplementary Documentation:     Quality:    DVT Mechanical Prophylaxis:   SCDs,    DVT Pharmacologic Prophylaxis   Medication    heparin (Porcine) 5000 UNIT/ML injection 5,000 Units                Code Status: Full Code  Soni: No urinary catheter in place  Soni Duration (in days):   Central line:    [place]: 5/9/2025      Discharge is dependent on: clinical stability  At this point Mr. Goins is expected to be discharge to: home    The 21st Century Cures Act makes medical notes like these available to patients in the interest of transparency. Please be advised this is a medical document. Medical documents are intended to carry relevant information, facts as evident, and the clinical opinion of the practitioner. The medical note is intended as peer to peer communication and may appear blunt or direct. It is written in medical language and may contain abbreviations or verbiage that are unfamiliar.                       [1]    meropenem  500 mg Intravenous Q24H    lidocaine-menthol  1 patch Transdermal Daily    gabapentin  400 mg Oral Nightly    heparin  5,000 Units Subcutaneous Q8H SUJATA    acetaminophen  650 mg Oral 4 times per day    melatonin  5 mg Oral Nightly    insulin aspart  1-68 Units Subcutaneous TID CC and HS    docusate sodium  100 mg Oral BID    sennosides  8.6 mg Oral BID    sevelamer carbonate  1,600 mg Oral TID CC

## 2025-05-08 NOTE — TELEPHONE ENCOUNTER
Felisha from [facility] called in regards to needing home health orders signed so patient does not get billed for home health services. Felisha states that patient is back in the hospital at [place] 4/29/25. Felisha requesting home health orders be signed by .     , are you able to sign home health orders for patient? Felisha from [facility] states that she needs provider signature so patient does not get billed for home health.

## 2025-05-09 ENCOUNTER — APPOINTMENT (OUTPATIENT)
Dept: INTERVENTIONAL RADIOLOGY/VASCULAR | Facility: HOSPITAL | Age: 65
End: 2025-05-09
Attending: INTERNAL MEDICINE
Payer: COMMERCIAL

## 2025-05-09 LAB
ANION GAP SERPL CALC-SCNC: 9 MMOL/L (ref 0–18)
BUN BLD-MCNC: 49 MG/DL (ref 9–23)
CALCIUM BLD-MCNC: 8.5 MG/DL (ref 8.7–10.6)
CHLORIDE SERPL-SCNC: 107 MMOL/L (ref 98–112)
CO2 SERPL-SCNC: 27 MMOL/L (ref 21–32)
CREAT BLD-MCNC: 5.81 MG/DL (ref 0.7–1.3)
EGFRCR SERPLBLD CKD-EPI 2021: 10 ML/MIN/1.73M2 (ref 60–?)
ERYTHROCYTE [DISTWIDTH] IN BLOOD BY AUTOMATED COUNT: 15.5 %
GLUCOSE BLD-MCNC: 109 MG/DL (ref 70–99)
GLUCOSE BLD-MCNC: 124 MG/DL (ref 70–99)
GLUCOSE BLD-MCNC: 149 MG/DL (ref 70–99)
GLUCOSE BLD-MCNC: 156 MG/DL (ref 70–99)
GLUCOSE BLD-MCNC: 92 MG/DL (ref 70–99)
HCT VFR BLD AUTO: 36.7 % (ref 39–53)
HGB BLD-MCNC: 11.9 G/DL (ref 13–17.5)
MCH RBC QN AUTO: 29 PG (ref 26–34)
MCHC RBC AUTO-ENTMCNC: 32.4 G/DL (ref 31–37)
MCV RBC AUTO: 89.5 FL (ref 80–100)
OSMOLALITY SERPL CALC.SUM OF ELEC: 312 MOSM/KG (ref 275–295)
PLATELET # BLD AUTO: 236 10(3)UL (ref 150–450)
POTASSIUM SERPL-SCNC: 4.1 MMOL/L (ref 3.5–5.1)
RBC # BLD AUTO: 4.1 X10(6)UL (ref 3.8–5.8)
SODIUM SERPL-SCNC: 143 MMOL/L (ref 136–145)
WBC # BLD AUTO: 10.3 X10(3) UL (ref 4–11)

## 2025-05-09 PROCEDURE — 057A3ZZ DILATION OF LEFT BRACHIAL VEIN, PERCUTANEOUS APPROACH: ICD-10-PCS | Performed by: RADIOLOGY

## 2025-05-09 PROCEDURE — 99233 SBSQ HOSP IP/OBS HIGH 50: CPT | Performed by: INTERNAL MEDICINE

## 2025-05-09 PROCEDURE — 3E03317 INTRODUCTION OF OTHER THROMBOLYTIC INTO PERIPHERAL VEIN, PERCUTANEOUS APPROACH: ICD-10-PCS | Performed by: RADIOLOGY

## 2025-05-09 PROCEDURE — B31JYZZ FLUOROSCOPY OF LEFT UPPER EXTREMITY ARTERIES USING OTHER CONTRAST: ICD-10-PCS | Performed by: RADIOLOGY

## 2025-05-09 PROCEDURE — 99232 SBSQ HOSP IP/OBS MODERATE 35: CPT | Performed by: INTERNAL MEDICINE

## 2025-05-09 PROCEDURE — B51NYZZ FLUOROSCOPY OF LEFT UPPER EXTREMITY VEINS USING OTHER CONTRAST: ICD-10-PCS | Performed by: RADIOLOGY

## 2025-05-09 RX ORDER — HEPARIN SODIUM 1000 [USP'U]/ML
1500 INJECTION, SOLUTION INTRAVENOUS; SUBCUTANEOUS ONCE
Status: COMPLETED | OUTPATIENT
Start: 2025-05-09 | End: 2025-05-09

## 2025-05-09 RX ORDER — HEPARIN SODIUM 5000 [USP'U]/ML
INJECTION, SOLUTION INTRAVENOUS; SUBCUTANEOUS
Status: COMPLETED
Start: 2025-05-09 | End: 2025-05-09

## 2025-05-09 RX ORDER — HYDROCODONE BITARTRATE AND ACETAMINOPHEN 5; 325 MG/1; MG/1
2 TABLET ORAL EVERY 6 HOURS PRN
Refills: 0 | Status: DISCONTINUED | OUTPATIENT
Start: 2025-05-09 | End: 2025-05-16

## 2025-05-09 RX ORDER — IODIXANOL 320 MG/ML
200 INJECTION, SOLUTION INTRAVASCULAR
Status: DISCONTINUED | OUTPATIENT
Start: 2025-05-09 | End: 2025-05-09

## 2025-05-09 RX ORDER — MIDAZOLAM HYDROCHLORIDE 1 MG/ML
INJECTION INTRAMUSCULAR; INTRAVENOUS
Status: COMPLETED
Start: 2025-05-09 | End: 2025-05-09

## 2025-05-09 RX ORDER — WATER 10 ML/10ML
INJECTION INTRAMUSCULAR; INTRAVENOUS; SUBCUTANEOUS
Status: COMPLETED
Start: 2025-05-09 | End: 2025-05-09

## 2025-05-09 RX ORDER — IODIXANOL 320 MG/ML
100 INJECTION, SOLUTION INTRAVASCULAR
Status: COMPLETED | OUTPATIENT
Start: 2025-05-09 | End: 2025-05-09

## 2025-05-09 RX ORDER — LIDOCAINE HYDROCHLORIDE 10 MG/ML
INJECTION, SOLUTION INFILTRATION; PERINEURAL
Status: COMPLETED
Start: 2025-05-09 | End: 2025-05-09

## 2025-05-09 RX ORDER — HYDROCODONE BITARTRATE AND ACETAMINOPHEN 5; 325 MG/1; MG/1
1 TABLET ORAL EVERY 6 HOURS PRN
Refills: 0 | Status: DISCONTINUED | OUTPATIENT
Start: 2025-05-09 | End: 2025-05-16

## 2025-05-09 NOTE — CONSULTS
.[facility]    Richy Goins Patient Status:  Inpatient    1960 MRN [id number]   Location [facility] INTERVENTIONAL SUITES Attending Dina Canada MD   Hosp Day # 10 PCP Woody Dahl MD     Patient Identification  Richy Goins is a 64 year old male.  :  1960  Admit Date:  2025  Attending Provider:  Dina Canada MD                                  Primary Care Physician:  Woody Dahl MD   Admitting Diagnosis: Hyperkalemia [E87.5]  Pleural effusion [J90]  Anemia, unspecified type [D64.9]  Acute hypoxic respiratory failure (HCC) [J96.01]    Subjective:      Reason for Consultation: Impaired ADL and mobility dysfuction due to  History of present illness:  Records reviewed, and patient examined.Consult Requested by:        Patient is a 64 year old male with a past medical history significant for ESRD, HTN, DM, Richard BKA's for severePAD, HLD who was admitted to [facility] on 2025 for acute hypoxic respiratory failure secondary to pulmonary edema and pleural effusion.  He was recently discharged from hospitals after recent L BKA with HH and HD MW/F.    Was found to have Acute Left Intrathoracic hemorrhage 2/2 intercostal artery injury with left hemothorax      On 25 underwent FLEXIBLE BRONCHOSCOPY. VIDEO-ASSISTED THORACOSCOPY for Emergent evacuation of left hemothorax and cessation of bleeding vessel . Pt intubated -. Required pressor support. Off pressors now.  Hemorrhagic shock secondary to hemothorax, improved     Patient has h/o Recurrent left-sided pleural effusion present since 2025, s/p thoracentesis c/b hemothorax; fluid analysis with lymphocytic exudate     Chest tube removed by CV surgeon on 2025  On IV Abx for  klebsiella ESBL - meropenem day 4     Physiatry consult obtained now to assess pt's funtional status and make appropriate recommendations.      HOME SITUATION  Type of Home: House  Home Layout: One level, Ramped entrance  Stairs to  Enter : 0        Stairs to Bedroom: 0         Lives With: Spouse    Drives: No           Prior Level of [place]: Pt lives w/ his wife in a one level home.  He is typically able to transfer to/from w/c, toilet and bed on his own.  Pt's wife assists him in/out of the car and takes him to dialysis which is 5 a.m.  This has become significantly more difficult following his recent LLE amputation.  Current plan is to have pt take a medical bus/van to from dialysis.  He has an electric w/c on order and will be able to roll on/off the bus and staff at dialysis will be able to use a lift in/out of chair.  Pt also will have a new time at dialysis.  He is simply awaiting delivery of electric w/c.     Pt finished recent stay at [facility] and is awaiting stability in L limb so he can be fit for a prosthesis.  Has a RLE prosthesis, but has had difficulty donning since LLE amputation. Pt has been primarily transferring via slide board.    Current Functional Status:     Bed Mobility:  Rolling: right w/ HOB elevated, increased time and verbal cues for use of bedrail and sequencing  Supine to sit: Min A,with HOB elevated.  Reports he can normally do this completely on his own from flat bed, but is too weak today to complete.  Once sitting eob, pt able to sit upright unsupported x8 minutes.  Pt able to initiate lateral scooting toward HOB with BUE, yet minimal distance achieved despite increased time and maximal effort.  Pt with increased chest/trunk pain with BUE pushing  Sit to supine: CGA     Transfer Mobility:  Sit to stand: NT              Stand to sit: NT  Gait = NT    Past Medical History:  @Medical hx@Past Medical History[1]    Past Surgical History[2]    Current Medications[3]    Social History     Tobacco Use    Smoking status: Never    Smokeless tobacco: Never    Tobacco comments:     none   Substance Use Topics    Alcohol use: Not Currently     Comment: none       Family History[4]    Allergies:  Allergies[5]        Lab  Results   Component Value Date    WBC 10.3 05/09/2025    HGB 11.9 05/09/2025    HCT 36.7 05/09/2025    .0 05/09/2025    CREATSERUM 5.81 05/09/2025    BUN 49 05/09/2025     05/09/2025    K 4.1 05/09/2025     05/09/2025    CO2 27.0 05/09/2025     05/09/2025    CA 8.5 05/09/2025    PGLU 124 05/09/2025       Review of Systems:  Complete review of systems completed/14 point.  Negative except as that outlined in HPI    OBJECTIVE:    Blood pressure 105/50, pulse 73, temperature 98.5 °F (36.9 °C), temperature source Oral, resp. rate 18, weight 164 lb 7.4 oz (74.6 kg), SpO2 92%.    Intake/Output Summary (Last 24 hours) at 5/9/2025 1245  Last data filed at 5/9/2025 0826  Gross per 24 hour   Intake 390 ml   Output 0 ml   Net 390 ml       Physical Exam:                                      General: Alert, cooperative, no distress, appears stated age.  Head:  Normocephalic, without obvious abnormality, atraumatic.   Eyes:  Conjunctivae/lids clear. PERRL, EOMs intact. Vision functional.   Ears/Nose/Throat: Hearing intact. Lips, mucosa, and tongue normal. Teeth and gums normal. Moist mucous membranes.     Neck: No neck masses or thyroid enlargement/tenderness/nodules.       Lungs:   Resonant, clear breath sounds, quiet accessory muscles.   Chest wall:  No tenderness or deformity.   Cardiovascular:  Heart with regular rate rhythm, no murmurs appreciated. Radial and pedal pulses good. No cyanosis in all extremities.   Abdomen:   No tenderness guarding or rigidity. Liver and spleen are not enlarged.  Bowel sounds present.                   Musculoskeletal:     Right Upper Extremity:  Strength is 4.  ROM WNL.   Left Upper Extremity:  Strength is 4.  ROM WNL.   Right Lower Extremity:  Strength  is 2-3 at hip.   ROM WNL.   Left Lower Extremity: Strength  is 2-3 at hip.   ROM WNL.          Neuro: CNII-XII are grossly intact.                     Psychiatric: Awake, alert and oriented                     Assessment:                                Rehab diagnosis: ADL and  mobility dysfunction due to Hemorrhagic shock secondary to hemothorax     Disposition:     Had a recent acute rehab stay and was taught lateral transfers with his R BKA prosthesis and slide board.    Based on pt's current functional and medical status, Subacute inpatient rehabilitation is recommended to maximize patient's independence, provide care giver training, and evaluate for home equipment needs      Thank you for the consult.     Danielle Downing MD  [facility].           [1]   Past Medical History:   Belching    Dialysis patient    Diarrhea, unspecified    Esophageal reflux    Essential hypertension    Fatigue    Flatulence/gas pain/belching    High blood pressure    High cholesterol    History of blood transfusion    Hyperlipidemia    Indigestion    Irregular bowel habits    Night sweats    Osteoporosis    Peripheral vascular disease    Pure hypercholesterolemia    Type II or unspecified type diabetes mellitus without mention of complication, not stated as uncontrolled    Visual impairment    reading glasses    Vomiting   [2]   Past Surgical History:  Procedure Laterality Date    Amputation toe,i-p jt Right     Av fistula revision, open      Below knee leg amputation Right     Colonoscopy N/A 02/20/2023    Procedure: COLONOSCOPY;  Surgeon: Sarmad Gonzalez MD;  Location:  ENDOSCOPY    Colonoscopy      Upper gi endoscopy,exam     [3]    [COMPLETED] lidocaine (Xylocaine) 1 % injection        [COMPLETED] fentaNYL (Sublimaze) 50 mcg/mL injection        [COMPLETED] midazolam (Versed) 2 MG/2ML injection        [COMPLETED] heparin (Porcine) 5000 UNIT/ML injection        [COMPLETED] sterile water for injection (PF) injection        [COMPLETED] alteplase (Activase) 2 mg injection        [COMPLETED] heparin (Porcine) 5000 UNIT/ML injection        iodixanol (VISIPAQUE) 320 MG/ML injection 200 mL  200 mL Injection ONCE PRN    meropenem  (Merrem) 500 mg in sodium chloride 0.9% 100mL IVPB-ELENA  500 mg Intravenous Q24H    lidocaine-menthol 4-1 % patch 1 patch  1 patch Transdermal Daily    gabapentin (Neurontin) cap 400 mg  400 mg Oral Nightly    heparin (Porcine) 5000 UNIT/ML injection 5,000 Units  5,000 Units Subcutaneous Q8H SUJATA    acetaminophen (Tylenol) tab 650 mg  650 mg Oral 4 times per day    melatonin cap/tab 5 mg  5 mg Oral Nightly    insulin aspart (NovoLOG) 100 Units/mL FlexPen 1-68 Units  1-68 Units Subcutaneous TID CC and HS    docusate sodium (Colace) cap 100 mg  100 mg Oral BID    sennosides (Senokot) tab 8.6 mg  8.6 mg Oral BID    [] sodium chloride 0.9 % IV bolus 100 mL  100 mL Intravenous Q30 Min PRN    And    [] albumin human (Albumin) 25% injection 25 g  25 g Intravenous PRN Dialysis    [COMPLETED] heparin (Porcine) 1000 UNIT/ML injection 1,500 Units  1.5 mL Intracatheter Once    [COMPLETED] vancomycin (Vancocin) 750 mg in sodium chloride 0.9% 250 mL IVPB-ADDV  10 mg/kg Intravenous Once    [] sodium chloride 0.9 % IV bolus 100 mL  100 mL Intravenous Q30 Min PRN    And    [] albumin human (Albumin) 25% injection 25 g  25 g Intravenous PRN Dialysis    [COMPLETED] sodium chloride 0.9% infusion   Intravenous Once    [COMPLETED] rocuronium (Zemuron) 50 mg/5mL injection        [] ketamine (Ketalar) 50 MG/ML injection        [] propofol (Diprivan) 10 MG/ML injection        [COMPLETED] calcium gluconate 2g in 100mL iso-NaCl IVPB premix  2 g Intravenous Once    [] fentaNYL (Sublimaze) 50 mcg BOLUS FROM BAG infusion  50 mcg Intravenous Once PRN    polyethylene glycol (PEG 3350) (Miralax) 17 g oral packet 17 g  17 g Per NG Tube Daily PRN    [COMPLETED] fentaNYL (Sublimaze) 50 mcg/mL injection        [] amiodarone in dextrose 4.14% (Cordarone) 360 mg/200mL infusion premix  1 mg/min Intravenous Continuous    Followed by    [] amiodarone in dextrose 4.14% (Cordarone) 360 mg/200mL  infusion premix  0.5 mg/min Intravenous Continuous    [COMPLETED] tranexamic acid in sodium chloride 0.7% (Cyklokapron) 1000 mg/100mL infusion premix 1,000 mg  1,000 mg Intravenous Once    [] norepinephrine (Levophed) 4 mg/250mL infusion premix        [COMPLETED] fentaNYL (Sublimaze) 50 mcg/mL injection        [COMPLETED] heparin (Porcine) 5000 UNIT/ML injection        [COMPLETED] sodium bicarbonate injection 100 mEq  100 mEq Intravenous Once    [COMPLETED] sodium bicarbonate injection 100 mEq  100 mEq Intravenous Once    [COMPLETED] sodium bicarbonate injection 50 mEq  50 mEq Intravenous Once    [COMPLETED] etomidate (Amidate) 2 mg/mL injection 22.2 mg  0.3 mg/kg Intravenous Once    [COMPLETED] calcium gluconate 1 g in sodium chloride 0.9% 100 mL IVPB  1 g Intravenous Once    [COMPLETED] vancomycin (Vancocin) 2 g in sodium chloride 0.9% 500mL IVPB premix  25 mg/kg Intravenous Once    [COMPLETED] magnesium sulfate 4 g/100mL IVPB premix 4 g  4 g Intravenous Once    sevelamer carbonate (Renvela) tab 1,600 mg  1,600 mg Oral TID CC    [COMPLETED] epoetin juan josé (Epogen, Procrit) 39873 UNIT/ML injection 10,000 Units  10,000 Units Intravenous Once    [] sodium chloride 0.9 % IV bolus 100 mL  100 mL Intravenous Q30 Min PRN    And    [] albumin human (Albumin) 25% injection 25 g  25 g Intravenous PRN Dialysis    [COMPLETED] sodium zirconium cyclosilicate (Lokelma) oral packet 10 g  10 g Oral Once    glucose (Dex4) 15 GM/59ML oral liquid 15 g  15 g Oral Q15 Min PRN    Or    glucose (Glutose) 40% oral gel 15 g  15 g Oral Q15 Min PRN    Or    glucose-vitamin C (Dex-4) chewable tab 4 tablet  4 tablet Oral Q15 Min PRN    Or    dextrose 50% injection 50 mL  50 mL Intravenous Q15 Min PRN    Or    glucose (Dex4) 15 GM/59ML oral liquid 30 g  30 g Oral Q15 Min PRN    Or    glucose (Glutose) 40% oral gel 30 g  30 g Oral Q15 Min PRN    Or    glucose-vitamin C (Dex-4) chewable tab 8 tablet  8 tablet Oral Q15 Min PRN     sennosides (Senokot) tab 17.2 mg  17.2 mg Oral Nightly PRN    ondansetron (Zofran) 4 MG/2ML injection 4 mg  4 mg Intravenous Q6H PRN   [4]   Family History  Problem Relation Age of Onset    Heart Attack Father     Heart Disorder Father 57    Diabetes Maternal Grandfather     Diabetes Maternal Aunt    [5]   Allergies  Allergen Reactions    Zosyn [Piperacillin-Tazobactam In D5w] RASH     Developed diffuse erythroderma 1 day after starting zosyn

## 2025-05-09 NOTE — PROGRESS NOTES
[facility] Progress Note     SUBJECTIVE/Interval history:  No acute events overnight, he denies any concerns. Pain remains controlled. No dyspnea. No fevers. Remains on RA.    Review of Systems:   A comprehensive 14 point review of systems was completed.   Pertinent positives and negatives noted in the HPI.    Medications  Reviewed personally  Scheduled Medications[1]  PRN Medications[2]    OBJECTIVE:  Vitals:    05/08/25 1648 05/08/25 1909 05/09/25 0002 05/09/25 0419   BP: 137/73  137/74 152/73   BP Location: Right arm  Right arm Right arm   Pulse: 75  72 73   Resp: 17 18 18 18   Temp: 98.2 °F (36.8 °C) 97.3 °F (36.3 °C) 97.6 °F (36.4 °C) 97.9 °F (36.6 °C)   TempSrc: Oral Oral Oral Oral   SpO2: 94% 95% 94% 90%   Weight:    164 lb 7.4 oz (74.6 kg)       Vital signs in last 24 hours:  Blood pressure 152/73, pulse 73, temperature 97.9 °F (36.6 °C), temperature source Oral, resp. rate 18, weight 164 lb 7.4 oz (74.6 kg), SpO2 90%.     Intake/Output:  I/O last 3 completed shifts:  In: 1400 [P.O.:1200; IV PIGGYBACK:200]  Out: 2020 [Other:2000; Chest Tube:20]     Physical Exam:  General: Appears alert, oriented x3. No acute distress  Neurologic:No focal deficits noted.  Alert.  Oriented.  Lungs:CTAB. No wheeze  Heart:RRR no m  Abdomen: soft, nondistended, no rigidity, no reaction with palpation. No hernias noted  Extremities:No overt deformities, edema    Lab Data Review:   Recent Labs   Lab 05/07/25  0552 05/08/25  0449 05/09/25  0523   GLU 75 154* 156*   BUN 42* 31* 49*   CREATSERUM 5.22* 4.31* 5.81*   CA 8.5* 8.4* 8.5*    141 143   K 3.8 4.0 4.1    106 107   CO2 24.0 24.0 27.0     Recent Labs   Lab 05/02/25  1804 05/02/25  1837 05/03/25  0427 05/04/25  0445 05/05/25  0443 05/07/25  0533 05/08/25  0449 05/09/25  0523   RBC 4.64   < > 4.62 4.19   < > 3.90 4.10 4.10   HGB 13.6   < > 13.5 12.3*   < > 11.6* 11.8* 11.9*   HCT 39.2   < > 38.0* 35.7*   < > 34.4* 36.2* 36.7*   MCV 84.5    < > 82.3 85.2   < > 88.2 88.3 89.5   MCH 29.3   < > 29.2 29.4   < > 29.7 28.8 29.0   MCHC 34.7   < > 35.5 34.5   < > 33.7 32.6 32.4   RDW 14.8   < > 14.9 16.1   < > 15.9 15.5 15.5   NEPRELIM 12.53*  --  8.31* 9.09*  --   --   --   --    WBC 14.4*   < > 10.3 11.2*   < > 9.1 9.1 10.3   .0   < > 174.0 141.0*   < > 184.0 201.0 236.0    < > = values in this interval not displayed.     No results for input(s): \"BNP\" in the last 168 hours.  No results for input(s): \"TROP\", \"CK\" in the last 168 hours.  Recent Labs   Lab 05/02/25  1449 05/02/25  1837 05/03/25  0427   INR 1.38*  --  1.33*   PTT  --  28.4  --      Recent Labs   Lab 05/04/25  1030   ABGPHT 7.44   SJIHJS0L 39   PUCXO6Z 97   ABGHCO3 26.6   ABGBE 2.2*   TEMP 98.6   NOEMÍ Positive   SITE Left Radial   DEV Nasal cannula   THGB 11.8*       Other Labs:  Interval Culture Data:   Hospital Encounter on 04/29/25   1. Body Fluid Cult Aerobic and Anaerobic     Status: None    Collection Time: 05/02/25 10:00 AM    Specimen: Pleural Fluid, Left; Body fluid, unspecified   Result Value Ref Range    Body Fluid Culture Result No Growth 5 Days N/A    Body Fluid Smear 1+ WBCs seen N/A    Body Fluid Smear No organisms seen N/A    Body Fluid Smear This is a cytocentrifuged smear. N/A     No results for input(s): \"COLORUR\", \"CLARITY\", \"SPECGRAVITY\", \"GLUUR\", \"BILUR\", \"KETUR\", \"BLOODURINE\", \"PHURINE\", \"PROUR\", \"UROBILINOGEN\", \"NITRITE\", \"LEUUR\", \"WBCUR\", \"RBCUR\", \"BACUR\", \"EPIUR\" in the last 168 hours.    Interval Radiology:   Reviewed personally  XR CHEST AP PORTABLE  (CPT=71045)  Result Date: 5/4/2025  CONCLUSION:  Improving airspace disease left lung.   LOCATION:  [facility]      Dictated by (CST): Rob Reyes MD on 5/04/2025 at 7:09 AM     Finalized by (CST): Rob Reyes MD on 5/04/2025 at 7:11 AM       XR CHEST AP/PA (1 VIEW) (CPT=71045)  Result Date: 5/3/2025  CONCLUSION:   1.  Dobbhoff tube in the stomach.  The remainder of the lines and tubes are stable since previous study.    2. Stable appearance of patchy airspace disease throughout the left lung with residual small amount of fluid in gas within the left lateral pleural space.   LOCATION:  [facility]      Dictated by (CST): Yoan Duvall MD on 5/03/2025 at 6:17 PM     Finalized by (CST): Yoan Duvall MD on 5/03/2025 at 6:18 PM       XR CHEST AP PORTABLE  (CPT=71045)  Result Date: 5/3/2025  CONCLUSION:  1. Overall no significant change in the appearance of the portable chest radiograph in the short interval since the prior study. 2. Lucency left lateral lung base is most likely a pneumothorax related incomplete expansion of lung (trapped lung).   LOCATION:  [facility]      Dictated by (CST): Camden Deleon MD on 5/03/2025 at 6:32 AM     Finalized by (CST): Camden Deleon MD on 5/03/2025 at 6:34 AM       XR CHEST AP PORTABLE  (CPT=71045)  Result Date: 5/2/2025  CONCLUSION:  Placement of a large bore left chest tube.  Evacuation of the left pleural fluid/hemo thorax.  Incomplete re-expansion of the left lung with a mild to moderate basilar pneumothorax.   LOCATION:  [facility]      Dictated by (CST): Rob Reyes MD on 5/02/2025 at 6:55 PM     Finalized by (CST): Rob Reyes MD on 5/02/2025 at 6:58 PM       XR CHEST AP PORTABLE  (CPT=71045)  Result Date: 5/2/2025  CONCLUSION:  A portion of left mid lung is now aerated.  Other findings appear stable.   LOCATION:  [facility]      Dictated by (CST): Rob Reyes MD on 5/02/2025 at 5:34 PM     Finalized by (CST): Rob Reyes MD on 5/02/2025 at 5:36 PM       XR CHEST AP PORTABLE  (CPT=71045)  Result Date: 5/2/2025  CONCLUSION:  Complete opacification left hemithorax with shift of mediastinal structures from left to right.  Finding was discussed with the ICU RN caring for the patient on May 2, 2025 at 2:30 p.m..  By this time a chest tube have been placed.  Findings  would be consistent with a large hemo thorax.   LOCATION:  [facility]      Dictated by (CST): Camden Deleon MD on 5/02/2025 at 2:25 PM     Finalized by  (CST): Camden Deleon MD on 5/02/2025 at 2:30 PM       US THORACENTESIS GUIDED LEFT (CPT=32555)  Result Date: 5/2/2025  CONCLUSION:  Ultrasound-guided left thoracentesis of 1600 cc was performed without complication.   LOCATION:  [facility]    Dictated by (CST): Oskar Lynn MD on 5/02/2025 at 1:33 PM     Finalized by (CST): Oskar Lynn MD on 5/02/2025 at 1:34 PM       XR CHEST AP PORTABLE  (CPT=71045)  Result Date: 5/2/2025  CONCLUSION:  Decreased size of left pleural effusion without pneumothorax.  There continues to be significant amount of opacity within the left hemithorax.   LOCATION:  [facility]      Dictated by (CST): Yoan Duvall MD on 5/02/2025 at 12:14 PM     Finalized by (CST): Yoan Duvall MD on 5/02/2025 at 12:15 PM       XR CHEST AP PORTABLE  (CPT=71045)  Result Date: 4/29/2025  CONCLUSION:   There is nearly complete opacification of the left hemithorax which partially obscures the cardiomediastinal silhouette.  This likely represents a combination of large pleural effusion and passive atelectasis, though superimposed infection or aspiration not excluded.  Mild interstitial edema noted throughout the right lung.  The right pleural space remains clear.   LOCATION:  [facility]      Dictated by (CST): Patricio Telles MD on 4/29/2025 at 9:14 PM     Finalized by (CST): Patricio Telles MD on 4/29/2025 at 9:15 PM       Assessment:  Hemorrhagic shock secondary to hemothorax, improved  Intrathoracic bleed following thoracentesis now status post intercostal artery cautery with thoracotomy-no further bleeding  Recurrent left-sided pleural effusion present since January 2025, s/p thoracentesis c/b hemothorax; fluid analysis with lymphocytic exudate  Possible inferior wall STEMI cardiology note appreciated-with changes thought related to severity of shock  Dyspnea mild hypoxia on presentation related to large left pleural effusion, now improved  End-stage renal disease on HD  Severe peripheral vascular disease bilateral BKA's  most recently last month    Plan:  Monitor respiratory status, wean o2 for sats >89%  Complete treatment for klebsiella ESBL - meropenem day 4  Pleural fulid cyto negative  Discharge planning         [1]    meropenem  500 mg Intravenous Q24H    lidocaine-menthol  1 patch Transdermal Daily    gabapentin  400 mg Oral Nightly    heparin  5,000 Units Subcutaneous Q8H SUJATA    acetaminophen  650 mg Oral 4 times per day    melatonin  5 mg Oral Nightly    insulin aspart  1-68 Units Subcutaneous TID CC and HS    docusate sodium  100 mg Oral BID    sennosides  8.6 mg Oral BID    sevelamer carbonate  1,600 mg Oral TID CC   [2]   polyethylene glycol (PEG 3350)    glucose **OR** glucose **OR** glucose-vitamin C **OR** dextrose **OR** glucose **OR** glucose **OR** glucose-vitamin C    sennosides    ondansetron

## 2025-05-09 NOTE — PROGRESS NOTES
[facility]   part of [facility]     Hospitalist Progress Note     Richy Goins Patient Status:  Inpatient    1960 MRN [id number]   Location [facility] 4SW-A Attending Jeremi Norwood MD   Hosp Day # 10 PCP Woody Dahl MD     Chief Complaint: resp failure     Subjective:   Status post declotting of the left upper extremity AV fistula today by IR  Patient has no new complaints.  Patient states he is feeling better.  Improving shortness of breath.  No nausea vomiting.  Denies abdominal pain.  Patient on hemodialysis at this time.  On room air today    Objective:    Review of Systems:   Limited as on vent    Vital signs:  Temp:  [97.3 °F (36.3 °C)-98.5 °F (36.9 °C)] 98.5 °F (36.9 °C)  Pulse:  [66-81] 77  Resp:  [18] 18  BP: (100-152)/(50-74) 100/66  SpO2:  [90 %-97 %] 97 %    Physical Exam:    /66 (BP Location: Right arm)   Pulse 77   Temp 98.5 °F (36.9 °C) (Oral)   Resp 18   Wt 164 lb 7.4 oz (74.6 kg)   SpO2 97%   BMI 25.01 kg/m²   On room air  General: No acute distress  Respiratory: Clear to auscultation bilateral.  Cardiovascular: S1, S2.  Abdomen: Soft, bowel sounds present  Neuro: Awake alert, no new focal deficits  MSK: B/L AKA, LLE incision with staples and eschar and in dressing      Diagnostic Data:    Labs:  Recent Labs   Lab 25  0427 25  0445 25  0443 25  0456 25  0533 25  0449 25  0523   WBC 10.3   < > 10.2 8.9 9.1 9.1 10.3   HGB 13.5   < > 11.1* 10.4* 11.6* 11.8* 11.9*   MCV 82.3   < > 88.5 89.7 88.2 88.3 89.5   .0   < > 142.0* 147.0* 184.0 201.0 236.0   INR 1.33*  --   --   --   --   --   --     < > = values in this interval not displayed.       Recent Labs   Lab 25  0427 25  1334 25  0445 25  0443 25  0552 25  0449 25  0523   *   < > 106*   < > 75 154* 156*   BUN 41*   < > 26*   < > 42* 31* 49*   CREATSERUM 5.13*  5.13*   < > 4.38*  4.38*   < > 5.22* 4.31* 5.81*   CA  8.0*   < > 8.1*   < > 8.5* 8.4* 8.5*   ALB 3.0*  --  3.3  --   --   --   --    *   < > 141   < > 140 141 143   K 3.8   < > 4.0   < > 3.8 4.0 4.1      < > 104   < > 102 106 107   CO2 23.0   < > 26.0   < > 24.0 24.0 27.0   ALKPHO 94  --  91  --   --   --   --    AST 55*  --  26  --   --   --   --    ALT 12  --  <7*  --   --   --   --    BILT 0.3  --  0.4  --   --   --   --    TP 4.5*  --  5.4*  --   --   --   --     < > = values in this interval not displayed.       Estimated Glomerular Filtration Rate: 10 mL/min/1.73m2 (A) (result from lab).     No results for input(s): \"TROP\", \"TROPHS\", \"CK\" in the last 168 hours.      Recent Labs   Lab 05/03/25  0427   PTP 16.6*   INR 1.33*              Imaging: Imaging data reviewed in Epic.    Medications: Scheduled Medications[1]    Assessment & Plan:     #Acute Left Intrathoracic hemorrhage 2/2 intercostal artery injury with left hemothorax  S/p L VATS washout and control of intercostal bleeding  Chest tube removed by CV surgeon on 5/7/2025.    #Acute hypoxic resp failure  Resolved  oxygen weaned off and on room air on 5/7/2025.    #ESBL PNA  On IV antibiotics with IV merrem    #Acute Metabolic Encephalopathy  Monitor MS, seems to be impoving    #Hemorrhagic shock on admission  Resolved, off pressors  Blood pressure remain stable at this time    #PAD s/p b/l AKA  Left incision with stapels and eschar; does not appear infected  Wound care  On PPX ABX    #Acute blood loss anemia on admission  S/p massive transfusion protocol on admission  Monitor Hgb  No s/s of further bleeding    #Cardiac arrest  Due to above  ROSC successful  EKG abnormal  Echo noted  Cardiology following, ischemic w/u deferred as likely reactive to above    #Left Hemithorax  Likely due to ESRD  Has been recurrent  S/p thora 5/1, cytology pending    #Occluded LUE AVG  Status post declot left upper extremity AV fistula by IR done on 5/9/2025    #ESRD on HD  Cont. HD    #DM type II  Elevated as stress  reaction  Cont. Insulin and adjust as needed  A1c 5.9 though unreliable at this point    #HTN  Home BP meds on hold    Discussed with patient,   Discussed with RN-awaiting subacute rehab insurance authorization and bed availability    Dina Canada MD      Supplementary Documentation:     Quality:    DVT Mechanical Prophylaxis:   SCDs,    DVT Pharmacologic Prophylaxis   Medication    heparin (Porcine) 5000 UNIT/ML injection 5,000 Units                Code Status: Full Code  Soni: No urinary catheter in place  Soni Duration (in days):   Central line:    [place]:       Discharge is dependent on: clinical stability  At this point Mr. Goins is expected to be discharge to: home    The 21st Century Cures Act makes medical notes like these available to patients in the interest of transparency. Please be advised this is a medical document. Medical documents are intended to carry relevant information, facts as evident, and the clinical opinion of the practitioner. The medical note is intended as peer to peer communication and may appear blunt or direct. It is written in medical language and may contain abbreviations or verbiage that are unfamiliar.                       [1]    meropenem  500 mg Intravenous Q24H    lidocaine-menthol  1 patch Transdermal Daily    gabapentin  400 mg Oral Nightly    heparin  5,000 Units Subcutaneous Q8H SUJATA    acetaminophen  650 mg Oral 4 times per day    melatonin  5 mg Oral Nightly    insulin aspart  1-68 Units Subcutaneous TID CC and HS    docusate sodium  100 mg Oral BID    sennosides  8.6 mg Oral BID    sevelamer carbonate  1,600 mg Oral TID CC

## 2025-05-09 NOTE — CONGREGATE LIVING REVIEW
[facility] Living Authorization    The [facility] Review Committee has reviewed this case and the patient IS APPROVED for discharge to a facility for Short Term Skilled once the following procedure is followed:     - The physician discharge instructions (contained within the [facility] note for SNF) must inlcude the below appropriate and approved COVID instructions to the facility    For questions regarding [facility] approval process, please contact the CM assigned to the case.  For questions regarding RN discharge workflow, please contact the unit Clinical Leader.

## 2025-05-09 NOTE — PROCEDURES
Successful p-m declot w/ re established flow.  Narrowing at distal anast.  PTA to 6mm.  Axilla vein distorted and narrowed.  6mm passes w/out difficulty.  Suspect subacute to chronic.  Robust collaterals w/ good flow.  Comp-none.

## 2025-05-09 NOTE — PROGRESS NOTES
[facility]  Nephrology Progress Note    Richy Goins Attending:  Jeremi Norwood MD       Assessment and Plan:    1) ESRD- due to longstanding DM 2; last HD . Lytes / volume OK. HD today via temp HD cath      2) Acute hypoxic resp failure due to pul edema + pleural effusion / hemothorax / arrest- extubated     3) L pleural effusion s/p thoracentesis -> massive hemothorax s/p ligation of intercostal artery + chest tube (dc'ed )    4) Occluded L UE AVG -> IR to declot L UE AVF today      5) Anemia- due to CKD / above / chronic disease. Hold EPO with hgb > 11 g/dl      6) Longstanding DM 2     7) PAD s/p remote R BKA; s/p recent L BKA     DC planning to rehab.       Subjective:  Awake alert in good spirits no issues overnight    Physical Exam:   /73 (BP Location: Right arm)   Pulse 73   Temp 97.9 °F (36.6 °C) (Oral)   Resp 18   Wt 164 lb 7.4 oz (74.6 kg)   SpO2 90%   BMI 25.01 kg/m²   Temp (24hrs), Av.9 °F (36.6 °C), Min:97.3 °F (36.3 °C), Max:98.6 °F (37 °C)       Intake/Output Summary (Last 24 hours) at 2025 0801  Last data filed at 2025 0600  Gross per 24 hour   Intake 790 ml   Output 0 ml   Net 790 ml     Wt Readings from Last 3 Encounters:   25 164 lb 7.4 oz (74.6 kg)   25 170 lb (77.1 kg)   10/02/24 181 lb (82.1 kg)     General: awake alert  HEENT: No scleral icterus, MMM  Neck: Supple, no SARITA or thyromegaly  Cardiac: Regular rate and rhythm, S1, S2 normal, no murmur or tub  Lungs: Decreased BS at bases bilaterally   Abdomen: Soft, non-tender. + bowel sounds, no palpable organomegaly  Extremities: Without clubbing, cyanosis; no edema  Neurologic: Cranial nerves grossly intact, moving all extremities  Skin: Warm and dry, no rashes       Labs:   Lab Results   Component Value Date    WBC 10.3 2025    HGB 11.9 2025    HCT 36.7 2025    .0 2025    CREATSERUM 5.81 2025    BUN 49 2025     2025    K 4.1 2025      05/09/2025    CO2 27.0 05/09/2025     05/09/2025    CA 8.5 05/09/2025    PGLU 124 05/09/2025       Imaging:  All imaging studies reviewed.    Meds:   Current Hospital Medications[1]      Questions/concerns were discussed with patient and/or family by bedside.          Keeley Blackwell MD  5/9/2025  801 AM         [1]   Current Facility-Administered Medications   Medication Dose Route Frequency    meropenem (Merrem) 500 mg in sodium chloride 0.9% 100mL IVPB-ELENA  500 mg Intravenous Q24H    lidocaine-menthol 4-1 % patch 1 patch  1 patch Transdermal Daily    gabapentin (Neurontin) cap 400 mg  400 mg Oral Nightly    heparin (Porcine) 5000 UNIT/ML injection 5,000 Units  5,000 Units Subcutaneous Q8H SUJATA    acetaminophen (Tylenol) tab 650 mg  650 mg Oral 4 times per day    melatonin cap/tab 5 mg  5 mg Oral Nightly    insulin aspart (NovoLOG) 100 Units/mL FlexPen 1-68 Units  1-68 Units Subcutaneous TID CC and HS    docusate sodium (Colace) cap 100 mg  100 mg Oral BID    sennosides (Senokot) tab 8.6 mg  8.6 mg Oral BID    polyethylene glycol (PEG 3350) (Miralax) 17 g oral packet 17 g  17 g Per NG Tube Daily PRN    sevelamer carbonate (Renvela) tab 1,600 mg  1,600 mg Oral TID CC    glucose (Dex4) 15 GM/59ML oral liquid 15 g  15 g Oral Q15 Min PRN    Or    glucose (Glutose) 40% oral gel 15 g  15 g Oral Q15 Min PRN    Or    glucose-vitamin C (Dex-4) chewable tab 4 tablet  4 tablet Oral Q15 Min PRN    Or    dextrose 50% injection 50 mL  50 mL Intravenous Q15 Min PRN    Or    glucose (Dex4) 15 GM/59ML oral liquid 30 g  30 g Oral Q15 Min PRN    Or    glucose (Glutose) 40% oral gel 30 g  30 g Oral Q15 Min PRN    Or    glucose-vitamin C (Dex-4) chewable tab 8 tablet  8 tablet Oral Q15 Min PRN    sennosides (Senokot) tab 17.2 mg  17.2 mg Oral Nightly PRN    ondansetron (Zofran) 4 MG/2ML injection 4 mg  4 mg Intravenous Q6H PRN

## 2025-05-09 NOTE — PAYOR COMM NOTE
CONTINUED STAY REVIEW    Payor: JD O  Subscriber #:  [id number]  Authorization Number: [id number]    Admit date: 4/29/25  Admit time: 10:59 PM    REVIEW DOCUMENTATION:        5/8      HOSPITALIST:     Improving shortness of breath. No nausea vomiting. Denies abdominal pain. Patient on hemodialysis at this time.     Vital signs:  Temp:  [97.8 °F (36.6 °C)-98.6 °F (37 °C)] 98.2 °F (36.8 °C)  Pulse:  [68-79] 75  Resp:  [17-20] 17  BP: ()/(55-73) 137/73  SpO2:  [92 %-96 %] 94 %           Lab 05/02/25  1449 05/02/25  1804 05/03/25  0427 05/04/25  0445 05/05/25  0443 05/06/25  0456 05/07/25  0533 05/08/25 0449   WBC  --    < > 10.3 11.2* 10.2 8.9 9.1 9.1   HGB  --    < > 13.5 12.3* 11.1* 10.4* 11.6* 11.8*   MCV  --    < > 82.3 85.2 88.5 89.7 88.2 88.3   PLT  --    < > 174.0 141.0* 142.0* 147.0* 184.0 201.0   INR 1.38*  --  1.33*  --   --   --   --   --     < > = values in this interval not displayed.                     Recent Labs   Lab 05/02/25  1804 05/02/25  1837 05/03/25  0427 05/03/25  1334 05/04/25  0445 05/05/25  0443 05/06/25  0456 05/07/25  0552 05/08/25 0449   *   < > 232*   < > 106*   < > 66* 75 154*   BUN 35*   < > 41*   < > 26*   < > 26* 42* 31*   CREATSERUM 4.24*   < > 5.13*  5.13*   < > 4.38*  4.38*   < > 4.17* 5.22* 4.31*   CA 7.6*   < > 8.0*   < > 8.1*   < > 8.1* 8.5* 8.4*   ALB 2.8*  --  3.0*  --  3.3  --   --   --   --    *   < > 146*   < > 141   < > 141 140 141   K 3.6   < > 3.8   < > 4.0   < > 3.4* 3.8 4.0      < > 108   < > 104   < > 104 102 106   CO2 24.0   < > 23.0   < > 26.0   < > 26.0 24.0 24.0   ALKPHO 94  --  94  --  91  --   --   --   --    AST 50*  --  55*  --  26  --   --   --   --    ALT 17  --  12  --  <7*  --   --   --   --    BILT 0.8  --  0.3  --  0.4  --   --   --   --    TP 4.7*  --  4.5*  --  5.4*  --   --   --   --     < > = values in this interval not displayed.      Assessment & Plan:  #Acute Left Intrathoracic hemorrhage 2/2 intercostal  artery injury with left hemothorax  S/p L VATS washout and control of intercostal bleeding  Chest tube removed by CV surgeon on 5/7/2025.     #Acute hypoxic resp failure  Resolved  oxygen weaned off and on room air on 5/7/2025.     #ESBL PNA  On IV antibiotics with IV merrem     #Acute Metabolic Encephalopathy  Monitor MS, seems to be impoving     #Hemorrhagic shock on admission  Resolved, off pressors  Blood pressure remain stable at this time     #PAD s/p b/l AKA  Left incision with stapels and eschar; does not appear infected  Wound care  On PPX ABX     #Acute blood loss anemia on admission  S/p massive transfusion protocol on admission  Monitor Hgb  No s/s of further bleeding     #Cardiac arrest  Due to above  ROSC successful  EKG abnormal  Echo noted  Cardiology following, ischemic w/u deferred as likely reactive to above     #Left Hemithorax  Likely due to ESRD  Has been recurrent  S/p thora 5/1, cytology pending     #Occluded LUE AVG  IR plans to declot left upper extremity AV fistula tomorrow     #ESRD on HD  Cont. HD     #DM type II  Elevated as stress reaction  Cont. Insulin and adjust as needed  A1c 5.9 though unreliable at this point     #HTN  Home BP meds on hold        PULMONARY:       Chest tube removed yesterday     Assessment:  Hemorrhagic shock secondary to hemothorax, improved  Intrathoracic bleed following thoracentesis now status post intercostal artery cautery with thoracotomy-no further bleeding  Recurrent left-sided pleural effusion present since January 2025, s/p thoracentesis c/b hemothorax; fluid analysis with lymphocytic exudate  Possible inferior wall STEMI cardiology note appreciated-with changes thought related to severity of shock  Dyspnea mild hypoxia on presentation related to large left pleural effusion, now improved  End-stage renal disease on HD  Severe peripheral vascular disease bilateral BKA's most recently last month     Plan:  Monitor respiratory status, wean o2 for sats  >89%  Chest tube per thoracic surgery  Complete treatment for klebsiella ESBL - meropenem day 3  Pleural fulid cyto negative          5/9    PULMONARY:       Assessment:  Hemorrhagic shock secondary to hemothorax, improved  Intrathoracic bleed following thoracentesis now status post intercostal artery cautery with thoracotomy-no further bleeding  Recurrent left-sided pleural effusion present since January 2025, s/p thoracentesis c/b hemothorax; fluid analysis with lymphocytic exudate  Possible inferior wall STEMI cardiology note appreciated-with changes thought related to severity of shock  Dyspnea mild hypoxia on presentation related to large left pleural effusion, now improved  End-stage renal disease on HD  Severe peripheral vascular disease bilateral BKA's most recently last month     Plan:  Monitor respiratory status, wean o2 for sats >89%  Chest tube per thoracic surgery  Complete treatment for klebsiella ESBL - meropenem day 3  Pleural fulid cyto negative            Recent Labs   Lab 05/07/25  0552 05/08/25  0449 05/09/25  0523   GLU 75 154* 156*   BUN 42* 31* 49*   CREATSERUM 5.22* 4.31* 5.81*   CA 8.5* 8.4* 8.5*    141 143   K 3.8 4.0 4.1    106 107   CO2 24.0 24.0 27.0                 Recent Labs   Lab 05/02/25  1804 05/02/25  1837 05/03/25  0427 05/04/25  0445 05/05/25  0443 05/07/25  0533 05/08/25  0449 05/09/25  0523   RBC 4.64   < > 4.62 4.19   < > 3.90 4.10 4.10   HGB 13.6   < > 13.5 12.3*   < > 11.6* 11.8* 11.9*   HCT 39.2   < > 38.0* 35.7*   < > 34.4* 36.2* 36.7*   MCV 84.5   < > 82.3 85.2   < > 88.2 88.3 89.5   MCH 29.3   < > 29.2 29.4   < > 29.7 28.8 29.0   MCHC 34.7   < > 35.5 34.5   < > 33.7 32.6 32.4   RDW 14.8   < > 14.9 16.1   < > 15.9 15.5 15.5   NEPRELIM 12.53*  --  8.31* 9.09*  --   --   --   --    WBC 14.4*   < > 10.3 11.2*   < > 9.1 9.1 10.3   .0   < > 174.0 141.0*   < > 184.0 201.0 236.0     Assessment:  Hemorrhagic shock secondary to hemothorax,  improved  Intrathoracic bleed following thoracentesis now status post intercostal artery cautery with thoracotomy-no further bleeding  Recurrent left-sided pleural effusion present since January 2025, s/p thoracentesis c/b hemothorax; fluid analysis with lymphocytic exudate  Possible inferior wall STEMI cardiology note appreciated-with changes thought related to severity of shock  Dyspnea mild hypoxia on presentation related to large left pleural effusion, now improved  End-stage renal disease on HD  Severe peripheral vascular disease bilateral BKA's most recently last month     Plan:  Monitor respiratory status, wean o2 for sats >89%  Complete treatment for klebsiella ESBL - meropenem day 4  Pleural fulid cyto negative        IR:        Rob Reyes MD   Physician  Interventional Radiology     Procedures     Signed     Date of Service: 5/9/2025 12:43 PM  Pre-procedure Diagnoses   Renal failure [N19]     Post-procedure Diagnoses   Renal failure [N19]     Procedures   IR AV GRAFT DECLOT CHECK [[id number]]          Signed                     MEDICATIONS ADMINISTERED IN LAST 1 DAY:  acetaminophen (Tylenol) tab 650 mg       Date Action Dose Route User    5/9/2025 0559 Given 650 mg Oral John Kelly RN    5/9/2025 0001 Given 650 mg Oral John Kelly RN    5/8/2025 1817 Given 650 mg Oral Jasmin Sethi RN          gabapentin (Neurontin) cap 400 mg       Date Action Dose Route User    5/8/2025 2110 Given 400 mg Oral John Kelly RN          heparin (Porcine) 5000 UNIT/ML injection 5,000 Units       Date Action Dose Route User    5/8/2025 2110 Given 5,000 Units Subcutaneous (Right Upper Abdomen) John Kelly RN          iodixanol (VISIPAQUE) 320 MG/ML injection 100 mL       Date Action Dose Route User    5/9/2025 1252 Given 70 mL Injection AmylaineDougAnayeli          lidocaine-menthol 4-1 % patch 1 patch       Date Action Dose Route User    5/9/2025 1023 Patch Applied 1 patch Transdermal (Left Anterior Chest) Jasmin Sethi, RN           melatonin cap/tab 5 mg       Date Action Dose Route User    5/8/2025 2110 Given 5 mg Oral John Kelly, ARAM          sevelamer carbonate (Renvela) tab 1,600 mg       Date Action Dose Route User    5/9/2025 1023 Given 1,600 mg Oral Jasmin Sethi RN    5/8/2025 1817 Given 1,600 mg Oral Jasmin Sethi, RN            Vitals (last day)       Date/Time Temp Pulse Resp BP SpO2 Weight O2 Device O2 Flow Rate (L/min) [facility]    05/09/25 1300 -- 80 -- 113/61 92 % -- -- --     05/09/25 1258 98.1 °F (36.7 °C) 81 18 113/61 92 % -- None (Room air) --     05/09/25 1001 -- 73 -- -- -- -- -- --     05/09/25 0826 98.5 °F (36.9 °C) 66 18 105/50 92 % -- None (Room air) --     05/09/25 0419 97.9 °F (36.6 °C) 73 18 152/73 90 % 164 lb 7.4 oz (74.6 kg) None (Room air) 0 L/min     05/09/25 0002 97.6 °F (36.4 °C) 72 18 137/74 94 % -- None (Room air) --     05/08/25 1909 97.3 °F (36.3 °C) -- 18 -- 95 % -- None (Room air) --     05/08/25 1648 98.2 °F (36.8 °C) 75 17 137/73 94 % -- None (Room air) --     05/08/25 1500 -- 74 -- -- -- -- -- --     05/08/25 1233 98.6 °F (37 °C) 68 18 118/65 92 % -- None (Room air) --     05/08/25 0826 97.9 °F (36.6 °C) 74 17 124/69 96 % -- None (Room air) --     05/08/25 0410 97.8 °F (36.6 °C) 70 17 133/69 93 % -- None (Room air) -- AT                 Blood Transfusion Record       Product Unit Status Volume Start End            Transfuse RBC       25  [id number]  [id number] Completed 05/02/25 1832 350 mL 05/02/25 1625 05/02/25 1635                Transfuse cryoprecipitate       25  [id number]  [id number] Completed 05/02/25 1832 350 mL 05/02/25 1655 05/02/25 1705                Transfuse fresh frozen plasma      25  [id number]  [id number] Completed 05/02/25 1832 350 mL 05/02/25 1613 05/02/25 1623                Transfuse platelets       25  [id number]  [id number] Completed 05/02/25 1832 350 mL 05/02/25 1647 05/02/25 1657

## 2025-05-09 NOTE — PLAN OF CARE
Patient received in bed with spouse at the bedside, alert and oriented x 4,. NSR on tele. Continent of bowel and bladder. Reports of mild to moderate sternal pain, managed with scheduled pain medication.Right jugular catheter with dressing, C/D/I. Wound care completed this morning. No complaints of  shortness of breath or chest pain/discomfort. POC : IR to declot AVF, HD, IV abx, wound care. Fall precautions in place. Call light within reach.    Problem: SAFETY ADULT - FALL  Goal: Free from fall injury  Description: INTERVENTIONS:- Assess pt frequently for physical needs- Identify cognitive and physical deficits and behaviors that affect risk of falls.- Seaside fall precautions as indicated by assessment.- Educate pt/family on patient safety including physical limitations- Instruct pt to call for assistance with activity based on assessment- Modify environment to reduce risk of injury- Provide assistive devices as appropriate- Consider OT/PT consult to assist with strengthening/mobility- Encourage toileting schedule  5/9/2025 0539 by John Kelly RN  Outcome: Progressing  5/9/2025 0509 by John Kelly RN  Outcome: Progressing     Problem: RESPIRATORY - ADULT  Goal: Achieves optimal ventilation and oxygenation  Description: INTERVENTIONS:- Assess for changes in respiratory status- Assess for changes in mentation and behavior- Position to facilitate oxygenation and minimize respiratory effort- Oxygen supplementation based on oxygen saturation or ABGs- Provide Smoking Cessation handout, if applicable- Encourage broncho-pulmonary hygiene including cough, deep breathe, Incentive Spirometry- Assess the need for suctioning and perform as needed- Assess and instruct to report SOB or any respiratory difficulty- Respiratory Therapy support as indicated- Manage/alleviate anxiety- Monitor for signs/symptoms of CO2 retention  5/9/2025 0539 by John Kelly RN  Outcome: Progressing  5/9/2025 0509 by John Kelly, ARAM  Outcome:  Progressing     Problem: HEMATOLOGIC - ADULT  Goal: Maintains hematologic stability  Description: INTERVENTIONS- Assess for signs and symptoms of bleeding or hemorrhage- Monitor labs and vital signs for trends- Administer supportive blood products/factors, fluids and medications as ordered and appropriate- Administer supportive blood products/factors as ordered and appropriate  5/9/2025 0539 by John Kelly RN  Outcome: Progressing  5/9/2025 0509 by John Kelly RN  Outcome: Progressing  Goal: Free from bleeding injury  Description: (Example usage: patient with low platelets)INTERVENTIONS:- Avoid intramuscular injections, enemas and rectal medication administration- Ensure safe mobilization of patient- Hold pressure on venipuncture sites to achieve adequate hemostasis- Assess for signs and symptoms of internal bleeding- Monitor lab trends- Patient is to report abnormal signs of bleeding to staff- Avoid use of toothpicks and dental floss- Use electric shaver for shaving- Use soft bristle tooth brush- Limit straining and forceful nose blowing  5/9/2025 0539 by John Kelly RN  Outcome: Progressing  5/9/2025 0509 by John Kelly RN  Outcome: Progressing     Problem: Safety Risk - Non-Violent Restraints  Goal: Patient will remain free from self-harm  Description: INTERVENTIONS:- Apply the least restrictive restraint to prevent harm- Notify patient and family of reasons restraints applied- Assess for any contributing factors to confusion (electrolyte disturbances, delirium, medications)- Discontinue any unnecessary medical devices as soon as possible- Assess the patient's physical comfort, circulation, skin condition, hydration, nutrition and elimination needs - Reorient and redirection as needed- Assess for the need to continue restraints  5/9/2025 0539 by John Kelly RN  Outcome: Progressing  5/9/2025 0509 by John Kelly RN  Outcome: Progressing     Problem: Delirium  Goal: Minimize duration of delirium  Description:  Interventions:- Encourage use of hearing aids, eye glasses- Promote highest level of mobility daily- Provide frequent reorientation- Promote wakefulness i.e. lights on, blinds open- Promote sleep, encourage patient's normal rest cycle i.e. lights off, TV off, minimize noise and interruptions- Encourage family to assist in orientation and promotion of home routines  5/9/2025 0539 by John Kelly RN  Outcome: Progressing  5/9/2025 0509 by John Kelly RN  Outcome: Progressing     Problem: NEUROLOGICAL - ADULT  Goal: Achieves stable or improved neurological status  Description: INTERVENTIONS- Assess for and report changes in neurological status- Initiate measures to prevent increased intracranial pressure- Maintain blood pressure and fluid volume within ordered parameters to optimize cerebral perfusion and minimize risk of hemorrhage- Monitor temperature, glucose, and sodium. Initiate appropriate interventions as ordered  5/9/2025 0539 by John Kelly, RN  Outcome: Progressing  5/9/2025 0509 by John Kelly, RN  Outcome: Progressing

## 2025-05-09 NOTE — CM/SW NOTE
ALEXA was made aware that [facility] PMR states that pt would benefit from [facility] at discharge. SW provided pt with the accepting [facility] choice list w/ onsite HD. Pt hoping for a private room. Explained that a lot of pt's sometime go to [facility] for rehab if they can't go to [place]. To be proactive, ALEXA reserved [facility]. Requested private room if possible - they will do their best to accommodate.     Need PASRR.     Addendum: ALEXA spoke with pt's spouse, Radha, over the phone for discharge planning update. Explained to Radha above. Agreeable to planning for [facility] w/ onsite HD. ALEXA did reiterate to Radha that [facility] will do their best to plan for a private room for pt, but pt may have to start in a semi private room and then they will work on transferring pt to a private room. Will send updated HD flowsheets once available. [facility] working on insurance auth approval and HD approval. Anticipate pt will be in hospital through the weekend until insurance auth can be obtained.     Addendum: PASRR assessment completed and attached to [facility] referral.     Lola GREENE, LCSW  Discharge Planner

## 2025-05-09 NOTE — PLAN OF CARE
Patient alert and oriented x4. RA. Normal sinus rhythm on tele. Complains of chest discomfort related to compressions, managed with PRN pain medications. Pt updated on plan of care, verbalized understanding. Call light within reach.    1400: returned from IR dressings with minor drainage  1500: oozing from proximal site, IR notified, pressure placed with additional gauze and tegaderm  1730: 2L off during HD

## 2025-05-10 PROBLEM — T82.49XA CLOTTED DIALYSIS ACCESS: Status: ACTIVE | Noted: 2025-05-10

## 2025-05-10 LAB
ANION GAP SERPL CALC-SCNC: 7 MMOL/L (ref 0–18)
BUN BLD-MCNC: 34 MG/DL (ref 9–23)
CALCIUM BLD-MCNC: 8.5 MG/DL (ref 8.7–10.6)
CHLORIDE SERPL-SCNC: 108 MMOL/L (ref 98–112)
CO2 SERPL-SCNC: 29 MMOL/L (ref 21–32)
CREAT BLD-MCNC: 4.43 MG/DL (ref 0.7–1.3)
EGFRCR SERPLBLD CKD-EPI 2021: 14 ML/MIN/1.73M2 (ref 60–?)
ERYTHROCYTE [DISTWIDTH] IN BLOOD BY AUTOMATED COUNT: 15.7 %
GLUCOSE BLD-MCNC: 115 MG/DL (ref 70–99)
GLUCOSE BLD-MCNC: 124 MG/DL (ref 70–99)
GLUCOSE BLD-MCNC: 126 MG/DL (ref 70–99)
GLUCOSE BLD-MCNC: 126 MG/DL (ref 70–99)
GLUCOSE BLD-MCNC: 165 MG/DL (ref 70–99)
HCT VFR BLD AUTO: 36.3 % (ref 39–53)
HGB BLD-MCNC: 11.8 G/DL (ref 13–17.5)
MCH RBC QN AUTO: 29.3 PG (ref 26–34)
MCHC RBC AUTO-ENTMCNC: 32.5 G/DL (ref 31–37)
MCV RBC AUTO: 90.1 FL (ref 80–100)
OSMOLALITY SERPL CALC.SUM OF ELEC: 307 MOSM/KG (ref 275–295)
PLATELET # BLD AUTO: 262 10(3)UL (ref 150–450)
POTASSIUM SERPL-SCNC: 4.1 MMOL/L (ref 3.5–5.1)
RBC # BLD AUTO: 4.03 X10(6)UL (ref 3.8–5.8)
SODIUM SERPL-SCNC: 144 MMOL/L (ref 136–145)
WBC # BLD AUTO: 11.4 X10(3) UL (ref 4–11)

## 2025-05-10 PROCEDURE — 99232 SBSQ HOSP IP/OBS MODERATE 35: CPT | Performed by: INTERNAL MEDICINE

## 2025-05-10 PROCEDURE — 99233 SBSQ HOSP IP/OBS HIGH 50: CPT | Performed by: INTERNAL MEDICINE

## 2025-05-10 RX ORDER — DIPHENHYDRAMINE HYDROCHLORIDE 50 MG/ML
25 INJECTION, SOLUTION INTRAMUSCULAR; INTRAVENOUS ONCE
Status: COMPLETED | OUTPATIENT
Start: 2025-05-10 | End: 2025-05-10

## 2025-05-10 NOTE — PROGRESS NOTES
[facility]   part of [facility]     Nephrology Progress Note    Richy Goins Patient Status:  Inpatient    1960 MRN [id number]   Location [facility] 8NE-A Attending Dina Canada MD   Hosp Day # 11 PCP Woody Dahl MD       SUBJECTIVE:  Had AVG declot yest, HD via temp cath.  Stable this AM        Physical Exam:   /70 (BP Location: Right arm)   Pulse 78   Temp 98.4 °F (36.9 °C) (Oral)   Resp 18   Wt 156 lb 11.2 oz (71.1 kg)   SpO2 98%   BMI 23.83 kg/m²   Temp (24hrs), Av.4 °F (36.9 °C), Min:98.1 °F (36.7 °C), Max:98.5 °F (36.9 °C)       Intake/Output Summary (Last 24 hours) at 5/10/2025 0832  Last data filed at 2025 1810  Gross per 24 hour   Intake 360 ml   Output 2000 ml   Net -1640 ml     Last 3 Weights   05/10/25 0501 156 lb 11.2 oz (71.1 kg)   25 0419 164 lb 7.4 oz (74.6 kg)   25 0513 170 lb 12.8 oz (77.5 kg)   25 0000 170 lb 13.7 oz (77.5 kg)   25 0000 173 lb 4.5 oz (78.6 kg)   25 0600 172 lb 13.5 oz (78.4 kg)   25 0200 168 lb 6.9 oz (76.4 kg)   25 0353 163 lb 9.3 oz (74.2 kg)   25 0038 180 lb (81.6 kg)   25 2310 180 lb (81.6 kg)   25 1623 170 lb (77.1 kg)   25 1055 170 lb (77.1 kg)   10/02/24 0458 181 lb (82.1 kg)   10/01/24 1056 175 lb 0.7 oz (79.4 kg)   10/01/24 0544 175 lb (79.4 kg)   24 1517 192 lb 3.2 oz (87.2 kg)   24 0936 196 lb (88.9 kg)     General: Alert and oriented in no apparent distress.  HEENT: No scleral icterus, MMM  Neck: Supple, no SARITA or thyromegaly  Cardiac: Regular rate and rhythm, S1, S2 normal, no murmur or rub  Lungs: Clear without wheezes, rales, rhonchi.    Abdomen: Soft, non-tender. + bowel sounds, no palpable organomegaly  Extremities: Without clubbing, cyanosis or edema. L AVG no bruit/thrill, B BKA  Neurologic: Alert and oriented, cranial nerves grossly intact, moving all extremities  Skin: Warm and dry, no rash        Labs:     Recent Labs   Lab  05/06/25  0456 05/07/25  0533 05/08/25  0449 05/09/25  0523 05/10/25  0459   WBC 8.9 9.1 9.1 10.3 11.4*   HGB 10.4* 11.6* 11.8* 11.9* 11.8*   MCV 89.7 88.2 88.3 89.5 90.1   .0* 184.0 201.0 236.0 262.0       Recent Labs   Lab 05/03/25  1334 05/04/25  0445 05/05/25  0443 05/06/25  0456 05/07/25  0552 05/08/25  0449 05/09/25  0523 05/10/25  0459    141 139 141 140 141 143 144   K 3.4* 4.0 4.3 3.4* 3.8 4.0 4.1 4.1    104 101 104 102 106 107 108   CO2 26.0 26.0 25.0 26.0 24.0 24.0 27.0 29.0   BUN 20 26* 39* 26* 42* 31* 49* 34*   CREATSERUM 3.12* 4.38*  4.38* 5.75*  5.75* 4.17* 5.22* 4.31* 5.81* 4.43*   CA 8.3* 8.1* 8.2* 8.1* 8.5* 8.4* 8.5* 8.5*   MG 2.1 2.0 2.2  --   --   --   --   --    * 106* 94 66* 75 154* 156* 126*       Recent Labs   Lab 05/04/25 0445   ALT <7*   AST 26   ALB 3.3       Recent Labs   Lab 05/09/25  0541 05/09/25  1303 05/09/25  1713 05/09/25  2259 05/10/25  0459   PGLU 124* 109* 92 149* 115*       Meds:   Current Hospital Medications[1]      Impression/Plan:      1) ESRD- due to longstanding DM 2;  HD done 5/9 via temp HD cath.  AVG again appears clotted.  If so will need tunneled cath      2) Acute hypoxic resp failure due to pul edema + pleural effusion / hemothorax / arrest- extubated 5/4     3) L pleural effusion s/p thoracentesis -> massive hemothorax s/p ligation of intercostal artery + chest tube (dc'ed 5/7)     4) Occluded L UE AVG- s/p declot on 5/9 although no palpable thrill or audible bruit today, will check doppler u/s     5) Anemia- due to CKD / above / chronic disease. Hold EPO with hgb > 11 g/dl        Questions/concerns were discussed with patient and/or family by bedside.          Ciro Perez MD  5/10/2025  8:32 AM         [1]   Current Facility-Administered Medications   Medication Dose Route Frequency    HYDROcodone-acetaminophen (Norco) 5-325 MG per tab 1 tablet  1 tablet Oral Q6H PRN    Or    HYDROcodone-acetaminophen (Norco) 5-325 MG per tab 2  tablet  2 tablet Oral Q6H PRN    meropenem (Merrem) 500 mg in sodium chloride 0.9% 100mL IVPB-ELENA  500 mg Intravenous Q24H    lidocaine-menthol 4-1 % patch 1 patch  1 patch Transdermal Daily    gabapentin (Neurontin) cap 400 mg  400 mg Oral Nightly    heparin (Porcine) 5000 UNIT/ML injection 5,000 Units  5,000 Units Subcutaneous Q8H SUJATA    acetaminophen (Tylenol) tab 650 mg  650 mg Oral 4 times per day    melatonin cap/tab 5 mg  5 mg Oral Nightly    insulin aspart (NovoLOG) 100 Units/mL FlexPen 1-68 Units  1-68 Units Subcutaneous TID CC and HS    docusate sodium (Colace) cap 100 mg  100 mg Oral BID    sennosides (Senokot) tab 8.6 mg  8.6 mg Oral BID    polyethylene glycol (PEG 3350) (Miralax) 17 g oral packet 17 g  17 g Per NG Tube Daily PRN    sevelamer carbonate (Renvela) tab 1,600 mg  1,600 mg Oral TID CC    glucose (Dex4) 15 GM/59ML oral liquid 15 g  15 g Oral Q15 Min PRN    Or    glucose (Glutose) 40% oral gel 15 g  15 g Oral Q15 Min PRN    Or    glucose-vitamin C (Dex-4) chewable tab 4 tablet  4 tablet Oral Q15 Min PRN    Or    dextrose 50% injection 50 mL  50 mL Intravenous Q15 Min PRN    Or    glucose (Dex4) 15 GM/59ML oral liquid 30 g  30 g Oral Q15 Min PRN    Or    glucose (Glutose) 40% oral gel 30 g  30 g Oral Q15 Min PRN    Or    glucose-vitamin C (Dex-4) chewable tab 8 tablet  8 tablet Oral Q15 Min PRN    sennosides (Senokot) tab 17.2 mg  17.2 mg Oral Nightly PRN    ondansetron (Zofran) 4 MG/2ML injection 4 mg  4 mg Intravenous Q6H PRN

## 2025-05-10 NOTE — PLAN OF CARE
Pt A&Ox4. In contact isolation for ESBL. Pt is having rib pain. Lidocaine patch applied and po norco given with improvement. Lungs are clear on RA. NSR on tele. Pt has minimal urine outpt. Bm today. Pt with bilat bka. Left leg with staples and dressing placed. Dressing CDI. To be changed tonight or prn.  Pt turns side to side. Sacrum red. Mepilex applied. Left arm fistula with no thrill or bruit. Renal aware. Ultrasound of left arm to completed. Left arm precautions. R internal jugular cath in place. QID gluc. All needs addressed at this time. Call light and belongings within reach.     Problem: SAFETY ADULT - FALL  Goal: Free from fall injury  Description: INTERVENTIONS:- Assess pt frequently for physical needs- Identify cognitive and physical deficits and behaviors that affect risk of falls.- [place] fall precautions as indicated by assessment.- Educate pt/family on patient safety including physical limitations- Instruct pt to call for assistance with activity based on assessment- Modify environment to reduce risk of injury- Provide assistive devices as appropriate- Consider OT/PT consult to assist with strengthening/mobility- Encourage toileting schedule  Outcome: Progressing     Problem: RESPIRATORY - ADULT  Goal: Achieves optimal ventilation and oxygenation  Description: INTERVENTIONS:- Assess for changes in respiratory status- Assess for changes in mentation and behavior- Position to facilitate oxygenation and minimize respiratory effort- Oxygen supplementation based on oxygen saturation or ABGs- Provide Smoking Cessation handout, if applicable- Encourage broncho-pulmonary hygiene including cough, deep breathe, Incentive Spirometry- Assess the need for suctioning and perform as needed- Assess and instruct to report SOB or any respiratory difficulty- Respiratory Therapy support as indicated- Manage/alleviate anxiety- Monitor for signs/symptoms of CO2 retention  Outcome: Progressing     Problem: HEMATOLOGIC -  ADULT  Goal: Maintains hematologic stability  Description: INTERVENTIONS- Assess for signs and symptoms of bleeding or hemorrhage- Monitor labs and vital signs for trends- Administer supportive blood products/factors, fluids and medications as ordered and appropriate- Administer supportive blood products/factors as ordered and appropriate  Outcome: Progressing  Goal: Free from bleeding injury  Description: (Example usage: patient with low platelets)INTERVENTIONS:- Avoid intramuscular injections, enemas and rectal medication administration- Ensure safe mobilization of patient- Hold pressure on venipuncture sites to achieve adequate hemostasis- Assess for signs and symptoms of internal bleeding- Monitor lab trends- Patient is to report abnormal signs of bleeding to staff- Avoid use of toothpicks and dental floss- Use electric shaver for shaving- Use soft bristle tooth brush- Limit straining and forceful nose blowing  Outcome: Progressing     Problem: NEUROLOGICAL - ADULT  Goal: Achieves stable or improved neurological status  Description: INTERVENTIONS- Assess for and report changes in neurological status- Initiate measures to prevent increased intracranial pressure- Maintain blood pressure and fluid volume within ordered parameters to optimize cerebral perfusion and minimize risk of hemorrhage- Monitor temperature, glucose, and sodium. Initiate appropriate interventions as ordered  Outcome: Progressing     Problem: Safety Risk - Non-Violent Restraints  Goal: Patient will remain free from self-harm  Description: INTERVENTIONS:- Apply the least restrictive restraint to prevent harm- Notify patient and family of reasons restraints applied- Assess for any contributing factors to confusion (electrolyte disturbances, delirium, medications)- Discontinue any unnecessary medical devices as soon as possible- Assess the patient's physical comfort, circulation, skin condition, hydration, nutrition and elimination needs -  Reorient and redirection as needed- Assess for the need to continue restraints  Outcome: Progressing     Problem: Delirium  Goal: Minimize duration of delirium  Description: Interventions:- Encourage use of hearing aids, eye glasses- Promote highest level of mobility daily- Provide frequent reorientation- Promote wakefulness i.e. lights on, blinds open- Promote sleep, encourage patient's normal rest cycle i.e. lights off, TV off, minimize noise and interruptions- Encourage family to assist in orientation and promotion of home routines  Outcome: Progressing

## 2025-05-10 NOTE — PLAN OF CARE
A&Ox4. RA. . Denies shortness of breath. NSR on telemetry. Incontinent of bladder. Anuric due to HD. Chest pain from compressions managed with prn medication. Bilateral BKA. L arm IR  dressings intact with old drainage. Wound care completed. Temporary HD catheter dressing changed. In bed with fall precautions in place. Discussed plan of care with patient. Patient verbalizes understanding.    Plan: IV antibiotics, HD, pain control, wound care.     Problem: SAFETY ADULT - FALL  Goal: Free from fall injury  Description: INTERVENTIONS:- Assess pt frequently for physical needs- Identify cognitive and physical deficits and behaviors that affect risk of falls.- [place] fall precautions as indicated by assessment.- Educate pt/family on patient safety including physical limitations- Instruct pt to call for assistance with activity based on assessment- Modify environment to reduce risk of injury- Provide assistive devices as appropriate- Consider OT/PT consult to assist with strengthening/mobility- Encourage toileting schedule  Outcome: Progressing     Problem: RESPIRATORY - ADULT  Goal: Achieves optimal ventilation and oxygenation  Description: INTERVENTIONS:- Assess for changes in respiratory status- Assess for changes in mentation and behavior- Position to facilitate oxygenation and minimize respiratory effort- Oxygen supplementation based on oxygen saturation or ABGs- Provide Smoking Cessation handout, if applicable- Encourage broncho-pulmonary hygiene including cough, deep breathe, Incentive Spirometry- Assess the need for suctioning and perform as needed- Assess and instruct to report SOB or any respiratory difficulty- Respiratory Therapy support as indicated- Manage/alleviate anxiety- Monitor for signs/symptoms of CO2 retention  Outcome: Progressing     Problem: HEMATOLOGIC - ADULT  Goal: Maintains hematologic stability  Description: INTERVENTIONS- Assess for signs and symptoms of bleeding or hemorrhage- Monitor  labs and vital signs for trends- Administer supportive blood products/factors, fluids and medications as ordered and appropriate- Administer supportive blood products/factors as ordered and appropriate  Outcome: Progressing  Goal: Free from bleeding injury  Description: (Example usage: patient with low platelets)INTERVENTIONS:- Avoid intramuscular injections, enemas and rectal medication administration- Ensure safe mobilization of patient- Hold pressure on venipuncture sites to achieve adequate hemostasis- Assess for signs and symptoms of internal bleeding- Monitor lab trends- Patient is to report abnormal signs of bleeding to staff- Avoid use of toothpicks and dental floss- Use electric shaver for shaving- Use soft bristle tooth brush- Limit straining and forceful nose blowing  Outcome: Progressing     Problem: Safety Risk - Non-Violent Restraints  Goal: Patient will remain free from self-harm  Description: INTERVENTIONS:- Apply the least restrictive restraint to prevent harm- Notify patient and family of reasons restraints applied- Assess for any contributing factors to confusion (electrolyte disturbances, delirium, medications)- Discontinue any unnecessary medical devices as soon as possible- Assess the patient's physical comfort, circulation, skin condition, hydration, nutrition and elimination needs - Reorient and redirection as needed- Assess for the need to continue restraints  Outcome: Progressing     Problem: Delirium  Goal: Minimize duration of delirium  Description: Interventions:- Encourage use of hearing aids, eye glasses- Promote highest level of mobility daily- Provide frequent reorientation- Promote wakefulness i.e. lights on, blinds open- Promote sleep, encourage patient's normal rest cycle i.e. lights off, TV off, minimize noise and interruptions- Encourage family to assist in orientation and promotion of home routines  Outcome: Progressing     Problem: NEUROLOGICAL - ADULT  Goal: Achieves stable  or improved neurological status  Description: INTERVENTIONS- Assess for and report changes in neurological status- Initiate measures to prevent increased intracranial pressure- Maintain blood pressure and fluid volume within ordered parameters to optimize cerebral perfusion and minimize risk of hemorrhage- Monitor temperature, glucose, and sodium. Initiate appropriate interventions as ordered  Outcome: Progressing

## 2025-05-10 NOTE — PROGRESS NOTES
[facility]   part of [facility]     Hospitalist Progress Note     Richy Goins Patient Status:  Inpatient    1960 MRN [id number]   Location [facility] 4SW-A Attending Jeremi Norwood MD   Hosp Day # 11 PCP Woody Dahl MD     Chief Complaint: resp failure     Subjective:     Patient has no new complaints.  Patient states he is feeling better.  Improving shortness of breath.  No nausea vomiting.  Denies abdominal pain.  Patient on hemodialysis at this time.  On room air today    Objective:    Review of Systems:   Limited as on vent    Vital signs:  Temp:  [98 °F (36.7 °C)-98.5 °F (36.9 °C)] 98 °F (36.7 °C)  Pulse:  [77-91] 78  Resp:  [18-20] 20  BP: (100-157)/(60-81) 128/60  SpO2:  [95 %-98 %] 95 %    Physical Exam:    /60 (BP Location: Right arm)   Pulse 78   Temp 98 °F (36.7 °C) (Oral)   Resp 20   Wt 156 lb 11.2 oz (71.1 kg)   SpO2 95%   BMI 23.83 kg/m²   On room air  General: No acute distress  Respiratory: Clear to auscultation bilateral.  Cardiovascular: S1, S2.  Abdomen: Soft, bowel sounds present  Neuro: Awake alert, no new focal deficits  MSK: B/L AKA, LLE incision with staples and eschar and in dressing      Diagnostic Data:    Labs:  Recent Labs   Lab 25  0456 25  0533 05/08/25  0449 05/09/25  0523 05/10/25  045   WBC 8.9 9.1 9.1 10.3 11.4*   HGB 10.4* 11.6* 11.8* 11.9* 11.8*   MCV 89.7 88.2 88.3 89.5 90.1   .0* 184.0 201.0 236.0 262.0       Recent Labs   Lab 25  0445 25  0443 25  0449 25  0523 05/10/25  045   *   < > 154* 156* 126*   BUN 26*   < > 31* 49* 34*   CREATSERUM 4.38*  4.38*   < > 4.31* 5.81* 4.43*   CA 8.1*   < > 8.4* 8.5* 8.5*   ALB 3.3  --   --   --   --       < > 141 143 144   K 4.0   < > 4.0 4.1 4.1      < > 106 107 108   CO2 26.0   < > 24.0 27.0 29.0   ALKPHO 91  --   --   --   --    AST 26  --   --   --   --    ALT <7*  --   --   --   --    BILT 0.4  --   --   --   --    TP 5.4*  --   --    --   --     < > = values in this interval not displayed.       Estimated Glomerular Filtration Rate: 14 mL/min/1.73m2 (A) (result from lab).     No results for input(s): \"TROP\", \"TROPHS\", \"CK\" in the last 168 hours.      No results for input(s): \"PTP\", \"INR\" in the last 168 hours.             Imaging: Imaging data reviewed in Epic.    Medications: Scheduled Medications[1]    Assessment & Plan:     #Acute Left Intrathoracic hemorrhage 2/2 intercostal artery injury with left hemothorax  S/p L VATS washout and control of intercostal bleeding  Chest tube removed by CV surgeon on 5/7/2025.    #Acute hypoxic resp failure  Resolved  oxygen weaned off and on room air on 5/7/2025.    #ESBL PNA  On IV antibiotics with IV merrem    #Acute Metabolic Encephalopathy  Monitor MS, seems to be impoving    #Hemorrhagic shock on admission  Resolved, off pressors  Blood pressure remain stable at this time    #PAD s/p b/l AKA  Left incision with stapels and eschar; does not appear infected  Wound care  On PPX ABX    #Acute blood loss anemia on admission  S/p massive transfusion protocol on admission  Monitor Hgb  No s/s of further bleeding    #Cardiac arrest  Due to above  ROSC successful  EKG abnormal  Echo noted  Cardiology following, ischemic w/u deferred as likely reactive to above    #Left Hemithorax  Likely due to ESRD  Has been recurrent  S/p thora 5/1, cytology pending    #Occluded LUE AVG  Status post declot left upper extremity AV fistula by IR done on 5/9/2025    #ESRD on HD  Cont. HD    #DM type II with ESRD on HD  Elevated as stress reaction  Cont. Insulin and adjust as needed  A1c 5.9 though unreliable at this point    #HTN  Home BP meds on hold    Discussed with patient,   Discussed with RN-awaiting subacute rehab insurance authorization and bed availability    Dina Canada MD      Supplementary Documentation:     Quality:    DVT Mechanical Prophylaxis:   SCDs,    DVT Pharmacologic Prophylaxis   Medication    heparin  (Porcine) 5000 UNIT/ML injection 5,000 Units                Code Status: Full Code  Soni: No urinary catheter in place  Soni Duration (in days):   Central line:    [place]: 5/12/2025      Discharge is dependent on: clinical stability  At this point Mr. Goins is expected to be discharge to: home    The 21st Century Cures Act makes medical notes like these available to patients in the interest of transparency. Please be advised this is a medical document. Medical documents are intended to carry relevant information, facts as evident, and the clinical opinion of the practitioner. The medical note is intended as peer to peer communication and may appear blunt or direct. It is written in medical language and may contain abbreviations or verbiage that are unfamiliar.                       [1]    meropenem  500 mg Intravenous Q24H    lidocaine-menthol  1 patch Transdermal Daily    gabapentin  400 mg Oral Nightly    heparin  5,000 Units Subcutaneous Q8H SUJATA    acetaminophen  650 mg Oral 4 times per day    melatonin  5 mg Oral Nightly    insulin aspart  1-68 Units Subcutaneous TID CC and HS    docusate sodium  100 mg Oral BID    sennosides  8.6 mg Oral BID    sevelamer carbonate  1,600 mg Oral TID CC

## 2025-05-10 NOTE — PROGRESS NOTES
[facility] [facility] Progress Note     SUBJECTIVE/Interval history:  No acute events overnight, he denies new issues. Had HD yesterday. Pain controlled. No dyspnea. No fevers. Remains on RA.    Review of Systems:   A comprehensive 14 point review of systems was completed.   Pertinent positives and negatives noted in the HPI.    Medications  Reviewed personally  Scheduled Medications[1]  PRN Medications[2]    OBJECTIVE:  Vitals:    05/09/25 1810 05/09/25 1917 05/09/25 2301 05/10/25 0501   BP:  157/81 144/70 137/70   BP Location:  Right arm Right arm Right arm   Pulse: 83 91 81 78   Resp:  18 18 18   Temp:  98.4 °F (36.9 °C) 98.5 °F (36.9 °C) 98.4 °F (36.9 °C)   TempSrc:  Oral Oral Oral   SpO2:  97% 98% 98%   Weight:    156 lb 11.2 oz (71.1 kg)       Vital signs in last 24 hours:  Blood pressure 137/70, pulse 78, temperature 98.4 °F (36.9 °C), temperature source Oral, resp. rate 18, weight 156 lb 11.2 oz (71.1 kg), SpO2 98%.     Intake/Output:  I/O last 3 completed shifts:  In: 510 [P.O.:510]  Out: 2000 [Other:2000]     Physical Exam:  General: Appears alert, oriented x3. No acute distress  Neurologic:No focal deficits noted.  Alert.  Oriented.  Lungs:CTAB. No wheeze  Heart:RRR no m  Abdomen: soft, nondistended, no rigidity, no reaction with palpation. No hernias noted  Extremities:No overt deformities, edema    Lab Data Review:   Recent Labs   Lab 05/08/25  0449 05/09/25  0523 05/10/25  0459   * 156* 126*   BUN 31* 49* 34*   CREATSERUM 4.31* 5.81* 4.43*   CA 8.4* 8.5* 8.5*    143 144   K 4.0 4.1 4.1    107 108   CO2 24.0 27.0 29.0     Recent Labs   Lab 05/04/25  0445 05/05/25  0443 05/08/25  0449 05/09/25  0523 05/10/25  0459   RBC 4.19   < > 4.10 4.10 4.03   HGB 12.3*   < > 11.8* 11.9* 11.8*   HCT 35.7*   < > 36.2* 36.7* 36.3*   MCV 85.2   < > 88.3 89.5 90.1   MCH 29.4   < > 28.8 29.0 29.3   MCHC 34.5   < > 32.6 32.4 32.5   RDW 16.1   < > 15.5 15.5 15.7   NEPRELIM 9.09*  --    --   --   --    WBC 11.2*   < > 9.1 10.3 11.4*   .0*   < > 201.0 236.0 262.0    < > = values in this interval not displayed.     No results for input(s): \"BNP\" in the last 168 hours.  No results for input(s): \"TROP\", \"CK\" in the last 168 hours.  No results for input(s): \"PT\", \"INR\", \"PTT\" in the last 168 hours.    Recent Labs   Lab 05/04/25  1030   ABGPHT 7.44   XGZLPR4X 39   HQYVU6Z 97   ABGHCO3 26.6   ABGBE 2.2*   TEMP 98.6   NOEMÍ Positive   SITE Left Radial   DEV Nasal cannula   THGB 11.8*       Other Labs:  Interval Culture Data:   Hospital Encounter on 04/29/25   1. Body Fluid Cult Aerobic and Anaerobic     Status: None    Collection Time: 05/02/25 10:00 AM    Specimen: Pleural Fluid, Left; Body fluid, unspecified   Result Value Ref Range    Body Fluid Culture Result No Growth 5 Days N/A    Body Fluid Smear 1+ WBCs seen N/A    Body Fluid Smear No organisms seen N/A    Body Fluid Smear This is a cytocentrifuged smear. N/A     No results for input(s): \"COLORUR\", \"CLARITY\", \"SPECGRAVITY\", \"GLUUR\", \"BILUR\", \"KETUR\", \"BLOODURINE\", \"PHURINE\", \"PROUR\", \"UROBILINOGEN\", \"NITRITE\", \"LEUUR\", \"WBCUR\", \"RBCUR\", \"BACUR\", \"EPIUR\" in the last 168 hours.    Interval Radiology:   Reviewed personally  IR GRAFT PROCEDURE  Result Date: 5/9/2025  CONCLUSION:  1. Technically successful pharmacomechanical declot of thrombosed left upper arm AV graft. 2. Mild narrowing at the distal anastomosis treated with 6 mm balloon dilatation. 3. Area of irregularity suspected to be subacute to chronic vein changes at the level of the axilla.  Numerous draining veins across this level have good antegrade flow. 4. Widely patent central veins.    LOCATION:  [facility]       Dictated by (CST): Rob Reyes MD on 5/09/2025 at 3:11 PM     Finalized by (CST): Rob Reyes MD on 5/09/2025 at 3:23 PM       XR CHEST AP PORTABLE  (CPT=71045)  Result Date: 5/4/2025  CONCLUSION:  Improving airspace disease left lung.   LOCATION:  [facility]      Dictated by  (CST): Rob Reyes MD on 5/04/2025 at 7:09 AM     Finalized by (CST): Rob Reyes MD on 5/04/2025 at 7:11 AM       XR CHEST AP/PA (1 VIEW) (CPT=71045)  Result Date: 5/3/2025  CONCLUSION:   1.  Dobbhoff tube in the stomach.  The remainder of the lines and tubes are stable since previous study.   2. Stable appearance of patchy airspace disease throughout the left lung with residual small amount of fluid in gas within the left lateral pleural space.   LOCATION:  [facility]      Dictated by (CST): Yoan Duvall MD on 5/03/2025 at 6:17 PM     Finalized by (CST): Yoan Duvall MD on 5/03/2025 at 6:18 PM       XR CHEST AP PORTABLE  (CPT=71045)  Result Date: 5/3/2025  CONCLUSION:  1. Overall no significant change in the appearance of the portable chest radiograph in the short interval since the prior study. 2. Lucency left lateral lung base is most likely a pneumothorax related incomplete expansion of lung (trapped lung).   LOCATION:  [facility]      Dictated by (CST): Camden Deleon MD on 5/03/2025 at 6:32 AM     Finalized by (CST): Camden Deleon MD on 5/03/2025 at 6:34 AM       XR CHEST AP PORTABLE  (CPT=71045)  Result Date: 5/2/2025  CONCLUSION:  Placement of a large bore left chest tube.  Evacuation of the left pleural fluid/hemo thorax.  Incomplete re-expansion of the left lung with a mild to moderate basilar pneumothorax.   LOCATION:  [facility]      Dictated by (CST): Rob Reyes MD on 5/02/2025 at 6:55 PM     Finalized by (CST): Rob Reyes MD on 5/02/2025 at 6:58 PM       XR CHEST AP PORTABLE  (CPT=71045)  Result Date: 5/2/2025  CONCLUSION:  A portion of left mid lung is now aerated.  Other findings appear stable.   LOCATION:  [facility]      Dictated by (CST): Rob Reyes MD on 5/02/2025 at 5:34 PM     Finalized by (CST): Rob Reyes MD on 5/02/2025 at 5:36 PM       XR CHEST AP PORTABLE  (CPT=71045)  Result Date: 5/2/2025  CONCLUSION:  Complete opacification left hemithorax with shift of mediastinal structures from left to right.   Finding was discussed with the ICU RN caring for the patient on May 2, 2025 at 2:30 p.m..  By this time a chest tube have been placed.  Findings  would be consistent with a large hemo thorax.   LOCATION:  [facility]      Dictated by (CST): Camden Deleon MD on 5/02/2025 at 2:25 PM     Finalized by (CST): Camden Deleon MD on 5/02/2025 at 2:30 PM       US THORACENTESIS GUIDED LEFT (CPT=32555)  Result Date: 5/2/2025  CONCLUSION:  Ultrasound-guided left thoracentesis of 1600 cc was performed without complication.   LOCATION:  [facility]    Dictated by (CST): Oskar Lynn MD on 5/02/2025 at 1:33 PM     Finalized by (CST): Oskar Lynn MD on 5/02/2025 at 1:34 PM       XR CHEST AP PORTABLE  (CPT=71045)  Result Date: 5/2/2025  CONCLUSION:  Decreased size of left pleural effusion without pneumothorax.  There continues to be significant amount of opacity within the left hemithorax.   LOCATION:  [facility]      Dictated by (CST): Yoan Duvall MD on 5/02/2025 at 12:14 PM     Finalized by (CST): Yoan Duvall MD on 5/02/2025 at 12:15 PM       XR CHEST AP PORTABLE  (CPT=71045)  Result Date: 4/29/2025  CONCLUSION:   There is nearly complete opacification of the left hemithorax which partially obscures the cardiomediastinal silhouette.  This likely represents a combination of large pleural effusion and passive atelectasis, though superimposed infection or aspiration not excluded.  Mild interstitial edema noted throughout the right lung.  The right pleural space remains clear.   LOCATION:  [facility]      Dictated by (CST): Patricio Telles MD on 4/29/2025 at 9:14 PM     Finalized by (CST): Patricio Telles MD on 4/29/2025 at 9:15 PM       Assessment:  Hemorrhagic shock secondary to hemothorax, improved  Intrathoracic bleed following thoracentesis now status post intercostal artery cautery with thoracotomy-no further bleeding  Recurrent left-sided pleural effusion present since January 2025, s/p thoracentesis c/b hemothorax; fluid analysis with lymphocytic  exudate  Possible inferior wall STEMI cardiology note appreciated-with changes thought related to severity of shock  Dyspnea mild hypoxia on presentation related to large left pleural effusion, now improved  End-stage renal disease on HD  Severe peripheral vascular disease bilateral BKA's most recently last month    Plan:  Monitor respiratory status, wean o2 for sats >89%  Complete treatment for klebsiella ESBL - meropenem day 5 of 7  Pleural fluid cyto negative  Discharge planning         [1]    meropenem  500 mg Intravenous Q24H    lidocaine-menthol  1 patch Transdermal Daily    gabapentin  400 mg Oral Nightly    heparin  5,000 Units Subcutaneous Q8H SUJATA    acetaminophen  650 mg Oral 4 times per day    melatonin  5 mg Oral Nightly    insulin aspart  1-68 Units Subcutaneous TID CC and HS    docusate sodium  100 mg Oral BID    sennosides  8.6 mg Oral BID    sevelamer carbonate  1,600 mg Oral TID CC   [2]   HYDROcodone-acetaminophen **OR** HYDROcodone-acetaminophen    polyethylene glycol (PEG 3350)    glucose **OR** glucose **OR** glucose-vitamin C **OR** dextrose **OR** glucose **OR** glucose **OR** glucose-vitamin C    sennosides    ondansetron

## 2025-05-11 LAB
ANION GAP SERPL CALC-SCNC: 10 MMOL/L (ref 0–18)
BUN BLD-MCNC: 53 MG/DL (ref 9–23)
CALCIUM BLD-MCNC: 8.6 MG/DL (ref 8.7–10.6)
CHLORIDE SERPL-SCNC: 107 MMOL/L (ref 98–112)
CO2 SERPL-SCNC: 27 MMOL/L (ref 21–32)
CREAT BLD-MCNC: 6.25 MG/DL (ref 0.7–1.3)
EGFRCR SERPLBLD CKD-EPI 2021: 9 ML/MIN/1.73M2 (ref 60–?)
ERYTHROCYTE [DISTWIDTH] IN BLOOD BY AUTOMATED COUNT: 16.1 %
GLUCOSE BLD-MCNC: 122 MG/DL (ref 70–99)
GLUCOSE BLD-MCNC: 149 MG/DL (ref 70–99)
GLUCOSE BLD-MCNC: 152 MG/DL (ref 70–99)
GLUCOSE BLD-MCNC: 155 MG/DL (ref 70–99)
GLUCOSE BLD-MCNC: 171 MG/DL (ref 70–99)
HCT VFR BLD AUTO: 35.5 % (ref 39–53)
HGB BLD-MCNC: 11.1 G/DL (ref 13–17.5)
MCH RBC QN AUTO: 28.7 PG (ref 26–34)
MCHC RBC AUTO-ENTMCNC: 31.3 G/DL (ref 31–37)
MCV RBC AUTO: 91.7 FL (ref 80–100)
OSMOLALITY SERPL CALC.SUM OF ELEC: 315 MOSM/KG (ref 275–295)
PLATELET # BLD AUTO: 294 10(3)UL (ref 150–450)
POTASSIUM SERPL-SCNC: 4.7 MMOL/L (ref 3.5–5.1)
RBC # BLD AUTO: 3.87 X10(6)UL (ref 3.8–5.8)
SODIUM SERPL-SCNC: 144 MMOL/L (ref 136–145)
WBC # BLD AUTO: 14.5 X10(3) UL (ref 4–11)

## 2025-05-11 PROCEDURE — 99232 SBSQ HOSP IP/OBS MODERATE 35: CPT | Performed by: INTERNAL MEDICINE

## 2025-05-11 RX ORDER — DIPHENHYDRAMINE HYDROCHLORIDE 50 MG/ML
25 INJECTION, SOLUTION INTRAMUSCULAR; INTRAVENOUS ONCE
Status: DISCONTINUED | OUTPATIENT
Start: 2025-05-11 | End: 2025-05-11

## 2025-05-11 RX ORDER — DIPHENHYDRAMINE HYDROCHLORIDE 50 MG/ML
50 INJECTION, SOLUTION INTRAMUSCULAR; INTRAVENOUS ONCE
Status: COMPLETED | OUTPATIENT
Start: 2025-05-11 | End: 2025-05-11

## 2025-05-11 NOTE — PLAN OF CARE
Assumed care of pt at 1930. Contact isolation for ESBL.   Patient alert and oriented x4. Belongings at the bedside.   O2 sats maintained on RA, Spo2 >90%, pulse ox and tele applied.  Denies shortness of breath, no cough present. Afebrile. Lungs clear.  NSR with HR in the 70's on tele at rest.Denies any chest pain, dizziness, or discomfort at this time.  Pt c/o rib pain, PRN pain medication given at 2315.   Incontinent of B&B. Last BM 5/10. Minimal urine production/output. Sacrum red and blanchable, mepilex applied.   Activity level total assist. Pt turns side to side. Pt has bilateral BKA.  Left arm precautions. R internal jugular cath in place. Left arm fistula with no thrill or bruit. Renal aware.   Pts wife notified RN of new generalized redness/rash to the pts underarms, lower abdomen, and back around 2100. Paged hospitalist, d/cd the meropenum abx, gave benadryl IV x1.   All questions addressed & updated on plan of care. Bed locked and in lowest position. Call light within reach. Bed alarm on.    POC:   - US left arm  - Pain control   - Wound care    Problem: Patient/Family Goals  Goal: Patient/Family Long Term Goal  Description: Patient's Long Term Goal: To be discharged  Interventions:- Eat a heart healthy diet  - attend all follow up appointments  - take medications as prescribed    - See additional Care Plan goals for specific interventions  Outcome: Progressing  Goal: Patient/Family Short Term Goal  Description: Patient's Short Term Goal: To feel better  Interventions: - IV lasix, IV abx  - See additional Care Plan goals for specific interventions  Outcome: Progressing     Problem: Delirium  Goal: Minimize duration of delirium  Description: Interventions:- Encourage use of hearing aids, eye glasses- Promote highest level of mobility daily- Provide frequent reorientation- Promote wakefulness i.e. lights on, blinds open- Promote sleep, encourage patient's normal rest cycle i.e. lights off, TV off, minimize  noise and interruptions- Encourage family to assist in orientation and promotion of home routines  5/11/2025 0019 by Sarah Alan RN  Outcome: Progressing  5/11/2025 0015 by Sarah Alan RN  Outcome: Progressing     Problem: Safety Risk - Non-Violent Restraints  Goal: Patient will remain free from self-harm  Description: INTERVENTIONS:- Apply the least restrictive restraint to prevent harm- Notify patient and family of reasons restraints applied- Assess for any contributing factors to confusion (electrolyte disturbances, delirium, medications)- Discontinue any unnecessary medical devices as soon as possible- Assess the patient's physical comfort, circulation, skin condition, hydration, nutrition and elimination needs - Reorient and redirection as needed- Assess for the need to continue restraints  5/11/2025 0019 by Sarah Alan RN  Outcome: Progressing  5/11/2025 0015 by Sarah Alan RN  Outcome: Progressing     Problem: NEUROLOGICAL - ADULT  Goal: Achieves stable or improved neurological status  Description: INTERVENTIONS- Assess for and report changes in neurological status- Initiate measures to prevent increased intracranial pressure- Maintain blood pressure and fluid volume within ordered parameters to optimize cerebral perfusion and minimize risk of hemorrhage- Monitor temperature, glucose, and sodium. Initiate appropriate interventions as ordered  5/11/2025 0019 by Sarah Alan RN  Outcome: Progressing  5/11/2025 0015 by Sarah Alan RN  Outcome: Progressing     Problem: HEMATOLOGIC - ADULT  Goal: Maintains hematologic stability  Description: INTERVENTIONS- Assess for signs and symptoms of bleeding or hemorrhage- Monitor labs and vital signs for trends- Administer supportive blood products/factors, fluids and medications as ordered and appropriate- Administer supportive blood products/factors as ordered and appropriate  5/11/2025 0019 by Sarah Alan RN  Outcome: Progressing  5/11/2025 0015 by Sarah Alan  RN  Outcome: Progressing  Goal: Free from bleeding injury  Description: (Example usage: patient with low platelets)INTERVENTIONS:- Avoid intramuscular injections, enemas and rectal medication administration- Ensure safe mobilization of patient- Hold pressure on venipuncture sites to achieve adequate hemostasis- Assess for signs and symptoms of internal bleeding- Monitor lab trends- Patient is to report abnormal signs of bleeding to staff- Avoid use of toothpicks and dental floss- Use electric shaver for shaving- Use soft bristle tooth brush- Limit straining and forceful nose blowing  5/11/2025 0019 by Sarah Alan RN  Outcome: Progressing  5/11/2025 0015 by Sarah Alan RN  Outcome: Progressing     Problem: SAFETY ADULT - FALL  Goal: Free from fall injury  Description: INTERVENTIONS:- Assess pt frequently for physical needs- Identify cognitive and physical deficits and behaviors that affect risk of falls.- [place] fall precautions as indicated by assessment.- Educate pt/family on patient safety including physical limitations- Instruct pt to call for assistance with activity based on assessment- Modify environment to reduce risk of injury- Provide assistive devices as appropriate- Consider OT/PT consult to assist with strengthening/mobility- Encourage toileting schedule  5/11/2025 0019 by Sarah Alan RN  Outcome: Progressing  5/11/2025 0015 by Sarah Alan RN  Outcome: Progressing

## 2025-05-11 NOTE — PLAN OF CARE
Assumed care of pt at 0700. Alert and oriented x4. Maintains o2 sat on RA. NSR on tele monitor. No complaints of pain. Total assist, rolls side to side. Continent of bowel and bladder, self reports when cleanup is needed. Isolation precautions maintained. Personal possessions and call light within reach. Bed locked in lowest position. Updated on plan of care.     Plan: L arm US, possible discharge Monday to [facility]    Problem: SAFETY ADULT - FALL  Goal: Free from fall injury  Description: INTERVENTIONS:- Assess pt frequently for physical needs- Identify cognitive and physical deficits and behaviors that affect risk of falls.- [place] fall precautions as indicated by assessment.- Educate pt/family on patient safety including physical limitations- Instruct pt to call for assistance with activity based on assessment- Modify environment to reduce risk of injury- Provide assistive devices as appropriate- Consider OT/PT consult to assist with strengthening/mobility- Encourage toileting schedule  5/11/2025 1219 by Fermin Amezquita, RN  Outcome: Progressing  5/11/2025 1219 by Fermin Amezquita RN  Outcome: Progressing     Problem: RESPIRATORY - ADULT  Goal: Achieves optimal ventilation and oxygenation  Description: INTERVENTIONS:- Assess for changes in respiratory status- Assess for changes in mentation and behavior- Position to facilitate oxygenation and minimize respiratory effort- Oxygen supplementation based on oxygen saturation or ABGs- Provide Smoking Cessation handout, if applicable- Encourage broncho-pulmonary hygiene including cough, deep breathe, Incentive Spirometry- Assess the need for suctioning and perform as needed- Assess and instruct to report SOB or any respiratory difficulty- Respiratory Therapy support as indicated- Manage/alleviate anxiety- Monitor for signs/symptoms of CO2 retention  5/11/2025 1219 by Fermin Amezquita, RN  Outcome: Progressing  5/11/2025 1219 by Fermin Amezquita, RN  Outcome: Progressing      Problem: HEMATOLOGIC - ADULT  Goal: Maintains hematologic stability  Description: INTERVENTIONS- Assess for signs and symptoms of bleeding or hemorrhage- Monitor labs and vital signs for trends- Administer supportive blood products/factors, fluids and medications as ordered and appropriate- Administer supportive blood products/factors as ordered and appropriate  5/11/2025 1219 by Fermin Amezquita RN  Outcome: Progressing  5/11/2025 1219 by Fermin Amezquita RN  Outcome: Progressing  Goal: Free from bleeding injury  Description: (Example usage: patient with low platelets)INTERVENTIONS:- Avoid intramuscular injections, enemas and rectal medication administration- Ensure safe mobilization of patient- Hold pressure on venipuncture sites to achieve adequate hemostasis- Assess for signs and symptoms of internal bleeding- Monitor lab trends- Patient is to report abnormal signs of bleeding to staff- Avoid use of toothpicks and dental floss- Use electric shaver for shaving- Use soft bristle tooth brush- Limit straining and forceful nose blowing  5/11/2025 1219 by Fermin Amezquita RN  Outcome: Progressing  5/11/2025 1219 by Fermin Amezquita RN  Outcome: Progressing     Problem: Safety Risk - Non-Violent Restraints  Goal: Patient will remain free from self-harm  Description: INTERVENTIONS:- Apply the least restrictive restraint to prevent harm- Notify patient and family of reasons restraints applied- Assess for any contributing factors to confusion (electrolyte disturbances, delirium, medications)- Discontinue any unnecessary medical devices as soon as possible- Assess the patient's physical comfort, circulation, skin condition, hydration, nutrition and elimination needs - Reorient and redirection as needed- Assess for the need to continue restraints  5/11/2025 1219 by Fermin Amezquita RN  Outcome: Progressing  5/11/2025 1219 by Fermin Amezquita RN  Outcome: Progressing     Problem: Delirium  Goal: Minimize duration of delirium  Description:  Interventions:- Encourage use of hearing aids, eye glasses- Promote highest level of mobility daily- Provide frequent reorientation- Promote wakefulness i.e. lights on, blinds open- Promote sleep, encourage patient's normal rest cycle i.e. lights off, TV off, minimize noise and interruptions- Encourage family to assist in orientation and promotion of home routines  5/11/2025 1219 by Fermin Amezquita RN  Outcome: Progressing  5/11/2025 1219 by Fermin Amezquita RN  Outcome: Progressing     Problem: NEUROLOGICAL - ADULT  Goal: Achieves stable or improved neurological status  Description: INTERVENTIONS- Assess for and report changes in neurological status- Initiate measures to prevent increased intracranial pressure- Maintain blood pressure and fluid volume within ordered parameters to optimize cerebral perfusion and minimize risk of hemorrhage- Monitor temperature, glucose, and sodium. Initiate appropriate interventions as ordered  5/11/2025 1219 by Fermin Amezquita RN  Outcome: Progressing  5/11/2025 1219 by Fermin Amezquita RN  Outcome: Progressing     Problem: Patient/Family Goals  Goal: Patient/Family Long Term Goal  Description: Patient's Long Term Goal: To be discharged  Interventions:- Eat a heart healthy diet  - attend all follow up appointments  - take medications as prescribed    - See additional Care Plan goals for specific interventions  5/11/2025 1219 by Fermin Amezquita RN  Outcome: Progressing  5/11/2025 1219 by Fermin Amezquita RN  Outcome: Progressing  Goal: Patient/Family Short Term Goal  Description: Patient's Short Term Goal: To feel better  Interventions: - IV lasix, IV abx  - See additional Care Plan goals for specific interventions  5/11/2025 1219 by Fermin Amezquita RN  Outcome: Progressing  5/11/2025 1219 by Fermin Amezquita RN  Outcome: Progressing

## 2025-05-11 NOTE — PROGRESS NOTES
[facility]   part of [facility]     Hospitalist Progress Note     Richy Goins Patient Status:  Inpatient    1960 MRN [id number]   Location [facility] 4SW-A Attending Jeremi Norwood MD   Hosp Day # 12 PCP Woody Dahl MD     Chief Complaint: resp failure     Subjective:     Patient has no new complaints.  Patient states he is feeling better.  Improving shortness of breath.  No nausea vomiting.  Denies abdominal pain.   On room air today    Objective:    Review of Systems:   Limited as on vent    Vital signs:  Temp:  [98.1 °F (36.7 °C)-98.6 °F (37 °C)] 98.2 °F (36.8 °C)  Pulse:  [71-90] 77  Resp:  [15-18] 17  BP: ()/(57-86) 128/62  SpO2:  [93 %-96 %] 96 %    Physical Exam:    /62 (BP Location: Right arm)   Pulse 77   Temp 98.2 °F (36.8 °C) (Oral)   Resp 17   Wt 160 lb 15 oz (73 kg)   SpO2 96%   BMI 24.47 kg/m²   On room air  General: No acute distress  Respiratory: Clear to auscultation bilateral.  Cardiovascular: S1, S2.  Abdomen: Soft, bowel sounds present  Neuro: Awake alert, no new focal deficits  MSK: B/L AKA, LLE incision with staples and eschar and in dressing      Diagnostic Data:    Labs:  Recent Labs   Lab 25  0533 25  0449 25  0523 05/10/25  04525  0521   WBC 9.1 9.1 10.3 11.4* 14.5*   HGB 11.6* 11.8* 11.9* 11.8* 11.1*   MCV 88.2 88.3 89.5 90.1 91.7   .0 201.0 236.0 262.0 294.0       Recent Labs   Lab 25  0523 05/10/25  04525  0521   * 126* 149*   BUN 49* 34* 53*   CREATSERUM 5.81* 4.43* 6.25*   CA 8.5* 8.5* 8.6*    144 144   K 4.1 4.1 4.7    108 107   CO2 27.0 29.0 27.0       Estimated Glomerular Filtration Rate: 9 mL/min/1.73m2 (A) (result from lab).     No results for input(s): \"TROP\", \"TROPHS\", \"CK\" in the last 168 hours.      No results for input(s): \"PTP\", \"INR\" in the last 168 hours.             Imaging: Imaging data reviewed in Epic.    Medications: Scheduled Medications[1]    Assessment &  Plan:     #Acute Left Intrathoracic hemorrhage 2/2 intercostal artery injury with left hemothorax  S/p L VATS washout and control of intercostal bleeding  Chest tube removed by CV surgeon on 5/7/2025.    #Acute hypoxic resp failure  Resolved  oxygen weaned off and on room air on 5/7/2025.    #ESBL PNA  On IV antibiotics with IV merrem    #Acute Metabolic Encephalopathy  Monitor MS, seems to be impoving    #Hemorrhagic shock on admission  Resolved, off pressors  Blood pressure remain stable at this time    #PAD s/p b/l AKA  Left incision with stapels and eschar; does not appear infected  Wound care  On PPX ABX    #Acute blood loss anemia on admission  S/p massive transfusion protocol on admission  Monitor Hgb  No s/s of further bleeding    #Cardiac arrest  Due to above  ROSC successful  EKG abnormal  Echo noted  Cardiology following, ischemic w/u deferred as likely reactive to above    #Left Hemithorax  Likely due to ESRD  Has been recurrent  S/p thora 5/1, cytology pending    #Occluded LUE AVG  Status post declot left upper extremity AV fistula by IR done on 5/9/2025    #ESRD on HD  Cont. HD    #DM type II with ESRD on HD  Elevated as stress reaction  Cont. Insulin and adjust as needed  A1c 5.9 though unreliable at this point    #HTN  Home BP meds on hold    Discussed with patient,   Discussed with RN-awaiting subacute rehab insurance authorization and bed availability    Dina Canada MD      Supplementary Documentation:     Quality:    DVT Mechanical Prophylaxis:   SCDs,    DVT Pharmacologic Prophylaxis   Medication    heparin (Porcine) 5000 UNIT/ML injection 5,000 Units                Code Status: Full Code  Soni: No urinary catheter in place  Soni Duration (in days):   Central line:    [place]: 5/12/2025      Discharge is dependent on: clinical stability  At this point Mr. Goins is expected to be discharge to: home    The 21st Century Cures Act makes medical notes like these available to patients in the  interest of transparency. Please be advised this is a medical document. Medical documents are intended to carry relevant information, facts as evident, and the clinical opinion of the practitioner. The medical note is intended as peer to peer communication and may appear blunt or direct. It is written in medical language and may contain abbreviations or verbiage that are unfamiliar.                       [1]    lidocaine-menthol  1 patch Transdermal Daily    gabapentin  400 mg Oral Nightly    heparin  5,000 Units Subcutaneous Q8H SUJATA    acetaminophen  650 mg Oral 4 times per day    melatonin  5 mg Oral Nightly    insulin aspart  1-68 Units Subcutaneous TID CC and HS    docusate sodium  100 mg Oral BID    sennosides  8.6 mg Oral BID    sevelamer carbonate  1,600 mg Oral TID CC

## 2025-05-11 NOTE — PROGRESS NOTES
[facility] Progress Note     SUBJECTIVE/Interval history:  Developed rash yesterday over lower torso and back. He denies any new concerns today. Does not notice the rash, no pruritus or pain. No dyspnea or cough. No fevers    Review of Systems:   A comprehensive 14 point review of systems was completed.   Pertinent positives and negatives noted in the HPI.    Medications  Reviewed personally  Scheduled Medications[1]  PRN Medications[2]    OBJECTIVE:  Vitals:    05/10/25 1950 05/10/25 2315 05/11/25 0515 05/11/25 0537   BP: 143/69 98/86 120/86    BP Location: Right arm Right arm Right arm    Pulse: 90 71 82    Resp: 18 18 18    Temp: 98.3 °F (36.8 °C) 98.5 °F (36.9 °C) 98.6 °F (37 °C)    TempSrc: Oral Oral Oral    SpO2: 93% 94% 95%    Weight:    160 lb 15 oz (73 kg)       Vital signs in last 24 hours:  Blood pressure 120/86, pulse 82, temperature 98.6 °F (37 °C), temperature source Oral, resp. rate 18, weight 160 lb 15 oz (73 kg), SpO2 95%.     Intake/Output:  I/O last 3 completed shifts:  In: 580 [P.O.:580]  Out: 0      Physical Exam:  General: Appears alert, oriented x3. No acute distress  Neurologic:No focal deficits noted.  Alert.  Oriented.  Lungs:CTAB. No wheeze  Heart:RRR no m  Abdomen: soft, nondistended, no rigidity, no reaction with palpation. No hernias noted  Extremities:No overt deformities, edema  Erythematous rash noted to back and lower abdomen    Lab Data Review:   Recent Labs   Lab 05/09/25  0523 05/10/25  0459 05/11/25  0521   * 126* 149*   BUN 49* 34* 53*   CREATSERUM 5.81* 4.43* 6.25*   CA 8.5* 8.5* 8.6*    144 144   K 4.1 4.1 4.7    108 107   CO2 27.0 29.0 27.0     Recent Labs   Lab 05/09/25  0523 05/10/25  0459 05/11/25  0521   RBC 4.10 4.03 3.87   HGB 11.9* 11.8* 11.1*   HCT 36.7* 36.3* 35.5*   MCV 89.5 90.1 91.7   MCH 29.0 29.3 28.7   MCHC 32.4 32.5 31.3   RDW 15.5 15.7 16.1   WBC 10.3 11.4* 14.5*   .0 262.0 294.0     No results for  input(s): \"BNP\" in the last 168 hours.  No results for input(s): \"TROP\", \"CK\" in the last 168 hours.  No results for input(s): \"PT\", \"INR\", \"PTT\" in the last 168 hours.    Recent Labs   Lab 05/04/25  1030   ABGPHT 7.44   RONHOE8B 39   RKOQG8I 97   ABGHCO3 26.6   ABGBE 2.2*   TEMP 98.6   NOEMÍ Positive   SITE Left Radial   DEV Nasal cannula   THGB 11.8*       Other Labs:  Interval Culture Data:   Hospital Encounter on 04/29/25   1. Body Fluid Cult Aerobic and Anaerobic     Status: None    Collection Time: 05/02/25 10:00 AM    Specimen: Pleural Fluid, Left; Body fluid, unspecified   Result Value Ref Range    Body Fluid Culture Result No Growth 5 Days N/A    Body Fluid Smear 1+ WBCs seen N/A    Body Fluid Smear No organisms seen N/A    Body Fluid Smear This is a cytocentrifuged smear. N/A     No results for input(s): \"COLORUR\", \"CLARITY\", \"SPECGRAVITY\", \"GLUUR\", \"BILUR\", \"KETUR\", \"BLOODURINE\", \"PHURINE\", \"PROUR\", \"UROBILINOGEN\", \"NITRITE\", \"LEUUR\", \"WBCUR\", \"RBCUR\", \"BACUR\", \"EPIUR\" in the last 168 hours.    Interval Radiology:   Reviewed personally  IR GRAFT PROCEDURE  Result Date: 5/9/2025  CONCLUSION:  1. Technically successful pharmacomechanical declot of thrombosed left upper arm AV graft. 2. Mild narrowing at the distal anastomosis treated with 6 mm balloon dilatation. 3. Area of irregularity suspected to be subacute to chronic vein changes at the level of the axilla.  Numerous draining veins across this level have good antegrade flow. 4. Widely patent central veins.    LOCATION:  [facility]       Dictated by (CST): Rob Reyes MD on 5/09/2025 at 3:11 PM     Finalized by (CST): Rob Reyes MD on 5/09/2025 at 3:23 PM       XR CHEST AP PORTABLE  (CPT=71045)  Result Date: 5/4/2025  CONCLUSION:  Improving airspace disease left lung.   LOCATION:  [facility]      Dictated by (CST): Rob Reyes MD on 5/04/2025 at 7:09 AM     Finalized by (CST): Rob Reyes MD on 5/04/2025 at 7:11 AM       XR CHEST AP/PA (1 VIEW)  (CPT=71045)  Result Date: 5/3/2025  CONCLUSION:   1.  Dobbhoff tube in the stomach.  The remainder of the lines and tubes are stable since previous study.   2. Stable appearance of patchy airspace disease throughout the left lung with residual small amount of fluid in gas within the left lateral pleural space.   LOCATION:  [facility]      Dictated by (CST): Yoan Duvall MD on 5/03/2025 at 6:17 PM     Finalized by (CST): Yoan Duvall MD on 5/03/2025 at 6:18 PM       XR CHEST AP PORTABLE  (CPT=71045)  Result Date: 5/3/2025  CONCLUSION:  1. Overall no significant change in the appearance of the portable chest radiograph in the short interval since the prior study. 2. Lucency left lateral lung base is most likely a pneumothorax related incomplete expansion of lung (trapped lung).   LOCATION:  [facility]      Dictated by (CST): Camden Deleon MD on 5/03/2025 at 6:32 AM     Finalized by (CST): Camden Deleon MD on 5/03/2025 at 6:34 AM       XR CHEST AP PORTABLE  (CPT=71045)  Result Date: 5/2/2025  CONCLUSION:  Placement of a large bore left chest tube.  Evacuation of the left pleural fluid/hemo thorax.  Incomplete re-expansion of the left lung with a mild to moderate basilar pneumothorax.   LOCATION:  [facility]      Dictated by (CST): Rob Reyes MD on 5/02/2025 at 6:55 PM     Finalized by (CST): Rob Reyes MD on 5/02/2025 at 6:58 PM       XR CHEST AP PORTABLE  (CPT=71045)  Result Date: 5/2/2025  CONCLUSION:  A portion of left mid lung is now aerated.  Other findings appear stable.   LOCATION:  [facility]      Dictated by (CST): Rob Reyes MD on 5/02/2025 at 5:34 PM     Finalized by (CST): Rob Reyes MD on 5/02/2025 at 5:36 PM       XR CHEST AP PORTABLE  (CPT=71045)  Result Date: 5/2/2025  CONCLUSION:  Complete opacification left hemithorax with shift of mediastinal structures from left to right.  Finding was discussed with the ICU RN caring for the patient on May 2, 2025 at 2:30 p.m..  By this time a chest tube have been placed.   Findings  would be consistent with a large hemo thorax.   LOCATION:  [facility]      Dictated by (CST): Camden Deleon MD on 5/02/2025 at 2:25 PM     Finalized by (CST): Camden Deleon MD on 5/02/2025 at 2:30 PM       US THORACENTESIS GUIDED LEFT (CPT=32555)  Result Date: 5/2/2025  CONCLUSION:  Ultrasound-guided left thoracentesis of 1600 cc was performed without complication.   LOCATION:  [facility]    Dictated by (CST): Oskar Lynn MD on 5/02/2025 at 1:33 PM     Finalized by (CST): Oskar Lynn MD on 5/02/2025 at 1:34 PM       XR CHEST AP PORTABLE  (CPT=71045)  Result Date: 5/2/2025  CONCLUSION:  Decreased size of left pleural effusion without pneumothorax.  There continues to be significant amount of opacity within the left hemithorax.   LOCATION:  [facility]      Dictated by (CST): Yoan Duvall MD on 5/02/2025 at 12:14 PM     Finalized by (CST): Yoan Duvall MD on 5/02/2025 at 12:15 PM       XR CHEST AP PORTABLE  (CPT=71045)  Result Date: 4/29/2025  CONCLUSION:   There is nearly complete opacification of the left hemithorax which partially obscures the cardiomediastinal silhouette.  This likely represents a combination of large pleural effusion and passive atelectasis, though superimposed infection or aspiration not excluded.  Mild interstitial edema noted throughout the right lung.  The right pleural space remains clear.   LOCATION:  [facility]      Dictated by (CST): Patricio Telles MD on 4/29/2025 at 9:14 PM     Finalized by (CST): Patricio Telles MD on 4/29/2025 at 9:15 PM       Assessment:  Hemorrhagic shock secondary to hemothorax, improved  Intrathoracic bleed following thoracentesis now status post intercostal artery cautery with thoracotomy-no further bleeding  Recurrent left-sided pleural effusion present since January 2025, s/p thoracentesis c/b hemothorax; fluid analysis with lymphocytic exudate  Possible inferior wall STEMI cardiology note appreciated-with changes thought related to severity of shock  Dyspnea mild  hypoxia on presentation related to large left pleural effusion, now improved  End-stage renal disease on HD  Severe peripheral vascular disease bilateral BKA's most recently last month  Rash - unclear etiology, possible drug rash    Plan:  Monitor respiratory status, wean o2 for sats >89%  Will stop meropenem given question of drug rash (completed 5 days of meropenem for klebsiella ESBL pneumonia)  Pleural fluid cx and cyto negative  Discharge planning         [1]    lidocaine-menthol  1 patch Transdermal Daily    gabapentin  400 mg Oral Nightly    heparin  5,000 Units Subcutaneous Q8H SUJATA    acetaminophen  650 mg Oral 4 times per day    melatonin  5 mg Oral Nightly    insulin aspart  1-68 Units Subcutaneous TID CC and HS    docusate sodium  100 mg Oral BID    sennosides  8.6 mg Oral BID    sevelamer carbonate  1,600 mg Oral TID CC   [2]   HYDROcodone-acetaminophen **OR** HYDROcodone-acetaminophen    polyethylene glycol (PEG 3350)    glucose **OR** glucose **OR** glucose-vitamin C **OR** dextrose **OR** glucose **OR** glucose **OR** glucose-vitamin C    sennosides    ondansetron

## 2025-05-12 ENCOUNTER — APPOINTMENT (OUTPATIENT)
Dept: ULTRASOUND IMAGING | Facility: HOSPITAL | Age: 65
End: 2025-05-12
Attending: INTERNAL MEDICINE
Payer: COMMERCIAL

## 2025-05-12 LAB
ANION GAP SERPL CALC-SCNC: 12 MMOL/L (ref 0–18)
BUN BLD-MCNC: 71 MG/DL (ref 9–23)
CALCIUM BLD-MCNC: 8.9 MG/DL (ref 8.7–10.6)
CHLORIDE SERPL-SCNC: 107 MMOL/L (ref 98–112)
CO2 SERPL-SCNC: 24 MMOL/L (ref 21–32)
CREAT BLD-MCNC: 7.48 MG/DL (ref 0.7–1.3)
EGFRCR SERPLBLD CKD-EPI 2021: 7 ML/MIN/1.73M2 (ref 60–?)
ERYTHROCYTE [DISTWIDTH] IN BLOOD BY AUTOMATED COUNT: 16.2 %
GLUCOSE BLD-MCNC: 104 MG/DL (ref 70–99)
GLUCOSE BLD-MCNC: 131 MG/DL (ref 70–99)
GLUCOSE BLD-MCNC: 140 MG/DL (ref 70–99)
GLUCOSE BLD-MCNC: 143 MG/DL (ref 70–99)
GLUCOSE BLD-MCNC: 154 MG/DL (ref 70–99)
HCT VFR BLD AUTO: 35.5 % (ref 39–53)
HGB BLD-MCNC: 11.1 G/DL (ref 13–17.5)
MCH RBC QN AUTO: 28.8 PG (ref 26–34)
MCHC RBC AUTO-ENTMCNC: 31.3 G/DL (ref 31–37)
MCV RBC AUTO: 92 FL (ref 80–100)
OSMOLALITY SERPL CALC.SUM OF ELEC: 320 MOSM/KG (ref 275–295)
PLATELET # BLD AUTO: 316 10(3)UL (ref 150–450)
POTASSIUM SERPL-SCNC: 4.9 MMOL/L (ref 3.5–5.1)
RBC # BLD AUTO: 3.86 X10(6)UL (ref 3.8–5.8)
SODIUM SERPL-SCNC: 143 MMOL/L (ref 136–145)
WBC # BLD AUTO: 13.4 X10(3) UL (ref 4–11)

## 2025-05-12 PROCEDURE — 99232 SBSQ HOSP IP/OBS MODERATE 35: CPT | Performed by: INTERNAL MEDICINE

## 2025-05-12 PROCEDURE — 93990 DOPPLER FLOW TESTING: CPT | Performed by: INTERNAL MEDICINE

## 2025-05-12 PROCEDURE — 93971 EXTREMITY STUDY: CPT | Performed by: INTERNAL MEDICINE

## 2025-05-12 PROCEDURE — 99233 SBSQ HOSP IP/OBS HIGH 50: CPT | Performed by: INTERNAL MEDICINE

## 2025-05-12 RX ORDER — HEPARIN SODIUM 1000 [USP'U]/ML
1500 INJECTION, SOLUTION INTRAVENOUS; SUBCUTANEOUS ONCE
Status: COMPLETED | OUTPATIENT
Start: 2025-05-12 | End: 2025-05-12

## 2025-05-12 NOTE — PROGRESS NOTES
[facility]  Nephrology Progress Note    Richy Goins Attending:  Jeremi Norwood MD       Assessment and Plan:    1) ESRD- due to longstanding DM 2; last HD . Lytes / volume OK. HD today via temp HD cath     2) Acute hypoxic resp failure due to pul edema + pleural effusion / hemothorax / arrest- extubated     3) L pleural effusion s/p thoracentesis -> massive hemothorax s/p ligation of intercostal artery + chest tube (dc'ed )    4) Occluded L UE AVG s/p declot ; no audible bruit -> US today / IR to re-evaluate     5) Anemia- due to CKD / above / chronic disease. Hold EPO with hgb > 11 g/dl      6) Longstanding DM 2     7) PAD s/p remote R BKA; s/p recent L BKA     DC planning to [facility]. Will need permcath if AVG cannot be salvaged      Subjective:  Awake alert in good spirits no issues overnight    Physical Exam:   /67 (BP Location: Right arm)   Pulse 73   Temp 98.7 °F (37.1 °C) (Oral)   Resp 20   Wt 166 lb 7.2 oz (75.5 kg)   SpO2 93%   BMI 25.31 kg/m²   Temp (24hrs), Av.3 °F (36.8 °C), Min:98.2 °F (36.8 °C), Max:98.7 °F (37.1 °C)       Intake/Output Summary (Last 24 hours) at 2025 0851  Last data filed at 2025 0734  Gross per 24 hour   Intake 840 ml   Output 0 ml   Net 840 ml     Wt Readings from Last 3 Encounters:   25 166 lb 7.2 oz (75.5 kg)   25 170 lb (77.1 kg)   10/02/24 181 lb (82.1 kg)     General: awake alert  HEENT: No scleral icterus, MMM  Neck: Supple, no SARITA or thyromegaly  Cardiac: Regular rate and rhythm, S1, S2 normal, no murmur or tub  Lungs: Decreased BS at bases bilaterally   Abdomen: Soft, non-tender. + bowel sounds, no palpable organomegaly  Extremities: Without clubbing, cyanosis; no edema  Neurologic: Cranial nerves grossly intact, moving all extremities  Skin: Warm and dry, no rashes       Labs:   Lab Results   Component Value Date    WBC 13.4 2025    HGB 11.1 2025    HCT 35.5 2025    .0 2025    CREATSERUM  7.48 05/12/2025    BUN 71 05/12/2025     05/12/2025    K 4.9 05/12/2025     05/12/2025    CO2 24.0 05/12/2025     05/12/2025    CA 8.9 05/12/2025    PGLU 143 05/12/2025       Imaging:  All imaging studies reviewed.    Meds:   Current Hospital Medications[1]      Questions/concerns were discussed with patient and/or family by bedside.          Keeley Blackwell MD  5/12/2025  851 AM         [1]   Current Facility-Administered Medications   Medication Dose Route Frequency    HYDROcodone-acetaminophen (Norco) 5-325 MG per tab 1 tablet  1 tablet Oral Q6H PRN    Or    HYDROcodone-acetaminophen (Norco) 5-325 MG per tab 2 tablet  2 tablet Oral Q6H PRN    lidocaine-menthol 4-1 % patch 1 patch  1 patch Transdermal Daily    gabapentin (Neurontin) cap 400 mg  400 mg Oral Nightly    heparin (Porcine) 5000 UNIT/ML injection 5,000 Units  5,000 Units Subcutaneous Q8H SUJATA    acetaminophen (Tylenol) tab 650 mg  650 mg Oral 4 times per day    melatonin cap/tab 5 mg  5 mg Oral Nightly    insulin aspart (NovoLOG) 100 Units/mL FlexPen 1-68 Units  1-68 Units Subcutaneous TID CC and HS    docusate sodium (Colace) cap 100 mg  100 mg Oral BID    sennosides (Senokot) tab 8.6 mg  8.6 mg Oral BID    polyethylene glycol (PEG 3350) (Miralax) 17 g oral packet 17 g  17 g Per NG Tube Daily PRN    sevelamer carbonate (Renvela) tab 1,600 mg  1,600 mg Oral TID CC    glucose (Dex4) 15 GM/59ML oral liquid 15 g  15 g Oral Q15 Min PRN    Or    glucose (Glutose) 40% oral gel 15 g  15 g Oral Q15 Min PRN    Or    glucose-vitamin C (Dex-4) chewable tab 4 tablet  4 tablet Oral Q15 Min PRN    Or    dextrose 50% injection 50 mL  50 mL Intravenous Q15 Min PRN    Or    glucose (Dex4) 15 GM/59ML oral liquid 30 g  30 g Oral Q15 Min PRN    Or    glucose (Glutose) 40% oral gel 30 g  30 g Oral Q15 Min PRN    Or    glucose-vitamin C (Dex-4) chewable tab 8 tablet  8 tablet Oral Q15 Min PRN    sennosides (Senokot) tab 17.2 mg  17.2 mg Oral Nightly PRN     ondansetron (Zofran) 4 MG/2ML injection 4 mg  4 mg Intravenous Q6H PRN

## 2025-05-12 NOTE — PROGRESS NOTES
[facility]   part of [facility]     Hospitalist Progress Note     Richy Goins Patient Status:  Inpatient    1960 MRN [id number]   Location [facility] 4SW-A Attending Jeremi Norwood MD   Hosp Day # 13 PCP Woody Dahl MD     Chief Complaint: resp failure     Subjective:     Patient has no new complaints.  Patient states he is feeling better.  Improving shortness of breath.  No nausea vomiting.  Denies abdominal pain.   On room air today.  Discussed with RN-awaiting follow-up ultrasound duplex of the left arm to follow-up on the clotted AV fistula left arm status post declotting on 2025 by IR.  Also awaiting insurance authorization for rehab    Objective:    Review of Systems:   Limited as on vent    Vital signs:  Temp:  [98.2 °F (36.8 °C)-98.7 °F (37.1 °C)] 98.7 °F (37.1 °C)  Pulse:  [73-90] 82  Resp:  [17-20] 20  BP: (125-151)/(62-74) 125/67  SpO2:  [90 %-96 %] 92 %    Physical Exam:    /67 (BP Location: Right arm)   Pulse 82   Temp 98.7 °F (37.1 °C) (Oral)   Resp 20   Wt 166 lb 7.2 oz (75.5 kg)   SpO2 92%   BMI 25.31 kg/m²   On room air  General: No acute distress  Respiratory: Clear to auscultation bilateral.  Cardiovascular: S1, S2.  Abdomen: Soft, bowel sounds present  Neuro: Awake alert, no new focal deficits  MSK: B/L AKA, LLE incision with staples and eschar and in dressing      Diagnostic Data:    Labs:  Recent Labs   Lab 25  0523 05/10/25  0459 05/11/25  0521 25  0514   WBC 9.1 10.3 11.4* 14.5* 13.4*   HGB 11.8* 11.9* 11.8* 11.1* 11.1*   MCV 88.3 89.5 90.1 91.7 92.0   .0 236.0 262.0 294.0 316.0       Recent Labs   Lab 05/10/25  04525  0521 25  0514   * 149* 154*   BUN 34* 53* 71*   CREATSERUM 4.43* 6.25* 7.48*   CA 8.5* 8.6* 8.9    144 143   K 4.1 4.7 4.9    107 107   CO2 29.0 27.0 24.0       Estimated Glomerular Filtration Rate: 7 mL/min/1.73m2 (A) (result from lab).     No results for input(s):  \"TROP\", \"TROPHS\", \"CK\" in the last 168 hours.      No results for input(s): \"PTP\", \"INR\" in the last 168 hours.             Imaging: Imaging data reviewed in Epic.    Medications: Scheduled Medications[1]    Assessment & Plan:     #Acute Left Intrathoracic hemorrhage 2/2 intercostal artery injury with left hemothorax  S/p L VATS washout and control of intercostal bleeding  Chest tube removed by CV surgeon on 5/7/2025.    #Acute hypoxic resp failure  Resolved  oxygen weaned off and on room air on 5/7/2025.    #ESBL PNA  On IV antibiotics with IV merrem    #Acute Metabolic Encephalopathy  Monitor MS, seems to be impoving    #Hemorrhagic shock on admission  Resolved, off pressors  Blood pressure remain stable at this time    #PAD s/p b/l AKA  Left incision with stapels and eschar; does not appear infected  Wound care  On PPX ABX    #Acute blood loss anemia on admission  S/p massive transfusion protocol on admission  Monitor Hgb  No s/s of further bleeding    #Cardiac arrest  Due to above  ROSC successful  EKG abnormal  Echo noted  Cardiology following, ischemic w/u deferred as likely reactive to above    #Left Hemithorax  Likely due to ESRD  Has been recurrent  S/p thora 5/1, cytology pending    #Occluded LUE AVG  Status post declot left upper extremity AV fistula by IR done on 5/9/2025    #ESRD on HD  Cont. HD    #DM type II with ESRD on HD  Elevated as stress reaction  Cont. Insulin and adjust as needed  A1c 5.9 though unreliable at this point    #HTN  Home BP meds on hold    Discussed with patient,   Discussed with RN-awaiting follow-up ultrasound duplex of the left arm to follow-up on the clotted AV fistula left arm status post declotting on 5/9/2025 by IR.  Also awaiting insurance authorization for rehab    Dr. Live Ott will follow from morning tomorrow    Dina Canada MD      Supplementary Documentation:     Quality:    DVT Mechanical Prophylaxis:   SCDs,    DVT Pharmacologic Prophylaxis   Medication    heparin  (Porcine) 5000 UNIT/ML injection 5,000 Units                Code Status: Full Code  Soni: No urinary catheter in place  Soni Duration (in days):   Central line:    DENNYS: 5/12/2025      Discharge is dependent on: clinical stability  At this point Mr. Goins is expected to be discharge to: home    The 21st Century Cures Act makes medical notes like these available to patients in the interest of transparency. Please be advised this is a medical document. Medical documents are intended to carry relevant information, facts as evident, and the clinical opinion of the practitioner. The medical note is intended as peer to peer communication and may appear blunt or direct. It is written in medical language and may contain abbreviations or verbiage that are unfamiliar.                       [1]    lidocaine-menthol  1 patch Transdermal Daily    gabapentin  400 mg Oral Nightly    heparin  5,000 Units Subcutaneous Q8H SUJATA    acetaminophen  650 mg Oral 4 times per day    melatonin  5 mg Oral Nightly    insulin aspart  1-68 Units Subcutaneous TID CC and HS    docusate sodium  100 mg Oral BID    sennosides  8.6 mg Oral BID    sevelamer carbonate  1,600 mg Oral TID CC

## 2025-05-12 NOTE — PHYSICAL THERAPY NOTE
Reviewed pt's chart and discuss pt with RN. At this time pt is undergoing dialysis. PT will re-attempt when appropriate.

## 2025-05-12 NOTE — PAYOR COMM NOTE
--------------  CONTINUED STAY REVIEW    Payor: [employer]  Subscriber #:  [id number]  Authorization Number: [id number]    Admit date: 4/29/25  Admit time: 10:59 PM    5/11  PULMONOLOGY  Developed rash yesterday      Assessment:  Hemorrhagic shock secondary to hemothorax, improved  Intrathoracic bleed following thoracentesis now status post intercostal artery cautery with thoracotomy-no further bleeding  Recurrent left-sided pleural effusion present since January 2025, s/p thoracentesis c/b hemothorax; fluid analysis with lymphocytic exudate  Possible inferior wall STEMI cardiology note appreciated-with changes thought related to severity of shock  Dyspnea mild hypoxia on presentation related to large left pleural effusion, now improved  End-stage renal disease on HD  Severe peripheral vascular disease bilateral BKA's most recently last month  Rash - unclear etiology, possible drug rash     Plan:  Monitor respiratory status, wean o2 for sats >89%  Will stop meropenem given question of drug rash (completed 5 days of meropenem for klebsiella ESBL pneumonia)  Pleural fluid cx and cyto negative    attending    Chief Complaint: resp failure    #Acute Left Intrathoracic hemorrhage 2/2 intercostal artery injury with left hemothorax  S/p L VATS washout and control of intercostal bleeding  Chest tube removed by CV surgeon on 5/7/2025.     #Acute hypoxic resp failure  Resolved  oxygen weaned off and on room air on 5/7/2025.     #ESBL PNA  On IV antibiotics with IV merrem     #Acute Metabolic Encephalopathy  Monitor MS, seems to be impoving     #Hemorrhagic shock on admission  Resolved, off pressors  Blood pressure remain stable at this time     #PAD s/p b/l AKA  Left incision with stapels and eschar; does not appear infected  Wound care  On PPX ABX     #Acute blood loss anemia on admission  S/p massive transfusion protocol on admission  Monitor Hgb  Hgb 11.1        MEDICATIONS ADMINISTERED IN LAST 1 DAY:  acetaminophen  (Tylenol) tab 650 mg       Date Action Dose Route User    5/12/2025 0603 Given 650 mg Oral Anjelica Hua RN    5/11/2025 1829 Given 650 mg Oral Fermin Amezquita RN    5/11/2025 1227 Given 650 mg Oral Fermin Amezquita RN          gabapentin (Neurontin) cap 400 mg       Date Action Dose Route User    5/11/2025 2223 Given 400 mg Oral Anjelica Hua RN          heparin (Porcine) 5000 UNIT/ML injection 5,000 Units       Date Action Dose Route User    5/12/2025 0603 Given 5,000 Units Subcutaneous (Left Lower Abdomen) Anjelica Hua RN    5/11/2025 2223 Given 5,000 Units Subcutaneous (Bilateral Lower Abdomen) Anjelica Hua RN    5/11/2025 1400 Given 5,000 Units Subcutaneous (Left Lower Abdomen) Fermin Amezquita RN          lidocaine-menthol 4-1 % patch 1 patch       Date Action Dose Route User    5/12/2025 0909 Patch Applied 1 patch Transdermal (Left Lateral Chest) Shruti Negro RN          melatonin cap/tab 5 mg       Date Action Dose Route User    5/11/2025 2223 Given 5 mg Oral Anjelica Hua RN          sevelamer carbonate (Renvela) tab 1,600 mg       Date Action Dose Route User    5/12/2025 0909 Given 1,600 mg Oral Shruti Negro RN    5/11/2025 1540 Given 1,600 mg Oral Fermin Amezquita RN    5/11/2025 1227 Given 1,600 mg Oral Fermin Amezquita RN            Vitals (last day)       Date/Time Temp Pulse Resp BP SpO2 Weight O2 Device O2 Flow Rate (L/min) [facility]    05/12/25 0900 -- 82 -- -- 92 % -- -- -- AL    05/12/25 0734 98.7 °F (37.1 °C) 73 20 125/67 93 % -- None (Room air) -- AL    05/12/25 0605 -- 74 -- -- -- 166 lb 7.2 oz (75.5 kg) -- -- PM    05/12/25 0339 98.3 °F (36.8 °C) 81 18 151/72 93 % -- -- -- PM    05/11/25 2303 98.4 °F (36.9 °C) 77 18 136/68 91 % -- -- -- PM    05/11/25 2136 -- 90 -- -- -- -- -- -- PM    05/11/25 2037 98.2 °F (36.8 °C) 79 18 135/65 90 % -- -- -- PM    05/11/25 1555 98.2 °F (36.8 °C) 85 18 151/74 95 % -- None (Room air) -- SORIN    05/11/25 1138 98.2 °F (36.8 °C) 77 17 128/62 96 % -- None (Room  air) -- SORIN    05/11/25 0833 98.1 °F (36.7 °C) 78 15 107/59 95 % -- None (Room air) -- SORIN    05/11/25 0537 -- -- -- -- -- 160 lb 15 oz (73 kg) -- -- RH    05/11/25 0515 98.6 °F (37 °C) 82 18 120/86 95 % -- None (Room air) 0 L/min HT          CIWA Scores (since admission)       None          Blood Transfusion Record       Product Unit Status Volume Start End            Transfuse RBC      25  [id number]  [id number] Completed 05/02/25 1832 350 mL 05/02/25 1625 05/02/25 1635                Transfuse cryoprecipitate      25  [id number]  [id number] Completed 05/02/25 1832 350 mL 05/02/25 1655 05/02/25 1705                Transfuse fresh frozen plasma      25  [id number]  [id number] Completed 05/02/25 1832 350 mL 05/02/25 1613 05/02/25 1623                Transfuse platelets      25  [id number]  [id number] Completed 05/02/25 1832 350 mL 05/02/25 1647 05/02/25 1657

## 2025-05-12 NOTE — PLAN OF CARE
Received patient from NOC RN at 0730. Patient A&O x4 and on room air, bilat lung sounds clear to diminished. Patient voiding without complications and is reporting no pain at this time, resting in bed. Fall precautions in place, call light and belongings within reach. Plan of care evolving.     POC:   - US L arm  - monitor rash  - dialysis   - discharge to rehab when ready    Problem: SAFETY ADULT - FALL  Goal: Free from fall injury  Description: INTERVENTIONS:- Assess pt frequently for physical needs- Identify cognitive and physical deficits and behaviors that affect risk of falls.- [place] fall precautions as indicated by assessment.- Educate pt/family on patient safety including physical limitations- Instruct pt to call for assistance with activity based on assessment- Modify environment to reduce risk of injury- Provide assistive devices as appropriate- Consider OT/PT consult to assist with strengthening/mobility- Encourage toileting schedule  Outcome: Progressing     Problem: RESPIRATORY - ADULT  Goal: Achieves optimal ventilation and oxygenation  Description: INTERVENTIONS:- Assess for changes in respiratory status- Assess for changes in mentation and behavior- Position to facilitate oxygenation and minimize respiratory effort- Oxygen supplementation based on oxygen saturation or ABGs- Provide Smoking Cessation handout, if applicable- Encourage broncho-pulmonary hygiene including cough, deep breathe, Incentive Spirometry- Assess the need for suctioning and perform as needed- Assess and instruct to report SOB or any respiratory difficulty- Respiratory Therapy support as indicated- Manage/alleviate anxiety- Monitor for signs/symptoms of CO2 retention  Outcome: Progressing     Problem: HEMATOLOGIC - ADULT  Goal: Maintains hematologic stability  Description: INTERVENTIONS- Assess for signs and symptoms of bleeding or hemorrhage- Monitor labs and vital signs for trends- Administer supportive blood  products/factors, fluids and medications as ordered and appropriate- Administer supportive blood products/factors as ordered and appropriate  Outcome: Progressing  Goal: Free from bleeding injury  Description: (Example usage: patient with low platelets)INTERVENTIONS:- Avoid intramuscular injections, enemas and rectal medication administration- Ensure safe mobilization of patient- Hold pressure on venipuncture sites to achieve adequate hemostasis- Assess for signs and symptoms of internal bleeding- Monitor lab trends- Patient is to report abnormal signs of bleeding to staff- Avoid use of toothpicks and dental floss- Use electric shaver for shaving- Use soft bristle tooth brush- Limit straining and forceful nose blowing  Outcome: Progressing     Problem: Safety Risk - Non-Violent Restraints  Goal: Patient will remain free from self-harm  Description: INTERVENTIONS:- Apply the least restrictive restraint to prevent harm- Notify patient and family of reasons restraints applied- Assess for any contributing factors to confusion (electrolyte disturbances, delirium, medications)- Discontinue any unnecessary medical devices as soon as possible- Assess the patient's physical comfort, circulation, skin condition, hydration, nutrition and elimination needs - Reorient and redirection as needed- Assess for the need to continue restraints  Outcome: Progressing     Problem: Delirium  Goal: Minimize duration of delirium  Description: Interventions:- Encourage use of hearing aids, eye glasses- Promote highest level of mobility daily- Provide frequent reorientation- Promote wakefulness i.e. lights on, blinds open- Promote sleep, encourage patient's normal rest cycle i.e. lights off, TV off, minimize noise and interruptions- Encourage family to assist in orientation and promotion of home routines  Outcome: Progressing     Problem: NEUROLOGICAL - ADULT  Goal: Achieves stable or improved neurological status  Description: INTERVENTIONS-  Assess for and report changes in neurological status- Initiate measures to prevent increased intracranial pressure- Maintain blood pressure and fluid volume within ordered parameters to optimize cerebral perfusion and minimize risk of hemorrhage- Monitor temperature, glucose, and sodium. Initiate appropriate interventions as ordered  Outcome: Progressing     Problem: Patient/Family Goals  Goal: Patient/Family Long Term Goal  Description: Patient's Long Term Goal: To be discharged  Interventions:- Eat a heart healthy diet  - attend all follow up appointments  - take medications as prescribed    - See additional Care Plan goals for specific interventions  Outcome: Progressing  Goal: Patient/Family Short Term Goal  Description: Patient's Short Term Goal: To feel better  Interventions: - IV lasix, IV abx  - See additional Care Plan goals for specific interventions  Outcome: Progressing

## 2025-05-12 NOTE — PROGRESS NOTES
Thoracic Surgery Progress Note     Richy Goins is a 64 year old male. MRN [id number]. Admitted 2025    SUBJECTIVE/24H EVENTS:     No acute events overnight. Doing well. Pain is improving. Denies shortness of breath.     Objective:     VITALS:     Temp (24hrs), Av.3 °F (36.8 °C), Min:98.2 °F (36.8 °C), Max:98.7 °F (37.1 °C)   /67 (BP Location: Right arm)   Pulse 82   Temp 98.7 °F (37.1 °C) (Oral)   Resp 20   Wt 166 lb 7.2 oz   SpO2 92%   BMI 25.31 kg/m²     EXAM:   General: NAD  Heart: RRR  Lungs: normal respiratory effort   Incisions: chest tube stitch removed.     Intake/Output Summary (Last 24 hours) at 2025 1013  Last data filed at 2025 0734  Gross per 24 hour   Intake 600 ml   Output 0 ml   Net 600 ml         MEDS:  Scheduled Medications[1]    LABS:  Lab Results   Component Value Date    WBC 13.4 2025    HGB 11.1 2025    HCT 35.5 2025    .0 2025    CREATSERUM 7.48 2025    BUN 71 2025     2025    K 4.9 2025     2025    CO2 24.0 2025     2025    CA 8.9 2025         Assessment/Plan:     64 year old male PMH ESRD, HTN, DM, PAD, HLD who was admitted for acute hypoxic respiratory failure secondary to volume overload and left pleural effusion s/p IR thoracentesis (25) complicated by acute hemorrhage due to intercostal artery now s/p left VATS washout and control of intercostal bleeding. Doing well.     -Chest tube stitch removed  -Encourage cough and IS use.   -Further management and discharge per medical teams. Plan for subacute rehab.  -Mr. Goins should follow up with thoracic surgery on an as needed basis. He is encouraged to call with any further questions or concerns.     Patient discussed with Dr. Caputo.     Beverley Brewster PA-C  Thoracic Surgery  Pager: [phone]               [1]    lidocaine-menthol  1 patch Transdermal Daily    gabapentin  400 mg Oral Nightly     heparin  5,000 Units Subcutaneous Q8H SUJATA    acetaminophen  650 mg Oral 4 times per day    melatonin  5 mg Oral Nightly    insulin aspart  1-68 Units Subcutaneous TID CC and HS    docusate sodium  100 mg Oral BID    sennosides  8.6 mg Oral BID    sevelamer carbonate  1,600 mg Oral TID CC

## 2025-05-12 NOTE — PLAN OF CARE
Patient Alert and oriented x 4. Up with max 2ppl and a lift due to BKA. . Room air sating 95%, lungs clear and diminished, no complaint of SOB. NSR on tele. Incontinent of bowel and bladder, brief in place. No complaint of pain or chest pain. POC discussed with patient. Call light within reach. Fall precaution in place.    Problem: RESPIRATORY - ADULT  Goal: Achieves optimal ventilation and oxygenation  Description: INTERVENTIONS:- Assess for changes in respiratory status- Assess for changes in mentation and behavior- Position to facilitate oxygenation and minimize respiratory effort- Oxygen supplementation based on oxygen saturation or ABGs- Provide Smoking Cessation handout, if applicable- Encourage broncho-pulmonary hygiene including cough, deep breathe, Incentive Spirometry- Assess the need for suctioning and perform as needed- Assess and instruct to report SOB or any respiratory difficulty- Respiratory Therapy support as indicated- Manage/alleviate anxiety- Monitor for signs/symptoms of CO2 retention  Outcome: Progressing       Problem: SAFETY ADULT - FALL  Goal: Free from fall injury  Description: INTERVENTIONS:- Assess pt frequently for physical needs- Identify cognitive and physical deficits and behaviors that affect risk of falls.- [place] fall precautions as indicated by assessment.- Educate pt/family on patient safety including physical limitations- Instruct pt to call for assistance with activity based on assessment- Modify environment to reduce risk of injury- Provide assistive devices as appropriate- Consider OT/PT consult to assist with strengthening/mobility- Encourage toileting schedule  Outcome: Progressing

## 2025-05-12 NOTE — PROGRESS NOTES
[facility] Progress Note     SUBJECTIVE/Interval history:  Rash about the same no significant changes    Review of Systems:   A comprehensive 14 point review of systems was completed.   Pertinent positives and negatives noted in the HPI.    Medications  Reviewed personally  Scheduled Medications[1]  PRN Medications[2]    OBJECTIVE:  Vitals:    05/12/25 0339 05/12/25 0605 05/12/25 0734 05/12/25 0900   BP: 151/72  125/67    BP Location: Right arm  Right arm    Pulse: 81 74 73 82   Resp: 18  20    Temp: 98.3 °F (36.8 °C)  98.7 °F (37.1 °C)    TempSrc: Oral  Oral    SpO2: 93%  93% 92%   Weight:  166 lb 7.2 oz (75.5 kg)         Vital signs in last 24 hours:  Blood pressure 125/67, pulse 82, temperature 98.7 °F (37.1 °C), temperature source Oral, resp. rate 20, weight 166 lb 7.2 oz (75.5 kg), SpO2 92%.     Intake/Output:  I/O last 3 completed shifts:  In: 940 [P.O.:940]  Out: -      Physical Exam:  General: Appears alert, oriented x3. No acute distress  Neurologic:No focal deficits noted.  Alert.  Oriented.  Lungs:CTAB. No wheeze  Heart:RRR no m  Abdomen: soft, nondistended, no rigidity, no reaction with palpation. No hernias noted  Extremities:No overt deformities, edema  Erythematous rash noted to back and lower abdomen    Lab Data Review:   Recent Labs   Lab 05/10/25  0459 05/11/25  0521 05/12/25  0514   * 149* 154*   BUN 34* 53* 71*   CREATSERUM 4.43* 6.25* 7.48*   CA 8.5* 8.6* 8.9    144 143   K 4.1 4.7 4.9    107 107   CO2 29.0 27.0 24.0     Recent Labs   Lab 05/10/25  0459 05/11/25  0521 05/12/25  0514   RBC 4.03 3.87 3.86   HGB 11.8* 11.1* 11.1*   HCT 36.3* 35.5* 35.5*   MCV 90.1 91.7 92.0   MCH 29.3 28.7 28.8   MCHC 32.5 31.3 31.3   RDW 15.7 16.1 16.2   WBC 11.4* 14.5* 13.4*   .0 294.0 316.0     No results for input(s): \"BNP\" in the last 168 hours.  No results for input(s): \"TROP\", \"CK\" in the last 168 hours.  No results for input(s): \"PT\", \"INR\", \"PTT\" in  the last 168 hours.    No results for input(s): \"ABGPHT\", \"IWZGGG1D\", \"TRRKY7I\", \"ABGHCO3\", \"ABGBE\", \"TEMP\", \"NOEMÍ\", \"SITE\", \"DEV\", \"THGB\" in the last 168 hours.    Invalid input(s): \"LCT08RLY\", \"CHOB\"      Other Labs:  Interval Culture Data:   Hospital Encounter on 04/29/25   1. Body Fluid Cult Aerobic and Anaerobic     Status: None    Collection Time: 05/02/25 10:00 AM    Specimen: Pleural Fluid, Left; Body fluid, unspecified   Result Value Ref Range    Body Fluid Culture Result No Growth 5 Days N/A    Body Fluid Smear 1+ WBCs seen N/A    Body Fluid Smear No organisms seen N/A    Body Fluid Smear This is a cytocentrifuged smear. N/A     No results for input(s): \"COLORUR\", \"CLARITY\", \"SPECGRAVITY\", \"GLUUR\", \"BILUR\", \"KETUR\", \"BLOODURINE\", \"PHURINE\", \"PROUR\", \"UROBILINOGEN\", \"NITRITE\", \"LEUUR\", \"WBCUR\", \"RBCUR\", \"BACUR\", \"EPIUR\" in the last 168 hours.    Interval Radiology:   Reviewed personally  IR GRAFT PROCEDURE  Result Date: 5/9/2025  CONCLUSION:  1. Technically successful pharmacomechanical declot of thrombosed left upper arm AV graft. 2. Mild narrowing at the distal anastomosis treated with 6 mm balloon dilatation. 3. Area of irregularity suspected to be subacute to chronic vein changes at the level of the axilla.  Numerous draining veins across this level have good antegrade flow. 4. Widely patent central veins.    LOCATION:  [facility]       Dictated by (CST): Rob Reyes MD on 5/09/2025 at 3:11 PM     Finalized by (CST): Rob Reyes MD on 5/09/2025 at 3:23 PM       XR CHEST AP PORTABLE  (CPT=71045)  Result Date: 5/4/2025  CONCLUSION:  Improving airspace disease left lung.   LOCATION:  [facility]      Dictated by (CST): Rob Reyes MD on 5/04/2025 at 7:09 AM     Finalized by (CST): Rob Reyes MD on 5/04/2025 at 7:11 AM       XR CHEST AP/PA (1 VIEW) (CPT=71045)  Result Date: 5/3/2025  CONCLUSION:   1.  Dobbhoff tube in the stomach.  The remainder of the lines and tubes are stable since previous study.   2. Stable  appearance of patchy airspace disease throughout the left lung with residual small amount of fluid in gas within the left lateral pleural space.   LOCATION:  [facility]      Dictated by (CST): Yoan Duvall MD on 5/03/2025 at 6:17 PM     Finalized by (CST): Yoan Duvall MD on 5/03/2025 at 6:18 PM       XR CHEST AP PORTABLE  (CPT=71045)  Result Date: 5/3/2025  CONCLUSION:  1. Overall no significant change in the appearance of the portable chest radiograph in the short interval since the prior study. 2. Lucency left lateral lung base is most likely a pneumothorax related incomplete expansion of lung (trapped lung).   LOCATION:  [facility]      Dictated by (CST): Camden Deleon MD on 5/03/2025 at 6:32 AM     Finalized by (CST): Camden Deleon MD on 5/03/2025 at 6:34 AM       XR CHEST AP PORTABLE  (CPT=71045)  Result Date: 5/2/2025  CONCLUSION:  Placement of a large bore left chest tube.  Evacuation of the left pleural fluid/hemo thorax.  Incomplete re-expansion of the left lung with a mild to moderate basilar pneumothorax.   LOCATION:  [facility]      Dictated by (CST): Rob Reyes MD on 5/02/2025 at 6:55 PM     Finalized by (CST): Rob Reyes MD on 5/02/2025 at 6:58 PM       XR CHEST AP PORTABLE  (CPT=71045)  Result Date: 5/2/2025  CONCLUSION:  A portion of left mid lung is now aerated.  Other findings appear stable.   LOCATION:  [facility]      Dictated by (CST): Rob Reyes MD on 5/02/2025 at 5:34 PM     Finalized by (CST): Rob Reyes MD on 5/02/2025 at 5:36 PM       XR CHEST AP PORTABLE  (CPT=71045)  Result Date: 5/2/2025  CONCLUSION:  Complete opacification left hemithorax with shift of mediastinal structures from left to right.  Finding was discussed with the ICU RN caring for the patient on May 2, 2025 at 2:30 p.m..  By this time a chest tube have been placed.  Findings  would be consistent with a large hemo thorax.   LOCATION:  [facility]      Dictated by (CST): Camden Deleon MD on 5/02/2025 at 2:25 PM     Finalized by (CST):  Camden Deleon MD on 5/02/2025 at 2:30 PM       US THORACENTESIS GUIDED LEFT (CPT=32555)  Result Date: 5/2/2025  CONCLUSION:  Ultrasound-guided left thoracentesis of 1600 cc was performed without complication.   LOCATION:  [facility]    Dictated by (CST): Oskar Lynn MD on 5/02/2025 at 1:33 PM     Finalized by (CST): Oskar Lynn MD on 5/02/2025 at 1:34 PM       XR CHEST AP PORTABLE  (CPT=71045)  Result Date: 5/2/2025  CONCLUSION:  Decreased size of left pleural effusion without pneumothorax.  There continues to be significant amount of opacity within the left hemithorax.   LOCATION:  [facility]      Dictated by (CST): Yoan Duvall MD on 5/02/2025 at 12:14 PM     Finalized by (CST): Yoan Duvall MD on 5/02/2025 at 12:15 PM       XR CHEST AP PORTABLE  (CPT=71045)  Result Date: 4/29/2025  CONCLUSION:   There is nearly complete opacification of the left hemithorax which partially obscures the cardiomediastinal silhouette.  This likely represents a combination of large pleural effusion and passive atelectasis, though superimposed infection or aspiration not excluded.  Mild interstitial edema noted throughout the right lung.  The right pleural space remains clear.   LOCATION:  [facility]      Dictated by (CST): Patricio Telles MD on 4/29/2025 at 9:14 PM     Finalized by (CST): Patricio Telles MD on 4/29/2025 at 9:15 PM       Assessment:  Hemorrhagic shock secondary to hemothorax, improved  Intrathoracic bleed following thoracentesis now status post intercostal artery cautery with thoracotomy-no further bleeding  Recurrent left-sided pleural effusion present since January 2025, s/p thoracentesis c/b hemothorax; fluid analysis with lymphocytic exudate  Possible inferior wall STEMI cardiology note appreciated-with changes thought related to severity of shock  Dyspnea mild hypoxia on presentation related to large left pleural effusion, now improved  End-stage renal disease on HD  Severe peripheral vascular disease bilateral BKA's most  recently last month  Rash - unclear etiology, possible drug rash    Plan:  Monitor respiratory status, wean o2 for sats >89%  Patient educated to let us know if rash worsens from here  Pleural fluid cx and cyto negative  Would be ok for discharge to rehab from my perspective  Patient is stable on room air all acute pulmonary issues have resolved, will sign off please call with questions         [1]    lidocaine-menthol  1 patch Transdermal Daily    gabapentin  400 mg Oral Nightly    heparin  5,000 Units Subcutaneous Q8H SUJATA    acetaminophen  650 mg Oral 4 times per day    melatonin  5 mg Oral Nightly    insulin aspart  1-68 Units Subcutaneous TID CC and HS    docusate sodium  100 mg Oral BID    sennosides  8.6 mg Oral BID    sevelamer carbonate  1,600 mg Oral TID CC   [2]   HYDROcodone-acetaminophen **OR** HYDROcodone-acetaminophen    polyethylene glycol (PEG 3350)    glucose **OR** glucose **OR** glucose-vitamin C **OR** dextrose **OR** glucose **OR** glucose **OR** glucose-vitamin C    sennosides    ondansetron

## 2025-05-13 ENCOUNTER — APPOINTMENT (OUTPATIENT)
Dept: INTERVENTIONAL RADIOLOGY/VASCULAR | Facility: HOSPITAL | Age: 65
End: 2025-05-13
Attending: INTERNAL MEDICINE
Payer: COMMERCIAL

## 2025-05-13 ENCOUNTER — APPOINTMENT (OUTPATIENT)
Dept: ULTRASOUND IMAGING | Facility: HOSPITAL | Age: 65
End: 2025-05-13
Attending: RADIOLOGY
Payer: COMMERCIAL

## 2025-05-13 ENCOUNTER — APPOINTMENT (OUTPATIENT)
Dept: ULTRASOUND IMAGING | Facility: HOSPITAL | Age: 65
End: 2025-05-13
Attending: STUDENT IN AN ORGANIZED HEALTH CARE EDUCATION/TRAINING PROGRAM
Payer: COMMERCIAL

## 2025-05-13 LAB
APTT PPP: 33.9 SECONDS (ref 23–36)
GLUCOSE BLD-MCNC: 113 MG/DL (ref 70–99)
GLUCOSE BLD-MCNC: 196 MG/DL (ref 70–99)
GLUCOSE BLD-MCNC: 74 MG/DL (ref 70–99)
GLUCOSE BLD-MCNC: 92 MG/DL (ref 70–99)

## 2025-05-13 PROCEDURE — 3E05317 INTRODUCTION OF OTHER THROMBOLYTIC INTO PERIPHERAL ARTERY, PERCUTANEOUS APPROACH: ICD-10-PCS | Performed by: RADIOLOGY

## 2025-05-13 PROCEDURE — 99232 SBSQ HOSP IP/OBS MODERATE 35: CPT | Performed by: INTERNAL MEDICINE

## 2025-05-13 PROCEDURE — 05783ZZ DILATION OF LEFT AXILLARY VEIN, PERCUTANEOUS APPROACH: ICD-10-PCS | Performed by: RADIOLOGY

## 2025-05-13 PROCEDURE — 93971 EXTREMITY STUDY: CPT | Performed by: STUDENT IN AN ORGANIZED HEALTH CARE EDUCATION/TRAINING PROGRAM

## 2025-05-13 PROCEDURE — 3E03317 INTRODUCTION OF OTHER THROMBOLYTIC INTO PERIPHERAL VEIN, PERCUTANEOUS APPROACH: ICD-10-PCS | Performed by: RADIOLOGY

## 2025-05-13 PROCEDURE — 99232 SBSQ HOSP IP/OBS MODERATE 35: CPT | Performed by: HOSPITALIST

## 2025-05-13 PROCEDURE — 057A3ZZ DILATION OF LEFT BRACHIAL VEIN, PERCUTANEOUS APPROACH: ICD-10-PCS | Performed by: RADIOLOGY

## 2025-05-13 PROCEDURE — 03783ZZ DILATION OF LEFT BRACHIAL ARTERY, PERCUTANEOUS APPROACH: ICD-10-PCS | Performed by: RADIOLOGY

## 2025-05-13 RX ORDER — MIDAZOLAM HYDROCHLORIDE 1 MG/ML
INJECTION INTRAMUSCULAR; INTRAVENOUS
Status: COMPLETED
Start: 2025-05-13 | End: 2025-05-13

## 2025-05-13 RX ORDER — IODIXANOL 320 MG/ML
75 INJECTION, SOLUTION INTRAVASCULAR
Status: COMPLETED | OUTPATIENT
Start: 2025-05-13 | End: 2025-05-13

## 2025-05-13 RX ORDER — HEPARIN SODIUM 10000 [USP'U]/100ML
12 INJECTION, SOLUTION INTRAVENOUS CONTINUOUS
Status: DISCONTINUED | OUTPATIENT
Start: 2025-05-13 | End: 2025-05-13

## 2025-05-13 RX ORDER — HEPARIN SODIUM 5000 [USP'U]/ML
INJECTION, SOLUTION INTRAVENOUS; SUBCUTANEOUS
Status: COMPLETED
Start: 2025-05-13 | End: 2025-05-13

## 2025-05-13 RX ORDER — TAMSULOSIN HYDROCHLORIDE 0.4 MG/1
0.4 CAPSULE ORAL NIGHTLY
Status: DISCONTINUED | OUTPATIENT
Start: 2025-05-13 | End: 2025-05-16

## 2025-05-13 RX ORDER — HEPARIN SODIUM AND DEXTROSE 10000; 5 [USP'U]/100ML; G/100ML
18 INJECTION INTRAVENOUS ONCE
Status: COMPLETED | OUTPATIENT
Start: 2025-05-13 | End: 2025-05-13

## 2025-05-13 RX ORDER — LIDOCAINE HYDROCHLORIDE 10 MG/ML
INJECTION, SOLUTION INFILTRATION; PERINEURAL
Status: COMPLETED
Start: 2025-05-13 | End: 2025-05-13

## 2025-05-13 RX ORDER — HEPARIN SODIUM AND DEXTROSE 10000; 5 [USP'U]/100ML; G/100ML
INJECTION INTRAVENOUS CONTINUOUS
Status: DISCONTINUED | OUTPATIENT
Start: 2025-05-14 | End: 2025-05-16

## 2025-05-13 RX ORDER — ATORVASTATIN CALCIUM 40 MG/1
40 TABLET, FILM COATED ORAL NIGHTLY
Status: DISCONTINUED | OUTPATIENT
Start: 2025-05-13 | End: 2025-05-16

## 2025-05-13 NOTE — CM/SW NOTE
SW sent HD flowsheet to [facility] via [employer]. Awaiting auth.     Lola GREENE, LCSW  Discharge Planner

## 2025-05-13 NOTE — PAYOR COMM NOTE
--------------  5/13:  CONTINUED STAY REVIEW    Payor: JD LISA  Subscriber #:  [id number]  Authorization Number: [id number]    Admit date: 4/29/25  Admit time: 10:59 PM    HOSPITALIST:    Chief Complaint: Dyspnea     Subjective:  Patient feels well this morning.  Denies sob or cp.  No n/v.  No fever or chills.  No new complaints.       Objective:  Review of Systems:   A comprehensive review of systems was completed; pertinent positive and negatives stated in subjective.     Vital signs:  Temp:  [97.9 °F (36.6 °C)-98.6 °F (37 °C)] 98.6 °F (37 °C)  Pulse:  [72-82] 72  Resp:  [18-20] 18  BP: (118-155)/(61-75) 118/65  SpO2:  [92 %-96 %] 96 %     Physical Exam:    General: No acute distress  Respiratory: No wheezes, no rhonchi  Cardiovascular: S1, S2, regular rate and rhythm  Abdomen: Soft, Non-tender, non-distended, positive bowel sounds  Neuro: No new focal deficits.   Extremities: B/l AKA, LLE incision with staples in place, LUE graft in place     Diagnostic Data:    Labs:          Recent Labs   Lab 05/08/25  0449 05/09/25  0523 05/10/25  0459 05/11/25  0521 05/12/25  0514   WBC 9.1 10.3 11.4* 14.5* 13.4*   HGB 11.8* 11.9* 11.8* 11.1* 11.1*   MCV 88.3 89.5 90.1 91.7 92.0   .0 236.0 262.0 294.0 316.0               Recent Labs   Lab 05/10/25  0459 05/11/25  0521 05/12/25  0514   * 149* 154*   BUN 34* 53* 71*   CREATSERUM 4.43* 6.25* 7.48*   CA 8.5* 8.6* 8.9    144 143   K 4.1 4.7 4.9    107 107   CO2 29.0 27.0 24.0       Assessment & Plan:  #Acute Left Intrathoracic hemorrhage 2/2 intercostal artery injury with left hemothorax  S/p L VATS washout and control of intercostal bleeding  Chest tube removed by CV surgeon on 5/7/2025.     #Acute hypoxic resp failure  Resolved  oxygen weaned off and on room air on 5/7/2025.     #ESBL PNA  Finished IV abx - IV merrem      #Acute Metabolic Encephalopathy  Back to baseline      #Hemorrhagic shock on admission  Resolved, off pressors     #PAD s/p b/l  AKA  Left incision with stapels and eschar; does not appear infected  Wound care  On PPX ABX     #Acute blood loss anemia on admission  S/p massive transfusion protocol on admission  Monitor Hgb  No s/s of further bleeding     #Cardiac arrest  Due to above  ROSC successful  EKG abnormal  Echo noted  Cardiology following, ischemic w/u deferred as likely reactive to above     #Left Hemithorax  Likely due to ESRD  Has been recurrent  S/p thora 5/1, cytology negative     #Occluded LUE AVG  Status post declot left upper extremity AV fistula by IR done on 5/9/2025  Will have re-eval by IR today - needs PERM cath      #ESRD on HD  Cont. HD, plan for Perm Cath prior to dc      #DM type II with ESRD on HD  Elevated as stress reaction  Cont. Insulin and adjust as needed  A1c 5.9 though unreliable at this point     #HTN  Home BP meds on hold        Dispo:  Insurance auth for [facility].  IR for de-clotting         Live Ott MD       MEDICATIONS ADMINISTERED IN LAST 1 DAY:  acetaminophen (Tylenol) tab 650 mg       Date Action Dose Route User    5/13/2025 1219 Given 650 mg Oral Yuval Dempsey RN    5/13/2025 0522 Given 650 mg Oral Linda Toth RN    5/12/2025 1802 Given 650 mg Oral Shruti Negro RN          gabapentin (Neurontin) cap 400 mg       Date Action Dose Route User    5/12/2025 2053 Given 400 mg Oral Linda Toth RN          heparin (Porcine) 1000 UNIT/ML injection 1,500 Units       Date Action Dose Route User    5/12/2025 1802 Given 1,500 Units Intravenous Shruti Negro RN          heparin (Porcine) 5000 UNIT/ML injection 5,000 Units       Date Action Dose Route User    5/13/2025 0522 Given 5,000 Units Subcutaneous (Left Lower Abdomen) Linda Toth RN    5/12/2025 2145 Given 5,000 Units Subcutaneous (Left Lower Abdomen) Linda Toth RN          lidocaine-menthol 4-1 % patch 1 patch       Date Action Dose Route User    5/13/2025 0815 Patch Applied 1 patch Transdermal (Left Lateral Chest) Yuval Dempsey RN           melatonin cap/tab 5 mg       Date Action Dose Route User    5/12/2025 2053 Given 5 mg Oral Linda Toth RN          sevelamer carbonate (Renvela) tab 1,600 mg       Date Action Dose Route User    5/12/2025 1802 Given 1,600 mg Oral Shruti Negro, RN            Vitals (last day)       Date/Time Temp Pulse Resp BP SpO2 Weight O2 Device O2 Flow Rate (L/min) [facility]    05/13/25 1031 -- 76 -- 112/66 -- -- -- -- NL    05/13/25 0833 98.6 °F (37 °C) 72 18 118/65 -- -- None (Room air) -- EM    05/13/25 0443 98.6 °F (37 °C) -- 20 -- -- 161 lb 2.5 oz (73.1 kg) None (Room air) -- ZK    05/12/25 2330 98.1 °F (36.7 °C) 76 20 142/71 -- -- None (Room air) -- ZK    05/12/25 1951 97.9 °F (36.6 °C) -- 20 155/71 -- -- None (Room air) -- ZK    05/12/25 1545 98.4 °F (36.9 °C) 73 20 144/75 96 % -- None (Room air) -- AL    05/12/25 1237 98.2 °F (36.8 °C) 75 18 128/61 93 % -- None (Room air) -- AL    05/12/25 0900 -- 82 -- -- 92 % -- -- -- AL    05/12/25 0734 98.7 °F (37.1 °C) 73 20 125/67 93 % -- None (Room air) -- AL    05/12/25 0605 -- 74 -- -- -- 166 lb 7.2 oz (75.5 kg) -- -- PM    05/12/25 0339 98.3 °F (36.8 °C) 81 18 151/72 93 % -- -- -- PM               Product Unit Status Volume Start End            Transfuse RBC       25  [id number]  [id number] Completed 05/02/25 1832 350 mL 05/02/25 1625 05/02/25 1635                Transfuse cryoprecipitate       25  [id number]  [id number] Completed 05/02/25 1832 350 mL 05/02/25 1655 05/02/25 1705                Transfuse fresh frozen plasma       25  [id number]  [id number] Completed 05/02/25 1832 350 mL 05/02/25 1613 05/02/25 1623                Transfuse platelets       25  [id number]  [id number] Completed 05/02/25 1832 350 mL 05/02/25 1647 05/02/25 1657

## 2025-05-13 NOTE — PLAN OF CARE
Pt c/o 8/10 pain see MAR, no malaise or cardiac symptoms. A&O x 4. Lungs diminished bilaterally with equal expansion, on room air. Pt NSR on monitor with regular rates. Abdomen soft, nontender with active bowel sounds in all four quadrants, incontinent to b&b. Pt. refusing CHG bath. Pt updated with plan of care.    1840 - S/p IR graft procedure. L fistula ballooned. Bruit and thrill present, however pt now complaining of L hand being cold and numb. Upon assessment, L hand is cold compared to R side. Radial and brachial pulses present however cap refill sluggish.    1900 - Called Dr. Crystal, this RN notified to check pulses and site q15 min and to update him if any changes. Will pass on to night shift.     Problem: SAFETY ADULT - FALL  Goal: Free from fall injury  Description: INTERVENTIONS:- Assess pt frequently for physical needs- Identify cognitive and physical deficits and behaviors that affect risk of falls.- [place] fall precautions as indicated by assessment.- Educate pt/family on patient safety including physical limitations- Instruct pt to call for assistance with activity based on assessment- Modify environment to reduce risk of injury- Provide assistive devices as appropriate- Consider OT/PT consult to assist with strengthening/mobility- Encourage toileting schedule  Outcome: Progressing     Problem: RESPIRATORY - ADULT  Goal: Achieves optimal ventilation and oxygenation  Description: INTERVENTIONS:- Assess for changes in respiratory status- Assess for changes in mentation and behavior- Position to facilitate oxygenation and minimize respiratory effort- Oxygen supplementation based on oxygen saturation or ABGs- Provide Smoking Cessation handout, if applicable- Encourage broncho-pulmonary hygiene including cough, deep breathe, Incentive Spirometry- Assess the need for suctioning and perform as needed- Assess and instruct to report SOB or any respiratory difficulty- Respiratory Therapy support as  indicated- Manage/alleviate anxiety- Monitor for signs/symptoms of CO2 retention  Outcome: Progressing     Problem: HEMATOLOGIC - ADULT  Goal: Maintains hematologic stability  Description: INTERVENTIONS- Assess for signs and symptoms of bleeding or hemorrhage- Monitor labs and vital signs for trends- Administer supportive blood products/factors, fluids and medications as ordered and appropriate- Administer supportive blood products/factors as ordered and appropriate  Outcome: Progressing  Goal: Free from bleeding injury  Description: (Example usage: patient with low platelets)INTERVENTIONS:- Avoid intramuscular injections, enemas and rectal medication administration- Ensure safe mobilization of patient- Hold pressure on venipuncture sites to achieve adequate hemostasis- Assess for signs and symptoms of internal bleeding- Monitor lab trends- Patient is to report abnormal signs of bleeding to staff- Avoid use of toothpicks and dental floss- Use electric shaver for shaving- Use soft bristle tooth brush- Limit straining and forceful nose blowing  Outcome: Progressing     Problem: Safety Risk - Non-Violent Restraints  Goal: Patient will remain free from self-harm  Description: INTERVENTIONS:- Apply the least restrictive restraint to prevent harm- Notify patient and family of reasons restraints applied- Assess for any contributing factors to confusion (electrolyte disturbances, delirium, medications)- Discontinue any unnecessary medical devices as soon as possible- Assess the patient's physical comfort, circulation, skin condition, hydration, nutrition and elimination needs - Reorient and redirection as needed- Assess for the need to continue restraints  Outcome: Progressing     Problem: Delirium  Goal: Minimize duration of delirium  Description: Interventions:- Encourage use of hearing aids, eye glasses- Promote highest level of mobility daily- Provide frequent reorientation- Promote wakefulness i.e. lights on, blinds  open- Promote sleep, encourage patient's normal rest cycle i.e. lights off, TV off, minimize noise and interruptions- Encourage family to assist in orientation and promotion of home routines  Outcome: Progressing     Problem: NEUROLOGICAL - ADULT  Goal: Achieves stable or improved neurological status  Description: INTERVENTIONS- Assess for and report changes in neurological status- Initiate measures to prevent increased intracranial pressure- Maintain blood pressure and fluid volume within ordered parameters to optimize cerebral perfusion and minimize risk of hemorrhage- Monitor temperature, glucose, and sodium. Initiate appropriate interventions as ordered  Outcome: Progressing     Problem: Patient/Family Goals  Goal: Patient/Family Long Term Goal  Description: Patient's Long Term Goal: To be discharged  Interventions:- Eat a heart healthy diet  - attend all follow up appointments  - take medications as prescribed    - See additional Care Plan goals for specific interventions  Outcome: Progressing  Goal: Patient/Family Short Term Goal  Description: Patient's Short Term Goal: To feel better  Interventions: - IV lasix, IV abx  - See additional Care Plan goals for specific interventions  Outcome: Progressing

## 2025-05-13 NOTE — PLAN OF CARE
Received patient at 1930. Patient is A&O x 4.  O2 maintained on room air. Patient's needs met. Fall precautions in place. No pain reported. Scattered rash noted, right arm swollen, physician notified. Bed in low position and call light within reach. Reviewed plan of care and patient verbalizes understanding.     Problem: SAFETY ADULT - FALL  Goal: Free from fall injury  Description: INTERVENTIONS:- Assess pt frequently for physical needs- Identify cognitive and physical deficits and behaviors that affect risk of falls.- [place] fall precautions as indicated by assessment.- Educate pt/family on patient safety including physical limitations- Instruct pt to call for assistance with activity based on assessment- Modify environment to reduce risk of injury- Provide assistive devices as appropriate- Consider OT/PT consult to assist with strengthening/mobility- Encourage toileting schedule  Outcome: Progressing     Problem: RESPIRATORY - ADULT  Goal: Achieves optimal ventilation and oxygenation  Description: INTERVENTIONS:- Assess for changes in respiratory status- Assess for changes in mentation and behavior- Position to facilitate oxygenation and minimize respiratory effort- Oxygen supplementation based on oxygen saturation or ABGs- Provide Smoking Cessation handout, if applicable- Encourage broncho-pulmonary hygiene including cough, deep breathe, Incentive Spirometry- Assess the need for suctioning and perform as needed- Assess and instruct to report SOB or any respiratory difficulty- Respiratory Therapy support as indicated- Manage/alleviate anxiety- Monitor for signs/symptoms of CO2 retention  Outcome: Progressing     Problem: HEMATOLOGIC - ADULT  Goal: Maintains hematologic stability  Description: INTERVENTIONS- Assess for signs and symptoms of bleeding or hemorrhage- Monitor labs and vital signs for trends- Administer supportive blood products/factors, fluids and medications as ordered and appropriate- Administer  supportive blood products/factors as ordered and appropriate  Outcome: Progressing  Goal: Free from bleeding injury  Description: (Example usage: patient with low platelets)INTERVENTIONS:- Avoid intramuscular injections, enemas and rectal medication administration- Ensure safe mobilization of patient- Hold pressure on venipuncture sites to achieve adequate hemostasis- Assess for signs and symptoms of internal bleeding- Monitor lab trends- Patient is to report abnormal signs of bleeding to staff- Avoid use of toothpicks and dental floss- Use electric shaver for shaving- Use soft bristle tooth brush- Limit straining and forceful nose blowing  Outcome: Progressing     Problem: Safety Risk - Non-Violent Restraints  Goal: Patient will remain free from self-harm  Description: INTERVENTIONS:- Apply the least restrictive restraint to prevent harm- Notify patient and family of reasons restraints applied- Assess for any contributing factors to confusion (electrolyte disturbances, delirium, medications)- Discontinue any unnecessary medical devices as soon as possible- Assess the patient's physical comfort, circulation, skin condition, hydration, nutrition and elimination needs - Reorient and redirection as needed- Assess for the need to continue restraints  Outcome: Progressing     Problem: Delirium  Goal: Minimize duration of delirium  Description: Interventions:- Encourage use of hearing aids, eye glasses- Promote highest level of mobility daily- Provide frequent reorientation- Promote wakefulness i.e. lights on, blinds open- Promote sleep, encourage patient's normal rest cycle i.e. lights off, TV off, minimize noise and interruptions- Encourage family to assist in orientation and promotion of home routines  Outcome: Progressing     Problem: NEUROLOGICAL - ADULT  Goal: Achieves stable or improved neurological status  Description: INTERVENTIONS- Assess for and report changes in neurological status- Initiate measures to  prevent increased intracranial pressure- Maintain blood pressure and fluid volume within ordered parameters to optimize cerebral perfusion and minimize risk of hemorrhage- Monitor temperature, glucose, and sodium. Initiate appropriate interventions as ordered  Outcome: Progressing     Problem: Patient/Family Goals  Goal: Patient/Family Long Term Goal  Description: Patient's Long Term Goal: To be discharged  Interventions:- Eat a heart healthy diet  - attend all follow up appointments  - take medications as prescribed    - See additional Care Plan goals for specific interventions  Outcome: Progressing  Goal: Patient/Family Short Term Goal  Description: Patient's Short Term Goal: To feel better  Interventions: - IV lasix, IV abx  - See additional Care Plan goals for specific interventions  Outcome: Progressing

## 2025-05-13 NOTE — CM/SW NOTE
Received update from [employer]  that pt authorization is approved 5/13-5/19. Await discharge clearance.     Lola GREENE, LCSW  Discharge Planner

## 2025-05-13 NOTE — PROGRESS NOTES
[facility]   part of [facility]     Hospitalist Progress Note     Richy Goins Patient Status:  Inpatient    1960 MRN [id number]   Location [facility] 8NE-A Attending Live Ott MD   Hosp Day # 14 PCP Woody Dahl MD     Chief Complaint: Dyspnea    Subjective:     Patient feels well this morning.  Denies sob or cp.  No n/v.  No fever or chills.  No new complaints.      Objective:    Review of Systems:   A comprehensive review of systems was completed; pertinent positive and negatives stated in subjective.    Vital signs:  Temp:  [97.9 °F (36.6 °C)-98.6 °F (37 °C)] 98.6 °F (37 °C)  Pulse:  [72-82] 72  Resp:  [18-20] 18  BP: (118-155)/(61-75) 118/65  SpO2:  [92 %-96 %] 96 %    Physical Exam:    General: No acute distress  Respiratory: No wheezes, no rhonchi  Cardiovascular: S1, S2, regular rate and rhythm  Abdomen: Soft, Non-tender, non-distended, positive bowel sounds  Neuro: No new focal deficits.   Extremities: B/l AKA, LLE incision with staples in place, LUE graft in place    Diagnostic Data:    Labs:  Recent Labs   Lab 25  04425  0523 05/10/25  0459 05/11/25  0521 25  0514   WBC 9.1 10.3 11.4* 14.5* 13.4*   HGB 11.8* 11.9* 11.8* 11.1* 11.1*   MCV 88.3 89.5 90.1 91.7 92.0   .0 236.0 262.0 294.0 316.0       Recent Labs   Lab 05/10/25  04525  0521 25  0514   * 149* 154*   BUN 34* 53* 71*   CREATSERUM 4.43* 6.25* 7.48*   CA 8.5* 8.6* 8.9    144 143   K 4.1 4.7 4.9    107 107   CO2 29.0 27.0 24.0       Estimated Glomerular Filtration Rate: 7 mL/min/1.73m2 (A) (result from lab).    No results for input(s): \"TROP\", \"TROPHS\", \"CK\" in the last 168 hours.    No results for input(s): \"PTP\", \"INR\" in the last 168 hours.               Microbiology    Hospital Encounter on 25   1. Body Fluid Cult Aerobic and Anaerobic     Status: None    Collection Time: 25 10:00 AM    Specimen: Pleural Fluid, Left; Body fluid, unspecified    Result Value Ref Range    Body Fluid Culture Result No Growth 5 Days N/A    Body Fluid Smear 1+ WBCs seen N/A    Body Fluid Smear No organisms seen N/A    Body Fluid Smear This is a cytocentrifuged smear. N/A         Imaging: Reviewed in Epic.    Medications: Scheduled Medications[1]    Assessment & Plan:      #Acute Left Intrathoracic hemorrhage 2/2 intercostal artery injury with left hemothorax  S/p L VATS washout and control of intercostal bleeding  Chest tube removed by CV surgeon on 5/7/2025.     #Acute hypoxic resp failure  Resolved  oxygen weaned off and on room air on 5/7/2025.     #ESBL PNA  Finished IV abx - IV merrem      #Acute Metabolic Encephalopathy  Back to baseline      #Hemorrhagic shock on admission  Resolved, off pressors     #PAD s/p b/l AKA  Left incision with stapels and eschar; does not appear infected  Wound care  On PPX ABX     #Acute blood loss anemia on admission  S/p massive transfusion protocol on admission  Monitor Hgb  No s/s of further bleeding     #Cardiac arrest  Due to above  ROSC successful  EKG abnormal  Echo noted  Cardiology following, ischemic w/u deferred as likely reactive to above     #Left Hemithorax  Likely due to ESRD  Has been recurrent  S/p thora 5/1, cytology negative     #Occluded LUE AVG  Status post declot left upper extremity AV fistula by IR done on 5/9/2025  Will have re-eval by IR today - needs PERM cath      #ESRD on HD  Cont. HD, plan for Perm Cath prior to dc      #DM type II with ESRD on HD  Elevated as stress reaction  Cont. Insulin and adjust as needed  A1c 5.9 though unreliable at this point     #HTN  Home BP meds on hold      Dispo:  Insurance auth for JOAQUINA.  IR for de-clotting       Live Ott MD    Supplementary Documentation:     Quality:  DVT Mechanical Prophylaxis:   SCDs,    DVT Pharmacologic Prophylaxis   Medication    heparin (Porcine) 5000 UNIT/ML injection 5,000 Units                Code Status: Full Code  Soni: No urinary catheter in  place  Soni Duration (in days):   Central line:    [place]:     Discharge is dependent on: Insurance auth  At this point Mr. Goins is expected to be discharge to: Home    The 21st Century Cures Act makes medical notes like these available to patients in the interest of transparency. Please be advised this is a medical document. Medical documents are intended to carry relevant information, facts as evident, and the clinical opinion of the practitioner. The medical note is intended as peer to peer communication and may appear blunt or direct. It is written in medical language and may contain abbreviations or verbiage that are unfamiliar.                         [1]    lidocaine-menthol  1 patch Transdermal Daily    gabapentin  400 mg Oral Nightly    heparin  5,000 Units Subcutaneous Q8H SUJATA    acetaminophen  650 mg Oral 4 times per day    melatonin  5 mg Oral Nightly    insulin aspart  1-68 Units Subcutaneous TID CC and HS    docusate sodium  100 mg Oral BID    sennosides  8.6 mg Oral BID    sevelamer carbonate  1,600 mg Oral TID CC

## 2025-05-13 NOTE — PROGRESS NOTES
[facility]  Nephrology Progress Note    Richy Goins Attending:  Jeremi Norwood MD       Assessment and Plan:    1) ESRD- due to longstanding DM 2; last HD . Lytes / volume OK. Next HD Wed via temp HD cath     2) Acute hypoxic resp failure due to pul edema + pleural effusion / hemothorax / arrest- extubated     3) L pleural effusion s/p thoracentesis -> massive hemothorax s/p ligation of intercostal artery + chest tube (dc'ed )    4) Occluded L UE AVG s/p declot ; no audible bruit -> US noted with occlusive thrombus / IR to re-evaluate     5) Anemia- due to CKD / above / chronic disease. Hold EPO with hgb > 11 g/dl      6) Longstanding DM 2     7) PAD s/p remote R BKA; s/p recent L BKA     DC planning to [facility]. Will need permcath if AVG cannot be salvaged      Subjective:  Awake alert in good spirits no issues overnight    Physical Exam:   /65 (BP Location: Right arm)   Pulse 72   Temp 98.6 °F (37 °C) (Oral)   Resp 18   Wt 161 lb 2.5 oz (73.1 kg)   SpO2 96%   BMI 24.50 kg/m²   Temp (24hrs), Av.3 °F (36.8 °C), Min:97.9 °F (36.6 °C), Max:98.6 °F (37 °C)       Intake/Output Summary (Last 24 hours) at 2025 0851  Last data filed at 2025  Gross per 24 hour   Intake 300 ml   Output 2000 ml   Net -1700 ml     Wt Readings from Last 3 Encounters:   25 161 lb 2.5 oz (73.1 kg)   25 170 lb (77.1 kg)   10/02/24 181 lb (82.1 kg)     General: awake alert  HEENT: No scleral icterus, MMM  Neck: Supple, no SARITA or thyromegaly  Cardiac: Regular rate and rhythm, S1, S2 normal, no murmur or tub  Lungs: Decreased BS at bases bilaterally   Abdomen: Soft, non-tender. + bowel sounds, no palpable organomegaly  Extremities: Without clubbing, cyanosis; no edema  Neurologic: Cranial nerves grossly intact, moving all extremities  Skin: Warm and dry, no rashes       Labs:   Lab Results   Component Value Date    PGLU 113 2025       Imaging:  All imaging studies reviewed.    Meds:    Current Hospital Medications[1]      Questions/concerns were discussed with patient and/or family by bedside.          Keeley Blackwell MD  5/13/2025  851 AM         [1]   Current Facility-Administered Medications   Medication Dose Route Frequency    HYDROcodone-acetaminophen (Norco) 5-325 MG per tab 1 tablet  1 tablet Oral Q6H PRN    Or    HYDROcodone-acetaminophen (Norco) 5-325 MG per tab 2 tablet  2 tablet Oral Q6H PRN    lidocaine-menthol 4-1 % patch 1 patch  1 patch Transdermal Daily    gabapentin (Neurontin) cap 400 mg  400 mg Oral Nightly    heparin (Porcine) 5000 UNIT/ML injection 5,000 Units  5,000 Units Subcutaneous Q8H SUJATA    acetaminophen (Tylenol) tab 650 mg  650 mg Oral 4 times per day    melatonin cap/tab 5 mg  5 mg Oral Nightly    insulin aspart (NovoLOG) 100 Units/mL FlexPen 1-68 Units  1-68 Units Subcutaneous TID CC and HS    docusate sodium (Colace) cap 100 mg  100 mg Oral BID    sennosides (Senokot) tab 8.6 mg  8.6 mg Oral BID    polyethylene glycol (PEG 3350) (Miralax) 17 g oral packet 17 g  17 g Per NG Tube Daily PRN    sevelamer carbonate (Renvela) tab 1,600 mg  1,600 mg Oral TID CC    glucose (Dex4) 15 GM/59ML oral liquid 15 g  15 g Oral Q15 Min PRN    Or    glucose (Glutose) 40% oral gel 15 g  15 g Oral Q15 Min PRN    Or    glucose-vitamin C (Dex-4) chewable tab 4 tablet  4 tablet Oral Q15 Min PRN    Or    dextrose 50% injection 50 mL  50 mL Intravenous Q15 Min PRN    Or    glucose (Dex4) 15 GM/59ML oral liquid 30 g  30 g Oral Q15 Min PRN    Or    glucose (Glutose) 40% oral gel 30 g  30 g Oral Q15 Min PRN    Or    glucose-vitamin C (Dex-4) chewable tab 8 tablet  8 tablet Oral Q15 Min PRN    sennosides (Senokot) tab 17.2 mg  17.2 mg Oral Nightly PRN    ondansetron (Zofran) 4 MG/2ML injection 4 mg  4 mg Intravenous Q6H PRN

## 2025-05-13 NOTE — PHYSICAL THERAPY NOTE
PHYSICAL THERAPY TREATMENT NOTE - INPATIENT    Room Number: 8611/8611-A     Session: 1   Number of Visits to Meet Established Goals: 5    Presenting Problem: Acute respiratory failure, L hemothorax s/p throacoscopy with evacuation of hematoma  Co-Morbidities : Recent L BKA, previous R BKA, ESRD on HD, PAD, DM, HTN    History related to current admission: Patient is a 64 year old male admitted on 4/29/2025 from home for sob and facial swelling after missing 2 sessions of dialysis within one week.  Pt diagnosed with acute respiratory failure, anemia, hyperkalemia, and L pleural effusion. Pt s/p L sided thoracentesis on 5/2 with AMS and hypotension following procedure.  RRT called and pt transferred to ICU, chest tube placed, 15 units of PRBC transfused.  Pt found to have L hemothorax requiring emergent flexible bronchoscopy, video assisted thoracoscopy and hematoma evacuation.  Pt intubated 5/2-5/4.  Chest tube removed 5/7. Pt s/p IR AV graft declot on 5/9.  US of L upper arm AVG on 5/12 demonstrates persistent occlusive thrombus within AV fistula.    HOME SITUATION  Type of Home: House  Home Layout: One level, Ramped entrance  Stairs to Enter : 0        Stairs to Bedroom: 0         Lives With: Spouse    Drives: No           Prior Level of [place]: Pt lives w/ his wife in a one level home.  He is typically able to transfer to/from w/c, toilet and bed on his own.  Pt's wife assists him in/out of the car and takes him to dialysis which is 5 a.m.  This has become significantly more difficult following his recent LLE amputation.  Current plan is to have pt take a medical bus/van to from dialysis.  He has an electric w/c on order and will be able to roll on/off the bus and staff at dialysis will be able to use a lift in/out of chair.  Pt also will have a new time at dialysis.  He is simply awaiting delivery of electric w/c.     Pt finished recent stay at [facility] and is awaiting stability in L limb so he can be fit for a  prosthesis.  Has a RLE prosthesis, but has had difficulty donning since LLE amputation. Pt has been primarily transferring via slide board.      PHYSICAL THERAPY ASSESSMENT   Patient demonstrates fair progress this session as pt is able to complete supine<>sit with decreased assistance, meeting goal #1 at this time.  Pt encouraged to attempt slide board transfers, but declined secondary to L trunk pain.  Goal #2  remains in progress.      Patient is requiring supervision and dependent as a result of the following impairments: decreased functional strength, decreased endurance/aerobic capacity, pain, impaired sitting balance balance, decreased muscular endurance, cognitive deficits (impaired memory), and medical status.     Patient continues to function below baseline with bed mobility and transfers.  Next session anticipate patient to progress bed mobility, transfers, and maintaining seated position.  Physical Therapy will continue to follow patient for duration of hospitalization.    Patient continues to benefit from continued skilled PT services: to promote return to prior level of function and safety with continuous assistance and gradual rehabilitative therapy .    PLAN DURING HOSPITALIZATION  Nursing Mobility Recommendation : Lift Equipment  PT Device Recommendation: Wheelchair, Rolling walker (Slide board)  PT Treatment Plan: Bed mobility, Patient education, Family education, Transfer training, Strengthening, Range of motion, Balance training  Frequency (Obs): 3-5x/week     CURRENT GOALS     Goal #1 Patient is able to demonstrate supine - sit EOB @ level: supervision   MET 5/13   Goal #2 Patient is able to demonstrate transfers EOB to/from Chair/Wheelchair at assistance level: maximum assistance      Goal #3     Goal #4     Goal #5     Goal #6     Goal Comments: Goals established on 5/1/2025 5/13/2025 goal #2 ongoing      SUBJECTIVE  Pt appears confused, \"I'm not sure if you're aware, but I have broken ribs  on this side.\" No documentation noted regarding rib fractures.    OBJECTIVE  Precautions: Limb alert - left, BLE BKA    WEIGHT BEARING RESTRICTION     PAIN ASSESSMENT   Ratin  Location: left chest/ribs  Management Techniques: Body mechanics, Relaxation, Breathing techniques    BALANCE                                                                                                                       Static Sitting: Fair +  Dynamic Sitting: Fair           Static Standing: Not tested  Dynamic Standing: Not tested    ACTIVITY TOLERANCE  Pulse: 76        BP: 112/66  BP Location: Right arm  BP Method: Automatic  Patient Position: Sitting    O2 WALK  Oxygen Therapy  SPO2% on Room Air at Rest: 95    AM-PAC '6-Clicks' INPATIENT SHORT FORM - BASIC MOBILITY  How much difficulty does the patient currently have...  Patient Difficulty: Turning over in bed (including adjusting bedclothes, sheets and blankets)?: A Little   Patient Difficulty: Sitting down on and standing up from a chair with arms (e.g., wheelchair, bedside commode, etc.): Unable   Patient Difficulty: Moving from lying on back to sitting on the side of the bed?: A Little   How much help from another person does the patient currently need...   Help from Another: Moving to and from a bed to a chair (including a wheelchair)?: Total   Help from Another: Need to walk in hospital room?: Total   Help from Another: Climbing 3-5 steps with a railing?: Total     AM-PAC Score:  Raw Score: 10   Approx Degree of Impairment: 76.75%   Standardized Score (AM-PAC Scale): 32.29   CMS Modifier (G-Code): CL    FUNCTIONAL ABILITY STATUS  Gait Assessment   Functional Mobility/Gait Assessment  Gait Assistance: Not tested    Skilled Therapy Provided    Bed Mobility:  Rolling: Min A   Supine<>Sit: SBA with HOB elevated 40 degrees   Sit<>Supine: SBA with HOB elevated 30 degrees    Transfer Mobility:  Sit<>Stand: NT   Stand<>Sit: NT   Gait: NT    Therapist's Comments: The pt completed  supine>sidelying, rolling bilaterally, with Min A for hand placement and rotation.  Pt completed partial sidelying>sit to EOB with SBA.  Pt able to sit upright at EOB 15 minutes.   While sitting pt completed neuromuscular re-education: functional reaching, dynamic seated exercises (as below), and unsupported sitting with BUE progressing to LUE support progressing to unsupported sitting without UE support progressing to tossing tossing a scrunched up paper towel up and catching it.  Pt reports fatigue after 10 tosses.      THERAPEUTIC EXERCISES  Lower Extremity Alternating marching  Hip AB/AD  LAQ     Upper Extremity      Position Sitting     Repetitions   X10-20   Sets   X1-2     Patient End of Session: In bed, Needs met, Call light within reach, RN aware of session/findings, All patient questions and concerns addressed    PT Session Time: 25 minutes  Gait Trainin minutes  Therapeutic Activity: 5 minutes  Therapeutic Exercise: 8 minutes   Neuromuscular Re-education: 12 minutes

## 2025-05-13 NOTE — PROCEDURES
[facility] [place]   part of [facility]  Procedure Note    Richy Goins Patient Status:  Inpatient    1960 MRN [id number]   Location [facility] INTERVENTIONAL SUITES Attending Live Ott MD   Hosp Day # 14 PCP Woody Dahl MD     Procedure: Fistula declot    Pre-Procedure Diagnosis:  Clotted left arm fistula    Post-Procedure Diagnosis: Same    Anesthesia:  Sedation    Findings:  Narrowing within the venous outflow    Specimens: None    Blood Loss:  < 5 cc    Tourniquet Time: None    Complications:  None  Drains:  None    Secondary Diagnosis:  N/A    Shi Crystal MD  2025

## 2025-05-14 ENCOUNTER — APPOINTMENT (OUTPATIENT)
Dept: ULTRASOUND IMAGING | Facility: HOSPITAL | Age: 65
End: 2025-05-14
Attending: SURGERY
Payer: COMMERCIAL

## 2025-05-14 ENCOUNTER — APPOINTMENT (OUTPATIENT)
Dept: ULTRASOUND IMAGING | Facility: HOSPITAL | Age: 65
End: 2025-05-14
Attending: RADIOLOGY
Payer: COMMERCIAL

## 2025-05-14 LAB
APTT PPP: 47.9 SECONDS (ref 23–36)
APTT PPP: 87.8 SECONDS (ref 23–36)
GLUCOSE BLD-MCNC: 109 MG/DL (ref 70–99)
GLUCOSE BLD-MCNC: 151 MG/DL (ref 70–99)
GLUCOSE BLD-MCNC: 154 MG/DL (ref 70–99)
PLATELET # BLD AUTO: 229 10(3)UL (ref 150–450)

## 2025-05-14 PROCEDURE — 99233 SBSQ HOSP IP/OBS HIGH 50: CPT | Performed by: INTERNAL MEDICINE

## 2025-05-14 PROCEDURE — 99233 SBSQ HOSP IP/OBS HIGH 50: CPT | Performed by: HOSPITALIST

## 2025-05-14 PROCEDURE — 93990 DOPPLER FLOW TESTING: CPT | Performed by: RADIOLOGY

## 2025-05-14 RX ORDER — HEPARIN SODIUM 1000 [USP'U]/ML
1500 INJECTION, SOLUTION INTRAVENOUS; SUBCUTANEOUS ONCE
Status: COMPLETED | OUTPATIENT
Start: 2025-05-14 | End: 2025-05-14

## 2025-05-14 RX ORDER — HEPARIN SODIUM 1000 [USP'U]/ML
30 INJECTION, SOLUTION INTRAVENOUS; SUBCUTANEOUS ONCE
Status: COMPLETED | OUTPATIENT
Start: 2025-05-14 | End: 2025-05-14

## 2025-05-14 NOTE — OCCUPATIONAL THERAPY NOTE
OCCUPATIONAL THERAPY TREATMENT NOTE - INPATIENT     Room Number: 8611/8611-A  Session: 1   Number of Visits to Meet Established Goals: 5    Presenting Problem: SOB, respiratory failure, pleural effusion, s/p hemothorax evac      History Related to Current Admission: Patient is a 64 year old male admitted on 4/29/2025 with Presenting Problem: SOB, respiratory failure, pleural effusion, s/p hemothorax evac. Pt admitted with sob and facial swelling after missing 2 sessions of dialysis within one week.  Pt diagnosed with acute respiratory failure, anemia, hyperkalemia, and L pleural effusion. Pt s/p L sided thoracentesis on 5/2 with AMS and hypotension following procedure.  RRT called and pt transferred to ICU, chest tube placed, 15 units of PRBC transfused.  Pt found to have L hemothorax requiring emergent flexible bronchoscopy, video assisted thoracoscopy and hematoma evacuation.  Pt intubated 5/2-5/4.  Chest tube removed 5/7.     HOME SITUATION  Type of Home: House  Home Layout: One level, Ramped entrance  Lives With: Spouse     Other Equipment: Other (Comment) (pt getting electric W/C)     Occupation/Status: N/A  Drives: No     Prior Level of Function: Pt lives w/ his wife in a one level home.  He is typically able to transfer to/from w/c, toilet and bed on his own.  Pt's wife assists him in/out of the car and takes him to dialysis which is 5 a.m.  This has become significantly more difficult following his recent LLE amputation.  Current plan is to have pt take a medical bus/van to from dialysis.  He has an electric w/c on order and will be able to roll on/off the bus and staff at dialysis will be able to use a lift in/out of chair.  Pt also will have a new time at dialysis.  He is simply awaiting delivery of electric w/c.     Pt finished recent stay at \[facility]\"" and is awaiting stability in L limb so he can be fit for a prosthesis.  Has a RLE prosthesis, but has had difficulty donning since LLE amputation. Pt has been  primarily transferring via slide board.        ASSESSMENT   Patient demonstrates good  progress this session, goals remain in progress.    Patient continues to function below baseline with toileting, bathing, upper body dressing, lower body dressing, bed mobility, and transfers.   Contributing factors to remaining limitations include decreased functional strength, decreased functional reach, decreased endurance, pain, impaired sitting balance, decreased muscular endurance, and medical status.  Next session anticipate patient to progress toileting, bathing, upper body dressing, lower body dressing, bed mobility, and transfers.  Occupational Therapy will continue to follow patient for duration of hospitalization.    Patient continues to benefit from continued skilled OT services: to promote return to prior level of function and safety with continuous assistance and gradual rehabilitative therapy.     WEIGHT BEARING RESTRICTION       Recommendations for nursing staff:   Transfers: stefany lift verus slide board   Toileting location: bed     TREATMENT SESSION:  Patient Start of Session: Pt was found in his bed.     FUNCTIONAL TRANSFER ASSESSMENT     BED MOBILITY  Supine to Sit : Contact Guard Assist  Sit to Supine (OT): Supervision    BALANCE ASSESSMENT     FUNCTIONAL ADL ASSESSMENT     ACTIVITY TOLERANCE:                          O2 SATURATIONS       EDUCATION PROVIDED  Patient Education : Role of Occupational Therapy; Plan of Care; Discharge Recommendations; Functional Transfer Techniques; Fall Prevention  Patient's Response to Education: Verbalized Understanding    Equipment used: none   Demonstrates functional use, Would benefit from additional trial      Exercises:    Exercises Repetitions Comments   Scapular elevation     Scapular retraction 10    Shoulder rolls 10    Shoulder flexion 10    Shoulder abduction     Shoulder internal/external rotation     Forward punch 10    Elbow flexion     Elbow extension      Forearm pronation/supination     Wrist flexion/extension     Gross grasp/fist pumps     Ankle pumps     Knee extension     Marching         Therapist comments: supine>sit EOB>B UE AROM exercises in major planes x10 reps each, reaching tasks, core strengthening activities>supine>modified chair position in bed       Patient End of Session: In bed, Needs met, Call light within reach, RN aware of session/findings, Hospital anti-slip socks, Alarm set    SUBJECTIVE  \"I don't think it's a good idea for me to do the slide board with my broken ribs, it hurts too much.\"     PAIN ASSESSMENT  Ratin  Location: L ribs  Management Techniques: Relaxation, Repositioning     OBJECTIVE  Precautions: Limb alert - left, Aspiration, Other (Comment) (ANEL Alvarez)    AM-PAC ‘6-Clicks’ Inpatient Daily Activity Short Form  -   Putting on and taking off regular lower body clothing?: A Lot  -   Bathing (including washing, rinsing, drying)?: A Lot  -   Toileting, which includes using toilet, bedpan or urinal? : A Lot  -   Putting on and taking off regular upper body clothing?: A Lot  -   Taking care of personal grooming such as brushing teeth?: A Little  -   Eating meals?: A Little    AM-PAC Score:  Score: 14  Approx Degree of Impairment: 59.67%  Standardized Score (AM-PAC Scale): 33.39    PLAN  OT Device Recommendations: TBD  OT Treatment Plan: Energy conservation/work simplification techniques, Balance activities, ADL training, Functional transfer training, Endurance training, Patient/Family education, Patient/Family training, Equipment eval/education, Compensatory technique education, Continued evaluation  Rehab Potential : Fair  Frequency: 3-5x/week    OT Goals: progressing as of , LS    ADL Goals  Patient will perform toileting with mod A and AE PRN  Patient will perform LB dressing with mod A and AE PRN     Functional Transfer Goals  Patient will perform bed mobility supine to sit with supervision  Patient will perform bed mobility  sit to supine with supervision  Patient will perform toilet transfer to drop arm commode with mod via sliding board     OT Session Time: 30 minutes  Therapeutic Activity: 15 minutes  Therapeutic Exercise: 15 minutes

## 2025-05-14 NOTE — PROGRESS NOTES
[facility]   part of [facility]  Progress Note      Richy Goins Patient Status:  Inpatient    1960 MRN [id number]   Location [facility] 8NE-A Attending Live Ott MD   Hosp Day # 14 PCP Woody Dahl MD       Was notified of patient complaining of numbness in left hand and some \"coldness\".  Findings could be possible to steal due to opening of fistula but small thrombus cannot be excluded. Will start on heparin drip and order stat ultrasound doppler. Recommend warm packs to hand as well in the meantime.    Shi Crystal MD  2025  7:37 PM

## 2025-05-14 NOTE — PLAN OF CARE
Assumed patient at 1930. Alert and oriented x 4 on room air. Lung sounds diminished bilaterally. Normal sinus on tele. Continent of bowel and bladder. Total care. Patient updated on plan of care and verbalized understanding.     POC: US LUE, dialysis, possible discharge to thrive.     0100: Patient refusing tele states he would like a good night sleep and does not need it. Hospitalist notified.     Problem: SAFETY ADULT - FALL  Goal: Free from fall injury  Description: INTERVENTIONS:- Assess pt frequently for physical needs- Identify cognitive and physical deficits and behaviors that affect risk of falls.- Cross fall precautions as indicated by assessment.- Educate pt/family on patient safety including physical limitations- Instruct pt to call for assistance with activity based on assessment- Modify environment to reduce risk of injury- Provide assistive devices as appropriate- Consider OT/PT consult to assist with strengthening/mobility- Encourage toileting schedule  Outcome: Progressing     Problem: RESPIRATORY - ADULT  Goal: Achieves optimal ventilation and oxygenation  Description: INTERVENTIONS:- Assess for changes in respiratory status- Assess for changes in mentation and behavior- Position to facilitate oxygenation and minimize respiratory effort- Oxygen supplementation based on oxygen saturation or ABGs- Provide Smoking Cessation handout, if applicable- Encourage broncho-pulmonary hygiene including cough, deep breathe, Incentive Spirometry- Assess the need for suctioning and perform as needed- Assess and instruct to report SOB or any respiratory difficulty- Respiratory Therapy support as indicated- Manage/alleviate anxiety- Monitor for signs/symptoms of CO2 retention  Outcome: Progressing     Problem: HEMATOLOGIC - ADULT  Goal: Maintains hematologic stability  Description: INTERVENTIONS- Assess for signs and symptoms of bleeding or hemorrhage- Monitor labs and vital signs for trends- Administer  supportive blood products/factors, fluids and medications as ordered and appropriate- Administer supportive blood products/factors as ordered and appropriate  Outcome: Progressing  Goal: Free from bleeding injury  Description: (Example usage: patient with low platelets)INTERVENTIONS:- Avoid intramuscular injections, enemas and rectal medication administration- Ensure safe mobilization of patient- Hold pressure on venipuncture sites to achieve adequate hemostasis- Assess for signs and symptoms of internal bleeding- Monitor lab trends- Patient is to report abnormal signs of bleeding to staff- Avoid use of toothpicks and dental floss- Use electric shaver for shaving- Use soft bristle tooth brush- Limit straining and forceful nose blowing  Outcome: Progressing     Problem: Safety Risk - Non-Violent Restraints  Goal: Patient will remain free from self-harm  Description: INTERVENTIONS:- Apply the least restrictive restraint to prevent harm- Notify patient and family of reasons restraints applied- Assess for any contributing factors to confusion (electrolyte disturbances, delirium, medications)- Discontinue any unnecessary medical devices as soon as possible- Assess the patient's physical comfort, circulation, skin condition, hydration, nutrition and elimination needs - Reorient and redirection as needed- Assess for the need to continue restraints  Outcome: Progressing     Problem: Delirium  Goal: Minimize duration of delirium  Description: Interventions:- Encourage use of hearing aids, eye glasses- Promote highest level of mobility daily- Provide frequent reorientation- Promote wakefulness i.e. lights on, blinds open- Promote sleep, encourage patient's normal rest cycle i.e. lights off, TV off, minimize noise and interruptions- Encourage family to assist in orientation and promotion of home routines  Outcome: Progressing     Problem: NEUROLOGICAL - ADULT  Goal: Achieves stable or improved neurological status  Description:  INTERVENTIONS- Assess for and report changes in neurological status- Initiate measures to prevent increased intracranial pressure- Maintain blood pressure and fluid volume within ordered parameters to optimize cerebral perfusion and minimize risk of hemorrhage- Monitor temperature, glucose, and sodium. Initiate appropriate interventions as ordered  Outcome: Progressing     Problem: Patient/Family Goals  Goal: Patient/Family Long Term Goal  Description: Patient's Long Term Goal: To be discharged  Interventions:- Eat a heart healthy diet  - attend all follow up appointments  - take medications as prescribed    - See additional Care Plan goals for specific interventions  Outcome: Progressing  Goal: Patient/Family Short Term Goal  Description: Patient's Short Term Goal: To feel better  Interventions: - IV lasix, IV abx  - See additional Care Plan goals for specific interventions  Outcome: Progressing

## 2025-05-14 NOTE — PROGRESS NOTES
Patient had AVG declotted earlier today.  RN contacted IR re: numbness, tingling and cool sensation to left hand.    Patient seen at bedside. He has been using heat pack and hand warming up. Numbness and tingling also improving.    Patient has a strong radial pulse.    As discussed with IR, will start Heparin drip and check US.    Discussed with patient, family, IR and RN.    Lucina DAVIES

## 2025-05-14 NOTE — PROGRESS NOTES
[facility]   part of [facility]  Progress Note      Richy Goins Patient Status:  Inpatient    1960 MRN [id number]   Location [facility] 8NE-A Attending Live Ott MD   Hosp Day # 15 PCP Woody Dahl MD       Subjective:   Continues to complain of coolness to the left hand. States this improves with warming of the hand with heat packs.    Objective:   General: NAD  Lungs: Non-labored  Heart: Regular rate  Abdomen: Non-distended  +Radial pulses bilaterally.  Strength and sensation equal in the bilateral hands.  Left hand cool relative to right.    Vital Signs:  Blood pressure 122/64, pulse 74, temperature 98.6 °F (37 °C), temperature source Oral, resp. rate 20, weight 161 lb 2.5 oz, SpO2 94%.    Input/Output:    Intake/Output Summary (Last 24 hours) at 2025 0853  Last data filed at 2025 0450  Gross per 24 hour   Intake 356 ml   Output 0 ml   Net 356 ml     24 hour drain output: n/a    Results:   Labs:  Lab Results   Component Value Date    .0 2025       Microbiology:  N/a    Assessment and Plan:   65 y/o male s/p LUE fistula declot, now with coolness to the left hand, improved with warming. Patient started on hep drip last night.   Possible arterial steal vs embolic process.  Duplex US ordered, pending.  Discussed with Dr. Ott, will consult vascular given on going symptoms.      Afua Ibrahim MD  2025  8:53 AM

## 2025-05-14 NOTE — PLAN OF CARE
Rec'd pt at 0730. A&O x 4. Tele shows NSR. O2 sats adequate on RA. Pt incontinent, anuric, briefed. C/O CP from broken ribs, lidocaine patch placed and prn norco given. Receives scheduled tylenol q6 as well. Skin dry and intact, LUE thrill/bruit present to AVF, pulses present to left arm and hand, left hand cool to touch. Pt states lt hand feels numb, arterial US LUE ordered -- ordered STAT, IR MD aware of delay. Staff at  stated \"it will get done at some point today\", this RN emphasized that US ordered STAT. Bed locked and in low position, call light and personal items within reach. Will continue to monitor. POC - Heparin gtt, arterial US LUE today, pain control, will dc to [facility] once medically cleared.    1240 -- HD completed, only able to pull 300cc out d/t BP dropping w/ fluid removal.   1745 -- Rec'd results of US LUE, partially occlusive thrombus at arterial anastomosis of AV graft. Hospitalist notified, Dr. Ott, who contacted IR and Vascular surgery MD's who have no new orders at this time. Will continue w/ hot packs to left hand.  1830 -- Spoke w/ Dr. Ott, who spoke w/ Dr. De La Fuente regarding US results -- pt to be NPO after dinner in case Dr. De La Fuente is able to take patient tonight for clot removal, if not able to tonight will take pt tomorrow morning. Pt aware of NPO status after dinner.    Problem: SAFETY ADULT - FALL  Goal: Free from fall injury  Description: INTERVENTIONS:- Assess pt frequently for physical needs- Identify cognitive and physical deficits and behaviors that affect risk of falls.- [place] fall precautions as indicated by assessment.- Educate pt/family on patient safety including physical limitations- Instruct pt to call for assistance with activity based on assessment- Modify environment to reduce risk of injury- Provide assistive devices as appropriate- Consider OT/PT consult to assist with strengthening/mobility- Encourage toileting schedule  Outcome: Progressing     Problem: RESPIRATORY  - ADULT  Goal: Achieves optimal ventilation and oxygenation  Description: INTERVENTIONS:- Assess for changes in respiratory status- Assess for changes in mentation and behavior- Position to facilitate oxygenation and minimize respiratory effort- Oxygen supplementation based on oxygen saturation or ABGs- Provide Smoking Cessation handout, if applicable- Encourage broncho-pulmonary hygiene including cough, deep breathe, Incentive Spirometry- Assess the need for suctioning and perform as needed- Assess and instruct to report SOB or any respiratory difficulty- Respiratory Therapy support as indicated- Manage/alleviate anxiety- Monitor for signs/symptoms of CO2 retention  Outcome: Progressing     Problem: HEMATOLOGIC - ADULT  Goal: Maintains hematologic stability  Description: INTERVENTIONS- Assess for signs and symptoms of bleeding or hemorrhage- Monitor labs and vital signs for trends- Administer supportive blood products/factors, fluids and medications as ordered and appropriate- Administer supportive blood products/factors as ordered and appropriate  Outcome: Progressing  Goal: Free from bleeding injury  Description: (Example usage: patient with low platelets)INTERVENTIONS:- Avoid intramuscular injections, enemas and rectal medication administration- Ensure safe mobilization of patient- Hold pressure on venipuncture sites to achieve adequate hemostasis- Assess for signs and symptoms of internal bleeding- Monitor lab trends- Patient is to report abnormal signs of bleeding to staff- Avoid use of toothpicks and dental floss- Use electric shaver for shaving- Use soft bristle tooth brush- Limit straining and forceful nose blowing  Outcome: Progressing     Problem: NEUROLOGICAL - ADULT  Goal: Achieves stable or improved neurological status  Description: INTERVENTIONS- Assess for and report changes in neurological status- Initiate measures to prevent increased intracranial pressure- Maintain blood pressure and fluid volume  within ordered parameters to optimize cerebral perfusion and minimize risk of hemorrhage- Monitor temperature, glucose, and sodium. Initiate appropriate interventions as ordered  Outcome: Progressing     Problem: Patient/Family Goals  Goal: Patient/Family Long Term Goal  Description: Patient's Long Term Goal: To be discharged  Interventions:- Eat a heart healthy diet  - attend all follow up appointments  - take medications as prescribed    - See additional Care Plan goals for specific interventions  Outcome: Progressing  Goal: Patient/Family Short Term Goal  Description: Patient's Short Term Goal: To feel better  Interventions: - IV lasix, IV abx  - See additional Care Plan goals for specific interventions  Outcome: Progressing

## 2025-05-14 NOTE — PROGRESS NOTES
[facility]   part of [facility]     Hospitalist Progress Note     Richy Goins Patient Status:  Inpatient    1960 MRN [id number]   Location [facility] 8NE-A Attending Live Ott MD   Hosp Day # 15 PCP Woody Dahl MD     Chief Complaint: Dyspnea    Subjective:     Patient complaining of numbness/tingling to his left hand.  Started yesterday evening.  Denies fever, chills, n/v.  No other complaints.      Objective:    Review of Systems:   A comprehensive review of systems was completed; pertinent positive and negatives stated in subjective.    Vital signs:  Temp:  [97.9 °F (36.6 °C)-98.6 °F (37 °C)] 98.6 °F (37 °C)  Pulse:  [69-89] 74  Resp:  [18-20] 20  BP: (114-162)/() 122/64  SpO2:  [92 %-95 %] 94 %    Physical Exam:    General: No acute distress  Respiratory: No wheezes, no rhonchi  Cardiovascular: S1, S2, regular rate and rhythm  Abdomen: Soft, Non-tender, non-distended, positive bowel sounds  Neuro: Left hand with decreased sensation to tips of fingers, able to squeeze, slightly cool to touch  Extremities: B/l AKA, LLE incision with staples in place, LUE graft in place    Diagnostic Data:    Labs:  Recent Labs   Lab 25  04425  0523 05/10/25  04525  0521 25  0514 25  0444   WBC 9.1 10.3 11.4* 14.5* 13.4*  --    HGB 11.8* 11.9* 11.8* 11.1* 11.1*  --    MCV 88.3 89.5 90.1 91.7 92.0  --    .0 236.0 262.0 294.0 316.0 229.0       Recent Labs   Lab 05/10/25  04525  0521 25  0514   * 149* 154*   BUN 34* 53* 71*   CREATSERUM 4.43* 6.25* 7.48*   CA 8.5* 8.6* 8.9    144 143   K 4.1 4.7 4.9    107 107   CO2 29.0 27.0 24.0       Estimated Glomerular Filtration Rate: 7 mL/min/1.73m2 (A) (result from lab).    No results for input(s): \"TROP\", \"TROPHS\", \"CK\" in the last 168 hours.    No results for input(s): \"PTP\", \"INR\" in the last 168 hours.               Microbiology    Hospital Encounter on 25   1. Body Fluid  Cult Aerobic and Anaerobic     Status: None    Collection Time: 05/02/25 10:00 AM    Specimen: Pleural Fluid, Left; Body fluid, unspecified   Result Value Ref Range    Body Fluid Culture Result No Growth 5 Days N/A    Body Fluid Smear 1+ WBCs seen N/A    Body Fluid Smear No organisms seen N/A    Body Fluid Smear This is a cytocentrifuged smear. N/A         Imaging: Reviewed in Epic.    Medications: Scheduled Medications[1]    Assessment & Plan:      #Arterial Steal vs embolic process  Left hand is cool, +tingling/numbness, strength intact  On heparin drip  Discussed with Vascular Dr. Najjar - stat Arterial duplex recommended  Discussed with IR  Will f/u arterial studies once available    #Acute Left Intrathoracic hemorrhage 2/2 intercostal artery injury with left hemothorax  S/p L VATS washout and control of intercostal bleeding  Chest tube removed by CV surgeon on 5/7/2025.     #Acute hypoxic resp failure  Resolved  oxygen weaned off and on room air on 5/7/2025.     #ESBL PNA  Finished IV abx - IV merrem      #Acute Metabolic Encephalopathy  Back to baseline      #Hemorrhagic shock on admission  Resolved, off pressors     #PAD s/p b/l AKA  Left incision with stapels and eschar; does not appear infected  Wound care  On PPX ABX     #Acute blood loss anemia on admission  S/p massive transfusion protocol on admission  Monitor Hgb  No s/s of further bleeding     #Cardiac arrest  Due to above  ROSC successful  EKG abnormal  Echo noted  Cardiology following, ischemic w/u deferred as likely reactive to above     #Left Hemithorax  Likely due to ESRD  Has been recurrent  S/p thora 5/1, cytology negative     #Occluded LUE AVG  Status post declot left upper extremity AV fistula by IR done on 5/9/2025 and 5/13     #ESRD on HD  Cont. HD -HD today      #DM type II with ESRD on HD  Elevated as stress reaction  Cont. Insulin and adjust as needed  A1c 5.9 though unreliable at this point     #HTN  Home BP meds on hold    Addendum:    Patient arterial duplex has been completed, stat procedure delayed due to multiple stat procedures.  Noted partial thrombus to AV graft.  Discussed with Dr. Najjar with vascular surgery, he feels it's more of a IR complication and would like me to discuss with them.  Discussed with IR Dr. Ibrahim, feels that findings on arterial duplex are expected in regards to the fistula partial thrombus, however does not explain his current symptoms of cool fingers and numbness in hand.  Discussed again with Dr. Najjar who noted that patient had previously been under consult with a different vascular surgeon in January and would like me to discuss case with him instead.  Called and discussed case with vascular Dr. De La Fuente who will now be seeing the patient.  Heparin drip, NPO orders for procedure scheduled in am.   Updated nursing staff on plan.   Noted continued radial pulse present.    Live Ott MD    Supplementary Documentation:     Quality:  DVT Mechanical Prophylaxis:   SCDs,    DVT Pharmacologic Prophylaxis   Medication    heparin (Porcine) 1000 UNIT/ML injection 1,500 Units    heparin (Porcine) 09123 units/250mL infusion PE/DVT/THROMBUS CONTINUOUS                Code Status: Full Code  Soni: No urinary catheter in place  Soni Duration (in days):   Central line:    [place]:     Discharge is dependent on: Arterial duplex  At this point Mr. Goins is expected to be discharge to: [facility]     The 21st Century Cures Act makes medical notes like these available to patients in the interest of transparency. Please be advised this is a medical document. Medical documents are intended to carry relevant information, facts as evident, and the clinical opinion of the practitioner. The medical note is intended as peer to peer communication and may appear blunt or direct. It is written in medical language and may contain abbreviations or verbiage that are unfamiliar.                         [1]    heparin  1,500 Units Intravenous Once     atorvastatin  40 mg Oral Nightly    tamsulosin  0.4 mg Oral Nightly    lidocaine-menthol  1 patch Transdermal Daily    gabapentin  400 mg Oral Nightly    acetaminophen  650 mg Oral 4 times per day    melatonin  5 mg Oral Nightly    insulin aspart  1-68 Units Subcutaneous TID CC and HS    docusate sodium  100 mg Oral BID    sennosides  8.6 mg Oral BID    sevelamer carbonate  1,600 mg Oral TID CC

## 2025-05-14 NOTE — PAYOR COMM NOTE
--------------  CONTINUED STAY REVIEW    Payor: JD GARCIA  Subscriber #:  [id number]  Authorization Number: [id number]    Admit date: 4/29/25  Admit time: 10:59 PM  5/13    Procedure: Fistula declot     Pre-Procedure Diagnosis:  Clotted left arm fistula     Post-Procedure Diagnosis: Same     Anesthesia:  Sedation     Findings:  Narrowing within the venous outflow    5/14  CONTS TO COMPLAIN OF COOLNESS LEDT HAND    3 y/o male s/p LUE fistula declot, now with coolness to the left hand, improved with warming. Patient started on hep drip last night.   Possible arterial steal vs embolic process.  Duplex US ordered, pending.  Discussed with Dr. Ott, will consult vascular given on going symptoms          NEPHROLOGY  1) ESRD- due to longstanding DM 2; last HD 4/22. Lytes / volume OK. HD today (off schedule)      2) Acute hypoxic resp failure due to pul edema + pleural effusion / hemothorax / arrest- extubated 5/4     3) L pleural effusion s/p thoracentesis -> massive hemothorax s/p ligation of intercostal artery + chest tube (dc'ed 5/7)     4) Occluded L UE AVG s/p declot 5/9 + 5/13; on heparin gtt as per IR     5) Anemia- due to CKD / above / chronic disease. Hold EPO with hgb > 11 g/dl      6) Longstanding DM 2     7) PAD s/p remote R BKA; s/p recent L BKA         MEDICATIONS ADMINISTERED IN LAST 1 DAY:  acetaminophen (Tylenol) tab 650 mg       Date Action Dose Route User    5/14/2025 0523 Given 650 mg Oral Arturo Carrizales RN    5/13/2025 2349 Given 650 mg Oral Arturo Carrizales RN    5/13/2025 1754 Given 650 mg Oral Yuval Dempsey RN    5/13/2025 1219 Given 650 mg Oral Yuval Dempsey RN          atorvastatin (Lipitor) tab 40 mg       Date Action Dose Route User    5/13/2025 2112 Given 40 mg Oral Arturo Carrizales RN          heparin (Porcine) 1000 UNIT/ML injection - BOLUS IV 2,200 Units       Date Action Dose Route User    5/14/2025 0624 Given 2,200 Units Intravenous Wood, Arturo, RN          heparin (Porcine) 69304 units/250mL  infusion PE/DVT/THROMBUS CONTINUOUS       Date Action Dose Route User    5/14/2025 0625 Hi-Risk Rate/Dose Change 1,400 Units/hr Intravenous Arturo Carrizales RN          gabapentin (Neurontin) cap 400 mg       Date Action Dose Route User    5/13/2025 2112 Given 400 mg Oral Arutro Carrizales RN          HYDROcodone-acetaminophen (Norco) 5-325 MG per tab 2 tablet       Date Action Dose Route User    5/14/2025 0945 Given 2 tablet Oral Aydin Carr RN          heparin (Porcine) 80039 units/250mL infusion (PE/DVT/THROMBUS) INITIAL DOSE       Date Action Dose Route User    5/13/2025 2211 New Bag 18 Units/kg/hr × 73.1 kg Intravenous Arturo Carrizales RN          insulin aspart (NovoLOG) 100 Units/mL FlexPen 1-68 Units       Date Action Dose Route User    5/13/2025 2349 Given 1 Units Subcutaneous (Left Upper Arm) Arturo Carrizales RN          iodixanol (VISIPAQUE) 320 MG/ML injection 75 mL       Date Action Dose Route User    5/13/2025 1630 Given 50 mL Intravenous Nhan Campbell RN          lidocaine-menthol 4-1 % patch 1 patch       Date Action Dose Route User    5/14/2025 0823 Patch Applied 1 patch Transdermal (Left Anterior Chest) Aydin Carr RN          melatonin cap/tab 5 mg       Date Action Dose Route User    5/13/2025 2112 Given 5 mg Oral Arturo Carrizales RN          sevelamer carbonate (Renvela) tab 1,600 mg       Date Action Dose Route User    5/13/2025 1754 Given 1,600 mg Oral Yuval Dempsey RN          tamsulosin (Flomax) cap 0.4 mg       Date Action Dose Route User    5/13/2025 2112 Given 0.4 mg Oral Arturo Carrizales RN            Vitals (last day)       Date/Time Temp Pulse Resp BP SpO2 Weight O2 Device O2 Flow Rate (L/min) Who    05/14/25 0750 -- 74 20 122/64 94 % -- None (Room air) -- LS    05/14/25 0450 98.6 °F (37 °C) 69 18 114/59 93 % -- -- -- EJ    05/13/25 2340 98.6 °F (37 °C) 79 18 127/67 92 % -- None (Room air) --     05/13/25 2000 97.9 °F (36.6 °C) 80 20 121/57 95 % -- None (Room air) --     05/13/25 1814  -- 84 -- 155/76 -- -- -- --     05/13/25 1745 -- 89 -- 144/79 -- -- -- --     05/13/25 1730 -- 81 -- 146/119 94 % -- -- --     05/13/25 1715 -- 80 -- 155/69 95 % -- -- --     05/13/25 1700 -- -- -- 162/79 93 % -- -- --     05/13/25 1031 -- 76 -- 112/66 -- -- -- -- NL    05/13/25 0833 98.6 °F (37 °C) 72 18 118/65 -- -- None (Room air) -- EM    05/13/25 0443 98.6 °F (37 °C) -- 20 -- -- 161 lb 2.5 oz (73.1 kg) None (Room air) -- ZK          CIWA Scores (since admission)       None          Blood Transfusion Record       Product Unit Status Volume Start End            Transfuse RBC       25  [id number]  [id number] Completed 05/02/25 1832 350 mL 05/02/25 1625 05/02/25 1635                Transfuse cryoprecipitate       25  [id number]  [id number] Completed 05/02/25 1832 350 mL 05/02/25 1655 05/02/25 1705                Transfuse fresh frozen plasma      25  [id number]  [id number] Completed 05/02/25 1832 350 mL 05/02/25 1613 05/02/25 1623                Transfuse platelets       25  [id number]  [id number] Completed 05/02/25 1832 350 mL 05/02/25 1647 05/02/25 1657

## 2025-05-14 NOTE — DIETARY NOTE
[facility]   part of [facility]    NUTRITION ASSESSMENT    Pt does not meet malnutrition criteria at this time.    NUTRITION INTERVENTION:    Meal and Snacks - Monitor and encourage adequate PO intake. Liberalize as able.     PATIENT STATUS:   PO intake avg %. +BM 5/13 without n/v/d. Will continue to monitor and support as able.  5/7-Diet advanced, transferred from ICU. Now POD 5 from left VATS washout and control of intercostal bleeding. Chest tube out. +BM. On RA. Getting HD.   05/03/25 RD consulted for Tubefeeding recommendations. Pt admitted with respiratory failure hemothorax.  Pt is intubated and is on Levo. Propofol was discontinued. Pt receiving HD on T/TH/Sat.  Pt with left BKA.     PMH: ESRD on HD,DM, HTN, PAD, HLD.     ANTHROPOMETRICS:  Ht:  172.7 cm (5'8\")  Wt: 73.1 kg (161 lb 2.5 oz).   BMI: Body mass index is 24.5 kg/m².  IBW: 61.7 kg(including -11.8% for  Richard BKA)      WEIGHT HISTORY:   Weight loss: No- stable since amputation.    Wt Readings from Last 10 Encounters:   05/13/25 73.1 kg (161 lb 2.5 oz)   01/08/25 77.1 kg (170 lb)   10/02/24 82.1 kg (181 lb)   08/19/24 87.2 kg (192 lb 3.2 oz)   07/24/24 88.9 kg (196 lb)   05/29/24 79.8 kg (176 lb)   05/03/24 80.7 kg (177 lb 14.4 oz)   04/17/24 77.1 kg (170 lb)   03/29/24 76.2 kg (168 lb 1.6 oz)   03/15/24 83 kg (183 lb)        NUTRITION:  Diet:       Procedures    Renal diet Renal; Calorie Restriction/Carb Controlled: 1800 kcal/60 grams; Is Patient on Accuchecks? Yes      Food Allergies: No  Cultural/Ethnic/Baptism Preferences Addressed: Yes    Percent Meals Eaten (last 3 days)       Date/Time Percent Meals Eaten (%)    05/11/25 0900 100 %    05/11/25 1300 100 %    05/11/25 2037 100 %    05/12/25 1345 90 %            GI system review: WNL Last BM Date: 05/13/25  Skin and wounds: no    NUTRITION RELATED PHYSICAL FINDINGS:     1. Body Fat/Muscle Mass: no wasting noted  and well nourished     2. Fluid Accumulation: none    NUTRITION  PRESCRIPTION: 76 kg  Calories:  1654-5023 calories/day (25-30 kcal/kg)  Protein:  grams protein/day (1.0-1.5 grams protein per kg)  Fluid: ~1 ml/kcal or per MD discretion    NUTRITION DIAGNOSIS/PROBLEM:  Inadequate oral intake related to respiratory process or complication of therapy which results in mechanical ventilation as evidenced by need for nutrition support to meet estimated needs- resolved       MONITOR AND EVALUATE/NUTRITION GOALS:  Weight stable within 1 to 2 lbs during admission - Ongoing  Start alternative nutrition in 24-48 hrs if diet is not able to advance- Resolved  Tolerate alternative nutrition at 100% of goal - Resolved      MEDICATIONS:  Reviewed    LABS:  Reviewed    Pt is at Low nutrition risk    Alejandrina Bruce RD, LDN  Clinical Nutrition  [id number]

## 2025-05-14 NOTE — PROGRESS NOTES
[facility]  Nephrology Progress Note    Richy Goins Attending:  Jeremi Norwood MD       Assessment and Plan:    1) ESRD- due to longstanding DM 2; last HD . Lytes / volume OK. HD today (off schedule)      2) Acute hypoxic resp failure due to pul edema + pleural effusion / hemothorax / arrest- extubated     3) L pleural effusion s/p thoracentesis -> massive hemothorax s/p ligation of intercostal artery + chest tube (dc'ed )    4) Occluded L UE AVG s/p declot  + ; on heparin gtt as per IR     5) Anemia- due to CKD / above / chronic disease. Hold EPO with hgb > 11 g/dl      6) Longstanding DM 2     7) PAD s/p remote R BKA; s/p recent L BKA     DC planning to rehab.       Subjective:  Awake alert in good spirits no issues overnight    Physical Exam:   /64 (BP Location: Right arm)   Pulse 74   Temp 98.6 °F (37 °C) (Oral)   Resp 20   Wt 161 lb 2.5 oz (73.1 kg)   SpO2 94%   BMI 24.50 kg/m²   Temp (24hrs), Av.4 °F (36.9 °C), Min:97.9 °F (36.6 °C), Max:98.6 °F (37 °C)       Intake/Output Summary (Last 24 hours) at 2025 0847  Last data filed at 2025 0450  Gross per 24 hour   Intake 356 ml   Output 0 ml   Net 356 ml     Wt Readings from Last 3 Encounters:   25 161 lb 2.5 oz (73.1 kg)   25 170 lb (77.1 kg)   10/02/24 181 lb (82.1 kg)     General: awake alert  HEENT: No scleral icterus, MMM  Neck: Supple, no SARITA or thyromegaly  Cardiac: Regular rate and rhythm, S1, S2 normal, no murmur or tub  Lungs: Decreased BS at bases bilaterally   Abdomen: Soft, non-tender. + bowel sounds, no palpable organomegaly  Extremities: Without clubbing, cyanosis; no edema  Neurologic: Cranial nerves grossly intact, moving all extremities  Skin: Warm and dry, no rashes       Labs:   Lab Results   Component Value Date    .0 2025    PTT 47.9 2025    PGLU 154 2025       Imaging:  All imaging studies reviewed.    Meds:   Current Hospital  Medications[1]      Questions/concerns were discussed with patient and/or family by bedside.          Keeley Blackwell MD  5/14/2025  847 AM         [1]   Current Facility-Administered Medications   Medication Dose Route Frequency    atorvastatin (Lipitor) tab 40 mg  40 mg Oral Nightly    tamsulosin (Flomax) cap 0.4 mg  0.4 mg Oral Nightly    heparin (Porcine) 72325 units/250mL infusion PE/DVT/THROMBUS CONTINUOUS  200-3,000 Units/hr Intravenous Continuous    HYDROcodone-acetaminophen (Norco) 5-325 MG per tab 1 tablet  1 tablet Oral Q6H PRN    Or    HYDROcodone-acetaminophen (Norco) 5-325 MG per tab 2 tablet  2 tablet Oral Q6H PRN    lidocaine-menthol 4-1 % patch 1 patch  1 patch Transdermal Daily    gabapentin (Neurontin) cap 400 mg  400 mg Oral Nightly    acetaminophen (Tylenol) tab 650 mg  650 mg Oral 4 times per day    melatonin cap/tab 5 mg  5 mg Oral Nightly    insulin aspart (NovoLOG) 100 Units/mL FlexPen 1-68 Units  1-68 Units Subcutaneous TID CC and HS    docusate sodium (Colace) cap 100 mg  100 mg Oral BID    sennosides (Senokot) tab 8.6 mg  8.6 mg Oral BID    polyethylene glycol (PEG 3350) (Miralax) 17 g oral packet 17 g  17 g Per NG Tube Daily PRN    sevelamer carbonate (Renvela) tab 1,600 mg  1,600 mg Oral TID CC    glucose (Dex4) 15 GM/59ML oral liquid 15 g  15 g Oral Q15 Min PRN    Or    glucose (Glutose) 40% oral gel 15 g  15 g Oral Q15 Min PRN    Or    glucose-vitamin C (Dex-4) chewable tab 4 tablet  4 tablet Oral Q15 Min PRN    Or    dextrose 50% injection 50 mL  50 mL Intravenous Q15 Min PRN    Or    glucose (Dex4) 15 GM/59ML oral liquid 30 g  30 g Oral Q15 Min PRN    Or    glucose (Glutose) 40% oral gel 30 g  30 g Oral Q15 Min PRN    Or    glucose-vitamin C (Dex-4) chewable tab 8 tablet  8 tablet Oral Q15 Min PRN    sennosides (Senokot) tab 17.2 mg  17.2 mg Oral Nightly PRN    ondansetron (Zofran) 4 MG/2ML injection 4 mg  4 mg Intravenous Q6H PRN

## 2025-05-15 ENCOUNTER — APPOINTMENT (OUTPATIENT)
Dept: INTERVENTIONAL RADIOLOGY/VASCULAR | Facility: HOSPITAL | Age: 65
End: 2025-05-15
Attending: SURGERY
Payer: COMMERCIAL

## 2025-05-15 LAB
APTT PPP: 67.6 SECONDS (ref 23–36)
GLUCOSE BLD-MCNC: 133 MG/DL (ref 70–99)
GLUCOSE BLD-MCNC: 150 MG/DL (ref 70–99)
GLUCOSE BLD-MCNC: 183 MG/DL (ref 70–99)
GLUCOSE BLD-MCNC: 74 MG/DL (ref 70–99)
GLUCOSE BLD-MCNC: 84 MG/DL (ref 70–99)
PLATELET # BLD AUTO: 243 10(3)UL (ref 150–450)

## 2025-05-15 PROCEDURE — 3E05317 INTRODUCTION OF OTHER THROMBOLYTIC INTO PERIPHERAL ARTERY, PERCUTANEOUS APPROACH: ICD-10-PCS | Performed by: RADIOLOGY

## 2025-05-15 PROCEDURE — 99232 SBSQ HOSP IP/OBS MODERATE 35: CPT | Performed by: HOSPITALIST

## 2025-05-15 PROCEDURE — 99233 SBSQ HOSP IP/OBS HIGH 50: CPT | Performed by: INTERNAL MEDICINE

## 2025-05-15 PROCEDURE — B31JYZZ FLUOROSCOPY OF LEFT UPPER EXTREMITY ARTERIES USING OTHER CONTRAST: ICD-10-PCS | Performed by: RADIOLOGY

## 2025-05-15 PROCEDURE — B51NYZZ FLUOROSCOPY OF LEFT UPPER EXTREMITY VEINS USING OTHER CONTRAST: ICD-10-PCS | Performed by: RADIOLOGY

## 2025-05-15 RX ORDER — LIDOCAINE HYDROCHLORIDE 10 MG/ML
INJECTION, SOLUTION INFILTRATION; PERINEURAL
Status: COMPLETED
Start: 2025-05-15 | End: 2025-05-15

## 2025-05-15 RX ORDER — HEPARIN SODIUM 5000 [USP'U]/ML
INJECTION, SOLUTION INTRAVENOUS; SUBCUTANEOUS
Status: COMPLETED
Start: 2025-05-15 | End: 2025-05-15

## 2025-05-15 RX ORDER — IODIXANOL 320 MG/ML
100 INJECTION, SOLUTION INTRAVASCULAR
Status: COMPLETED | OUTPATIENT
Start: 2025-05-15 | End: 2025-05-15

## 2025-05-15 RX ORDER — WATER 10 ML/10ML
INJECTION INTRAMUSCULAR; INTRAVENOUS; SUBCUTANEOUS
Status: COMPLETED
Start: 2025-05-15 | End: 2025-05-15

## 2025-05-15 NOTE — PLAN OF CARE
Assumed care of patient at change of shift, resting in bed. AO x4. NSR on tele monitor. O2 sat adequate on RA. Denies chest pain and shortness of breath. Plan of care updated. Call light and personal items in reach. All needs met.   POC:  - NPO for procedure this afternoon  - heparin gtt  - wound care    1220: patient had spoken to Dr. De La Fuente around noon. This RN went in at 1220 to consent patient for OR this afternoon, and pt stated  he spoke to his wife and they decided against procedure. OR notified of same. Received call from OR that patient is tentatively on schedule for Friday morning, so he can eat today.     Problem: SAFETY ADULT - FALL  Goal: Free from fall injury  Description: INTERVENTIONS:- Assess pt frequently for physical needs- Identify cognitive and physical deficits and behaviors that affect risk of falls.- [place] fall precautions as indicated by assessment.- Educate pt/family on patient safety including physical limitations- Instruct pt to call for assistance with activity based on assessment- Modify environment to reduce risk of injury- Provide assistive devices as appropriate- Consider OT/PT consult to assist with strengthening/mobility- Encourage toileting schedule  Outcome: Progressing     Problem: HEMATOLOGIC - ADULT  Goal: Maintains hematologic stability  Description: INTERVENTIONS- Assess for signs and symptoms of bleeding or hemorrhage- Monitor labs and vital signs for trends- Administer supportive blood products/factors, fluids and medications as ordered and appropriate- Administer supportive blood products/factors as ordered and appropriate  Outcome: Progressing     Problem: HEMATOLOGIC - ADULT  Goal: Free from bleeding injury  Description: (Example usage: patient with low platelets)INTERVENTIONS:- Avoid intramuscular injections, enemas and rectal medication administration- Ensure safe mobilization of patient- Hold pressure on venipuncture sites to achieve adequate hemostasis- Assess  for signs and symptoms of internal bleeding- Monitor lab trends- Patient is to report abnormal signs of bleeding to staff- Avoid use of toothpicks and dental floss- Use electric shaver for shaving- Use soft bristle tooth brush- Limit straining and forceful nose blowing  Outcome: Progressing     Problem: Patient/Family Goals  Goal: Patient/Family Long Term Goal  Description: Patient's Long Term Goal: To be discharged  Interventions:- Eat a heart healthy diet  - attend all follow up appointments  - take medications as prescribed    - See additional Care Plan goals for specific interventions  Outcome: Progressing     Problem: Patient/Family Goals  Goal: Patient/Family Short Term Goal  Description: Patient's Short Term Goal: To feel better  Interventions: - IV lasix, IV abx  - See additional Care Plan goals for specific interventions  Outcome: Progressing

## 2025-05-15 NOTE — CONSULTS
[facility]    PATIENT'S NAME: JULIUS FOY   ATTENDING PHYSICIAN: Live Ott MD   CONSULTING PHYSICIAN: Fermin De La Fuente M.D.   PATIENT ACCOUNT#:   [id number]    LOCATION:  [address] [place] [address]  MEDICAL RECORD #:   [id number]       YOB: 1960  ADMISSION DATE:       04/29/2025      CONSULT DATE:  05/15/2025    REPORT OF CONSULTATION    FOLLOWUP CONSULTATION    The patient was scheduled for procedure today and decided not to the last minute.  He has visual a fistulogram and angiogram done by Dr. Reyes who I have gone over the films with.  He thinks it is a thrombus, embolism at the area of the origin of ulnar artery.  The patient has some discomfort, pain in his left hand.  He is still moving his hand but is cool.  His ulnar artery is very weak.  He is on heparin.  The patient's hand changed clinically, neurologically with some numbness after the declotting of the fistula.  It is possible embolism went from the graft into the ulnar artery.  Discussed with the patient that he needs a surgical procedure to extract this clot and able to be done endovascular with Dr. Reyes.  He is aware there is risk of death, cardiac complications, bleeding, infection, persistent neurological status of the hand, difficulty maybe removing the clot because it has been there for several days, bleeding, persistent symptoms of the hand, underlying atherosclerotic disease of the ulna.  The patient had duplex ultrasound prior to his fistula creation over 1 year ago and still the ulnar artery open.  Ultrasound after the surgery, the ulnar artery was not scanned.  The fistulogram was done today.  No initial imaging was done of the ulnar artery prior to the declotting by Dr. Reyes and I will see anything by Dr. Crystal.  Today, it shows the ulnar artery occluded at its origin assuming this is related to thromboembolism.  I do not think this is necessarily a steal syndrome at this time.  The fistula is still functioning and seems to  be doing well.  The patient which I did not realize had a major complication 1 week ago where he had a thoracentesis with injury to the intercostal artery with extensive amount of blood loss and procedure done on 05/05/2025 by Dr. Caputo for the left pneumothorax with at least 2 L came on initial placement of a chest tube, hypovolemic shock and ongoing bleeding.  He had a flexible bronchoscopy done first and then left VAT with evacuation of hematoma and cessation of bleeding from an intercostal vessel.  The patient is reluctant for surgery but after talking to his wife, they can have the option of doing no matter what they do because of the patient, but I have recommended that they do the surgery.  I am concerned for persistent block in the ulnar artery it may affect neurological function and hand ischemia in the future.  The patient and wife discussed tonight and I keep him tentatively on the surgery for tomorrow.  I described the surgery incisions and we could respect whatever decision they make but stressed that this should be done.  It is unfortunate, but it is a known complication of declotting of the fistula.  All questions answered.  The option of no treatment was discussed too.    Dictated By Fermin De La Fuente M.D.  d: 05/15/2025 17:58:15  t: 05/15/2025 18:20:28  Job [id number]  JJW/    cc: Fermin De La Fuente M.D.

## 2025-05-15 NOTE — PROGRESS NOTES
[facility]   part of [facility]     Hospitalist Progress Note     Richy Goins Patient Status:  Inpatient    1960 MRN [id number]   Location [facility] 8NE-A Attending Live Ott MD   Hosp Day # 16 PCP Woody Dahl MD     Chief Complaint: Dyspnea    Subjective:     Patient with continued numbness to left hand.  Still feels cool.  Denies n/v, no fever or chills.  No other complaints.      Objective:    Review of Systems:   A comprehensive review of systems was completed; pertinent positive and negatives stated in subjective.    Vital signs:  Temp:  [97.4 °F (36.3 °C)-98.5 °F (36.9 °C)] 98.1 °F (36.7 °C)  Pulse:  [62-74] 70  Resp:  [18-20] 20  BP: (119-144)/(58-93) 128/64  SpO2:  [93 %-97 %] 93 %    Physical Exam:    General: No acute distress  Respiratory: No wheezes, no rhonchi  Cardiovascular: S1, S2, regular rate and rhythm  Abdomen: Soft, Non-tender, non-distended, positive bowel sounds  Neuro: Left hand with decreased sensation to tips of fingers, able to squeeze, slightly cool to touch  Extremities: B/l AKA, LLE incision with staples in place, LUE graft in place    Diagnostic Data:    Labs:  Recent Labs   Lab 05/09/25  0523 05/10/25  0459 05/11/25  0521 05/12/25  0514 25  0444 05/15/25  0603   WBC 10.3 11.4* 14.5* 13.4*  --   --    HGB 11.9* 11.8* 11.1* 11.1*  --   --    MCV 89.5 90.1 91.7 92.0  --   --    .0 262.0 294.0 316.0 229.0 243.0       Recent Labs   Lab 05/10/25  0459 05/11/25  0521 05/12/25  0514   * 149* 154*   BUN 34* 53* 71*   CREATSERUM 4.43* 6.25* 7.48*   CA 8.5* 8.6* 8.9    144 143   K 4.1 4.7 4.9    107 107   CO2 29.0 27.0 24.0       Estimated Glomerular Filtration Rate: 7 mL/min/1.73m2 (A) (result from lab).    No results for input(s): \"TROP\", \"TROPHS\", \"CK\" in the last 168 hours.    No results for input(s): \"PTP\", \"INR\" in the last 168 hours.               Microbiology    Hospital Encounter on 25   1. Body Fluid Cult Aerobic  and Anaerobic     Status: None    Collection Time: 05/02/25 10:00 AM    Specimen: Pleural Fluid, Left; Body fluid, unspecified   Result Value Ref Range    Body Fluid Culture Result No Growth 5 Days N/A    Body Fluid Smear 1+ WBCs seen N/A    Body Fluid Smear No organisms seen N/A    Body Fluid Smear This is a cytocentrifuged smear. N/A         Imaging: Reviewed in Epic.    Medications: Scheduled Medications[1]    Assessment & Plan:      #Ulnar artery thromboembolism   Arterial studies reviewed   Noted on IR declot check - 5/15  Did not respond to thrombolysis per report   Vascular surgery - Dr. De La Fuente on case   Heparin drip    #Acute Left Intrathoracic hemorrhage 2/2 intercostal artery injury with left hemothorax  S/p L VATS washout and control of intercostal bleeding  Chest tube removed by CV surgeon on 5/7/2025.     #Acute hypoxic resp failure  Resolved  oxygen weaned off and on room air on 5/7/2025.     #ESBL PNA  Finished IV abx - IV merrem      #Acute Metabolic Encephalopathy  Back to baseline      #Hemorrhagic shock on admission  Resolved, off pressors     #PAD s/p b/l AKA  Left incision with stapels and eschar; does not appear infected  Wound care  On PPX ABX     #Acute blood loss anemia on admission  S/p massive transfusion protocol on admission  Monitor Hgb  No s/s of further bleeding     #Cardiac arrest  Due to above  ROSC successful  EKG abnormal  Echo noted  Cardiology following, ischemic w/u deferred as likely reactive to above     #Left Hemithorax  Likely due to ESRD  Has been recurrent  S/p thora 5/1, cytology negative     #Occluded LUE AVG  Status post declot left upper extremity AV fistula by IR done on 5/9/2025 and 5/13     #ESRD on HD  Cont. HD -HD today      #DM type II with ESRD on HD  Elevated as stress reaction  Cont. Insulin and adjust as needed  A1c 5.9 though unreliable at this point     #HTN  Home BP meds on hold        Live Ott MD    Supplementary Documentation:     Quality:  DVT  Mechanical Prophylaxis:   SCDs,    DVT Pharmacologic Prophylaxis   Medication    heparin (Porcine) 44764 units/250mL infusion PE/DVT/THROMBUS CONTINUOUS                Code Status: Full Code  Soni: No urinary catheter in place  Soni Duration (in days):   Central line:    [place]:     Discharge is dependent on: Arterial duplex  At this point Mr. Goins is expected to be discharge to: [facility]     The 21st Century Cures Act makes medical notes like these available to patients in the interest of transparency. Please be advised this is a medical document. Medical documents are intended to carry relevant information, facts as evident, and the clinical opinion of the practitioner. The medical note is intended as peer to peer communication and may appear blunt or direct. It is written in medical language and may contain abbreviations or verbiage that are unfamiliar.                         [1]    ceFAZolin  2 g Intravenous Once    atorvastatin  40 mg Oral Nightly    tamsulosin  0.4 mg Oral Nightly    lidocaine-menthol  1 patch Transdermal Daily    gabapentin  400 mg Oral Nightly    acetaminophen  650 mg Oral 4 times per day    melatonin  5 mg Oral Nightly    insulin aspart  1-68 Units Subcutaneous TID CC and HS    docusate sodium  100 mg Oral BID    sennosides  8.6 mg Oral BID    sevelamer carbonate  1,600 mg Oral TID CC

## 2025-05-15 NOTE — PLAN OF CARE
Assumed patient at 1930. Alert and oriented x 4 on room air. Lung sounds diminished bilaterally. Normal sinus on tele. Anuric of bladder, incontinent of bowel. Total care. Patient and family updated on plan of care and verbalized understanding.     POC: Vein mapping, angio with IR, discharge to [facility]     Problem: SAFETY ADULT - FALL  Goal: Free from fall injury  Description: INTERVENTIONS:- Assess pt frequently for physical needs- Identify cognitive and physical deficits and behaviors that affect risk of falls.- [place] fall precautions as indicated by assessment.- Educate pt/family on patient safety including physical limitations- Instruct pt to call for assistance with activity based on assessment- Modify environment to reduce risk of injury- Provide assistive devices as appropriate- Consider OT/PT consult to assist with strengthening/mobility- Encourage toileting schedule  Outcome: Progressing     Problem: RESPIRATORY - ADULT  Goal: Achieves optimal ventilation and oxygenation  Description: INTERVENTIONS:- Assess for changes in respiratory status- Assess for changes in mentation and behavior- Position to facilitate oxygenation and minimize respiratory effort- Oxygen supplementation based on oxygen saturation or ABGs- Provide Smoking Cessation handout, if applicable- Encourage broncho-pulmonary hygiene including cough, deep breathe, Incentive Spirometry- Assess the need for suctioning and perform as needed- Assess and instruct to report SOB or any respiratory difficulty- Respiratory Therapy support as indicated- Manage/alleviate anxiety- Monitor for signs/symptoms of CO2 retention  Outcome: Progressing     Problem: HEMATOLOGIC - ADULT  Goal: Maintains hematologic stability  Description: INTERVENTIONS- Assess for signs and symptoms of bleeding or hemorrhage- Monitor labs and vital signs for trends- Administer supportive blood products/factors, fluids and medications as ordered and appropriate- Administer supportive  blood products/factors as ordered and appropriate  Outcome: Progressing  Goal: Free from bleeding injury  Description: (Example usage: patient with low platelets)INTERVENTIONS:- Avoid intramuscular injections, enemas and rectal medication administration- Ensure safe mobilization of patient- Hold pressure on venipuncture sites to achieve adequate hemostasis- Assess for signs and symptoms of internal bleeding- Monitor lab trends- Patient is to report abnormal signs of bleeding to staff- Avoid use of toothpicks and dental floss- Use electric shaver for shaving- Use soft bristle tooth brush- Limit straining and forceful nose blowing  Outcome: Progressing     Problem: Safety Risk - Non-Violent Restraints  Goal: Patient will remain free from self-harm  Description: INTERVENTIONS:- Apply the least restrictive restraint to prevent harm- Notify patient and family of reasons restraints applied- Assess for any contributing factors to confusion (electrolyte disturbances, delirium, medications)- Discontinue any unnecessary medical devices as soon as possible- Assess the patient's physical comfort, circulation, skin condition, hydration, nutrition and elimination needs - Reorient and redirection as needed- Assess for the need to continue restraints  Outcome: Progressing     Problem: Delirium  Goal: Minimize duration of delirium  Description: Interventions:- Encourage use of hearing aids, eye glasses- Promote highest level of mobility daily- Provide frequent reorientation- Promote wakefulness i.e. lights on, blinds open- Promote sleep, encourage patient's normal rest cycle i.e. lights off, TV off, minimize noise and interruptions- Encourage family to assist in orientation and promotion of home routines  Outcome: Progressing     Problem: NEUROLOGICAL - ADULT  Goal: Achieves stable or improved neurological status  Description: INTERVENTIONS- Assess for and report changes in neurological status- Initiate measures to prevent increased  intracranial pressure- Maintain blood pressure and fluid volume within ordered parameters to optimize cerebral perfusion and minimize risk of hemorrhage- Monitor temperature, glucose, and sodium. Initiate appropriate interventions as ordered  Outcome: Progressing     Problem: Patient/Family Goals  Goal: Patient/Family Long Term Goal  Description: Patient's Long Term Goal: To be discharged  Interventions:- Eat a heart healthy diet  - attend all follow up appointments  - take medications as prescribed    - See additional Care Plan goals for specific interventions  Outcome: Progressing  Goal: Patient/Family Short Term Goal  Description: Patient's Short Term Goal: To feel better  Interventions: - IV lasix, IV abx  - See additional Care Plan goals for specific interventions  Outcome: Progressing

## 2025-05-15 NOTE — CM/SW NOTE
Care Progression Note:  Length of stay: 16  GMLOS: 4.9  Avoidable Delays:   Code Status: Full Code    Acute Medical Issue/Factors:   S/p IR AV graft declot check 5/15.  Thromboembolism identified in the proximal ulnar artery, that did not respond to pharmaceutical thrombolysis. Plan for Left arm thrombectomy 5/16. Pt receiving IV Heparin infusion.       Discharge Barriers: Pending surgical intervention.    Expected discharge date: 2-3 days  [facility].  .     / available for discharge planning.       RAPHAEL Cam, CMSRN    [id number]

## 2025-05-15 NOTE — PROGRESS NOTES
[facility]  Nephrology Progress Note    Richy Goins Attending:  Jeremi Norwood MD       Assessment and Plan:    1) ESRD- due to longstanding DM 2; last HD . Lytes / volume OK. Next HD likely Fri      2) Acute hypoxic resp failure due to pul edema + pleural effusion / hemothorax / arrest- extubated     3) L pleural effusion s/p thoracentesis -> massive hemothorax s/p ligation of intercostal artery + chest tube (dc'ed )    4) Occluded L UE AVG s/p declot  +  with possible steal syndrome; on heparin gtt -> for fistulogram today as per Dr. De La Fuente     5) Anemia- due to CKD / above / chronic disease. Hold EPO with hgb > 11 g/dl      6) Longstanding DM 2     7) PAD s/p remote R BKA; s/p recent L BKA      Subjective:  Awake alert in good spirits no issues overnight    Physical Exam:   /58 (BP Location: Right arm)   Pulse 62   Temp 98.5 °F (36.9 °C) (Oral)   Resp 20   Wt 162 lb 11.2 oz (73.8 kg)   SpO2 93%   BMI 24.74 kg/m²   Temp (24hrs), Av.9 °F (36.6 °C), Min:97.4 °F (36.3 °C), Max:98.5 °F (36.9 °C)       Intake/Output Summary (Last 24 hours) at 5/15/2025 1029  Last data filed at 5/15/2025 0844  Gross per 24 hour   Intake 480 ml   Output 300 ml   Net 180 ml     Wt Readings from Last 3 Encounters:   05/15/25 162 lb 11.2 oz (73.8 kg)   25 170 lb (77.1 kg)   10/02/24 181 lb (82.1 kg)     General: awake alert  HEENT: No scleral icterus, MMM  Neck: Supple, no SARITA or thyromegaly  Cardiac: Regular rate and rhythm, S1, S2 normal, no murmur or tub  Lungs: Decreased BS at bases bilaterally   Abdomen: Soft, non-tender. + bowel sounds, no palpable organomegaly  Extremities: Without clubbing, cyanosis; no edema  Neurologic: Cranial nerves grossly intact, moving all extremities  Skin: Warm and dry, no rashes       Labs:   Lab Results   Component Value Date    .0 05/15/2025    PTT 67.6 05/15/2025    PGLU 84 05/15/2025       Imaging:  All imaging studies reviewed.    Meds:   Current  Hospital Medications[1]      Questions/concerns were discussed with patient and/or family by bedside.          Keeley Blackwell MD  5/15/2025  847 AM         [1]   Current Facility-Administered Medications   Medication Dose Route Frequency    ceFAZolin (Ancef) 2g in 10mL IV syringe premix  2 g Intravenous Once    atorvastatin (Lipitor) tab 40 mg  40 mg Oral Nightly    tamsulosin (Flomax) cap 0.4 mg  0.4 mg Oral Nightly    heparin (Porcine) 30896 units/250mL infusion PE/DVT/THROMBUS CONTINUOUS  200-3,000 Units/hr Intravenous Continuous    HYDROcodone-acetaminophen (Norco) 5-325 MG per tab 1 tablet  1 tablet Oral Q6H PRN    Or    HYDROcodone-acetaminophen (Norco) 5-325 MG per tab 2 tablet  2 tablet Oral Q6H PRN    lidocaine-menthol 4-1 % patch 1 patch  1 patch Transdermal Daily    gabapentin (Neurontin) cap 400 mg  400 mg Oral Nightly    acetaminophen (Tylenol) tab 650 mg  650 mg Oral 4 times per day    melatonin cap/tab 5 mg  5 mg Oral Nightly    insulin aspart (NovoLOG) 100 Units/mL FlexPen 1-68 Units  1-68 Units Subcutaneous TID CC and HS    docusate sodium (Colace) cap 100 mg  100 mg Oral BID    sennosides (Senokot) tab 8.6 mg  8.6 mg Oral BID    polyethylene glycol (PEG 3350) (Miralax) 17 g oral packet 17 g  17 g Per NG Tube Daily PRN    sevelamer carbonate (Renvela) tab 1,600 mg  1,600 mg Oral TID CC    glucose (Dex4) 15 GM/59ML oral liquid 15 g  15 g Oral Q15 Min PRN    Or    glucose (Glutose) 40% oral gel 15 g  15 g Oral Q15 Min PRN    Or    glucose-vitamin C (Dex-4) chewable tab 4 tablet  4 tablet Oral Q15 Min PRN    Or    dextrose 50% injection 50 mL  50 mL Intravenous Q15 Min PRN    Or    glucose (Dex4) 15 GM/59ML oral liquid 30 g  30 g Oral Q15 Min PRN    Or    glucose (Glutose) 40% oral gel 30 g  30 g Oral Q15 Min PRN    Or    glucose-vitamin C (Dex-4) chewable tab 8 tablet  8 tablet Oral Q15 Min PRN    sennosides (Senokot) tab 17.2 mg  17.2 mg Oral Nightly PRN    ondansetron (Zofran) 4 MG/2ML injection 4  mg  4 mg Intravenous Q6H PRN

## 2025-05-15 NOTE — CONSULTS
[facility]    PATIENT'S NAME: JULIUS FOY   ATTENDING PHYSICIAN: Live Ott MD   CONSULTING PHYSICIAN: Fermin De La Fuente M.D.   PATIENT ACCOUNT#:   [id number]    LOCATION:  [address] [place] [address]  MEDICAL RECORD #:   [id number]       YOB: 1960  ADMISSION DATE:       04/29/2025      CONSULT DATE:  05/13/2025    REPORT OF CONSULTATION    HISTORY OF PRESENT ILLNESS:  A 64-year-old [place] male, known to me for history of creation of a left brachial artery-axillary prosthetic graft fistula 4 x 6 mm [place]-Yemi back on 05/03/2024.  The patient did well with the procedure and has had a fistula function until recently in the last week or so, started having some decreased flow and thrombosed yesterday and was brought to catheterization lab by Dr. Crystal, and he did thrombolysis and balloon angioplasty of the outflow stenosis of the axillary vein.  No stent was placed at this time.  The images were done by Dr. Reyes before this and suggested some chronic thickening of the arterial anastomosis, but the flow seemed to be widely open in the brachial artery graft anastomosis by Dr. Crystal on 05/13/2025.  Images were reviewed.  There is no formal fistulogram but balloon angioplastied the same multiple areas.  The patient after the procedure started complaining of numbness in the dorsum of his hand.  His hand was cool.  He neurologically was intact.  Doppler flow was seen in the hand in the palmar arch, both distal radial and ulnar arteries.  Some thickening at the area of the brachial artery anastomosis, but on imaging I cannot tell if there was anything there.  The patient currently has no chest pain or shortness of breath.  He has a history of a below-knee amputation left side for gangrene.  He has been treated in the past by Dr. Coello with multiple interventions before having a below-knee amputation.  He is an insulin-dependent diabetic for many years.  He has been followed by Dr. Woody Dahl also.  He  says dialysis usually on Tuesday, Thursday, and Saturday.  He has had previous right jugular permacath.     He was admitted on  with dyspnea and feeling unwell, and he missed multiple dialysis sessions.  He had a history recently of gangrene now of the left leg and had a left below-knee amputation done at [facility].  They evaluated, eventually had to do the amputation.    PAST MEDICAL HISTORY:  He has history of hypertension, dyslipidemia.    PAST SURGICAL HISTORY:  Right below-knee amputation.     MEDICATIONS:  He is currently on atorvastatin 40 mg a day, tamsulosin, insulin.  He has had cephalexin orally in the past.  Sevelamer carbonate, gabapentin, Protonix.      ALLERGIES:  Zosyn with a rash and meropenem with a rash.      SOCIAL HISTORY:  No EtOH abuse, drug abuse, tobacco abuse.  He is .  He has 1 child.  He works in marketing.    FAMILY HISTORY:  He has a history of diabetes from his grandfather.  His father  from a myocardial infarction.  Mother is alive.     REVIEW OF SYSTEMS:  No hematuria, dysuria, hemoptysis, cough, sputum production.  No weight loss, fever, chills, hematemesis, or bright red blood per rectum.       PHYSICAL EXAMINATION:    GENERAL:  Currently, he is awake, alert.  He is appropriate.  He is in no acute distress.  VITAL SIGNS:  His blood pressure was 133/93.  He is afebrile.  Respirations 18 to 20, pulse about 70.  LUNGS:  Clear to auscultation.  HEART:  Normal.  ABDOMEN:  Soft.  No tenderness or masses.  EXTREMITIES:  Femoral pulses are palpable.  The fistula is functioning normally.  Neurologically, he is intact with intact median, ulnar, and radial nerves.  The hand is cool but does have sensation there and venous distention is seen.  Capillary refill is seen.  Has adequate Doppler flow in the radial, ulnar, and palmar arch.  NEUROLOGIC:  Grossly normal.    Fistulogram reviewed.  Ultrasound reviewed.      IMPRESSION:  Discussed with Dr. Ott and then also  discussed with Dr. Afua Ibrahim who called me up earlier today.  I have been notified about 9:00 or 10:00 this morning that he had ischemia and I asked him to consult Dr. Najjar and follow up after the operation with Dr. Ott and Dr. Najjar is to follow up.  They called late to see between 6:30 and 7:00, and Dr. Najjar was not going to see the patient.  I came by to see the patient now and discussed with him that I had a discussion with Dr. Ibrahim decided to do a fistulogram and angiogram tomorrow to assess if there is any thrombotic material if this is an arterial steal.  If it is an arterial steal, like to see if there is any vein available in his leg and then possibly do a DRIL procedure.  I have explained to the patient and his wife distal revascularization interval ligation with bypass surgery for the brachial artery above and below the proximal anastomosis with ligation of the artery below the proximal anastomosis, allowing blood to go directly down the arm to try to improve the flow just greater than symptoms.  There is no tissue damage in the hand at this time.  He also needs dialysis and will do the timing of this with the Nephrology.  If possible, this fistula may not be able to be used that he might need to have a permacath placed.  I have texted Nephrology Service to have the decision making and at least proceed tomorrow morning with a fistulogram.     Dictated By Fermin De La Fuente M.D.  d: 05/14/2025 21:21:25  t: 05/14/2025 21:29:23  Job [id number]  HERRERA/    cc: Fermin De La Fuente M.D.

## 2025-05-16 VITALS
RESPIRATION RATE: 20 BRPM | WEIGHT: 163.38 LBS | OXYGEN SATURATION: 97 % | DIASTOLIC BLOOD PRESSURE: 64 MMHG | HEART RATE: 65 BPM | TEMPERATURE: 98 F | BODY MASS INDEX: 25 KG/M2 | SYSTOLIC BLOOD PRESSURE: 133 MMHG

## 2025-05-16 LAB
ANION GAP SERPL CALC-SCNC: 13 MMOL/L (ref 0–18)
APTT PPP: 69 SECONDS (ref 23–36)
BUN BLD-MCNC: 65 MG/DL (ref 9–23)
CALCIUM BLD-MCNC: 8.8 MG/DL (ref 8.7–10.6)
CHLORIDE SERPL-SCNC: 107 MMOL/L (ref 98–112)
CO2 SERPL-SCNC: 20 MMOL/L (ref 21–32)
CREAT BLD-MCNC: 6.87 MG/DL (ref 0.7–1.3)
EGFRCR SERPLBLD CKD-EPI 2021: 8 ML/MIN/1.73M2 (ref 60–?)
ERYTHROCYTE [DISTWIDTH] IN BLOOD BY AUTOMATED COUNT: 15.5 %
GLUCOSE BLD-MCNC: 112 MG/DL (ref 70–99)
GLUCOSE BLD-MCNC: 115 MG/DL (ref 70–99)
GLUCOSE BLD-MCNC: 185 MG/DL (ref 70–99)
HCT VFR BLD AUTO: 31.4 % (ref 39–53)
HGB BLD-MCNC: 10.2 G/DL (ref 13–17.5)
MCH RBC QN AUTO: 29.1 PG (ref 26–34)
MCHC RBC AUTO-ENTMCNC: 32.5 G/DL (ref 31–37)
MCV RBC AUTO: 89.5 FL (ref 80–100)
OSMOLALITY SERPL CALC.SUM OF ELEC: 309 MOSM/KG (ref 275–295)
PLATELET # BLD AUTO: 243 10(3)UL (ref 150–450)
POTASSIUM SERPL-SCNC: 4.8 MMOL/L (ref 3.5–5.1)
RBC # BLD AUTO: 3.51 X10(6)UL (ref 3.8–5.8)
SODIUM SERPL-SCNC: 140 MMOL/L (ref 136–145)
WBC # BLD AUTO: 9.9 X10(3) UL (ref 4–11)

## 2025-05-16 PROCEDURE — 99233 SBSQ HOSP IP/OBS HIGH 50: CPT | Performed by: INTERNAL MEDICINE

## 2025-05-16 PROCEDURE — 99239 HOSP IP/OBS DSCHRG MGMT >30: CPT | Performed by: HOSPITALIST

## 2025-05-16 NOTE — PLAN OF CARE
Assumed care of patient at change of shift, resting in bed. AO x4. NSR on tele monitor. O2 sat adequate on RA. Denies chest pain and shortness of breath. Plan of care updated. Call from OR saying procedure was canceled, so patient can eat. Bed locked, in lowest position. Call light and personal items in reach. All needs met.    Problem: SAFETY ADULT - FALL  Goal: Free from fall injury  Description: INTERVENTIONS:- Assess pt frequently for physical needs- Identify cognitive and physical deficits and behaviors that affect risk of falls.- [place] fall precautions as indicated by assessment.- Educate pt/family on patient safety including physical limitations- Instruct pt to call for assistance with activity based on assessment- Modify environment to reduce risk of injury- Provide assistive devices as appropriate- Consider OT/PT consult to assist with strengthening/mobility- Encourage toileting schedule  Outcome: Adequate for Discharge     Problem: RESPIRATORY - ADULT  Goal: Achieves optimal ventilation and oxygenation  Description: INTERVENTIONS:- Assess for changes in respiratory status- Assess for changes in mentation and behavior- Position to facilitate oxygenation and minimize respiratory effort- Oxygen supplementation based on oxygen saturation or ABGs- Provide Smoking Cessation handout, if applicable- Encourage broncho-pulmonary hygiene including cough, deep breathe, Incentive Spirometry- Assess the need for suctioning and perform as needed- Assess and instruct to report SOB or any respiratory difficulty- Respiratory Therapy support as indicated- Manage/alleviate anxiety- Monitor for signs/symptoms of CO2 retention  Outcome: Adequate for Discharge     Problem: HEMATOLOGIC - ADULT  Goal: Maintains hematologic stability  Description: INTERVENTIONS- Assess for signs and symptoms of bleeding or hemorrhage- Monitor labs and vital signs for trends- Administer supportive blood products/factors, fluids and medications  as ordered and appropriate- Administer supportive blood products/factors as ordered and appropriate  Outcome: Adequate for Discharge  Goal: Free from bleeding injury  Description: (Example usage: patient with low platelets)INTERVENTIONS:- Avoid intramuscular injections, enemas and rectal medication administration- Ensure safe mobilization of patient- Hold pressure on venipuncture sites to achieve adequate hemostasis- Assess for signs and symptoms of internal bleeding- Monitor lab trends- Patient is to report abnormal signs of bleeding to staff- Avoid use of toothpicks and dental floss- Use electric shaver for shaving- Use soft bristle tooth brush- Limit straining and forceful nose blowing  Outcome: Adequate for Discharge     Problem: Safety Risk - Non-Violent Restraints  Goal: Patient will remain free from self-harm  Description: INTERVENTIONS:- Apply the least restrictive restraint to prevent harm- Notify patient and family of reasons restraints applied- Assess for any contributing factors to confusion (electrolyte disturbances, delirium, medications)- Discontinue any unnecessary medical devices as soon as possible- Assess the patient's physical comfort, circulation, skin condition, hydration, nutrition and elimination needs - Reorient and redirection as needed- Assess for the need to continue restraints  Outcome: Adequate for Discharge     Problem: Delirium  Goal: Minimize duration of delirium  Description: Interventions:- Encourage use of hearing aids, eye glasses- Promote highest level of mobility daily- Provide frequent reorientation- Promote wakefulness i.e. lights on, blinds open- Promote sleep, encourage patient's normal rest cycle i.e. lights off, TV off, minimize noise and interruptions- Encourage family to assist in orientation and promotion of home routines  Outcome: Adequate for Discharge     Problem: NEUROLOGICAL - ADULT  Goal: Achieves stable or improved neurological status  Description:  INTERVENTIONS- Assess for and report changes in neurological status- Initiate measures to prevent increased intracranial pressure- Maintain blood pressure and fluid volume within ordered parameters to optimize cerebral perfusion and minimize risk of hemorrhage- Monitor temperature, glucose, and sodium. Initiate appropriate interventions as ordered  Outcome: Adequate for Discharge     Problem: Patient/Family Goals  Goal: Patient/Family Long Term Goal  Description: Patient's Long Term Goal: To be discharged  Interventions:- Eat a heart healthy diet  - attend all follow up appointments  - take medications as prescribed    - See additional Care Plan goals for specific interventions  Outcome: Adequate for Discharge  Goal: Patient/Family Short Term Goal  Description: Patient's Short Term Goal: To feel better  Interventions: - IV lasix, IV abx  - See additional Care Plan goals for specific interventions  Outcome: Adequate for Discharge

## 2025-05-16 NOTE — CONSULTS
[facility]    PATIENT'S NAME: JULIUS FOY   ATTENDING PHYSICIAN: Live Ott MD   CONSULTING PHYSICIAN: Fermin De La Fuente M.D.   PATIENT ACCOUNT#:   [id number]    LOCATION:  [address] [place] [address]  MEDICAL RECORD #:   [id number]       YOB: 1960  ADMISSION DATE:       04/29/2025      CONSULT DATE:  05/15/2025    REPORT OF CONSULTATION    FOLLOWUP CONSULTATION    Patient was scheduled for surgery for today as an emergency after fistulogram and angiogram were performed.  Discussed this with Dr. Rob Reyes.  He believes there is a thrombus in the origin of the ulnar artery.  Duplex ultrasound before the surgery back in March 2024 showed that ulnar artery was patent, somewhat small but patent.  The first ultrasound after the surgery in August showed flow going to the radial artery, but no comment was made to the ulnar artery.  At this time, he has flow in the ulnar artery but is weak, as well as in the palmar arch. Flow was going to the hand.  He is neurologically still intact, but his hand is cool and has this numbness on the dorsum of the hand.  I recommended exploration and thrombectomy and opening up the ulnar artery if it is possible.  Dr. Reyes does not feel this is a steal phenomenon.  Arterial anastomosis proximally seemed to be widely open.  The patient, however, when talking to on the phone, did not want the surgery today.  He wanted to wait for his wife to show up, and he was agreeable, at least on the phone, for surgery tomorrow but not today.  He was being fed.  We will keep him on heparin and re-evaluate him tomorrow and see if he wants to do surgery.  He is on the schedule again, but it is his decision whether he wants to go to the surgery or not and will talk to his wife also.    Dictated By Fermin eD La Fuente M.D.  d: 05/15/2025 17:25:09  t: 05/15/2025 17:46:09  Job [id number]  HERRERA/    cc: Fermin De La Fuente M.D.

## 2025-05-16 NOTE — TRANSFER CENTER NOTE
[facility] Transfer Note:  Reason for transfer:     2nd opinion family request    Request initiated by:    Dr Ott      Active Acute Medical issue:    4) Occluded L UE AVG s/p declot 5/9 + 5/13; on heparin gtt due to ulnar thrombus -> will need thrombectomy as outlined by Dr. De La Fuente.      5) Anemia- due to CKD / above / chronic disease. On EPO with HD     6) Longstanding DM 2     7) PAD s/p remote R BKA; s/p recent L BKA      Pt requesting 2nd opinion re: #4 before moving forward with surgery- will ask Dr. Najjar et al       Anticipated Transfer Plan:     Spoke to Samara with [facility] transfer center. Face sheet faxed. They will reach out to their medicine on call.

## 2025-05-16 NOTE — DISCHARGE PLANNING
Risks/benefits/alternatives discussed with patient as applicable to reason for leaving AMA? Yes  Provider(s) contacted: Jung, Fang, Najjar  Patient education/AVS/follow-up appointments/prescriptions given? no  AMA paperwork completed? yes  Removed IV access/decannulated port if applicable and patient belongings returned.    Documented from Admission Navigator:  Belongings at Bedside: None  Belongings Sent to Public Safety: None  Medications brought by patient?: No

## 2025-05-16 NOTE — TRANSFER CENTER NOTE
Spoke to Alison with [facility]. They are waiting to hear back from the medical MD concerning the transfer.

## 2025-05-16 NOTE — TRANSFER CENTER NOTE
Updated Samara with [facility] that the pt signed AMA and is taking an ambulance to [facility] ED.

## 2025-05-16 NOTE — CM/SW NOTE
CM consulted for patient tertiary transfer request to [facility] V[facility]; CM called office of [facility] system Vascular Surgeon Dr. Juan Smith at [phone]  for second opinion consult  regarding left arm AV graft, awaiting call back.     1045: CM spoke to [facility] system JAMEL Pate (Dr. Smith's office) for update; transfer request will need to go through [facility] system Transfer Center.  CM asked patient care team providers for Tertiary transfer order and follow up with [facility] CM ([phone]) if clinically appropriate.      Juani Pretty, RN Case Manager [phone]

## 2025-05-16 NOTE — DISCHARGE PLANNING
1340: patient and wife informed this RN that they would like to leave AMA. They spoke with someone at [facility], who is aware they are coming. They have arranged for pickup by [facility]. All current providers have been notified. Patient signed AMA form. Heparin gtt stopped. Temp cath removed at 2pm. Patient agreeable to staying the required time to be sure site is not bleeding. Tele box and IV removed.    1515: Patient discharged via [facility] with all paperwork and belongings.

## 2025-05-16 NOTE — PROGRESS NOTES
[facility]   part of [facility]     Hospitalist Progress Note     Richy Goins Patient Status:  Inpatient    1960 MRN [id number]   Location [facility] 8NE-A Attending Live Ott MD   Hosp Day # 17 PCP Woody Dahl MD     Chief Complaint: Dyspnea    Subjective:     Patient seen this morning, wife at bedside.  Patient with continued numbness to left hand.  Wife also endorsing worsening wounds to his left leg.  Denies fever, n/v, no new complaints.      Objective:    Review of Systems:   A comprehensive review of systems was completed; pertinent positive and negatives stated in subjective.    Vital signs:  Temp:  [97.7 °F (36.5 °C)-98.6 °F (37 °C)] 98.1 °F (36.7 °C)  Pulse:  [63-78] 65  Resp:  [17-20] 19  BP: (119-150)/(59-77) 143/68  SpO2:  [93 %-95 %] 93 %    Physical Exam:    General: No acute distress  Respiratory: No wheezes, no rhonchi  Cardiovascular: S1, S2, regular rate and rhythm  Abdomen: Soft, Non-tender, non-distended, positive bowel sounds  Neuro: Left hand with decreased sensation to tips of fingers, able to squeeze, slightly cool to touch  Extremities: B/l AKA, LLE incision with staples in place, LUE graft in place            Diagnostic Data:    Labs:  Recent Labs   Lab 05/10/25  0459 25  0521 25  0514 25  0444 05/15/25  0603 25  0629   WBC 11.4* 14.5* 13.4*  --   --  9.9   HGB 11.8* 11.1* 11.1*  --   --  10.2*   MCV 90.1 91.7 92.0  --   --  89.5   .0 294.0 316.0 229.0 243.0 243.0       Recent Labs   Lab 25  0521 25  0514 25  0629   * 154* 112*   BUN 53* 71* 65*   CREATSERUM 6.25* 7.48* 6.87*   CA 8.6* 8.9 8.8    143 140   K 4.7 4.9 4.8    107 107   CO2 27.0 24.0 20.0*       Estimated Glomerular Filtration Rate: 8 mL/min/1.73m2 (A) (result from lab).    No results for input(s): \"TROP\", \"TROPHS\", \"CK\" in the last 168 hours.    No results for input(s): \"PTP\", \"INR\" in the last 168 hours.                Microbiology    Hospital Encounter on 04/29/25   1. Body Fluid Cult Aerobic and Anaerobic     Status: None    Collection Time: 05/02/25 10:00 AM    Specimen: Pleural Fluid, Left; Body fluid, unspecified   Result Value Ref Range    Body Fluid Culture Result No Growth 5 Days N/A    Body Fluid Smear 1+ WBCs seen N/A    Body Fluid Smear No organisms seen N/A    Body Fluid Smear This is a cytocentrifuged smear. N/A         Imaging: Reviewed in Epic.    Medications: Scheduled Medications[1]    Assessment & Plan:      #Ulnar artery thromboembolism   Arterial studies reviewed   Noted on IR declot check - 5/15  Did not respond to thrombolysis per report   Vascular surgery consulted - Patient/family would like a 2nd opinion, specifically would like patient transferred to [facility] - his previous vascular surgeon is based there - Dr. Smith.  Discussed with transfer center, not a higher level of need but transfer for 2nd opinon being arranged.  Will transfer/discharge once accepted and bed available  Continue heparin drip    #Acute Left Intrathoracic hemorrhage 2/2 intercostal artery injury with left hemothorax  S/p L VATS washout and control of intercostal bleeding  Chest tube removed by CV surgeon on 5/7/2025.     #Acute hypoxic resp failure  Resolved  oxygen weaned off and on room air on 5/7/2025.     #ESBL PNA  Finished IV abx - IV merrem      #Acute Metabolic Encephalopathy  Back to baseline      #Hemorrhagic shock on admission  Resolved, off pressors     #PAD s/p b/l AKA  Left incision with stapels and eschar; does not appear infected  Wound care - re-evaluation today - discussed with nurse   On PPX ABX     #Acute blood loss anemia on admission  S/p massive transfusion protocol on admission  Monitor Hgb  No s/s of further bleeding     #Cardiac arrest  Due to above  ROSC successful  EKG abnormal  Echo noted  Cardiology following, ischemic w/u deferred as likely reactive to above     #Left Hemithorax  Likely due to  ESRD  Has been recurrent  S/p thora 5/1, cytology negative     #Occluded LUE AVG  Status post declot left upper extremity AV fistula by IR done on 5/9/2025 and 5/13     #ESRD on HD  Cont. HD -HD today      #DM type II with ESRD on HD  Elevated as stress reaction  Cont. Insulin and adjust as needed  A1c 5.9 though unreliable at this point     #HTN  Home BP meds on hold        Live Ott MD    Supplementary Documentation:     Quality:  DVT Mechanical Prophylaxis:   SCDs,    DVT Pharmacologic Prophylaxis   Medication    heparin (Porcine) 18875 units/250mL infusion PE/DVT/THROMBUS CONTINUOUS                Code Status: Full Code  Soni: No urinary catheter in place  Soni Duration (in days):   Central line:    [place]:     Discharge is dependent on: Arterial duplex  At this point Mr. Goins is expected to be discharge to: [facility]     The 21st Century Cures Act makes medical notes like these available to patients in the interest of transparency. Please be advised this is a medical document. Medical documents are intended to carry relevant information, facts as evident, and the clinical opinion of the practitioner. The medical note is intended as peer to peer communication and may appear blunt or direct. It is written in medical language and may contain abbreviations or verbiage that are unfamiliar.                         [1]    [START ON 5/17/2025] epoetin juan josé  10,000 Units Intravenous Once    ceFAZolin  2 g Intravenous Once    atorvastatin  40 mg Oral Nightly    tamsulosin  0.4 mg Oral Nightly    lidocaine-menthol  1 patch Transdermal Daily    gabapentin  400 mg Oral Nightly    acetaminophen  650 mg Oral 4 times per day    melatonin  5 mg Oral Nightly    insulin aspart  1-68 Units Subcutaneous TID CC and HS    docusate sodium  100 mg Oral BID    sennosides  8.6 mg Oral BID    sevelamer carbonate  1,600 mg Oral TID CC

## 2025-05-16 NOTE — PHYSICAL THERAPY NOTE
Per EMR - possible L arm thrombectomy 5/16/2025 - awaiting further medical management.     Will continue to follow

## 2025-05-16 NOTE — CM/SW NOTE
SW met with pt and spouse at bedside per request to discuss transfer. SW discussed that given the second opinion and procedures can be done at [facility], the transfer is potentially considered a family/pt request. SW did indicate that pt would most likely be responsible for cost of transport (estimated cost of $3000+) family ok with the cost for transportation. SW briefly discussed that it is important to consider all factors, etc. Family wanting to proceed with transfer. Explained that [facility] may not have a bed available for transfers for a few days since they are a high acuity hospital. SW encouraged spouse to reach out to MD's office at [facility] to encourage the transfer.      &  to remain available and supportive for discharge planning needs.    Lola PAT MSW, LCSW  Discharge Planner

## 2025-05-16 NOTE — PLAN OF CARE
Received patient at 2030. Patient is A&O x 4. Mobility is limited. Incontinent bladder and bowel. SpO2 maintained on room air. Denies pain. NSR on tele. Bed in low position and call light within reach. Reviewed plan of care and patient verbalizes understanding.     Problem: SAFETY ADULT - FALL  Goal: Free from fall injury  Description: INTERVENTIONS:- Assess pt frequently for physical needs- Identify cognitive and physical deficits and behaviors that affect risk of falls.- [place] fall precautions as indicated by assessment.- Educate pt/family on patient safety including physical limitations- Instruct pt to call for assistance with activity based on assessment- Modify environment to reduce risk of injury- Provide assistive devices as appropriate- Consider OT/PT consult to assist with strengthening/mobility- Encourage toileting schedule  Outcome: Progressing     Problem: RESPIRATORY - ADULT  Goal: Achieves optimal ventilation and oxygenation  Description: INTERVENTIONS:- Assess for changes in respiratory status- Assess for changes in mentation and behavior- Position to facilitate oxygenation and minimize respiratory effort- Oxygen supplementation based on oxygen saturation or ABGs- Provide Smoking Cessation handout, if applicable- Encourage broncho-pulmonary hygiene including cough, deep breathe, Incentive Spirometry- Assess the need for suctioning and perform as needed- Assess and instruct to report SOB or any respiratory difficulty- Respiratory Therapy support as indicated- Manage/alleviate anxiety- Monitor for signs/symptoms of CO2 retention  Outcome: Progressing     Problem: HEMATOLOGIC - ADULT  Goal: Maintains hematologic stability  Description: INTERVENTIONS- Assess for signs and symptoms of bleeding or hemorrhage- Monitor labs and vital signs for trends- Administer supportive blood products/factors, fluids and medications as ordered and appropriate- Administer supportive blood products/factors as ordered and  appropriate  Outcome: Progressing  Goal: Free from bleeding injury  Description: (Example usage: patient with low platelets)INTERVENTIONS:- Avoid intramuscular injections, enemas and rectal medication administration- Ensure safe mobilization of patient- Hold pressure on venipuncture sites to achieve adequate hemostasis- Assess for signs and symptoms of internal bleeding- Monitor lab trends- Patient is to report abnormal signs of bleeding to staff- Avoid use of toothpicks and dental floss- Use electric shaver for shaving- Use soft bristle tooth brush- Limit straining and forceful nose blowing  Outcome: Progressing     Problem: Safety Risk - Non-Violent Restraints  Goal: Patient will remain free from self-harm  Description: INTERVENTIONS:- Apply the least restrictive restraint to prevent harm- Notify patient and family of reasons restraints applied- Assess for any contributing factors to confusion (electrolyte disturbances, delirium, medications)- Discontinue any unnecessary medical devices as soon as possible- Assess the patient's physical comfort, circulation, skin condition, hydration, nutrition and elimination needs - Reorient and redirection as needed- Assess for the need to continue restraints  Outcome: Progressing     Problem: Delirium  Goal: Minimize duration of delirium  Description: Interventions:- Encourage use of hearing aids, eye glasses- Promote highest level of mobility daily- Provide frequent reorientation- Promote wakefulness i.e. lights on, blinds open- Promote sleep, encourage patient's normal rest cycle i.e. lights off, TV off, minimize noise and interruptions- Encourage family to assist in orientation and promotion of home routines  Outcome: Progressing     Problem: NEUROLOGICAL - ADULT  Goal: Achieves stable or improved neurological status  Description: INTERVENTIONS- Assess for and report changes in neurological status- Initiate measures to prevent increased intracranial pressure- Maintain blood  pressure and fluid volume within ordered parameters to optimize cerebral perfusion and minimize risk of hemorrhage- Monitor temperature, glucose, and sodium. Initiate appropriate interventions as ordered  Outcome: Progressing     Problem: Patient/Family Goals  Goal: Patient/Family Long Term Goal  Description: Patient's Long Term Goal: To be discharged  Interventions:- Eat a heart healthy diet  - attend all follow up appointments  - take medications as prescribed    - See additional Care Plan goals for specific interventions  Outcome: Progressing  Goal: Patient/Family Short Term Goal  Description: Patient's Short Term Goal: To feel better  Interventions: - IV lasix, IV abx  - See additional Care Plan goals for specific interventions  Outcome: Progressing

## 2025-05-16 NOTE — PROGRESS NOTES
[facility]  Nephrology Progress Note    Richy Goins Attending:  Jeremi Norwood MD       Assessment and Plan:    1) ESRD- due to longstanding DM 2; last HD . Lytes / volume OK. Next HD Sat per usual routine     2) Acute hypoxic resp failure due to pul edema + pleural effusion / hemothorax / arrest- extubated     3) L pleural effusion s/p thoracentesis -> massive hemothorax s/p ligation of intercostal artery + chest tube (dc'ed )    4) Occluded L UE AVG s/p declot  + ; on heparin gtt due to ulnar thrombus -> will need thrombectomy as outlined by Dr. De La Fuente.      5) Anemia- due to CKD / above / chronic disease. On EPO with HD     6) Longstanding DM 2     7) PAD s/p remote R BKA; s/p recent L BKA     Pt requesting 2nd opinion re: #4 before moving forward with surgery- will ask Dr. Najjar et al      Subjective:  Awake alert in good spirits no issues overnight    Physical Exam:   /59 (BP Location: Right arm)   Pulse 63   Temp 98.4 °F (36.9 °C) (Oral)   Resp 20   Wt 163 lb 5.8 oz (74.1 kg)   SpO2 95%   BMI 24.84 kg/m²   Temp (24hrs), Av.3 °F (36.8 °C), Min:97.7 °F (36.5 °C), Max:98.6 °F (37 °C)       Intake/Output Summary (Last 24 hours) at 2025 0752  Last data filed at 2025 0520  Gross per 24 hour   Intake 360 ml   Output 1 ml   Net 359 ml     Wt Readings from Last 3 Encounters:   25 163 lb 5.8 oz (74.1 kg)   25 170 lb (77.1 kg)   10/02/24 181 lb (82.1 kg)     General: awake alert  HEENT: No scleral icterus, MMM  Neck: Supple, no SARITA or thyromegaly  Cardiac: Regular rate and rhythm, S1, S2 normal, no murmur or tub  Lungs: Decreased BS at bases bilaterally   Abdomen: Soft, non-tender. + bowel sounds, no palpable organomegaly  Extremities: Without clubbing, cyanosis; no edema  Neurologic: Cranial nerves grossly intact, moving all extremities  Skin: Warm and dry, no rashes       Labs:   Lab Results   Component Value Date    WBC 9.9 2025    HGB 10.2  05/16/2025    HCT 31.4 05/16/2025    .0 05/16/2025    CREATSERUM 6.87 05/16/2025    BUN 65 05/16/2025     05/16/2025    K 4.8 05/16/2025     05/16/2025    CO2 20.0 05/16/2025     05/16/2025    CA 8.8 05/16/2025    PTT 69.0 05/16/2025    PGLU 115 05/16/2025       Imaging:  All imaging studies reviewed.    Meds:   Current Hospital Medications[1]      Questions/concerns were discussed with patient and/or family by bedside.          Keeley Blackwell MD  5/16/2025  752 AM         [1]   Current Facility-Administered Medications   Medication Dose Route Frequency    ceFAZolin (Ancef) 2g in 10mL IV syringe premix  2 g Intravenous Once    atorvastatin (Lipitor) tab 40 mg  40 mg Oral Nightly    tamsulosin (Flomax) cap 0.4 mg  0.4 mg Oral Nightly    heparin (Porcine) 03667 units/250mL infusion PE/DVT/THROMBUS CONTINUOUS  200-3,000 Units/hr Intravenous Continuous    HYDROcodone-acetaminophen (Norco) 5-325 MG per tab 1 tablet  1 tablet Oral Q6H PRN    Or    HYDROcodone-acetaminophen (Norco) 5-325 MG per tab 2 tablet  2 tablet Oral Q6H PRN    lidocaine-menthol 4-1 % patch 1 patch  1 patch Transdermal Daily    gabapentin (Neurontin) cap 400 mg  400 mg Oral Nightly    acetaminophen (Tylenol) tab 650 mg  650 mg Oral 4 times per day    melatonin cap/tab 5 mg  5 mg Oral Nightly    insulin aspart (NovoLOG) 100 Units/mL FlexPen 1-68 Units  1-68 Units Subcutaneous TID CC and HS    docusate sodium (Colace) cap 100 mg  100 mg Oral BID    sennosides (Senokot) tab 8.6 mg  8.6 mg Oral BID    polyethylene glycol (PEG 3350) (Miralax) 17 g oral packet 17 g  17 g Per NG Tube Daily PRN    sevelamer carbonate (Renvela) tab 1,600 mg  1,600 mg Oral TID CC    glucose (Dex4) 15 GM/59ML oral liquid 15 g  15 g Oral Q15 Min PRN    Or    glucose (Glutose) 40% oral gel 15 g  15 g Oral Q15 Min PRN    Or    glucose-vitamin C (Dex-4) chewable tab 4 tablet  4 tablet Oral Q15 Min PRN    Or    dextrose 50% injection 50 mL  50 mL Intravenous  Q15 Min PRN    Or    glucose (Dex4) 15 GM/59ML oral liquid 30 g  30 g Oral Q15 Min PRN    Or    glucose (Glutose) 40% oral gel 30 g  30 g Oral Q15 Min PRN    Or    glucose-vitamin C (Dex-4) chewable tab 8 tablet  8 tablet Oral Q15 Min PRN    sennosides (Senokot) tab 17.2 mg  17.2 mg Oral Nightly PRN    ondansetron (Zofran) 4 MG/2ML injection 4 mg  4 mg Intravenous Q6H PRN

## 2025-05-16 NOTE — PAYOR COMM NOTE
--------------  CONTINUED STAY REVIEW    Payor: [employer]  Subscriber #:  [id number]  Authorization Number: [id number]    Admit date: 4/29/25  Admit time: 10:59 PM    5/15    Chief Complaint: Dyspnea     []Expand by Default  #Ulnar artery thromboembolism   Arterial studies reviewed   Noted on IR declot check - 5/15  Did not respond to thrombolysis per report   Vascular surgery - Dr. De La Fuente on case   Heparin drip     #Acute Left Intrathoracic hemorrhage 2/2 intercostal artery injury with left hemothorax  S/p L VATS washout and control of intercostal bleeding  Chest tube removed by CV surgeon on 5/7/2025.     #Acute hypoxic resp failure  Resolved  oxygen weaned off and on room air on 5/7/2025.     #ESBL PNA  Finished IV abx - IV merrem      #Acute Metabolic Encephalopathy  Back to baseline      #Hemorrhagic shock on admission  Resolved, off pressors     #PAD s/p b/l AKA  Left incision with stapels and eschar; does not appear infected  Wound care  On PPX ABX     #Acute blood loss anemia on admission  S/p massive transfusion protocol on admission  Monitor Hgb  No s/s of further bleeding     #Cardiac arrest  Due to above  ROSC successful  EKG abnormal  Echo noted  Cardiology following, ischemic w/u deferred as likely reactive to above     #Left Hemithorax  Likely due to ESRD  Has been recurrent  S/p thora 5/1, cytology negative      #Occluded LUE AVG  Status post declot left upper extremity AV fistula by IR done on 5/9/2025 and 5/13     #ESRD on HD  Cont. HD -HD today      #DM type II with ESRD on HD    5/16    []Expand by Default  #Ulnar artery thromboembolism   Arterial studies reviewed   Noted on IR declot check - 5/15  Did not respond to thrombolysis per report   Vascular surgery consulted - Patient/family would like a 2nd opinion, specifically would like patient transferred to [facility] - his previous vascular surgeon is based there - Dr. Smith.  Discussed with transfer center, not a higher level of need but transfer  for 2nd opinon being arranged.  Will transfer/discharge once accepted and bed available  Continue heparin drip     #Acute Left Intrathoracic hemorrhage 2/2 intercostal artery injury with left hemothorax  S/p L VATS washout and control of intercostal bleeding  Chest tube removed by CV surgeon on 5/7/2025.     #Acute hypoxic resp failure  Resolved  oxygen weaned off and on room air on 5/7/2025.     #ESBL PNA  Finished IV abx - IV merrem      #Acute Metabolic Encephalopathy  Back to baseline      #Hemorrhagic shock on admission  Resolved, off pressors     #PAD s/p b/l AKA  Left incision with stapels and eschar; does not appear infected  Wound care - re-evaluation today - discussed with nurse   On PPX ABX     #Acute blood loss anemia on admission  S/p massive transfusion protocol on admission  Monitor Hgb  No s/s of further bleeding     #Cardiac arrest  Due to above  ROSC successful  EKG abnormal  Echo noted  Cardiology following, ischemic w/u deferred as likely reactive to above     #Left Hemithorax  Likely due to ESRD  Has been recurrent  S/p thora 5/1, cytology negative     #Occluded LUE AVG  Status post declot left upper extremity AV fistula by IR done on 5/9/2025 and 5/13     #ESRD on HD  Cont. HD -HD today      #DM type II with ESRD on HD  Elevated as stress reaction  Cont. Insulin and adjust as needed  A1c 5.9 though unreliable at this point     #HTN      MEDICATIONS ADMINISTERED IN LAST 1 DAY:  acetaminophen (Tylenol) tab 650 mg       Date Action Dose Route User    5/15/2025 1806 Given 650 mg Oral Ashly Morris, ARAM          atorvastatin (Lipitor) tab 40 mg       Date Action Dose Route User    5/15/2025 2147 Given 40 mg Oral Linda Toth RN          heparin (Porcine) 91504 units/250mL infusion PE/DVT/THROMBUS CONTINUOUS       Date Action Dose Route User    5/16/2025 1017 New Bag 1,400 Units/hr Intravenous Mirza Johnston, RN    5/15/2025 1552 New Bag 1,400 Units/hr Intravenous Ashly Morris, RN           gabapentin (Neurontin) cap 400 mg       Date Action Dose Route User    5/15/2025 2147 Given 400 mg Oral Linda Toth RN          insulin aspart (NovoLOG) 100 Units/mL FlexPen 1-68 Units       Date Action Dose Route User    5/15/2025 1807 Given 1 Units Subcutaneous (Left Lower Abdomen) Ashly Morris RN          lidocaine-menthol 4-1 % patch 1 patch       Date Action Dose Route User    5/16/2025 0834 Patch Applied 1 patch Transdermal (Left Lateral Chest) Ashly Morris RN          melatonin cap/tab 5 mg       Date Action Dose Route User    5/15/2025 2147 Given 5 mg Oral Linda Toth RN          sevelamer carbonate (Renvela) tab 1,600 mg       Date Action Dose Route User    5/16/2025 0834 Given 1,600 mg Oral Ashly Morris RN    5/15/2025 1806 Given 1,600 mg Oral Ashly Morris RN          tamsulosin (Flomax) cap 0.4 mg       Date Action Dose Route User    5/15/2025 2147 Given 0.4 mg Oral Linda Toth RN            Vitals (last day)       Date/Time Temp Pulse Resp BP SpO2 Weight O2 Device O2 Flow Rate (L/min) [facility]    05/16/25 1230 98.2 °F (36.8 °C) 65 20 133/64 97 % -- None (Room air) --     05/16/25 1145 -- 68 -- -- -- -- -- --     05/16/25 1028 -- 65 -- -- -- -- -- --     05/16/25 0759 98.1 °F (36.7 °C) 66 19 143/68 93 % -- None (Room air) --     05/16/25 0520 98.4 °F (36.9 °C) 63 20 125/59 95 % 163 lb 5.8 oz (74.1 kg) None (Room air) --     05/15/25 2301 98.5 °F (36.9 °C) 67 17 136/65 94 % -- None (Room air) --     05/15/25 2000 -- 73 -- -- -- -- -- --     05/15/25 1937 98.6 °F (37 °C) 78 20 150/77 95 % -- None (Room air) --     05/15/25 1658 -- 66 -- -- -- -- -- --     05/15/25 1534 97.7 °F (36.5 °C) 72 20 119/62 93 % -- None (Room air) --     05/15/25 1120 98.1 °F (36.7 °C) 70 20 128/64 93 % -- -- --     05/15/25 0844 98.5 °F (36.9 °C) 62 20 125/58 93 % -- None (Room air) --     05/15/25 0506 97.9 °F (36.6 °C) 67 18 119/67 97 % 162 lb 11.2 oz (73.8 kg)  None (Room air) -- ZH    05/15/25 0015 98 °F (36.7 °C) 67 18 141/71 -- -- None (Room air) -- AW          CIWA Scores (since admission)       None          Blood Transfusion Record       Product Unit Status Volume Start End            Transfuse RBC      25  [id number]  [id number] Completed 05/02/25 1832 350 mL 05/02/25 1625 05/02/25 1635                Transfuse cryoprecipitate      25  [id number]  [id number] Completed 05/02/25 1832 350 mL 05/02/25 1655 05/02/25 1705                Transfuse fresh frozen plasma      25  [id number]  [id number] Completed 05/02/25 1832 350 mL 05/02/25 1613 05/02/25 1623                Transfuse platelets      25  [id number]  [id number] Completed 05/02/25 1832 350 mL 05/02/25 1647 05/02/25 1657

## 2025-05-17 NOTE — DISCHARGE SUMMARY
[facility]     Richy Goins Patient Status:  Inpatient    1960 MRN [id number]   Location [facility] 8NE-A Attending No att. providers found   Hosp Day # 17 PCP Woody Dahl MD     Date of Admission: 2025  Date of Discharge:  2025     Discharge Disposition: Left Against Medical Advice    Discharge Diagnosis:    #Ulnar artery thromboembolism   #Occluded LUE AVG  #Acute Left Intrathoracic hemorrhage   #Left Hemithorax  #Hemorrhagic shock   #Acute blood loss anemia   #Acute hypoxic resp failure  #Cardiac arrest  #ESBL PNA  #Acute Metabolic Encephalopathy  #PAD s/p b/l AKA  #ESRD on HD  #DM type II with ESRD on HD  #Hyperkalemia  #HTN    History of Present Illness: Richy Goins is a 64 year old male with PMHX ESRD (on HD //Sat)/ DM/ HTN/ PAD/ HLD who presented to the hospital for dyspnea and generally feeling unwell. He was hospitalized about 4 weeks ago at [facility] for wet gangrene with proteus mirabilis and Klebsiella oxytoca of his left lower leg with severe PAD and had a left BKA. He reports since being discharged to rehab about 1 week ago that they were not able to get him transport to dialysis so he missed HD since last Thursday. He felt like he may be dehydrated.     Brief Synopsis:   Patient presented initially to the hospital with shortness of breath and generalized weakness.  Patient had recent hospitalization for wet gangrene, left AKA and was sent to rehab.  Patient had missed hemodialysis prior to coming to the hospital, he states due to transportation issues.  Ned was admitted and treated for acute hypoxic respiratory failure due to pulmonary edema and pleural effusion, likely fluid overload from missed hemodialysis.  Patient underwent urgent hemodialysis due to hyperkalemia and fluid overload.  He also underwent a thoracentesis with IR on .  Patient's ultrasound-guided thoracentesis was complicated due to intercostal artery injury  which resulted in a left hemothorax, hemorrhagic shock, acute blood loss anemia and eventual cardiac arrest.  Patient recovered status post VATS procedure with CV surgery.  He was treated for ESBL pneumonia in the ICU as well.  He was weaned off pressors and ventilator support and transferred to the medical floor.  Patient had an occluded left upper extremity AV graft and decision was made to undergo declot procedure with IR.  Postprocedure patient had left hand numbness and was initiated on heparin drip.  Vascular surgery was consulted and IR performed a fistulogram and attempted chemical thrombolysis which failed.  He was noted to have an ulnar artery thrombus during this procedure.    Plan today was for vascular surgery to perform a thrombectomy of the ulnar artery thrombus.  However today patient and family were unhappy with care and decided to cancel the procedure and try for transfer to [facility] where patient had initial amputation of his leg.  Unfortunately, prior to getting approval for transfer patient left AGAINST MEDICAL ADVICE and travel to the [facility] ER.  Patient up to this time per vascular team did not show evidence of permanent ischemic damage to the left hand and we voiced concerns that him being off heparin drip during his transfer could result in permanent damage.  Patient still decided to leave AGAINST MEDICAL ADVICE.  Per nursing staff this was recommended to him by his [facility] physicians, however this could not be corroborated.      Lace+ Score: 85  59-90 High Risk  29-58 Medium Risk  0-28   Low Risk       TCM Follow-Up Recommendation:  LACE > 58: High Risk of readmission after discharge from the hospital.      Procedures during hospitalization:   Us guided Thoracentesis on 5/2  Chest tube insertion on 5/2  FLEXIBLE BRONCHOSCOPY. VIDEO-ASSISTED THORACOSCOPY. HEMATOMA EVACUATION - 5/2  IR AV graft Declot on 5/9  Fistula declot on 5/13    Consultants:  Nephrology,  Pulmonology, Cardiology, CV surgery, Gen surgery, Critical care, Wound care, Vascular surgery     Discharge Medication List:     Discharge Medications        CHANGE how you take these medications        Instructions Prescription details   pantoprazole 40 MG Tbec  Commonly known as: Protonix  What changed:   when to take this  additional instructions      Take one tablet (40 mg total) by mouth once daily, 30 minutes prior to breakfast.   Quantity: 90 tablet  Refills: 3            CONTINUE taking these medications        Instructions Prescription details   amLODIPine 10 MG Tabs  Commonly known as: Norvasc      Take 1 tablet (10 mg total) by mouth daily.   Quantity: 30 tablet  Refills: 1     aspirin 81 MG Tbec      Take 1 tablet (81 mg total) by mouth in the morning.   Refills: 0     atorvastatin 40 MG Tabs  Commonly known as: Lipitor      Take 1 tablet (40 mg total) by mouth nightly. TAKE AT BEDTIME   Refills: 0     BD Pen Needle Lara 2nd Gen 32G X 4 MM Misc  Generic drug: Insulin Pen Needle      Use to inject insulin up to 5 times daily   Quantity: 450 each  Refills: 1     calcitRIOL 0.5 MCG Caps  Commonly known as: ROCALTROL      Take 1 capsule (0.5 mcg total) by mouth See Admin Instructions. Every Tuesday, Thursday, and Saturday   Refills: 0     gabapentin 300 MG Caps  Commonly known as: Neurontin      Take 1 capsule (300 mg total) by mouth nightly. TAKE AT BEDTIME   Refills: 0     Insulin Glargine-yfgn 100 UNIT/ML Sopn      Inject 15 Units into the skin nightly.   Quantity: 15 mL  Refills: 0     Levemir FlexPen 100 UNIT/ML Sopn  Generic drug: insulin detemir      Inject 15 Units into the skin nightly.   Refills: 0     sevelamer carbonate 800 MG Tabs  Commonly known as: Renvela      Take 2 tablets (1,600 mg total) by mouth 3 (three) times daily with meals.   Refills: 0     tamsulosin 0.4 MG Caps  Commonly known as: Flomax      Take 1 capsule (0.4 mg total) by mouth nightly. TAKE AT BEDTIME   Refills: 0             STOP taking these medications      cephALEXin 500 MG Caps  Commonly known as: Keflex                 [place]    Follow-up appointment:   Kuldip Ibrahim DO  [address]  [address]  [facility] [address]  [phone]    Follow up in 2 week(s)  Office will call you for follow up appt.    Woody Dahl MD  [address]  [address]  [facility] [address]  [phone]    Schedule an appointment as soon as possible for a visit in 1 week(s)      Appointments for Next 30 Days 5/16/2025 - 6/15/2025      None            Vital signs:  Temp:  [98.1 °F (36.7 °C)-98.6 °F (37 °C)] 98.2 °F (36.8 °C)  Pulse:  [63-78] 65  Resp:  [17-20] 20  BP: (125-150)/(59-77) 133/64  SpO2:  [93 %-97 %] 97 %    Physical Exam:    General: No acute distress  Respiratory: No wheezes, no rhonchi  Cardiovascular: S1, S2, regular rate and rhythm  Abdomen: Soft, Non-tender, non-distended, positive bowel sounds  Neuro: Left hand with decreased sensation to tips of fingers, able to squeeze, slightly cool to touch  Extremities: B/l AKA, LLE incision with staples in place, LUE graft in place              -----------------------------------------------------------------------------------------------  PATIENT DISCHARGE INSTRUCTIONS: See electronic chart    Live Ott MD      The 21st Century Cures Act makes medical notes like these available to patients in the interest of transparency. Please be advised this is a medical document. Medical documents are intended to carry relevant information, facts as evident, and the clinical opinion of the practitioner. The medical note is intended as peer to peer communication and may appear blunt or direct. It is written in medical language and may contain abbreviations or verbiage that are unfamiliar.

## 2025-05-17 NOTE — CONSULTS
[facility]    PATIENT'S NAME: JULIUS FOY   ATTENDING PHYSICIAN: Live Ott MD   CONSULTING PHYSICIAN: Fermin De La Fuente M.D.   PATIENT ACCOUNT#:   [id number]    LOCATION:  [address] [place] [address]  MEDICAL RECORD #:   [id number]       YOB: 1960  ADMISSION DATE:       04/29/2025      CONSULT DATE:  05/16/2025    REPORT OF CONSULTATION    FOLLOWUP CONSULTATION    I had a lengthy discussion with Dr. Blackwell today to convince the patient to have surgery, even a possibility for Penumbra by Dr. Crystal.  Dr. Blackwell was thinking the patient was possibly going to go with the procedure.  The operating room was kept open for him, but he decided not to do this, and he transferred to [facility].  I told him yesterday to make sure he gets somebody to treat this as soon as possible, so he made an excellent choice going to [facility] where they can do a thrombectomy of the ulnar artery.  Hopefully, all things will work out for him.    Dictated By Fermin De La Fuente M.D.  d: 05/16/2025 16:40:18  t: 05/16/2025 16:44:16  Job [id number]  JJW/    cc: Fermin De La Fuente M.D.

## 2025-05-19 NOTE — PAYOR COMM NOTE
--------------  DISCHARGE REVIEW    Payor: [facility]  Subscriber #:  [id number]  Authorization Number: [id number]    Admit date: 25  Admit time:  10:59 PM  Discharge Date: 2025  3:36 PM     Admitting Physician: Jeremi Norwood MD  Attending Physician:  No att. providers found  Primary Care Physician: Woody Dahl MD          Discharge Summary Notes        Discharge Summary signed by Live Ott MD at 2025  7:45 PM       Author: Live Ott MD Specialty: HOSPITALIST Author Type: Physician    Filed: 2025  7:45 PM Date of Service: 2025  7:24 PM Status: Signed    : Live Ott MD (Physician)           [facility]     Richy Goins Patient Status:  Inpatient    1960 MRN [id number]   Location [address] Attending No att. providers found   Hosp Day # 17 PCP Woody Dahl MD     Date of Admission: 2025  Date of Discharge:  2025     Discharge Disposition: Left Against Medical Advice    Discharge Diagnosis:    #Ulnar artery thromboembolism   #Occluded LUE AVG  #Acute Left Intrathoracic hemorrhage   #Left Hemithorax  #Hemorrhagic shock   #Acute blood loss anemia   #Acute hypoxic resp failure  #Cardiac arrest  #ESBL PNA  #Acute Metabolic Encephalopathy  #PAD s/p b/l AKA  #ESRD on HD  #DM type II with ESRD on HD  #Hyperkalemia  #HTN    History of Present Illness: Richy Goins is a 64 year old male with PMHX ESRD (on HD //Sat)/ DM/ HTN/ PAD/ HLD who presented to the hospital for dyspnea and generally feeling unwell. He was hospitalized about 4 weeks ago at [facility] for wet gangrene with proteus mirabilis and Klebsiella oxytoca of his left lower leg with severe PAD and had a left BKA. He reports since being discharged to rehab about 1 week ago that they were not able to get him transport to dialysis so he missed HD since last Thursday. He felt like he may be dehydrated.     Brief Synopsis:   Patient presented  initially to the hospital with shortness of breath and generalized weakness.  Patient had recent hospitalization for wet gangrene, left AKA and was sent to rehab.  Patient had missed hemodialysis prior to coming to the hospital, he states due to transportation issues.  Ned was admitted and treated for acute hypoxic respiratory failure due to pulmonary edema and pleural effusion, likely fluid overload from missed hemodialysis.  Patient underwent urgent hemodialysis due to hyperkalemia and fluid overload.  He also underwent a thoracentesis with IR on 5/2.  Patient's ultrasound-guided thoracentesis was complicated due to intercostal artery injury which resulted in a left hemothorax, hemorrhagic shock, acute blood loss anemia and eventual cardiac arrest.  Patient recovered status post VATS procedure with CV surgery.  He was treated for ESBL pneumonia in the ICU as well.  He was weaned off pressors and ventilator support and transferred to the medical floor.  Patient had an occluded left upper extremity AV graft and decision was made to undergo declot procedure with IR.  Postprocedure patient had left hand numbness and was initiated on heparin drip.  Vascular surgery was consulted and IR performed a fistulogram and attempted chemical thrombolysis which failed.  He was noted to have an ulnar artery thrombus during this procedure.    Plan today was for vascular surgery to perform a thrombectomy of the ulnar artery thrombus.  However today patient and family were unhappy with care and decided to cancel the procedure and try for transfer to [facility] where patient had initial amputation of his leg.  Unfortunately, prior to getting approval for transfer patient left AGAINST MEDICAL ADVICE and travel to the [facility] ER.  Patient up to this time per vascular team did not show evidence of permanent ischemic damage to the left hand and we voiced concerns that him being off heparin drip during his transfer could  result in permanent damage.  Patient still decided to leave AGAINST MEDICAL ADVICE.  Per nursing staff this was recommended to him by his [facility] physicians, however this could not be corroborated.      Lace+ Score: 85  59-90 High Risk  29-58 Medium Risk  0-28   Low Risk       TCM Follow-Up Recommendation:  LACE > 58: High Risk of readmission after discharge from the hospital.      Procedures during hospitalization:   Us guided Thoracentesis on 5/2  Chest tube insertion on 5/2  FLEXIBLE BRONCHOSCOPY. VIDEO-ASSISTED THORACOSCOPY. HEMATOMA EVACUATION - 5/2  IR AV graft Declot on 5/9  Fistula declot on 5/13    Consultants:  Nephrology, Pulmonology, Cardiology, CV surgery, Gen surgery, Critical care, Wound care, Vascular surgery     Discharge Medication List:     Discharge Medications        CHANGE how you take these medications        Instructions Prescription details   pantoprazole 40 MG Tbec  Commonly known as: Protonix  What changed:   when to take this  additional instructions      Take one tablet (40 mg total) by mouth once daily, 30 minutes prior to breakfast.   Quantity: 90 tablet  Refills: 3            CONTINUE taking these medications        Instructions Prescription details   amLODIPine 10 MG Tabs  Commonly known as: Norvasc      Take 1 tablet (10 mg total) by mouth daily.   Quantity: 30 tablet  Refills: 1     aspirin 81 MG Tbec      Take 1 tablet (81 mg total) by mouth in the morning.   Refills: 0     atorvastatin 40 MG Tabs  Commonly known as: Lipitor      Take 1 tablet (40 mg total) by mouth nightly. TAKE AT BEDTIME   Refills: 0     BD Pen Needle Lara 2nd Gen 32G X 4 MM Misc  Generic drug: Insulin Pen Needle      Use to inject insulin up to 5 times daily   Quantity: 450 each  Refills: 1     calcitRIOL 0.5 MCG Caps  Commonly known as: ROCALTROL      Take 1 capsule (0.5 mcg total) by mouth See Admin Instructions. Every Tuesday, Thursday, and Saturday   Refills: 0     gabapentin 300 MG  Caps  Commonly known as: Neurontin      Take 1 capsule (300 mg total) by mouth nightly. TAKE AT BEDTIME   Refills: 0     Insulin Glargine-yfgn 100 UNIT/ML Sopn      Inject 15 Units into the skin nightly.   Quantity: 15 mL  Refills: 0     Levemir FlexPen 100 UNIT/ML Sopn  Generic drug: insulin detemir      Inject 15 Units into the skin nightly.   Refills: 0     sevelamer carbonate 800 MG Tabs  Commonly known as: Renvela      Take 2 tablets (1,600 mg total) by mouth 3 (three) times daily with meals.   Refills: 0     tamsulosin 0.4 MG Caps  Commonly known as: Flomax      Take 1 capsule (0.4 mg total) by mouth nightly. TAKE AT BEDTIME   Refills: 0            STOP taking these medications      cephALEXin 500 MG Caps  Commonly known as: Keflex                 [place]    Follow-up appointment:   Kuldip Ibrahim DO  [address]  [address]  [place] [address]  [phone]    Follow up in 2 week(s)  Office will call you for follow up appt.    Woody Dahl MD  [address]   [address]  [place] [address]  [phone]    Schedule an appointment as soon as possible for a visit in 1 week(s)      Appointments for Next 30 Days 5/16/2025 - 6/15/2025      None            Vital signs:  Temp:  [98.1 °F (36.7 °C)-98.6 °F (37 °C)] 98.2 °F (36.8 °C)  Pulse:  [63-78] 65  Resp:  [17-20] 20  BP: (125-150)/(59-77) 133/64  SpO2:  [93 %-97 %] 97 %    Physical Exam:    General: No acute distress  Respiratory: No wheezes, no rhonchi  Cardiovascular: S1, S2, regular rate and rhythm  Abdomen: Soft, Non-tender, non-distended, positive bowel sounds  Neuro: Left hand with decreased sensation to tips of fingers, able to squeeze, slightly cool to touch  Extremities: B/l AKA, LLE incision with staples in place, LUE graft in place              -----------------------------------------------------------------------------------------------  PATIENT DISCHARGE INSTRUCTIONS: See electronic chart    Live Ott MD      The 21st Century Cures Act  makes medical notes like these available to patients in the interest of transparency. Please be advised this is a medical document. Medical documents are intended to carry relevant information, facts as evident, and the clinical opinion of the practitioner. The medical note is intended as peer to peer communication and may appear blunt or direct. It is written in medical language and may contain abbreviations or verbiage that are unfamiliar.       Electronically signed by Live Ott MD on 5/16/2025  7:45 PM         REVIEWER COMMENTS

## 2025-05-20 ENCOUNTER — TELEPHONE (OUTPATIENT)
Dept: INTERNAL MEDICINE CLINIC | Facility: CLINIC | Age: 65
End: 2025-05-20

## 2025-05-20 NOTE — TELEPHONE ENCOUNTER
Signed paperwork was faxed to [facility] order#[id number]. Confirmation of receipt was received and paperwork was sent to [facility].

## 2025-05-20 NOTE — TELEPHONE ENCOUNTER
Paperwork was received from [facility] order#[id number]. It was placed on Dr Dahl's desk for review and signature.

## 2025-05-22 ENCOUNTER — TELEPHONE (OUTPATIENT)
Dept: INTERNAL MEDICINE CLINIC | Facility: CLINIC | Age: 65
End: 2025-05-22

## 2025-05-22 NOTE — TELEPHONE ENCOUNTER
Paperwork was received from [facility] order#[id number]. It was placed on Dr Dahl's desk for review and signature.

## 2025-05-29 ENCOUNTER — TELEPHONE (OUTPATIENT)
Dept: INTERNAL MEDICINE CLINIC | Facility: CLINIC | Age: 65
End: 2025-05-29

## 2025-05-29 NOTE — TELEPHONE ENCOUNTER
Paperwork was received from [facility] orders#[id number], [id number], and [id number]. It was placed on Dr Dahl's  desk for review and signature.

## 2025-06-03 ENCOUNTER — TELEPHONE (OUTPATIENT)
Dept: INTERNAL MEDICINE CLINIC | Facility: CLINIC | Age: 65
End: 2025-06-03

## 2025-06-03 NOTE — TELEPHONE ENCOUNTER
Paperwork was received from [facility] order#[id number] and [id number]. They were placed on Dr Dahl's desk for review and signature.

## 2025-06-24 ENCOUNTER — TELEPHONE (OUTPATIENT)
Dept: INTERNAL MEDICINE CLINIC | Facility: CLINIC | Age: 65
End: 2025-06-24

## 2025-06-24 NOTE — TELEPHONE ENCOUNTER
Signed paperwork was faxed to [facility] on 6/23/25. Confirmation of receipt was received and paperwork was sent to [facility],

## 2025-06-26 ENCOUNTER — TELEPHONE (OUTPATIENT)
Dept: INTERNAL MEDICINE CLINIC | Facility: CLINIC | Age: 65
End: 2025-06-26

## 2025-06-26 NOTE — TELEPHONE ENCOUNTER
Paperwork was received from [facility];and  order#[id number] and order dated 6/11/25. They were placed on Dr Dahl's desk for review and signature.

## 2025-07-02 ENCOUNTER — TELEPHONE (OUTPATIENT)
Dept: INTERNAL MEDICINE CLINIC | Facility: CLINIC | Age: 65
End: 2025-07-02

## 2025-07-02 NOTE — TELEPHONE ENCOUNTER
RN to pt call, relayed message below from Dr. Dahl. He expressed frustration that he has received 2 calls regarding this today, but he was in the hospital and never received a phone call. He will continue to check his blood pressure daily with home monitor and send Dr. Dahl the log of his numbers. Pt declined having any symptoms of elevated blood pressure at time of call.    Pt is a double-amputee and coming to office for appointment is challenging.  He will conference with his wife whom drives him to appointments and schedule OV.

## 2025-07-02 NOTE — TELEPHONE ENCOUNTER
LOV:8/19/24  Tatyana from [facility] called to inform of patient blood pressure at OT visit today, States that beginning of visit BP was 174/96 and end of visit was 170/96 with a heart rate of 87. Patient was asymptomatic to Tatyana, denying headache or chest pain.     Spoke to patient, states that he checked his BP after OT left and it ranged from 136-142, forgot to write down bottom number. Patient denies headache, chest pain, blurry vision, shortness of breath. Patient has been complaint with Amlodipine 10 mg daily. Advised patient that annual visit is due soon, states will call back to schedule due to needing to know wife schedule.     , OT states patient BP was 170/96 at end of visit. Patient asymptomatic. He checked blood pressure when she left and it was 136-142, he forgot to write down diastolic number.

## 2025-07-02 NOTE — TELEPHONE ENCOUNTER
I have not seen the patient in nearly one year. Is there someone who is managing his blood pressure regularly? Can we see him in follow-up this week?

## 2025-07-15 ENCOUNTER — TELEPHONE (OUTPATIENT)
Dept: INTERNAL MEDICINE CLINIC | Facility: CLINIC | Age: 65
End: 2025-07-15

## 2025-07-15 NOTE — TELEPHONE ENCOUNTER
Paperwork was received from [facility] order#[id number]. It was placed on Dr Dahl's desk for review and signature.

## 2025-07-29 ENCOUNTER — TELEMEDICINE (OUTPATIENT)
Dept: INTERNAL MEDICINE CLINIC | Facility: CLINIC | Age: 65
End: 2025-07-29
Payer: COMMERCIAL

## 2025-07-29 DIAGNOSIS — Z89.511 S/P BILATERAL BKA (BELOW KNEE AMPUTATION) (HCC): ICD-10-CM

## 2025-07-29 DIAGNOSIS — E11.65 TYPE 2 DIABETES MELLITUS WITH HYPERGLYCEMIA, WITH LONG-TERM CURRENT USE OF INSULIN (HCC): ICD-10-CM

## 2025-07-29 DIAGNOSIS — I73.9 PAD (PERIPHERAL ARTERY DISEASE): ICD-10-CM

## 2025-07-29 DIAGNOSIS — R35.0 BENIGN PROSTATIC HYPERPLASIA WITH URINARY FREQUENCY: Primary | ICD-10-CM

## 2025-07-29 DIAGNOSIS — N40.1 BENIGN PROSTATIC HYPERPLASIA WITH URINARY FREQUENCY: Primary | ICD-10-CM

## 2025-07-29 DIAGNOSIS — Z89.512 S/P BILATERAL BKA (BELOW KNEE AMPUTATION) (HCC): ICD-10-CM

## 2025-07-29 DIAGNOSIS — Z79.4 TYPE 2 DIABETES MELLITUS WITH HYPERGLYCEMIA, WITH LONG-TERM CURRENT USE OF INSULIN (HCC): ICD-10-CM

## 2025-07-29 PROCEDURE — 98006 SYNCH AUDIO-VIDEO EST MOD 30: CPT | Performed by: INTERNAL MEDICINE

## 2025-07-29 RX ORDER — TAMSULOSIN HYDROCHLORIDE 0.4 MG/1
0.4 CAPSULE ORAL DAILY
Qty: 90 CAPSULE | Refills: 3 | Status: SHIPPED | OUTPATIENT
Start: 2025-07-29

## 2025-07-31 ENCOUNTER — TELEPHONE (OUTPATIENT)
Dept: INTERNAL MEDICINE CLINIC | Facility: CLINIC | Age: 65
End: 2025-07-31

## 2025-08-12 ENCOUNTER — TELEPHONE (OUTPATIENT)
Dept: INTERNAL MEDICINE CLINIC | Facility: CLINIC | Age: 65
End: 2025-08-12

## 2025-08-13 ENCOUNTER — TELEPHONE (OUTPATIENT)
Dept: INTERNAL MEDICINE CLINIC | Facility: CLINIC | Age: 65
End: 2025-08-13

## 2025-08-19 ENCOUNTER — TELEPHONE (OUTPATIENT)
Dept: INTERNAL MEDICINE CLINIC | Facility: CLINIC | Age: 65
End: 2025-08-19

## (undated) DIAGNOSIS — E11.65 TYPE 2 DIABETES MELLITUS WITH HYPERGLYCEMIA, UNSPECIFIED WHETHER LONG TERM INSULIN USE (HCC): ICD-10-CM

## (undated) DEVICE — UNIVERSAL STERIBUMP® STERILE (5/CASE): Brand: UNIVERSAL STERIBUMP®

## (undated) DEVICE — CV PACK-LF: Brand: MEDLINE INDUSTRIES, INC.

## (undated) DEVICE — STERILE POLYISOPRENE POWDER-FREE SURGICAL GLOVES: Brand: PROTEXIS

## (undated) DEVICE — KIT CUSTOM ENDOPROCEDURE STERIS

## (undated) DEVICE — TRAY CATH 16FR F INCL BARDX IC COMPLT CARE

## (undated) DEVICE — GOWN SUR 2XL LEV 4 BLU W/ WHT V NK BND AERO

## (undated) DEVICE — KIT VLV 5 PC AIR H2O SUCT BX ENDOGATOR CONN

## (undated) DEVICE — GEL US 20 GM PKT ST AQSNC 100

## (undated) DEVICE — KIT HEMSTAT MTRX 8ML PORCINE GEL HUM THROM

## (undated) DEVICE — SUT VCRL 2-0 36IN CT-1 ABSRB UD L36MM 1/2 CIR

## (undated) DEVICE — 3M™ IOBAN™ 2 ANTIMICROBIAL INCISE DRAPE 6650EZ: Brand: IOBAN™ 2

## (undated) DEVICE — FORCEP BIOPSY RJ4 LG CAP W/ND

## (undated) DEVICE — ADHESIVE SKIN TOP FOR WND CLSR DERMBND ADV

## (undated) DEVICE — TRANSPOSAL ULTRAFLEX DUO/QUAD ULTRA CART MANIFOLD

## (undated) DEVICE — GIJAW SINGLE-USE BIOPSY FORCEPS WITH NEEDLE: Brand: GIJAW

## (undated) DEVICE — SKIN MARKER DUAL TIP WITH RULER CAP AND LABELS: Brand: DEVON

## (undated) DEVICE — STOCKINETTE,IMPERVIOUS,12X48,STERILE: Brand: MEDLINE

## (undated) DEVICE — 1200CC GUARDIAN II: Brand: GUARDIAN

## (undated) DEVICE — SUT COAT VCRL 3-0 27IN CT-1 ABSRB UD 36MM 1/2

## (undated) DEVICE — STRL PENROSE DRAIN 18" X 1/4": Brand: CARDINAL HEALTH

## (undated) DEVICE — ENDOSCOPY PACK UPPER: Brand: MEDLINE INDUSTRIES, INC.

## (undated) DEVICE — SUT PROL 6-0 24IN C-1 NABSRB BLU 13MM 3/8 CIR

## (undated) DEVICE — DRAPE,EXTREMITY,89X128,STERILE: Brand: MEDLINE

## (undated) DEVICE — BANDAGE ROLL,100% COTTON, 6 PLY, LARGE: Brand: KERLIX

## (undated) DEVICE — SPONGE GZ 4X4IN COT 12 PLY TYP VII WVN C

## (undated) DEVICE — ZZ-CONVERTED-TO-524825-ADHESIVE SKIN TOP FOR WND CLSR DERMBND ADV

## (undated) DEVICE — BANDAGE,GAUZE,BULKEE II,4.5"X4.1YD,STRL: Brand: MEDLINE

## (undated) DEVICE — DRAPE,U/SHT,SPLIT,FILM,60X84,STERILE: Brand: MEDLINE

## (undated) DEVICE — SUT VICRYL 2-0 CP-2 J762D

## (undated) DEVICE — SUT MCRYL 4-0 18IN PS-2 ABSRB UD 19MM 3/8 CIR

## (undated) DEVICE — SOLUTION IV 500ML 0.9% NACL FLX CONT

## (undated) DEVICE — MEGADYNE ELECTRODE ADULT PT RT

## (undated) DEVICE — GAUZE SPONGES,USP TYPE VII GAUZE, 12 PLY: Brand: CURITY

## (undated) DEVICE — 450 ML BOTTLE OF 0.05% CHLORHEXIDINE GLUCONATE IN 99.95% STERILE WATER FOR IRRIGATION, USP AND APPLICATOR.: Brand: IRRISEPT ANTIMICROBIAL WOUND LAVAGE

## (undated) DEVICE — Device: Brand: STABLECUT®

## (undated) DEVICE — SLEEVE KENDALL SCD EXPRESS MED

## (undated) DEVICE — 3M™ BAIR HUGGER® UNDERBODY BLANKET, FULL ACCESS, 10 PER CASE 63500: Brand: BAIR HUGGER™

## (undated) DEVICE — NON-ADHERENT STRIPS,OIL EMULSION: Brand: CURITY

## (undated) DEVICE — SYRINGE MED 10ML LL TIP W/O SFTY DISP

## (undated) DEVICE — GOWN AERO CHROME XXL

## (undated) DEVICE — TRAP SPEC REMOVAL ETRAP 15CM

## (undated) DEVICE — STRL PENROSE DRAIN 18" X 1/2": Brand: CARDINAL HEALTH

## (undated) DEVICE — SOLUTION IRRIG 1000ML ST H2O AQUALITE PLAS

## (undated) DEVICE — SLEEVE COMPR MD KNEE LEN SGL USE KENDALL SCD

## (undated) DEVICE — Device: Brand: DEFENDO AIR/WATER/SUCTION AND BIOPSY VALVE

## (undated) DEVICE — SNARE 9MM 230CM 2.4MM EXACTO

## (undated) DEVICE — ABDOMINAL PAD: Brand: DERMACEA

## (undated) DEVICE — 10FT COMBINED O2 DELIVERY/CO2 MONITORING. FILTER WITH MICROSTREAM TYPE LUER: Brand: DUAL ADULT NASAL CANNULA

## (undated) DEVICE — LOWER EXTREMITY CDS-LF: Brand: MEDLINE INDUSTRIES, INC.

## (undated) DEVICE — STANDARD HYPODERMIC NEEDLE,POLYPROPYLENE HUB: Brand: MONOJECT

## (undated) DEVICE — 1010 S-DRAPE TOWEL DRAPE 10/BX: Brand: STERI-DRAPE™

## (undated) DEVICE — PCKNG STRP IODOFORM 1/4INX5YD

## (undated) DEVICE — KENDALL SCD EXPRESS SLEEVES, KNEE LENGTH, MEDIUM: Brand: KENDALL SCD

## (undated) DEVICE — SUT PROL 7-0 24IN BV-1 NABSRB BLU 9.3MM 3/8 C

## (undated) DEVICE — PREMIUM WET SKIN PREP TRAY: Brand: MEDLINE INDUSTRIES, INC.

## (undated) DEVICE — ENDOSCOPY PACK - LOWER: Brand: MEDLINE INDUSTRIES, INC.

## (undated) DEVICE — BITEBLOCK ENDOSCP 60FR MAXI STRP

## (undated) DEVICE — SUPER SPONGES,MEDIUM: Brand: KERLIX

## (undated) DEVICE — ZZ-CONVERTED-TO-522442- SPONGE 4X4 10PK

## (undated) DEVICE — KIT,ANTI FOG,W/SPONGE & FLUID,SOFT PACK: Brand: MEDLINE

## (undated) DEVICE — INSULATED BLADE ELECTRODE;CAUTION: FOR MANUFACTURING, PROCESSING, OR REPACKING.: Brand: EDGE

## (undated) DEVICE — SUTURE POLYDEK 4-0 TIES 6-932

## (undated) DEVICE — GEL US 20GM NONIRRITATING TRNSMIT AQSNC 100

## (undated) DEVICE — 3M™ RED DOT™ MONITORING ELECTRODE WITH FOAM TAPE AND STICKY GEL, 50/BAG, 20/CASE, 72/PLT 2570: Brand: RED DOT™

## (undated) DEVICE — SUT ETHILON 3-0 PS-1 1663H

## (undated) DEVICE — TOWEL SURG OR 17X30IN BLUE

## (undated) DEVICE — ENSEAL X1 TISSUE SEALER, CURVED JAW, 37 CM SHAFT LENGTH: Brand: ENSEAL

## (undated) DEVICE — SUT DEKNATEL POLYDEK 4-0 12XL30IN GRN POLY

## (undated) DEVICE — SOLUTION IRRIG 1000ML 0.9% NACL USP BTL

## (undated) DEVICE — WAX BONE 2.5G NONABSORBABLE

## (undated) DEVICE — DISPOSABLE TOURNIQUET CUFF SINGLE BLADDER, DUAL PORT AND QUICK CONNECT CONNECTOR: Brand: COLOR CUFF

## (undated) DEVICE — CATHETER THOR 28FR L23IN CLR PVC HEP STR TAPR

## (undated) DEVICE — TELFA NON-ADHERENT ABSORBENT DRESSING: Brand: TELFA

## (undated) DEVICE — GAUZE,SPONGE,FLUFF,6"X6.75",STRL,5/TRAY: Brand: MEDLINE

## (undated) DEVICE — SUT PERMA- 0 30IN FSL NABSRB BLK 30MM 3/8

## (undated) DEVICE — WOUND RETRACTOR AND PROTECTOR: Brand: ALEXIS WOUND PROTECTOR-RETRACTOR

## (undated) DEVICE — GOWN,SIRUS,FABRIC-REINFORCED,LARGE: Brand: MEDLINE

## (undated) DEVICE — FILTERLINE NASAL ADULT O2/CO2

## (undated) DEVICE — HOOK LOCK LATEX FREE ELASTIC BANDAGE 4INX5YD

## (undated) DEVICE — PACK CV CUSTOM

## (undated) DEVICE — CURAD OIL EMLUSION GAUZE 3X16

## (undated) DEVICE — SPONGE LAP 18X18IN WHT COT 4 PLY FLD STRUNG

## (undated) DEVICE — MEDI-VAC NON-CONDUCTIVE SUCTION TUBING: Brand: CARDINAL HEALTH

## (undated) DEVICE — BNDG COHESIVE W4INXL5YD TAN E

## (undated) DEVICE — UNDYED BRAIDED (POLYGLACTIN 910), SYNTHETIC ABSORBABLE SUTURE: Brand: COATED VICRYL

## (undated) DEVICE — ARYGLE SUCTION CATHETER WITH CHIMNEY VALVE STRIAGHT PACKED 14 FR/ CH: Brand: ARGYLE

## (undated) DEVICE — REM POLYHESIVE ADULT PATIENT RETURN ELECTRODE: Brand: VALLEYLAB

## (undated) DEVICE — PROXIMATE RH ROTATING HEAD SKIN STAPLERS (35 WIDE) CONTAINS 35 STAINLESS STEEL STAPLES: Brand: PROXIMATE

## (undated) DEVICE — ABSORBABLE HEMOSTAT (OXIDIZED REGENERATED CELLULOSE): Brand: SURGICEL NU-KNIT

## (undated) DEVICE — STERILE SYNTHETIC POLYISOPRENE POWDER-FREE SURGICAL GLOVES WITH HYDROGEL COATING: Brand: PROTEXIS

## (undated) DEVICE — AV FISTULA PACK: Brand: MEDLINE INDUSTRIES, INC.

## (undated) DEVICE — APPLICATOR STD 6IN COT TIP WOOD HNDL ST

## (undated) DEVICE — SPONGE GZ 4XL4IN 100% COT 12 PLY TYP VII WVN

## (undated) DEVICE — SOL NACL IRRIG 0.9% 1000ML BTL

## (undated) DEVICE — SUT ETHILON 3-0 PS-2 1669H

## (undated) DEVICE — Device: Brand: INTELLICART™

## (undated) DEVICE — CHLORAPREP 26ML APPLICATOR

## (undated) DEVICE — SLEEVE COMPR M KNEE LEN SGL USE KENDALL SCD

## (undated) DEVICE — VIOLET BRAIDED (POLYGLACTIN 910), SYNTHETIC ABSORBABLE SUTURE: Brand: COATED VICRYL

## (undated) DEVICE — DRAIN CHST SGL COLL 1 PT TB FOR ATS BG CMPTBL

## (undated) DEVICE — OCCLUSIVE GAUZE STRIP OVERWRAP,3% BISMUTH TRIBROMOPHENATE IN PETROLATUM BLEND: Brand: XEROFORM

## (undated) DEVICE — BLADE SAW KM33-11

## (undated) DEVICE — BANDAGE COMP PREMPRO 5YDX4IN

## (undated) DEVICE — FAN SPRAY KIT: Brand: PULSAVAC®

## (undated) DEVICE — SOL  .9 1000ML BTL

## (undated) DEVICE — CAUTERY PENCIL EDGE ROCKER

## (undated) DEVICE — 3M™ STERI-DRAPE™ U-DRAPE 1015: Brand: STERI-DRAPE™

## (undated) DEVICE — SUT SILK 3-0 SH C013D

## (undated) DEVICE — SUT SILK 2-0 SH C016D

## (undated) NOTE — LETTER
[address] [facility] [place]OD OR BLOOD COMPONENTS      In the course of your treatment, it may become necessary to administer a transfusion of blood or blood components. This form provides basic information concerning this procedure and, if signed by you, authorizes its performance by qualified medical personnel. DESCRIPTION OF PROCEDURE:  Blood is introduced into one of your veins, commonly in the arm, using a sterilized disposable needle. The amount of blood transfused, and whether the transfusion will be of blood or blood components is a judgment the physician will make based on your particular needs. RISKS:  The transfusion is a common procedure of low risk. MINOR AND TEMPORARY REACTIONS ARE NOT UNCOMMON, including a slight bruise, swelling or local reaction in the area where the needle pierces your skin, or a non-serious reaction to the transfused material itself, including headache, fever or a mild skin reaction, such as rash. Serious reactions are possible, though very unlikely and include severe allergic reaction (shock)  and destruction (hemolysis) of transfused blood cells. Infectious diseases which are known to be transmitted by blood transfusion include CERTAIN TYPES OF VIRAL HEPATITIS, a viral infection of the liver, HUMAN IMMUNODEFICIENCY VIRUS (HIV-1,2) infection, a viral infection known to cause ACQUIRED IMMUNODEFICIENCY SYNDROME (AIDS) AS WELL AS CERTAIN OTHER BACTERIAL, VIRAL AND PARASITIC DISEASES. While a minimal risk of acquiring an infectious disease from transfused blood exists, in accordance with Federal and State law all due care has been taken in donor selection and testing to avoid transmission of disease. ALTERNATIVES:  If loss of blood poses serious threats in the course of your treatment, THERE IS NO EFFECTIVE ALTERNATIVE TO BLOOD TRANSFUSION.  However, if you have any further questions on this matter, your physician will fully explain the alternatives to you if it has not already been done. I,Richy Goins, have read/had read to me the above. I understand the matters bearing on the decision whether or not to authorize a transfusion of blood or blood components. I have no questions which have not been answered to my full satisfaction.  I hereby consent to such transfusion as  my physician may deem necessary or advisable in the course of my treatment.        _______________   __________________________________________________  Date     Signature of Patient, Parent or Legal Guardian      ([place] [facility])      __________________________________________  Witness to Signature (title or relationship to patient)    Patient Name: Allison Bah     : 1960                 Printed: April 3, 2023     Medical Record #: [id number]                    Page 1 of 1

## (undated) NOTE — LETTER
[address] [facility]     I agree to have a Peripherally Inserted Central Catheter (PICC) placed in my arm.    1. The PICC insertion procedure, care, maintenance, risks, benefits, and complications have been explained to me by my physic benefits, and side effects related to the alternatives and risks related to not receiving this procedure. 8.  I have expressed any questions about this procedure to my physician or the PICC Proceduralist and he/she has answered them.   I certify that I h

## (undated) NOTE — LETTER
Date & Time: 5/16/2025, 1:49 PM  Patient: Richy Goins  Attending Provider: Live Ott MD      This certifies that I, Richy Goins, a patient at an [facility], am leaving the facility voluntarily and against the advice of my physician.    I acknowledge that I have been:    1. informed that my physician believes that I need to receive care here;  2. informed that if I leave, I could become sicker or even die;  3. provided instructions consistent with my current diagnosis; and  4. informed that my physician recommends EMS/911 transportation to the hospital for further           evaluation.    I hereby release my physician, the facility, and its employees from all responsibility for any ill effects which may result from this action.        __________________________________  Patient or authorized caregiver signature    __________________________________  RN signature    If no patient or patient representative signature was obtained, sign below to acknowledge that the form was reviewed with the patient and that the patient refused to sign.    __________________________________  RN signature

## (undated) NOTE — LETTER
07/10/20        Radha Cueva  [address] [facility] [address]      Dear Ирина Evans,    Our records indicate that you have outstanding lab work and or testing that was ordered for you and has not yet been completed:  Orders Placed This E

## (undated) NOTE — LETTER
Carl Plaza M.D., F.A.C.S.  Roberto Marsh M.D., F.A.C.S.  Nima Tomlinson M.D., F.A.C.S  Enio Araya M.D., F.A.C.S.   Jin Cooper M.D., F.A.C.S.   Ezequiel Mcwilliams M.D.  Farhan Sy M.D., F.A.C.S.  Ciro Matute M.D., F.A.C.S.  Ciro De Luna M.D., F.A.C.SAdalid Rueda M.D., F.A.C.S.  Ciro Zafar M.D. F.A.C.S.  Joshua Bourgeois M.D., F.A.C.S.   Jas Vale M.D., F.A.C.S.  Bill Odonnell M.D., F.A.C.S., F.A.C.C. Ren Marie M.D., F.A.C.S.   Fermin Mcclelland M.D., F.A.C.S.  Ren Butterfield M.D., F.A.C.S.  Jose Gomez M.D., F.A.C.S.  JENY Avila M.D., F.A.C.S  JENY Nance M.D., F.A.C.S.   Marito Sal M.D., F.A.C.S.  JENY Abernathy M.D. R. Anthony Perez-Tamayo, M.D. PhD.  JENY Adan M.D. F A OLEKSANDR Resendez M.D.   Malcom Ventura M.D.   Zina Mccann M.D.  JENY Wilson D.O., F.A.CAdalidS.  Fermin De La Fuente M.D., F.A.C.S.  Jose Cruz M.D.        Welcome to [facility]    As you contemplate possible surgical treatment, it is very important to us that you understand fully what is being discussed, that all of your questions have been answered, and that your options for treatment have been fully explained.    To that end, on the following page we will ask you some questions to make certain that you understand everything which has been explained to you. Included in this understanding is that there are both surgical and nonsurgical treatments available for you, that you have options regarding where your care is given, and what doctors are involved in your care. Included in these options would, of course, be the option to elect for no treatment whatsoever. We especially want to be sure that you have had a chance to have all of your questions and  concerns answered. If there are any issues which have not been adequately addressed, we ask you to bring them forward so that we can thoroughly address them.    A patient who is fully informed and understands their condition and options for treatment, as well as potential adverse effects of treatment, is going to be a patient who receives the most benefit out of care rendered. Our goal in addition to providing excellent surgical care is to provide the necessary information to you and your family in order to make decisions which are appropriate relative to your own care.    Please take the time necessary to read and answer the questions on the next page. Again, if you have any questions, bring them forward and we will certainly address them.    Sincerely,    [facility].    _______________________  ____________________________________  Date:                                             Patient Signature  ________________________  Richy Richardsongo  Witness Consent Form         Revised: 2015  Patient Name: Richy Goins     : 1960                 Printed: 6/15/13 9:43 AM     Medical Record #: [id number]                Page: 1 of  2        [facility]  Supplemental Consent Form    A [facility] surgeon has met with me and explained the matter of my illness, and what treatments might be available to improve my condition. As a result of that conversation, I understand the following:    A [facility] surgeon met with me and explained, in detail, the nature of my condition for which surgery is being contemplated. The procedure to be performed  Is: CREATION OF LEFT ARM FISTULA            Yes _____ No _____    A [facility] surgeon has explained to me that there are alternatives to surgery which might include no surgery, medical therapy, or interventional treatment, among other options and the risks and benefits of the different treatment options:    Yes _____ No  _____    A [facility] surgeon as explained to me that if I should so desire, he/she is willing to explain my case and the surgical and non-surgical options to family members:            Yes _____ No _____    A [facility] surgeon has answered all of my questions regarding the topics we have discussed. I have been invited to ask more questions:  Yes _____ No _____    A [facility] surgeon has explained to me that if I seek other options or wish treatment at another facility in [place] or [place], or anywhere in the [place], that his/her office will assist me in making such accommodations:   Yes _____ No _____    A [facility] surgeon has explained to me, that death, risk of bleeding, stroke, multi-organ failure, heart attack or other complications are risks for the proposed surgical procedure:           Yes _____ No _____    A [facility] surgeon has explained to me that I have the right to cancel or postpone the surgery at any time prior to the start of surgery:    Yes _____ No _____    The nature and options for treatment for my condition have been explained to me, in detail, by a [facility] surgeon and all questions have been answered to my satisfaction. I understand that I am not required to undergo surgery, and further, that if I so desire, I could have surgery accomplished by another surgeon or at another institution. I understand and accept that which has been explained to me. I am able to make my decisions knowingly and willfully based on the data.    ______________________   __________________________________  Date       Patient Signature  ______________________________ Richy Goins  Witness Consent Form   Patient Name: Richy Goins     DATB: 12/7/1960                 Medical Record #: [id number]    Page: 2 of 2        Printed: April 23, 2024

## (undated) NOTE — LETTER
[facility]  [address] [facility] [address]  [phone]  AUTHORIZATION FOR SURGICAL OPERATION OR PROCEDURE                                                           I hereby authorize Dr.Paul Lynn my physician and his/her assistants (if applicable), which may include medical students, residents, and/or fellows, to perform the following surgical operation/ procedure and administer such anesthesia as may be determined necessary by my physician:  Operation/Procedure name (s) Left Thoracenthesis   On Richy Goins.  2.   I recognize that during the surgical operation/procedure, unforeseen conditions may necessitate additional or different procedures than those listed above.  I, therefore, further authorize and request that the above-named surgeon, assistants, or designees perform such procedures as are, in their judgment, necessary and desirable.    3.   My surgeon/physician has discussed prior to my surgery the potential benefits, risks and side effects of this procedure; the likelihood of achieving goals; and potential problems that might occur during recuperation.  They also discussed reasonable alternatives to the procedure, including risks, benefits, and side effects related to the alternatives and risks related to not receiving this procedure.  I have had all my questions answered and I acknowledge that no guarantee has been made as to the result that may be obtained.    4.   Should the need arise during my operation/procedure, which includes change of level of care prior to discharge, I also consent to the administration of blood and/or blood products.  Further, I understand that despite careful testing and screening of blood or blood products by collecting agencies, I may still be subject to ill effects as a result of receiving a blood transfusion and/or blood products.  The following are some, but not all, of the potential risks that can occur: fever and allergic  reactions, hemolytic reactions, transmission of diseases such as Hepatitis, AIDS and Cytomegalovirus (CMV) and fluid overload.  In the event that I wish to have an autologous transfusion of my own blood, or a directed donor transfusion, I will discuss this with my physician.  Check only if Refusing Blood or Blood Products  I understand refusal of blood or blood products as deemed necessary by my physician may have serious consequences to my condition to include possible death. I hereby assume responsibility for my refusal and release the hospital, its personnel, and my physicians from any responsibility for the consequences of my refusal.          o  Refuse   5.   I authorize the use of any specimen, organs, tissues, body parts or foreign objects that may be removed from my body during the operation/procedure for diagnosis, research or teaching purposes and their subsequent disposal by hospital authorities.  I also authorize the release of specimen test results and/or written reports to my treating physician on the hospital medical staff or other referring or consulting physicians involved in my care, at the discretion of the Pathologist or my treating physician.    6.   I consent to the photographing or videotaping of the operations or procedures to be performed, including appropriate portions of my body for medical, scientific, or educational purposes, provided my identity is not revealed by the pictures or by descriptive texts accompanying them.  If the procedure has been photographed/videotaped, the surgeon will obtain the original picture, image, videotape or CD.  The hospital will not be responsible for storage, release or maintenance of the picture, image, tape or CD.    7.   I consent to the presence of a  or observers in the operating room as deemed necessary by my physician or their designees.    8.   I recognize that in the event my procedure results in extended X-Ray/fluoroscopy time, I  may develop a skin reaction.    9. If I have a Do Not Attempt Resuscitation (DNAR) order in place, that status will be suspended while in the operating room, procedural suite, and during the recovery period unless otherwise explicitly stated by me (or a person authorized to consent on my behalf). The surgeon or my attending physician will determine when the applicable recovery period ends for purposes of reinstating the DNAR order.  10. Patients having a sterilization procedure: I understand that if the procedure is successful the results will be permanent and it will therefore be impossible for me to inseminate, conceive, or bear children.  I also understand that the procedure is intended to result in sterility, although the result has not been guaranteed.   11. I acknowledge that my physician has explained sedation/analgesia administration to me including the risk and benefits I consent to the administration of sedation/analgesia as may be necessary or desirable in the judgment of my physician.    I CERTIFY THAT I HAVE READ AND FULLY UNDERSTAND THE ABOVE CONSENT TO OPERATION and/or OTHER PROCEDURE.     _________________________________________ _________________________________     ___________________________________  Signature of Patient     Signature of Responsible Person                   Printed Name of Responsible Person                              _________________________________________ ______________________________        ___________________________________  Signature of Witness         Date  Time         Relationship to Patient    Patient Name: Richy IRAM Goins    : 1960   Printed: 2025      Medical Record #: [id number]                                              Page 1 of 1

## (undated) NOTE — LETTER
[address]  [address]  Consent for Procedure/Sedation  Date: 1/13/2024         Time: 0800    I hereby authorize  Parul De La Fuente/ Natalya/ Leslye , my physician and his/her assistants (if applicable), which may include medical students, residents, and/or fellows, to perform the following surgical operation/ procedure and administer such anesthesia as may be determined necessary by my physician:  Operation/Procedure name (s) Left leg angiogram/ PTA/ possible stent  on Richy Goins   2.   I recognize that during the surgical operation/procedure, unforeseen conditions may necessitate additional or different procedures than those listed above.  I, therefore, further authorize and request that the above-named surgeon, assistants, or designees perform such procedures as are, in their judgment, necessary and desirable.    3.   My surgeon/physician has discussed prior to my surgery the potential benefits, risks and side effects of this procedure; the likelihood of achieving goals; and potential problems that might occur during recuperation.  They also discussed reasonable alternatives to the procedure, including risks, benefits, and side effects related to the alternatives and risks related to not receiving this procedure.  I have had all my questions answered and I acknowledge that no guarantee has been made as to the result that may be obtained.    4.   Should the need arise during my operation/procedure, which includes change of level of care prior to discharge, I also consent to the administration of blood and/or blood products.  Further, I understand that despite careful testing and screening of blood or blood products by collecting agencies, I may still be subject to ill effects as a result of receiving a blood transfusion and/or blood products.  The following are some, but not all, of the potential risks that can occur: fever and allergic reactions, hemolytic reactions,  transmission of diseases such as Hepatitis, AIDS and Cytomegalovirus (CMV) and fluid overload.  In the event that I wish to have an autologous transfusion of my own blood, or a directed donor transfusion, I will discuss this with my physician.  Check only if Refusing Blood or Blood Products  I understand refusal of blood or blood products as deemed necessary by my physician may have serious consequences to my condition to include possible death. I hereby assume responsibility for my refusal and release the hospital, its personnel, and my physicians from any responsibility for the consequences of my refusal.          o  Refuse      5.   I authorize the use of any specimen, organs, tissues, body parts or foreign objects that may be removed from my body during the operation/procedure for diagnosis, research or teaching purposes and their subsequent disposal by hospital authorities.  I also authorize the release of specimen test results and/or written reports to my treating physician on the hospital medical staff or other referring or consulting physicians involved in my care, at the discretion of the Pathologist or my treating physician.    6.   I consent to the photographing or videotaping of the operations or procedures to be performed, including appropriate portions of my body for medical, scientific, or educational purposes, provided my identity is not revealed by the pictures or by descriptive texts accompanying them.  If the procedure has been photographed/videotaped, the surgeon will obtain the original picture, image, videotape or CD.  The hospital will not be responsible for storage, release or maintenance of the picture, image, tape or CD.    7.   I consent to the presence of a  or observers in the operating room as deemed necessary by my physician or their designees.    8.   I recognize that in the event my procedure results in extended X-Ray/fluoroscopy time, I may develop a skin reaction.     9. If I have a Do Not Attempt Resuscitation (DNAR) order in place, that status will be suspended while in the operating room, procedural suite, and during the recovery period unless otherwise explicitly stated by me (or a person authorized to consent on my behalf). The surgeon or my attending physician will determine when the applicable recovery period ends for purposes of reinstating the DNAR order.  10. Patients having a sterilization procedure: I understand that if the procedure is successful the results will be permanent and it will therefore be impossible for me to inseminate, conceive, or bear children.  I also understand that the procedure is intended to result in sterility, although the result has not been guaranteed.   11. I acknowledge that my physician has explained sedation/analgesia administration to me including the risk and benefits I consent to the administration of sedation/analgesia as may be necessary or desirable in the judgment of my physician.    I CERTIFY THAT I HAVE READ AND FULLY UNDERSTAND THE ABOVE CONSENT TO OPERATION and/or OTHER PROCEDURE.      ____________________________________       _________________________________      ______________________________  Signature of Patient         Signature of Responsible Person        Printed Name of Responsible Person   ____________________________________      _________________________________      ______________________________       Signature of Witness          Relationship to Patient                       Date                                       Time  Patient Name: Richy Goins  : 1960    Reviewed: 2024   Printed: 2025  Medical Record #: [id number] Page 1 of 1

## (undated) NOTE — Clinical Note
Sim Mckay, I saw Long Blair today (saw him in the hospital as well). He does seem more Dm2 rather than BRUNA, but given the DKA, I'm running labs just in case. Also I talked to him about statins, he might need to hear it from you too before he is willing to start.  Thanks! -Maikol Meade

## (undated) NOTE — Clinical Note
ALANNAI. Patient completed TCM. Patient does not have HFU appt scheduled at this time. NCM attempted to schedule TCM/HFU however, no availability within the timeframe recommended. TE to PCP office re: appointment. Thank you.

## (undated) NOTE — LETTER
07/10/20        David Sawyer  [address] [place] [id number]      Dear Lupillo aMrin,    Our records indicate that you have outstanding lab work and or testing that was ordered for you and has not yet been completed:  Orders Placed This E

## (undated) NOTE — LETTER
[address]  [address]  Consent for Procedure/Sedation  Date: 1/11/25         Time: 1400    I hereby authorize Dr. De La Fuente, Dr. Gavin, my physician and his/her assistants (if applicable), which may include medical students, residents, and/or fellows, to perform the following surgical operation/ procedure and administer such anesthesia as may be determined necessary by my physician:  Operation/Procedure name (s) Peripheral angiography, atherectomy, percutaneous transluminal angioplasty (PTA) and/or vascular stenting on Richy Goins  2.   I recognize that during the surgical operation/procedure, unforeseen conditions may necessitate additional or different procedures than those listed above.  I, therefore, further authorize and request that the above-named surgeon, assistants, or designees perform such procedures as are, in their judgment, necessary and desirable.    3.   My surgeon/physician has discussed prior to my surgery the potential benefits, risks and side effects of this procedure; the likelihood of achieving goals; and potential problems that might occur during recuperation.  They also discussed reasonable alternatives to the procedure, including risks, benefits, and side effects related to the alternatives and risks related to not receiving this procedure.  I have had all my questions answered and I acknowledge that no guarantee has been made as to the result that may be obtained.    4.   Should the need arise during my operation/procedure, which includes change of level of care prior to discharge, I also consent to the administration of blood and/or blood products.  Further, I understand that despite careful testing and screening of blood or blood products by collecting agencies, I may still be subject to ill effects as a result of receiving a blood transfusion and/or blood products.  The following are some, but not all, of the potential risks that can occur: fever  and allergic reactions, hemolytic reactions, transmission of diseases such as Hepatitis, AIDS and Cytomegalovirus (CMV) and fluid overload.  In the event that I wish to have an autologous transfusion of my own blood, or a directed donor transfusion, I will discuss this with my physician.     Check only if Refusing Blood or Blood Products  I understand refusal of blood or blood products as deemed necessary by my physician may have serious consequences to my condition to include possible death. I hereby assume responsibility for my refusal and release the hospital, its personnel, and my physicians from any responsibility for the consequences of my refusal.      o  Refuse        5.   I authorize the use of any specimen, organs, tissues, body parts or foreign objects that may be removed from my body during the operation/procedure for diagnosis, research or teaching purposes and their subsequent disposal by hospital authorities.  I also authorize the release of specimen test results and/or written reports to my treating physician on the hospital medical staff or other referring or consulting physicians involved in my care, at the discretion of the Pathologist or my treating physician.    6.   I consent to the photographing or videotaping of the operations or procedures to be performed, including appropriate portions of my body for medical, scientific, or educational purposes, provided my identity is not revealed by the pictures or by descriptive texts accompanying them.  If the procedure has been photographed/videotaped, the surgeon will obtain the original picture, image, videotape or CD.  The hospital will not be responsible for storage, release or maintenance of the picture, image, tape or CD.    7.   I consent to the presence of a  or observers in the operating room as deemed necessary by my physician or their designees.    8.   I recognize that in the event my procedure results in extended  X-Ray/fluoroscopy time, I may develop a skin reaction.    9. If I have a Do Not Attempt Resuscitation (DNAR) order in place, that status will be suspended while in the operating room, procedural suite, and during the recovery period unless otherwise explicitly stated by me (or a person authorized to consent on my behalf). The surgeon or my attending physician will determine when the applicable recovery period ends for purposes of reinstating the DNAR order.  10. Patients having a sterilization procedure: I understand that if the procedure is successful the results will be permanent and it will therefore be impossible for me to inseminate, conceive, or bear children.  I also understand that the procedure is intended to result in sterility, although the result has not been guaranteed.   11. I acknowledge that my physician has explained sedation/analgesia administration to me including the risk and benefits I consent to the administration of sedation/analgesia as may be necessary or desirable in the judgment of my physician.    I CERTIFY THAT I HAVE READ AND FULLY UNDERSTAND THE ABOVE CONSENT TO OPERATION and/or OTHER PROCEDURE.      ____________________________________       _________________________________      ______________________________  Signature of Patient         Signature of Responsible Person        Printed Name of Responsible Person    ____________________________________      _________________________________      ______________________________       Signature of Witness          Relationship to Patient                       Date                                         Time  Patient Name: Richy Goins  : 1960    Reviewed: 2024   Printed: 2025  Medical Record #: [id number] Page 1 of 1

## (undated) NOTE — LETTER
[address] [facility] FOR BLOOD OR BLOOD COMPONENTS      In the course of your treatment, it may become necessary to administer a transfusion of blood or blood components. This form provides basic information concerning this procedure and, if signed by you, authorizes its performance by qualified medical personnel. DESCRIPTION OF PROCEDURE:  Blood is introduced into one of your veins, commonly in the arm, using a sterilized disposable needle. The amount of blood transfused, and whether the transfusion will be of blood or blood components is a judgment the physician will make based on your particular needs. RISKS:  The transfusion is a common procedure of low risk. MINOR AND TEMPORARY REACTIONS ARE NOT UNCOMMON, including a slight bruise, swelling or local reaction in the area where the needle pierces your skin, or a non-serious reaction to the transfused material itself, including headache, fever or a mild skin reaction, such as rash. Serious reactions are possible, though very unlikely and include severe allergic reaction (shock)  and destruction (hemolysis) of transfused blood cells. Infectious diseases which are known to be transmitted by blood transfusion include CERTAIN TYPES OF VIRAL HEPATITIS, a viral infection of the liver, HUMAN IMMUNODEFICIENCY VIRUS (HIV-1,2) infection, a viral infection known to cause ACQUIRED IMMUNODEFICIENCY SYNDROME (AIDS) AS WELL AS CERTAIN OTHER BACTERIAL, VIRAL AND PARASITIC DISEASES. While a minimal risk of acquiring an infectious disease from transfused blood exists, in accordance with Federal and State law all due care has been taken in donor selection and testing to avoid transmission of disease. ALTERNATIVES:  If loss of blood poses serious threats in the course of your treatment, THERE IS NO EFFECTIVE ALTERNATIVE TO BLOOD TRANSFUSION.  However, if you have any further questions on this matter, your physician will fully explain the alternatives to you if it has not already been done. I,Richy Goins, have read/had read to me the above. I understand the matters bearing on the decision whether or not to authorize a transfusion of blood or blood components. I have no questions which have not been answered to my full satisfaction.  I hereby consent to such transfusion as  my physician may deem necessary or advisable in the course of my treatment.        _______________   __________________________________________________  Date     Signature of Patient, Parent or Legal Guardian      ([place] [facility])      __________________________________________  Witness to Signature (title or relationship to patient)    Patient Name: Mello Daniels     : 1960                 Printed: 2023     Medical Record #: [id number]                    Page 1 of 1

## (undated) NOTE — LETTER
Stacie De Leon [address]  [address] [facility] [address] [facility]  Authorization for Surgical Operation and Procedure     Date:___________                                                                                                         Time:__________ be subject to ill effects as a result of receiving a blood transfusion and/or blood products.   The following are some, but not all, of the potential risks that can occur: fever and allergic reactions, hemolytic reactions, transmission of diseases such as H and during the recovery period unless otherwise explicitly stated by me (or a person authorized to consent on my behalf).  The surgeon or my attending physician will determine when the applicable recovery period ends for purposes of reinstating the DNAR ord [facility] or [facility]. He or she works for [facility] [employer], [employer].    2. As the patient asking for anesthesia services, I agree to:  a.  Allow the anesthesiologist (anesthesia doctor) to give me medicine and do additional procedur pressure, difficulty urinating, headache or slowing of the baby’s heart. Very rare risks include infection, bleeding, seizure, irregular heart rhythms and nerve injury. 7. Regional Anesthesia (“spinal”, “epidural”, & “nerve blocks”):   I understand that ra

## (undated) NOTE — LETTER
[address]  [address]  Consent for Procedure/Sedation  Date: 5/15/25         Time: 1058    I hereby authorize Dr. De La Fuente, my physician and his/her assistants (if applicable), which may include medical students, residents, and/or fellows, to perform the following surgical operation/ procedure and administer such anesthesia as may be determined necessary by my physician: Possible DRIL procedure left arm- saphenous vein bypass from proximal left brachial to distal left brachial with ligsation of artery below fistula:/Possible thrombectomy with graft revision  on Richy Goins  2.   I recognize that during the surgical operation/procedure, unforeseen conditions may necessitate additional or different procedures than those listed above.  I, therefore, further authorize and request that the above-named surgeon, assistants, or designees perform such procedures as are, in their judgment, necessary and desirable.    3.   My surgeon/physician has discussed prior to my surgery the potential benefits, risks and side effects of this procedure; the likelihood of achieving goals; and potential problems that might occur during recuperation.  They also discussed reasonable alternatives to the procedure, including risks, benefits, and side effects related to the alternatives and risks related to not receiving this procedure.  I have had all my questions answered and I acknowledge that no guarantee has been made as to the result that may be obtained.    4.   Should the need arise during my operation/procedure, which includes change of level of care prior to discharge, I also consent to the administration of blood and/or blood products.  Further, I understand that despite careful testing and screening of blood or blood products by collecting agencies, I may still be subject to ill effects as a result of receiving a blood transfusion and/or blood products.  The following are some, but not all, of  the potential risks that can occur: fever and allergic reactions, hemolytic reactions, transmission of diseases such as Hepatitis, AIDS and Cytomegalovirus (CMV) and fluid overload.  In the event that I wish to have an autologous transfusion of my own blood, or a directed donor transfusion, I will discuss this with my physician.   Check only if Refusing Blood or Blood Products  I understand refusal of blood or blood products as deemed necessary by my physician may have serious consequences to my condition to include possible death. I hereby assume responsibility for my refusal and release the hospital, its personnel, and my physicians from any responsibility for the consequences of my refusal.         o  Refuse         5.   I authorize the use of any specimen, organs, tissues, body parts or foreign objects that may be removed from my body during the operation/procedure for diagnosis, research or teaching purposes and their subsequent disposal by hospital authorities.  I also authorize the release of specimen test results and/or written reports to my treating physician on the hospital medical staff or other referring or consulting physicians involved in my care, at the discretion of the Pathologist or my treating physician.    6.   I consent to the photographing or videotaping of the operations or procedures to be performed, including appropriate portions of my body for medical, scientific, or educational purposes, provided my identity is not revealed by the pictures or by descriptive texts accompanying them.  If the procedure has been photographed/videotaped, the surgeon will obtain the original picture, image, videotape or CD.  The hospital will not be responsible for storage, release or maintenance of the picture, image, tape or CD.    7.   I consent to the presence of a  or observers in the operating room as deemed necessary by my physician or their designees.    8.   I recognize that in the  event my procedure results in extended X-Ray/fluoroscopy time, I may develop a skin reaction.    9. If I have a Do Not Attempt Resuscitation (DNAR) order in place, that status will be suspended while in the operating room, procedural suite, and during the recovery period unless otherwise explicitly stated by me (or a person authorized to consent on my behalf). The surgeon or my attending physician will determine when the applicable recovery period ends for purposes of reinstating the DNAR order.  10. Patients having a sterilization procedure: I understand that if the procedure is successful the results will be permanent and it will therefore be impossible for me to inseminate, conceive, or bear children.  I also understand that the procedure is intended to result in sterility, although the result has not been guaranteed.   11. I acknowledge that my physician has explained sedation/analgesia administration to me including the risk and benefits I consent to the administration of sedation/analgesia as may be necessary or desirable in the judgment of my physician.    I CERTIFY THAT I HAVE READ AND FULLY UNDERSTAND THE ABOVE CONSENT TO OPERATION and/or OTHER PROCEDURE.        ____________________________________       _________________________________      ______________________________  Signature of Patient         Signature of Responsible Person        Printed Name of Responsible Person        ____________________________________      _________________________________      ______________________________       Signature of Witness          Relationship to Patient                       Date                                       Time  Patient Name: Richy Goins  : 1960    Reviewed: 2024   Printed: May 15, 2025  Medical Record #: [id number] Page 1 of 1

## (undated) NOTE — LETTER
[address]  [address]  Consent for Procedure/Sedation  Date: 5/15/25         Time: 1800    I hereby authorize Dr. De La Fuente, my physician and his/her assistants (if applicable), which may include medical students, residents, and/or fellows, to perform the following surgical operation/ procedure and administer such anesthesia as may be determined necessary by my physician: Left arm thrombectomy:  on Richy Richardsongo  2.   I recognize that during the surgical operation/procedure, unforeseen conditions may necessitate additional or different procedures than those listed above.  I, therefore, further authorize and request that the above-named surgeon, assistants, or designees perform such procedures as are, in their judgment, necessary and desirable.    3.   My surgeon/physician has discussed prior to my surgery the potential benefits, risks and side effects of this procedure; the likelihood of achieving goals; and potential problems that might occur during recuperation.  They also discussed reasonable alternatives to the procedure, including risks, benefits, and side effects related to the alternatives and risks related to not receiving this procedure.  I have had all my questions answered and I acknowledge that no guarantee has been made as to the result that may be obtained.    4.   Should the need arise during my operation/procedure, which includes change of level of care prior to discharge, I also consent to the administration of blood and/or blood products.  Further, I understand that despite careful testing and screening of blood or blood products by collecting agencies, I may still be subject to ill effects as a result of receiving a blood transfusion and/or blood products.  The following are some, but not all, of the potential risks that can occur: fever and allergic reactions, hemolytic reactions, transmission of diseases such as Hepatitis, AIDS and Cytomegalovirus (CMV) and  fluid overload.  In the event that I wish to have an autologous transfusion of my own blood, or a directed donor transfusion, I will discuss this with my physician.   Check only if Refusing Blood or Blood Products  I understand refusal of blood or blood products as deemed necessary by my physician may have serious consequences to my condition to include possible death. I hereby assume responsibility for my refusal and release the hospital, its personnel, and my physicians from any responsibility for the consequences of my refusal.         o  Refuse         5.   I authorize the use of any specimen, organs, tissues, body parts or foreign objects that may be removed from my body during the operation/procedure for diagnosis, research or teaching purposes and their subsequent disposal by hospital authorities.  I also authorize the release of specimen test results and/or written reports to my treating physician on the hospital medical staff or other referring or consulting physicians involved in my care, at the discretion of the Pathologist or my treating physician.    6.   I consent to the photographing or videotaping of the operations or procedures to be performed, including appropriate portions of my body for medical, scientific, or educational purposes, provided my identity is not revealed by the pictures or by descriptive texts accompanying them.  If the procedure has been photographed/videotaped, the surgeon will obtain the original picture, image, videotape or CD.  The hospital will not be responsible for storage, release or maintenance of the picture, image, tape or CD.    7.   I consent to the presence of a  or observers in the operating room as deemed necessary by my physician or their designees.    8.   I recognize that in the event my procedure results in extended X-Ray/fluoroscopy time, I may develop a skin reaction.    9. If I have a Do Not Attempt Resuscitation (DNAR) order in place, that  status will be suspended while in the operating room, procedural suite, and during the recovery period unless otherwise explicitly stated by me (or a person authorized to consent on my behalf). The surgeon or my attending physician will determine when the applicable recovery period ends for purposes of reinstating the DNAR order.  10. Patients having a sterilization procedure: I understand that if the procedure is successful the results will be permanent and it will therefore be impossible for me to inseminate, conceive, or bear children.  I also understand that the procedure is intended to result in sterility, although the result has not been guaranteed.   11. I acknowledge that my physician has explained sedation/analgesia administration to me including the risk and benefits I consent to the administration of sedation/analgesia as may be necessary or desirable in the judgment of my physician.    I CERTIFY THAT I HAVE READ AND FULLY UNDERSTAND THE ABOVE CONSENT TO OPERATION and/or OTHER PROCEDURE.        ____________________________________       _________________________________      ______________________________  Signature of Patient         Signature of Responsible Person        Printed Name of Responsible Person        ____________________________________      _________________________________      ______________________________       Signature of Witness          Relationship to Patient                       Date                                       Time  Patient Name: Richy Richardsongo  : 1960    Reviewed: 2024   Printed: May 15, 2025  Medical Record #: [id number] Page 1 of 1

## (undated) NOTE — LETTER
[facility]  Sarah Bean 59 [address]  Consent for Procedure/Sedation  Date: 11/14/23         Time: 1300    I hereby authorize Dr Hilda Whitt, my physician and his/her assistants (if applicable), which may include medical students, residents, and/or fellows, to perform the following surgical operation/ procedure and administer such anesthesia as may be determined necessary by my physician: Permanent Dialysis Catheter Insertion on [address]  2. I recognize that during the surgical operation/procedure, unforeseen conditions may necessitate additional or different procedures than those listed above. I, therefore, further authorize and request that the above-named surgeon, assistants, or designees perform such procedures as are, in their judgment, necessary and desirable. 3.   My surgeon/physician has discussed prior to my surgery the potential benefits, risks and side effects of this procedure; the likelihood of achieving goals; and potential problems that might occur during recuperation. They also discussed reasonable alternatives to the procedure, including risks, benefits, and side effects related to the alternatives and risks related to not receiving this procedure. I have had all my questions answered and I acknowledge that no guarantee has been made as to the result that may be obtained. 4.   Should the need arise during my operation/procedure, which includes change of level of care prior to discharge, I also consent to the administration of blood and/or blood products. Further, I understand that despite careful testing and screening of blood or blood products by collecting agencies, I may still be subject to ill effects as a result of receiving a blood transfusion and/or blood products.   The following are some, but not all, of the potential risks that can occur: fever and allergic reactions, hemolytic reactions, transmission of diseases such as Hepatitis, AIDS and Cytomegalovirus (CMV) and fluid overload. In the event that I wish to have an autologous transfusion of my own blood, or a directed donor transfusion, I will discuss this with my physician. Check only if Refusing Blood or Blood Products  I understand refusal of blood or blood products as deemed necessary by my physician may have serious consequences to my condition to include possible death. I hereby assume responsibility for my refusal and release the hospital, its personnel, and my physicians from any responsibility for the consequences of my refusal.         o  Refuse         5. I authorize the use of any specimen, organs, tissues, body parts or foreign objects that may be removed from my body during the operation/procedure for diagnosis, research or teaching purposes and their subsequent disposal by hospital authorities. I also authorize the release of specimen test results and/or written reports to my treating physician on the hospital medical staff or other referring or consulting physicians involved in my care, at the discretion of the Pathologist or my treating physician. 6.   I consent to the photographing or videotaping of the operations or procedures to be performed, including appropriate portions of my body for medical, scientific, or educational purposes, provided my identity is not revealed by the pictures or by descriptive texts accompanying them. If the procedure has been photographed/videotaped, the surgeon will obtain the original picture, image, videotape or CD. The hospital will not be responsible for storage, release or maintenance of the picture, image, tape or CD.    7.   I consent to the presence of a  or observers in the operating room as deemed necessary by my physician or their designees. 8.   I recognize that in the event my procedure results in extended X-Ray/fluoroscopy time, I may develop a skin reaction. 9.  If I have a Do Not Attempt Resuscitation (DNAR) order in place, that status will be suspended while in the operating room, procedural suite, and during the recovery period unless otherwise explicitly stated by me (or a person authorized to consent on my behalf). The surgeon or my attending physician will determine when the applicable recovery period ends for purposes of reinstating the DNAR order. 10. Patients having a sterilization procedure: I understand that if the procedure is successful the results will be permanent and it will therefore be impossible for me to inseminate, conceive, or bear children. I also understand that the procedure is intended to result in sterility, although the result has not been guaranteed. 11. I acknowledge that my physician has explained sedation/analgesia administration to me including the risk and benefits I consent to the administration of sedation/analgesia as may be necessary or desirable in the judgment of my physician.     I CERTIFY THAT I HAVE READ AND FULLY UNDERSTAND THE ABOVE CONSENT TO OPERATION and/or OTHER PROCEDURE.        ____________________________________       _________________________________      ______________________________  Signature of Patient         Signature of Responsible Person        Printed Name of Responsible Person        ____________________________________      _________________________________      ______________________________       Signature of Witness          Relationship to Patient                       Date                                       Time  Patient Name: Reji Reyes     : 1960                 Printed: 2023      Medical Record #: [id number]                      Page 1 of 1

## (undated) NOTE — LETTER
02/16/21        [facility] [id number]      Dear Lance Trejo,    Our records indicate that you have outstanding lab work and or testing that was ordered for you and has not yet been completed:  Orders Placed This Encoun

## (undated) NOTE — LETTER
Stacie De Leon [address] [facility] [address] [facility]  Authorization for Surgical Operation and Procedure   Date:___________                                                                                            Time:__________  1.  I hereby aut potential risks that can occur: fever and allergic reactions, hemolytic reactions, transmission of diseases such as Hepatitis, AIDS and Cytomegalovirus (CMV) and fluid overload.   In the event that I wish to have an autologous transfusion of my own blood, o attending physician will determine when the applicable recovery period ends for purposes of reinstating the DNAR order.   10. Patients having a sterilization procedure: I understand that if the procedure is successful the results will be permanent and it wi

## (undated) NOTE — Clinical Note
Hi Dr. Retha Alpers-     I have seen your patient for their diabetes today. Still working on [address] diabetes control, he is going to get his labs complete today to determine if he is type 1 or type 2. He wants to defer to you at his next office visit about his cholesterol and if he should take his BP meds. Attached is our visit notes.      Kind regards,   MISSY Montenegro

## (undated) NOTE — LETTER
Stacie De Leon 182  [address] [facility] [address] [facility]  Authorization for Surgical Operation and Procedure     Date:___________                                                                                                         Time:__________ and/or blood products. The following are some, but not all, of the potential risks that can occur: fever and allergic reactions, hemolytic reactions, transmission of diseases such as Hepatitis, AIDS and Cytomegalovirus (CMV) and fluid overload.   In the ev (or a person authorized to consent on my behalf). The surgeon or my attending physician will determine when the applicable recovery period ends for purposes of reinstating the DNAR order.   10. Patients having a sterilization procedure: I understand that if 2. As the patient asking for anesthesia services, I agree to:  a. Allow the anesthesiologist (anesthesia doctor) to give me medicine and do additional procedures as necessary.  Some examples are: Starting or using an “IV” to give me medicine, fluids or bloo Very rare risks include infection, bleeding, seizure, irregular heart rhythms and nerve injury. 7. Regional Anesthesia (“spinal”, “epidural”, & “nerve blocks”):   I understand that rare but potential complications include headache, bleeding, infection, sei

## (undated) NOTE — LETTER
[address]  [address]  Consent for Procedure/Sedation  Date: 5/9/25         Time: 1537    I hereby authorize Dr Reyes, my physician and his/her assistants (if applicable), which may include medical students, residents, and/or fellows, to perform the following surgical operation/ procedure and administer such anesthesia as may be determined necessary by my physician: Left Arm Fistulogram with Possible Thrombolysis, Angioplasty, or Stent Placement on Richy Goins  2.   I recognize that during the surgical operation/procedure, unforeseen conditions may necessitate additional or different procedures than those listed above.  I, therefore, further authorize and request that the above-named surgeon, assistants, or designees perform such procedures as are, in their judgment, necessary and desirable.    3.   My surgeon/physician has discussed prior to my surgery the potential benefits, risks and side effects of this procedure; the likelihood of achieving goals; and potential problems that might occur during recuperation.  They also discussed reasonable alternatives to the procedure, including risks, benefits, and side effects related to the alternatives and risks related to not receiving this procedure.  I have had all my questions answered and I acknowledge that no guarantee has been made as to the result that may be obtained.    4.   Should the need arise during my operation/procedure, which includes change of level of care prior to discharge, I also consent to the administration of blood and/or blood products.  Further, I understand that despite careful testing and screening of blood or blood products by collecting agencies, I may still be subject to ill effects as a result of receiving a blood transfusion and/or blood products.  The following are some, but not all, of the potential risks that can occur: fever and allergic reactions, hemolytic reactions, transmission of diseases  such as Hepatitis, AIDS and Cytomegalovirus (CMV) and fluid overload.  In the event that I wish to have an autologous transfusion of my own blood, or a directed donor transfusion, I will discuss this with my physician.   Check only if Refusing Blood or Blood Products  I understand refusal of blood or blood products as deemed necessary by my physician may have serious consequences to my condition to include possible death. I hereby assume responsibility for my refusal and release the hospital, its personnel, and my physicians from any responsibility for the consequences of my refusal.         o  Refuse         5.   I authorize the use of any specimen, organs, tissues, body parts or foreign objects that may be removed from my body during the operation/procedure for diagnosis, research or teaching purposes and their subsequent disposal by hospital authorities.  I also authorize the release of specimen test results and/or written reports to my treating physician on the hospital medical staff or other referring or consulting physicians involved in my care, at the discretion of the Pathologist or my treating physician.    6.   I consent to the photographing or videotaping of the operations or procedures to be performed, including appropriate portions of my body for medical, scientific, or educational purposes, provided my identity is not revealed by the pictures or by descriptive texts accompanying them.  If the procedure has been photographed/videotaped, the surgeon will obtain the original picture, image, videotape or CD.  The hospital will not be responsible for storage, release or maintenance of the picture, image, tape or CD.    7.   I consent to the presence of a  or observers in the operating room as deemed necessary by my physician or their designees.    8.   I recognize that in the event my procedure results in extended X-Ray/fluoroscopy time, I may develop a skin reaction.    9. If I have a Do  Not Attempt Resuscitation (DNAR) order in place, that status will be suspended while in the operating room, procedural suite, and during the recovery period unless otherwise explicitly stated by me (or a person authorized to consent on my behalf). The surgeon or my attending physician will determine when the applicable recovery period ends for purposes of reinstating the DNAR order.  10. Patients having a sterilization procedure: I understand that if the procedure is successful the results will be permanent and it will therefore be impossible for me to inseminate, conceive, or bear children.  I also understand that the procedure is intended to result in sterility, although the result has not been guaranteed.   11. I acknowledge that my physician has explained sedation/analgesia administration to me including the risk and benefits I consent to the administration of sedation/analgesia as may be necessary or desirable in the judgment of my physician.    I CERTIFY THAT I HAVE READ AND FULLY UNDERSTAND THE ABOVE CONSENT TO OPERATION and/or OTHER PROCEDURE.        ____________________________________       _________________________________      ______________________________  Signature of Patient         Signature of Responsible Person        Printed Name of Responsible Person        ____________________________________      _________________________________      ______________________________       Signature of Witness          Relationship to Patient                       Date                                       Time  Patient Name: Richy Goins  : 1960    Reviewed: 2024   Printed: May 9, 2025  Medical Record #: [id number] Page 1 of 1

## (undated) NOTE — Clinical Note
[address]  [address]  Authorization for Surgical Operation and Procedure     Date:___________                                                                                                         Time:__________  1. I hereby authorize * Surgery not found *, my physician and his/her assistants (if applicable), which may include medical students, residents, and/or fellows, to perform the following surgical operation/ procedure and administer such anesthesia as may be determined necessary by my physician:  Operation/Procedure name (s)  on Richy Goins   2.   I recognize that during the surgical operation/procedure, unforeseen conditions may necessitate additional or different procedures than those listed above.  I, therefore, further authorize and request that the above-named surgeon, assistants, or designees perform such procedures as are, in their judgment, necessary and desirable.    3.   My surgeon/physician has discussed prior to my surgery the potential benefits, risks and side effects of this procedure; the likelihood of achieving goals; and potential problems that might occur during recuperation.  They also discussed reasonable alternatives to the procedure, including risks, benefits, and side effects related to the alternatives and risks related to not receiving this procedure.  I have had all my questions answered and I acknowledge that no guarantee has been made as to the result that may be obtained.    4.   Should the need arise during my operation/procedure, which includes change of level of care prior to discharge, I also consent to the administration of blood and/or blood products.  Further, I understand that despite careful testing and screening of blood or blood products by collecting agencies, I may still be subject to ill effects as a result of receiving a blood transfusion and/or blood products.  The following are some, but not all, of the potential  risks that can occur: fever and allergic reactions, hemolytic reactions, transmission of diseases such as Hepatitis, AIDS and Cytomegalovirus (CMV) and fluid overload.  In the event that I wish to have an autologous transfusion of my own blood, or a directed donor transfusion, I will discuss this with my physician.  Check only if Refusing Blood or Blood Products  I understand refusal of blood or blood products as deemed necessary by my physician may have serious consequences to my condition to include possible death. I hereby assume responsibility for my refusal and release the hospital, its personnel, and my physicians from any responsibility for the consequences of my refusal.          o  Refuse      5.   I authorize the use of any specimen, organs, tissues, body parts or foreign objects that may be removed from my body during the operation/procedure for diagnosis, research or teaching purposes and their subsequent disposal by hospital authorities.  I also authorize the release of specimen test results and/or written reports to my treating physician on the hospital medical staff or other referring or consulting physicians involved in my care, at the discretion of the Pathologist or my treating physician.    6.   I consent to the photographing or videotaping of the operations or procedures to be performed, including appropriate portions of my body for medical, scientific, or educational purposes, provided my identity is not revealed by the pictures or by descriptive texts accompanying them.  If the procedure has been photographed/videotaped, the surgeon will obtain the original picture, image, videotape or CD.  The hospital will not be responsible for storage, release or maintenance of the picture, image, tape or CD.    7.   I consent to the presence of a  or observers in the operating room as deemed necessary by my physician or their designees.    8.   I recognize that in the event my procedure  results in extended X-Ray/fluoroscopy time, I may develop a skin reaction.    9. If I have a Do Not Attempt Resuscitation (DNAR) order in place, that status will be suspended while in the operating room, procedural suite, and during the recovery period unless otherwise explicitly stated by me (or a person authorized to consent on my behalf). The surgeon or my attending physician will determine when the applicable recovery period ends for purposes of reinstating the DNAR order.  10. Patients having a sterilization procedure: I understand that if the procedure is successful the results will be permanent and it will therefore be impossible for me to inseminate, conceive, or bear children.  I also understand that the procedure is intended to result in sterility, although the result has not been guaranteed.   11. I acknowledge that my physician has explained sedation/analgesia administration to me including the risk and benefits I consent to the administration of sedation/analgesia as may be necessary or desirable in the judgment of my physician.    I CERTIFY THAT I HAVE READ AND FULLY UNDERSTAND THE ABOVE CONSENT TO OPERATION and/or OTHER PROCEDURE.    _________________________________________  __________________________________  Signature of Patient     Signature of Responsible Person         ___________________________________         Printed Name of Responsible Person           _________________________________                 Relationship to Patient  _________________________________________  ______________________________  Signature of Witness          Date  Time      Patient Name: Richy Goins     : 1960                 Printed: 2025     Medical Record #: [id number]                     Page 1 [address]  [address]    Consent for Anesthesia    1. IRichy agree to be cared for by an anesthesiologist, who is  specially trained to monitor me and give me medicine to put me to sleep or keep me comfortable during my procedure    I understand that my anesthesiologist is not an employee or agent of [facility] [employer] or [employer]. He or she works for [employer]    2. As the patient asking for anesthesia services, I agree to:  a. Allow the anesthesiologist (anesthesia doctor) to give me medicine and do additional procedures as necessary. Some examples are: Starting or using an “IV” to give me medicine, fluids or blood during my procedure, and having a breathing tube placed to help me breathe when I’m asleep (intubation). In the event that my heart stops working properly, I understand that my anesthesiologist will make every effort to sustain my life, unless otherwise directed by [facility] Do Not Resuscitate documents.  b. Tell my anesthesia doctor before my procedure:  i. If I am pregnant.  ii. The last time that I ate or drank.  iii. All of the medicines I take (including prescriptions, herbal supplements, and pills I can buy without a prescription (including street drugs/illegal medications). Failure to inform my anesthesiologist about these medicines may increase my risk of anesthetic complications.  iv. If I am allergic to anything or have had a reaction to anesthesia before.  3. I understand how the anesthesia medicine will help me (benefits).  4. I understand that with any type of anesthesia medicine there are risks:  a. The most common risks are: nausea, vomiting, sore throat, muscle soreness, damage to my eyes, mouth, or teeth (from breathing tube placement).  b. Rare risks include: remembering what happened during my procedure, allergic reactions to medications, injury to my airway, heart, lungs, vision, nerves, or muscles and in extremely rare instances death.  5. My doctor has explained to me other choices available to me for my care (alternatives).  6. Pregnant Patients  (“epidural”):  I understand that the risks of having an epidural (medicine given into my back to help control pain during labor), include itching, low blood pressure, difficulty urinating, headache or slowing of the baby’s heart. Very rare risks include infection, bleeding, seizure, irregular heart rhythms and nerve injury.  7. Regional Anesthesia (“spinal”, “epidural”, & “nerve blocks”):  I understand that rare but potential complications include headache, bleeding, infection, seizure, irregular heart rhythms, and nerve injury.    I can change my mind about having anesthesia services at any time before I get the medicine.    _____________________________________________________________________________  Patient (or Representative) Signature/Relationship to Patient  Date   Time    _____________________________________________________________________________   Name (if used)    Language/Organization   Time    _____________________________________________________________________________  Anesthesiologist Signature     Date   Time  I have discussed the procedure and information above with the patient (or patient’s representative) and answered their questions. The patient or their representative has agreed to have anesthesia services.    _____________________________________________________________________________  Witness        Date   Time  I have verified that the signature is that of the patient or patient’s representative, and that it was signed before the procedure  Patient Name: Richy Richardsongo     : 1960                 Printed: 2025     Medical Record #: [id number]                     Page 2 of 2

## (undated) NOTE — LETTER
[address]  [address]  Consent for Procedure/Sedation  Date: 5/15/25         Time: 0900    I hereby authorize Dr Reyes, my physician and his/her assistants (if applicable), which may include medical students, residents, and/or fellows, to perform the following surgical operation/ procedure and administer such anesthesia as may be determined necessary by my physician: Fistulogram on Richy Goins  2.   I recognize that during the surgical operation/procedure, unforeseen conditions may necessitate additional or different procedures than those listed above.  I, therefore, further authorize and request that the above-named surgeon, assistants, or designees perform such procedures as are, in their judgment, necessary and desirable.    3.   My surgeon/physician has discussed prior to my surgery the potential benefits, risks and side effects of this procedure; the likelihood of achieving goals; and potential problems that might occur during recuperation.  They also discussed reasonable alternatives to the procedure, including risks, benefits, and side effects related to the alternatives and risks related to not receiving this procedure.  I have had all my questions answered and I acknowledge that no guarantee has been made as to the result that may be obtained.    4.   Should the need arise during my operation/procedure, which includes change of level of care prior to discharge, I also consent to the administration of blood and/or blood products.  Further, I understand that despite careful testing and screening of blood or blood products by collecting agencies, I may still be subject to ill effects as a result of receiving a blood transfusion and/or blood products.  The following are some, but not all, of the potential risks that can occur: fever and allergic reactions, hemolytic reactions, transmission of diseases such as Hepatitis, AIDS and Cytomegalovirus (CMV) and fluid overload.   In the event that I wish to have an autologous transfusion of my own blood, or a directed donor transfusion, I will discuss this with my physician.   Check only if Refusing Blood or Blood Products  I understand refusal of blood or blood products as deemed necessary by my physician may have serious consequences to my condition to include possible death. I hereby assume responsibility for my refusal and release the hospital, its personnel, and my physicians from any responsibility for the consequences of my refusal.         o  Refuse         5.   I authorize the use of any specimen, organs, tissues, body parts or foreign objects that may be removed from my body during the operation/procedure for diagnosis, research or teaching purposes and their subsequent disposal by hospital authorities.  I also authorize the release of specimen test results and/or written reports to my treating physician on the hospital medical staff or other referring or consulting physicians involved in my care, at the discretion of the Pathologist or my treating physician.    6.   I consent to the photographing or videotaping of the operations or procedures to be performed, including appropriate portions of my body for medical, scientific, or educational purposes, provided my identity is not revealed by the pictures or by descriptive texts accompanying them.  If the procedure has been photographed/videotaped, the surgeon will obtain the original picture, image, videotape or CD.  The hospital will not be responsible for storage, release or maintenance of the picture, image, tape or CD.    7.   I consent to the presence of a  or observers in the operating room as deemed necessary by my physician or their designees.    8.   I recognize that in the event my procedure results in extended X-Ray/fluoroscopy time, I may develop a skin reaction.    9. If I have a Do Not Attempt Resuscitation (DNAR) order in place, that status will be  suspended while in the operating room, procedural suite, and during the recovery period unless otherwise explicitly stated by me (or a person authorized to consent on my behalf). The surgeon or my attending physician will determine when the applicable recovery period ends for purposes of reinstating the DNAR order.  10. Patients having a sterilization procedure: I understand that if the procedure is successful the results will be permanent and it will therefore be impossible for me to inseminate, conceive, or bear children.  I also understand that the procedure is intended to result in sterility, although the result has not been guaranteed.   11. I acknowledge that my physician has explained sedation/analgesia administration to me including the risk and benefits I consent to the administration of sedation/analgesia as may be necessary or desirable in the judgment of my physician.    I CERTIFY THAT I HAVE READ AND FULLY UNDERSTAND THE ABOVE CONSENT TO OPERATION and/or OTHER PROCEDURE.        ____________________________________       _________________________________      ______________________________  Signature of Patient         Signature of Responsible Person        Printed Name of Responsible Person        ____________________________________      _________________________________      ______________________________       Signature of Witness          Relationship to Patient                       Date                                       Time  Patient Name: Richy Richardsongo  : 1960    Reviewed: 2024   Printed: May 15, 2025  Medical Record #: [id number] Page 1 of 1

## (undated) NOTE — LETTER
Date & Time: 6/22/2020, 2:25 PM  Patient: Carson Boo  Encounter Provider(s):    Olvin Medrano MD         This certifies that I, Carson Boo, a patient at [facility], am leaving the facility voluntarily and agains

## (undated) NOTE — LETTER
December 30, 2021    [address] [facility]      Dear James Dill:    Dr. Janet Smith received your Fecal kit results and stated they are normal. He will have you repeat it in one year to track results.     The following are the re

## (undated) NOTE — LETTER
2/5/2024              Richy Goins        [address]        [facility] [address]          Dear Richy,    I'm writing to let you know about the tests that were taken recently.    LAB RESULTS:  Blood Cultures were negative      Follow Up:    No further testing needed           With warmest regards.     [facility]  [address]  [facility] [address]  [phone]    Document generated by:  Nan Lopez MD

## (undated) NOTE — LETTER
[address]  [address]  Consent for Procedure/Sedation  Date: 5/13/25         Time: 1430    I hereby authorize Dr Crystal, my physician and his/her assistants (if applicable), which may include medical students, residents, and/or fellows, to perform the following surgical operation/ procedure and administer such anesthesia as may be determined necessary by my physician: Fistulogram with Possible Angioplasty on Richy Goins  2.   I recognize that during the surgical operation/procedure, unforeseen conditions may necessitate additional or different procedures than those listed above.  I, therefore, further authorize and request that the above-named surgeon, assistants, or designees perform such procedures as are, in their judgment, necessary and desirable.    3.   My surgeon/physician has discussed prior to my surgery the potential benefits, risks and side effects of this procedure; the likelihood of achieving goals; and potential problems that might occur during recuperation.  They also discussed reasonable alternatives to the procedure, including risks, benefits, and side effects related to the alternatives and risks related to not receiving this procedure.  I have had all my questions answered and I acknowledge that no guarantee has been made as to the result that may be obtained.    4.   Should the need arise during my operation/procedure, which includes change of level of care prior to discharge, I also consent to the administration of blood and/or blood products.  Further, I understand that despite careful testing and screening of blood or blood products by collecting agencies, I may still be subject to ill effects as a result of receiving a blood transfusion and/or blood products.  The following are some, but not all, of the potential risks that can occur: fever and allergic reactions, hemolytic reactions, transmission of diseases such as Hepatitis, AIDS and  Cytomegalovirus (CMV) and fluid overload.  In the event that I wish to have an autologous transfusion of my own blood, or a directed donor transfusion, I will discuss this with my physician.   Check only if Refusing Blood or Blood Products  I understand refusal of blood or blood products as deemed necessary by my physician may have serious consequences to my condition to include possible death. I hereby assume responsibility for my refusal and release the hospital, its personnel, and my physicians from any responsibility for the consequences of my refusal.         o  Refuse         5.   I authorize the use of any specimen, organs, tissues, body parts or foreign objects that may be removed from my body during the operation/procedure for diagnosis, research or teaching purposes and their subsequent disposal by hospital authorities.  I also authorize the release of specimen test results and/or written reports to my treating physician on the hospital medical staff or other referring or consulting physicians involved in my care, at the discretion of the Pathologist or my treating physician.    6.   I consent to the photographing or videotaping of the operations or procedures to be performed, including appropriate portions of my body for medical, scientific, or educational purposes, provided my identity is not revealed by the pictures or by descriptive texts accompanying them.  If the procedure has been photographed/videotaped, the surgeon will obtain the original picture, image, videotape or CD.  The hospital will not be responsible for storage, release or maintenance of the picture, image, tape or CD.    7.   I consent to the presence of a  or observers in the operating room as deemed necessary by my physician or their designees.    8.   I recognize that in the event my procedure results in extended X-Ray/fluoroscopy time, I may develop a skin reaction.    9. If I have a Do Not Attempt Resuscitation  (DNAR) order in place, that status will be suspended while in the operating room, procedural suite, and during the recovery period unless otherwise explicitly stated by me (or a person authorized to consent on my behalf). The surgeon or my attending physician will determine when the applicable recovery period ends for purposes of reinstating the DNAR order.  10. Patients having a sterilization procedure: I understand that if the procedure is successful the results will be permanent and it will therefore be impossible for me to inseminate, conceive, or bear children.  I also understand that the procedure is intended to result in sterility, although the result has not been guaranteed.   11. I acknowledge that my physician has explained sedation/analgesia administration to me including the risk and benefits I consent to the administration of sedation/analgesia as may be necessary or desirable in the judgment of my physician.    I CERTIFY THAT I HAVE READ AND FULLY UNDERSTAND THE ABOVE CONSENT TO OPERATION and/or OTHER PROCEDURE.        ____________________________________       _________________________________      ______________________________  Signature of Patient         Signature of Responsible Person        Printed Name of Responsible Person        ____________________________________      _________________________________      ______________________________       Signature of Witness          Relationship to Patient                       Date                                       Time  Patient Name: Richy Goins  : 1960    Reviewed: 2024   Printed: May 13, 2025  Medical Record #: [id number] Page 1 of 1

## (undated) NOTE — LETTER
[address]  [address]  Consent for Procedure/Sedation  Date: 1/9/2025         Time: 0500    I hereby authorize Dr Fermin De La Fuente, my physician and his/her assistants (if applicable), which may include medical students, residents, and/or fellows, to perform the following surgical operation/ procedure and administer such anesthesia as may be determined necessary by my physician:  Operation/Procedure name (s) Peripheral angiography, atherectomy, percutaneous transluminal angioplasty (PTA) and/or vascular stenting on Richy Goins  2.   I recognize that during the surgical operation/procedure, unforeseen conditions may necessitate additional or different procedures than those listed above.  I, therefore, further authorize and request that the above-named surgeon, assistants, or designees perform such procedures as are, in their judgment, necessary and desirable.    3.   My surgeon/physician has discussed prior to my surgery the potential benefits, risks and side effects of this procedure; the likelihood of achieving goals; and potential problems that might occur during recuperation.  They also discussed reasonable alternatives to the procedure, including risks, benefits, and side effects related to the alternatives and risks related to not receiving this procedure.  I have had all my questions answered and I acknowledge that no guarantee has been made as to the result that may be obtained.    4.   Should the need arise during my operation/procedure, which includes change of level of care prior to discharge, I also consent to the administration of blood and/or blood products.  Further, I understand that despite careful testing and screening of blood or blood products by collecting agencies, I may still be subject to ill effects as a result of receiving a blood transfusion and/or blood products.  The following are some, but not all, of the potential risks that can occur: fever and  allergic reactions, hemolytic reactions, transmission of diseases such as Hepatitis, AIDS and Cytomegalovirus (CMV) and fluid overload.  In the event that I wish to have an autologous transfusion of my own blood, or a directed donor transfusion, I will discuss this with my physician.     Check only if Refusing Blood or Blood Products  I understand refusal of blood or blood products as deemed necessary by my physician may have serious consequences to my condition to include possible death. I hereby assume responsibility for my refusal and release the hospital, its personnel, and my physicians from any responsibility for the consequences of my refusal.      o  Refuse        5.   I authorize the use of any specimen, organs, tissues, body parts or foreign objects that may be removed from my body during the operation/procedure for diagnosis, research or teaching purposes and their subsequent disposal by hospital authorities.  I also authorize the release of specimen test results and/or written reports to my treating physician on the hospital medical staff or other referring or consulting physicians involved in my care, at the discretion of the Pathologist or my treating physician.    6.   I consent to the photographing or videotaping of the operations or procedures to be performed, including appropriate portions of my body for medical, scientific, or educational purposes, provided my identity is not revealed by the pictures or by descriptive texts accompanying them.  If the procedure has been photographed/videotaped, the surgeon will obtain the original picture, image, videotape or CD.  The hospital will not be responsible for storage, release or maintenance of the picture, image, tape or CD.    7.   I consent to the presence of a  or observers in the operating room as deemed necessary by my physician or their designees.    8.   I recognize that in the event my procedure results in extended  X-Ray/fluoroscopy time, I may develop a skin reaction.    9. If I have a Do Not Attempt Resuscitation (DNAR) order in place, that status will be suspended while in the operating room, procedural suite, and during the recovery period unless otherwise explicitly stated by me (or a person authorized to consent on my behalf). The surgeon or my attending physician will determine when the applicable recovery period ends for purposes of reinstating the DNAR order.  10. Patients having a sterilization procedure: I understand that if the procedure is successful the results will be permanent and it will therefore be impossible for me to inseminate, conceive, or bear children.  I also understand that the procedure is intended to result in sterility, although the result has not been guaranteed.   11. I acknowledge that my physician has explained sedation/analgesia administration to me including the risk and benefits I consent to the administration of sedation/analgesia as may be necessary or desirable in the judgment of my physician.      I CERTIFY THAT I HAVE READ AND FULLY UNDERSTAND THE ABOVE CONSENT TO OPERATION and/or OTHER PROCEDURE.      ____________________________________       _________________________________      ______________________________  Signature of Patient         Signature of Responsible Person        Printed Name of Responsible Person    ____________________________________      _________________________________      ______________________________       Signature of Witness          Relationship to Patient                       Date                                         Time  Patient Name: Richy Goins  : 1960    Reviewed: 2024   Printed: 2025  Medical Record #: [id number] Page 1 of 1

## (undated) NOTE — LETTER
[address] [facility] FOR BLOOD OR BLOOD COMPONENTS      In the course of your treatment, it may become necessary to administer a transfusion of blood or blood components. This form provides basic information concerning this procedure and, if signed by you, authorizes its performance by qualified medical personnel. DESCRIPTION OF PROCEDURE:  Blood is introduced into one of your veins, commonly in the arm, using a sterilized disposable needle. The amount of blood transfused, and whether the transfusion will be of blood or blood components is a judgment the physician will make based on your particular needs. RISKS:  The transfusion is a common procedure of low risk. MINOR AND TEMPORARY REACTIONS ARE NOT UNCOMMON, including a slight bruise, swelling or local reaction in the area where the needle pierces your skin, or a non-serious reaction to the transfused material itself, including headache, fever or a mild skin reaction, such as rash. Serious reactions are possible, though very unlikely and include severe allergic reaction (shock)  and destruction (hemolysis) of transfused blood cells. Infectious diseases which are known to be transmitted by blood transfusion include CERTAIN TYPES OF VIRAL HEPATITIS, a viral infection of the liver, HUMAN IMMUNODEFICIENCY VIRUS (HIV-1,2) infection, a viral infection known to cause ACQUIRED IMMUNODEFICIENCY SYNDROME (AIDS) AS WELL AS CERTAIN OTHER BACTERIAL, VIRAL AND PARASITIC DISEASES. While a minimal risk of acquiring an infectious disease from transfused blood exists, in accordance with Federal and State law all due care has been taken in donor selection and testing to avoid transmission of disease. ALTERNATIVES:  If loss of blood poses serious threats in the course of your treatment, THERE IS NO EFFECTIVE ALTERNATIVE TO BLOOD TRANSFUSION.  However, if you have any further questions on this matter, your physician will fully explain the alternatives to you if it has not already been done. I,Richy Goins, have read/had read to me the above. I understand the matters bearing on the decision whether or not to authorize a transfusion of blood or blood components. I have no questions which have not been answered to my full satisfaction.  I hereby consent to such transfusion as  my physician may deem necessary or advisable in the course of my treatment.        _______________   __________________________________________________  Date     Signature of Patient, Parent or Legal Guardian      ([place])      __________________________________________  Witness to Signature (title or relationship to patient)    Patient Name: Sagar Bosch     : 1960                 Printed: 2023     Medical Record #: [id number]                    Page 1 of 1

## (undated) NOTE — LETTER
20    Patient: Mike Ramos  : 1960 Visit date: 6/10/2020    Dear  Dr. Francheska Moore MD,    Thank you for referring Mike Ramos to my practice. Please find my assessment and plan below.            Assessment   Screen for colon cancer  (prim